# Patient Record
Sex: MALE | Race: BLACK OR AFRICAN AMERICAN | NOT HISPANIC OR LATINO | Employment: OTHER | RURAL
[De-identification: names, ages, dates, MRNs, and addresses within clinical notes are randomized per-mention and may not be internally consistent; named-entity substitution may affect disease eponyms.]

---

## 2020-10-08 ENCOUNTER — HISTORICAL (OUTPATIENT)
Dept: ADMINISTRATIVE | Facility: HOSPITAL | Age: 70
End: 2020-10-08

## 2020-10-08 LAB
ALBUMIN SERPL BCP-MCNC: 3 G/DL (ref 3.5–5)
ALBUMIN/GLOB SERPL: 0.7 {RATIO}
ALP SERPL-CCNC: 58 U/L (ref 45–115)
ALT SERPL W P-5'-P-CCNC: 13 U/L (ref 16–61)
ANION GAP SERPL CALCULATED.3IONS-SCNC: 16 MMOL/L
ANISOCYTOSIS BLD QL SMEAR: ABNORMAL
APTT PPP: 29.4 SECONDS (ref 25.2–37.3)
AST SERPL W P-5'-P-CCNC: 24 U/L (ref 15–37)
BASOPHILS # BLD AUTO: 0.01 X10E3/UL (ref 0–0.2)
BASOPHILS NFR BLD AUTO: 0.1 % (ref 0–1)
BILIRUB SERPL-MCNC: 0.3 MG/DL (ref 0–1.2)
BUN SERPL-MCNC: 107 MG/DL (ref 7–18)
BUN/CREAT SERPL: 15.9
CALCIUM SERPL-MCNC: 9.8 MG/DL (ref 8.5–10.1)
CHLORIDE SERPL-SCNC: 100 MMOL/L (ref 98–107)
CO2 SERPL-SCNC: 21 MMOL/L (ref 21–32)
CREAT SERPL-MCNC: 6.71 MG/DL (ref 0.7–1.3)
CREAT UR-MCNC: 106 MG/DL (ref 39–259)
D DIMER PPP FEU-MCNC: 0.59 MG/ML (ref 0–0.47)
EOSINOPHIL # BLD AUTO: 0.07 X10E3/UL (ref 0–0.5)
EOSINOPHIL NFR BLD AUTO: 0.6 % (ref 1–4)
ERYTHROCYTE [DISTWIDTH] IN BLOOD BY AUTOMATED COUNT: 18.2 % (ref 11.5–14.5)
GLOBULIN SER-MCNC: 4.3 G/DL (ref 2–4)
GLUCOSE SERPL-MCNC: 124 MG/DL (ref 74–106)
HCT VFR BLD AUTO: 25.8 % (ref 40–54)
HCT VFR BLD AUTO: 28.6 % (ref 40–54)
HGB BLD-MCNC: 8.1 G/DL (ref 13.5–18)
HGB BLD-MCNC: 9.3 G/DL (ref 13.5–18)
INR BLD: 1.1 (ref 0–3.4)
LYMPHOCYTES # BLD AUTO: 2.6 X10E3/UL (ref 1–4.8)
LYMPHOCYTES NFR BLD AUTO: 24.1 % (ref 27–41)
MCH RBC QN AUTO: 24.1 PG (ref 27–31)
MCHC RBC AUTO-ENTMCNC: 32.5 G/DL (ref 32–36)
MCV RBC AUTO: 74 FL (ref 80–96)
MICROCYTES BLD QL SMEAR: ABNORMAL
MONOCYTES # BLD AUTO: 1.04 X10E3/UL (ref 0–0.8)
MONOCYTES NFR BLD AUTO: 9.6 % (ref 2–6)
MPC BLD CALC-MCNC: 9.2 FL (ref 9.4–12.4)
NEUTROPHILS # BLD AUTO: 7.08 X10E3/UL (ref 1.8–7.7)
NEUTROPHILS NFR BLD AUTO: 65.6 % (ref 53–65)
OVALOCYTES BLD QL SMEAR: ABNORMAL
PLATELET # BLD AUTO: 283 X10E3/UL (ref 150–400)
PLATELET # BLD AUTO: 352 X10E3/UL (ref 150–400)
PLATELET MORPHOLOGY: NORMAL
POTASSIUM SERPL-SCNC: 4.8 MMOL/L (ref 3.5–5.1)
PROT SERPL-MCNC: 7.3 G/DL (ref 6.4–8.2)
PROT UR-MCNC: 23.7 MG/DL (ref 28–141)
PROT/CREAT 24H UR-RTO: 0.2 MG/G
PROTHROMBIN TIME: 13.2 SECONDS (ref 11.7–14.7)
RBC # BLD AUTO: 3.86 X10E6/UL (ref 4.6–6.2)
SARS-COV+SARS-COV-2 AG RESP QL IA.RAPID: NEGATIVE
SODIUM SERPL-SCNC: 132 MMOL/L (ref 136–145)
TROPONIN I SERPL-MCNC: 0.07 NG/ML (ref 0–0.06)
TROPONIN I SERPL-MCNC: 0.07 NG/ML (ref 0–0.06)
WBC # BLD AUTO: 10.8 X10E3/UL (ref 4.5–11)

## 2020-10-09 ENCOUNTER — HISTORICAL (OUTPATIENT)
Dept: ADMINISTRATIVE | Facility: HOSPITAL | Age: 70
End: 2020-10-09

## 2020-10-09 LAB
CK MB SERPL-MCNC: 2 NG/ML (ref 1–3.6)
CK MB SERPL-MCNC: 2.1 NG/ML (ref 1–3.6)
HCT VFR BLD AUTO: 25.4 % (ref 40–54)
HCT VFR BLD AUTO: 26.4 % (ref 40–54)
HGB BLD-MCNC: 7.8 G/DL (ref 13.5–18)
HGB BLD-MCNC: 8.5 G/DL (ref 13.5–18)
MYOGLOBIN SERPL-MCNC: 239 NG/ML (ref 16–116)
MYOGLOBIN SERPL-MCNC: 264 NG/ML (ref 16–116)
PLATELET # BLD AUTO: 248 X10E3/UL (ref 150–400)
TROPONIN I SERPL-MCNC: 0.06 NG/ML (ref 0–0.06)

## 2020-10-10 ENCOUNTER — HISTORICAL (OUTPATIENT)
Dept: ADMINISTRATIVE | Facility: HOSPITAL | Age: 70
End: 2020-10-10

## 2020-10-10 LAB
ALBUMIN SERPL BCP-MCNC: 2.5 G/DL (ref 3.5–5)
ALBUMIN/GLOB SERPL: 0.7 {RATIO}
ALP SERPL-CCNC: 48 U/L (ref 45–115)
ALT SERPL W P-5'-P-CCNC: 11 U/L (ref 16–61)
ANION GAP SERPL CALCULATED.3IONS-SCNC: 13 MMOL/L
AST SERPL W P-5'-P-CCNC: 22 U/L (ref 15–37)
BASOPHILS # BLD AUTO: 0.02 X10E3/UL (ref 0–0.2)
BASOPHILS NFR BLD AUTO: 0.3 % (ref 0–1)
BILIRUB SERPL-MCNC: 0.3 MG/DL (ref 0–1.2)
BUN SERPL-MCNC: 69 MG/DL (ref 7–18)
BUN/CREAT SERPL: 16.5
CALCIUM SERPL-MCNC: 8.9 MG/DL (ref 8.5–10.1)
CHLORIDE SERPL-SCNC: 106 MMOL/L (ref 98–107)
CO2 SERPL-SCNC: 20 MMOL/L (ref 21–32)
CREAT SERPL-MCNC: 4.18 MG/DL (ref 0.7–1.3)
EOSINOPHIL # BLD AUTO: 0.21 X10E3/UL (ref 0–0.5)
EOSINOPHIL NFR BLD AUTO: 2.9 % (ref 1–4)
ERYTHROCYTE [DISTWIDTH] IN BLOOD BY AUTOMATED COUNT: 18.6 % (ref 11.5–14.5)
GLOBULIN SER-MCNC: 3.5 G/DL (ref 2–4)
GLUCOSE SERPL-MCNC: 86 MG/DL (ref 74–106)
HCT VFR BLD AUTO: 22.8 % (ref 40–54)
HGB BLD-MCNC: 7.2 G/DL (ref 13.5–18)
IMM GRANULOCYTES # BLD AUTO: 0.04 X10E3/UL (ref 0–0.04)
IMM GRANULOCYTES NFR BLD: 0.5 % (ref 0–0.4)
LYMPHOCYTES # BLD AUTO: 2.06 X10E3/UL (ref 1–4.8)
LYMPHOCYTES NFR BLD AUTO: 28.3 % (ref 27–41)
MCH RBC QN AUTO: 24.6 PG (ref 27–31)
MCHC RBC AUTO-ENTMCNC: 31.6 G/DL (ref 32–36)
MCV RBC AUTO: 77.8 FL (ref 80–96)
MONOCYTES # BLD AUTO: 0.63 X10E3/UL (ref 0–0.8)
MONOCYTES NFR BLD AUTO: 8.6 % (ref 2–6)
MPC BLD CALC-MCNC: 9.3 FL (ref 9.4–12.4)
NEUTROPHILS # BLD AUTO: 4.33 X10E3/UL (ref 1.8–7.7)
NEUTROPHILS NFR BLD AUTO: 59.4 % (ref 53–65)
NRBC # BLD AUTO: 0 X10E3/UL (ref 0–0)
NRBC, AUTO (.00): 0 /100 (ref 0–0)
PLATELET # BLD AUTO: 229 X10E3/UL (ref 150–400)
POTASSIUM SERPL-SCNC: 4.5 MMOL/L (ref 3.5–5.1)
PROT SERPL-MCNC: 6 G/DL (ref 6.4–8.2)
RBC # BLD AUTO: 2.93 X10E6/UL (ref 4.6–6.2)
SODIUM SERPL-SCNC: 134 MMOL/L (ref 136–145)
WBC # BLD AUTO: 7.29 X10E3/UL (ref 4.5–11)

## 2020-10-11 ENCOUNTER — HISTORICAL (OUTPATIENT)
Dept: ADMINISTRATIVE | Facility: HOSPITAL | Age: 70
End: 2020-10-11

## 2020-10-11 LAB
ANION GAP SERPL CALCULATED.3IONS-SCNC: 13 MMOL/L
BASOPHILS # BLD AUTO: 0.03 X10E3/UL (ref 0–0.2)
BASOPHILS NFR BLD AUTO: 0.5 % (ref 0–1)
BUN SERPL-MCNC: 51 MG/DL (ref 7–18)
CALCIUM SERPL-MCNC: 9 MG/DL (ref 8.5–10.1)
CHLORIDE SERPL-SCNC: 106 MMOL/L (ref 98–107)
CO2 SERPL-SCNC: 21 MMOL/L (ref 21–32)
CREAT SERPL-MCNC: 3.34 MG/DL (ref 0.7–1.3)
EOSINOPHIL # BLD AUTO: 0.22 X10E3/UL (ref 0–0.5)
EOSINOPHIL NFR BLD AUTO: 3.5 % (ref 1–4)
ERYTHROCYTE [DISTWIDTH] IN BLOOD BY AUTOMATED COUNT: 18.9 % (ref 11.5–14.5)
GLUCOSE SERPL-MCNC: 105 MG/DL (ref 74–106)
HCT VFR BLD AUTO: 23.7 % (ref 40–54)
HGB BLD-MCNC: 7.4 G/DL (ref 13.5–18)
IMM GRANULOCYTES # BLD AUTO: 0.02 X10E3/UL (ref 0–0.04)
IMM GRANULOCYTES NFR BLD: 0.3 % (ref 0–0.4)
LYMPHOCYTES # BLD AUTO: 1.69 X10E3/UL (ref 1–4.8)
LYMPHOCYTES NFR BLD AUTO: 27 % (ref 27–41)
MCH RBC QN AUTO: 23.9 PG (ref 27–31)
MCHC RBC AUTO-ENTMCNC: 31.2 G/DL (ref 32–36)
MCV RBC AUTO: 76.5 FL (ref 80–96)
MONOCYTES # BLD AUTO: 0.4 X10E3/UL (ref 0–0.8)
MONOCYTES NFR BLD AUTO: 6.4 % (ref 2–6)
MPC BLD CALC-MCNC: 9.1 FL (ref 9.4–12.4)
NEUTROPHILS # BLD AUTO: 3.89 X10E3/UL (ref 1.8–7.7)
NEUTROPHILS NFR BLD AUTO: 62.3 % (ref 53–65)
NRBC # BLD AUTO: 0 X10E3/UL (ref 0–0)
NRBC, AUTO (.00): 0 /100 (ref 0–0)
PLATELET # BLD AUTO: 249 X10E3/UL (ref 150–400)
POTASSIUM SERPL-SCNC: 4.2 MMOL/L (ref 3.5–5.1)
RBC # BLD AUTO: 3.1 X10E6/UL (ref 4.6–6.2)
SODIUM SERPL-SCNC: 136 MMOL/L (ref 136–145)
TROPONIN I SERPL-MCNC: 0.04 NG/ML (ref 0–0.06)
WBC # BLD AUTO: 6.25 X10E3/UL (ref 4.5–11)

## 2020-10-21 ENCOUNTER — HISTORICAL (OUTPATIENT)
Dept: ADMINISTRATIVE | Facility: HOSPITAL | Age: 70
End: 2020-10-21

## 2020-10-21 LAB
ALBUMIN SERPL BCP-MCNC: 3.5 G/DL (ref 3.5–5)
ALBUMIN/GLOB SERPL: 0.9 {RATIO}
ALP SERPL-CCNC: 78 U/L (ref 45–115)
ALT SERPL W P-5'-P-CCNC: 15 U/L (ref 16–61)
ANION GAP SERPL CALCULATED.3IONS-SCNC: 14 MMOL/L
AST SERPL W P-5'-P-CCNC: 15 U/L (ref 15–37)
BACTERIA #/AREA URNS HPF: ABNORMAL /HPF
BASOPHILS # BLD AUTO: 0.02 X10E3/UL (ref 0–0.2)
BASOPHILS NFR BLD AUTO: 0.3 % (ref 0–1)
BILIRUB SERPL-MCNC: 0.5 MG/DL (ref 0–1.2)
BILIRUB UR QL STRIP: NEGATIVE MG/DL
BUN SERPL-MCNC: 21 MG/DL (ref 7–18)
BUN/CREAT SERPL: 6
CALCIUM SERPL-MCNC: 9.9 MG/DL (ref 8.5–10.1)
CHLORIDE SERPL-SCNC: 103 MMOL/L (ref 98–107)
CLARITY UR: CLEAR
CLARITY UR: CLEAR
CO2 SERPL-SCNC: 23 MMOL/L (ref 21–32)
COLOR UR: YELLOW
COLOR UR: YELLOW
CREAT SERPL-MCNC: 3.5 MG/DL (ref 0.7–1.3)
EOSINOPHIL # BLD AUTO: 0.06 X10E3/UL (ref 0–0.5)
EOSINOPHIL NFR BLD AUTO: 0.8 % (ref 1–4)
ERYTHROCYTE [DISTWIDTH] IN BLOOD BY AUTOMATED COUNT: 19.1 % (ref 11.5–14.5)
GLOBULIN SER-MCNC: 4 G/DL (ref 2–4)
GLUCOSE SERPL-MCNC: 101 MG/DL (ref 70–105)
GLUCOSE SERPL-MCNC: 119 MG/DL (ref 74–106)
GLUCOSE SERPL-MCNC: 134 MG/DL (ref 70–105)
GLUCOSE UR STRIP-MCNC: NORMAL MG/DL
HCT VFR BLD AUTO: 28.9 % (ref 40–54)
HGB BLD-MCNC: 9.6 G/DL (ref 13.5–18)
KETONES UR STRIP-SCNC: NEGATIVE MG/DL
LEUKOCYTE ESTERASE UR QL STRIP: NEGATIVE LEU/UL
LYMPHOCYTES # BLD AUTO: 1.4 X10E3/UL (ref 1–4.8)
LYMPHOCYTES NFR BLD AUTO: 19.4 % (ref 27–41)
MAGNESIUM SERPL-MCNC: 1.5 MG/DL (ref 1.7–2.3)
MCH RBC QN AUTO: 25.7 PG (ref 27–31)
MCHC RBC AUTO-ENTMCNC: 33.2 G/DL (ref 32–36)
MCV RBC AUTO: 78 FL (ref 80–96)
MONOCYTES # BLD AUTO: 0.56 X10E3/UL (ref 0–0.8)
MONOCYTES NFR BLD AUTO: 7.8 % (ref 2–6)
MPC BLD CALC-MCNC: 9 FL (ref 9.4–12.4)
NEUTROPHILS # BLD AUTO: 5.17 X10E3/UL (ref 1.8–7.7)
NEUTROPHILS NFR BLD AUTO: 71.7 % (ref 53–65)
NITRITE UR QL STRIP: NEGATIVE
PH UR STRIP: 5.5 PH UNITS (ref 5–8)
PLATELET # BLD AUTO: 369 X10E3/UL (ref 150–400)
POTASSIUM SERPL-SCNC: 3.8 MMOL/L (ref 3.5–5.1)
PREALB SERPL NEPH-MCNC: 24 MG/DL (ref 20–40)
PROT SERPL-MCNC: 7.5 G/DL (ref 6.4–8.2)
PROT UR QL STRIP: 30 MG/DL
RBC # BLD AUTO: 3.73 X10E6/UL (ref 4.6–6.2)
RBC # UR STRIP: NEGATIVE ERY/UL
RBC #/AREA URNS HPF: ABNORMAL /HPF (ref 0–3)
SARS-COV+SARS-COV-2 AG RESP QL IA.RAPID: NEGATIVE
SODIUM SERPL-SCNC: 136 MMOL/L (ref 136–145)
SP GR UR STRIP: 1.01 (ref 1–1.03)
SQUAMOUS #/AREA URNS LPF: ABNORMAL /LPF
TROPONIN I SERPL-MCNC: 0.04 NG/ML (ref 0–0.06)
TSH SERPL DL<=0.005 MIU/L-ACNC: 2.49 UIU/ML (ref 0.36–3.74)
UROBILINOGEN UR STRIP-ACNC: 0.2 MG/DL
WBC # BLD AUTO: 7.21 X10E3/UL (ref 4.5–11)
WBC #/AREA URNS HPF: ABNORMAL /HPF (ref 0–5)

## 2020-10-22 ENCOUNTER — HISTORICAL (OUTPATIENT)
Dept: ADMINISTRATIVE | Facility: HOSPITAL | Age: 70
End: 2020-10-22

## 2020-10-22 LAB
GLUCOSE SERPL-MCNC: 89 MG/DL (ref 70–105)
GLUCOSE SERPL-MCNC: 97 MG/DL (ref 70–105)

## 2020-10-23 ENCOUNTER — HISTORICAL (OUTPATIENT)
Dept: ADMINISTRATIVE | Facility: HOSPITAL | Age: 70
End: 2020-10-23

## 2020-10-23 LAB
GLUCOSE SERPL-MCNC: 118 MG/DL (ref 70–105)
GLUCOSE SERPL-MCNC: 135 MG/DL (ref 70–105)

## 2020-10-24 ENCOUNTER — HISTORICAL (OUTPATIENT)
Dept: ADMINISTRATIVE | Facility: HOSPITAL | Age: 70
End: 2020-10-24

## 2020-10-24 LAB
GLUCOSE SERPL-MCNC: 104 MG/DL (ref 70–105)
GLUCOSE SERPL-MCNC: 138 MG/DL (ref 70–105)

## 2020-10-25 ENCOUNTER — HISTORICAL (OUTPATIENT)
Dept: ADMINISTRATIVE | Facility: HOSPITAL | Age: 70
End: 2020-10-25

## 2020-10-25 LAB
GLUCOSE SERPL-MCNC: 115 MG/DL (ref 70–105)
GLUCOSE SERPL-MCNC: 120 MG/DL (ref 70–105)

## 2020-10-26 ENCOUNTER — HISTORICAL (OUTPATIENT)
Dept: ADMINISTRATIVE | Facility: HOSPITAL | Age: 70
End: 2020-10-26

## 2020-10-26 LAB
GLUCOSE SERPL-MCNC: 95 MG/DL (ref 70–105)
GLUCOSE SERPL-MCNC: 96 MG/DL (ref 70–105)

## 2020-10-27 ENCOUNTER — HISTORICAL (OUTPATIENT)
Dept: ADMINISTRATIVE | Facility: HOSPITAL | Age: 70
End: 2020-10-27

## 2020-10-27 LAB
GLUCOSE SERPL-MCNC: 109 MG/DL (ref 70–105)
GLUCOSE SERPL-MCNC: 124 MG/DL (ref 70–105)

## 2020-10-28 ENCOUNTER — HISTORICAL (OUTPATIENT)
Dept: ADMINISTRATIVE | Facility: HOSPITAL | Age: 70
End: 2020-10-28

## 2020-10-28 LAB
ABO: NORMAL
ANION GAP SERPL CALCULATED.3IONS-SCNC: 12 MMOL/L
ANTIBODY SCREEN: NEGATIVE
BASOPHILS # BLD AUTO: 0.01 X10E3/UL (ref 0–0.2)
BASOPHILS NFR BLD AUTO: 0.2 % (ref 0–1)
BUN SERPL-MCNC: 22 MG/DL (ref 7–18)
CALCIUM SERPL-MCNC: 8.9 MG/DL (ref 8.5–10.1)
CHLORIDE SERPL-SCNC: 106 MMOL/L (ref 98–107)
CO2 SERPL-SCNC: 24 MMOL/L (ref 21–32)
CREAT SERPL-MCNC: 2.86 MG/DL (ref 0.7–1.3)
CROSSMATCH INTERPRETATION: 1
CROSSMATCH INTERPRETATION: 1
EOSINOPHIL # BLD AUTO: 0.13 X10E3/UL (ref 0–0.5)
EOSINOPHIL NFR BLD AUTO: 2.1 % (ref 1–4)
ERYTHROCYTE [DISTWIDTH] IN BLOOD BY AUTOMATED COUNT: 18.1 % (ref 11.5–14.5)
GLUCOSE SERPL-MCNC: 118 MG/DL (ref 70–105)
GLUCOSE SERPL-MCNC: 94 MG/DL (ref 74–106)
HCT VFR BLD AUTO: 20.8 % (ref 40–54)
HCT VFR BLD AUTO: 25.4 % (ref 40–54)
HGB BLD-MCNC: 6.4 G/DL (ref 13.5–18)
HGB BLD-MCNC: 8.1 G/DL (ref 13.5–18)
LYMPHOCYTES # BLD AUTO: 1.73 X10E3/UL (ref 1–4.8)
LYMPHOCYTES NFR BLD AUTO: 28.2 % (ref 27–41)
MCH RBC QN AUTO: 23.7 PG (ref 27–31)
MCHC RBC AUTO-ENTMCNC: 30.8 G/DL (ref 32–36)
MCV RBC AUTO: 77 FL (ref 80–96)
MONOCYTES # BLD AUTO: 0.55 X10E3/UL (ref 0–0.8)
MONOCYTES NFR BLD AUTO: 9 % (ref 2–6)
MPC BLD CALC-MCNC: 8.2 FL (ref 9.4–12.4)
NEUTROPHILS # BLD AUTO: 3.71 X10E3/UL (ref 1.8–7.7)
NEUTROPHILS NFR BLD AUTO: 60.5 % (ref 53–65)
PACKED RBC'S: 1
PACKED RBC'S: 1
PLATELET # BLD AUTO: 261 X10E3/UL (ref 150–400)
POTASSIUM SERPL-SCNC: 4.2 MMOL/L (ref 3.5–5.1)
RBC # BLD AUTO: 2.7 X10E6/UL (ref 4.6–6.2)
RH TYPE: POSITIVE
SODIUM SERPL-SCNC: 138 MMOL/L (ref 136–145)
UNIT NUMBER: 1
UNIT NUMBER: 1
UNIT STATUS: 1
UNIT STATUS: 1
WBC # BLD AUTO: 6.13 X10E3/UL (ref 4.5–11)

## 2020-10-29 ENCOUNTER — HISTORICAL (OUTPATIENT)
Dept: ADMINISTRATIVE | Facility: HOSPITAL | Age: 70
End: 2020-10-29

## 2020-10-29 LAB
BASOPHILS # BLD AUTO: 0.02 X10E3/UL (ref 0–0.2)
BASOPHILS NFR BLD AUTO: 0.3 % (ref 0–1)
EOSINOPHIL # BLD AUTO: 0.23 X10E3/UL (ref 0–0.5)
EOSINOPHIL NFR BLD AUTO: 3.2 % (ref 1–4)
ERYTHROCYTE [DISTWIDTH] IN BLOOD BY AUTOMATED COUNT: 18.1 % (ref 11.5–14.5)
GLUCOSE SERPL-MCNC: 87 MG/DL (ref 70–105)
GLUCOSE SERPL-MCNC: 88 MG/DL (ref 70–105)
HCT VFR BLD AUTO: 25.7 % (ref 40–54)
HGB BLD-MCNC: 8.3 G/DL (ref 13.5–18)
LYMPHOCYTES # BLD AUTO: 1.88 X10E3/UL (ref 1–4.8)
LYMPHOCYTES NFR BLD AUTO: 26.4 % (ref 27–41)
MCH RBC QN AUTO: 25.2 PG (ref 27–31)
MCHC RBC AUTO-ENTMCNC: 32.3 G/DL (ref 32–36)
MCV RBC AUTO: 78 FL (ref 80–96)
MONOCYTES # BLD AUTO: 0.61 X10E3/UL (ref 0–0.8)
MONOCYTES NFR BLD AUTO: 8.6 % (ref 2–6)
MPC BLD CALC-MCNC: 9 FL (ref 9.4–12.4)
NEUTROPHILS # BLD AUTO: 4.38 X10E3/UL (ref 1.8–7.7)
NEUTROPHILS NFR BLD AUTO: 61.5 % (ref 53–65)
PLATELET # BLD AUTO: 284 X10E3/UL (ref 150–400)
RBC # BLD AUTO: 3.3 X10E6/UL (ref 4.6–6.2)
WBC # BLD AUTO: 7.12 X10E3/UL (ref 4.5–11)

## 2020-10-30 ENCOUNTER — HISTORICAL (OUTPATIENT)
Dept: ADMINISTRATIVE | Facility: HOSPITAL | Age: 70
End: 2020-10-30

## 2020-10-30 LAB
GLUCOSE SERPL-MCNC: 113 MG/DL (ref 70–105)
GLUCOSE SERPL-MCNC: 137 MG/DL (ref 70–105)
GLUCOSE SERPL-MCNC: 90 MG/DL (ref 70–105)

## 2020-10-31 ENCOUNTER — HISTORICAL (OUTPATIENT)
Dept: ADMINISTRATIVE | Facility: HOSPITAL | Age: 70
End: 2020-10-31

## 2020-10-31 LAB
GLUCOSE SERPL-MCNC: 104 MG/DL (ref 70–105)
GLUCOSE SERPL-MCNC: 106 MG/DL (ref 70–105)

## 2020-11-01 ENCOUNTER — HISTORICAL (OUTPATIENT)
Dept: ADMINISTRATIVE | Facility: HOSPITAL | Age: 70
End: 2020-11-01

## 2020-11-01 LAB
GLUCOSE SERPL-MCNC: 108 MG/DL (ref 70–105)
GLUCOSE SERPL-MCNC: 87 MG/DL (ref 70–105)

## 2020-11-02 ENCOUNTER — HISTORICAL (OUTPATIENT)
Dept: ADMINISTRATIVE | Facility: HOSPITAL | Age: 70
End: 2020-11-02

## 2020-11-02 LAB
GLUCOSE SERPL-MCNC: 91 MG/DL (ref 70–105)
GLUCOSE SERPL-MCNC: 92 MG/DL (ref 70–105)

## 2020-11-03 ENCOUNTER — HISTORICAL (OUTPATIENT)
Dept: ADMINISTRATIVE | Facility: HOSPITAL | Age: 70
End: 2020-11-03

## 2020-11-03 LAB
GLUCOSE SERPL-MCNC: 95 MG/DL (ref 70–105)
GLUCOSE SERPL-MCNC: 95 MG/DL (ref 70–105)

## 2020-11-04 ENCOUNTER — HISTORICAL (OUTPATIENT)
Dept: ADMINISTRATIVE | Facility: HOSPITAL | Age: 70
End: 2020-11-04

## 2020-11-04 LAB
ANION GAP SERPL CALCULATED.3IONS-SCNC: 12 MMOL/L
BASOPHILS # BLD AUTO: 0.02 X10E3/UL (ref 0–0.2)
BASOPHILS NFR BLD AUTO: 0.3 % (ref 0–1)
BUN SERPL-MCNC: 19 MG/DL (ref 7–18)
CALCIUM SERPL-MCNC: 9.1 MG/DL (ref 8.5–10.1)
CHLORIDE SERPL-SCNC: 108 MMOL/L (ref 98–107)
CO2 SERPL-SCNC: 23 MMOL/L (ref 21–32)
CREAT SERPL-MCNC: 2.64 MG/DL (ref 0.7–1.3)
EOSINOPHIL # BLD AUTO: 0.11 X10E3/UL (ref 0–0.5)
EOSINOPHIL NFR BLD AUTO: 1.4 % (ref 1–4)
ERYTHROCYTE [DISTWIDTH] IN BLOOD BY AUTOMATED COUNT: 20.6 % (ref 11.5–14.5)
GLUCOSE SERPL-MCNC: 111 MG/DL (ref 70–105)
GLUCOSE SERPL-MCNC: 90 MG/DL (ref 74–106)
HCT VFR BLD AUTO: 28.2 % (ref 40–54)
HGB BLD-MCNC: 8.8 G/DL (ref 13.5–18)
LYMPHOCYTES # BLD AUTO: 1.75 X10E3/UL (ref 1–4.8)
LYMPHOCYTES NFR BLD AUTO: 22.1 % (ref 27–41)
MCH RBC QN AUTO: 25.4 PG (ref 27–31)
MCHC RBC AUTO-ENTMCNC: 31.2 G/DL (ref 32–36)
MCV RBC AUTO: 82 FL (ref 80–96)
MONOCYTES # BLD AUTO: 0.65 X10E3/UL (ref 0–0.8)
MONOCYTES NFR BLD AUTO: 8.2 % (ref 2–6)
MPC BLD CALC-MCNC: 8.7 FL (ref 9.4–12.4)
NEUTROPHILS # BLD AUTO: 5.4 X10E3/UL (ref 1.8–7.7)
NEUTROPHILS NFR BLD AUTO: 68 % (ref 53–65)
PLATELET # BLD AUTO: 273 X10E3/UL (ref 150–400)
POTASSIUM SERPL-SCNC: 4.4 MMOL/L (ref 3.5–5.1)
RBC # BLD AUTO: 3.46 X10E6/UL (ref 4.6–6.2)
SODIUM SERPL-SCNC: 139 MMOL/L (ref 136–145)
WBC # BLD AUTO: 7.93 X10E3/UL (ref 4.5–11)

## 2020-11-05 ENCOUNTER — HISTORICAL (OUTPATIENT)
Dept: ADMINISTRATIVE | Facility: HOSPITAL | Age: 70
End: 2020-11-05

## 2020-11-05 LAB
GLUCOSE SERPL-MCNC: 102 MG/DL (ref 70–105)
GLUCOSE SERPL-MCNC: 91 MG/DL (ref 70–105)

## 2020-11-06 ENCOUNTER — HISTORICAL (OUTPATIENT)
Dept: ADMINISTRATIVE | Facility: HOSPITAL | Age: 70
End: 2020-11-06

## 2020-11-06 LAB
GLUCOSE SERPL-MCNC: 123 MG/DL (ref 70–105)
GLUCOSE SERPL-MCNC: 91 MG/DL (ref 70–105)

## 2020-11-07 ENCOUNTER — HISTORICAL (OUTPATIENT)
Dept: ADMINISTRATIVE | Facility: HOSPITAL | Age: 70
End: 2020-11-07

## 2020-11-07 LAB
GLUCOSE SERPL-MCNC: 101 MG/DL (ref 70–105)
GLUCOSE SERPL-MCNC: 91 MG/DL (ref 70–105)

## 2020-11-08 ENCOUNTER — HISTORICAL (OUTPATIENT)
Dept: ADMINISTRATIVE | Facility: HOSPITAL | Age: 70
End: 2020-11-08

## 2020-11-08 LAB
GLUCOSE SERPL-MCNC: 114 MG/DL (ref 70–105)
GLUCOSE SERPL-MCNC: 81 MG/DL (ref 70–105)

## 2020-11-09 ENCOUNTER — HISTORICAL (OUTPATIENT)
Dept: ADMINISTRATIVE | Facility: HOSPITAL | Age: 70
End: 2020-11-09

## 2020-11-09 LAB
GLUCOSE SERPL-MCNC: 92 MG/DL (ref 70–105)
GLUCOSE SERPL-MCNC: 97 MG/DL (ref 70–105)

## 2020-11-10 ENCOUNTER — HISTORICAL (OUTPATIENT)
Dept: ADMINISTRATIVE | Facility: HOSPITAL | Age: 70
End: 2020-11-10

## 2020-11-10 LAB — GLUCOSE SERPL-MCNC: 91 MG/DL (ref 70–105)

## 2021-03-10 ENCOUNTER — HISTORICAL (OUTPATIENT)
Dept: ADMINISTRATIVE | Facility: HOSPITAL | Age: 71
End: 2021-03-10

## 2021-03-10 LAB
FLUAV AG UPPER RESP QL IA.RAPID: NEGATIVE
FLUBV AG UPPER RESP QL IA.RAPID: NEGATIVE
SARS-COV+SARS-COV-2 AG RESP QL IA.RAPID: NEGATIVE

## 2021-12-11 ENCOUNTER — HOSPITAL ENCOUNTER (INPATIENT)
Facility: HOSPITAL | Age: 71
LOS: 4 days | Discharge: NURSING, REHAB OR LONG-TERM CARE | DRG: 534 | End: 2021-12-15
Attending: FAMILY MEDICINE | Admitting: FAMILY MEDICINE
Payer: MEDICARE

## 2021-12-11 DIAGNOSIS — S72.109A: ICD-10-CM
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
## 2021-12-11 DIAGNOSIS — R05.9 COUGH: ICD-10-CM

## 2021-12-11 DIAGNOSIS — S72.8X1A OTHER FRACTURE OF RIGHT FEMUR, INITIAL ENCOUNTER FOR CLOSED FRACTURE: Primary | ICD-10-CM

## 2021-12-11 DIAGNOSIS — S72.101A CLOSED FRACTURE OF TROCHANTER OF RIGHT FEMUR, INITIAL ENCOUNTER: ICD-10-CM

## 2021-12-11 LAB
ANION GAP SERPL CALCULATED.3IONS-SCNC: 12 MMOL/L (ref 7–16)
BASOPHILS # BLD AUTO: 0.02 K/UL (ref 0–0.2)
BASOPHILS NFR BLD AUTO: 0.3 % (ref 0–1)
BUN SERPL-MCNC: 26 MG/DL (ref 7–18)
BUN/CREAT SERPL: 9 (ref 6–20)
CALCIUM SERPL-MCNC: 8.6 MG/DL (ref 8.5–10.1)
CHLORIDE SERPL-SCNC: 106 MMOL/L (ref 98–107)
CO2 SERPL-SCNC: 25 MMOL/L (ref 21–32)
CREAT SERPL-MCNC: 2.91 MG/DL (ref 0.7–1.3)
DIFFERENTIAL METHOD BLD: ABNORMAL
EOSINOPHIL # BLD AUTO: 0.14 K/UL (ref 0–0.5)
EOSINOPHIL NFR BLD AUTO: 1.8 % (ref 1–4)
ERYTHROCYTE [DISTWIDTH] IN BLOOD BY AUTOMATED COUNT: 13.2 % (ref 11.5–14.5)
FLUAV AG UPPER RESP QL IA.RAPID: NEGATIVE
FLUBV AG UPPER RESP QL IA.RAPID: NEGATIVE
GLUCOSE SERPL-MCNC: 101 MG/DL (ref 74–106)
HCT VFR BLD AUTO: 33 % (ref 40–54)
HGB BLD-MCNC: 11.3 G/DL (ref 13.5–18)
LYMPHOCYTES # BLD AUTO: 0.84 K/UL (ref 1–4.8)
LYMPHOCYTES NFR BLD AUTO: 11 % (ref 27–41)
MCH RBC QN AUTO: 33.9 PG (ref 27–31)
MCHC RBC AUTO-ENTMCNC: 34.2 G/DL (ref 32–36)
MCV RBC AUTO: 99.1 FL (ref 80–96)
MONOCYTES # BLD AUTO: 0.53 K/UL (ref 0–0.8)
MONOCYTES NFR BLD AUTO: 6.9 % (ref 2–6)
MPC BLD CALC-MCNC: 8.7 FL (ref 9.4–12.4)
NEUTROPHILS # BLD AUTO: 6.13 K/UL (ref 1.8–7.7)
NEUTROPHILS NFR BLD AUTO: 80 % (ref 53–65)
PLATELET # BLD AUTO: 196 K/UL (ref 150–400)
POTASSIUM SERPL-SCNC: 4.5 MMOL/L (ref 3.5–5.1)
RBC # BLD AUTO: 3.33 M/UL (ref 4.6–6.2)
SARS-COV+SARS-COV-2 AG RESP QL IA.RAPID: NEGATIVE
SODIUM SERPL-SCNC: 138 MMOL/L (ref 136–145)
WBC # BLD AUTO: 7.66 K/UL (ref 4.5–11)

## 2021-12-11 PROCEDURE — 85025 COMPLETE CBC W/AUTO DIFF WBC: CPT | Performed by: NURSE PRACTITIONER

## 2021-12-11 PROCEDURE — 94640 AIRWAY INHALATION TREATMENT: CPT

## 2021-12-11 PROCEDURE — 99285 EMERGENCY DEPT VISIT HI MDM: CPT | Mod: 25

## 2021-12-11 PROCEDURE — 96375 TX/PRO/DX INJ NEW DRUG ADDON: CPT

## 2021-12-11 PROCEDURE — 27000941

## 2021-12-11 PROCEDURE — 25000242 PHARM REV CODE 250 ALT 637 W/ HCPCS: Performed by: NURSE PRACTITIONER

## 2021-12-11 PROCEDURE — 25000003 PHARM REV CODE 250: Performed by: PHYSICIAN ASSISTANT

## 2021-12-11 PROCEDURE — 96374 THER/PROPH/DIAG INJ IV PUSH: CPT

## 2021-12-11 PROCEDURE — 63600175 PHARM REV CODE 636 W HCPCS: Performed by: NURSE PRACTITIONER

## 2021-12-11 PROCEDURE — 96376 TX/PRO/DX INJ SAME DRUG ADON: CPT

## 2021-12-11 PROCEDURE — 36415 COLL VENOUS BLD VENIPUNCTURE: CPT | Performed by: NURSE PRACTITIONER

## 2021-12-11 PROCEDURE — 25000003 PHARM REV CODE 250: Performed by: NURSE PRACTITIONER

## 2021-12-11 PROCEDURE — 99223 PR INITIAL HOSPITAL CARE,LEVL III: ICD-10-PCS | Mod: AI,,, | Performed by: FAMILY MEDICINE

## 2021-12-11 PROCEDURE — 87428 SARSCOV & INF VIR A&B AG IA: CPT | Performed by: NURSE PRACTITIONER

## 2021-12-11 PROCEDURE — 99900035 HC TECH TIME PER 15 MIN (STAT)

## 2021-12-11 PROCEDURE — 83036 HEMOGLOBIN GLYCOSYLATED A1C: CPT | Performed by: NURSE PRACTITIONER

## 2021-12-11 PROCEDURE — 99223 1ST HOSP IP/OBS HIGH 75: CPT | Mod: AI,,, | Performed by: FAMILY MEDICINE

## 2021-12-11 PROCEDURE — 99285 EMERGENCY DEPT VISIT HI MDM: CPT | Mod: GF | Performed by: NURSE PRACTITIONER

## 2021-12-11 PROCEDURE — 94761 N-INVAS EAR/PLS OXIMETRY MLT: CPT

## 2021-12-11 PROCEDURE — 11000001 HC ACUTE MED/SURG PRIVATE ROOM

## 2021-12-11 PROCEDURE — 80048 BASIC METABOLIC PNL TOTAL CA: CPT | Performed by: NURSE PRACTITIONER

## 2021-12-11 RX ORDER — CLOPIDOGREL BISULFATE 75 MG/1
75 TABLET ORAL DAILY
Status: DISCONTINUED | OUTPATIENT
Start: 2021-12-12 | End: 2021-12-15 | Stop reason: HOSPADM

## 2021-12-11 RX ORDER — SODIUM CHLORIDE 0.9 % (FLUSH) 0.9 %
10 SYRINGE (ML) INJECTION
Status: DISCONTINUED | OUTPATIENT
Start: 2021-12-11 | End: 2021-12-15 | Stop reason: HOSPADM

## 2021-12-11 RX ORDER — TRAZODONE HYDROCHLORIDE 50 MG/1
300 TABLET ORAL NIGHTLY
Status: DISCONTINUED | OUTPATIENT
Start: 2021-12-11 | End: 2021-12-13

## 2021-12-11 RX ORDER — ATORVASTATIN CALCIUM 80 MG/1
40 TABLET, FILM COATED ORAL NIGHTLY
COMMUNITY
Start: 2021-01-28

## 2021-12-11 RX ORDER — LISINOPRIL 20 MG/1
20 TABLET ORAL DAILY
Status: DISCONTINUED | OUTPATIENT
Start: 2021-12-12 | End: 2021-12-15 | Stop reason: HOSPADM

## 2021-12-11 RX ORDER — AMLODIPINE BESYLATE 10 MG/1
10 TABLET ORAL DAILY
COMMUNITY
Start: 2021-01-28

## 2021-12-11 RX ORDER — ISOSORBIDE MONONITRATE 30 MG/1
60 TABLET, EXTENDED RELEASE ORAL DAILY
Status: DISCONTINUED | OUTPATIENT
Start: 2021-12-12 | End: 2021-12-15 | Stop reason: HOSPADM

## 2021-12-11 RX ORDER — ALLOPURINOL 100 MG/1
TABLET ORAL
Status: ON HOLD | COMMUNITY
Start: 2021-01-28 | End: 2022-07-20 | Stop reason: HOSPADM

## 2021-12-11 RX ORDER — MORPHINE SULFATE 10 MG/ML
4 INJECTION INTRAMUSCULAR; INTRAVENOUS; SUBCUTANEOUS
Status: COMPLETED | OUTPATIENT
Start: 2021-12-11 | End: 2021-12-11

## 2021-12-11 RX ORDER — CETIRIZINE HYDROCHLORIDE 10 MG/1
10 TABLET ORAL DAILY
Status: DISCONTINUED | OUTPATIENT
Start: 2021-12-12 | End: 2021-12-15 | Stop reason: HOSPADM

## 2021-12-11 RX ORDER — ALBUTEROL SULFATE 90 UG/1
2 AEROSOL, METERED RESPIRATORY (INHALATION) EVERY 6 HOURS PRN
Status: ON HOLD | COMMUNITY
End: 2021-12-23 | Stop reason: HOSPADM

## 2021-12-11 RX ORDER — TALC
6 POWDER (GRAM) TOPICAL NIGHTLY PRN
Status: DISCONTINUED | OUTPATIENT
Start: 2021-12-11 | End: 2021-12-15 | Stop reason: HOSPADM

## 2021-12-11 RX ORDER — METOPROLOL TARTRATE 25 MG/1
25 TABLET, FILM COATED ORAL DAILY
Status: DISCONTINUED | OUTPATIENT
Start: 2021-12-12 | End: 2021-12-15 | Stop reason: HOSPADM

## 2021-12-11 RX ORDER — ISOSORBIDE MONONITRATE 60 MG/1
1 TABLET, EXTENDED RELEASE ORAL DAILY
COMMUNITY
Start: 2021-10-08 | End: 2022-07-20

## 2021-12-11 RX ORDER — HYDROCODONE BITARTRATE AND ACETAMINOPHEN 10; 325 MG/1; MG/1
1 TABLET ORAL EVERY 4 HOURS PRN
Status: DISCONTINUED | OUTPATIENT
Start: 2021-12-12 | End: 2021-12-15 | Stop reason: HOSPADM

## 2021-12-11 RX ORDER — LISINOPRIL 20 MG/1
20 TABLET ORAL
COMMUNITY
End: 2022-07-20

## 2021-12-11 RX ORDER — ASPIRIN 325 MG
325 TABLET, DELAYED RELEASE (ENTERIC COATED) ORAL DAILY
COMMUNITY
End: 2022-07-20

## 2021-12-11 RX ORDER — HYDROCODONE BITARTRATE AND ACETAMINOPHEN 10; 325 MG/1; MG/1
1 TABLET ORAL EVERY 6 HOURS PRN
Status: DISCONTINUED | OUTPATIENT
Start: 2021-12-11 | End: 2021-12-11

## 2021-12-11 RX ORDER — OMEPRAZOLE 20 MG/1
20 CAPSULE, DELAYED RELEASE ORAL DAILY
COMMUNITY
End: 2022-07-20

## 2021-12-11 RX ORDER — SODIUM CHLORIDE 9 MG/ML
INJECTION, SOLUTION INTRAVENOUS
Status: COMPLETED | OUTPATIENT
Start: 2021-12-11 | End: 2021-12-11

## 2021-12-11 RX ORDER — TRAZODONE HYDROCHLORIDE 100 MG/1
300 TABLET ORAL NIGHTLY
COMMUNITY

## 2021-12-11 RX ORDER — AMLODIPINE BESYLATE 5 MG/1
5 TABLET ORAL DAILY
Status: DISCONTINUED | OUTPATIENT
Start: 2021-12-12 | End: 2021-12-15 | Stop reason: HOSPADM

## 2021-12-11 RX ORDER — ALLOPURINOL 100 MG/1
100 TABLET ORAL DAILY
Status: DISCONTINUED | OUTPATIENT
Start: 2021-12-12 | End: 2021-12-15 | Stop reason: HOSPADM

## 2021-12-11 RX ORDER — POLYETHYLENE GLYCOL 3350 17 G/17G
17 POWDER, FOR SOLUTION ORAL DAILY
Status: DISCONTINUED | OUTPATIENT
Start: 2021-12-12 | End: 2021-12-15 | Stop reason: HOSPADM

## 2021-12-11 RX ORDER — ALBUTEROL SULFATE 90 UG/1
2 AEROSOL, METERED RESPIRATORY (INHALATION) EVERY 6 HOURS PRN
Status: DISCONTINUED | OUTPATIENT
Start: 2021-12-11 | End: 2021-12-15 | Stop reason: HOSPADM

## 2021-12-11 RX ORDER — ATORVASTATIN CALCIUM 40 MG/1
80 TABLET, FILM COATED ORAL DAILY
Status: DISCONTINUED | OUTPATIENT
Start: 2021-12-12 | End: 2021-12-15 | Stop reason: HOSPADM

## 2021-12-11 RX ORDER — ASPIRIN 325 MG
325 TABLET, DELAYED RELEASE (ENTERIC COATED) ORAL DAILY
Status: DISCONTINUED | OUTPATIENT
Start: 2021-12-12 | End: 2021-12-15 | Stop reason: HOSPADM

## 2021-12-11 RX ORDER — PANTOPRAZOLE SODIUM 40 MG/1
40 TABLET, DELAYED RELEASE ORAL DAILY
Status: DISCONTINUED | OUTPATIENT
Start: 2021-12-12 | End: 2021-12-15 | Stop reason: HOSPADM

## 2021-12-11 RX ORDER — LORATADINE 10 MG/1
TABLET ORAL
COMMUNITY
Start: 2021-10-08

## 2021-12-11 RX ORDER — IPRATROPIUM BROMIDE AND ALBUTEROL SULFATE 2.5; .5 MG/3ML; MG/3ML
3 SOLUTION RESPIRATORY (INHALATION) EVERY 4 HOURS PRN
Status: DISCONTINUED | OUTPATIENT
Start: 2021-12-11 | End: 2021-12-15 | Stop reason: HOSPADM

## 2021-12-11 RX ORDER — ONDANSETRON 2 MG/ML
4 INJECTION INTRAMUSCULAR; INTRAVENOUS
Status: COMPLETED | OUTPATIENT
Start: 2021-12-11 | End: 2021-12-11

## 2021-12-11 RX ORDER — METOPROLOL TARTRATE 25 MG/1
25 TABLET, FILM COATED ORAL DAILY
Status: ON HOLD | COMMUNITY
End: 2022-07-20 | Stop reason: HOSPADM

## 2021-12-11 RX ORDER — IPRATROPIUM BROMIDE AND ALBUTEROL SULFATE 2.5; .5 MG/3ML; MG/3ML
3 SOLUTION RESPIRATORY (INHALATION)
Status: COMPLETED | OUTPATIENT
Start: 2021-12-11 | End: 2021-12-11

## 2021-12-11 RX ORDER — ACETAMINOPHEN 325 MG/1
650 TABLET ORAL EVERY 8 HOURS PRN
Status: DISCONTINUED | OUTPATIENT
Start: 2021-12-11 | End: 2021-12-15 | Stop reason: HOSPADM

## 2021-12-11 RX ORDER — ONDANSETRON 2 MG/ML
4 INJECTION INTRAMUSCULAR; INTRAVENOUS EVERY 8 HOURS PRN
Status: DISCONTINUED | OUTPATIENT
Start: 2021-12-11 | End: 2021-12-15 | Stop reason: HOSPADM

## 2021-12-11 RX ADMIN — TRAZODONE HYDROCHLORIDE 300 MG: 50 TABLET ORAL at 09:12

## 2021-12-11 RX ADMIN — IPRATROPIUM BROMIDE AND ALBUTEROL SULFATE 3 ML: 2.5; .5 SOLUTION RESPIRATORY (INHALATION) at 02:12

## 2021-12-11 RX ADMIN — ONDANSETRON 4 MG: 2 INJECTION INTRAMUSCULAR; INTRAVENOUS at 03:12

## 2021-12-11 RX ADMIN — HYDROCODONE BITARTRATE AND ACETAMINOPHEN 1 TABLET: 10; 325 TABLET ORAL at 08:12

## 2021-12-11 RX ADMIN — MORPHINE SULFATE 4 MG: 10 INJECTION INTRAVENOUS at 03:12

## 2021-12-11 RX ADMIN — SODIUM CHLORIDE: 9 INJECTION, SOLUTION INTRAVENOUS at 03:12

## 2021-12-11 RX ADMIN — MORPHINE SULFATE 4 MG: 10 INJECTION INTRAVENOUS at 04:12

## 2021-12-11 RX ADMIN — IPRATROPIUM BROMIDE AND ALBUTEROL SULFATE 3 ML: 2.5; .5 SOLUTION RESPIRATORY (INHALATION) at 06:12

## 2021-12-12 PROBLEM — N18.9 CHRONIC KIDNEY DISEASE: Status: ACTIVE | Noted: 2021-12-12

## 2021-12-12 PROBLEM — J44.9 CHRONIC OBSTRUCTIVE PULMONARY DISEASE: Status: ACTIVE | Noted: 2021-12-12

## 2021-12-12 PROBLEM — I10 HYPERTENSION: Status: ACTIVE | Noted: 2021-12-12

## 2021-12-12 PROBLEM — K21.9 GASTROESOPHAGEAL REFLUX DISEASE: Status: ACTIVE | Noted: 2021-12-12

## 2021-12-12 PROBLEM — S72.109A: Status: ACTIVE | Noted: 2021-12-12

## 2021-12-12 PROBLEM — E11.9 TYPE 2 DIABETES MELLITUS WITHOUT COMPLICATION: Status: ACTIVE | Noted: 2021-12-12

## 2021-12-12 LAB
EST. AVERAGE GLUCOSE BLD GHB EST-MCNC: 81 MG/DL
GLUCOSE SERPL-MCNC: 128 MG/DL (ref 70–105)
GLUCOSE SERPL-MCNC: 132 MG/DL (ref 70–105)
GLUCOSE SERPL-MCNC: 94 MG/DL (ref 70–105)
HBA1C MFR BLD HPLC: 5 % (ref 4.5–6.6)

## 2021-12-12 PROCEDURE — 25000003 PHARM REV CODE 250: Performed by: NURSE PRACTITIONER

## 2021-12-12 PROCEDURE — 82962 GLUCOSE BLOOD TEST: CPT

## 2021-12-12 PROCEDURE — 11000001 HC ACUTE MED/SURG PRIVATE ROOM

## 2021-12-12 PROCEDURE — 99900035 HC TECH TIME PER 15 MIN (STAT)

## 2021-12-12 PROCEDURE — 99232 SBSQ HOSP IP/OBS MODERATE 35: CPT | Mod: ,,, | Performed by: FAMILY MEDICINE

## 2021-12-12 PROCEDURE — 25000003 PHARM REV CODE 250: Performed by: PHYSICIAN ASSISTANT

## 2021-12-12 PROCEDURE — 94761 N-INVAS EAR/PLS OXIMETRY MLT: CPT

## 2021-12-12 PROCEDURE — 27000941

## 2021-12-12 PROCEDURE — 99232 PR SUBSEQUENT HOSPITAL CARE,LEVL II: ICD-10-PCS | Mod: ,,, | Performed by: FAMILY MEDICINE

## 2021-12-12 PROCEDURE — 25000003 PHARM REV CODE 250: Performed by: FAMILY MEDICINE

## 2021-12-12 RX ORDER — KETOROLAC TROMETHAMINE 30 MG/ML
15 INJECTION, SOLUTION INTRAMUSCULAR; INTRAVENOUS EVERY 6 HOURS PRN
Status: DISCONTINUED | OUTPATIENT
Start: 2021-12-12 | End: 2021-12-15 | Stop reason: HOSPADM

## 2021-12-12 RX ORDER — INSULIN ASPART 100 [IU]/ML
0-5 INJECTION, SOLUTION INTRAVENOUS; SUBCUTANEOUS
Status: DISCONTINUED | OUTPATIENT
Start: 2021-12-12 | End: 2021-12-15 | Stop reason: HOSPADM

## 2021-12-12 RX ORDER — GLUCAGON 1 MG
1 KIT INJECTION
Status: DISCONTINUED | OUTPATIENT
Start: 2021-12-12 | End: 2021-12-15 | Stop reason: HOSPADM

## 2021-12-12 RX ORDER — IBUPROFEN 200 MG
24 TABLET ORAL
Status: DISCONTINUED | OUTPATIENT
Start: 2021-12-12 | End: 2021-12-15 | Stop reason: HOSPADM

## 2021-12-12 RX ORDER — IBUPROFEN 200 MG
16 TABLET ORAL
Status: DISCONTINUED | OUTPATIENT
Start: 2021-12-12 | End: 2021-12-15 | Stop reason: HOSPADM

## 2021-12-12 RX ADMIN — HYDROCODONE BITARTRATE AND ACETAMINOPHEN 1 TABLET: 10; 325 TABLET ORAL at 12:12

## 2021-12-12 RX ADMIN — PANTOPRAZOLE SODIUM 40 MG: 40 TABLET, DELAYED RELEASE ORAL at 09:12

## 2021-12-12 RX ADMIN — CLOPIDOGREL 75 MG: 75 TABLET, FILM COATED ORAL at 09:12

## 2021-12-12 RX ADMIN — HYDROCODONE BITARTRATE AND ACETAMINOPHEN 1 TABLET: 10; 325 TABLET ORAL at 02:12

## 2021-12-12 RX ADMIN — ASPIRIN 325 MG: 325 TABLET ORAL at 09:12

## 2021-12-12 RX ADMIN — TRAZODONE HYDROCHLORIDE 300 MG: 50 TABLET ORAL at 08:12

## 2021-12-12 RX ADMIN — POLYETHYLENE GLYCOL 3350 17 G: 17 POWDER, FOR SOLUTION ORAL at 09:12

## 2021-12-12 RX ADMIN — HYDROCODONE BITARTRATE AND ACETAMINOPHEN 1 TABLET: 10; 325 TABLET ORAL at 07:12

## 2021-12-12 RX ADMIN — LIDOCAINE HYDROCHLORIDE: 20 SOLUTION ORAL; TOPICAL at 08:12

## 2021-12-12 RX ADMIN — ISOSORBIDE MONONITRATE 60 MG: 30 TABLET, EXTENDED RELEASE ORAL at 09:12

## 2021-12-12 RX ADMIN — HYDROCODONE BITARTRATE AND ACETAMINOPHEN 1 TABLET: 10; 325 TABLET ORAL at 09:12

## 2021-12-12 RX ADMIN — ATORVASTATIN CALCIUM 80 MG: 40 TABLET, FILM COATED ORAL at 09:12

## 2021-12-12 RX ADMIN — CETIRIZINE HYDROCHLORIDE 10 MG: 10 TABLET, FILM COATED ORAL at 09:12

## 2021-12-12 RX ADMIN — ALLOPURINOL 100 MG: 100 TABLET ORAL at 09:12

## 2021-12-13 LAB
GLUCOSE SERPL-MCNC: 100 MG/DL (ref 70–105)
GLUCOSE SERPL-MCNC: 102 MG/DL (ref 70–105)
GLUCOSE SERPL-MCNC: 105 MG/DL (ref 70–105)
GLUCOSE SERPL-MCNC: 110 MG/DL (ref 70–105)

## 2021-12-13 PROCEDURE — 82962 GLUCOSE BLOOD TEST: CPT

## 2021-12-13 PROCEDURE — 99232 PR SUBSEQUENT HOSPITAL CARE,LEVL II: ICD-10-PCS | Mod: ,,, | Performed by: FAMILY MEDICINE

## 2021-12-13 PROCEDURE — 25000003 PHARM REV CODE 250: Performed by: PHYSICIAN ASSISTANT

## 2021-12-13 PROCEDURE — 97161 PT EVAL LOW COMPLEX 20 MIN: CPT

## 2021-12-13 PROCEDURE — 97166 OT EVAL MOD COMPLEX 45 MIN: CPT

## 2021-12-13 PROCEDURE — 25000242 PHARM REV CODE 250 ALT 637 W/ HCPCS: Performed by: NURSE PRACTITIONER

## 2021-12-13 PROCEDURE — 25000003 PHARM REV CODE 250: Performed by: FAMILY MEDICINE

## 2021-12-13 PROCEDURE — 63600175 PHARM REV CODE 636 W HCPCS: Performed by: FAMILY MEDICINE

## 2021-12-13 PROCEDURE — 99232 SBSQ HOSP IP/OBS MODERATE 35: CPT | Mod: ,,, | Performed by: FAMILY MEDICINE

## 2021-12-13 PROCEDURE — 25000003 PHARM REV CODE 250: Performed by: NURSE PRACTITIONER

## 2021-12-13 PROCEDURE — 27000941

## 2021-12-13 PROCEDURE — 94761 N-INVAS EAR/PLS OXIMETRY MLT: CPT

## 2021-12-13 PROCEDURE — 94640 AIRWAY INHALATION TREATMENT: CPT

## 2021-12-13 PROCEDURE — 11000001 HC ACUTE MED/SURG PRIVATE ROOM

## 2021-12-13 PROCEDURE — 99900035 HC TECH TIME PER 15 MIN (STAT)

## 2021-12-13 PROCEDURE — A4216 STERILE WATER/SALINE, 10 ML: HCPCS | Performed by: NURSE PRACTITIONER

## 2021-12-13 RX ORDER — TRAZODONE HYDROCHLORIDE 50 MG/1
300 TABLET ORAL NIGHTLY
Status: DISCONTINUED | OUTPATIENT
Start: 2021-12-13 | End: 2021-12-15 | Stop reason: HOSPADM

## 2021-12-13 RX ORDER — TRAZODONE HYDROCHLORIDE 50 MG/1
300 TABLET ORAL NIGHTLY
Status: DISCONTINUED | OUTPATIENT
Start: 2021-12-13 | End: 2021-12-13

## 2021-12-13 RX ORDER — MAG HYDROX/ALUMINUM HYD/SIMETH 200-200-20
30 SUSPENSION, ORAL (FINAL DOSE FORM) ORAL EVERY 6 HOURS PRN
Status: DISCONTINUED | OUTPATIENT
Start: 2021-12-13 | End: 2021-12-15 | Stop reason: HOSPADM

## 2021-12-13 RX ORDER — KETOROLAC TROMETHAMINE 30 MG/ML
60 INJECTION, SOLUTION INTRAMUSCULAR; INTRAVENOUS ONCE
Status: COMPLETED | OUTPATIENT
Start: 2021-12-13 | End: 2021-12-13

## 2021-12-13 RX ADMIN — HYDROCODONE BITARTRATE AND ACETAMINOPHEN 1 TABLET: 10; 325 TABLET ORAL at 12:12

## 2021-12-13 RX ADMIN — ISOSORBIDE MONONITRATE 60 MG: 30 TABLET, EXTENDED RELEASE ORAL at 09:12

## 2021-12-13 RX ADMIN — TRAZODONE HYDROCHLORIDE 300 MG: 50 TABLET ORAL at 09:12

## 2021-12-13 RX ADMIN — ATORVASTATIN CALCIUM 80 MG: 40 TABLET, FILM COATED ORAL at 09:12

## 2021-12-13 RX ADMIN — HYDROCODONE BITARTRATE AND ACETAMINOPHEN 1 TABLET: 10; 325 TABLET ORAL at 01:12

## 2021-12-13 RX ADMIN — AMLODIPINE BESYLATE 5 MG: 5 TABLET ORAL at 09:12

## 2021-12-13 RX ADMIN — LIDOCAINE HYDROCHLORIDE: 20 SOLUTION ORAL; TOPICAL at 08:12

## 2021-12-13 RX ADMIN — ALUMINUM HYDROXIDE, MAGNESIUM HYDROXIDE, AND SIMETHICONE 30 ML: 200; 200; 20 SUSPENSION ORAL at 10:12

## 2021-12-13 RX ADMIN — KETOROLAC TROMETHAMINE 15 MG: 30 INJECTION, SOLUTION INTRAMUSCULAR at 06:12

## 2021-12-13 RX ADMIN — LISINOPRIL 20 MG: 20 TABLET ORAL at 09:12

## 2021-12-13 RX ADMIN — ALLOPURINOL 100 MG: 100 TABLET ORAL at 09:12

## 2021-12-13 RX ADMIN — HYDROCODONE BITARTRATE AND ACETAMINOPHEN 1 TABLET: 10; 325 TABLET ORAL at 05:12

## 2021-12-13 RX ADMIN — IPRATROPIUM BROMIDE AND ALBUTEROL SULFATE 3 ML: 2.5; .5 SOLUTION RESPIRATORY (INHALATION) at 08:12

## 2021-12-13 RX ADMIN — CLOPIDOGREL 75 MG: 75 TABLET, FILM COATED ORAL at 09:12

## 2021-12-13 RX ADMIN — CETIRIZINE HYDROCHLORIDE 10 MG: 10 TABLET, FILM COATED ORAL at 09:12

## 2021-12-13 RX ADMIN — METOPROLOL TARTRATE 25 MG: 25 TABLET, FILM COATED ORAL at 09:12

## 2021-12-13 RX ADMIN — ASPIRIN 325 MG: 325 TABLET ORAL at 09:12

## 2021-12-13 RX ADMIN — PANTOPRAZOLE SODIUM 40 MG: 40 TABLET, DELAYED RELEASE ORAL at 09:12

## 2021-12-13 RX ADMIN — SODIUM CHLORIDE, PRESERVATIVE FREE 10 ML: 5 INJECTION INTRAVENOUS at 09:12

## 2021-12-13 RX ADMIN — KETOROLAC TROMETHAMINE 60 MG: 30 INJECTION, SOLUTION INTRAMUSCULAR at 09:12

## 2021-12-13 RX ADMIN — POLYETHYLENE GLYCOL 3350 17 G: 17 POWDER, FOR SOLUTION ORAL at 09:12

## 2021-12-14 LAB
ALBUMIN SERPL BCP-MCNC: 2 G/DL (ref 3.5–5)
ALBUMIN/GLOB SERPL: 0.6 {RATIO}
ALP SERPL-CCNC: 57 U/L (ref 45–115)
ALT SERPL W P-5'-P-CCNC: 13 U/L (ref 16–61)
ANION GAP SERPL CALCULATED.3IONS-SCNC: 10 MMOL/L (ref 7–16)
AST SERPL W P-5'-P-CCNC: 15 U/L (ref 15–37)
BASOPHILS # BLD AUTO: 0.01 K/UL (ref 0–0.2)
BASOPHILS NFR BLD AUTO: 0.1 % (ref 0–1)
BILIRUB SERPL-MCNC: 0.3 MG/DL (ref 0–1.2)
BUN SERPL-MCNC: 47 MG/DL (ref 7–18)
BUN/CREAT SERPL: 13 (ref 6–20)
CALCIUM SERPL-MCNC: 7.8 MG/DL (ref 8.5–10.1)
CHLORIDE SERPL-SCNC: 102 MMOL/L (ref 98–107)
CO2 SERPL-SCNC: 26 MMOL/L (ref 21–32)
CREAT SERPL-MCNC: 3.58 MG/DL (ref 0.7–1.3)
DIFFERENTIAL METHOD BLD: ABNORMAL
EOSINOPHIL # BLD AUTO: 0.23 K/UL (ref 0–0.5)
EOSINOPHIL NFR BLD AUTO: 3.4 % (ref 1–4)
EOSINOPHIL NFR BLD MANUAL: 2 % (ref 1–4)
ERYTHROCYTE [DISTWIDTH] IN BLOOD BY AUTOMATED COUNT: 12.8 % (ref 11.5–14.5)
GLOBULIN SER-MCNC: 3.6 G/DL (ref 2–4)
GLUCOSE SERPL-MCNC: 109 MG/DL (ref 70–105)
GLUCOSE SERPL-MCNC: 111 MG/DL (ref 70–105)
GLUCOSE SERPL-MCNC: 112 MG/DL (ref 70–105)
GLUCOSE SERPL-MCNC: 114 MG/DL (ref 70–105)
GLUCOSE SERPL-MCNC: 122 MG/DL (ref 74–106)
GROUP & RH: NORMAL
HCT VFR BLD AUTO: 23.8 % (ref 40–54)
HGB BLD-MCNC: 8.1 G/DL (ref 13.5–18)
INDIRECT COOMBS GEL: NEGATIVE
LYMPHOCYTES # BLD AUTO: 0.73 K/UL (ref 1–4.8)
LYMPHOCYTES NFR BLD AUTO: 10.9 % (ref 27–41)
LYMPHOCYTES NFR BLD MANUAL: 16 % (ref 27–41)
MCH RBC QN AUTO: 33.9 PG (ref 27–31)
MCHC RBC AUTO-ENTMCNC: 34 G/DL (ref 32–36)
MCV RBC AUTO: 99.6 FL (ref 80–96)
MONOCYTES # BLD AUTO: 0.41 K/UL (ref 0–0.8)
MONOCYTES NFR BLD AUTO: 6.1 % (ref 2–6)
MONOCYTES NFR BLD MANUAL: 5 % (ref 2–6)
MPC BLD CALC-MCNC: 8.9 FL (ref 9.4–12.4)
NEUTROPHILS # BLD AUTO: 5.34 K/UL (ref 1.8–7.7)
NEUTROPHILS NFR BLD AUTO: 79.5 % (ref 53–65)
NEUTS BAND NFR BLD MANUAL: 1 % (ref 1–5)
NEUTS SEG NFR BLD MANUAL: 76 % (ref 50–62)
NRBC BLD MANUAL-RTO: ABNORMAL %
OCCULT BLOOD: NEGATIVE
OVALOCYTES BLD QL SMEAR: ABNORMAL
PLATELET # BLD AUTO: 191 K/UL (ref 150–400)
PLATELET MORPHOLOGY: NORMAL
POTASSIUM SERPL-SCNC: 5.1 MMOL/L (ref 3.5–5.1)
PROT SERPL-MCNC: 5.6 G/DL (ref 6.4–8.2)
RBC # BLD AUTO: 2.39 M/UL (ref 4.6–6.2)
SODIUM SERPL-SCNC: 133 MMOL/L (ref 136–145)
WBC # BLD AUTO: 6.72 K/UL (ref 4.5–11)

## 2021-12-14 PROCEDURE — 82962 GLUCOSE BLOOD TEST: CPT

## 2021-12-14 PROCEDURE — 36430 TRANSFUSION BLD/BLD COMPNT: CPT

## 2021-12-14 PROCEDURE — 97110 THERAPEUTIC EXERCISES: CPT

## 2021-12-14 PROCEDURE — A4216 STERILE WATER/SALINE, 10 ML: HCPCS | Performed by: NURSE PRACTITIONER

## 2021-12-14 PROCEDURE — 63600175 PHARM REV CODE 636 W HCPCS: Performed by: FAMILY MEDICINE

## 2021-12-14 PROCEDURE — 99900035 HC TECH TIME PER 15 MIN (STAT)

## 2021-12-14 PROCEDURE — 97530 THERAPEUTIC ACTIVITIES: CPT

## 2021-12-14 PROCEDURE — 94761 N-INVAS EAR/PLS OXIMETRY MLT: CPT

## 2021-12-14 PROCEDURE — 27000941

## 2021-12-14 PROCEDURE — 82271 OCCULT BLOOD OTHER SOURCES: CPT | Performed by: FAMILY MEDICINE

## 2021-12-14 PROCEDURE — 25000003 PHARM REV CODE 250: Performed by: NURSE PRACTITIONER

## 2021-12-14 PROCEDURE — 36415 COLL VENOUS BLD VENIPUNCTURE: CPT | Performed by: FAMILY MEDICINE

## 2021-12-14 PROCEDURE — 11000001 HC ACUTE MED/SURG PRIVATE ROOM

## 2021-12-14 PROCEDURE — 25000242 PHARM REV CODE 250 ALT 637 W/ HCPCS: Performed by: NURSE PRACTITIONER

## 2021-12-14 PROCEDURE — 25000003 PHARM REV CODE 250: Performed by: FAMILY MEDICINE

## 2021-12-14 PROCEDURE — 99232 PR SUBSEQUENT HOSPITAL CARE,LEVL II: ICD-10-PCS | Mod: ,,, | Performed by: FAMILY MEDICINE

## 2021-12-14 PROCEDURE — 25000003 PHARM REV CODE 250: Performed by: PHYSICIAN ASSISTANT

## 2021-12-14 PROCEDURE — 97535 SELF CARE MNGMENT TRAINING: CPT

## 2021-12-14 PROCEDURE — 80053 COMPREHEN METABOLIC PANEL: CPT | Performed by: FAMILY MEDICINE

## 2021-12-14 PROCEDURE — 97116 GAIT TRAINING THERAPY: CPT | Mod: CQ

## 2021-12-14 PROCEDURE — 25000003 PHARM REV CODE 250

## 2021-12-14 PROCEDURE — 94640 AIRWAY INHALATION TREATMENT: CPT

## 2021-12-14 PROCEDURE — 85025 COMPLETE CBC W/AUTO DIFF WBC: CPT | Performed by: FAMILY MEDICINE

## 2021-12-14 PROCEDURE — 99232 SBSQ HOSP IP/OBS MODERATE 35: CPT | Mod: ,,, | Performed by: FAMILY MEDICINE

## 2021-12-14 PROCEDURE — 97110 THERAPEUTIC EXERCISES: CPT | Mod: CQ

## 2021-12-14 PROCEDURE — P9016 RBC LEUKOCYTES REDUCED: HCPCS

## 2021-12-14 RX ORDER — SODIUM CHLORIDE 9 MG/ML
INJECTION, SOLUTION INTRAVENOUS
Status: COMPLETED
Start: 2021-12-14 | End: 2021-12-14

## 2021-12-14 RX ORDER — HYDROCODONE BITARTRATE AND ACETAMINOPHEN 500; 5 MG/1; MG/1
TABLET ORAL
Status: DISCONTINUED | OUTPATIENT
Start: 2021-12-14 | End: 2021-12-15

## 2021-12-14 RX ADMIN — IPRATROPIUM BROMIDE AND ALBUTEROL SULFATE 3 ML: 2.5; .5 SOLUTION RESPIRATORY (INHALATION) at 07:12

## 2021-12-14 RX ADMIN — METOPROLOL TARTRATE 25 MG: 25 TABLET, FILM COATED ORAL at 08:12

## 2021-12-14 RX ADMIN — ALLOPURINOL 100 MG: 100 TABLET ORAL at 08:12

## 2021-12-14 RX ADMIN — KETOROLAC TROMETHAMINE 15 MG: 30 INJECTION, SOLUTION INTRAMUSCULAR at 11:12

## 2021-12-14 RX ADMIN — TRAZODONE HYDROCHLORIDE 300 MG: 50 TABLET ORAL at 08:12

## 2021-12-14 RX ADMIN — HYDROCODONE BITARTRATE AND ACETAMINOPHEN 1 TABLET: 10; 325 TABLET ORAL at 06:12

## 2021-12-14 RX ADMIN — ISOSORBIDE MONONITRATE 60 MG: 30 TABLET, EXTENDED RELEASE ORAL at 08:12

## 2021-12-14 RX ADMIN — HYDROCODONE BITARTRATE AND ACETAMINOPHEN 1 TABLET: 10; 325 TABLET ORAL at 08:12

## 2021-12-14 RX ADMIN — SODIUM CHLORIDE, PRESERVATIVE FREE 10 ML: 5 INJECTION INTRAVENOUS at 05:12

## 2021-12-14 RX ADMIN — CETIRIZINE HYDROCHLORIDE 10 MG: 10 TABLET, FILM COATED ORAL at 08:12

## 2021-12-14 RX ADMIN — IPRATROPIUM BROMIDE AND ALBUTEROL SULFATE 3 ML: 2.5; .5 SOLUTION RESPIRATORY (INHALATION) at 06:12

## 2021-12-14 RX ADMIN — CLOPIDOGREL 75 MG: 75 TABLET, FILM COATED ORAL at 08:12

## 2021-12-14 RX ADMIN — POLYETHYLENE GLYCOL 3350 17 G: 17 POWDER, FOR SOLUTION ORAL at 08:12

## 2021-12-14 RX ADMIN — ATORVASTATIN CALCIUM 80 MG: 40 TABLET, FILM COATED ORAL at 08:12

## 2021-12-14 RX ADMIN — ASPIRIN 325 MG: 325 TABLET ORAL at 08:12

## 2021-12-14 RX ADMIN — IPRATROPIUM BROMIDE AND ALBUTEROL SULFATE 3 ML: 2.5; .5 SOLUTION RESPIRATORY (INHALATION) at 01:12

## 2021-12-14 RX ADMIN — SODIUM CHLORIDE 1000 ML: 9 INJECTION, SOLUTION INTRAVENOUS at 11:12

## 2021-12-14 RX ADMIN — PANTOPRAZOLE SODIUM 40 MG: 40 TABLET, DELAYED RELEASE ORAL at 08:12

## 2021-12-14 RX ADMIN — SODIUM CHLORIDE: 9 INJECTION, SOLUTION INTRAVENOUS at 06:12

## 2021-12-14 RX ADMIN — IPRATROPIUM BROMIDE AND ALBUTEROL SULFATE 3 ML: 2.5; .5 SOLUTION RESPIRATORY (INHALATION) at 12:12

## 2021-12-14 RX ADMIN — HYDROCODONE BITARTRATE AND ACETAMINOPHEN 1 TABLET: 10; 325 TABLET ORAL at 11:12

## 2021-12-15 ENCOUNTER — HOSPITAL ENCOUNTER (INPATIENT)
Facility: HOSPITAL | Age: 71
LOS: 8 days | Discharge: NURSING, REHAB OR LONG-TERM CARE | DRG: 561 | End: 2021-12-23
Attending: FAMILY MEDICINE | Admitting: FAMILY MEDICINE
Payer: MEDICARE

## 2021-12-15 VITALS
DIASTOLIC BLOOD PRESSURE: 54 MMHG | RESPIRATION RATE: 20 BRPM | WEIGHT: 167 LBS | BODY MASS INDEX: 22.62 KG/M2 | TEMPERATURE: 98 F | SYSTOLIC BLOOD PRESSURE: 115 MMHG | HEART RATE: 62 BPM | OXYGEN SATURATION: 96 % | HEIGHT: 72 IN

## 2021-12-15 DIAGNOSIS — S72.109A: ICD-10-CM

## 2021-12-15 DIAGNOSIS — M80.051A: ICD-10-CM

## 2021-12-15 LAB
GLUCOSE SERPL-MCNC: 94 MG/DL (ref 70–105)
GLUCOSE SERPL-MCNC: 98 MG/DL (ref 70–105)

## 2021-12-15 PROCEDURE — 27000941

## 2021-12-15 PROCEDURE — 97110 THERAPEUTIC EXERCISES: CPT

## 2021-12-15 PROCEDURE — 94640 AIRWAY INHALATION TREATMENT: CPT

## 2021-12-15 PROCEDURE — 94761 N-INVAS EAR/PLS OXIMETRY MLT: CPT

## 2021-12-15 PROCEDURE — 82962 GLUCOSE BLOOD TEST: CPT

## 2021-12-15 PROCEDURE — 11000004 HC SNF PRIVATE

## 2021-12-15 PROCEDURE — 97535 SELF CARE MNGMENT TRAINING: CPT

## 2021-12-15 PROCEDURE — 25000242 PHARM REV CODE 250 ALT 637 W/ HCPCS: Performed by: PHYSICIAN ASSISTANT

## 2021-12-15 PROCEDURE — 25000003 PHARM REV CODE 250: Performed by: PHYSICIAN ASSISTANT

## 2021-12-15 PROCEDURE — 97530 THERAPEUTIC ACTIVITIES: CPT

## 2021-12-15 PROCEDURE — 99239 PR HOSPITAL DISCHARGE DAY,>30 MIN: ICD-10-PCS | Mod: ,,, | Performed by: FAMILY MEDICINE

## 2021-12-15 PROCEDURE — 99900035 HC TECH TIME PER 15 MIN (STAT)

## 2021-12-15 PROCEDURE — 25000003 PHARM REV CODE 250: Performed by: FAMILY MEDICINE

## 2021-12-15 PROCEDURE — 25000242 PHARM REV CODE 250 ALT 637 W/ HCPCS: Performed by: NURSE PRACTITIONER

## 2021-12-15 PROCEDURE — 99239 HOSP IP/OBS DSCHRG MGMT >30: CPT | Mod: ,,, | Performed by: FAMILY MEDICINE

## 2021-12-15 PROCEDURE — 25000003 PHARM REV CODE 250: Performed by: NURSE PRACTITIONER

## 2021-12-15 RX ORDER — TALC
6 POWDER (GRAM) TOPICAL NIGHTLY PRN
Status: DISCONTINUED | OUTPATIENT
Start: 2021-12-15 | End: 2021-12-23 | Stop reason: HOSPADM

## 2021-12-15 RX ORDER — ACETAMINOPHEN 325 MG/1
650 TABLET ORAL EVERY 6 HOURS PRN
Status: DISCONTINUED | OUTPATIENT
Start: 2021-12-15 | End: 2021-12-23 | Stop reason: HOSPADM

## 2021-12-15 RX ORDER — LISINOPRIL 20 MG/1
20 TABLET ORAL DAILY
Status: DISCONTINUED | OUTPATIENT
Start: 2021-12-16 | End: 2021-12-23 | Stop reason: HOSPADM

## 2021-12-15 RX ORDER — ALLOPURINOL 100 MG/1
100 TABLET ORAL DAILY
Status: DISCONTINUED | OUTPATIENT
Start: 2021-12-16 | End: 2021-12-23 | Stop reason: HOSPADM

## 2021-12-15 RX ORDER — ATORVASTATIN CALCIUM 40 MG/1
80 TABLET, FILM COATED ORAL NIGHTLY
Status: DISCONTINUED | OUTPATIENT
Start: 2021-12-15 | End: 2021-12-23 | Stop reason: HOSPADM

## 2021-12-15 RX ORDER — CALCIUM CARBONATE 200(500)MG
500 TABLET,CHEWABLE ORAL 2 TIMES DAILY PRN
Status: DISCONTINUED | OUTPATIENT
Start: 2021-12-15 | End: 2021-12-23 | Stop reason: HOSPADM

## 2021-12-15 RX ORDER — IPRATROPIUM BROMIDE AND ALBUTEROL SULFATE 2.5; .5 MG/3ML; MG/3ML
3 SOLUTION RESPIRATORY (INHALATION) EVERY 6 HOURS PRN
Status: DISCONTINUED | OUTPATIENT
Start: 2021-12-15 | End: 2021-12-23 | Stop reason: HOSPADM

## 2021-12-15 RX ORDER — ISOSORBIDE MONONITRATE 30 MG/1
60 TABLET, EXTENDED RELEASE ORAL DAILY
Status: DISCONTINUED | OUTPATIENT
Start: 2021-12-16 | End: 2021-12-23 | Stop reason: HOSPADM

## 2021-12-15 RX ORDER — HYDROCODONE BITARTRATE AND ACETAMINOPHEN 10; 325 MG/1; MG/1
1 TABLET ORAL EVERY 6 HOURS PRN
Status: DISCONTINUED | OUTPATIENT
Start: 2021-12-16 | End: 2021-12-23 | Stop reason: HOSPADM

## 2021-12-15 RX ORDER — SODIUM CHLORIDE 9 MG/ML
INJECTION, SOLUTION INTRAVENOUS CONTINUOUS
Status: DISCONTINUED | OUTPATIENT
Start: 2021-12-15 | End: 2021-12-15 | Stop reason: HOSPADM

## 2021-12-15 RX ORDER — ASPIRIN 325 MG
325 TABLET, DELAYED RELEASE (ENTERIC COATED) ORAL DAILY
Status: DISCONTINUED | OUTPATIENT
Start: 2021-12-16 | End: 2021-12-23 | Stop reason: HOSPADM

## 2021-12-15 RX ORDER — AMLODIPINE BESYLATE 5 MG/1
5 TABLET ORAL DAILY
Status: DISCONTINUED | OUTPATIENT
Start: 2021-12-16 | End: 2021-12-23 | Stop reason: HOSPADM

## 2021-12-15 RX ORDER — IPRATROPIUM BROMIDE AND ALBUTEROL SULFATE 2.5; .5 MG/3ML; MG/3ML
3 SOLUTION RESPIRATORY (INHALATION) EVERY 6 HOURS
Status: DISCONTINUED | OUTPATIENT
Start: 2021-12-15 | End: 2021-12-15

## 2021-12-15 RX ORDER — ALBUTEROL SULFATE 90 UG/1
2 AEROSOL, METERED RESPIRATORY (INHALATION) EVERY 6 HOURS PRN
Status: DISCONTINUED | OUTPATIENT
Start: 2021-12-15 | End: 2021-12-15

## 2021-12-15 RX ORDER — PANTOPRAZOLE SODIUM 40 MG/1
40 TABLET, DELAYED RELEASE ORAL DAILY
Status: DISCONTINUED | OUTPATIENT
Start: 2021-12-16 | End: 2021-12-23 | Stop reason: HOSPADM

## 2021-12-15 RX ORDER — METOPROLOL TARTRATE 25 MG/1
25 TABLET, FILM COATED ORAL DAILY
Status: DISCONTINUED | OUTPATIENT
Start: 2021-12-16 | End: 2021-12-23 | Stop reason: HOSPADM

## 2021-12-15 RX ORDER — TRAZODONE HYDROCHLORIDE 150 MG/1
300 TABLET ORAL NIGHTLY
Status: DISCONTINUED | OUTPATIENT
Start: 2021-12-15 | End: 2021-12-23 | Stop reason: HOSPADM

## 2021-12-15 RX ORDER — CLOPIDOGREL BISULFATE 75 MG/1
75 TABLET ORAL DAILY
Status: DISCONTINUED | OUTPATIENT
Start: 2021-12-16 | End: 2021-12-23 | Stop reason: HOSPADM

## 2021-12-15 RX ORDER — AMOXICILLIN 250 MG
1 CAPSULE ORAL 2 TIMES DAILY
Status: DISCONTINUED | OUTPATIENT
Start: 2021-12-15 | End: 2021-12-23 | Stop reason: HOSPADM

## 2021-12-15 RX ORDER — CETIRIZINE HYDROCHLORIDE 10 MG/1
10 TABLET ORAL DAILY
Status: DISCONTINUED | OUTPATIENT
Start: 2021-12-16 | End: 2021-12-23 | Stop reason: HOSPADM

## 2021-12-15 RX ADMIN — LISINOPRIL 20 MG: 20 TABLET ORAL at 09:12

## 2021-12-15 RX ADMIN — ISOSORBIDE MONONITRATE 60 MG: 30 TABLET, EXTENDED RELEASE ORAL at 09:12

## 2021-12-15 RX ADMIN — CETIRIZINE HYDROCHLORIDE 10 MG: 10 TABLET, FILM COATED ORAL at 09:12

## 2021-12-15 RX ADMIN — SENNOSIDES AND DOCUSATE SODIUM 1 TABLET: 50; 8.6 TABLET ORAL at 09:12

## 2021-12-15 RX ADMIN — CLOPIDOGREL 75 MG: 75 TABLET, FILM COATED ORAL at 09:12

## 2021-12-15 RX ADMIN — SODIUM CHLORIDE: 9 INJECTION, SOLUTION INTRAVENOUS at 03:12

## 2021-12-15 RX ADMIN — POLYETHYLENE GLYCOL 3350 17 G: 17 POWDER, FOR SOLUTION ORAL at 09:12

## 2021-12-15 RX ADMIN — IPRATROPIUM BROMIDE AND ALBUTEROL SULFATE 3 ML: 2.5; .5 SOLUTION RESPIRATORY (INHALATION) at 01:12

## 2021-12-15 RX ADMIN — METOPROLOL TARTRATE 25 MG: 25 TABLET, FILM COATED ORAL at 09:12

## 2021-12-15 RX ADMIN — HYDROCODONE BITARTRATE AND ACETAMINOPHEN 1 TABLET: 10; 325 TABLET ORAL at 03:12

## 2021-12-15 RX ADMIN — HYDROCODONE BITARTRATE AND ACETAMINOPHEN 1 TABLET: 10; 325 TABLET ORAL at 10:12

## 2021-12-15 RX ADMIN — PANTOPRAZOLE SODIUM 40 MG: 40 TABLET, DELAYED RELEASE ORAL at 09:12

## 2021-12-15 RX ADMIN — AMLODIPINE BESYLATE 5 MG: 5 TABLET ORAL at 09:12

## 2021-12-15 RX ADMIN — HYDROCODONE BITARTRATE AND ACETAMINOPHEN 1 TABLET: 10; 325 TABLET ORAL at 11:12

## 2021-12-15 RX ADMIN — IPRATROPIUM BROMIDE AND ALBUTEROL SULFATE 3 ML: 2.5; .5 SOLUTION RESPIRATORY (INHALATION) at 09:12

## 2021-12-15 RX ADMIN — IPRATROPIUM BROMIDE AND ALBUTEROL SULFATE 3 ML: 2.5; .5 SOLUTION RESPIRATORY (INHALATION) at 06:12

## 2021-12-15 RX ADMIN — ATORVASTATIN CALCIUM 80 MG: 40 TABLET, FILM COATED ORAL at 09:12

## 2021-12-15 RX ADMIN — ASPIRIN 325 MG: 325 TABLET ORAL at 09:12

## 2021-12-15 RX ADMIN — ALLOPURINOL 100 MG: 100 TABLET ORAL at 09:12

## 2021-12-15 RX ADMIN — TRAZODONE HYDROCHLORIDE 300 MG: 150 TABLET ORAL at 09:12

## 2021-12-15 RX ADMIN — IPRATROPIUM BROMIDE AND ALBUTEROL SULFATE 3 ML: 2.5; .5 SOLUTION RESPIRATORY (INHALATION) at 12:12

## 2021-12-16 LAB
BASOPHILS # BLD AUTO: 0.01 K/UL (ref 0–0.2)
BASOPHILS NFR BLD AUTO: 0.2 % (ref 0–1)
DIFFERENTIAL METHOD BLD: ABNORMAL
EOSINOPHIL # BLD AUTO: 0.33 K/UL (ref 0–0.5)
EOSINOPHIL NFR BLD AUTO: 6.5 % (ref 1–4)
EOSINOPHIL NFR BLD MANUAL: 7 % (ref 1–4)
ERYTHROCYTE [DISTWIDTH] IN BLOOD BY AUTOMATED COUNT: 13.7 % (ref 11.5–14.5)
GLUCOSE SERPL-MCNC: 103 MG/DL (ref 70–105)
GLUCOSE SERPL-MCNC: 106 MG/DL (ref 70–105)
GLUCOSE SERPL-MCNC: 85 MG/DL (ref 70–105)
GLUCOSE SERPL-MCNC: 94 MG/DL (ref 70–105)
HCT VFR BLD AUTO: 29.2 % (ref 40–54)
HGB BLD-MCNC: 9.7 G/DL (ref 13.5–18)
LYMPHOCYTES # BLD AUTO: 0.67 K/UL (ref 1–4.8)
LYMPHOCYTES NFR BLD AUTO: 13.3 % (ref 27–41)
LYMPHOCYTES NFR BLD MANUAL: 14 % (ref 27–41)
MCH RBC QN AUTO: 33.3 PG (ref 27–31)
MCHC RBC AUTO-ENTMCNC: 33.2 G/DL (ref 32–36)
MCV RBC AUTO: 100.3 FL (ref 80–96)
MONOCYTES # BLD AUTO: 0.55 K/UL (ref 0–0.8)
MONOCYTES NFR BLD AUTO: 10.9 % (ref 2–6)
MONOCYTES NFR BLD MANUAL: 12 % (ref 2–6)
MPC BLD CALC-MCNC: 9.3 FL (ref 9.4–12.4)
NEUTROPHILS # BLD AUTO: 3.48 K/UL (ref 1.8–7.7)
NEUTROPHILS NFR BLD AUTO: 69.1 % (ref 53–65)
NEUTS SEG NFR BLD MANUAL: 67 % (ref 50–62)
NRBC BLD MANUAL-RTO: ABNORMAL %
PLATELET # BLD AUTO: 157 K/UL (ref 150–400)
PLATELET MORPHOLOGY: NORMAL
RBC # BLD AUTO: 2.91 M/UL (ref 4.6–6.2)
RBC MORPH BLD: NORMAL
WBC # BLD AUTO: 5.04 K/UL (ref 4.5–11)

## 2021-12-16 PROCEDURE — 25000003 PHARM REV CODE 250: Performed by: PHYSICIAN ASSISTANT

## 2021-12-16 PROCEDURE — 94761 N-INVAS EAR/PLS OXIMETRY MLT: CPT

## 2021-12-16 PROCEDURE — 94640 AIRWAY INHALATION TREATMENT: CPT

## 2021-12-16 PROCEDURE — 27000941

## 2021-12-16 PROCEDURE — 97162 PT EVAL MOD COMPLEX 30 MIN: CPT

## 2021-12-16 PROCEDURE — 85025 COMPLETE CBC W/AUTO DIFF WBC: CPT | Performed by: NURSE PRACTITIONER

## 2021-12-16 PROCEDURE — 82962 GLUCOSE BLOOD TEST: CPT

## 2021-12-16 PROCEDURE — 25000242 PHARM REV CODE 250 ALT 637 W/ HCPCS: Performed by: PHYSICIAN ASSISTANT

## 2021-12-16 PROCEDURE — 97166 OT EVAL MOD COMPLEX 45 MIN: CPT

## 2021-12-16 PROCEDURE — 36415 COLL VENOUS BLD VENIPUNCTURE: CPT | Performed by: NURSE PRACTITIONER

## 2021-12-16 PROCEDURE — 99900035 HC TECH TIME PER 15 MIN (STAT)

## 2021-12-16 PROCEDURE — 97110 THERAPEUTIC EXERCISES: CPT

## 2021-12-16 PROCEDURE — 11000004 HC SNF PRIVATE

## 2021-12-16 RX ORDER — IBUPROFEN 200 MG
16 TABLET ORAL
Status: DISCONTINUED | OUTPATIENT
Start: 2021-12-16 | End: 2021-12-23 | Stop reason: HOSPADM

## 2021-12-16 RX ORDER — IBUPROFEN 200 MG
24 TABLET ORAL
Status: DISCONTINUED | OUTPATIENT
Start: 2021-12-16 | End: 2021-12-23 | Stop reason: HOSPADM

## 2021-12-16 RX ORDER — GLUCAGON 1 MG
1 KIT INJECTION
Status: DISCONTINUED | OUTPATIENT
Start: 2021-12-16 | End: 2021-12-23 | Stop reason: HOSPADM

## 2021-12-16 RX ORDER — INSULIN ASPART 100 [IU]/ML
0-5 INJECTION, SOLUTION INTRAVENOUS; SUBCUTANEOUS
Status: DISCONTINUED | OUTPATIENT
Start: 2021-12-16 | End: 2021-12-23 | Stop reason: HOSPADM

## 2021-12-16 RX ORDER — ONDANSETRON 2 MG/ML
4 INJECTION INTRAMUSCULAR; INTRAVENOUS EVERY 8 HOURS PRN
Status: DISCONTINUED | OUTPATIENT
Start: 2021-12-16 | End: 2021-12-23 | Stop reason: HOSPADM

## 2021-12-16 RX ADMIN — AMLODIPINE BESYLATE 5 MG: 5 TABLET ORAL at 09:12

## 2021-12-16 RX ADMIN — HYDROCODONE BITARTRATE AND ACETAMINOPHEN 1 TABLET: 10; 325 TABLET ORAL at 10:12

## 2021-12-16 RX ADMIN — METOPROLOL TARTRATE 25 MG: 25 TABLET, FILM COATED ORAL at 09:12

## 2021-12-16 RX ADMIN — ATORVASTATIN CALCIUM 80 MG: 40 TABLET, FILM COATED ORAL at 08:12

## 2021-12-16 RX ADMIN — ISOSORBIDE MONONITRATE 60 MG: 30 TABLET, EXTENDED RELEASE ORAL at 09:12

## 2021-12-16 RX ADMIN — CLOPIDOGREL 75 MG: 75 TABLET, FILM COATED ORAL at 09:12

## 2021-12-16 RX ADMIN — CETIRIZINE HYDROCHLORIDE 10 MG: 10 TABLET, FILM COATED ORAL at 09:12

## 2021-12-16 RX ADMIN — TRAZODONE HYDROCHLORIDE 300 MG: 150 TABLET ORAL at 08:12

## 2021-12-16 RX ADMIN — SENNOSIDES AND DOCUSATE SODIUM 1 TABLET: 50; 8.6 TABLET ORAL at 09:12

## 2021-12-16 RX ADMIN — ASPIRIN 325 MG: 325 TABLET ORAL at 09:12

## 2021-12-16 RX ADMIN — IPRATROPIUM BROMIDE AND ALBUTEROL SULFATE 3 ML: 2.5; .5 SOLUTION RESPIRATORY (INHALATION) at 07:12

## 2021-12-16 RX ADMIN — HYDROCODONE BITARTRATE AND ACETAMINOPHEN 1 TABLET: 10; 325 TABLET ORAL at 09:12

## 2021-12-16 RX ADMIN — PANTOPRAZOLE SODIUM 40 MG: 40 TABLET, DELAYED RELEASE ORAL at 09:12

## 2021-12-16 RX ADMIN — LISINOPRIL 20 MG: 20 TABLET ORAL at 09:12

## 2021-12-16 RX ADMIN — SENNOSIDES AND DOCUSATE SODIUM 1 TABLET: 50; 8.6 TABLET ORAL at 08:12

## 2021-12-16 RX ADMIN — ALLOPURINOL 100 MG: 100 TABLET ORAL at 09:12

## 2021-12-17 LAB
GLUCOSE SERPL-MCNC: 103 MG/DL (ref 70–105)
GLUCOSE SERPL-MCNC: 157 MG/DL (ref 70–105)
GLUCOSE SERPL-MCNC: 80 MG/DL (ref 70–105)
GLUCOSE SERPL-MCNC: 94 MG/DL (ref 70–105)

## 2021-12-17 PROCEDURE — 97116 GAIT TRAINING THERAPY: CPT

## 2021-12-17 PROCEDURE — 82962 GLUCOSE BLOOD TEST: CPT

## 2021-12-17 PROCEDURE — 25000242 PHARM REV CODE 250 ALT 637 W/ HCPCS: Performed by: PHYSICIAN ASSISTANT

## 2021-12-17 PROCEDURE — 25000003 PHARM REV CODE 250: Performed by: PHYSICIAN ASSISTANT

## 2021-12-17 PROCEDURE — 99900035 HC TECH TIME PER 15 MIN (STAT)

## 2021-12-17 PROCEDURE — 27000941

## 2021-12-17 PROCEDURE — 94640 AIRWAY INHALATION TREATMENT: CPT

## 2021-12-17 PROCEDURE — 97110 THERAPEUTIC EXERCISES: CPT

## 2021-12-17 PROCEDURE — 11000004 HC SNF PRIVATE

## 2021-12-17 PROCEDURE — 94761 N-INVAS EAR/PLS OXIMETRY MLT: CPT

## 2021-12-17 RX ADMIN — IPRATROPIUM BROMIDE AND ALBUTEROL SULFATE 3 ML: 2.5; .5 SOLUTION RESPIRATORY (INHALATION) at 09:12

## 2021-12-17 RX ADMIN — IPRATROPIUM BROMIDE AND ALBUTEROL SULFATE 3 ML: 2.5; .5 SOLUTION RESPIRATORY (INHALATION) at 07:12

## 2021-12-17 RX ADMIN — HYDROCODONE BITARTRATE AND ACETAMINOPHEN 1 TABLET: 10; 325 TABLET ORAL at 08:12

## 2021-12-17 RX ADMIN — TRAZODONE HYDROCHLORIDE 300 MG: 150 TABLET ORAL at 08:12

## 2021-12-17 RX ADMIN — ATORVASTATIN CALCIUM 80 MG: 40 TABLET, FILM COATED ORAL at 08:12

## 2021-12-17 RX ADMIN — ASPIRIN 325 MG: 325 TABLET ORAL at 08:12

## 2021-12-17 RX ADMIN — SENNOSIDES AND DOCUSATE SODIUM 1 TABLET: 50; 8.6 TABLET ORAL at 08:12

## 2021-12-17 RX ADMIN — ALLOPURINOL 100 MG: 100 TABLET ORAL at 08:12

## 2021-12-17 RX ADMIN — METOPROLOL TARTRATE 25 MG: 25 TABLET, FILM COATED ORAL at 08:12

## 2021-12-17 RX ADMIN — PANTOPRAZOLE SODIUM 40 MG: 40 TABLET, DELAYED RELEASE ORAL at 08:12

## 2021-12-17 RX ADMIN — CLOPIDOGREL 75 MG: 75 TABLET, FILM COATED ORAL at 08:12

## 2021-12-17 RX ADMIN — AMLODIPINE BESYLATE 5 MG: 5 TABLET ORAL at 08:12

## 2021-12-17 RX ADMIN — ISOSORBIDE MONONITRATE 60 MG: 30 TABLET, EXTENDED RELEASE ORAL at 08:12

## 2021-12-17 RX ADMIN — LISINOPRIL 20 MG: 20 TABLET ORAL at 08:12

## 2021-12-17 RX ADMIN — CETIRIZINE HYDROCHLORIDE 10 MG: 10 TABLET, FILM COATED ORAL at 08:12

## 2021-12-18 LAB
GLUCOSE SERPL-MCNC: 100 MG/DL (ref 70–105)
GLUCOSE SERPL-MCNC: 124 MG/DL (ref 70–105)
GLUCOSE SERPL-MCNC: 132 MG/DL (ref 70–105)
GLUCOSE SERPL-MCNC: 93 MG/DL (ref 70–105)

## 2021-12-18 PROCEDURE — 11000004 HC SNF PRIVATE

## 2021-12-18 PROCEDURE — 25000003 PHARM REV CODE 250: Performed by: PHYSICIAN ASSISTANT

## 2021-12-18 PROCEDURE — 82962 GLUCOSE BLOOD TEST: CPT

## 2021-12-18 PROCEDURE — 94640 AIRWAY INHALATION TREATMENT: CPT

## 2021-12-18 PROCEDURE — 94761 N-INVAS EAR/PLS OXIMETRY MLT: CPT

## 2021-12-18 PROCEDURE — 25000242 PHARM REV CODE 250 ALT 637 W/ HCPCS: Performed by: PHYSICIAN ASSISTANT

## 2021-12-18 PROCEDURE — 99900035 HC TECH TIME PER 15 MIN (STAT)

## 2021-12-18 PROCEDURE — 27000941

## 2021-12-18 RX ORDER — IPRATROPIUM BROMIDE AND ALBUTEROL SULFATE 2.5; .5 MG/3ML; MG/3ML
3 SOLUTION RESPIRATORY (INHALATION) ONCE
Status: COMPLETED | OUTPATIENT
Start: 2021-12-18 | End: 2021-12-18

## 2021-12-18 RX ORDER — IPRATROPIUM BROMIDE AND ALBUTEROL SULFATE 2.5; .5 MG/3ML; MG/3ML
3 SOLUTION RESPIRATORY (INHALATION) ONCE
Status: DISCONTINUED | OUTPATIENT
Start: 2021-12-18 | End: 2021-12-23 | Stop reason: HOSPADM

## 2021-12-18 RX ADMIN — HYDROCODONE BITARTRATE AND ACETAMINOPHEN 1 TABLET: 10; 325 TABLET ORAL at 08:12

## 2021-12-18 RX ADMIN — TRAZODONE HYDROCHLORIDE 300 MG: 150 TABLET ORAL at 08:12

## 2021-12-18 RX ADMIN — CLOPIDOGREL 75 MG: 75 TABLET, FILM COATED ORAL at 08:12

## 2021-12-18 RX ADMIN — AMLODIPINE BESYLATE 5 MG: 5 TABLET ORAL at 08:12

## 2021-12-18 RX ADMIN — IPRATROPIUM BROMIDE AND ALBUTEROL SULFATE 3 ML: 2.5; .5 SOLUTION RESPIRATORY (INHALATION) at 07:12

## 2021-12-18 RX ADMIN — PANTOPRAZOLE SODIUM 40 MG: 40 TABLET, DELAYED RELEASE ORAL at 08:12

## 2021-12-18 RX ADMIN — ALLOPURINOL 100 MG: 100 TABLET ORAL at 08:12

## 2021-12-18 RX ADMIN — ATORVASTATIN CALCIUM 80 MG: 40 TABLET, FILM COATED ORAL at 08:12

## 2021-12-18 RX ADMIN — SENNOSIDES AND DOCUSATE SODIUM 1 TABLET: 50; 8.6 TABLET ORAL at 08:12

## 2021-12-18 RX ADMIN — METOPROLOL TARTRATE 25 MG: 25 TABLET, FILM COATED ORAL at 08:12

## 2021-12-18 RX ADMIN — ASPIRIN 325 MG: 325 TABLET ORAL at 08:12

## 2021-12-18 RX ADMIN — HYDROCODONE BITARTRATE AND ACETAMINOPHEN 1 TABLET: 10; 325 TABLET ORAL at 11:12

## 2021-12-18 RX ADMIN — IPRATROPIUM BROMIDE AND ALBUTEROL SULFATE 3 ML: 2.5; .5 SOLUTION RESPIRATORY (INHALATION) at 06:12

## 2021-12-18 RX ADMIN — CETIRIZINE HYDROCHLORIDE 10 MG: 10 TABLET, FILM COATED ORAL at 08:12

## 2021-12-18 RX ADMIN — HYDROCODONE BITARTRATE AND ACETAMINOPHEN 1 TABLET: 10; 325 TABLET ORAL at 05:12

## 2021-12-18 RX ADMIN — HYDROCODONE BITARTRATE AND ACETAMINOPHEN 1 TABLET: 10; 325 TABLET ORAL at 02:12

## 2021-12-18 RX ADMIN — ISOSORBIDE MONONITRATE 60 MG: 30 TABLET, EXTENDED RELEASE ORAL at 08:12

## 2021-12-18 RX ADMIN — LISINOPRIL 20 MG: 20 TABLET ORAL at 08:12

## 2021-12-19 LAB
GLUCOSE SERPL-MCNC: 103 MG/DL (ref 70–105)
GLUCOSE SERPL-MCNC: 103 MG/DL (ref 70–105)
GLUCOSE SERPL-MCNC: 106 MG/DL (ref 70–105)
GLUCOSE SERPL-MCNC: 127 MG/DL (ref 70–105)

## 2021-12-19 PROCEDURE — 94761 N-INVAS EAR/PLS OXIMETRY MLT: CPT

## 2021-12-19 PROCEDURE — 97530 THERAPEUTIC ACTIVITIES: CPT

## 2021-12-19 PROCEDURE — 82962 GLUCOSE BLOOD TEST: CPT

## 2021-12-19 PROCEDURE — 27000941

## 2021-12-19 PROCEDURE — 25000242 PHARM REV CODE 250 ALT 637 W/ HCPCS: Performed by: PHYSICIAN ASSISTANT

## 2021-12-19 PROCEDURE — 94640 AIRWAY INHALATION TREATMENT: CPT

## 2021-12-19 PROCEDURE — 11000004 HC SNF PRIVATE

## 2021-12-19 PROCEDURE — 99900035 HC TECH TIME PER 15 MIN (STAT)

## 2021-12-19 PROCEDURE — 25000003 PHARM REV CODE 250: Performed by: PHYSICIAN ASSISTANT

## 2021-12-19 RX ADMIN — AMLODIPINE BESYLATE 5 MG: 5 TABLET ORAL at 09:12

## 2021-12-19 RX ADMIN — METOPROLOL TARTRATE 25 MG: 25 TABLET, FILM COATED ORAL at 09:12

## 2021-12-19 RX ADMIN — CETIRIZINE HYDROCHLORIDE 10 MG: 10 TABLET, FILM COATED ORAL at 09:12

## 2021-12-19 RX ADMIN — ATORVASTATIN CALCIUM 80 MG: 40 TABLET, FILM COATED ORAL at 08:12

## 2021-12-19 RX ADMIN — IPRATROPIUM BROMIDE AND ALBUTEROL SULFATE 3 ML: 2.5; .5 SOLUTION RESPIRATORY (INHALATION) at 06:12

## 2021-12-19 RX ADMIN — IPRATROPIUM BROMIDE AND ALBUTEROL SULFATE 3 ML: 2.5; .5 SOLUTION RESPIRATORY (INHALATION) at 07:12

## 2021-12-19 RX ADMIN — MELATONIN 6 MG: 3 TAB ORAL at 10:12

## 2021-12-19 RX ADMIN — CLOPIDOGREL 75 MG: 75 TABLET, FILM COATED ORAL at 09:12

## 2021-12-19 RX ADMIN — ALLOPURINOL 100 MG: 100 TABLET ORAL at 09:12

## 2021-12-19 RX ADMIN — ASPIRIN 325 MG: 325 TABLET ORAL at 09:12

## 2021-12-19 RX ADMIN — SENNOSIDES AND DOCUSATE SODIUM 1 TABLET: 50; 8.6 TABLET ORAL at 08:12

## 2021-12-19 RX ADMIN — PANTOPRAZOLE SODIUM 40 MG: 40 TABLET, DELAYED RELEASE ORAL at 09:12

## 2021-12-19 RX ADMIN — HYDROCODONE BITARTRATE AND ACETAMINOPHEN 1 TABLET: 10; 325 TABLET ORAL at 08:12

## 2021-12-19 RX ADMIN — LISINOPRIL 20 MG: 20 TABLET ORAL at 09:12

## 2021-12-19 RX ADMIN — SENNOSIDES AND DOCUSATE SODIUM 1 TABLET: 50; 8.6 TABLET ORAL at 09:12

## 2021-12-19 RX ADMIN — IPRATROPIUM BROMIDE AND ALBUTEROL SULFATE 3 ML: 2.5; .5 SOLUTION RESPIRATORY (INHALATION) at 01:12

## 2021-12-19 RX ADMIN — ISOSORBIDE MONONITRATE 60 MG: 30 TABLET, EXTENDED RELEASE ORAL at 09:12

## 2021-12-19 RX ADMIN — TRAZODONE HYDROCHLORIDE 300 MG: 150 TABLET ORAL at 08:12

## 2021-12-19 RX ADMIN — HYDROCODONE BITARTRATE AND ACETAMINOPHEN 1 TABLET: 10; 325 TABLET ORAL at 09:12

## 2021-12-20 LAB
GLUCOSE SERPL-MCNC: 121 MG/DL (ref 70–105)
GLUCOSE SERPL-MCNC: 126 MG/DL (ref 70–105)
GLUCOSE SERPL-MCNC: 81 MG/DL (ref 70–105)
GLUCOSE SERPL-MCNC: 94 MG/DL (ref 70–105)

## 2021-12-20 PROCEDURE — 97530 THERAPEUTIC ACTIVITIES: CPT

## 2021-12-20 PROCEDURE — 94640 AIRWAY INHALATION TREATMENT: CPT

## 2021-12-20 PROCEDURE — 97110 THERAPEUTIC EXERCISES: CPT

## 2021-12-20 PROCEDURE — 11000004 HC SNF PRIVATE

## 2021-12-20 PROCEDURE — 25000003 PHARM REV CODE 250: Performed by: PHYSICIAN ASSISTANT

## 2021-12-20 PROCEDURE — 97116 GAIT TRAINING THERAPY: CPT

## 2021-12-20 PROCEDURE — 27000941

## 2021-12-20 PROCEDURE — 86580 TB INTRADERMAL TEST: CPT | Performed by: NURSE PRACTITIONER

## 2021-12-20 PROCEDURE — 94761 N-INVAS EAR/PLS OXIMETRY MLT: CPT

## 2021-12-20 PROCEDURE — 99900035 HC TECH TIME PER 15 MIN (STAT)

## 2021-12-20 PROCEDURE — 30200315 PPD INTRADERMAL TEST REV CODE 302: Performed by: NURSE PRACTITIONER

## 2021-12-20 PROCEDURE — 97535 SELF CARE MNGMENT TRAINING: CPT

## 2021-12-20 PROCEDURE — 82962 GLUCOSE BLOOD TEST: CPT

## 2021-12-20 PROCEDURE — 25000242 PHARM REV CODE 250 ALT 637 W/ HCPCS: Performed by: PHYSICIAN ASSISTANT

## 2021-12-20 RX ADMIN — TUBERCULIN PURIFIED PROTEIN DERIVATIVE 5 UNITS: 5 INJECTION, SOLUTION INTRADERMAL at 06:12

## 2021-12-20 RX ADMIN — IPRATROPIUM BROMIDE AND ALBUTEROL SULFATE 3 ML: 2.5; .5 SOLUTION RESPIRATORY (INHALATION) at 07:12

## 2021-12-20 RX ADMIN — PANTOPRAZOLE SODIUM 40 MG: 40 TABLET, DELAYED RELEASE ORAL at 09:12

## 2021-12-20 RX ADMIN — ATORVASTATIN CALCIUM 80 MG: 40 TABLET, FILM COATED ORAL at 09:12

## 2021-12-20 RX ADMIN — IPRATROPIUM BROMIDE AND ALBUTEROL SULFATE 3 ML: 2.5; .5 SOLUTION RESPIRATORY (INHALATION) at 01:12

## 2021-12-20 RX ADMIN — ALLOPURINOL 100 MG: 100 TABLET ORAL at 09:12

## 2021-12-20 RX ADMIN — IPRATROPIUM BROMIDE AND ALBUTEROL SULFATE 3 ML: 2.5; .5 SOLUTION RESPIRATORY (INHALATION) at 08:12

## 2021-12-20 RX ADMIN — CLOPIDOGREL 75 MG: 75 TABLET, FILM COATED ORAL at 09:12

## 2021-12-20 RX ADMIN — TRAZODONE HYDROCHLORIDE 300 MG: 150 TABLET ORAL at 09:12

## 2021-12-20 RX ADMIN — LISINOPRIL 20 MG: 20 TABLET ORAL at 09:12

## 2021-12-20 RX ADMIN — CETIRIZINE HYDROCHLORIDE 10 MG: 10 TABLET, FILM COATED ORAL at 09:12

## 2021-12-20 RX ADMIN — ISOSORBIDE MONONITRATE 60 MG: 30 TABLET, EXTENDED RELEASE ORAL at 09:12

## 2021-12-20 RX ADMIN — SENNOSIDES AND DOCUSATE SODIUM 1 TABLET: 50; 8.6 TABLET ORAL at 09:12

## 2021-12-20 RX ADMIN — AMLODIPINE BESYLATE 5 MG: 5 TABLET ORAL at 09:12

## 2021-12-20 RX ADMIN — METOPROLOL TARTRATE 25 MG: 25 TABLET, FILM COATED ORAL at 09:12

## 2021-12-20 RX ADMIN — HYDROCODONE BITARTRATE AND ACETAMINOPHEN 1 TABLET: 10; 325 TABLET ORAL at 09:12

## 2021-12-20 RX ADMIN — HYDROCODONE BITARTRATE AND ACETAMINOPHEN 1 TABLET: 10; 325 TABLET ORAL at 03:12

## 2021-12-20 RX ADMIN — MELATONIN 6 MG: 3 TAB ORAL at 10:12

## 2021-12-20 RX ADMIN — ASPIRIN 325 MG: 325 TABLET ORAL at 09:12

## 2021-12-21 ENCOUNTER — OFFICE VISIT (OUTPATIENT)
Dept: ORTHOPEDICS | Facility: CLINIC | Age: 71
End: 2021-12-21
Payer: MEDICARE

## 2021-12-21 VITALS — BODY MASS INDEX: 22.66 KG/M2 | HEIGHT: 72 IN

## 2021-12-21 DIAGNOSIS — S72.024A: ICD-10-CM

## 2021-12-21 DIAGNOSIS — S72.111A CLOSED AVULSION FRACTURE OF GREATER TROCHANTER OF FEMUR, RIGHT, INITIAL ENCOUNTER: Primary | ICD-10-CM

## 2021-12-21 LAB
GLUCOSE SERPL-MCNC: 106 MG/DL (ref 70–105)
GLUCOSE SERPL-MCNC: 119 MG/DL (ref 70–105)
GLUCOSE SERPL-MCNC: 131 MG/DL (ref 70–105)

## 2021-12-21 PROCEDURE — 27246 PR CLOSED RX GR TROCHANTERIC FX: ICD-10-PCS | Mod: RT,,, | Performed by: ORTHOPAEDIC SURGERY

## 2021-12-21 PROCEDURE — 25000003 PHARM REV CODE 250: Performed by: PHYSICIAN ASSISTANT

## 2021-12-21 PROCEDURE — 97535 SELF CARE MNGMENT TRAINING: CPT

## 2021-12-21 PROCEDURE — 94640 AIRWAY INHALATION TREATMENT: CPT

## 2021-12-21 PROCEDURE — 99204 PR OFFICE/OUTPT VISIT, NEW, LEVL IV, 45-59 MIN: ICD-10-PCS | Mod: 57,,, | Performed by: ORTHOPAEDIC SURGERY

## 2021-12-21 PROCEDURE — 27246 TREAT THIGH FRACTURE: CPT | Mod: RT,,, | Performed by: ORTHOPAEDIC SURGERY

## 2021-12-21 PROCEDURE — 27000941

## 2021-12-21 PROCEDURE — 97116 GAIT TRAINING THERAPY: CPT | Mod: CQ

## 2021-12-21 PROCEDURE — 99900035 HC TECH TIME PER 15 MIN (STAT)

## 2021-12-21 PROCEDURE — 97530 THERAPEUTIC ACTIVITIES: CPT

## 2021-12-21 PROCEDURE — 82962 GLUCOSE BLOOD TEST: CPT

## 2021-12-21 PROCEDURE — 94761 N-INVAS EAR/PLS OXIMETRY MLT: CPT

## 2021-12-21 PROCEDURE — 99204 OFFICE O/P NEW MOD 45 MIN: CPT | Mod: 57,,, | Performed by: ORTHOPAEDIC SURGERY

## 2021-12-21 PROCEDURE — 11000004 HC SNF PRIVATE

## 2021-12-21 PROCEDURE — 97110 THERAPEUTIC EXERCISES: CPT

## 2021-12-21 PROCEDURE — 25000242 PHARM REV CODE 250 ALT 637 W/ HCPCS: Performed by: PHYSICIAN ASSISTANT

## 2021-12-21 PROCEDURE — 97110 THERAPEUTIC EXERCISES: CPT | Mod: CQ

## 2021-12-21 RX ORDER — PRAZOSIN HYDROCHLORIDE 1 MG/1
1 CAPSULE ORAL
Status: ON HOLD | COMMUNITY
End: 2021-12-23 | Stop reason: HOSPADM

## 2021-12-21 RX ORDER — GUAIFENESIN 600 MG/1
TABLET, EXTENDED RELEASE ORAL
Status: ON HOLD | COMMUNITY
Start: 2021-10-08 | End: 2021-12-23 | Stop reason: HOSPADM

## 2021-12-21 RX ORDER — SILDENAFIL 100 MG/1
100 TABLET, FILM COATED ORAL DAILY PRN
Status: ON HOLD | COMMUNITY
End: 2021-12-23 | Stop reason: HOSPADM

## 2021-12-21 RX ORDER — TRAZODONE HYDROCHLORIDE 100 MG/1
TABLET ORAL
Status: ON HOLD | COMMUNITY
Start: 2021-01-28 | End: 2021-12-23 | Stop reason: HOSPADM

## 2021-12-21 RX ORDER — ALPROSTADIL 500 UG/ML
1000 INJECTION, SOLUTION, CONCENTRATE INTRAVASCULAR
Status: ON HOLD | COMMUNITY
End: 2021-12-23 | Stop reason: HOSPADM

## 2021-12-21 RX ORDER — SUCRALFATE 1 G/1
1 TABLET ORAL DAILY
Status: ON HOLD | COMMUNITY
Start: 2021-01-28 | End: 2021-12-23 | Stop reason: HOSPADM

## 2021-12-21 RX ORDER — CLOPIDOGREL BISULFATE 75 MG/1
1 TABLET ORAL DAILY
Status: ON HOLD | COMMUNITY
Start: 2021-01-28 | End: 2021-12-23 | Stop reason: HOSPADM

## 2021-12-21 RX ORDER — ASPIRIN 325 MG
1 TABLET ORAL DAILY
Status: ON HOLD | COMMUNITY
Start: 2021-01-28 | End: 2021-12-23 | Stop reason: HOSPADM

## 2021-12-21 RX ADMIN — ISOSORBIDE MONONITRATE 60 MG: 30 TABLET, EXTENDED RELEASE ORAL at 09:12

## 2021-12-21 RX ADMIN — LISINOPRIL 20 MG: 20 TABLET ORAL at 09:12

## 2021-12-21 RX ADMIN — TRAZODONE HYDROCHLORIDE 300 MG: 150 TABLET ORAL at 08:12

## 2021-12-21 RX ADMIN — HYDROCODONE BITARTRATE AND ACETAMINOPHEN 1 TABLET: 10; 325 TABLET ORAL at 11:12

## 2021-12-21 RX ADMIN — ATORVASTATIN CALCIUM 80 MG: 40 TABLET, FILM COATED ORAL at 08:12

## 2021-12-21 RX ADMIN — HYDROCODONE BITARTRATE AND ACETAMINOPHEN 1 TABLET: 10; 325 TABLET ORAL at 03:12

## 2021-12-21 RX ADMIN — PANTOPRAZOLE SODIUM 40 MG: 40 TABLET, DELAYED RELEASE ORAL at 09:12

## 2021-12-21 RX ADMIN — IPRATROPIUM BROMIDE AND ALBUTEROL SULFATE 3 ML: 2.5; .5 SOLUTION RESPIRATORY (INHALATION) at 07:12

## 2021-12-21 RX ADMIN — CETIRIZINE HYDROCHLORIDE 10 MG: 10 TABLET, FILM COATED ORAL at 09:12

## 2021-12-21 RX ADMIN — SENNOSIDES AND DOCUSATE SODIUM 1 TABLET: 50; 8.6 TABLET ORAL at 09:12

## 2021-12-21 RX ADMIN — HYDROCODONE BITARTRATE AND ACETAMINOPHEN 1 TABLET: 10; 325 TABLET ORAL at 09:12

## 2021-12-21 RX ADMIN — SENNOSIDES AND DOCUSATE SODIUM 1 TABLET: 50; 8.6 TABLET ORAL at 08:12

## 2021-12-21 RX ADMIN — MELATONIN 6 MG: 3 TAB ORAL at 10:12

## 2021-12-21 RX ADMIN — ASPIRIN 325 MG: 325 TABLET ORAL at 09:12

## 2021-12-21 RX ADMIN — METOPROLOL TARTRATE 25 MG: 25 TABLET, FILM COATED ORAL at 09:12

## 2021-12-21 RX ADMIN — ALLOPURINOL 100 MG: 100 TABLET ORAL at 09:12

## 2021-12-21 RX ADMIN — AMLODIPINE BESYLATE 5 MG: 5 TABLET ORAL at 09:12

## 2021-12-21 RX ADMIN — CLOPIDOGREL 75 MG: 75 TABLET, FILM COATED ORAL at 09:12

## 2021-12-22 LAB
ANION GAP SERPL CALCULATED.3IONS-SCNC: 13 MMOL/L (ref 7–16)
BASOPHILS # BLD AUTO: 0.02 K/UL (ref 0–0.2)
BASOPHILS NFR BLD AUTO: 0.5 % (ref 0–1)
BUN SERPL-MCNC: 35 MG/DL (ref 7–18)
BUN/CREAT SERPL: 11 (ref 6–20)
CALCIUM SERPL-MCNC: 8.8 MG/DL (ref 8.5–10.1)
CHLORIDE SERPL-SCNC: 109 MMOL/L (ref 98–107)
CO2 SERPL-SCNC: 24 MMOL/L (ref 21–32)
CREAT SERPL-MCNC: 3.31 MG/DL (ref 0.7–1.3)
DIFFERENTIAL METHOD BLD: ABNORMAL
EOSINOPHIL # BLD AUTO: 0.26 K/UL (ref 0–0.5)
EOSINOPHIL NFR BLD AUTO: 6.6 % (ref 1–4)
EOSINOPHIL NFR BLD MANUAL: 7 % (ref 1–4)
ERYTHROCYTE [DISTWIDTH] IN BLOOD BY AUTOMATED COUNT: 12.7 % (ref 11.5–14.5)
GLUCOSE SERPL-MCNC: 116 MG/DL (ref 70–105)
GLUCOSE SERPL-MCNC: 84 MG/DL (ref 70–105)
GLUCOSE SERPL-MCNC: 93 MG/DL (ref 70–105)
GLUCOSE SERPL-MCNC: 95 MG/DL (ref 74–106)
GLUCOSE SERPL-MCNC: 99 MG/DL (ref 70–105)
HCT VFR BLD AUTO: 28.5 % (ref 40–54)
HGB BLD-MCNC: 9.6 G/DL (ref 13.5–18)
LYMPHOCYTES # BLD AUTO: 0.79 K/UL (ref 1–4.8)
LYMPHOCYTES NFR BLD AUTO: 20.1 % (ref 27–41)
LYMPHOCYTES NFR BLD MANUAL: 25 % (ref 27–41)
MCH RBC QN AUTO: 33.2 PG (ref 27–31)
MCHC RBC AUTO-ENTMCNC: 33.7 G/DL (ref 32–36)
MCV RBC AUTO: 98.6 FL (ref 80–96)
MONOCYTES # BLD AUTO: 0.46 K/UL (ref 0–0.8)
MONOCYTES NFR BLD AUTO: 11.7 % (ref 2–6)
MONOCYTES NFR BLD MANUAL: 6 % (ref 2–6)
MPC BLD CALC-MCNC: 8.6 FL (ref 9.4–12.4)
NEUTROPHILS # BLD AUTO: 2.4 K/UL (ref 1.8–7.7)
NEUTROPHILS NFR BLD AUTO: 61.1 % (ref 53–65)
NEUTS SEG NFR BLD MANUAL: 62 % (ref 50–62)
NRBC BLD MANUAL-RTO: ABNORMAL %
OVALOCYTES BLD QL SMEAR: ABNORMAL
PLATELET # BLD AUTO: 177 K/UL (ref 150–400)
PLATELET MORPHOLOGY: NORMAL
POTASSIUM SERPL-SCNC: 5.5 MMOL/L (ref 3.5–5.1)
RBC # BLD AUTO: 2.89 M/UL (ref 4.6–6.2)
SCHISTOCYTES BLD QL AUTO: ABNORMAL
SODIUM SERPL-SCNC: 140 MMOL/L (ref 136–145)
WBC # BLD AUTO: 3.93 K/UL (ref 4.5–11)

## 2021-12-22 PROCEDURE — 25000242 PHARM REV CODE 250 ALT 637 W/ HCPCS: Performed by: PHYSICIAN ASSISTANT

## 2021-12-22 PROCEDURE — 85025 COMPLETE CBC W/AUTO DIFF WBC: CPT | Performed by: NURSE PRACTITIONER

## 2021-12-22 PROCEDURE — 97530 THERAPEUTIC ACTIVITIES: CPT

## 2021-12-22 PROCEDURE — 80048 BASIC METABOLIC PNL TOTAL CA: CPT | Performed by: NURSE PRACTITIONER

## 2021-12-22 PROCEDURE — 97535 SELF CARE MNGMENT TRAINING: CPT

## 2021-12-22 PROCEDURE — 99900035 HC TECH TIME PER 15 MIN (STAT)

## 2021-12-22 PROCEDURE — 94761 N-INVAS EAR/PLS OXIMETRY MLT: CPT

## 2021-12-22 PROCEDURE — 97110 THERAPEUTIC EXERCISES: CPT

## 2021-12-22 PROCEDURE — 97110 THERAPEUTIC EXERCISES: CPT | Mod: CQ

## 2021-12-22 PROCEDURE — 36415 COLL VENOUS BLD VENIPUNCTURE: CPT | Performed by: NURSE PRACTITIONER

## 2021-12-22 PROCEDURE — 97116 GAIT TRAINING THERAPY: CPT | Mod: CQ

## 2021-12-22 PROCEDURE — 25000003 PHARM REV CODE 250: Performed by: PHYSICIAN ASSISTANT

## 2021-12-22 PROCEDURE — 94640 AIRWAY INHALATION TREATMENT: CPT

## 2021-12-22 PROCEDURE — 11000004 HC SNF PRIVATE

## 2021-12-22 PROCEDURE — 27000941

## 2021-12-22 PROCEDURE — 82962 GLUCOSE BLOOD TEST: CPT

## 2021-12-22 RX ADMIN — PANTOPRAZOLE SODIUM 40 MG: 40 TABLET, DELAYED RELEASE ORAL at 09:12

## 2021-12-22 RX ADMIN — HYDROCODONE BITARTRATE AND ACETAMINOPHEN 1 TABLET: 10; 325 TABLET ORAL at 09:12

## 2021-12-22 RX ADMIN — ATORVASTATIN CALCIUM 80 MG: 40 TABLET, FILM COATED ORAL at 08:12

## 2021-12-22 RX ADMIN — AMLODIPINE BESYLATE 5 MG: 5 TABLET ORAL at 09:12

## 2021-12-22 RX ADMIN — METOPROLOL TARTRATE 25 MG: 25 TABLET, FILM COATED ORAL at 09:12

## 2021-12-22 RX ADMIN — HYDROCODONE BITARTRATE AND ACETAMINOPHEN 1 TABLET: 10; 325 TABLET ORAL at 03:12

## 2021-12-22 RX ADMIN — IPRATROPIUM BROMIDE AND ALBUTEROL SULFATE 3 ML: 2.5; .5 SOLUTION RESPIRATORY (INHALATION) at 10:12

## 2021-12-22 RX ADMIN — LISINOPRIL 20 MG: 20 TABLET ORAL at 09:12

## 2021-12-22 RX ADMIN — SENNOSIDES AND DOCUSATE SODIUM 1 TABLET: 50; 8.6 TABLET ORAL at 09:12

## 2021-12-22 RX ADMIN — ISOSORBIDE MONONITRATE 60 MG: 30 TABLET, EXTENDED RELEASE ORAL at 09:12

## 2021-12-22 RX ADMIN — ALLOPURINOL 100 MG: 100 TABLET ORAL at 09:12

## 2021-12-22 RX ADMIN — CLOPIDOGREL 75 MG: 75 TABLET, FILM COATED ORAL at 09:12

## 2021-12-22 RX ADMIN — CETIRIZINE HYDROCHLORIDE 10 MG: 10 TABLET, FILM COATED ORAL at 09:12

## 2021-12-22 RX ADMIN — SENNOSIDES AND DOCUSATE SODIUM 1 TABLET: 50; 8.6 TABLET ORAL at 08:12

## 2021-12-22 RX ADMIN — TRAZODONE HYDROCHLORIDE 300 MG: 150 TABLET ORAL at 08:12

## 2021-12-22 RX ADMIN — ASPIRIN 325 MG: 325 TABLET ORAL at 09:12

## 2021-12-23 VITALS
SYSTOLIC BLOOD PRESSURE: 135 MMHG | HEIGHT: 72 IN | BODY MASS INDEX: 22.69 KG/M2 | WEIGHT: 167.56 LBS | RESPIRATION RATE: 17 BRPM | OXYGEN SATURATION: 94 % | HEART RATE: 74 BPM | TEMPERATURE: 98 F | DIASTOLIC BLOOD PRESSURE: 85 MMHG

## 2021-12-23 LAB — GLUCOSE SERPL-MCNC: 87 MG/DL (ref 70–105)

## 2021-12-23 PROCEDURE — 25000003 PHARM REV CODE 250: Performed by: PHYSICIAN ASSISTANT

## 2021-12-23 PROCEDURE — 82962 GLUCOSE BLOOD TEST: CPT

## 2021-12-23 PROCEDURE — 94761 N-INVAS EAR/PLS OXIMETRY MLT: CPT

## 2021-12-23 PROCEDURE — 27000941

## 2021-12-23 PROCEDURE — 94640 AIRWAY INHALATION TREATMENT: CPT

## 2021-12-23 PROCEDURE — 25000242 PHARM REV CODE 250 ALT 637 W/ HCPCS: Performed by: PHYSICIAN ASSISTANT

## 2021-12-23 RX ORDER — HYDROCODONE BITARTRATE AND ACETAMINOPHEN 7.5; 325 MG/1; MG/1
1 TABLET ORAL EVERY 6 HOURS PRN
Qty: 20 TABLET | Refills: 0 | Status: SHIPPED | OUTPATIENT
Start: 2021-12-23

## 2021-12-23 RX ORDER — IPRATROPIUM BROMIDE AND ALBUTEROL SULFATE 2.5; .5 MG/3ML; MG/3ML
3 SOLUTION RESPIRATORY (INHALATION) EVERY 6 HOURS PRN
Qty: 75 ML | Refills: 0 | Status: SHIPPED | OUTPATIENT
Start: 2021-12-23 | End: 2022-12-23

## 2021-12-23 RX ADMIN — ALLOPURINOL 100 MG: 100 TABLET ORAL at 08:12

## 2021-12-23 RX ADMIN — HYDROCODONE BITARTRATE AND ACETAMINOPHEN 1 TABLET: 10; 325 TABLET ORAL at 03:12

## 2021-12-23 RX ADMIN — PANTOPRAZOLE SODIUM 40 MG: 40 TABLET, DELAYED RELEASE ORAL at 08:12

## 2021-12-23 RX ADMIN — SENNOSIDES AND DOCUSATE SODIUM 1 TABLET: 50; 8.6 TABLET ORAL at 08:12

## 2021-12-23 RX ADMIN — METOPROLOL TARTRATE 25 MG: 25 TABLET, FILM COATED ORAL at 08:12

## 2021-12-23 RX ADMIN — AMLODIPINE BESYLATE 5 MG: 5 TABLET ORAL at 08:12

## 2021-12-23 RX ADMIN — CETIRIZINE HYDROCHLORIDE 10 MG: 10 TABLET, FILM COATED ORAL at 08:12

## 2021-12-23 RX ADMIN — CLOPIDOGREL 75 MG: 75 TABLET, FILM COATED ORAL at 08:12

## 2021-12-23 RX ADMIN — ASPIRIN 325 MG: 325 TABLET ORAL at 08:12

## 2021-12-23 RX ADMIN — IPRATROPIUM BROMIDE AND ALBUTEROL SULFATE 3 ML: 2.5; .5 SOLUTION RESPIRATORY (INHALATION) at 06:12

## 2021-12-23 RX ADMIN — ISOSORBIDE MONONITRATE 60 MG: 30 TABLET, EXTENDED RELEASE ORAL at 08:12

## 2021-12-23 RX ADMIN — LISINOPRIL 20 MG: 20 TABLET ORAL at 08:12

## 2022-01-11 ENCOUNTER — HOSPITAL ENCOUNTER (OUTPATIENT)
Dept: RADIOLOGY | Facility: HOSPITAL | Age: 72
Discharge: HOME | End: 2022-01-11
Attending: ORTHOPAEDIC SURGERY
Payer: COMMERCIAL

## 2022-01-11 ENCOUNTER — OFFICE VISIT (OUTPATIENT)
Dept: ORTHOPEDICS | Facility: CLINIC | Age: 72
End: 2022-01-11
Payer: MEDICARE

## 2022-01-11 DIAGNOSIS — S42.254D CLOSED NONDISPLACED FRACTURE OF GREATER TUBEROSITY OF RIGHT HUMERUS WITH ROUTINE HEALING, SUBSEQUENT ENCOUNTER: Primary | ICD-10-CM

## 2022-01-11 DIAGNOSIS — S72.411D CLOSED DISPLACED FRACTURE OF CONDYLE OF RIGHT FEMUR WITH ROUTINE HEALING, SUBSEQUENT ENCOUNTER: ICD-10-CM

## 2022-01-11 PROCEDURE — 99024 PR POST-OP FOLLOW-UP VISIT: ICD-10-PCS | Mod: ,,, | Performed by: ORTHOPAEDIC SURGERY

## 2022-01-11 PROCEDURE — 73552 X-RAY EXAM OF FEMUR 2/>: CPT | Mod: 26,RT,, | Performed by: ORTHOPAEDIC SURGERY

## 2022-01-11 PROCEDURE — 99024 POSTOP FOLLOW-UP VISIT: CPT | Mod: ,,, | Performed by: ORTHOPAEDIC SURGERY

## 2022-01-11 PROCEDURE — 73552 XR FEMUR 2 VIEW RIGHT: ICD-10-PCS | Mod: 26,RT,, | Performed by: ORTHOPAEDIC SURGERY

## 2022-01-11 PROCEDURE — 73552 X-RAY EXAM OF FEMUR 2/>: CPT | Mod: TC,RT

## 2022-01-11 NOTE — PROGRESS NOTES
ASSESSMENT:      ICD-10-CM ICD-9-CM   1. Closed nondisplaced fracture of greater tuberosity of right humerus with routine healing, subsequent encounter  S42.254D V54.11   2. Closed displaced fracture of condyle of right femur with routine healing, subsequent encounter  S72.411D V54.15       PLAN:     -Findings and treatment options were discussed with the patient  -All questions answered  Okay for full weight-bearing at this time see show no signs of read displacement.  Will see him back in about 4-6 weeks with repeat radiographs of the right hip.    There are no Patient Instructions on file for this visit.    IMAGING:  X-Ray Femur 2 View Right    Result Date: 1/11/2022  See Procedure Notes for results. IMPRESSION: Please see Ortho procedure notes for report.  This procedure was auto-finalized by: Virtual Radiologist     Radiographs right femur demonstrate a greater tuberosity fracture which is in satisfactory alignment with no displacement and no significant adverse interval change          CC:  Fracture follow-up.     71 y.o. Male returns to clinic for a follow up visit regarding his right femur fracture. He is using his walker on today, states he is just now bearing weight  on his leg, states that it is painful.    he is now 4 weeks out from the injury.  he has been treated with a walker/ home therapy    The patient's current weight bearing restriction has been: as tolerated           REVIEW OF SYSTEMS:   Constitution: Negative. Negative for chills, fever and night sweats.    Hematologic/Lymphatic: Negative for bleeding problem. Does not bruise/bleed easily.   Skin: Negative for dry skin, itching and rash.   Musculoskeletal: Negative for falls. Positive for hand/wrist pain and muscle weakness.     All other review of symptoms were reviewed and found to be noncontributory.     PAST MEDICAL HISTORY:   Past Medical History:   Diagnosis Date    Cancer     Duodenal adenoma     Hypertension        PAST SURGICAL  HISTORY:   Past Surgical History:   Procedure Laterality Date    ABDOMINAL SURGERY      JOINT REPLACEMENT         FAMILY HISTORY:   No family history on file.    SOCIAL HISTORY:   Social History     Socioeconomic History    Marital status: Single   Tobacco Use    Smoking status: Current Every Day Smoker     Types: Cigarettes    Smokeless tobacco: Never Used   Substance and Sexual Activity    Alcohol use: Yes     Comment: pint a week    Drug use: Never       MEDICATIONS:     Current Outpatient Medications:     albuterol-ipratropium (DUO-NEB) 2.5 mg-0.5 mg/3 mL nebulizer solution, Take 3 mLs by nebulization every 6 (six) hours as needed for Wheezing. Rescue, Disp: 75 mL, Rfl: 0    allopurinoL (ZYLOPRIM) 100 MG tablet, TAKE ONE TABLET BY MOUTH DAILY FOR GOUT, Disp: , Rfl:     amLODIPine (NORVASC) 10 MG tablet, Take 0.5 tablets by mouth once daily., Disp: , Rfl:     aspirin (ECOTRIN) 325 MG EC tablet, Take 325 mg by mouth once daily., Disp: , Rfl:     atorvastatin (LIPITOR) 80 MG tablet, TAKE ONE-HALF TABLET BY MOUTH DAILY FOR CHOLESTEROL. STOP MEDICATION AND CALL PROVIDER FOR ANY UNEXPLAINED MUSCLE ACHES,PAIN OR WEAKNESS, Disp: , Rfl:     clopidogreL (PLAVIX) 75 mg tablet, TAKE 1 TABLET BY MOUTH ONCE DAILY , Disp: 90 tablet, Rfl: 1    HYDROcodone-acetaminophen (NORCO) 7.5-325 mg per tablet, Take 1 tablet by mouth every 6 (six) hours as needed for Pain., Disp: 20 tablet, Rfl: 0    isosorbide mononitrate (IMDUR) 60 MG 24 hr tablet, Take 1 tablet by mouth once daily., Disp: , Rfl:     lisinopriL (PRINIVIL,ZESTRIL) 20 MG tablet, Take 20 mg by mouth., Disp: , Rfl:     loratadine (CLARITIN) 10 mg tablet, TAKE ONE TABLET BY MOUTH DAILY FOR ALLERGIES, Disp: , Rfl:     metoprolol tartrate (LOPRESSOR) 25 MG tablet, Take 25 mg by mouth once daily., Disp: , Rfl:     omeprazole (PRILOSEC) 20 MG capsule, Take 20 mg by mouth., Disp: , Rfl:     traZODone (DESYREL) 100 MG tablet, Take 300 mg by mouth every evening.,  Disp: , Rfl:     ALLERGIES:   Review of patient's allergies indicates:   Allergen Reactions    Gatifloxacin Anaphylaxis        PHYSICAL EXAMINATION:  There were no vitals taken for this visit.  Ortho/SPM Exam  Overall gait appears to be improved.  He is mobilizing quite well.  No significant abductor lurch is noted.  His leg lengths appear to be equal.  Orders Placed This Encounter   Procedures    X-Ray Femur 2 View Right

## 2022-02-08 ENCOUNTER — HOSPITAL ENCOUNTER (EMERGENCY)
Facility: HOSPITAL | Age: 72
Discharge: HOME | End: 2022-02-08
Attending: FAMILY MEDICINE | Admitting: FAMILY MEDICINE
Payer: MEDICARE

## 2022-02-08 VITALS
RESPIRATION RATE: 16 BRPM | SYSTOLIC BLOOD PRESSURE: 138 MMHG | OXYGEN SATURATION: 97 % | WEIGHT: 181 LBS | HEIGHT: 72 IN | TEMPERATURE: 98 F | DIASTOLIC BLOOD PRESSURE: 77 MMHG | BODY MASS INDEX: 24.52 KG/M2 | HEART RATE: 89 BPM

## 2022-02-08 DIAGNOSIS — R06.02 SHORTNESS OF BREATH: ICD-10-CM

## 2022-02-08 DIAGNOSIS — I50.9 ACUTE ON CHRONIC CONGESTIVE HEART FAILURE, UNSPECIFIED HEART FAILURE TYPE: Primary | ICD-10-CM

## 2022-02-08 LAB
ALBUMIN SERPL BCP-MCNC: 3.3 G/DL (ref 3.5–5)
ALBUMIN/GLOB SERPL: 0.8 {RATIO}
ALP SERPL-CCNC: 88 U/L (ref 45–115)
ALT SERPL W P-5'-P-CCNC: 33 U/L (ref 16–61)
ANION GAP SERPL CALCULATED.3IONS-SCNC: 17 MMOL/L (ref 7–16)
AST SERPL W P-5'-P-CCNC: 24 U/L (ref 15–37)
BASOPHILS # BLD AUTO: 0.01 K/UL (ref 0–0.2)
BASOPHILS NFR BLD AUTO: 0.2 % (ref 0–1)
BILIRUB SERPL-MCNC: 0.4 MG/DL (ref 0–1.2)
BUN SERPL-MCNC: 46 MG/DL (ref 7–18)
BUN/CREAT SERPL: 12 (ref 6–20)
CALCIUM SERPL-MCNC: 8.4 MG/DL (ref 8.5–10.1)
CHLORIDE SERPL-SCNC: 107 MMOL/L (ref 98–107)
CO2 SERPL-SCNC: 21 MMOL/L (ref 21–32)
CREAT SERPL-MCNC: 3.95 MG/DL (ref 0.7–1.3)
D DIMER PPP FEU-MCNC: 0.69 ΜG/ML (ref 0–0.47)
DIFFERENTIAL METHOD BLD: ABNORMAL
EOSINOPHIL # BLD AUTO: 0.21 K/UL (ref 0–0.5)
EOSINOPHIL NFR BLD AUTO: 3.8 % (ref 1–4)
ERYTHROCYTE [DISTWIDTH] IN BLOOD BY AUTOMATED COUNT: 14.7 % (ref 11.5–14.5)
GLOBULIN SER-MCNC: 3.9 G/DL (ref 2–4)
GLUCOSE SERPL-MCNC: 101 MG/DL (ref 74–106)
HCT VFR BLD AUTO: 29.2 % (ref 40–54)
HGB BLD-MCNC: 9.5 G/DL (ref 13.5–18)
LYMPHOCYTES # BLD AUTO: 1.01 K/UL (ref 1–4.8)
LYMPHOCYTES NFR BLD AUTO: 18.4 % (ref 27–41)
MCH RBC QN AUTO: 32.2 PG (ref 27–31)
MCHC RBC AUTO-ENTMCNC: 32.5 G/DL (ref 32–36)
MCV RBC AUTO: 99 FL (ref 80–96)
MONOCYTES # BLD AUTO: 0.39 K/UL (ref 0–0.8)
MONOCYTES NFR BLD AUTO: 7.1 % (ref 2–6)
MPC BLD CALC-MCNC: 9.2 FL (ref 9.4–12.4)
NEUTROPHILS # BLD AUTO: 3.87 K/UL (ref 1.8–7.7)
NEUTROPHILS NFR BLD AUTO: 70.5 % (ref 53–65)
NT-PROBNP SERPL-MCNC: 3979 PG/ML (ref 1–125)
PLATELET # BLD AUTO: 154 K/UL (ref 150–400)
POTASSIUM SERPL-SCNC: 5.6 MMOL/L (ref 3.5–5.1)
PROT SERPL-MCNC: 7.2 G/DL (ref 6.4–8.2)
RBC # BLD AUTO: 2.95 M/UL (ref 4.6–6.2)
SODIUM SERPL-SCNC: 139 MMOL/L (ref 136–145)
TROPONIN I SERPL HS-MCNC: 23.5 PG/ML
WBC # BLD AUTO: 5.49 K/UL (ref 4.5–11)

## 2022-02-08 PROCEDURE — 80053 COMPREHEN METABOLIC PANEL: CPT | Performed by: FAMILY MEDICINE

## 2022-02-08 PROCEDURE — 93005 ELECTROCARDIOGRAM TRACING: CPT

## 2022-02-08 PROCEDURE — 99285 EMERGENCY DEPT VISIT HI MDM: CPT | Mod: 25

## 2022-02-08 PROCEDURE — 94761 N-INVAS EAR/PLS OXIMETRY MLT: CPT

## 2022-02-08 PROCEDURE — 96374 THER/PROPH/DIAG INJ IV PUSH: CPT

## 2022-02-08 PROCEDURE — 84484 ASSAY OF TROPONIN QUANT: CPT | Performed by: FAMILY MEDICINE

## 2022-02-08 PROCEDURE — 93010 ELECTROCARDIOGRAM REPORT: CPT | Performed by: FAMILY MEDICINE

## 2022-02-08 PROCEDURE — 63600175 PHARM REV CODE 636 W HCPCS: Performed by: FAMILY MEDICINE

## 2022-02-08 PROCEDURE — 36415 COLL VENOUS BLD VENIPUNCTURE: CPT | Performed by: FAMILY MEDICINE

## 2022-02-08 PROCEDURE — 99284 EMERGENCY DEPT VISIT MOD MDM: CPT | Mod: GF | Performed by: NURSE PRACTITIONER

## 2022-02-08 PROCEDURE — 94640 AIRWAY INHALATION TREATMENT: CPT

## 2022-02-08 PROCEDURE — 85378 FIBRIN DEGRADE SEMIQUANT: CPT | Performed by: FAMILY MEDICINE

## 2022-02-08 PROCEDURE — 85025 COMPLETE CBC W/AUTO DIFF WBC: CPT | Performed by: FAMILY MEDICINE

## 2022-02-08 PROCEDURE — 83880 ASSAY OF NATRIURETIC PEPTIDE: CPT | Performed by: FAMILY MEDICINE

## 2022-02-08 PROCEDURE — 27000221 HC OXYGEN, UP TO 24 HOURS

## 2022-02-08 PROCEDURE — 25000003 PHARM REV CODE 250: Performed by: FAMILY MEDICINE

## 2022-02-08 PROCEDURE — 25000242 PHARM REV CODE 250 ALT 637 W/ HCPCS: Performed by: NURSE PRACTITIONER

## 2022-02-08 RX ORDER — IPRATROPIUM BROMIDE AND ALBUTEROL SULFATE 2.5; .5 MG/3ML; MG/3ML
3 SOLUTION RESPIRATORY (INHALATION)
Status: COMPLETED | OUTPATIENT
Start: 2022-02-08 | End: 2022-02-08

## 2022-02-08 RX ORDER — FUROSEMIDE 10 MG/ML
40 INJECTION INTRAMUSCULAR; INTRAVENOUS
Status: COMPLETED | OUTPATIENT
Start: 2022-02-08 | End: 2022-02-08

## 2022-02-08 RX ORDER — METOLAZONE 5 MG/1
10 TABLET ORAL DAILY
Status: DISCONTINUED | OUTPATIENT
Start: 2022-02-08 | End: 2022-02-08 | Stop reason: HOSPADM

## 2022-02-08 RX ORDER — FUROSEMIDE 20 MG/1
20 TABLET ORAL EVERY OTHER DAY
Qty: 30 TABLET | Refills: 3 | Status: SHIPPED | OUTPATIENT
Start: 2022-02-08 | End: 2022-06-07

## 2022-02-08 RX ORDER — METOLAZONE 5 MG/1
TABLET ORAL
Status: DISCONTINUED
Start: 2022-02-08 | End: 2022-02-08 | Stop reason: HOSPADM

## 2022-02-08 RX ADMIN — IPRATROPIUM BROMIDE AND ALBUTEROL SULFATE 3 ML: 2.5; .5 SOLUTION RESPIRATORY (INHALATION) at 06:02

## 2022-02-08 RX ADMIN — METOLAZONE 10 MG: 5 TABLET ORAL at 04:02

## 2022-02-08 RX ADMIN — FUROSEMIDE 40 MG: 10 INJECTION INTRAMUSCULAR; INTRAVENOUS at 02:02

## 2022-02-08 NOTE — ED PROVIDER NOTES
Encounter Date: 2/8/2022       History     Chief Complaint   Patient presents with    Shortness of Breath     C/o increased SOB and bloated feeling onset last pm     HPI  Review of patient's allergies indicates:   Allergen Reactions    Gatifloxacin Anaphylaxis     Past Medical History:   Diagnosis Date    Cancer     Duodenal adenoma     Hypertension      Past Surgical History:   Procedure Laterality Date    ABDOMINAL SURGERY      JOINT REPLACEMENT       No family history on file.  Social History     Tobacco Use    Smoking status: Former Smoker     Types: Cigarettes    Smokeless tobacco: Never Used   Substance Use Topics    Alcohol use: Yes     Comment: pint a week    Drug use: Never     Review of Systems    Physical Exam     Initial Vitals   BP Pulse Resp Temp SpO2   02/08/22 1248 02/08/22 1248 02/08/22 1246 02/08/22 1248 02/08/22 1246   (!) 158/89 76 (!) 34 98.1 °F (36.7 °C) (!) 83 %      MAP       --                Physical Exam    Medical Screening Exam   See Full Note    ED Course   Procedures  Labs Reviewed - No data to display     ECG Results          EKG 12-lead (In process)  Result time 02/08/22 13:10:29    In process by Interface, Lab In [facility] (02/08/22 13:10:29)                 Narrative:    Test Reason : R06.02,    Vent. Rate : 073 BPM     Atrial Rate : 073 BPM     P-R Int : 186 ms          QRS Dur : 074 ms      QT Int : 396 ms       P-R-T Axes : 040 067 081 degrees     QTc Int : 436 ms    Normal sinus rhythm  Possible Anterior infarct ,age undetermined  Abnormal ECG  No previous ECGs available    Referred By: [email or web address]   SELF           Confirmed By:                             Imaging Results    None          Medications - No data to display                    Clinical Impression:   Final diagnoses:  [R06.02] Shortness of breath

## 2022-02-09 NOTE — ED PROVIDER NOTES
Encounter Date: 2/8/2022       History     Chief Complaint   Patient presents with    Shortness of Breath     C/o increased SOB and bloated feeling onset last pm     70 y/o BM presents to the ED with complaints of shortness of breath. PT was tested for covid at the NH and it was negative. Pt does have a history of COPD. His chest xray revealed pulmonary congestive changes.         Review of patient's allergies indicates:   Allergen Reactions    Gatifloxacin Anaphylaxis     Past Medical History:   Diagnosis Date    Cancer     Duodenal adenoma     Hypertension      Past Surgical History:   Procedure Laterality Date    ABDOMINAL SURGERY      JOINT REPLACEMENT       No family history on file.  Social History     Tobacco Use    Smoking status: Former Smoker     Types: Cigarettes    Smokeless tobacco: Never Used   Substance Use Topics    Alcohol use: Yes     Comment: pint a week    Drug use: Never     Review of Systems   Constitutional: Negative.    HENT: Negative.    Eyes: Negative.    Respiratory: Positive for shortness of breath.    Cardiovascular: Negative.  Negative for chest pain, palpitations and leg swelling.   Gastrointestinal: Negative.  Negative for diarrhea, nausea and vomiting.   Endocrine: Negative.    Genitourinary: Negative.    Musculoskeletal: Negative.  Negative for arthralgias, back pain and neck pain.   Skin: Negative.    Neurological: Negative.    Psychiatric/Behavioral: Negative.    All other systems reviewed and are negative.      Physical Exam     Initial Vitals   BP Pulse Resp Temp SpO2   02/08/22 1248 02/08/22 1248 02/08/22 1246 02/08/22 1248 02/08/22 1246   (!) 158/89 76 (!) 34 98.1 °F (36.7 °C) (!) 83 %      MAP       --                Physical Exam    Nursing note and vitals reviewed.  Constitutional: He appears well-developed and well-nourished.   HENT:   Head: Normocephalic and atraumatic.   Eyes: EOM are normal. Pupils are equal, round, and reactive to light.   Neck: Neck supple.    Normal range of motion.  Cardiovascular: Normal rate, regular rhythm and normal heart sounds.   Pulmonary/Chest: He has rales.   Abdominal: Abdomen is soft. Bowel sounds are normal. There is no abdominal tenderness. There is no rebound and no guarding.   Musculoskeletal:         General: Normal range of motion.      Cervical back: Normal range of motion and neck supple.     Neurological: He is alert and oriented to person, place, and time. He has normal strength.   Skin: Skin is warm and dry.   Psychiatric: He has a normal mood and affect.         Medical Screening Exam   See Full Note    ED Course   Procedures  Labs Reviewed   COMPREHENSIVE METABOLIC PANEL - Abnormal; Notable for the following components:       Result Value    Potassium 5.6 (*)     Anion Gap 17 (*)     BUN 46 (*)     Creatinine 3.95 (*)     Calcium 8.4 (*)     Albumin 3.3 (*)     eGFR 16 (*)     All other components within normal limits   D DIMER, QUANTITATIVE - Abnormal; Notable for the following components:    D-Dimer 0.69 (*)     All other components within normal limits   CBC WITH DIFFERENTIAL - Abnormal; Notable for the following components:    RBC 2.95 (*)     Hemoglobin 9.5 (*)     Hematocrit 29.2 (*)     MCV 99.0 (*)     MCH 32.2 (*)     RDW 14.7 (*)     MPV 9.2 (*)     Neutrophils % 70.5 (*)     Lymphocytes % 18.4 (*)     Monocytes % 7.1 (*)     All other components within normal limits   NT-PRO NATRIURETIC PEPTIDE - Abnormal; Notable for the following components:    ProBNP 3,979 (*)     All other components within normal limits   TROPONIN I - Normal   CBC W/ AUTO DIFFERENTIAL    Narrative:     The following orders were created for panel order CBC auto differential.  Procedure                               Abnormality         Status                     ---------                               -----------         ------                     CBC with Differential[[id number]]        Abnormal            Final result                 Please view  results for these tests on the individual orders.        ECG Results          EKG 12-lead (In process)  Result time 02/08/22 13:10:29    In process by Interface, Lab In [facility] (02/08/22 13:10:29)                 Narrative:    Test Reason : R06.02,    Vent. Rate : 073 BPM     Atrial Rate : 073 BPM     P-R Int : 186 ms          QRS Dur : 074 ms      QT Int : 396 ms       P-R-T Axes : 040 067 081 degrees     QTc Int : 436 ms    Normal sinus rhythm  Possible Anterior infarct ,age undetermined  Abnormal ECG  No previous ECGs available    Referred By: [email or web address]   SELF           Confirmed By:                             Imaging Results          X-Ray Chest 1 View (Final result)  Result time 02/08/22 13:38:43    Final result by Francesco Graham MD (02/08/22 13:38:43)                 Impression:      Development of bilateral hazy opacities which may represent pulmonary congestive changes      Electronically signed by: Francesco Graham  Date:    02/08/2022  Time:    13:38             Narrative:    EXAMINATION:  XR CHEST 1 VIEW    CLINICAL HISTORY:  Shortness of breath    TECHNIQUE:  Single frontal view of the chest was performed.    COMPARISON:  12/11/2021    FINDINGS:  Cardiac silhouette enlarged.  There are hazy bilateral basilar predominant opacities.  No pneumothorax.                                 Medications   metOLazone tablet 10 mg ( Oral Not Given 2/8/22 1630)   furosemide injection 40 mg (40 mg Intravenous Given 2/8/22 1413)   albuterol-ipratropium 2.5 mg-0.5 mg/3 mL nebulizer solution 3 mL (3 mLs Nebulization Given 2/8/22 1816)     Medical Decision Making:   ED Management:  Dr. Castañeda gave pt Lasix 40 mg and pt began to diurese. He had had 1150 mls of urine output. His oxygen saturation has remained stable at 95-97% on oxygen 2 liters per NC that he has had on intermittently today. He does have oxygen at the NH. Pt received one Duoneb in the ED - he does take nebs at the NH and has not had one since this morning. He is  eating supper and is doing better. We will keep him until he has put off around 1500 cc of urine. He will go back to the nursing home with a prescription Lasix 20 mg every other day.                    Clinical Impression:   Final diagnoses:  [R06.02] Shortness of breath  [I50.9] Acute on chronic congestive heart failure, unspecified heart failure type (Primary)                 Justin Holbrook, [facility]  02/08/22 1846       Justin Holbrook [facility]  02/08/22 1848       Justin Holbrook, [facility]  02/08/22 1848       Justin Holbrook, [facility]  02/08/22 1849

## 2022-02-09 NOTE — DISCHARGE INSTRUCTIONS
Wear oxygen at NH as needed for SOB. Start taking the lasix 20 mg in the morning and then take it every other day. Return to the ED for any increase in symptoms or any worrisome of symptoms.

## 2022-02-11 ENCOUNTER — TELEPHONE (OUTPATIENT)
Dept: EMERGENCY MEDICINE | Facility: HOSPITAL | Age: 72
End: 2022-02-11
Payer: MEDICAID

## 2022-02-14 ENCOUNTER — LAB REQUISITION (OUTPATIENT)
Dept: LAB | Facility: HOSPITAL | Age: 72
End: 2022-02-14
Payer: MEDICARE

## 2022-02-14 DIAGNOSIS — I10 ESSENTIAL (PRIMARY) HYPERTENSION: ICD-10-CM

## 2022-02-14 DIAGNOSIS — N18.9 CHRONIC KIDNEY DISEASE, UNSPECIFIED: ICD-10-CM

## 2022-02-14 LAB
ALBUMIN SERPL BCP-MCNC: 2.9 G/DL (ref 3.5–5)
ALBUMIN/GLOB SERPL: 0.9 {RATIO}
ALP SERPL-CCNC: 73 U/L (ref 45–115)
ALT SERPL W P-5'-P-CCNC: 24 U/L (ref 16–61)
ANION GAP SERPL CALCULATED.3IONS-SCNC: 14 MMOL/L (ref 7–16)
AST SERPL W P-5'-P-CCNC: 17 U/L (ref 15–37)
BILIRUB SERPL-MCNC: 0.4 MG/DL (ref 0–1.2)
BUN SERPL-MCNC: 48 MG/DL (ref 7–18)
BUN/CREAT SERPL: 12 (ref 6–20)
CALCIUM SERPL-MCNC: 8.1 MG/DL (ref 8.5–10.1)
CHLORIDE SERPL-SCNC: 107 MMOL/L (ref 98–107)
CO2 SERPL-SCNC: 22 MMOL/L (ref 21–32)
CREAT SERPL-MCNC: 4 MG/DL (ref 0.7–1.3)
GLOBULIN SER-MCNC: 3.4 G/DL (ref 2–4)
GLUCOSE SERPL-MCNC: 84 MG/DL (ref 74–106)
POTASSIUM SERPL-SCNC: 4.7 MMOL/L (ref 3.5–5.1)
PROT SERPL-MCNC: 6.3 G/DL (ref 6.4–8.2)
SODIUM SERPL-SCNC: 138 MMOL/L (ref 136–145)
URATE SERPL-MCNC: 5.8 MG/DL (ref 3.5–7.2)

## 2022-02-14 PROCEDURE — 84075 ASSAY ALKALINE PHOSPHATASE: CPT | Performed by: INTERNAL MEDICINE

## 2022-02-14 PROCEDURE — 80053 COMPREHEN METABOLIC PANEL: CPT | Performed by: INTERNAL MEDICINE

## 2022-02-14 PROCEDURE — 82040 ASSAY OF SERUM ALBUMIN: CPT | Performed by: INTERNAL MEDICINE

## 2022-02-14 PROCEDURE — 84550 ASSAY OF BLOOD/URIC ACID: CPT | Performed by: INTERNAL MEDICINE

## 2022-02-21 ENCOUNTER — LAB REQUISITION (OUTPATIENT)
Dept: LAB | Facility: HOSPITAL | Age: 72
End: 2022-02-21
Payer: MEDICARE

## 2022-02-21 DIAGNOSIS — I10 ESSENTIAL (PRIMARY) HYPERTENSION: ICD-10-CM

## 2022-02-21 DIAGNOSIS — N18.9 CHRONIC KIDNEY DISEASE, UNSPECIFIED: ICD-10-CM

## 2022-02-21 LAB
ALBUMIN SERPL BCP-MCNC: 3.2 G/DL (ref 3.5–5)
ALBUMIN/GLOB SERPL: 0.9 {RATIO}
ALP SERPL-CCNC: 85 U/L (ref 45–115)
ALT SERPL W P-5'-P-CCNC: 23 U/L (ref 16–61)
ANION GAP SERPL CALCULATED.3IONS-SCNC: 13 MMOL/L (ref 7–16)
AST SERPL W P-5'-P-CCNC: 16 U/L (ref 15–37)
BILIRUB SERPL-MCNC: 0.4 MG/DL (ref 0–1.2)
BUN SERPL-MCNC: 50 MG/DL (ref 7–18)
BUN/CREAT SERPL: 12 (ref 6–20)
CALCIUM SERPL-MCNC: 8.5 MG/DL (ref 8.5–10.1)
CHLORIDE SERPL-SCNC: 108 MMOL/L (ref 98–107)
CO2 SERPL-SCNC: 24 MMOL/L (ref 21–32)
CREAT SERPL-MCNC: 4.03 MG/DL (ref 0.7–1.3)
GLOBULIN SER-MCNC: 3.7 G/DL (ref 2–4)
GLUCOSE SERPL-MCNC: 84 MG/DL (ref 74–106)
POTASSIUM SERPL-SCNC: 5.8 MMOL/L (ref 3.5–5.1)
PROT SERPL-MCNC: 6.9 G/DL (ref 6.4–8.2)
SODIUM SERPL-SCNC: 139 MMOL/L (ref 136–145)

## 2022-02-21 PROCEDURE — 80053 COMPREHEN METABOLIC PANEL: CPT

## 2022-02-24 ENCOUNTER — LAB REQUISITION (OUTPATIENT)
Dept: LAB | Facility: HOSPITAL | Age: 72
End: 2022-02-24
Payer: MEDICARE

## 2022-02-24 DIAGNOSIS — N18.9 CHRONIC KIDNEY DISEASE, UNSPECIFIED: ICD-10-CM

## 2022-02-24 DIAGNOSIS — I10 ESSENTIAL (PRIMARY) HYPERTENSION: ICD-10-CM

## 2022-02-24 LAB
ANION GAP SERPL CALCULATED.3IONS-SCNC: 14 MMOL/L (ref 7–16)
BUN SERPL-MCNC: 49 MG/DL (ref 7–18)
BUN/CREAT SERPL: 12 (ref 6–20)
CALCIUM SERPL-MCNC: 8.8 MG/DL (ref 8.5–10.1)
CHLORIDE SERPL-SCNC: 107 MMOL/L (ref 98–107)
CO2 SERPL-SCNC: 23 MMOL/L (ref 21–32)
CREAT SERPL-MCNC: 4.12 MG/DL (ref 0.7–1.3)
GLUCOSE SERPL-MCNC: 131 MG/DL (ref 74–106)
POTASSIUM SERPL-SCNC: 5.3 MMOL/L (ref 3.5–5.1)
SODIUM SERPL-SCNC: 139 MMOL/L (ref 136–145)

## 2022-02-24 PROCEDURE — 80048 BASIC METABOLIC PNL TOTAL CA: CPT | Performed by: INTERNAL MEDICINE

## 2022-03-14 ENCOUNTER — LAB REQUISITION (OUTPATIENT)
Dept: LAB | Facility: HOSPITAL | Age: 72
End: 2022-03-14
Payer: MEDICARE

## 2022-03-14 DIAGNOSIS — I10 ESSENTIAL (PRIMARY) HYPERTENSION: ICD-10-CM

## 2022-03-14 LAB
ALBUMIN SERPL BCP-MCNC: 3.1 G/DL (ref 3.5–5)
ALBUMIN/GLOB SERPL: 0.9 {RATIO}
ALP SERPL-CCNC: 91 U/L (ref 45–115)
ALT SERPL W P-5'-P-CCNC: 45 U/L (ref 16–61)
ANION GAP SERPL CALCULATED.3IONS-SCNC: 17 MMOL/L (ref 7–16)
AST SERPL W P-5'-P-CCNC: 25 U/L (ref 15–37)
BASOPHILS # BLD AUTO: 0.02 K/UL (ref 0–0.2)
BASOPHILS NFR BLD AUTO: 0.4 % (ref 0–1)
BILIRUB SERPL-MCNC: 0.3 MG/DL (ref 0–1.2)
BUN SERPL-MCNC: 42 MG/DL (ref 7–18)
BUN/CREAT SERPL: 10 (ref 6–20)
CALCIUM SERPL-MCNC: 8.5 MG/DL (ref 8.5–10.1)
CHLORIDE SERPL-SCNC: 109 MMOL/L (ref 98–107)
CO2 SERPL-SCNC: 19 MMOL/L (ref 21–32)
CREAT SERPL-MCNC: 4.08 MG/DL (ref 0.7–1.3)
DIFFERENTIAL METHOD BLD: ABNORMAL
EOSINOPHIL # BLD AUTO: 0.3 K/UL (ref 0–0.5)
EOSINOPHIL NFR BLD AUTO: 6 % (ref 1–4)
ERYTHROCYTE [DISTWIDTH] IN BLOOD BY AUTOMATED COUNT: 14.9 % (ref 11.5–14.5)
GLOBULIN SER-MCNC: 3.5 G/DL (ref 2–4)
GLUCOSE SERPL-MCNC: 79 MG/DL (ref 74–106)
HCT VFR BLD AUTO: 28.4 % (ref 40–54)
HGB BLD-MCNC: 9 G/DL (ref 13.5–18)
LYMPHOCYTES # BLD AUTO: 1.08 K/UL (ref 1–4.8)
LYMPHOCYTES NFR BLD AUTO: 21.6 % (ref 27–41)
MCH RBC QN AUTO: 31.1 PG (ref 27–31)
MCHC RBC AUTO-ENTMCNC: 31.7 G/DL (ref 32–36)
MCV RBC AUTO: 98.3 FL (ref 80–96)
MONOCYTES # BLD AUTO: 0.4 K/UL (ref 0–0.8)
MONOCYTES NFR BLD AUTO: 8 % (ref 2–6)
MPC BLD CALC-MCNC: 8.8 FL (ref 9.4–12.4)
NEUTROPHILS # BLD AUTO: 3.21 K/UL (ref 1.8–7.7)
NEUTROPHILS NFR BLD AUTO: 64 % (ref 53–65)
PLATELET # BLD AUTO: 146 K/UL (ref 150–400)
POTASSIUM SERPL-SCNC: 5.7 MMOL/L (ref 3.5–5.1)
PROT SERPL-MCNC: 6.6 G/DL (ref 6.4–8.2)
RBC # BLD AUTO: 2.89 M/UL (ref 4.6–6.2)
SODIUM SERPL-SCNC: 139 MMOL/L (ref 136–145)
WBC # BLD AUTO: 5.01 K/UL (ref 4.5–11)

## 2022-03-14 PROCEDURE — 85025 COMPLETE CBC W/AUTO DIFF WBC: CPT | Performed by: INTERNAL MEDICINE

## 2022-03-14 PROCEDURE — 80053 COMPREHEN METABOLIC PANEL: CPT | Performed by: INTERNAL MEDICINE

## 2022-04-08 ENCOUNTER — LAB REQUISITION (OUTPATIENT)
Dept: LAB | Facility: HOSPITAL | Age: 72
End: 2022-04-08
Payer: MEDICARE

## 2022-04-08 DIAGNOSIS — I10 ESSENTIAL (PRIMARY) HYPERTENSION: ICD-10-CM

## 2022-04-08 LAB
BASOPHILS # BLD AUTO: 0.02 K/UL (ref 0–0.2)
BASOPHILS NFR BLD AUTO: 0.4 % (ref 0–1)
BURR CELLS BLD QL SMEAR: ABNORMAL
DIFFERENTIAL METHOD BLD: ABNORMAL
EOSINOPHIL # BLD AUTO: 0.24 K/UL (ref 0–0.5)
EOSINOPHIL NFR BLD AUTO: 5 % (ref 1–4)
EOSINOPHIL NFR BLD MANUAL: 4 % (ref 1–4)
ERYTHROCYTE [DISTWIDTH] IN BLOOD BY AUTOMATED COUNT: 15.3 % (ref 11.5–14.5)
HCT VFR BLD AUTO: 33.1 % (ref 40–54)
HGB BLD-MCNC: 10.7 G/DL (ref 13.5–18)
LYMPHOCYTES # BLD AUTO: 0.79 K/UL (ref 1–4.8)
LYMPHOCYTES NFR BLD AUTO: 16.4 % (ref 27–41)
LYMPHOCYTES NFR BLD MANUAL: 20 % (ref 27–41)
MCH RBC QN AUTO: 30.4 PG (ref 27–31)
MCHC RBC AUTO-ENTMCNC: 32.3 G/DL (ref 32–36)
MCV RBC AUTO: 94 FL (ref 80–96)
MONOCYTES # BLD AUTO: 0.41 K/UL (ref 0–0.8)
MONOCYTES NFR BLD AUTO: 8.5 % (ref 2–6)
MONOCYTES NFR BLD MANUAL: 7 % (ref 2–6)
MPC BLD CALC-MCNC: 8.7 FL (ref 9.4–12.4)
NEUTROPHILS # BLD AUTO: 3.36 K/UL (ref 1.8–7.7)
NEUTROPHILS NFR BLD AUTO: 69.7 % (ref 53–65)
NEUTS SEG NFR BLD MANUAL: 69 % (ref 50–62)
NRBC BLD MANUAL-RTO: ABNORMAL %
OVALOCYTES BLD QL SMEAR: ABNORMAL
PLATELET # BLD AUTO: 145 K/UL (ref 150–400)
PLATELET MORPHOLOGY: NORMAL
RBC # BLD AUTO: 3.52 M/UL (ref 4.6–6.2)
WBC # BLD AUTO: 4.82 K/UL (ref 4.5–11)

## 2022-04-08 PROCEDURE — 85025 COMPLETE CBC W/AUTO DIFF WBC: CPT | Performed by: INTERNAL MEDICINE

## 2022-04-28 DIAGNOSIS — J44.9 CHRONIC OBSTRUCTIVE PULMONARY DISEASE, UNSPECIFIED COPD TYPE: ICD-10-CM

## 2022-04-28 DIAGNOSIS — R93.89 ABNORMAL CXR (CHEST X-RAY): ICD-10-CM

## 2022-04-28 DIAGNOSIS — R04.2 HEMOPTYSIS: Primary | ICD-10-CM

## 2022-04-29 ENCOUNTER — HOSPITAL ENCOUNTER (OUTPATIENT)
Dept: RADIOLOGY | Facility: HOSPITAL | Age: 72
Discharge: HOME | End: 2022-04-29
Attending: INTERNAL MEDICINE
Payer: MEDICARE

## 2022-04-29 ENCOUNTER — OFFICE VISIT (OUTPATIENT)
Dept: PULMONOLOGY | Facility: CLINIC | Age: 72
End: 2022-04-29
Payer: MEDICARE

## 2022-04-29 VITALS
DIASTOLIC BLOOD PRESSURE: 82 MMHG | OXYGEN SATURATION: 98 % | BODY MASS INDEX: 27.9 KG/M2 | HEIGHT: 72 IN | HEART RATE: 61 BPM | WEIGHT: 206 LBS | SYSTOLIC BLOOD PRESSURE: 140 MMHG

## 2022-04-29 DIAGNOSIS — R04.2 HEMOPTYSIS: ICD-10-CM

## 2022-04-29 DIAGNOSIS — R93.89 ABNORMAL CXR (CHEST X-RAY): ICD-10-CM

## 2022-04-29 DIAGNOSIS — J44.9 CHRONIC OBSTRUCTIVE PULMONARY DISEASE, UNSPECIFIED COPD TYPE: ICD-10-CM

## 2022-04-29 DIAGNOSIS — J90 PLEURAL EFFUSION: ICD-10-CM

## 2022-04-29 PROCEDURE — 99214 OFFICE O/P EST MOD 30 MIN: CPT | Mod: S$PBB,,, | Performed by: INTERNAL MEDICINE

## 2022-04-29 PROCEDURE — 71046 X-RAY EXAM CHEST 2 VIEWS: CPT | Mod: 26,,, | Performed by: RADIOLOGY

## 2022-04-29 PROCEDURE — 99214 PR OFFICE/OUTPT VISIT, EST, LEVL IV, 30-39 MIN: ICD-10-PCS | Mod: S$PBB,,, | Performed by: INTERNAL MEDICINE

## 2022-04-29 PROCEDURE — 71046 XR CHEST PA AND LATERAL: ICD-10-PCS | Mod: 26,,, | Performed by: RADIOLOGY

## 2022-04-29 PROCEDURE — 99215 OFFICE O/P EST HI 40 MIN: CPT | Mod: PBBFAC,25 | Performed by: INTERNAL MEDICINE

## 2022-04-29 PROCEDURE — 71046 X-RAY EXAM CHEST 2 VIEWS: CPT | Mod: TC

## 2022-04-29 NOTE — H&P (VIEW-ONLY)
Subjective:       Patient ID: Neymar Parra is a 71 y.o. male.    Chief Complaint: Establish Care and Hemoptysis    Hemoptysis  This is a chronic problem. The current episode started more than 1 month ago. The problem occurs daily. The problem has been unchanged. Pertinent negatives include no abdominal pain, arthralgias, chest pain, chills, congestion, headaches or rash. Nothing aggravates the symptoms.     Past Medical History:   Diagnosis Date    Cancer     Duodenal adenoma     Hypertension      Past Surgical History:   Procedure Laterality Date    ABDOMINAL SURGERY      JOINT REPLACEMENT       History reviewed. No pertinent family history.  Review of patient's allergies indicates:   Allergen Reactions    Gatifloxacin Anaphylaxis      Social History     Tobacco Use    Smoking status: Former Smoker     Types: Cigarettes    Smokeless tobacco: Never Used   Substance Use Topics    Alcohol use: Yes     Comment: pint a week    Drug use: Never      Review of Systems   Constitutional: Negative for chills, activity change and night sweats.   HENT: Negative for congestion and ear pain.    Eyes: Negative for redness and itching.   Respiratory: Positive for hemoptysis.    Cardiovascular: Negative for chest pain and palpitations.   Musculoskeletal: Negative for arthralgias and back pain.   Skin: Negative for rash.   Gastrointestinal: Negative for abdominal pain and abdominal distention.   Neurological: Negative for dizziness and headaches.   Hematological: Negative for adenopathy. Does not bruise/bleed easily.   Psychiatric/Behavioral: Negative for confusion. The patient is not nervous/anxious.        Objective:      Physical Exam   Constitutional: He is oriented to person, place, and time. He appears well-developed and well-nourished.   HENT:   Head: Normocephalic.   Nose: Nose normal.   Mouth/Throat: Oropharynx is clear and moist.   Neck: No JVD present. No thyromegaly present.   Cardiovascular: Normal rate,  regular rhythm, normal heart sounds and intact distal pulses.   Pulmonary/Chest: Normal expansion, hyperinflation, symmetric chest wall expansion, effort normal and breath sounds normal.   Abdominal: Soft. Bowel sounds are normal.   Musculoskeletal:         General: Normal range of motion.      Cervical back: Normal range of motion and neck supple.   Lymphadenopathy: No supraclavicular adenopathy is present.     He has no cervical adenopathy.   Neurological: He is alert and oriented to person, place, and time. He has normal reflexes.   Skin: Skin is warm and dry.   Psychiatric: He has a normal mood and affect. His behavior is normal.     Personal Diagnostic Review  Chest x-ray has right pleural effusion    No flowsheet data found.      Assessment:       1. Hemoptysis    2. Pleural effusion        Outpatient Encounter Medications as of 4/29/2022   Medication Sig Dispense Refill    albuterol-ipratropium (DUO-NEB) 2.5 mg-0.5 mg/3 mL nebulizer solution Take 3 mLs by nebulization every 6 (six) hours as needed for Wheezing. Rescue 75 mL 0    allopurinoL (ZYLOPRIM) 100 MG tablet TAKE ONE TABLET BY MOUTH DAILY FOR GOUT      amLODIPine (NORVASC) 10 MG tablet Take 0.5 tablets by mouth once daily.      aspirin (ECOTRIN) 325 MG EC tablet Take 325 mg by mouth once daily.      atorvastatin (LIPITOR) 80 MG tablet TAKE ONE-HALF TABLET BY MOUTH DAILY FOR CHOLESTEROL. STOP MEDICATION AND CALL PROVIDER FOR ANY UNEXPLAINED MUSCLE ACHES,PAIN OR WEAKNESS      clopidogreL (PLAVIX) 75 mg tablet TAKE 1 TABLET BY MOUTH ONCE DAILY  90 tablet 1    furosemide (LASIX) 20 MG tablet Take 1 tablet (20 mg total) by mouth every other day. 30 tablet 3    HYDROcodone-acetaminophen (NORCO) 7.5-325 mg per tablet Take 1 tablet by mouth every 6 (six) hours as needed for Pain. 20 tablet 0    isosorbide mononitrate (IMDUR) 60 MG 24 hr tablet Take 1 tablet by mouth once daily.      lisinopriL (PRINIVIL,ZESTRIL) 20 MG tablet Take 20 mg by mouth.       loratadine (CLARITIN) 10 mg tablet TAKE ONE TABLET BY MOUTH DAILY FOR ALLERGIES      metoprolol tartrate (LOPRESSOR) 25 MG tablet Take 25 mg by mouth once daily.      omeprazole (PRILOSEC) 20 MG capsule Take 20 mg by mouth.      traZODone (DESYREL) 100 MG tablet Take 300 mg by mouth every evening.       No facility-administered encounter medications on file as of 4/29/2022.     Orders Placed This Encounter   Procedures    CT Chest Without Contrast     Standing Status:   Future     Standing Expiration Date:   4/29/2023     Order Specific Question:   May the Radiologist modify the order per protocol to meet the clinical needs of the patient?     Answer:   Yes     Order Specific Question:   Access port per protocol?     Answer:   No       Plan:       Problem List Items Addressed This Visit        Pulmonary    Hemoptysis     Patient is on anticoagulation but presents with several months history of hemoptysis chest x-ray shows of pleural effusion on the right versus of subpulmonic effusion.  Will proceed with bronchoscopy           Relevant Orders    BRONCHOSCOPY    CT Chest Without Contrast    Pleural effusion     CT chest review what was going on the pleural space           Relevant Orders    BRONCHOSCOPY    CT Chest Without Contrast

## 2022-04-29 NOTE — ASSESSMENT & PLAN NOTE
Patient is on anticoagulation but presents with several months history of hemoptysis chest x-ray shows of pleural effusion on the right versus of subpulmonic effusion.  Will proceed with bronchoscopy

## 2022-05-03 ENCOUNTER — HOSPITAL ENCOUNTER (OUTPATIENT)
Dept: GASTROENTEROLOGY | Facility: HOSPITAL | Age: 72
Discharge: HOME | End: 2022-05-03
Attending: INTERNAL MEDICINE
Payer: MEDICARE

## 2022-05-03 VITALS
HEART RATE: 77 BPM | OXYGEN SATURATION: 99 % | DIASTOLIC BLOOD PRESSURE: 89 MMHG | RESPIRATION RATE: 16 BRPM | SYSTOLIC BLOOD PRESSURE: 155 MMHG

## 2022-05-03 DIAGNOSIS — J90 PLEURAL EFFUSION: ICD-10-CM

## 2022-05-03 DIAGNOSIS — R04.2 HEMOPTYSIS: ICD-10-CM

## 2022-05-03 LAB — DIRECT PREP: NORMAL

## 2022-05-03 PROCEDURE — 87206 SMEAR FLUORESCENT/ACID STAI: CPT | Performed by: INTERNAL MEDICINE

## 2022-05-03 PROCEDURE — 88108 NON-GYN CYTOLOGY: ICD-10-PCS | Mod: 26,,, | Performed by: PATHOLOGY

## 2022-05-03 PROCEDURE — 31622 PR BRONCHOSCOPY,DIAGNOSTIC: ICD-10-PCS | Mod: ,,, | Performed by: INTERNAL MEDICINE

## 2022-05-03 PROCEDURE — 87116 MYCOBACTERIA CULTURE: CPT | Performed by: INTERNAL MEDICINE

## 2022-05-03 PROCEDURE — 88108 CYTOPATH CONCENTRATE TECH: CPT | Mod: 26,,, | Performed by: PATHOLOGY

## 2022-05-03 PROCEDURE — 31622 DX BRONCHOSCOPE/WASH: CPT | Mod: ,,, | Performed by: INTERNAL MEDICINE

## 2022-05-03 PROCEDURE — 27201423 OPTIME MED/SURG SUP & DEVICES STERILE SUPPLY

## 2022-05-03 PROCEDURE — 88305 TISSUE EXAM BY PATHOLOGIST: CPT | Mod: MCY | Performed by: INTERNAL MEDICINE

## 2022-05-03 PROCEDURE — 88305 NON-GYN CYTOLOGY: ICD-10-PCS | Mod: 26,,, | Performed by: PATHOLOGY

## 2022-05-03 PROCEDURE — 87210 SMEAR WET MOUNT SALINE/INK: CPT | Performed by: INTERNAL MEDICINE

## 2022-05-03 PROCEDURE — 63600175 PHARM REV CODE 636 W HCPCS: Performed by: INTERNAL MEDICINE

## 2022-05-03 PROCEDURE — 88305 TISSUE EXAM BY PATHOLOGIST: CPT | Mod: 26,,, | Performed by: PATHOLOGY

## 2022-05-03 PROCEDURE — 31622 DX BRONCHOSCOPE/WASH: CPT

## 2022-05-03 PROCEDURE — 87070 CULTURE OTHR SPECIMN AEROBIC: CPT | Performed by: INTERNAL MEDICINE

## 2022-05-03 PROCEDURE — 87070 CULTURE OTHR SPECIMN AEROBIC: CPT | Mod: 91 | Performed by: INTERNAL MEDICINE

## 2022-05-03 PROCEDURE — 87102 FUNGUS ISOLATION CULTURE: CPT | Performed by: INTERNAL MEDICINE

## 2022-05-03 RX ORDER — MEPERIDINE HYDROCHLORIDE 100 MG/ML
INJECTION INTRAMUSCULAR; INTRAVENOUS; SUBCUTANEOUS
Status: COMPLETED | OUTPATIENT
Start: 2022-05-03 | End: 2022-05-03

## 2022-05-03 RX ORDER — MIDAZOLAM HYDROCHLORIDE 1 MG/ML
INJECTION INTRAMUSCULAR; INTRAVENOUS
Status: COMPLETED | OUTPATIENT
Start: 2022-05-03 | End: 2022-05-03

## 2022-05-03 RX ADMIN — MIDAZOLAM HYDROCHLORIDE 2 MG: 1 INJECTION, SOLUTION INTRAMUSCULAR; INTRAVENOUS at 08:05

## 2022-05-03 RX ADMIN — Medication 25 MG: at 08:05

## 2022-05-03 NOTE — DISCHARGE INSTRUCTIONS
Procedure Date  5/3/22     Impression  Overall Impression: bronchitis     Recommendation  Follow up bronchoscopy    NO DRIVING, OPERATING EQUIPMENT, OR SIGNING LEGAL DOCUMENTS FOR 24 HOURS.

## 2022-05-04 LAB
AFB SMEAR (FA): NORMAL
INSULIN SERPL-ACNC: NORMAL U[IU]/ML
LAB AP CLINICAL INFORMATION: NORMAL
LAB AP LABORATORY NOTES: NORMAL
LAB AP SPECIMEN A NON-GYN GENERAL CATEGORIZATION: NEGATIVE
LAB AP SPECIMEN A NON-GYN INTERPETATION: NORMAL

## 2022-05-05 LAB
CULTURE, LOWER RESPIRATORY: NORMAL
GRAM STN SPEC: NORMAL

## 2022-05-26 ENCOUNTER — HOSPITAL ENCOUNTER (OUTPATIENT)
Dept: RADIOLOGY | Facility: HOSPITAL | Age: 72
Discharge: HOME | End: 2022-05-26
Attending: INTERNAL MEDICINE
Payer: MEDICARE

## 2022-05-26 ENCOUNTER — OFFICE VISIT (OUTPATIENT)
Dept: PULMONOLOGY | Facility: CLINIC | Age: 72
End: 2022-05-26
Payer: MEDICARE

## 2022-05-26 ENCOUNTER — TELEPHONE (OUTPATIENT)
Dept: PULMONOLOGY | Facility: CLINIC | Age: 72
End: 2022-05-26
Payer: MEDICAID

## 2022-05-26 VITALS
OXYGEN SATURATION: 98 % | HEIGHT: 72 IN | BODY MASS INDEX: 28.58 KG/M2 | RESPIRATION RATE: 16 BRPM | SYSTOLIC BLOOD PRESSURE: 142 MMHG | DIASTOLIC BLOOD PRESSURE: 80 MMHG | HEART RATE: 64 BPM | WEIGHT: 211 LBS

## 2022-05-26 DIAGNOSIS — R04.2 HEMOPTYSIS: ICD-10-CM

## 2022-05-26 DIAGNOSIS — J44.9 CHRONIC OBSTRUCTIVE PULMONARY DISEASE, UNSPECIFIED COPD TYPE: ICD-10-CM

## 2022-05-26 DIAGNOSIS — J90 PLEURAL EFFUSION: Primary | ICD-10-CM

## 2022-05-26 DIAGNOSIS — J90 PLEURAL EFFUSION: ICD-10-CM

## 2022-05-26 PROCEDURE — 71250 CT THORAX DX C-: CPT | Mod: TC

## 2022-05-26 PROCEDURE — 99214 PR OFFICE/OUTPT VISIT, EST, LEVL IV, 30-39 MIN: ICD-10-PCS | Mod: S$PBB,,, | Performed by: INTERNAL MEDICINE

## 2022-05-26 PROCEDURE — 71250 CT THORAX DX C-: CPT | Mod: 26,,, | Performed by: RADIOLOGY

## 2022-05-26 PROCEDURE — 99215 OFFICE O/P EST HI 40 MIN: CPT | Mod: PBBFAC,25 | Performed by: INTERNAL MEDICINE

## 2022-05-26 PROCEDURE — 71250 CT CHEST WITHOUT CONTRAST: ICD-10-PCS | Mod: 26,,, | Performed by: RADIOLOGY

## 2022-05-26 PROCEDURE — 99214 OFFICE O/P EST MOD 30 MIN: CPT | Mod: S$PBB,,, | Performed by: INTERNAL MEDICINE

## 2022-05-26 NOTE — ASSESSMENT & PLAN NOTE
Bronchoscopy was unremarkable except for some bronchitis I worry that this is being driven by Plavix with the same to the cardiologist to review his need for ongoing Plavix he has been on it greater than 4 years

## 2022-05-26 NOTE — ASSESSMENT & PLAN NOTE
This is a small effusion but in the context of hemoptysis were going to try to ultrasound directed thoracentesis

## 2022-05-26 NOTE — PROGRESS NOTES
Subjective:       Patient ID: Neymar Parra is a 72 y.o. male.    Chief Complaint: Hemoptysis (Follow-up appt w/ CT today for Hemoptysis: post-Bronchoscopy (5/3/22))    Hemoptysis  This is a chronic problem. The current episode started more than 1 month ago. The problem occurs daily. Pertinent negatives include no abdominal pain, arthralgias, chest pain, chills, congestion, headaches or rash. Nothing aggravates the symptoms.     Past Medical History:   Diagnosis Date    Cancer     Coronary artery disease     stents ~ 2017    Duodenal adenoma     Hypertension      Past Surgical History:   Procedure Laterality Date    ABDOMINAL SURGERY      CORONARY ANGIOPLASTY WITH STENT PLACEMENT      ~ 2017    JOINT REPLACEMENT       Family History   Problem Relation Age of Onset    Diabetes Mother     Diabetes Father      Review of patient's allergies indicates:   Allergen Reactions    Gatifloxacin Anaphylaxis      Social History     Tobacco Use    Smoking status: Former Smoker     Types: Cigarettes    Smokeless tobacco: Never Used    Tobacco comment: 1/3 PPD ~ 20 years: quit Dec 2021   Substance Use Topics    Alcohol use: Not Currently     Comment: pint a week: Hx    Drug use: Never      Review of Systems   Constitutional: Negative for chills, activity change and night sweats.   HENT: Negative for congestion and ear pain.    Eyes: Negative for redness and itching.   Respiratory: Positive for hemoptysis.    Cardiovascular: Negative for chest pain and palpitations.   Musculoskeletal: Negative for arthralgias and back pain.   Skin: Negative for rash.   Gastrointestinal: Negative for abdominal pain and abdominal distention.   Neurological: Negative for dizziness and headaches.   Hematological: Negative for adenopathy. Does not bruise/bleed easily.   Psychiatric/Behavioral: Negative for confusion. The patient is not nervous/anxious.        Objective:      Physical Exam   Constitutional: He is oriented to person, place,  and time. He appears well-developed and well-nourished.   HENT:   Head: Normocephalic.   Nose: Nose normal.   Mouth/Throat: Oropharynx is clear and moist.   Neck: No JVD present. No thyromegaly present.   Cardiovascular: Normal rate, regular rhythm, normal heart sounds and intact distal pulses.   Pulmonary/Chest: Normal expansion, hyperinflation, symmetric chest wall expansion, effort normal and breath sounds normal.   Abdominal: Soft. Bowel sounds are normal.   Musculoskeletal:         General: Normal range of motion.      Cervical back: Normal range of motion and neck supple.   Lymphadenopathy: No supraclavicular adenopathy is present.     He has no cervical adenopathy.   Neurological: He is alert and oriented to person, place, and time. He has normal reflexes.   Skin: Skin is warm and dry.   Psychiatric: He has a normal mood and affect. His behavior is normal.     Personal Diagnostic Review  Bronch negative for tumors or cancer    No flowsheet data found.      Assessment:       1. Pleural effusion    2. Chronic obstructive pulmonary disease, unspecified COPD type    3. Hemoptysis        Outpatient Encounter Medications as of 5/26/2022   Medication Sig Dispense Refill    albuterol-ipratropium (DUO-NEB) 2.5 mg-0.5 mg/3 mL nebulizer solution Take 3 mLs by nebulization every 6 (six) hours as needed for Wheezing. Rescue 75 mL 0    allopurinoL (ZYLOPRIM) 100 MG tablet TAKE ONE TABLET BY MOUTH DAILY FOR GOUT      amLODIPine (NORVASC) 10 MG tablet Take 0.5 tablets by mouth once daily.      aspirin (ECOTRIN) 325 MG EC tablet Take 325 mg by mouth once daily.      atorvastatin (LIPITOR) 80 MG tablet TAKE ONE-HALF TABLET BY MOUTH DAILY FOR CHOLESTEROL. STOP MEDICATION AND CALL PROVIDER FOR ANY UNEXPLAINED MUSCLE ACHES,PAIN OR WEAKNESS      clopidogreL (PLAVIX) 75 mg tablet TAKE 1 TABLET BY MOUTH ONCE DAILY  90 tablet 1    isosorbide mononitrate (IMDUR) 60 MG 24 hr tablet Take 1 tablet by mouth once daily.       loratadine (CLARITIN) 10 mg tablet TAKE ONE TABLET BY MOUTH DAILY FOR ALLERGIES      metoprolol tartrate (LOPRESSOR) 25 MG tablet Take 25 mg by mouth once daily.      omeprazole (PRILOSEC) 20 MG capsule Take 20 mg by mouth.      traZODone (DESYREL) 100 MG tablet Take 300 mg by mouth every evening.      furosemide (LASIX) 20 MG tablet Take 1 tablet (20 mg total) by mouth every other day. 30 tablet 3    HYDROcodone-acetaminophen (NORCO) 7.5-325 mg per tablet Take 1 tablet by mouth every 6 (six) hours as needed for Pain. 20 tablet 0    lisinopriL (PRINIVIL,ZESTRIL) 20 MG tablet Take 20 mg by mouth.       No facility-administered encounter medications on file as of 5/26/2022.     Orders Placed This Encounter   Procedures    Culture, Body Fluid     Standing Status:   Future     Standing Expiration Date:   7/25/2023    US Thoracentesis with Imaging, Aspiration Only     Standing Status:   Future     Standing Expiration Date:   5/26/2023     Order Specific Question:   May the Radiologist modify the order per protocol to meet the clinical needs of the patient?     Answer:   Yes     Order Specific Question:   Release to patient     Answer:   Immediate    Cholesterol, Body Fluid     Standing Status:   Future     Standing Expiration Date:   7/25/2023    Glucose, Body Fluid     Standing Status:   Future     Standing Expiration Date:   7/25/2023    LDH, Body Fluid     Standing Status:   Future     Standing Expiration Date:   7/25/2023    Protein, Body Fluid     Standing Status:   Future     Standing Expiration Date:   7/25/2023    pH, Body Fluid     Standing Status:   Future     Standing Expiration Date:   7/25/2023    Cell Count w/ Diff, Fluid     Standing Status:   Future     Standing Expiration Date:   7/25/2023     Order Specific Question:   Source:     Answer:   Pleural, Right [[id number]]    Ambulatory referral/consult to Cardiology     Standing Status:   Future     Standing Expiration Date:   6/26/2023     Referral  Priority:   Routine     Referral Type:   Consultation     Referral Reason:   Specialty Services Required     Requested Specialty:   Cardiology     Number of Visits Requested:   1       Plan:       Problem List Items Addressed This Visit        Pulmonary    Chronic obstructive pulmonary disease     This seems to be stable at the current time           Hemoptysis     Bronchoscopy was unremarkable except for some bronchitis I worry that this is being driven by Plavix with the same to the cardiologist to review his need for ongoing Plavix he has been on it greater than 4 years           Pleural effusion - Primary     This is a small effusion but in the context of hemoptysis were going to try to ultrasound directed thoracentesis           Relevant Orders    US Thoracentesis with Imaging, Aspiration Only    Ambulatory referral/consult to Cardiology    Cholesterol, Body Fluid    Culture, Body Fluid    Glucose, Body Fluid    LDH, Body Fluid    Protein, Body Fluid    pH, Body Fluid    Cell Count w/ Diff, Fluid    Non-Gyn Cytology

## 2022-05-26 NOTE — TELEPHONE ENCOUNTER
at [facility]:   fax [phone]  Phone [phone].    Dali accompanied  to today's clinic appt:  Written notes re follow-up for Thoracentesis, Cardio appt with , and follow-up appt with   were given to her today after clinic appt.    Today's clinic note and outline for scheduled follow-up fax'd to  at above fax number.  Message was left on voice mail at above phone number that fax was sent and to phone, prn.//gp

## 2022-06-06 ENCOUNTER — LAB REQUISITION (OUTPATIENT)
Dept: LAB | Facility: HOSPITAL | Age: 72
End: 2022-06-06
Payer: MEDICARE

## 2022-06-06 DIAGNOSIS — I10 ESSENTIAL (PRIMARY) HYPERTENSION: ICD-10-CM

## 2022-06-06 LAB
ALBUMIN SERPL BCP-MCNC: 3 G/DL (ref 3.5–5)
ALBUMIN/GLOB SERPL: 0.8 {RATIO}
ALP SERPL-CCNC: 73 U/L (ref 45–115)
ALT SERPL W P-5'-P-CCNC: 21 U/L (ref 16–61)
ANION GAP SERPL CALCULATED.3IONS-SCNC: 17 MMOL/L (ref 7–16)
AST SERPL W P-5'-P-CCNC: 13 U/L (ref 15–37)
BASOPHILS # BLD AUTO: 0.02 K/UL (ref 0–0.2)
BASOPHILS NFR BLD AUTO: 0.4 % (ref 0–1)
BILIRUB DIRECT SERPL-MCNC: 0.1 MG/DL (ref 0–0.2)
BILIRUB SERPL-MCNC: 0.3 MG/DL (ref 0–1.2)
BUN SERPL-MCNC: 47 MG/DL (ref 7–18)
BUN/CREAT SERPL: 10 (ref 6–20)
CALCIUM SERPL-MCNC: 8.6 MG/DL (ref 8.5–10.1)
CHLORIDE SERPL-SCNC: 109 MMOL/L (ref 98–107)
CHOLEST SERPL-MCNC: 110 MG/DL (ref 0–200)
CHOLEST/HDLC SERPL: 1.5 {RATIO}
CO2 SERPL-SCNC: 19 MMOL/L (ref 21–32)
CREAT SERPL-MCNC: 4.76 MG/DL (ref 0.7–1.3)
DIFFERENTIAL METHOD BLD: ABNORMAL
EOSINOPHIL # BLD AUTO: 0.27 K/UL (ref 0–0.5)
EOSINOPHIL NFR BLD AUTO: 5.1 % (ref 1–4)
ERYTHROCYTE [DISTWIDTH] IN BLOOD BY AUTOMATED COUNT: 16.7 % (ref 11.5–14.5)
EST. AVERAGE GLUCOSE BLD GHB EST-MCNC: 71 MG/DL
GLOBULIN SER-MCNC: 3.7 G/DL (ref 2–4)
GLUCOSE SERPL-MCNC: 71 MG/DL (ref 74–106)
HBA1C MFR BLD HPLC: 4.7 % (ref 4.5–6.6)
HCT VFR BLD AUTO: 27.9 % (ref 40–54)
HDLC SERPL-MCNC: 71 MG/DL (ref 40–60)
HGB BLD-MCNC: 9.1 G/DL (ref 13.5–18)
LDLC SERPL CALC-MCNC: 34 MG/DL
LDLC/HDLC SERPL: 0.5 {RATIO}
LYMPHOCYTES # BLD AUTO: 1.06 K/UL (ref 1–4.8)
LYMPHOCYTES NFR BLD AUTO: 20.1 % (ref 27–41)
MCH RBC QN AUTO: 29.9 PG (ref 27–31)
MCHC RBC AUTO-ENTMCNC: 32.6 G/DL (ref 32–36)
MCV RBC AUTO: 91.8 FL (ref 80–96)
MONOCYTES # BLD AUTO: 0.55 K/UL (ref 0–0.8)
MONOCYTES NFR BLD AUTO: 10.4 % (ref 2–6)
MPC BLD CALC-MCNC: 8.9 FL (ref 9.4–12.4)
NEUTROPHILS # BLD AUTO: 3.38 K/UL (ref 1.8–7.7)
NEUTROPHILS NFR BLD AUTO: 64 % (ref 53–65)
NONHDLC SERPL-MCNC: 39 MG/DL
PLATELET # BLD AUTO: 147 K/UL (ref 150–400)
POTASSIUM SERPL-SCNC: 6.5 MMOL/L (ref 3.5–5.1)
PROT SERPL-MCNC: 6.7 G/DL (ref 6.4–8.2)
RBC # BLD AUTO: 3.04 M/UL (ref 4.6–6.2)
SODIUM SERPL-SCNC: 138 MMOL/L (ref 136–145)
TRIGL SERPL-MCNC: 26 MG/DL (ref 35–150)
VLDLC SERPL-MCNC: 5 MG/DL
WBC # BLD AUTO: 5.28 K/UL (ref 4.5–11)

## 2022-06-06 PROCEDURE — 80061 LIPID PANEL: CPT | Performed by: INTERNAL MEDICINE

## 2022-06-06 PROCEDURE — 80053 COMPREHEN METABOLIC PANEL: CPT | Performed by: INTERNAL MEDICINE

## 2022-06-06 PROCEDURE — 83036 HEMOGLOBIN GLYCOSYLATED A1C: CPT | Performed by: INTERNAL MEDICINE

## 2022-06-06 PROCEDURE — 85025 COMPLETE CBC W/AUTO DIFF WBC: CPT | Performed by: INTERNAL MEDICINE

## 2022-06-06 PROCEDURE — 82248 BILIRUBIN DIRECT: CPT | Performed by: INTERNAL MEDICINE

## 2022-06-07 ENCOUNTER — HOSPITAL ENCOUNTER (OUTPATIENT)
Dept: RADIOLOGY | Facility: HOSPITAL | Age: 72
Discharge: HOME | End: 2022-06-07
Attending: INTERNAL MEDICINE
Payer: MEDICARE

## 2022-06-07 ENCOUNTER — TELEPHONE (OUTPATIENT)
Dept: PULMONOLOGY | Facility: CLINIC | Age: 72
End: 2022-06-07
Payer: MEDICAID

## 2022-06-07 VITALS
RESPIRATION RATE: 22 BRPM | DIASTOLIC BLOOD PRESSURE: 78 MMHG | SYSTOLIC BLOOD PRESSURE: 158 MMHG | HEART RATE: 76 BPM | OXYGEN SATURATION: 100 %

## 2022-06-07 DIAGNOSIS — J90 PLEURAL EFFUSION: ICD-10-CM

## 2022-06-07 LAB
APTT PPP: 37.6 SECONDS (ref 25.2–37.3)
INR BLD: 1.08 (ref 0.9–1.1)
PROTHROMBIN TIME: 13.6 SECONDS (ref 11.7–14.7)

## 2022-06-07 PROCEDURE — 71046 X-RAY EXAM CHEST 2 VIEWS: CPT | Mod: TC,59

## 2022-06-07 PROCEDURE — 71046 XR CHEST 2 VIEW INSPIRATION EXPIRATION: ICD-10-PCS | Mod: 26,59,77, | Performed by: RADIOLOGY

## 2022-06-07 PROCEDURE — 32555 US THORACENTESIS WITH IMAGING, ASPIRATION ONLY: ICD-10-PCS | Mod: RT,,, | Performed by: STUDENT IN AN ORGANIZED HEALTH CARE EDUCATION/TRAINING PROGRAM

## 2022-06-07 PROCEDURE — 71046 X-RAY EXAM CHEST 2 VIEWS: CPT | Mod: 26,59,, | Performed by: STUDENT IN AN ORGANIZED HEALTH CARE EDUCATION/TRAINING PROGRAM

## 2022-06-07 PROCEDURE — 71046 XR CHEST 2 VIEW INSPIRATION EXPIRATION: ICD-10-PCS | Mod: 26,59,, | Performed by: STUDENT IN AN ORGANIZED HEALTH CARE EDUCATION/TRAINING PROGRAM

## 2022-06-07 PROCEDURE — C1729 CATH, DRAINAGE: HCPCS

## 2022-06-07 PROCEDURE — 85730 THROMBOPLASTIN TIME PARTIAL: CPT | Performed by: STUDENT IN AN ORGANIZED HEALTH CARE EDUCATION/TRAINING PROGRAM

## 2022-06-07 PROCEDURE — 71046 X-RAY EXAM CHEST 2 VIEWS: CPT | Mod: 26,59,77, | Performed by: RADIOLOGY

## 2022-06-07 PROCEDURE — 36415 COLL VENOUS BLD VENIPUNCTURE: CPT | Performed by: STUDENT IN AN ORGANIZED HEALTH CARE EDUCATION/TRAINING PROGRAM

## 2022-06-07 PROCEDURE — 32555 ASPIRATE PLEURA W/ IMAGING: CPT | Mod: RT,,, | Performed by: STUDENT IN AN ORGANIZED HEALTH CARE EDUCATION/TRAINING PROGRAM

## 2022-06-07 NOTE — DISCHARGE INSTRUCTIONS
No heavy lifting.  Remove bandage tomorrow. Restart plavix and aspirin in am. No change in other medications.

## 2022-06-07 NOTE — PROGRESS NOTES
I R requested to perform ultrasound-guided right thoracentesis.  H&P had been reviewed.  Informed consent has been obtained.

## 2022-06-07 NOTE — PROCEDURES
Procedure performed by Marcello HERNANDEZ with assistance from Dr. Donaldson. Patient was prepped with ChloraPrep and draped in a sterile manner.  Formal timeout was called with all present in agreement. 6 cc of lidocaine infused.  Six Thai thoracentesis catheter advanced  into the right posterior thorax.  A total of 500 cc of red tinged  fluid obtained and sent to the lab for analysis.  Fluid delivered to the lab by Ibeth Bragg RN. Catheter was withdrawn  with pressure being held or puncture site.  Bandage then placed.   Patient tolerated procedure well with no immediate complication.

## 2022-06-09 LAB
INSULIN SERPL-ACNC: NORMAL U[IU]/ML
LAB AP CLINICAL INFORMATION: NORMAL
LAB AP LABORATORY NOTES: NORMAL
LAB AP SPECIMEN A NON-GYN GENERAL CATEGORIZATION: NEGATIVE
LAB AP SPECIMEN A NON-GYN INTERPETATION: NORMAL

## 2022-06-12 LAB — CULTURE, FUNGUS (OTHER): NORMAL

## 2022-06-15 ENCOUNTER — HOSPITAL ENCOUNTER (EMERGENCY)
Facility: HOSPITAL | Age: 72
Discharge: OTHER INSTITUTION | End: 2022-06-16
Payer: MEDICARE

## 2022-06-15 ENCOUNTER — TELEPHONE (OUTPATIENT)
Dept: PULMONOLOGY | Facility: CLINIC | Age: 72
End: 2022-06-15
Payer: MEDICAID

## 2022-06-15 DIAGNOSIS — E87.5 HYPERKALEMIA: ICD-10-CM

## 2022-06-15 DIAGNOSIS — R09.02 HYPOXIA: ICD-10-CM

## 2022-06-15 DIAGNOSIS — U07.1 COVID: Primary | ICD-10-CM

## 2022-06-15 DIAGNOSIS — N17.9 ACUTE RENAL FAILURE, UNSPECIFIED ACUTE RENAL FAILURE TYPE: ICD-10-CM

## 2022-06-15 DIAGNOSIS — R06.00 DYSPNEA: ICD-10-CM

## 2022-06-15 LAB
ALBUMIN SERPL BCP-MCNC: 2.8 G/DL (ref 3.5–5)
ALBUMIN/GLOB SERPL: 0.7 {RATIO}
ALP SERPL-CCNC: 80 U/L (ref 45–115)
ALT SERPL W P-5'-P-CCNC: 28 U/L (ref 16–61)
ANION GAP SERPL CALCULATED.3IONS-SCNC: 16 MMOL/L (ref 7–16)
AST SERPL W P-5'-P-CCNC: 29 U/L (ref 15–37)
BASOPHILS # BLD AUTO: 0.01 K/UL (ref 0–0.2)
BASOPHILS NFR BLD AUTO: 0.2 % (ref 0–1)
BILIRUB SERPL-MCNC: 0.2 MG/DL (ref 0–1.2)
BUN SERPL-MCNC: 73 MG/DL (ref 7–18)
BUN/CREAT SERPL: 10 (ref 6–20)
CALCIUM SERPL-MCNC: 7.8 MG/DL (ref 8.5–10.1)
CHLORIDE SERPL-SCNC: 101 MMOL/L (ref 98–107)
CO2 SERPL-SCNC: 19 MMOL/L (ref 21–32)
CREAT SERPL-MCNC: 7.16 MG/DL (ref 0.7–1.3)
D DIMER PPP FEU-MCNC: 0.76 ΜG/ML (ref 0–0.47)
DIFFERENTIAL METHOD BLD: ABNORMAL
EOSINOPHIL # BLD AUTO: 0.02 K/UL (ref 0–0.5)
EOSINOPHIL NFR BLD AUTO: 0.3 % (ref 1–4)
EOSINOPHIL NFR BLD MANUAL: 1 % (ref 1–4)
ERYTHROCYTE [DISTWIDTH] IN BLOOD BY AUTOMATED COUNT: 15.4 % (ref 11.5–14.5)
GLOBULIN SER-MCNC: 3.9 G/DL (ref 2–4)
GLUCOSE SERPL-MCNC: 131 MG/DL (ref 74–106)
HCT VFR BLD AUTO: 27.6 % (ref 40–54)
HGB BLD-MCNC: 9.1 G/DL (ref 13.5–18)
LACTATE SERPL-SCNC: 1.4 MMOL/L (ref 0.4–2)
LYMPHOCYTES # BLD AUTO: 0.56 K/UL (ref 1–4.8)
LYMPHOCYTES NFR BLD AUTO: 9.5 % (ref 27–41)
LYMPHOCYTES NFR BLD MANUAL: 12 % (ref 27–41)
MAGNESIUM SERPL-MCNC: 2.1 MG/DL (ref 1.7–2.3)
MCH RBC QN AUTO: 29.5 PG (ref 27–31)
MCHC RBC AUTO-ENTMCNC: 33 G/DL (ref 32–36)
MCV RBC AUTO: 89.6 FL (ref 80–96)
MONOCYTES # BLD AUTO: 0.43 K/UL (ref 0–0.8)
MONOCYTES NFR BLD AUTO: 7.3 % (ref 2–6)
MONOCYTES NFR BLD MANUAL: 7 % (ref 2–6)
MPC BLD CALC-MCNC: 8.3 FL (ref 9.4–12.4)
NEUTROPHILS # BLD AUTO: 4.87 K/UL (ref 1.8–7.7)
NEUTROPHILS NFR BLD AUTO: 82.7 % (ref 53–65)
NEUTS BAND NFR BLD MANUAL: 2 % (ref 1–5)
NEUTS SEG NFR BLD MANUAL: 78 % (ref 50–62)
NRBC BLD MANUAL-RTO: ABNORMAL %
NT-PROBNP SERPL-MCNC: 6284 PG/ML (ref 1–125)
PLATELET # BLD AUTO: 86 K/UL (ref 150–400)
PLATELET MORPHOLOGY: ABNORMAL
POTASSIUM SERPL-SCNC: 7.2 MMOL/L (ref 3.5–5.1)
PROT SERPL-MCNC: 6.7 G/DL (ref 6.4–8.2)
RBC # BLD AUTO: 3.08 M/UL (ref 4.6–6.2)
RBC MORPH BLD: NORMAL
SODIUM SERPL-SCNC: 129 MMOL/L (ref 136–145)
WBC # BLD AUTO: 5.89 K/UL (ref 4.5–11)

## 2022-06-15 PROCEDURE — 83880 ASSAY OF NATRIURETIC PEPTIDE: CPT | Performed by: NURSE PRACTITIONER

## 2022-06-15 PROCEDURE — 96375 TX/PRO/DX INJ NEW DRUG ADDON: CPT

## 2022-06-15 PROCEDURE — 93005 ELECTROCARDIOGRAM TRACING: CPT

## 2022-06-15 PROCEDURE — 94761 N-INVAS EAR/PLS OXIMETRY MLT: CPT

## 2022-06-15 PROCEDURE — 27000221 HC OXYGEN, UP TO 24 HOURS

## 2022-06-15 PROCEDURE — 96365 THER/PROPH/DIAG IV INF INIT: CPT

## 2022-06-15 PROCEDURE — 85025 COMPLETE CBC W/AUTO DIFF WBC: CPT | Performed by: NURSE PRACTITIONER

## 2022-06-15 PROCEDURE — 36415 COLL VENOUS BLD VENIPUNCTURE: CPT | Performed by: NURSE PRACTITIONER

## 2022-06-15 PROCEDURE — 93010 ELECTROCARDIOGRAM REPORT: CPT | Performed by: FAMILY MEDICINE

## 2022-06-15 PROCEDURE — 85378 FIBRIN DEGRADE SEMIQUANT: CPT | Performed by: NURSE PRACTITIONER

## 2022-06-15 PROCEDURE — 99284 EMERGENCY DEPT VISIT MOD MDM: CPT | Mod: GF | Performed by: NURSE PRACTITIONER

## 2022-06-15 PROCEDURE — 80053 COMPREHEN METABOLIC PANEL: CPT | Performed by: NURSE PRACTITIONER

## 2022-06-15 PROCEDURE — 25000003 PHARM REV CODE 250: Performed by: NURSE PRACTITIONER

## 2022-06-15 PROCEDURE — 99285 EMERGENCY DEPT VISIT HI MDM: CPT | Mod: 25

## 2022-06-15 PROCEDURE — 83605 ASSAY OF LACTIC ACID: CPT | Performed by: NURSE PRACTITIONER

## 2022-06-15 PROCEDURE — 83735 ASSAY OF MAGNESIUM: CPT | Performed by: NURSE PRACTITIONER

## 2022-06-15 PROCEDURE — 87040 BLOOD CULTURE FOR BACTERIA: CPT | Mod: 59 | Performed by: NURSE PRACTITIONER

## 2022-06-15 PROCEDURE — 63600175 PHARM REV CODE 636 W HCPCS: Performed by: NURSE PRACTITIONER

## 2022-06-15 RX ORDER — ACETAMINOPHEN 500 MG
1000 TABLET ORAL EVERY 8 HOURS PRN
COMMUNITY

## 2022-06-15 RX ORDER — ACETAMINOPHEN 325 MG/1
650 TABLET ORAL
Status: COMPLETED | OUTPATIENT
Start: 2022-06-15 | End: 2022-06-15

## 2022-06-15 RX ORDER — TRAMADOL HYDROCHLORIDE 50 MG/1
50 TABLET ORAL EVERY 8 HOURS PRN
COMMUNITY

## 2022-06-15 RX ORDER — CALCIUM GLUCONATE 20 MG/ML
1 INJECTION, SOLUTION INTRAVENOUS ONCE
Status: COMPLETED | OUTPATIENT
Start: 2022-06-15 | End: 2022-06-15

## 2022-06-15 RX ORDER — METHYLPREDNISOLONE SOD SUCC 125 MG
125 VIAL (EA) INJECTION
Status: COMPLETED | OUTPATIENT
Start: 2022-06-15 | End: 2022-06-15

## 2022-06-15 RX ADMIN — CALCIUM GLUCONATE 1 G: 20 INJECTION, SOLUTION INTRAVENOUS at 09:06

## 2022-06-15 RX ADMIN — METHYLPREDNISOLONE SODIUM SUCCINATE 125 MG: 125 INJECTION, POWDER, FOR SOLUTION INTRAMUSCULAR; INTRAVENOUS at 07:06

## 2022-06-15 RX ADMIN — SODIUM CHLORIDE 1000 ML: 9 INJECTION, SOLUTION INTRAVENOUS at 09:06

## 2022-06-15 RX ADMIN — HUMAN INSULIN 10 UNITS: 100 INJECTION, SOLUTION SUBCUTANEOUS at 09:06

## 2022-06-15 RX ADMIN — ACETAMINOPHEN 650 MG: 325 TABLET ORAL at 08:06

## 2022-06-15 RX ADMIN — DEXTROSE MONOHYDRATE 25 G: 25 INJECTION, SOLUTION INTRAVENOUS at 09:06

## 2022-06-16 ENCOUNTER — HOSPITAL ENCOUNTER (INPATIENT)
Facility: HOSPITAL | Age: 72
LOS: 34 days | Discharge: NURSING, REHAB OR LONG-TERM CARE | DRG: 981 | End: 2022-07-20
Attending: HOSPITALIST | Admitting: HOSPITALIST
Payer: MEDICARE

## 2022-06-16 VITALS
WEIGHT: 211.5 LBS | TEMPERATURE: 100 F | OXYGEN SATURATION: 93 % | SYSTOLIC BLOOD PRESSURE: 110 MMHG | DIASTOLIC BLOOD PRESSURE: 72 MMHG | HEIGHT: 72 IN | BODY MASS INDEX: 28.65 KG/M2 | RESPIRATION RATE: 24 BRPM | HEART RATE: 64 BPM

## 2022-06-16 DIAGNOSIS — K31.82 DIEULAFOY LESION OF DUODENUM: ICD-10-CM

## 2022-06-16 DIAGNOSIS — K21.9 GASTROESOPHAGEAL REFLUX DISEASE, UNSPECIFIED WHETHER ESOPHAGITIS PRESENT: ICD-10-CM

## 2022-06-16 DIAGNOSIS — N18.9 ACUTE RENAL FAILURE SUPERIMPOSED ON CHRONIC KIDNEY DISEASE, UNSPECIFIED CKD STAGE, UNSPECIFIED ACUTE RENAL FAILURE TYPE: ICD-10-CM

## 2022-06-16 DIAGNOSIS — J90 PLEURAL EFFUSION: ICD-10-CM

## 2022-06-16 DIAGNOSIS — R07.9 CHEST PAIN: ICD-10-CM

## 2022-06-16 DIAGNOSIS — I50.9 CHF (CONGESTIVE HEART FAILURE): ICD-10-CM

## 2022-06-16 DIAGNOSIS — K26.4 GASTROINTESTINAL HEMORRHAGE ASSOCIATED WITH DUODENAL ULCER: ICD-10-CM

## 2022-06-16 DIAGNOSIS — K29.81 GASTROINTESTINAL HEMORRHAGE ASSOCIATED WITH DUODENITIS: Primary | ICD-10-CM

## 2022-06-16 DIAGNOSIS — E87.5 HYPERKALEMIA: ICD-10-CM

## 2022-06-16 DIAGNOSIS — N17.9 AKI (ACUTE KIDNEY INJURY): ICD-10-CM

## 2022-06-16 DIAGNOSIS — K29.80 DUODENITIS: ICD-10-CM

## 2022-06-16 DIAGNOSIS — E87.1 HYPONATREMIA: ICD-10-CM

## 2022-06-16 DIAGNOSIS — U07.1 COVID-19: ICD-10-CM

## 2022-06-16 DIAGNOSIS — K44.9 HH (HIATUS HERNIA): ICD-10-CM

## 2022-06-16 DIAGNOSIS — D64.9 ANEMIA, UNSPECIFIED TYPE: ICD-10-CM

## 2022-06-16 DIAGNOSIS — N17.9 ACUTE RENAL FAILURE SUPERIMPOSED ON CHRONIC KIDNEY DISEASE, UNSPECIFIED CKD STAGE, UNSPECIFIED ACUTE RENAL FAILURE TYPE: ICD-10-CM

## 2022-06-16 DIAGNOSIS — K20.90 ESOPHAGITIS: ICD-10-CM

## 2022-06-16 DIAGNOSIS — E11.9 TYPE 2 DIABETES MELLITUS WITHOUT COMPLICATION, WITHOUT LONG-TERM CURRENT USE OF INSULIN: Chronic | ICD-10-CM

## 2022-06-16 DIAGNOSIS — I49.9 ARRHYTHMIA: ICD-10-CM

## 2022-06-16 DIAGNOSIS — I10 HYPERTENSION, UNSPECIFIED TYPE: ICD-10-CM

## 2022-06-16 PROBLEM — N18.5 ACUTE RENAL FAILURE SUPERIMPOSED ON STAGE 5 CHRONIC KIDNEY DISEASE, NOT ON CHRONIC DIALYSIS: Chronic | Status: ACTIVE | Noted: 2022-06-16

## 2022-06-16 PROBLEM — J44.9 CHRONIC OBSTRUCTIVE PULMONARY DISEASE: Chronic | Status: ACTIVE | Noted: 2021-12-12

## 2022-06-16 PROBLEM — R04.2 HEMOPTYSIS: Status: RESOLVED | Noted: 2022-04-29 | Resolved: 2022-06-16

## 2022-06-16 PROBLEM — S72.109A: Status: RESOLVED | Noted: 2021-12-12 | Resolved: 2022-06-16

## 2022-06-16 LAB
ANION GAP SERPL CALCULATED.3IONS-SCNC: 20 MMOL/L (ref 7–16)
APTT PPP: 45.6 SECONDS (ref 25.2–37.3)
BASOPHILS # BLD AUTO: 0 K/UL (ref 0–0.2)
BASOPHILS NFR BLD AUTO: 0 % (ref 0–1)
BUN SERPL-MCNC: 79 MG/DL (ref 7–18)
BUN/CREAT SERPL: 11 (ref 6–20)
CALCIUM SERPL-MCNC: 7.7 MG/DL (ref 8.5–10.1)
CHLORIDE SERPL-SCNC: 105 MMOL/L (ref 98–107)
CO2 SERPL-SCNC: 16 MMOL/L (ref 21–32)
CREAT SERPL-MCNC: 7.26 MG/DL (ref 0.7–1.3)
DIFFERENTIAL METHOD BLD: ABNORMAL
EOSINOPHIL # BLD AUTO: 0 K/UL (ref 0–0.5)
EOSINOPHIL NFR BLD AUTO: 0 % (ref 1–4)
ERYTHROCYTE [DISTWIDTH] IN BLOOD BY AUTOMATED COUNT: 15.9 % (ref 11.5–14.5)
FERRITIN SERPL-MCNC: 238 NG/ML (ref 26–388)
FOLATE SERPL-MCNC: 11.9 NG/ML (ref 3.1–17.5)
GLUCOSE SERPL-MCNC: 192 MG/DL (ref 74–106)
GLUCOSE SERPL-MCNC: 226 MG/DL (ref 70–105)
HCT VFR BLD AUTO: 27.3 % (ref 40–54)
HGB BLD-MCNC: 8.8 G/DL (ref 13.5–18)
HYPOCHROMIA BLD QL SMEAR: ABNORMAL
IMM GRANULOCYTES # BLD AUTO: 0.02 K/UL (ref 0–0.04)
IMM GRANULOCYTES NFR BLD: 0.4 % (ref 0–0.4)
INR BLD: 1.12 (ref 0.9–1.1)
INTERNAL CONTROL: ABNORMAL
IRON SATN MFR SERPL: 7 % (ref 14–50)
IRON SERPL-MCNC: 12 ΜG/DL (ref 65–175)
LYMPHOCYTES # BLD AUTO: 0.28 K/UL (ref 1–4.8)
LYMPHOCYTES NFR BLD AUTO: 5.1 % (ref 27–41)
LYMPHOCYTES NFR BLD MANUAL: 7 % (ref 27–41)
MCH RBC QN AUTO: 29 PG (ref 27–31)
MCHC RBC AUTO-ENTMCNC: 32.2 G/DL (ref 32–36)
MCV RBC AUTO: 90.1 FL (ref 80–96)
MONOCYTES # BLD AUTO: 0.05 K/UL (ref 0–0.8)
MONOCYTES NFR BLD AUTO: 0.9 % (ref 2–6)
MONOCYTES NFR BLD MANUAL: 1 % (ref 2–6)
MPC BLD CALC-MCNC: 9.4 FL (ref 9.4–12.4)
NEUTROPHILS # BLD AUTO: 5.1 K/UL (ref 1.8–7.7)
NEUTROPHILS NFR BLD AUTO: 93.6 % (ref 53–65)
NEUTS BAND NFR BLD MANUAL: 2 % (ref 1–5)
NEUTS SEG NFR BLD MANUAL: 90 % (ref 50–62)
NRBC # BLD AUTO: 0 X10E3/UL
NRBC, AUTO (.00): 0 %
OVALOCYTES BLD QL SMEAR: ABNORMAL
PLATELET # BLD AUTO: 92 K/UL (ref 150–400)
PLATELET MORPHOLOGY: ABNORMAL
POLYCHROMASIA BLD QL SMEAR: ABNORMAL
POTASSIUM SERPL-SCNC: 6.3 MMOL/L (ref 3.5–5.1)
POTASSIUM SERPL-SCNC: 6.7 MMOL/L (ref 3.5–5.1)
POTASSIUM SERPL-SCNC: 6.8 MMOL/L (ref 3.5–5.1)
POTASSIUM SERPL-SCNC: 7.6 MMOL/L (ref 3.5–5.1)
PROTHROMBIN TIME: 14 SECONDS (ref 11.7–14.7)
RBC # BLD AUTO: 3.03 M/UL (ref 4.6–6.2)
REACTIVE LYMPHOCYTES: ABNORMAL
SARS-COV-2 RNA RESP QL NAA+PROBE: POSITIVE
SODIUM SERPL-SCNC: 133 MMOL/L (ref 136–145)
TIBC SERPL-MCNC: 172 ΜG/DL (ref 250–450)
TSH SERPL DL<=0.005 MIU/L-ACNC: 0.9 UIU/ML (ref 0.36–3.74)
VIT B12 SERPL-MCNC: 912 PG/ML (ref 193–986)
WBC # BLD AUTO: 5.45 K/UL (ref 4.5–11)

## 2022-06-16 PROCEDURE — 25000003 PHARM REV CODE 250

## 2022-06-16 PROCEDURE — 93010 ELECTROCARDIOGRAM REPORT: CPT | Mod: ,,, | Performed by: INTERNAL MEDICINE

## 2022-06-16 PROCEDURE — 25000003 PHARM REV CODE 250: Performed by: HOSPITALIST

## 2022-06-16 PROCEDURE — 82962 GLUCOSE BLOOD TEST: CPT

## 2022-06-16 PROCEDURE — 80048 BASIC METABOLIC PNL TOTAL CA: CPT | Performed by: HOSPITALIST

## 2022-06-16 PROCEDURE — 85610 PROTHROMBIN TIME: CPT | Performed by: FAMILY MEDICINE

## 2022-06-16 PROCEDURE — 27000221 HC OXYGEN, UP TO 24 HOURS

## 2022-06-16 PROCEDURE — 99223 PR INITIAL HOSPITAL CARE,LEVL III: ICD-10-PCS | Mod: AI,CR,, | Performed by: HOSPITALIST

## 2022-06-16 PROCEDURE — 63600175 PHARM REV CODE 636 W HCPCS: Performed by: HOSPITALIST

## 2022-06-16 PROCEDURE — 63600175 PHARM REV CODE 636 W HCPCS

## 2022-06-16 PROCEDURE — 84132 ASSAY OF SERUM POTASSIUM: CPT

## 2022-06-16 PROCEDURE — 36415 COLL VENOUS BLD VENIPUNCTURE: CPT | Performed by: HOSPITALIST

## 2022-06-16 PROCEDURE — 84132 ASSAY OF SERUM POTASSIUM: CPT | Performed by: FAMILY MEDICINE

## 2022-06-16 PROCEDURE — 99900035 HC TECH TIME PER 15 MIN (STAT)

## 2022-06-16 PROCEDURE — 87040 BLOOD CULTURE FOR BACTERIA: CPT | Performed by: FAMILY MEDICINE

## 2022-06-16 PROCEDURE — 82728 ASSAY OF FERRITIN: CPT | Performed by: HOSPITALIST

## 2022-06-16 PROCEDURE — 85025 COMPLETE CBC W/AUTO DIFF WBC: CPT | Performed by: HOSPITALIST

## 2022-06-16 PROCEDURE — 99223 1ST HOSP IP/OBS HIGH 75: CPT | Mod: AI,CR,, | Performed by: HOSPITALIST

## 2022-06-16 PROCEDURE — 63600175 PHARM REV CODE 636 W HCPCS: Performed by: FAMILY MEDICINE

## 2022-06-16 PROCEDURE — 25000003 PHARM REV CODE 250: Performed by: FAMILY MEDICINE

## 2022-06-16 PROCEDURE — 83540 ASSAY OF IRON: CPT | Performed by: HOSPITALIST

## 2022-06-16 PROCEDURE — 25000242 PHARM REV CODE 250 ALT 637 W/ HCPCS: Performed by: HOSPITALIST

## 2022-06-16 PROCEDURE — 85730 THROMBOPLASTIN TIME PARTIAL: CPT | Performed by: FAMILY MEDICINE

## 2022-06-16 PROCEDURE — 93010 EKG 12-LEAD: ICD-10-PCS | Mod: ,,, | Performed by: INTERNAL MEDICINE

## 2022-06-16 PROCEDURE — 94761 N-INVAS EAR/PLS OXIMETRY MLT: CPT

## 2022-06-16 PROCEDURE — 84443 ASSAY THYROID STIM HORMONE: CPT

## 2022-06-16 PROCEDURE — 11000001 HC ACUTE MED/SURG PRIVATE ROOM

## 2022-06-16 PROCEDURE — 82746 ASSAY OF FOLIC ACID SERUM: CPT | Performed by: HOSPITALIST

## 2022-06-16 PROCEDURE — 87635 SARS-COV-2 COVID-19 AMP PRB: CPT

## 2022-06-16 PROCEDURE — 93005 ELECTROCARDIOGRAM TRACING: CPT

## 2022-06-16 RX ORDER — BISACODYL 5 MG
10 TABLET, DELAYED RELEASE (ENTERIC COATED) ORAL DAILY PRN
Status: DISCONTINUED | OUTPATIENT
Start: 2022-06-16 | End: 2022-07-20 | Stop reason: HOSPADM

## 2022-06-16 RX ORDER — ALLOPURINOL 100 MG/1
100 TABLET ORAL DAILY
Status: DISCONTINUED | OUTPATIENT
Start: 2022-06-16 | End: 2022-06-19

## 2022-06-16 RX ORDER — SODIUM BICARBONATE 1 MEQ/ML
50 SYRINGE (ML) INTRAVENOUS ONCE
Status: COMPLETED | OUTPATIENT
Start: 2022-06-16 | End: 2022-06-16

## 2022-06-16 RX ORDER — CLOPIDOGREL BISULFATE 75 MG/1
75 TABLET ORAL DAILY
Status: DISCONTINUED | OUTPATIENT
Start: 2022-06-16 | End: 2022-06-16

## 2022-06-16 RX ORDER — TRAZODONE HYDROCHLORIDE 50 MG/1
50 TABLET ORAL NIGHTLY PRN
Status: DISCONTINUED | OUTPATIENT
Start: 2022-06-16 | End: 2022-07-20 | Stop reason: HOSPADM

## 2022-06-16 RX ORDER — IBUPROFEN 200 MG
16 TABLET ORAL
Status: DISCONTINUED | OUTPATIENT
Start: 2022-06-16 | End: 2022-07-20 | Stop reason: HOSPADM

## 2022-06-16 RX ORDER — LISINOPRIL 20 MG/1
20 TABLET ORAL DAILY
Status: DISCONTINUED | OUTPATIENT
Start: 2022-06-16 | End: 2022-06-16

## 2022-06-16 RX ORDER — ISOSORBIDE MONONITRATE 30 MG/1
60 TABLET, EXTENDED RELEASE ORAL DAILY
Status: DISCONTINUED | OUTPATIENT
Start: 2022-06-16 | End: 2022-07-07

## 2022-06-16 RX ORDER — ATORVASTATIN CALCIUM 40 MG/1
40 TABLET, FILM COATED ORAL NIGHTLY
Status: DISCONTINUED | OUTPATIENT
Start: 2022-06-16 | End: 2022-07-20 | Stop reason: HOSPADM

## 2022-06-16 RX ORDER — ALBUTEROL SULFATE 90 UG/1
2 AEROSOL, METERED RESPIRATORY (INHALATION) EVERY 6 HOURS PRN
Status: DISCONTINUED | OUTPATIENT
Start: 2022-06-16 | End: 2022-07-20 | Stop reason: HOSPADM

## 2022-06-16 RX ORDER — SIMETHICONE 80 MG
1 TABLET,CHEWABLE ORAL 3 TIMES DAILY PRN
Status: DISCONTINUED | OUTPATIENT
Start: 2022-06-16 | End: 2022-07-20 | Stop reason: HOSPADM

## 2022-06-16 RX ORDER — CALCIUM GLUCONATE 20 MG/ML
1 INJECTION, SOLUTION INTRAVENOUS ONCE
Status: COMPLETED | OUTPATIENT
Start: 2022-06-16 | End: 2022-06-16

## 2022-06-16 RX ORDER — METOPROLOL TARTRATE 25 MG/1
25 TABLET, FILM COATED ORAL DAILY
Status: DISCONTINUED | OUTPATIENT
Start: 2022-06-16 | End: 2022-07-01

## 2022-06-16 RX ORDER — TRAMADOL HYDROCHLORIDE 50 MG/1
50 TABLET ORAL EVERY 8 HOURS PRN
Status: DISCONTINUED | OUTPATIENT
Start: 2022-06-16 | End: 2022-07-20 | Stop reason: HOSPADM

## 2022-06-16 RX ORDER — PANTOPRAZOLE SODIUM 40 MG/1
40 TABLET, DELAYED RELEASE ORAL DAILY
Status: DISCONTINUED | OUTPATIENT
Start: 2022-06-16 | End: 2022-06-16

## 2022-06-16 RX ORDER — IBUPROFEN 200 MG
24 TABLET ORAL
Status: DISCONTINUED | OUTPATIENT
Start: 2022-06-16 | End: 2022-07-20 | Stop reason: HOSPADM

## 2022-06-16 RX ORDER — TALC
6 POWDER (GRAM) TOPICAL NIGHTLY PRN
Status: DISCONTINUED | OUTPATIENT
Start: 2022-06-16 | End: 2022-07-20 | Stop reason: HOSPADM

## 2022-06-16 RX ORDER — SODIUM CHLORIDE 0.9 % (FLUSH) 0.9 %
10 SYRINGE (ML) INJECTION EVERY 12 HOURS PRN
Status: DISCONTINUED | OUTPATIENT
Start: 2022-06-16 | End: 2022-07-20 | Stop reason: HOSPADM

## 2022-06-16 RX ORDER — INSULIN ASPART 100 [IU]/ML
0-5 INJECTION, SOLUTION INTRAVENOUS; SUBCUTANEOUS
Status: DISCONTINUED | OUTPATIENT
Start: 2022-06-16 | End: 2022-06-16

## 2022-06-16 RX ORDER — ACETAMINOPHEN 500 MG
1000 TABLET ORAL EVERY 6 HOURS PRN
Status: DISCONTINUED | OUTPATIENT
Start: 2022-06-16 | End: 2022-07-20 | Stop reason: HOSPADM

## 2022-06-16 RX ORDER — GUAIFENESIN/DEXTROMETHORPHAN 100-10MG/5
10 SYRUP ORAL EVERY 6 HOURS PRN
Status: DISCONTINUED | OUTPATIENT
Start: 2022-06-16 | End: 2022-07-20 | Stop reason: HOSPADM

## 2022-06-16 RX ORDER — MUPIROCIN 20 MG/G
OINTMENT TOPICAL 2 TIMES DAILY
Status: DISPENSED | OUTPATIENT
Start: 2022-06-16 | End: 2022-06-21

## 2022-06-16 RX ORDER — ONDANSETRON 2 MG/ML
8 INJECTION INTRAMUSCULAR; INTRAVENOUS EVERY 6 HOURS PRN
Status: DISCONTINUED | OUTPATIENT
Start: 2022-06-16 | End: 2022-07-20 | Stop reason: HOSPADM

## 2022-06-16 RX ORDER — GLUCAGON 1 MG
1 KIT INJECTION
Status: DISCONTINUED | OUTPATIENT
Start: 2022-06-16 | End: 2022-06-18

## 2022-06-16 RX ORDER — TRAZODONE HYDROCHLORIDE 50 MG/1
50 TABLET ORAL NIGHTLY
Status: DISCONTINUED | OUTPATIENT
Start: 2022-06-16 | End: 2022-06-18

## 2022-06-16 RX ORDER — FERROUS GLUCONATE 324(38)MG
324 TABLET ORAL
Status: DISCONTINUED | OUTPATIENT
Start: 2022-06-16 | End: 2022-06-29

## 2022-06-16 RX ORDER — CETIRIZINE HYDROCHLORIDE 10 MG/1
10 TABLET ORAL DAILY PRN
Status: DISCONTINUED | OUTPATIENT
Start: 2022-06-16 | End: 2022-07-20 | Stop reason: HOSPADM

## 2022-06-16 RX ORDER — CALCIUM GLUCONATE 20 MG/ML
1 INJECTION, SOLUTION INTRAVENOUS EVERY 10 MIN PRN
Status: DISCONTINUED | OUTPATIENT
Start: 2022-06-16 | End: 2022-07-20 | Stop reason: HOSPADM

## 2022-06-16 RX ORDER — AMLODIPINE BESYLATE 10 MG/1
10 TABLET ORAL DAILY
Status: DISCONTINUED | OUTPATIENT
Start: 2022-06-16 | End: 2022-07-07

## 2022-06-16 RX ORDER — IPRATROPIUM BROMIDE AND ALBUTEROL SULFATE 2.5; .5 MG/3ML; MG/3ML
3 SOLUTION RESPIRATORY (INHALATION) EVERY 6 HOURS PRN
Status: DISCONTINUED | OUTPATIENT
Start: 2022-06-16 | End: 2022-06-16

## 2022-06-16 RX ORDER — TRAZODONE HYDROCHLORIDE 50 MG/1
150 TABLET ORAL NIGHTLY
Status: DISCONTINUED | OUTPATIENT
Start: 2022-06-16 | End: 2022-06-16

## 2022-06-16 RX ORDER — NALOXONE HCL 0.4 MG/ML
0.02 VIAL (ML) INJECTION
Status: DISCONTINUED | OUTPATIENT
Start: 2022-06-16 | End: 2022-07-20 | Stop reason: HOSPADM

## 2022-06-16 RX ORDER — HYDROCODONE BITARTRATE AND ACETAMINOPHEN 7.5; 325 MG/1; MG/1
1 TABLET ORAL EVERY 6 HOURS PRN
Status: DISCONTINUED | OUTPATIENT
Start: 2022-06-16 | End: 2022-07-20 | Stop reason: HOSPADM

## 2022-06-16 RX ORDER — ASPIRIN 81 MG/1
81 TABLET ORAL DAILY
Status: DISCONTINUED | OUTPATIENT
Start: 2022-06-16 | End: 2022-06-16

## 2022-06-16 RX ADMIN — ALLOPURINOL 100 MG: 100 TABLET ORAL at 08:06

## 2022-06-16 RX ADMIN — VANCOMYCIN HYDROCHLORIDE 2000 MG: 5 INJECTION, POWDER, LYOPHILIZED, FOR SOLUTION INTRAVENOUS at 05:06

## 2022-06-16 RX ADMIN — GUAIFENESIN AND DEXTROMETHORPHAN 10 ML: 100; 10 SYRUP ORAL at 01:06

## 2022-06-16 RX ADMIN — HUMAN INSULIN 10 UNITS: 100 INJECTION, SOLUTION SUBCUTANEOUS at 01:06

## 2022-06-16 RX ADMIN — HYDROCODONE BITARTRATE AND ACETAMINOPHEN 1 TABLET: 7.5; 325 TABLET ORAL at 08:06

## 2022-06-16 RX ADMIN — CALCIUM GLUCONATE 1 G: 20 INJECTION, SOLUTION INTRAVENOUS at 01:06

## 2022-06-16 RX ADMIN — SODIUM ZIRCONIUM CYCLOSILICATE 10 G: 10 POWDER, FOR SUSPENSION ORAL at 03:06

## 2022-06-16 RX ADMIN — SODIUM ZIRCONIUM CYCLOSILICATE 10 G: 10 POWDER, FOR SUSPENSION ORAL at 02:06

## 2022-06-16 RX ADMIN — METOPROLOL TARTRATE 25 MG: 25 TABLET, FILM COATED ORAL at 08:06

## 2022-06-16 RX ADMIN — DEXAMETHASONE 6 MG: 2 TABLET ORAL at 08:06

## 2022-06-16 RX ADMIN — ISOSORBIDE MONONITRATE 60 MG: 60 TABLET, EXTENDED RELEASE ORAL at 08:06

## 2022-06-16 RX ADMIN — THERA TABS 1 TABLET: TAB at 08:06

## 2022-06-16 RX ADMIN — SODIUM BICARBONATE 50 MEQ: 84 INJECTION, SOLUTION INTRAVENOUS at 02:06

## 2022-06-16 RX ADMIN — FERROUS GLUCONATE 324 MG: 324 TABLET ORAL at 08:06

## 2022-06-16 RX ADMIN — SODIUM ZIRCONIUM CYCLOSILICATE 10 G: 10 POWDER, FOR SUSPENSION ORAL at 08:06

## 2022-06-16 RX ADMIN — TRAZODONE HYDROCHLORIDE 150 MG: 50 TABLET ORAL at 02:06

## 2022-06-16 RX ADMIN — TRAZODONE HYDROCHLORIDE 50 MG: 50 TABLET ORAL at 08:06

## 2022-06-16 RX ADMIN — HUMAN INSULIN 10 UNITS: 100 INJECTION, SOLUTION SUBCUTANEOUS at 06:06

## 2022-06-16 RX ADMIN — MUPIROCIN: 20 OINTMENT TOPICAL at 08:06

## 2022-06-16 RX ADMIN — DEXTROSE MONOHYDRATE 25 G: 25 INJECTION, SOLUTION INTRAVENOUS at 01:06

## 2022-06-16 RX ADMIN — DEXTROSE MONOHYDRATE 25 G: 25 INJECTION, SOLUTION INTRAVENOUS at 06:06

## 2022-06-16 RX ADMIN — AMLODIPINE BESYLATE 10 MG: 10 TABLET ORAL at 08:06

## 2022-06-16 RX ADMIN — HYDROCODONE BITARTRATE AND ACETAMINOPHEN 1 TABLET: 7.5; 325 TABLET ORAL at 12:06

## 2022-06-16 RX ADMIN — ATORVASTATIN CALCIUM 40 MG: 40 TABLET, FILM COATED ORAL at 02:06

## 2022-06-16 RX ADMIN — CEFEPIME HYDROCHLORIDE 1 G: 1 INJECTION, POWDER, FOR SOLUTION INTRAMUSCULAR; INTRAVENOUS at 04:06

## 2022-06-16 RX ADMIN — ATORVASTATIN CALCIUM 40 MG: 40 TABLET, FILM COATED ORAL at 08:06

## 2022-06-16 NOTE — ED NOTES
Sachin Shaver RN called [facility] to facilitate transfer of patient. Talked with Ho.

## 2022-06-16 NOTE — PLAN OF CARE
[facility], [place] - [facility] Telemetry  Initial Discharge Assessment       Primary Care Provider: [facility]    Admission Diagnosis: DEO (acute kidney injury) [N17.9]    Admission Date: 6/16/2022  Expected Discharge Date:     Discharge Barriers Identified: None    Payor: MEDICARE / Plan: MEDICARE PART A & B / Product Type: Government /     Extended Emergency Contact Information  Primary Emergency Contact: GarciaCaity gill  Mobile Phone: [phone]  Relation: Relative  Preferred language: English   needed? No    Discharge Plan A: Long-term acute care facility (LTAC) (Choice obtained for Specialty)  Discharge Plan B: Assisted Living      [facility], [place] - [address] Replaced by [facility] [address]  [id number] Replaced by [facility] [address]  [facility] [address]  Phone: [phone] Fax: [phone]      Initial Assessment (most recent)     Adult Discharge Assessment - 06/16/22 1121        Discharge Assessment    Assessment Type Discharge Planning Assessment     Source of Information family   Caity Garcia (Next of kin) [phone]    If unable to respond/provide information was family/caregiver contacted? Yes     Contact Name/Number Caity Garcia (Next of kin) [phone]     Communicated EDWARDO with patient/caregiver Date not available/Unable to determine     Lives With facility resident     Facility Arrived From: [facility]     Do you expect to return to your current living situation? Yes     Walking or Climbing Stairs Difficulty ambulation difficulty, requires equipment     Dressing/Bathing Difficulty none     Home Accessibility wheelchair accessible     Home Layout Able to live on 1st floor     Equipment Currently Used at Home walker, rolling;oxygen;wheelchair     Patient currently being followed by outpatient case management? No     Are you on dialysis? No     Discharge Plan A Long-term acute care facility (LTAC)   Choice obtained for Specialty    Discharge Plan B Assisted Living      DME Needed Upon Discharge  none     Discharge Barriers Identified None                 SS consulted for LTAC placement. SS unable to speak with son. SS contacted patient's next of kin, Caity Garcia, to discuss discharge planning. Caity states patient was a resident at [facility]. Caity is open to LTAC placement at Specialty. Choice obtained. Discussed with Mary in Specialty, she will check his days. Awaiting cultures. Final plan pending. SS to follow.

## 2022-06-16 NOTE — ED NOTES
Sachin Shaver RN started a second IV site to the right forearm with a 20g IV catheter. Normal Saline at 175ml/hr was moved to this site.

## 2022-06-16 NOTE — ED NOTES
Patient presents with coughing, slight fever, inspiratory & expiratory wheezing & shortness or breath. On auscultation patient has rhonci, rales & wheezing through out all lung fields. This reported to ROSAS Sow.

## 2022-06-16 NOTE — ED NOTES
ROSAS Galindo consulted Dr Garvey at [facility]. Dr Garvey concurred that patient should be transferred to another facility.

## 2022-06-16 NOTE — ED PROVIDER NOTES
Encounter Date: 6/15/2022       History     Chief Complaint   Patient presents with    Cough    COVID-19 Concerns    Shortness of Breath     Patient is 6 days positive for covid-19. Has a cough & is short of breath.     Poor historian. Sent from local nursing home with reports of dyspnea and hypoxia.  Room air sats in the 80s.  Dyspnea began 7 days ago, worsening.  Diagnosed with COVID 6 days ago (06/09/2022).  On arrival, O2 sat 88-92% on 2 L via nasal cannula.  Afebrile.  Breath sounds coarse.  Mildly tachypneic.  Respirations even and nonlabored.  Of note, the patient had a thoracentesis of a right pleural effusion recently.  Followed by Dr. Staley.  20+ pack-year history of smoking, quit a few weeks ago.        Review of patient's allergies indicates:   Allergen Reactions    Gatifloxacin Anaphylaxis     Past Medical History:   Diagnosis Date    Cancer     Coronary artery disease     stents ~ 2017    Duodenal adenoma     Hypertension      Past Surgical History:   Procedure Laterality Date    ABDOMINAL SURGERY      CORONARY ANGIOPLASTY WITH STENT PLACEMENT      ~ 2017    JOINT REPLACEMENT       Family History   Problem Relation Age of Onset    Diabetes Mother     Diabetes Father      Social History     Tobacco Use    Smoking status: Former Smoker     Types: Cigarettes    Smokeless tobacco: Never Used    Tobacco comment: 1/3 PPD ~ 20 years: quit Dec 2021   Substance Use Topics    Alcohol use: Not Currently     Comment: pint a week: Hx    Drug use: Never     Review of Systems   Constitutional: Negative for fever.   HENT: Negative for trouble swallowing.    Eyes: Negative for visual disturbance.   Respiratory: Positive for cough and shortness of breath.    Cardiovascular: Negative for chest pain, palpitations and leg swelling.   Gastrointestinal: Negative for abdominal pain, diarrhea, nausea and vomiting.   Genitourinary: Negative for decreased urine volume.   Skin: Negative for color change.    Neurological: Negative for dizziness, numbness and headaches.       Physical Exam     Initial Vitals   BP Pulse Resp Temp SpO2   06/15/22 1924 06/15/22 1924 06/15/22 1924 06/15/22 1924 06/15/22 1910   (!) 102/47 (!) 53 (!) 24 99.8 °F (37.7 °C) (!) 91 %      MAP       --                Physical Exam    Nursing note and vitals reviewed.  Constitutional: No distress.   HENT:   Head: Normocephalic and atraumatic.   Mouth/Throat: Oropharynx is clear and moist.   Eyes: EOM are normal. Pupils are equal, round, and reactive to light.   Neck: Neck supple.   Cardiovascular: Normal rate, regular rhythm and normal heart sounds.   Pulmonary/Chest: No respiratory distress. He has rhonchi (throughout, worse on right).   Abdominal: Abdomen is soft. He exhibits no distension. There is no abdominal tenderness.   Musculoskeletal:         General: No edema.      Cervical back: Neck supple.     Neurological: He is alert. GCS score is 15. GCS eye subscore is 4. GCS verbal subscore is 5. GCS motor subscore is 6.   Skin: Skin is warm and dry. Capillary refill takes less than 2 seconds.         Medical Screening Exam   See Full Note    ED Course   Procedures  Labs Reviewed   COMPREHENSIVE METABOLIC PANEL - Abnormal; Notable for the following components:       Result Value    Sodium 129 (*)     Potassium 7.2 (*)     CO2 19 (*)     Glucose 131 (*)     BUN 73 (*)     Creatinine 7.16 (*)     Calcium 7.8 (*)     Albumin 2.8 (*)     eGFR 8 (*)     All other components within normal limits   D DIMER, QUANTITATIVE - Abnormal; Notable for the following components:    D-Dimer 0.76 (*)     All other components within normal limits   CBC WITH DIFFERENTIAL - Abnormal; Notable for the following components:    RBC 3.08 (*)     Hemoglobin 9.1 (*)     Hematocrit 27.6 (*)     RDW 15.4 (*)     Platelet Count 86 (*)     MPV 8.3 (*)     Neutrophils % 82.7 (*)     Lymphocytes % 9.5 (*)     Lymphocytes, Absolute 0.56 (*)     Monocytes % 7.3 (*)     Eosinophils  % 0.3 (*)     All other components within normal limits   NT-PRO NATRIURETIC PEPTIDE - Abnormal; Notable for the following components:    ProBNP 6,284 (*)     All other components within normal limits   MANUAL DIFFERENTIAL - Abnormal; Notable for the following components:    Segmented Neutrophils, Man % 78 (*)     Lymphocytes, Man % 12 (*)     Monocytes, Man % 7 (*)     Platelet Morphology Decreased (*)     All other components within normal limits   MAGNESIUM - Normal   LACTIC ACID, PLASMA - Normal   CULTURE, BLOOD   CULTURE, BLOOD   CBC W/ AUTO DIFFERENTIAL    Narrative:     The following orders were created for panel order CBC auto differential.  Procedure                               Abnormality         Status                     ---------                               -----------         ------                     CBC with Differential[[id number]]        Abnormal            Final result               Manual Differential[[id number]]          Abnormal            Final result                 Please view results for these tests on the individual orders.   URINALYSIS, REFLEX TO URINE CULTURE   POCT GLUCOSE MONITORING CONTINUOUS          Imaging Results          X-Ray Chest 1 View (Final result)  Result time 06/15/22 19:37:27    Final result by Sergio Marlow II, MD (06/15/22 19:37:27)                 Impression:      Increased right lower lung pulmonary density could indicate pneumonia.      Electronically signed by: Sergio Marlow  Date:    06/15/2022  Time:    19:37             Narrative:    EXAMINATION:  XR CHEST 1 VIEW    CLINICAL HISTORY:  Dyspnea, unspecified    COMPARISON:  7 June 2022 there is increased right lower    FINDINGS:  The heart and mediastinum are normal in size and configuration.  The pulmonary vascularity is normal in caliber.  There is lung pulmonary density.  No other infiltrates, effusions, pneumothorax or other abnormality is demonstrated.                                 Medications    calcium gluconate 1 g in NS IVPB (premixed) (has no administration in time range)   insulin regular injection 10 Units 0.1 mL (has no administration in time range)   dextrose 50% injection 25 g (has no administration in time range)   sodium chloride 0.9% bolus 1,000 mL (1,000 mLs Intravenous New Bag 6/15/22 2101)   methylPREDNISolone sodium succinate injection 125 mg (125 mg Intravenous Given 6/15/22 1916)   acetaminophen tablet 650 mg (650 mg Oral Given 6/15/22 2050)                 ED Course as of 06/15/22 2113   Wed Manuel 15, 2022   2019 VBG:  pH 7.27  pCO2: 39  pO2: 61  HCO3: 17.9 [GM]   2021 NT-proBNP(!): 6,284 [GM]   2021 Sodium(!): 129 [GM]   2021 Potassium(!!): 7.2 [GM]   2021 BUN(!): 73 [GM]   2021 Creatinine(!): 7.16 [GM]   2021 RBC(!): 3.08 [GM]   2021 Hemoglobin(!): 9.1 [GM]   2021 Hematocrit(!): 27.6 [GM]   2021 Platelets(!): 86 [GM]   2021 Creatinine 7.16, up from trends at 4. Not on dialysis. [GM]   2022 [facility] consult initiated [GM]   2047 Video consult with Dr Finn at [facility]. Advised 1L NS, giving in 250mL increments with re-eval. Transfer. No other change in plan [GM]   2110 Accepted by Dr Rivera. Awaiting room assignment. [GM]      ED Course User Index  [GM] SRIKANTH Null          Clinical Impression:   Final diagnoses:  [R06.00] Dyspnea  [R09.02] Hypoxia  [U07.1] COVID (Primary)  [N17.9] Acute renal failure, unspecified acute renal failure type  [E87.5] Hyperkalemia          ED Disposition Condition    Transfer to Another Facility Stable              SRIKANTH Null  06/15/22 2113

## 2022-06-16 NOTE — ASSESSMENT & PLAN NOTE
-albuterol inhaler q4h PRN, will avoid nebulizer until pt's has completed 10 days after testing positive for COVID.

## 2022-06-16 NOTE — ASSESSMENT & PLAN NOTE
-BUN/Cr on admission 73/7.16  -BUN/Cr 1 week ago was 47/4.76.  -will consult nephrology for eGFR 8 and end stage kidney disease.

## 2022-06-16 NOTE — PROGRESS NOTES
Pharmacy consulted to assist with vancomycin dosing for pneumonia in this 71 y/o nursing home resident. Was Covid + on 6/9.    Due to acute renal failure (CrCl 11), will give a one-time dose of 2000mg and check a random with tomorrow morning labs.    Pharmacy will continue to monitor and make adjustments as needed.    Thank you for the consult,  Jessika Munoz, PharmD  [phone]

## 2022-06-16 NOTE — H&P
[address] Medicine  History & Physical    Patient Name: Neymar Parra  MRN: [id number]  Patient Class: IP- Inpatient  Admission Date: 6/16/2022  Attending Physician: Rj Fernandez MD   Primary Care Provider: [facility] megan [place]         Patient information was obtained from patient and ER records.     Subjective:     Principal Problem:Hyperkalemia    Chief Complaint:   Chief Complaint   Patient presents with    Cough    Shortness of Breath        HPI: 72 y.o. male transferred to the [facility] from [facility] for hyperkalemia and hypoxia. Pt reports he went to [facility] because he was having dyspnea and hemoptysis since 1 month which is worse today. Pt reports he was tested positive for COVID at the nursing home on 6/9/22. Per nursing home patient oxygen saturation was in the 80's on RA with tachypnea and labored breathing. Pt reports he normally does not wear oxygen at the nursing home but has been wearing oxygen more often recently. Pt reports he smoked 1-1.5 PPD for 20 years but quit 7 months ago. Per pt, he saw a pulmonologist (Dr. Aldana) for his hemoptysis and had some tests done the results of which he does not know. Per records, pt with a right pleural effusion recently and had a thoracentesis. Pt denies any fever, congestion, dysuria, N/V/D, chest pain, or any other complaints at this time.     In the ED, pt's K 7.2, d-dimer 0.76, BNP 6284, BUN/Cr 73/7.16. EKG showed some peaked t-waves and irregular rhythm. Chest xray showed Increased right lower lung pulmonary density could indicate pneumonia. Pt was treated in the ED with Calcium gluconate 1g, regular insulin 10 units, D50, IV solumedrol 125 mg, tylenol 650 mg and IV fluids NS 1L bolus. Pt will be admitted to the hospital service for management of hypoxia and hyperkalemia.      Past Medical History:   Diagnosis Date    Anticoagulant long-term use     Chronic renal disease, stage 4, severely decreased  glomerular filtration rate (GFR) between 15-29 mL/min/1.73 square meter     Coronary artery disease     stents ~ 2017    Duodenal adenoma     Hypertension        Past Surgical History:   Procedure Laterality Date    ABDOMINAL SURGERY      CORONARY ANGIOPLASTY WITH STENT PLACEMENT      ~ 2017    JOINT REPLACEMENT         Review of patient's allergies indicates:   Allergen Reactions    Gatifloxacin Anaphylaxis       Current Facility-Administered Medications on File Prior to Encounter   Medication    [COMPLETED] acetaminophen tablet 650 mg    [COMPLETED] calcium gluconate 1 g in NS IVPB (premixed)    [COMPLETED] dextrose 50% injection 25 g    [COMPLETED] insulin regular injection 10 Units 0.1 mL    [COMPLETED] methylPREDNISolone sodium succinate injection 125 mg    [COMPLETED] sodium chloride 0.9% bolus 1,000 mL     Current Outpatient Medications on File Prior to Encounter   Medication Sig    acetaminophen (TYLENOL) 500 MG tablet Take 1,000 mg by mouth every 8 (eight) hours as needed for Pain.    albuterol-ipratropium (DUO-NEB) 2.5 mg-0.5 mg/3 mL nebulizer solution Take 3 mLs by nebulization every 6 (six) hours as needed for Wheezing. Rescue    allopurinoL (ZYLOPRIM) 100 MG tablet TAKE ONE TABLET BY MOUTH DAILY FOR GOUT    amLODIPine (NORVASC) 10 MG tablet Take 10 mg by mouth once daily.    aspirin (ECOTRIN) 325 MG EC tablet Take 325 mg by mouth once daily.    atorvastatin (LIPITOR) 80 MG tablet Take 40 mg by mouth every evening.    clopidogreL (PLAVIX) 75 mg tablet TAKE 1 TABLET BY MOUTH ONCE DAILY     HYDROcodone-acetaminophen (NORCO) 7.5-325 mg per tablet Take 1 tablet by mouth every 6 (six) hours as needed for Pain.    isosorbide mononitrate (IMDUR) 60 MG 24 hr tablet Take 1 tablet by mouth once daily.    lisinopriL (PRINIVIL,ZESTRIL) 20 MG tablet Take 20 mg by mouth.    loratadine (CLARITIN) 10 mg tablet TAKE ONE TABLET BY MOUTH DAILY FOR ALLERGIES    metoprolol tartrate (LOPRESSOR) 25 MG  tablet Take 25 mg by mouth once daily.    omeprazole (PRILOSEC) 20 MG capsule Take 20 mg by mouth once daily.    traMADoL (ULTRAM) 50 mg tablet Take 50 mg by mouth every 8 (eight) hours as needed for Pain.    traZODone (DESYREL) 100 MG tablet Take 300 mg by mouth every evening.     Family History       Problem Relation (Age of Onset)    Diabetes Mother, Father          Tobacco Use    Smoking status: Former Smoker     Types: Cigarettes    Smokeless tobacco: Never Used    Tobacco comment: 1/3 PPD ~ 20 years: quit Dec 2021   Substance and Sexual Activity    Alcohol use: Not Currently     Comment: pint a week: Hx    Drug use: Never    Sexual activity: Not Currently     Review of Systems   Constitutional:  Negative for chills, fatigue and fever.   HENT:  Negative for congestion, hearing loss and trouble swallowing.    Eyes:  Negative for visual disturbance.   Respiratory:  Positive for cough (hemoptysis) and shortness of breath.    Cardiovascular:  Negative for chest pain, palpitations and leg swelling.   Gastrointestinal:  Negative for abdominal pain, blood in stool, diarrhea, nausea and vomiting.   Genitourinary:  Negative for difficulty urinating and hematuria.   Musculoskeletal:  Negative for back pain and myalgias.   Skin:  Negative for rash.   Neurological:  Negative for dizziness, light-headedness and headaches.   Psychiatric/Behavioral:  Negative for sleep disturbance. The patient is not nervous/anxious.    Objective:     Vital Signs (Most Recent):  Temp: 97.3 °F (36.3 °C) (06/16/22 0100)  Pulse: 69 (06/16/22 0100)  Resp: (!) 22 (06/16/22 0100)  BP: 135/72 (06/16/22 0100)  SpO2: 95 % (06/16/22 0100)   Vital Signs (24h Range):  Temp:  [97.3 °F (36.3 °C)-99.8 °F (37.7 °C)] 97.3 °F (36.3 °C)  Pulse:  [49-71] 69  Resp:  [22-24] 22  SpO2:  [91 %-98 %] 95 %  BP: (102-135)/(47-72) 135/72     Weight: 100.9 kg (222 lb 8 oz)  Body mass index is 30.18 kg/m².    Physical Exam  Vitals reviewed.   Constitutional:        General: He is not in acute distress.     Appearance: Normal appearance. He is obese. He is not toxic-appearing or diaphoretic.   HENT:      Head: Normocephalic and atraumatic.      Right Ear: External ear normal.      Left Ear: External ear normal.      Nose: Nose normal.      Mouth/Throat:      Mouth: Mucous membranes are moist.      Pharynx: Oropharynx is clear.   Eyes:      General: No scleral icterus.     Extraocular Movements: Extraocular movements intact.      Conjunctiva/sclera: Conjunctivae normal.      Pupils: Pupils are equal, round, and reactive to light.   Cardiovascular:      Rate and Rhythm: Normal rate and regular rhythm.      Pulses: Normal pulses.      Heart sounds: Normal heart sounds. No murmur heard.  Pulmonary:      Effort: Pulmonary effort is normal. No respiratory distress.      Breath sounds: Rhonchi and rales present. No wheezing.   Abdominal:      General: Abdomen is flat. Bowel sounds are normal. There is no distension.      Palpations: Abdomen is soft.      Tenderness: There is no abdominal tenderness. There is no guarding or rebound.   Musculoskeletal:         General: No swelling. Normal range of motion.      Cervical back: Normal range of motion and neck supple. No rigidity.      Right lower leg: No edema.      Left lower leg: No edema.   Skin:     General: Skin is warm and dry.      Capillary Refill: Capillary refill takes less than 2 seconds.      Findings: No rash.   Neurological:      General: No focal deficit present.      Mental Status: He is alert. Mental status is at baseline.   Psychiatric:         Mood and Affect: Mood normal.         Behavior: Behavior normal.         Thought Content: Thought content normal.         Judgment: Judgment normal.         CRANIAL NERVES     CN III, IV, VI   Pupils are equal, round, and reactive to light.     Significant Labs: All pertinent labs within the past 24 hours have been reviewed.  Recent Lab Results         06/15/22  1950    06/15/22  1918        Albumin/Globulin Ratio   0.7       Albumin   2.8       Alkaline Phosphatase   80       ALT   28       Anion Gap   16       AST   29       Bands   2       Baso #   0.01       Basophil %   0.2       BILIRUBIN TOTAL   0.2       BUN   73       BUN/CREAT RATIO   10       Calcium   7.8       Chloride   101       CO2   19       Creatinine   7.16       D-Dimer   0.76       Differential Type   Manual       eGFR if non    8       Eos #   0.02       Eosinophil %   0.3          1       Globulin, Total   3.9       Glucose   131       Hematocrit   27.6       Hemoglobin   9.1       Lactate, Alonzo   1.4       Lymph #   0.56       Lymph %   9.5          12       Magnesium   2.1       MCH   29.5       MCHC   33.0       MCV   89.6       Mono #   0.43       Mono %   7.3          7       MPV   8.3       Neutrophils, Abs   4.87       Neutrophils Relative   82.7       nRBC         NT-proBNP 6,284         PLATELET MORPHOLOGY   Decreased       Platelets   86       Potassium   7.2       PROTEIN TOTAL   6.7       RBC   3.08       RBC Morphology   Normal       RDW   15.4       Segmented Neutrophils, Man %   78       Sodium   129       WBC   5.89               Significant Imaging: I have reviewed all pertinent imaging results/findings within the past 24 hours.    Assessment/Plan:     * Hyperkalemia  -given potassium cocktail at [facility]  -Bicarb 19 so 1 amp of sodium bicarb on arrival and LoKelma 10 mg given.  -repeat BMP pending.      COVID-19  -concern for COVID bacterial pneumonia, will treat as hospital acquired pneumonia since pt resides at a nursing home so will start IV cefepime and IV vanc pharmacy to dose.  -dexamethasone 6 mg qd   -pt with hemoptysis -s/p bronchoscopy by Dr. Aldana who reports it is most likely due to aspirin and Plavix that pt has used for greater than 4 years. Will hold at this time. Pt is scheduled to see cardiology outpt for further evaluation.  -d-dimer elevated - age-adjusted  d-dimer and Well's score shows VTE possible so LE dopplers and V/Q scan pending.     Acute renal failure superimposed on stage 5 chronic kidney disease, not on chronic dialysis  -BUN/Cr on admission 73/7.16  -BUN/Cr 1 week ago was 47/4.76.  -will consult nephrology for eGFR 8 and end stage kidney disease.    Hypertension  -continued home amlodipine 10 mg qd, isosorbide mononitrate 60 mg qd, and lisinopril 20 mg qd.    Anemia  -H/H 9.1/27.6  -serial BMP, Fe, TIBC, ferritin, B12 and folate.  -will hold DAPT and DVT ppx since patient also has hemoptysis.     Type 2 diabetes mellitus without complication  -HgA1c 4.7 10 days ago  -pt is not on any oral hypoglycemic agents so will not add POCT glucose.    Gastroesophageal reflux disease  -pantoprazole 40 mg qd    Chronic obstructive pulmonary disease  -albuterol inhaler q4h PRN, will avoid nebulizer until pt's has completed 10 days after testing positive for COVID.       VTE Risk Mitigation (From admission, onward)         Ordered     Reason for No Pharmacological VTE Prophylaxis  Once        Question:  Reasons:  Answer:  Risk of Bleeding    06/16/22 0301     IP VTE HIGH RISK PATIENT  Once         06/16/22 0301     Place sequential compression device  Until discontinued         06/16/22 0301            -will hold DAPT and DVT ppx since patient also has hemoptysis.        Nuzhat Leiva DO  Department of Hospital Medicine   [address]

## 2022-06-16 NOTE — ASSESSMENT & PLAN NOTE
-concern for COVID bacterial pneumonia, will treat as hospital acquired pneumonia since pt resides at a nursing home so will start IV cefepime and IV vanc pharmacy to dose.  -dexamethasone 6 mg qd   -pt with hemoptysis -s/p bronchoscopy by Dr. Aldana who reports it is most likely due to aspirin and Plavix that pt has used for greater than 4 years. Will hold at this time. Pt is scheduled to see cardiology outpt for further evaluation.

## 2022-06-16 NOTE — SUBJECTIVE & OBJECTIVE
Past Medical History:   Diagnosis Date    Anticoagulant long-term use     Chronic renal disease, stage 4, severely decreased glomerular filtration rate (GFR) between 15-29 mL/min/1.73 square meter     Coronary artery disease     stents ~ 2017    Duodenal adenoma     Hypertension        Past Surgical History:   Procedure Laterality Date    ABDOMINAL SURGERY      CORONARY ANGIOPLASTY WITH STENT PLACEMENT      ~ 2017    JOINT REPLACEMENT         Review of patient's allergies indicates:   Allergen Reactions    Gatifloxacin Anaphylaxis       Current Facility-Administered Medications on File Prior to Encounter   Medication    [COMPLETED] acetaminophen tablet 650 mg    [COMPLETED] calcium gluconate 1 g in NS IVPB (premixed)    [COMPLETED] dextrose 50% injection 25 g    [COMPLETED] insulin regular injection 10 Units 0.1 mL    [COMPLETED] methylPREDNISolone sodium succinate injection 125 mg    [COMPLETED] sodium chloride 0.9% bolus 1,000 mL     Current Outpatient Medications on File Prior to Encounter   Medication Sig    acetaminophen (TYLENOL) 500 MG tablet Take 1,000 mg by mouth every 8 (eight) hours as needed for Pain.    albuterol-ipratropium (DUO-NEB) 2.5 mg-0.5 mg/3 mL nebulizer solution Take 3 mLs by nebulization every 6 (six) hours as needed for Wheezing. Rescue    allopurinoL (ZYLOPRIM) 100 MG tablet TAKE ONE TABLET BY MOUTH DAILY FOR GOUT    amLODIPine (NORVASC) 10 MG tablet Take 10 mg by mouth once daily.    aspirin (ECOTRIN) 325 MG EC tablet Take 325 mg by mouth once daily.    atorvastatin (LIPITOR) 80 MG tablet Take 40 mg by mouth every evening.    clopidogreL (PLAVIX) 75 mg tablet TAKE 1 TABLET BY MOUTH ONCE DAILY     HYDROcodone-acetaminophen (NORCO) 7.5-325 mg per tablet Take 1 tablet by mouth every 6 (six) hours as needed for Pain.    isosorbide mononitrate (IMDUR) 60 MG 24 hr tablet Take 1 tablet by mouth once daily.    lisinopriL (PRINIVIL,ZESTRIL) 20 MG tablet Take 20 mg by mouth.    loratadine (CLARITIN)  10 mg tablet TAKE ONE TABLET BY MOUTH DAILY FOR ALLERGIES    metoprolol tartrate (LOPRESSOR) 25 MG tablet Take 25 mg by mouth once daily.    omeprazole (PRILOSEC) 20 MG capsule Take 20 mg by mouth once daily.    traMADoL (ULTRAM) 50 mg tablet Take 50 mg by mouth every 8 (eight) hours as needed for Pain.    traZODone (DESYREL) 100 MG tablet Take 300 mg by mouth every evening.     Family History       Problem Relation (Age of Onset)    Diabetes Mother, Father          Tobacco Use    Smoking status: Former Smoker     Types: Cigarettes    Smokeless tobacco: Never Used    Tobacco comment: 1/3 PPD ~ 20 years: quit Dec 2021   Substance and Sexual Activity    Alcohol use: Not Currently     Comment: pint a week: Hx    Drug use: Never    Sexual activity: Not Currently     Review of Systems   Constitutional:  Negative for chills, fatigue and fever.   HENT:  Negative for congestion, hearing loss and trouble swallowing.    Eyes:  Negative for visual disturbance.   Respiratory:  Positive for cough (hemoptysis) and shortness of breath.    Cardiovascular:  Negative for chest pain, palpitations and leg swelling.   Gastrointestinal:  Negative for abdominal pain, blood in stool, diarrhea, nausea and vomiting.   Genitourinary:  Negative for difficulty urinating and hematuria.   Musculoskeletal:  Negative for back pain and myalgias.   Skin:  Negative for rash.   Neurological:  Negative for dizziness, light-headedness and headaches.   Psychiatric/Behavioral:  Negative for sleep disturbance. The patient is not nervous/anxious.    Objective:     Vital Signs (Most Recent):  Temp: 97.3 °F (36.3 °C) (06/16/22 0100)  Pulse: 69 (06/16/22 0100)  Resp: (!) 22 (06/16/22 0100)  BP: 135/72 (06/16/22 0100)  SpO2: 95 % (06/16/22 0100)   Vital Signs (24h Range):  Temp:  [97.3 °F (36.3 °C)-99.8 °F (37.7 °C)] 97.3 °F (36.3 °C)  Pulse:  [49-71] 69  Resp:  [22-24] 22  SpO2:  [91 %-98 %] 95 %  BP: (102-135)/(47-72) 135/72     Weight: 100.9 kg (222 lb 8  oz)  Body mass index is 30.18 kg/m².    Physical Exam  Vitals reviewed.   Constitutional:       General: He is not in acute distress.     Appearance: Normal appearance. He is obese. He is not toxic-appearing or diaphoretic.   HENT:      Head: Normocephalic and atraumatic.      Right Ear: External ear normal.      Left Ear: External ear normal.      Nose: Nose normal.      Mouth/Throat:      Mouth: Mucous membranes are moist.      Pharynx: Oropharynx is clear.   Eyes:      General: No scleral icterus.     Extraocular Movements: Extraocular movements intact.      Conjunctiva/sclera: Conjunctivae normal.      Pupils: Pupils are equal, round, and reactive to light.   Cardiovascular:      Rate and Rhythm: Normal rate and regular rhythm.      Pulses: Normal pulses.      Heart sounds: Normal heart sounds. No murmur heard.  Pulmonary:      Effort: Pulmonary effort is normal. No respiratory distress.      Breath sounds: Rhonchi and rales present. No wheezing.   Abdominal:      General: Abdomen is flat. Bowel sounds are normal. There is no distension.      Palpations: Abdomen is soft.      Tenderness: There is no abdominal tenderness. There is no guarding or rebound.   Musculoskeletal:         General: No swelling. Normal range of motion.      Cervical back: Normal range of motion and neck supple. No rigidity.      Right lower leg: No edema.      Left lower leg: No edema.   Skin:     General: Skin is warm and dry.      Capillary Refill: Capillary refill takes less than 2 seconds.      Findings: No rash.   Neurological:      General: No focal deficit present.      Mental Status: He is alert. Mental status is at baseline.   Psychiatric:         Mood and Affect: Mood normal.         Behavior: Behavior normal.         Thought Content: Thought content normal.         Judgment: Judgment normal.         CRANIAL NERVES     CN III, IV, VI   Pupils are equal, round, and reactive to light.     Significant Labs: All pertinent labs within  the past 24 hours have been reviewed.  Recent Lab Results         06/15/22  1950   06/15/22  1918        Albumin/Globulin Ratio   0.7       Albumin   2.8       Alkaline Phosphatase   80       ALT   28       Anion Gap   16       AST   29       Bands   2       Baso #   0.01       Basophil %   0.2       BILIRUBIN TOTAL   0.2       BUN   73       BUN/CREAT RATIO   10       Calcium   7.8       Chloride   101       CO2   19       Creatinine   7.16       D-Dimer   0.76       Differential Type   Manual       eGFR if non    8       Eos #   0.02       Eosinophil %   0.3          1       Globulin, Total   3.9       Glucose   131       Hematocrit   27.6       Hemoglobin   9.1       Lactate, Alonzo   1.4       Lymph #   0.56       Lymph %   9.5          12       Magnesium   2.1       MCH   29.5       MCHC   33.0       MCV   89.6       Mono #   0.43       Mono %   7.3          7       MPV   8.3       Neutrophils, Abs   4.87       Neutrophils Relative   82.7       nRBC         NT-proBNP 6,284         PLATELET MORPHOLOGY   Decreased       Platelets   86       Potassium   7.2       PROTEIN TOTAL   6.7       RBC   3.08       RBC Morphology   Normal       RDW   15.4       Segmented Neutrophils, Man %   78       Sodium   129       WBC   5.89               Significant Imaging: I have reviewed all pertinent imaging results/findings within the past 24 hours.

## 2022-06-16 NOTE — ASSESSMENT & PLAN NOTE
-H/H 9.1/27.6  -serial BMP, Fe, TIBC, ferritin, B12 and folate.  -will hold DAPT and DVT ppx since patient also has hemoptysis.

## 2022-06-16 NOTE — ED NOTES
[facility] departed [facility] with patient on board en route to [facility]. Patient stable at time of discharge with normal saline at [id number] ml/hr infusing to gravity.

## 2022-06-16 NOTE — HPI
72 y.o. male transferred to the [facility] from [facility] for hyperkalemia and hypoxia. Pt reports he went to [facility] because he was having dyspnea and hemoptysis since 1 month which is worse today. Pt reports he was tested positive for COVID at the nursing home on 6/9/22. Per nursing home patient oxygen saturation was in the 80's on RA with tachypnea and labored breathing. Pt reports he normally does not wear oxygen at the nursing home but has been wearing oxygen more often recently. Pt reports he smoked 1-1.5 PPD for 20 years but quit 7 months ago. Per pt, he saw a pulmonologist (Dr. Aldana) for his hemoptysis and had some tests done the results of which he does not know. Per records, pt with a right pleural effusion recently and had a thoracentesis. Pt denies any fever, congestion, dysuria, N/V/D, chest pain, or any other complaints at this time.     In the ED, pt's K 7.2, d-dimer 0.76, BNP 6284, BUN/Cr 73/7.16. EKG showed some peaked t-waves and irregular rhythm. Chest xray showed Increased right lower lung pulmonary density could indicate pneumonia. Pt was treated in the ED with Calcium gluconate 1g, regular insulin 10 units, D50, IV solumedrol 125 mg, tylenol 650 mg and IV fluids NS 1L bolus. Pt will be admitted to the hospital service for management of hypoxia and hyperkalemia.

## 2022-06-16 NOTE — ED NOTES
Called [facility] & talked with Opal to inform them that Mr Parra is being transferred to [facility] in [place] to Room 653.

## 2022-06-16 NOTE — ASSESSMENT & PLAN NOTE
-given potassium cocktail. Bicarb 19 so 1 amp of sodium bicarb on arrival.   -repeat BMP pending.

## 2022-06-17 LAB
ACANTHOCYTES BLD QL SMEAR: ABNORMAL
ANION GAP SERPL CALCULATED.3IONS-SCNC: 18 MMOL/L (ref 7–16)
ANISOCYTOSIS BLD QL SMEAR: ABNORMAL
BASOPHILS # BLD AUTO: 0.01 K/UL (ref 0–0.2)
BASOPHILS NFR BLD AUTO: 0.1 % (ref 0–1)
BUN SERPL-MCNC: 83 MG/DL (ref 7–18)
BUN SERPL-MCNC: 83 MG/DL (ref 7–18)
BUN/CREAT SERPL: 12 (ref 6–20)
BUN/CREAT SERPL: 12 (ref 6–20)
CALCIUM SERPL-MCNC: 8.4 MG/DL (ref 8.5–10.1)
CHLORIDE SERPL-SCNC: 103 MMOL/L (ref 98–107)
CO2 SERPL-SCNC: 20 MMOL/L (ref 21–32)
CREAT SERPL-MCNC: 7.15 MG/DL (ref 0.7–1.3)
CREAT SERPL-MCNC: 7.15 MG/DL (ref 0.7–1.3)
CRENATED CELLS: ABNORMAL
DIFFERENTIAL METHOD BLD: ABNORMAL
EOSINOPHIL # BLD AUTO: 0 K/UL (ref 0–0.5)
EOSINOPHIL NFR BLD AUTO: 0 % (ref 1–4)
ERYTHROCYTE [DISTWIDTH] IN BLOOD BY AUTOMATED COUNT: 15.9 % (ref 11.5–14.5)
GLUCOSE SERPL-MCNC: 197 MG/DL (ref 70–105)
GLUCOSE SERPL-MCNC: 200 MG/DL (ref 70–105)
GLUCOSE SERPL-MCNC: 214 MG/DL (ref 74–106)
GLUCOSE SERPL-MCNC: 228 MG/DL (ref 70–105)
GLUCOSE SERPL-MCNC: 262 MG/DL (ref 70–105)
HAV IGM SER QL: NORMAL
HBV CORE IGM SER QL: NORMAL
HBV SURFACE AG SERPL QL IA: NORMAL
HCO3 UR-SCNC: 17.9 MMOL/L (ref 24–28)
HCO3 UR-SCNC: 24.2 MMOL/L (ref 24–28)
HCT VFR BLD AUTO: 27.5 % (ref 40–54)
HCV AB SER QL: NORMAL
HGB BLD-MCNC: 9.1 G/DL (ref 13.5–18)
IMM GRANULOCYTES # BLD AUTO: 0.09 K/UL (ref 0–0.04)
IMM GRANULOCYTES NFR BLD: 0.7 % (ref 0–0.4)
LYMPHOCYTES # BLD AUTO: 0.44 K/UL (ref 1–4.8)
LYMPHOCYTES NFR BLD AUTO: 3.5 % (ref 27–41)
LYMPHOCYTES NFR BLD MANUAL: 3 % (ref 27–41)
MCH RBC QN AUTO: 28.9 PG (ref 27–31)
MCHC RBC AUTO-ENTMCNC: 33.1 G/DL (ref 32–36)
MCV RBC AUTO: 87.3 FL (ref 80–96)
MONOCYTES # BLD AUTO: 0.35 K/UL (ref 0–0.8)
MONOCYTES NFR BLD AUTO: 2.8 % (ref 2–6)
MONOCYTES NFR BLD MANUAL: 3 % (ref 2–6)
MPC BLD CALC-MCNC: 11.1 FL (ref 9.4–12.4)
NEUTROPHILS # BLD AUTO: 11.67 K/UL (ref 1.8–7.7)
NEUTROPHILS NFR BLD AUTO: 92.9 % (ref 53–65)
NEUTS BAND NFR BLD MANUAL: 8 % (ref 1–5)
NEUTS SEG NFR BLD MANUAL: 86 % (ref 50–62)
NRBC # BLD AUTO: 0.03 X10E3/UL
NRBC, AUTO (.00): 0.2 %
OVALOCYTES BLD QL SMEAR: ABNORMAL
PCO2 BLDA: 39 MMHG (ref 41–51)
PCO2 BLDA: 48 MMHG (ref 41–51)
PH SMN: 7.27 [PH] (ref 7.32–7.42)
PH SMN: 7.31 [PH] (ref 7.32–7.42)
PLATELET # BLD AUTO: 147 K/UL (ref 150–400)
PLATELET MORPHOLOGY: ABNORMAL
PO2 BLDA: 19 MMHG (ref 25–40)
PO2 BLDA: 61 MMHG (ref 25–40)
POC BASE EXCESS: -2.4 MMOL/L (ref -2–3)
POC BASE EXCESS: -8.4 MMOL/L (ref -2–3)
POC CO2: 19.1 MMOL/L
POC CO2: 25.7 MMOL/L
POC SATURATED O2: 24 %
POC SATURATED O2: 87 %
POTASSIUM SERPL-SCNC: 5.4 MMOL/L (ref 3.5–5.1)
POTASSIUM SERPL-SCNC: 5.7 MMOL/L (ref 3.5–5.1)
POTASSIUM SERPL-SCNC: 6.4 MMOL/L (ref 3.5–5.1)
POTASSIUM SERPL-SCNC: 6.4 MMOL/L (ref 3.5–5.1)
POTASSIUM SERPL-SCNC: 6.5 MMOL/L (ref 3.5–5.1)
POTASSIUM SERPL-SCNC: 6.6 MMOL/L (ref 3.5–5.1)
POTASSIUM SERPL-SCNC: 6.8 MMOL/L (ref 3.5–5.1)
POTASSIUM SERPL-SCNC: 7 MMOL/L (ref 3.5–5.1)
PROCALCITONIN SERPL-MCNC: 0.86 NG/ML
RBC # BLD AUTO: 3.15 M/UL (ref 4.6–6.2)
SODIUM SERPL-SCNC: 135 MMOL/L (ref 136–145)
VANCOMYCIN SERPL-MCNC: 15.2 ΜG/ML (ref 0–20)
WBC # BLD AUTO: 12.56 K/UL (ref 4.5–11)

## 2022-06-17 PROCEDURE — 80048 BASIC METABOLIC PNL TOTAL CA: CPT | Performed by: FAMILY MEDICINE

## 2022-06-17 PROCEDURE — 93010 ELECTROCARDIOGRAM REPORT: CPT | Mod: ,,, | Performed by: INTERNAL MEDICINE

## 2022-06-17 PROCEDURE — 99232 PR SUBSEQUENT HOSPITAL CARE,LEVL II: ICD-10-PCS | Mod: GC,,, | Performed by: FAMILY MEDICINE

## 2022-06-17 PROCEDURE — 82962 GLUCOSE BLOOD TEST: CPT

## 2022-06-17 PROCEDURE — 25000003 PHARM REV CODE 250: Performed by: FAMILY MEDICINE

## 2022-06-17 PROCEDURE — 84520 ASSAY OF UREA NITROGEN: CPT

## 2022-06-17 PROCEDURE — 11000001 HC ACUTE MED/SURG PRIVATE ROOM

## 2022-06-17 PROCEDURE — 36415 COLL VENOUS BLD VENIPUNCTURE: CPT

## 2022-06-17 PROCEDURE — 25000003 PHARM REV CODE 250

## 2022-06-17 PROCEDURE — 85025 COMPLETE CBC W/AUTO DIFF WBC: CPT

## 2022-06-17 PROCEDURE — 93005 ELECTROCARDIOGRAM TRACING: CPT

## 2022-06-17 PROCEDURE — 87070 CULTURE OTHR SPECIMN AEROBIC: CPT | Performed by: FAMILY MEDICINE

## 2022-06-17 PROCEDURE — 99232 SBSQ HOSP IP/OBS MODERATE 35: CPT | Mod: GC,,, | Performed by: FAMILY MEDICINE

## 2022-06-17 PROCEDURE — 63600175 PHARM REV CODE 636 W HCPCS: Performed by: FAMILY MEDICINE

## 2022-06-17 PROCEDURE — 25000242 PHARM REV CODE 250 ALT 637 W/ HCPCS: Performed by: FAMILY MEDICINE

## 2022-06-17 PROCEDURE — 80202 ASSAY OF VANCOMYCIN: CPT | Performed by: FAMILY MEDICINE

## 2022-06-17 PROCEDURE — 80074 ACUTE HEPATITIS PANEL: CPT

## 2022-06-17 PROCEDURE — 93010 EKG 12-LEAD: ICD-10-PCS | Mod: ,,, | Performed by: INTERNAL MEDICINE

## 2022-06-17 PROCEDURE — 82565 ASSAY OF CREATININE: CPT

## 2022-06-17 PROCEDURE — 36415 COLL VENOUS BLD VENIPUNCTURE: CPT | Performed by: FAMILY MEDICINE

## 2022-06-17 PROCEDURE — 63600175 PHARM REV CODE 636 W HCPCS

## 2022-06-17 PROCEDURE — 84132 ASSAY OF SERUM POTASSIUM: CPT | Performed by: FAMILY MEDICINE

## 2022-06-17 PROCEDURE — 25000003 PHARM REV CODE 250: Performed by: HOSPITALIST

## 2022-06-17 RX ORDER — SODIUM BICARBONATE 1 MEQ/ML
50 SYRINGE (ML) INTRAVENOUS ONCE
Status: COMPLETED | OUTPATIENT
Start: 2022-06-17 | End: 2022-06-17

## 2022-06-17 RX ORDER — CALCIUM GLUCONATE 20 MG/ML
1 INJECTION, SOLUTION INTRAVENOUS ONCE
Status: COMPLETED | OUTPATIENT
Start: 2022-06-17 | End: 2022-06-17

## 2022-06-17 RX ADMIN — DEXAMETHASONE 6 MG: 2 TABLET ORAL at 08:06

## 2022-06-17 RX ADMIN — SODIUM BICARBONATE: 84 INJECTION, SOLUTION INTRAVENOUS at 09:06

## 2022-06-17 RX ADMIN — ALLOPURINOL 100 MG: 100 TABLET ORAL at 08:06

## 2022-06-17 RX ADMIN — CALCIUM GLUCONATE 1 G: 20 INJECTION, SOLUTION INTRAVENOUS at 01:06

## 2022-06-17 RX ADMIN — CEFEPIME HYDROCHLORIDE 1 G: 1 INJECTION, POWDER, FOR SOLUTION INTRAMUSCULAR; INTRAVENOUS at 05:06

## 2022-06-17 RX ADMIN — GUAIFENESIN AND DEXTROMETHORPHAN 10 ML: 100; 10 SYRUP ORAL at 08:06

## 2022-06-17 RX ADMIN — THERA TABS 1 TABLET: TAB at 08:06

## 2022-06-17 RX ADMIN — MUPIROCIN: 20 OINTMENT TOPICAL at 08:06

## 2022-06-17 RX ADMIN — ISOSORBIDE MONONITRATE 60 MG: 60 TABLET, EXTENDED RELEASE ORAL at 08:06

## 2022-06-17 RX ADMIN — FERROUS GLUCONATE 324 MG: 324 TABLET ORAL at 08:06

## 2022-06-17 RX ADMIN — DEXTROSE MONOHYDRATE 25 G: 25 INJECTION, SOLUTION INTRAVENOUS at 01:06

## 2022-06-17 RX ADMIN — HUMAN INSULIN 10 UNITS: 100 INJECTION, SOLUTION SUBCUTANEOUS at 08:06

## 2022-06-17 RX ADMIN — SODIUM ZIRCONIUM CYCLOSILICATE 10 G: 10 POWDER, FOR SUSPENSION ORAL at 08:06

## 2022-06-17 RX ADMIN — SODIUM BICARBONATE 50 MEQ: 84 INJECTION, SOLUTION INTRAVENOUS at 01:06

## 2022-06-17 RX ADMIN — ATORVASTATIN CALCIUM 40 MG: 40 TABLET, FILM COATED ORAL at 08:06

## 2022-06-17 RX ADMIN — MUPIROCIN: 20 OINTMENT TOPICAL at 09:06

## 2022-06-17 RX ADMIN — SODIUM ZIRCONIUM CYCLOSILICATE 10 G: 10 POWDER, FOR SUSPENSION ORAL at 03:06

## 2022-06-17 RX ADMIN — TRAZODONE HYDROCHLORIDE 50 MG: 50 TABLET ORAL at 08:06

## 2022-06-17 RX ADMIN — AMLODIPINE BESYLATE 10 MG: 10 TABLET ORAL at 08:06

## 2022-06-17 RX ADMIN — SODIUM BICARBONATE: 84 INJECTION, SOLUTION INTRAVENOUS at 08:06

## 2022-06-17 RX ADMIN — DEXTROSE MONOHYDRATE 25 G: 25 INJECTION, SOLUTION INTRAVENOUS at 08:06

## 2022-06-17 RX ADMIN — HUMAN INSULIN 10 UNITS: 100 INJECTION, SOLUTION SUBCUTANEOUS at 01:06

## 2022-06-17 RX ADMIN — HYDROCODONE BITARTRATE AND ACETAMINOPHEN 1 TABLET: 7.5; 325 TABLET ORAL at 05:06

## 2022-06-17 RX ADMIN — HYDROCODONE BITARTRATE AND ACETAMINOPHEN 1 TABLET: 7.5; 325 TABLET ORAL at 08:06

## 2022-06-17 NOTE — CONSULTS
[facility], [place] - [address] [place] [address]  Nephrology  Consult Note    Patient Name: Neymar Parra  MRN: [id number]  Admission Date: 6/16/2022  Hospital Length of Stay: 1 days  Attending Provider: Rj Fernandez MD   Primary Care Physician: [facility] megan Rodríguez  Principal Problem:Hyperkalemia    Consults  Subjective:     HPI: This patient with history of HTN, DM, CKD who has seen Dr. Crowder in the past 2 years ago is currently in a nursing facility.  The patient presented with SOB and diagnosed with COVID.  Serum creatinine has trended up to 7.15.  Serum potassium is 6.4.  Nephrology has been consulted for renal issues.  At the bedside, the patient mentions feeling fine.  He states that his breathing has improved.      Past Medical History:   Diagnosis Date    Anticoagulant long-term use     Chronic renal disease, stage 4, severely decreased glomerular filtration rate (GFR) between 15-29 mL/min/1.73 square meter     Coronary artery disease     stents ~ 2017    Duodenal adenoma     Hypertension        Past Surgical History:   Procedure Laterality Date    ABDOMINAL SURGERY      CORONARY ANGIOPLASTY WITH STENT PLACEMENT      ~ 2017    JOINT REPLACEMENT         Review of patient's allergies indicates:   Allergen Reactions    Gatifloxacin Anaphylaxis     Current Facility-Administered Medications   Medication Frequency    acetaminophen tablet 1,000 mg Q6H PRN    albuterol inhaler 2 puff Q6H PRN    allopurinoL tablet 100 mg Daily    amLODIPine tablet 10 mg Daily    atorvastatin tablet 40 mg QHS    bisacodyL EC tablet 10 mg Daily PRN    calcium gluconate 1 g in NS IVPB (premixed) Q10 Min PRN    ceFEPIme (MAXIPIME) 1 g in dextrose 5 % in water (D5W) 5 % 50 mL IVPB (MB+) Q24H    cetirizine tablet 10 mg Daily PRN    dexAMETHasone tablet 6 mg Daily    dextromethorphan-guaiFENesin  mg/5 ml liquid 10 mL Q6H PRN    dextrose 50% injection 12.5 g PRN    dextrose 50% injection 25 g PRN     dextrose 50% injection 25 g PRN    ferrous gluconate tablet 324 mg Daily with breakfast    glucagon (human recombinant) injection 1 mg PRN    glucose chewable tablet 16 g PRN    glucose chewable tablet 24 g PRN    HYDROcodone-acetaminophen 7.5-325 mg per tablet 1 tablet Q6H PRN    isosorbide mononitrate 24 hr tablet 60 mg Daily    melatonin tablet 6 mg Nightly PRN    metoprolol tartrate (LOPRESSOR) tablet 25 mg Daily    multivitamin tablet Daily    mupirocin 2 % ointment BID    naloxone 0.4 mg/mL injection 0.02 mg PRN    ondansetron injection 8 mg Q6H PRN    simethicone chewable tablet 80 mg TID PRN    sodium bicarbonate 150 mEq in dextrose 5 % 1,000 mL infusion Continuous    sodium chloride 0.9% flush 10 mL Q12H PRN    sodium zirconium cyclosilicate packet 10 g TID    traMADoL tablet 50 mg Q8H PRN    traZODone tablet 50 mg Nightly PRN    traZODone tablet 50 mg QHS    vancomycin (VANCOCIN) 2,000 mg in dextrose 5 % 500 mL IVPB Once    vancomycin - pharmacy to dose pharmacy to manage frequency     Family History       Problem Relation (Age of Onset)    Diabetes Mother, Father          Tobacco Use    Smoking status: Former Smoker     Types: Cigarettes    Smokeless tobacco: Never Used    Tobacco comment: 1/3 PPD ~ 20 years: quit Dec 2021   Substance and Sexual Activity    Alcohol use: Not Currently     Comment: pint a week: Hx    Drug use: Never    Sexual activity: Not Currently     Review of Systems   All other systems reviewed and are negative.  Objective:     Vital Signs (Most Recent):  Temp: 98 °F (36.7 °C) (06/17/22 1020)  Pulse: 76 (06/17/22 1020)  Resp: 18 (06/17/22 1020)  BP: 121/67 (06/17/22 1020)  SpO2: 98 % (06/17/22 1020)  O2 Device (Oxygen Therapy): nasal cannula w/ humidification (06/16/22 2036) Vital Signs (24h Range):  Temp:  [97.7 °F (36.5 °C)-98.2 °F (36.8 °C)] 98 °F (36.7 °C)  Pulse:  [61-76] 76  Resp:  [18-20] 18  SpO2:  [96 %-98 %] 98 %  BP: (114-125)/(61-71) 121/67      Weight: 100.8 kg (222 lb 3.6 oz) (06/16/22 0600)  Body mass index is 30.14 kg/m².  Body surface area is 2.26 meters squared.    I/O last 3 completed shifts:  In: 220 [P.O.:120; IV Piggyback:100]  Out: 1075 [Urine:1075]    Physical Exam  Vitals reviewed.   HENT:      Head: Normocephalic and atraumatic.      Mouth/Throat:      Mouth: Mucous membranes are moist.   Cardiovascular:      Rate and Rhythm: Regular rhythm.   Pulmonary:      Effort: Pulmonary effort is normal.      Comments: Occassional crackles in the bases.  Abdominal:      Palpations: Abdomen is soft.   Neurological:      Mental Status: He is alert.       Significant Labs:  BMP:   Recent Labs   Lab 06/15/22  1918 06/16/22  0809 06/17/22  0954   *   < > 214*   *   < > 135*   K 7.2*   < > 6.4*  6.4*      < > 103   CO2 19*   < > 20*   BUN 73*   < > 83*  83*   CREATININE 7.16*   < > 7.15*  7.15*   CALCIUM 7.8*   < > 8.4*   MG 2.1  --   --     < > = values in this interval not displayed.     CBC:   Recent Labs   Lab 06/17/22  0954   WBC 12.56*   RBC 3.15*   HGB 9.1*   HCT 27.5*   *   MCV 87.3   MCH 28.9   MCHC 33.1       Significant Imaging:  Labs: Reviewed    Assessment/Plan:     * Hyperkalemia  Recommend lokelma 10mg po TID  BMP in Am    DEO (acute kidney injury)  Acute on chronic renal failure.  2. No uremic symptoms  3.  Hepatitis panel  4.  Strict I/os  5.  BMP in AM  6.  Will follow    COVID-19  New diagnosis.    BMP in AM    Thank you for your consult. I will follow-up with patient. Please contact us if you have any additional questions.    Jorge Butts Jr, MD  Nephrology  [facility] - [address] [place] [address]

## 2022-06-17 NOTE — ASSESSMENT & PLAN NOTE
- given potassium cocktail. Bicarb 19 so 1 amp of sodium bicarb on arrival.   - Calcium gluconate x 2  - Insulin 10U with D50   - Lokelma 10mg TID  - Neph consulted, appreciate their expertise -- Dr Butts to see today.

## 2022-06-17 NOTE — PLAN OF CARE
Problem: Adult Inpatient Plan of Care  Goal: Plan of Care Review  Outcome: Ongoing, Progressing  Flowsheets (Taken 6/17/2022 0039)  Plan of Care Reviewed With: patient  Goal: Optimal Comfort and Wellbeing  Outcome: Ongoing, Progressing  Intervention: Monitor Pain and Promote Comfort  Flowsheets (Taken 6/17/2022 0039)  Pain Management Interventions:   pain management plan reviewed with patient/caregiver   relaxation techniques promoted   position adjusted  Intervention: Provide Person-Centered Care  Flowsheets (Taken 6/17/2022 0039)  Trust Relationship/Rapport:   care explained   choices provided   emotional support provided   empathic listening provided   questions answered   questions encouraged   reassurance provided   thoughts/feelings acknowledged     Problem: Diabetes Comorbidity  Goal: Blood Glucose Level Within Targeted Range  Outcome: Ongoing, Progressing  Intervention: Monitor and Manage Glycemia  Flowsheets (Taken 6/17/2022 0039)  Glycemic Management:   blood glucose monitored   supplemental insulin given     Problem: Fluid and Electrolyte Imbalance (Acute Kidney Injury/Impairment)  Goal: Fluid and Electrolyte Balance  Outcome: Ongoing, Progressing  Intervention: Monitor and Manage Fluid and Electrolyte Balance  Flowsheets (Taken 6/17/2022 0039)  Fluid/Electrolyte Management: fluids provided     Problem: Oral Intake Inadequate (Acute Kidney Injury/Impairment)  Goal: Optimal Nutrition Intake  Outcome: Ongoing, Progressing  Intervention: Promote and Optimize Nutrition  Flowsheets (Taken 6/17/2022 0039)  Oral Nutrition Promotion:   physical activity promoted   rest periods promoted   safe use of adaptive equipment encouraged   social interaction promoted     Problem: Renal Function Impairment (Acute Kidney Injury/Impairment)  Goal: Effective Renal Function  Outcome: Ongoing, Progressing  Intervention: Monitor and Support Renal Function  Flowsheets (Taken 6/17/2022 0039)  Medication Review/Management:  medications reviewed

## 2022-06-17 NOTE — PROGRESS NOTES
Giving a dose of Vancomycin 2gm at 1300 today. BUN 83. Scr 7.15. Will get a random tomorrow to determine when a next dose is to be given.

## 2022-06-17 NOTE — PROGRESS NOTES
[facility] - [address] [place] [address] Medicine  Progress Note    Patient Name: Neymar Parra  MRN: [id number]  Patient Class: IP- Inpatient   Admission Date: 6/16/2022  Length of Stay: 1 days  Attending Physician: Rj Fernandez MD  Primary Care Provider: [facility] megan Rodríguez        Subjective:     Principal Problem:Hyperkalemia        HPI:  72 y.o. male transferred to the [facility] from [facility] for hyperkalemia and hypoxia. Pt reports he went to [facility] because he was having dyspnea and hemoptysis since 1 month which is worse today. Pt reports he was tested positive for COVID at the nursing home on 6/9/22. Per nursing home patient oxygen saturation was in the 80's on RA with tachypnea and labored breathing. Pt reports he normally does not wear oxygen at the nursing home but has been wearing oxygen more often recently. Pt reports he smoked 1-1.5 PPD for 20 years but quit 7 months ago. Per pt, he saw a pulmonologist (Dr. Aldana) for his hemoptysis and had some tests done the results of which he does not know. Per records, pt with a right pleural effusion recently and had a thoracentesis. Pt denies any fever, congestion, dysuria, N/V/D, chest pain, or any other complaints at this time.     In the ED, pt's K 7.2, d-dimer 0.76, BNP 6284, BUN/Cr 73/7.16. EKG showed some peaked t-waves and irregular rhythm. Chest xray showed Increased right lower lung pulmonary density could indicate pneumonia. Pt was treated in the ED with Calcium gluconate 1g, regular insulin 10 units, D50, IV solumedrol 125 mg, tylenol 650 mg and IV fluids NS 1L bolus. Pt will be admitted to the hospital service for management of hypoxia and hyperkalemia.      Overview/Hospital Course:  No notes on file    Interval History: Pt is awake, resting in bed. No acute complaints, no overnight events. Pt still hyperkalemic, Nephrology re-consulted, Dr. Butts to see today.     Review of Systems   Constitutional:  Negative for chills,  fatigue and fever.   HENT:  Negative for congestion, hearing loss and trouble swallowing.    Eyes:  Negative for visual disturbance.   Respiratory:  Positive for cough (hemoptysis) and shortness of breath.    Cardiovascular:  Negative for chest pain, palpitations and leg swelling.   Gastrointestinal:  Negative for abdominal pain, blood in stool, diarrhea, nausea and vomiting.   Genitourinary:  Negative for difficulty urinating and hematuria.   Musculoskeletal:  Negative for back pain and myalgias.   Skin:  Negative for rash.   Neurological:  Negative for dizziness, light-headedness and headaches.   Psychiatric/Behavioral:  Negative for sleep disturbance. The patient is not nervous/anxious.    Objective:     Vital Signs (Most Recent):  Temp: 98 °F (36.7 °C) (06/17/22 1020)  Pulse: 76 (06/17/22 1020)  Resp: 18 (06/17/22 1020)  BP: 121/67 (06/17/22 1020)  SpO2: 98 % (06/17/22 1020) Vital Signs (24h Range):  Temp:  [97.7 °F (36.5 °C)-98.2 °F (36.8 °C)] 98 °F (36.7 °C)  Pulse:  [61-76] 76  Resp:  [18-20] 18  SpO2:  [96 %-98 %] 98 %  BP: (114-125)/(61-71) 121/67     Weight: 100.8 kg (222 lb 3.6 oz)  Body mass index is 30.14 kg/m².    Intake/Output Summary (Last 24 hours) at 6/17/2022 1233  Last data filed at 6/17/2022 0611  Gross per 24 hour   Intake 220 ml   Output 675 ml   Net -455 ml      Physical Exam  Vitals reviewed.   Constitutional:       General: He is not in acute distress.     Appearance: Normal appearance. He is obese. He is not toxic-appearing or diaphoretic.   HENT:      Head: Normocephalic and atraumatic.      Right Ear: External ear normal.      Left Ear: External ear normal.      Nose: Nose normal.      Mouth/Throat:      Mouth: Mucous membranes are moist.      Pharynx: Oropharynx is clear.   Eyes:      General: No scleral icterus.     Extraocular Movements: Extraocular movements intact.      Conjunctiva/sclera: Conjunctivae normal.      Pupils: Pupils are equal, round, and reactive to light.    Cardiovascular:      Rate and Rhythm: Normal rate and regular rhythm.      Pulses: Normal pulses.      Heart sounds: Normal heart sounds. No murmur heard.  Pulmonary:      Effort: Pulmonary effort is normal. No respiratory distress.      Breath sounds: Rhonchi and rales present. No wheezing.   Abdominal:      General: Abdomen is flat. Bowel sounds are normal. There is no distension.      Palpations: Abdomen is soft.      Tenderness: There is no abdominal tenderness. There is no guarding or rebound.   Musculoskeletal:         General: No swelling. Normal range of motion.      Cervical back: Normal range of motion and neck supple. No rigidity.      Right lower leg: No edema.      Left lower leg: No edema.   Skin:     General: Skin is warm and dry.      Capillary Refill: Capillary refill takes less than 2 seconds.      Findings: No rash.   Neurological:      General: No focal deficit present.      Mental Status: He is alert. Mental status is at baseline.   Psychiatric:         Mood and Affect: Mood normal.         Behavior: Behavior normal.         Thought Content: Thought content normal.         Judgment: Judgment normal.       Significant Labs: All pertinent labs within the past 24 hours have been reviewed.  Recent Lab Results  (Last 5 results in the past 24 hours)        06/17/22  1059   06/17/22  0954   06/17/22  0430   06/17/22  0312   06/16/22  2359        POC CO2               Acanthocytes   Few             Anion Gap   18             Aniso   1+             Bands   8             Baso #   0.01             Basophil %   0.1             BUN   83                83             BUN/CREAT RATIO   12                12             Calcium   8.4             Chloride   103             CO2   20             Creatinine   7.15                7.15             Crenated RBC's   1+             Differential Type   Manual             eGFR if non    8                8             Eos #   0.00             Eosinophil %    0.0             Glucose   214             Hematocrit   27.5             Hemoglobin   9.1             Immature Grans (Abs)   0.09             Immature Granulocytes   0.7             Lymph #   0.44             Lymph %   3.5                3             MCH   28.9             MCHC   33.1             MCV   87.3             Mono #   0.35             Mono %   2.8                3             MPV   11.1             Neutrophils, Abs   11.67             Neutrophils Relative   92.9             nRBC   0.2             NUCLEATED RBC ABSOLUTE   0.03             Ovalocytes   Few             PLATELET MORPHOLOGY   Decreased             Platelets   147             POC Base Excess               POC Glucose 262     200           POC HCO3               POC PCO2               POC PH               POC PO2               POC SATURATED O2               Potassium   6.4     6.8   7.0          6.4             RBC   3.15             RDW   15.9             Segmented Neutrophils, Man %   86             Sodium   135             Vancomycin, Random       15.2         WBC   12.56                                    Significant Imaging: I have reviewed all pertinent imaging results/findings within the past 24 hours.      Assessment/Plan:      * Hyperkalemia  - given potassium cocktail. Bicarb 19 so 1 amp of sodium bicarb on arrival.   - Calcium gluconate x 2  - Insulin 10U with D50   - Lokelma 10mg TID  - Neph consulted, appreciate their expertise -- Dr Butts to see today.    Anemia  -H/H 9.1/27.6  -serial BMP, Fe, TIBC, ferritin, B12 and folate.  -will hold DAPT and DVT ppx since patient also has hemoptysis.     DEO (acute kidney injury)  -BUN/Cr on admission 73/7.16  -BUN/Cr 1 week ago was 47/4.76.  -will consult nephrology for eGFR 8 and end stage kidney disease.    COVID-19  -concern for COVID bacterial pneumonia, will treat as hospital acquired pneumonia since pt resides at a nursing home so will start IV cefepime and IV vanc pharmacy to  dose.  -dexamethasone 6 mg qd   -pt with hemoptysis -s/p bronchoscopy by Dr. Aldana who reports it is most likely due to aspirin and Plavix that pt has used for greater than 4 years. Will hold at this time. Pt is scheduled to see cardiology outpt for further evaluation.     Type 2 diabetes mellitus without complication  -HgA1c 4.7 10 days ago  -pt is not on any oral hypoglycemic agents so will not add POCT glucose.    Gastroesophageal reflux disease  -pantoprazole 40 mg qd    Chronic obstructive pulmonary disease  -albuterol inhaler q4h PRN, will avoid nebulizer until pt's has completed 10 days after testing positive for COVID.     Hypertension  -continued home amlodipine 10 mg qd, isosorbide mononitrate 60 mg qd, and lisinopril 20 mg qd.      VTE Risk Mitigation (From admission, onward)         Ordered     Reason for No Pharmacological VTE Prophylaxis  Once        Question:  Reasons:  Answer:  Risk of Bleeding    06/16/22 0301     IP VTE HIGH RISK PATIENT  Once         06/16/22 0301     Place sequential compression device  Until discontinued         06/16/22 0301                Discharge Planning   EDWARDO:      Code Status: Full Code   Is the patient medically ready for discharge?:     Reason for patient still in hospital (select all that apply): Treatment  Discharge Plan A: Long-term acute care facility (LTAC) (Choice obtained for Specialty)                  Narciso Salazar MD  Department of Hospital Medicine   [address]

## 2022-06-17 NOTE — ASSESSMENT & PLAN NOTE
Acute on chronic renal failure.  2. No uremic symptoms  3.  Hepatitis panel  4.  Strict I/os  5.  BMP in AM  6.  Will follow

## 2022-06-17 NOTE — PLAN OF CARE
Problem: Adult Inpatient Plan of Care  Goal: Plan of Care Review  Outcome: Ongoing, Progressing  Flowsheets (Taken 6/17/2022 1658)  Plan of Care Reviewed With: patient  Goal: Patient-Specific Goal (Individualized)  Outcome: Ongoing, Progressing  Goal: Absence of Hospital-Acquired Illness or Injury  Outcome: Ongoing, Progressing  Intervention: Identify and Manage Fall Risk  Flowsheets (Taken 6/17/2022 1658)  Safety Promotion/Fall Prevention:   assistive device/personal item within reach   side rails raised x 2  Intervention: Prevent Skin Injury  Flowsheets (Taken 6/17/2022 1658)  Body Position: position changed independently  Skin Protection:   adhesive use limited   incontinence pads utilized   transparent dressing maintained   tubing/devices free from skin contact  Intervention: Prevent and Manage VTE (Venous Thromboembolism) Risk  Flowsheets (Taken 6/17/2022 1658)  VTE Prevention/Management:   bleeding precations maintained   bleeding risk assessed   bleeding risk factor(s) identified, provider notified   fluids promoted   ROM (active) performed  Range of Motion:   active ROM (range of motion) encouraged   ROM (range of motion) performed  Intervention: Prevent Infection  Flowsheets (Taken 6/17/2022 1658)  Infection Prevention:   equipment surfaces disinfected   personal protective equipment utilized  Goal: Optimal Comfort and Wellbeing  Outcome: Ongoing, Progressing  Intervention: Monitor Pain and Promote Comfort  Flowsheets (Taken 6/17/2022 1658)  Pain Management Interventions:   pain management plan reviewed with patient/caregiver   relaxation techniques promoted   position adjusted  Intervention: Provide Person-Centered Care  Flowsheets (Taken 6/17/2022 1658)  Trust Relationship/Rapport:   care explained   choices provided   questions answered   questions encouraged  Goal: Readiness for Transition of Care  Outcome: Ongoing, Progressing     Problem: Diabetes Comorbidity  Goal: Blood Glucose Level Within  Targeted Range  Outcome: Ongoing, Progressing  Intervention: Monitor and Manage Glycemia  Flowsheets (Taken 6/17/2022 1658)  Glycemic Management:   blood glucose monitored   supplemental insulin given     Problem: Fluid and Electrolyte Imbalance (Acute Kidney Injury/Impairment)  Goal: Fluid and Electrolyte Balance  Outcome: Ongoing, Progressing  Intervention: Monitor and Manage Fluid and Electrolyte Balance  Flowsheets (Taken 6/17/2022 1658)  Fluid/Electrolyte Management: fluids provided     Problem: Oral Intake Inadequate (Acute Kidney Injury/Impairment)  Goal: Optimal Nutrition Intake  Outcome: Ongoing, Progressing  Intervention: Promote and Optimize Nutrition  Flowsheets (Taken 6/17/2022 1658)  Oral Nutrition Promotion:   physical activity promoted   rest periods promoted   safe use of adaptive equipment encouraged   social interaction promoted     Problem: Renal Function Impairment (Acute Kidney Injury/Impairment)  Goal: Effective Renal Function  Outcome: Ongoing, Progressing  Intervention: Monitor and Support Renal Function  Flowsheets (Taken 6/17/2022 1658)  Medication Review/Management: medications reviewed     POC reviewed and continues

## 2022-06-17 NOTE — SUBJECTIVE & OBJECTIVE
Interval History: Pt is awake, resting in bed. No acute complaints, no overnight events. Pt still hyperkalemic, Nephrology re-consulted, Dr. Butts to see today.     Review of Systems   Constitutional:  Negative for chills, fatigue and fever.   HENT:  Negative for congestion, hearing loss and trouble swallowing.    Eyes:  Negative for visual disturbance.   Respiratory:  Positive for cough (hemoptysis) and shortness of breath.    Cardiovascular:  Negative for chest pain, palpitations and leg swelling.   Gastrointestinal:  Negative for abdominal pain, blood in stool, diarrhea, nausea and vomiting.   Genitourinary:  Negative for difficulty urinating and hematuria.   Musculoskeletal:  Negative for back pain and myalgias.   Skin:  Negative for rash.   Neurological:  Negative for dizziness, light-headedness and headaches.   Psychiatric/Behavioral:  Negative for sleep disturbance. The patient is not nervous/anxious.    Objective:     Vital Signs (Most Recent):  Temp: 98 °F (36.7 °C) (06/17/22 1020)  Pulse: 76 (06/17/22 1020)  Resp: 18 (06/17/22 1020)  BP: 121/67 (06/17/22 1020)  SpO2: 98 % (06/17/22 1020) Vital Signs (24h Range):  Temp:  [97.7 °F (36.5 °C)-98.2 °F (36.8 °C)] 98 °F (36.7 °C)  Pulse:  [61-76] 76  Resp:  [18-20] 18  SpO2:  [96 %-98 %] 98 %  BP: (114-125)/(61-71) 121/67     Weight: 100.8 kg (222 lb 3.6 oz)  Body mass index is 30.14 kg/m².    Intake/Output Summary (Last 24 hours) at 6/17/2022 1233  Last data filed at 6/17/2022 0611  Gross per 24 hour   Intake 220 ml   Output 675 ml   Net -455 ml      Physical Exam  Vitals reviewed.   Constitutional:       General: He is not in acute distress.     Appearance: Normal appearance. He is obese. He is not toxic-appearing or diaphoretic.   HENT:      Head: Normocephalic and atraumatic.      Right Ear: External ear normal.      Left Ear: External ear normal.      Nose: Nose normal.      Mouth/Throat:      Mouth: Mucous membranes are moist.      Pharynx: Oropharynx is  clear.   Eyes:      General: No scleral icterus.     Extraocular Movements: Extraocular movements intact.      Conjunctiva/sclera: Conjunctivae normal.      Pupils: Pupils are equal, round, and reactive to light.   Cardiovascular:      Rate and Rhythm: Normal rate and regular rhythm.      Pulses: Normal pulses.      Heart sounds: Normal heart sounds. No murmur heard.  Pulmonary:      Effort: Pulmonary effort is normal. No respiratory distress.      Breath sounds: Rhonchi and rales present. No wheezing.   Abdominal:      General: Abdomen is flat. Bowel sounds are normal. There is no distension.      Palpations: Abdomen is soft.      Tenderness: There is no abdominal tenderness. There is no guarding or rebound.   Musculoskeletal:         General: No swelling. Normal range of motion.      Cervical back: Normal range of motion and neck supple. No rigidity.      Right lower leg: No edema.      Left lower leg: No edema.   Skin:     General: Skin is warm and dry.      Capillary Refill: Capillary refill takes less than 2 seconds.      Findings: No rash.   Neurological:      General: No focal deficit present.      Mental Status: He is alert. Mental status is at baseline.   Psychiatric:         Mood and Affect: Mood normal.         Behavior: Behavior normal.         Thought Content: Thought content normal.         Judgment: Judgment normal.       Significant Labs: All pertinent labs within the past 24 hours have been reviewed.  Recent Lab Results  (Last 5 results in the past 24 hours)        06/17/22  1059   06/17/22  0954   06/17/22  0430   06/17/22  0312   06/16/22  2359        POC CO2               Acanthocytes   Few             Anion Gap   18             Aniso   1+             Bands   8             Baso #   0.01             Basophil %   0.1             BUN   83                83             BUN/CREAT RATIO   12                12             Calcium   8.4             Chloride   103             CO2   20              Creatinine   7.15                7.15             Crenated RBC's   1+             Differential Type   Manual             eGFR if non    8                8             Eos #   0.00             Eosinophil %   0.0             Glucose   214             Hematocrit   27.5             Hemoglobin   9.1             Immature Grans (Abs)   0.09             Immature Granulocytes   0.7             Lymph #   0.44             Lymph %   3.5                3             MCH   28.9             MCHC   33.1             MCV   87.3             Mono #   0.35             Mono %   2.8                3             MPV   11.1             Neutrophils, Abs   11.67             Neutrophils Relative   92.9             nRBC   0.2             NUCLEATED RBC ABSOLUTE   0.03             Ovalocytes   Few             PLATELET MORPHOLOGY   Decreased             Platelets   147             POC Base Excess               POC Glucose 262     200           POC HCO3               POC PCO2               POC PH               POC PO2               POC SATURATED O2               Potassium   6.4     6.8   7.0          6.4             RBC   3.15             RDW   15.9             Segmented Neutrophils, Man %   86             Sodium   135             Vancomycin, Random       15.2         WBC   12.56                                    Significant Imaging: I have reviewed all pertinent imaging results/findings within the past 24 hours.

## 2022-06-17 NOTE — SUBJECTIVE & OBJECTIVE
Past Medical History:   Diagnosis Date    Anticoagulant long-term use     Chronic renal disease, stage 4, severely decreased glomerular filtration rate (GFR) between 15-29 mL/min/1.73 square meter     Coronary artery disease     stents ~ 2017    Duodenal adenoma     Hypertension        Past Surgical History:   Procedure Laterality Date    ABDOMINAL SURGERY      CORONARY ANGIOPLASTY WITH STENT PLACEMENT      ~ 2017    JOINT REPLACEMENT         Review of patient's allergies indicates:   Allergen Reactions    Gatifloxacin Anaphylaxis     Current Facility-Administered Medications   Medication Frequency    acetaminophen tablet 1,000 mg Q6H PRN    albuterol inhaler 2 puff Q6H PRN    allopurinoL tablet 100 mg Daily    amLODIPine tablet 10 mg Daily    atorvastatin tablet 40 mg QHS    bisacodyL EC tablet 10 mg Daily PRN    calcium gluconate 1 g in NS IVPB (premixed) Q10 Min PRN    ceFEPIme (MAXIPIME) 1 g in dextrose 5 % in water (D5W) 5 % 50 mL IVPB (MB+) Q24H    cetirizine tablet 10 mg Daily PRN    dexAMETHasone tablet 6 mg Daily    dextromethorphan-guaiFENesin  mg/5 ml liquid 10 mL Q6H PRN    dextrose 50% injection 12.5 g PRN    dextrose 50% injection 25 g PRN    dextrose 50% injection 25 g PRN    ferrous gluconate tablet 324 mg Daily with breakfast    glucagon (human recombinant) injection 1 mg PRN    glucose chewable tablet 16 g PRN    glucose chewable tablet 24 g PRN    HYDROcodone-acetaminophen 7.5-325 mg per tablet 1 tablet Q6H PRN    isosorbide mononitrate 24 hr tablet 60 mg Daily    melatonin tablet 6 mg Nightly PRN    metoprolol tartrate (LOPRESSOR) tablet 25 mg Daily    multivitamin tablet Daily    mupirocin 2 % ointment BID    naloxone 0.4 mg/mL injection 0.02 mg PRN    ondansetron injection 8 mg Q6H PRN    simethicone chewable tablet 80 mg TID PRN    sodium bicarbonate 150 mEq in dextrose 5 % 1,000 mL infusion Continuous    sodium chloride 0.9% flush 10 mL Q12H PRN    sodium zirconium cyclosilicate packet  10 g TID    traMADoL tablet 50 mg Q8H PRN    traZODone tablet 50 mg Nightly PRN    traZODone tablet 50 mg QHS    vancomycin (VANCOCIN) 2,000 mg in dextrose 5 % 500 mL IVPB Once    vancomycin - pharmacy to dose pharmacy to manage frequency     Family History       Problem Relation (Age of Onset)    Diabetes Mother, Father          Tobacco Use    Smoking status: Former Smoker     Types: Cigarettes    Smokeless tobacco: Never Used    Tobacco comment: 1/3 PPD ~ 20 years: quit Dec 2021   Substance and Sexual Activity    Alcohol use: Not Currently     Comment: pint a week: Hx    Drug use: Never    Sexual activity: Not Currently     Review of Systems   All other systems reviewed and are negative.  Objective:     Vital Signs (Most Recent):  Temp: 98 °F (36.7 °C) (06/17/22 1020)  Pulse: 76 (06/17/22 1020)  Resp: 18 (06/17/22 1020)  BP: 121/67 (06/17/22 1020)  SpO2: 98 % (06/17/22 1020)  O2 Device (Oxygen Therapy): nasal cannula w/ humidification (06/16/22 2036) Vital Signs (24h Range):  Temp:  [97.7 °F (36.5 °C)-98.2 °F (36.8 °C)] 98 °F (36.7 °C)  Pulse:  [61-76] 76  Resp:  [18-20] 18  SpO2:  [96 %-98 %] 98 %  BP: (114-125)/(61-71) 121/67     Weight: 100.8 kg (222 lb 3.6 oz) (06/16/22 0600)  Body mass index is 30.14 kg/m².  Body surface area is 2.26 meters squared.    I/O last 3 completed shifts:  In: 220 [P.O.:120; IV Piggyback:100]  Out: 1075 [Urine:1075]    Physical Exam  Vitals reviewed.   HENT:      Head: Normocephalic and atraumatic.      Mouth/Throat:      Mouth: Mucous membranes are moist.   Cardiovascular:      Rate and Rhythm: Regular rhythm.   Pulmonary:      Effort: Pulmonary effort is normal.      Comments: Occassional crackles in the bases.  Abdominal:      Palpations: Abdomen is soft.   Neurological:      Mental Status: He is alert.       Significant Labs:  BMP:   Recent Labs   Lab 06/15/22  1918 06/16/22  0809 06/17/22  0954   *   < > 214*   *   < > 135*   K 7.2*   < > 6.4*  6.4*      < >  103   CO2 19*   < > 20*   BUN 73*   < > 83*  83*   CREATININE 7.16*   < > 7.15*  7.15*   CALCIUM 7.8*   < > 8.4*   MG 2.1  --   --     < > = values in this interval not displayed.     CBC:   Recent Labs   Lab 06/17/22  0954   WBC 12.56*   RBC 3.15*   HGB 9.1*   HCT 27.5*   *   MCV 87.3   MCH 28.9   MCHC 33.1       Significant Imaging:  Labs: Reviewed

## 2022-06-18 LAB
ANION GAP SERPL CALCULATED.3IONS-SCNC: 18 MMOL/L (ref 7–16)
BASOPHILS # BLD AUTO: 0.01 K/UL (ref 0–0.2)
BASOPHILS NFR BLD AUTO: 0.1 % (ref 0–1)
BUN SERPL-MCNC: 85 MG/DL (ref 7–18)
BUN/CREAT SERPL: 13 (ref 6–20)
CALCIUM SERPL-MCNC: 7.2 MG/DL (ref 8.5–10.1)
CHLORIDE SERPL-SCNC: 98 MMOL/L (ref 98–107)
CO2 SERPL-SCNC: 27 MMOL/L (ref 21–32)
CREAT SERPL-MCNC: 6.37 MG/DL (ref 0.7–1.3)
DIFFERENTIAL METHOD BLD: ABNORMAL
EOSINOPHIL # BLD AUTO: 0 K/UL (ref 0–0.5)
EOSINOPHIL NFR BLD AUTO: 0 % (ref 1–4)
ERYTHROCYTE [DISTWIDTH] IN BLOOD BY AUTOMATED COUNT: 15.2 % (ref 11.5–14.5)
GLUCOSE SERPL-MCNC: 211 MG/DL (ref 70–105)
GLUCOSE SERPL-MCNC: 245 MG/DL (ref 70–105)
GLUCOSE SERPL-MCNC: 271 MG/DL (ref 70–105)
GLUCOSE SERPL-MCNC: 295 MG/DL (ref 70–105)
GLUCOSE SERPL-MCNC: 383 MG/DL (ref 74–106)
HCT VFR BLD AUTO: 23.8 % (ref 40–54)
HGB BLD-MCNC: 8.1 G/DL (ref 13.5–18)
IMM GRANULOCYTES # BLD AUTO: 0.11 K/UL (ref 0–0.04)
IMM GRANULOCYTES NFR BLD: 1 % (ref 0–0.4)
LYMPHOCYTES # BLD AUTO: 0.37 K/UL (ref 1–4.8)
LYMPHOCYTES NFR BLD AUTO: 3.4 % (ref 27–41)
LYMPHOCYTES NFR BLD MANUAL: 4 % (ref 27–41)
MCH RBC QN AUTO: 28.8 PG (ref 27–31)
MCHC RBC AUTO-ENTMCNC: 34 G/DL (ref 32–36)
MCV RBC AUTO: 84.7 FL (ref 80–96)
MONOCYTES # BLD AUTO: 0.35 K/UL (ref 0–0.8)
MONOCYTES NFR BLD AUTO: 3.2 % (ref 2–6)
MONOCYTES NFR BLD MANUAL: 2 % (ref 2–6)
MPC BLD CALC-MCNC: 11 FL (ref 9.4–12.4)
NEUTROPHILS # BLD AUTO: 9.98 K/UL (ref 1.8–7.7)
NEUTROPHILS NFR BLD AUTO: 92.3 % (ref 53–65)
NEUTS BAND NFR BLD MANUAL: 9 % (ref 1–5)
NEUTS SEG NFR BLD MANUAL: 85 % (ref 50–62)
NRBC # BLD AUTO: 0.03 X10E3/UL
NRBC, AUTO (.00): 0.3 %
PLATELET # BLD AUTO: 140 K/UL (ref 150–400)
POTASSIUM SERPL-SCNC: 5.4 MMOL/L (ref 3.5–5.1)
POTASSIUM SERPL-SCNC: 5.4 MMOL/L (ref 3.5–5.1)
POTASSIUM SERPL-SCNC: 5.5 MMOL/L (ref 3.5–5.1)
POTASSIUM SERPL-SCNC: 5.6 MMOL/L (ref 3.5–5.1)
RBC # BLD AUTO: 2.81 M/UL (ref 4.6–6.2)
SODIUM SERPL-SCNC: 137 MMOL/L (ref 136–145)
VANCOMYCIN SERPL-MCNC: 10.9 ΜG/ML (ref 0–20)
WBC # BLD AUTO: 10.82 K/UL (ref 4.5–11)

## 2022-06-18 PROCEDURE — 84132 ASSAY OF SERUM POTASSIUM: CPT | Performed by: FAMILY MEDICINE

## 2022-06-18 PROCEDURE — 25000003 PHARM REV CODE 250

## 2022-06-18 PROCEDURE — 80048 BASIC METABOLIC PNL TOTAL CA: CPT | Performed by: INTERNAL MEDICINE

## 2022-06-18 PROCEDURE — 36415 COLL VENOUS BLD VENIPUNCTURE: CPT | Performed by: FAMILY MEDICINE

## 2022-06-18 PROCEDURE — 63600175 PHARM REV CODE 636 W HCPCS: Performed by: FAMILY MEDICINE

## 2022-06-18 PROCEDURE — 11000001 HC ACUTE MED/SURG PRIVATE ROOM

## 2022-06-18 PROCEDURE — 25000003 PHARM REV CODE 250: Performed by: FAMILY MEDICINE

## 2022-06-18 PROCEDURE — 84132 ASSAY OF SERUM POTASSIUM: CPT

## 2022-06-18 PROCEDURE — 25000242 PHARM REV CODE 250 ALT 637 W/ HCPCS: Performed by: FAMILY MEDICINE

## 2022-06-18 PROCEDURE — 85025 COMPLETE CBC W/AUTO DIFF WBC: CPT

## 2022-06-18 PROCEDURE — 82962 GLUCOSE BLOOD TEST: CPT

## 2022-06-18 PROCEDURE — 94761 N-INVAS EAR/PLS OXIMETRY MLT: CPT

## 2022-06-18 PROCEDURE — 99232 PR SUBSEQUENT HOSPITAL CARE,LEVL II: ICD-10-PCS | Mod: GC,,, | Performed by: FAMILY MEDICINE

## 2022-06-18 PROCEDURE — 27000221 HC OXYGEN, UP TO 24 HOURS

## 2022-06-18 PROCEDURE — 36415 COLL VENOUS BLD VENIPUNCTURE: CPT | Performed by: INTERNAL MEDICINE

## 2022-06-18 PROCEDURE — 80202 ASSAY OF VANCOMYCIN: CPT | Performed by: FAMILY MEDICINE

## 2022-06-18 PROCEDURE — 25000003 PHARM REV CODE 250: Performed by: HOSPITALIST

## 2022-06-18 PROCEDURE — 63600175 PHARM REV CODE 636 W HCPCS

## 2022-06-18 PROCEDURE — 99232 SBSQ HOSP IP/OBS MODERATE 35: CPT | Mod: GC,,, | Performed by: FAMILY MEDICINE

## 2022-06-18 RX ORDER — INSULIN ASPART 100 [IU]/ML
1-10 INJECTION, SOLUTION INTRAVENOUS; SUBCUTANEOUS
Status: DISCONTINUED | OUTPATIENT
Start: 2022-06-18 | End: 2022-07-20 | Stop reason: HOSPADM

## 2022-06-18 RX ORDER — GLUCAGON 1 MG
1 KIT INJECTION
Status: DISCONTINUED | OUTPATIENT
Start: 2022-06-18 | End: 2022-07-20 | Stop reason: HOSPADM

## 2022-06-18 RX ADMIN — TRAZODONE HYDROCHLORIDE 50 MG: 50 TABLET ORAL at 08:06

## 2022-06-18 RX ADMIN — SODIUM ZIRCONIUM CYCLOSILICATE 10 G: 10 POWDER, FOR SUSPENSION ORAL at 08:06

## 2022-06-18 RX ADMIN — INSULIN ASPART 3 UNITS: 100 INJECTION, SOLUTION INTRAVENOUS; SUBCUTANEOUS at 08:06

## 2022-06-18 RX ADMIN — ATORVASTATIN CALCIUM 40 MG: 40 TABLET, FILM COATED ORAL at 08:06

## 2022-06-18 RX ADMIN — HYDROCODONE BITARTRATE AND ACETAMINOPHEN 1 TABLET: 7.5; 325 TABLET ORAL at 05:06

## 2022-06-18 RX ADMIN — ISOSORBIDE MONONITRATE 60 MG: 60 TABLET, EXTENDED RELEASE ORAL at 10:06

## 2022-06-18 RX ADMIN — DEXAMETHASONE 6 MG: 2 TABLET ORAL at 10:06

## 2022-06-18 RX ADMIN — METOPROLOL TARTRATE 25 MG: 25 TABLET, FILM COATED ORAL at 10:06

## 2022-06-18 RX ADMIN — AMLODIPINE BESYLATE 10 MG: 10 TABLET ORAL at 10:06

## 2022-06-18 RX ADMIN — SODIUM BICARBONATE: 84 INJECTION, SOLUTION INTRAVENOUS at 01:06

## 2022-06-18 RX ADMIN — FERROUS GLUCONATE 324 MG: 324 TABLET ORAL at 10:06

## 2022-06-18 RX ADMIN — INSULIN ASPART 6 UNITS: 100 INJECTION, SOLUTION INTRAVENOUS; SUBCUTANEOUS at 05:06

## 2022-06-18 RX ADMIN — MUPIROCIN: 20 OINTMENT TOPICAL at 09:06

## 2022-06-18 RX ADMIN — THERA TABS 1 TABLET: TAB at 10:06

## 2022-06-18 RX ADMIN — ALLOPURINOL 100 MG: 100 TABLET ORAL at 10:06

## 2022-06-18 RX ADMIN — TRAMADOL HYDROCHLORIDE 50 MG: 50 TABLET, COATED ORAL at 08:06

## 2022-06-18 RX ADMIN — SODIUM ZIRCONIUM CYCLOSILICATE 10 G: 10 POWDER, FOR SUSPENSION ORAL at 10:06

## 2022-06-18 RX ADMIN — VANCOMYCIN HYDROCHLORIDE 2000 MG: 1 INJECTION, POWDER, LYOPHILIZED, FOR SOLUTION INTRAVENOUS at 10:06

## 2022-06-18 RX ADMIN — MUPIROCIN: 20 OINTMENT TOPICAL at 08:06

## 2022-06-18 RX ADMIN — HYDROCODONE BITARTRATE AND ACETAMINOPHEN 1 TABLET: 7.5; 325 TABLET ORAL at 10:06

## 2022-06-18 RX ADMIN — CEFEPIME HYDROCHLORIDE 1 G: 1 INJECTION, POWDER, FOR SOLUTION INTRAMUSCULAR; INTRAVENOUS at 04:06

## 2022-06-18 NOTE — ASSESSMENT & PLAN NOTE
- given potassium cocktail. Bicarb 19 so 1 amp of sodium bicarb on arrival.   - Calcium gluconate x 2  - Insulin 10U with D50   - Lokelma 10mg TID  - Neph consulted, appreciate their expertise -- Dr Butts recommending continued lokelma  - Improving today

## 2022-06-18 NOTE — ASSESSMENT & PLAN NOTE
-H/H 8.1/23.8  -serial BMP, Fe, TIBC, ferritin, B12 and folate.  -will hold DAPT and DVT ppx since patient also has hemoptysis.

## 2022-06-18 NOTE — ASSESSMENT & PLAN NOTE
-HgA1c 4.7 10 days ago  - BG elevated at 380, likely 2/2 D50 for hyperkalemia  - Added SSI and POCT glucose

## 2022-06-18 NOTE — PROGRESS NOTES
[facility], [place] - [address] [place] [address] Medicine  Progress Note    Patient Name: Neymar Parra  MRN: [id number]  Patient Class: IP- Inpatient   Admission Date: 6/16/2022  Length of Stay: 2 days  Attending Physician: Rj Fernandez MD  Primary Care Provider: [facility] megan Rodríguez        Subjective:     Principal Problem:Hyperkalemia        HPI:  72 y.o. male transferred to the [facility] from [facility] for hyperkalemia and hypoxia. Pt reports he went to [facility] because he was having dyspnea and hemoptysis since 1 month which is worse today. Pt reports he was tested positive for COVID at the nursing home on 6/9/22. Per nursing home patient oxygen saturation was in the 80's on RA with tachypnea and labored breathing. Pt reports he normally does not wear oxygen at the nursing home but has been wearing oxygen more often recently. Pt reports he smoked 1-1.5 PPD for 20 years but quit 7 months ago. Per pt, he saw a pulmonologist (Dr. Aldana) for his hemoptysis and had some tests done the results of which he does not know. Per records, pt with a right pleural effusion recently and had a thoracentesis. Pt denies any fever, congestion, dysuria, N/V/D, chest pain, or any other complaints at this time.     In the ED, pt's K 7.2, d-dimer 0.76, BNP 6284, BUN/Cr 73/7.16. EKG showed some peaked t-waves and irregular rhythm. Chest xray showed Increased right lower lung pulmonary density could indicate pneumonia. Pt was treated in the ED with Calcium gluconate 1g, regular insulin 10 units, D50, IV solumedrol 125 mg, tylenol 650 mg and IV fluids NS 1L bolus. Pt will be admitted to the hospital service for management of hypoxia and hyperkalemia.      Overview/Hospital Course:  No notes on file    Interval History: Pt is awake, resting in bed. No acute complaints, no overnight events. Potassium improving. SSI added for hyperglycemia. Awaiting recommendations from Nephrology     Review of Systems   Constitutional:   Negative for chills, fatigue and fever.   HENT:  Negative for congestion, hearing loss and trouble swallowing.    Eyes:  Negative for visual disturbance.   Respiratory:  Positive for cough (hemoptysis) and shortness of breath.    Cardiovascular:  Negative for chest pain, palpitations and leg swelling.   Gastrointestinal:  Negative for abdominal pain, blood in stool, diarrhea, nausea and vomiting.   Genitourinary:  Negative for difficulty urinating and hematuria.   Musculoskeletal:  Negative for back pain and myalgias.   Skin:  Negative for rash.   Neurological:  Negative for dizziness, light-headedness and headaches.   Psychiatric/Behavioral:  Negative for sleep disturbance. The patient is not nervous/anxious.    Objective:     Vital Signs (Most Recent):  Temp: 97.8 °F (36.6 °C) (06/18/22 0455)  Pulse: 76 (06/18/22 0455)  Resp: 18 (06/18/22 0455)  BP: 123/71 (06/18/22 0455)  SpO2: 98 % (06/18/22 0455) Vital Signs (24h Range):  Temp:  [97.8 °F (36.6 °C)-98.7 °F (37.1 °C)] 97.8 °F (36.6 °C)  Pulse:  [73-86] 76  Resp:  [18] 18  SpO2:  [96 %-98 %] 98 %  BP: (121-151)/(67-84) 123/71     Weight: 107.4 kg (236 lb 11.2 oz)  Body mass index is 32.1 kg/m².    Intake/Output Summary (Last 24 hours) at 6/18/2022 0824  Last data filed at 6/18/2022 0453  Gross per 24 hour   Intake --   Output 1225 ml   Net -1225 ml      Physical Exam  Vitals reviewed.   Constitutional:       General: He is not in acute distress.     Appearance: Normal appearance. He is obese. He is not toxic-appearing or diaphoretic.   HENT:      Head: Normocephalic and atraumatic.      Right Ear: External ear normal.      Left Ear: External ear normal.      Nose: Nose normal.      Mouth/Throat:      Mouth: Mucous membranes are moist.      Pharynx: Oropharynx is clear.   Eyes:      General: No scleral icterus.     Extraocular Movements: Extraocular movements intact.      Conjunctiva/sclera: Conjunctivae normal.      Pupils: Pupils are equal, round, and reactive to  light.   Cardiovascular:      Rate and Rhythm: Normal rate and regular rhythm.      Pulses: Normal pulses.      Heart sounds: Normal heart sounds. No murmur heard.  Pulmonary:      Effort: Pulmonary effort is normal. No respiratory distress.      Breath sounds: No wheezing, rhonchi or rales.   Abdominal:      General: Abdomen is flat. Bowel sounds are normal. There is no distension.      Palpations: Abdomen is soft.      Tenderness: There is no abdominal tenderness. There is no guarding or rebound.   Musculoskeletal:         General: No swelling. Normal range of motion.      Cervical back: Normal range of motion and neck supple. No rigidity.      Right lower leg: No edema.      Left lower leg: No edema.   Skin:     General: Skin is warm and dry.      Capillary Refill: Capillary refill takes less than 2 seconds.      Findings: No rash.   Neurological:      General: No focal deficit present.      Mental Status: He is alert. Mental status is at baseline.   Psychiatric:         Mood and Affect: Mood normal.         Behavior: Behavior normal.         Thought Content: Thought content normal.         Judgment: Judgment normal.       Significant Labs: All pertinent labs within the past 24 hours have been reviewed.  Recent Lab Results  (Last 5 results in the past 24 hours)        06/18/22  0429   06/18/22  0303   06/18/22  0302   06/17/22  2314   06/17/22  2038        Anion Gap     18           Bands   9             Baso #   0.01             Basophil %   0.1             BUN     85           BUN/CREAT RATIO     13           Calcium     7.2           Chloride     98           CO2     27           Creatinine     6.37           Differential Type   Manual             eGFR if non      9           Eos #   0.00             Eosinophil %   0.0             Glucose     383           Gram Stain Result               Hematocrit   23.8             Hemoglobin   8.1             Immature Grans (Abs)   0.11             Immature  Granulocytes   1.0             Lymph #   0.37             Lymph %   3.4                4             MCH   28.8             MCHC   34.0             MCV   84.7             Mono #   0.35             Mono %   3.2                2             MPV   11.0             Neutrophils, Abs   9.98             Neutrophils Relative   92.3             nRBC   0.3             NUCLEATED RBC ABSOLUTE   0.03             Platelets   140             POC Glucose 211         228       Potassium     5.6   5.7         RBC   2.81             RDW   15.2             Segmented Neutrophils, Man %   85             Sodium     137           Vancomycin, Random     10.9           WBC   10.82                                    Significant Imaging: I have reviewed all pertinent imaging results/findings within the past 24 hours.      Assessment/Plan:      * Hyperkalemia  - given potassium cocktail. Bicarb 19 so 1 amp of sodium bicarb on arrival.   - Calcium gluconate x 2  - Insulin 10U with D50   - Lokelma 10mg TID  - Neph consulted, appreciate their expertise -- Dr Butts recommending continued lokelma  - Improving today    Anemia  -H/H 8.1/23.8  -serial BMP, Fe, TIBC, ferritin, B12 and folate.  -will hold DAPT and DVT ppx since patient also has hemoptysis.     DEO (acute kidney injury)  -BUN/Cr on admission 73/7.16  -BUN/Cr 1 week ago was 47/4.76.  -will consult nephrology for eGFR 8 and end stage kidney disease.    COVID-19  -concern for COVID bacterial pneumonia, will treat as hospital acquired pneumonia since pt resides at a nursing home so will start IV cefepime and IV vanc pharmacy to dose.  -dexamethasone 6 mg qd   -pt with hemoptysis -s/p bronchoscopy by Dr. Aldana who reports it is most likely due to aspirin and Plavix that pt has used for greater than 4 years. Will hold at this time. Pt is scheduled to see cardiology outpt for further evaluation.     Type 2 diabetes mellitus without complication  -HgA1c 4.7 10 days ago  - BG elevated at 380,  likely 2/2 D50 for hyperkalemia  - Added SSI and POCT glucose    Gastroesophageal reflux disease  -pantoprazole 40 mg qd    Chronic obstructive pulmonary disease  -albuterol inhaler q4h PRN, will avoid nebulizer until pt's has completed 10 days after testing positive for COVID.     Hypertension  -continued home amlodipine 10 mg qd, isosorbide mononitrate 60 mg qd, and lisinopril 20 mg qd.      VTE Risk Mitigation (From admission, onward)         Ordered     Reason for No Pharmacological VTE Prophylaxis  Once        Question:  Reasons:  Answer:  Risk of Bleeding    06/16/22 0301     IP VTE HIGH RISK PATIENT  Once         06/16/22 0301     Place sequential compression device  Until discontinued         06/16/22 0301                Discharge Planning   EDWARDO:      Code Status: Full Code   Is the patient medically ready for discharge?:     Reason for patient still in hospital (select all that apply): Treatment  Discharge Plan A: Long-term acute care facility (LTAC) (Choice obtained for Specialty)                  Narciso Salazar MD  Department of Hospital Medicine   [address]

## 2022-06-18 NOTE — ASSESSMENT & PLAN NOTE
Acute on chronic renal failure.  2. No uremic symptoms  3.  Hepatitis panel  4.  Strict I/os  5.  BMP in AM  6.  Will follow  6/18/2022  The patient's renal function is improving.

## 2022-06-18 NOTE — SUBJECTIVE & OBJECTIVE
Interval History: Pt is awake, resting in bed. No acute complaints, no overnight events. Potassium improving. SSI added for hyperglycemia. Awaiting recommendations from Nephrology     Review of Systems   Constitutional:  Negative for chills, fatigue and fever.   HENT:  Negative for congestion, hearing loss and trouble swallowing.    Eyes:  Negative for visual disturbance.   Respiratory:  Positive for cough (hemoptysis) and shortness of breath.    Cardiovascular:  Negative for chest pain, palpitations and leg swelling.   Gastrointestinal:  Negative for abdominal pain, blood in stool, diarrhea, nausea and vomiting.   Genitourinary:  Negative for difficulty urinating and hematuria.   Musculoskeletal:  Negative for back pain and myalgias.   Skin:  Negative for rash.   Neurological:  Negative for dizziness, light-headedness and headaches.   Psychiatric/Behavioral:  Negative for sleep disturbance. The patient is not nervous/anxious.    Objective:     Vital Signs (Most Recent):  Temp: 97.8 °F (36.6 °C) (06/18/22 0455)  Pulse: 76 (06/18/22 0455)  Resp: 18 (06/18/22 0455)  BP: 123/71 (06/18/22 0455)  SpO2: 98 % (06/18/22 0455) Vital Signs (24h Range):  Temp:  [97.8 °F (36.6 °C)-98.7 °F (37.1 °C)] 97.8 °F (36.6 °C)  Pulse:  [73-86] 76  Resp:  [18] 18  SpO2:  [96 %-98 %] 98 %  BP: (121-151)/(67-84) 123/71     Weight: 107.4 kg (236 lb 11.2 oz)  Body mass index is 32.1 kg/m².    Intake/Output Summary (Last 24 hours) at 6/18/2022 0824  Last data filed at 6/18/2022 0453  Gross per 24 hour   Intake --   Output 1225 ml   Net -1225 ml      Physical Exam  Vitals reviewed.   Constitutional:       General: He is not in acute distress.     Appearance: Normal appearance. He is obese. He is not toxic-appearing or diaphoretic.   HENT:      Head: Normocephalic and atraumatic.      Right Ear: External ear normal.      Left Ear: External ear normal.      Nose: Nose normal.      Mouth/Throat:      Mouth: Mucous membranes are moist.       Pharynx: Oropharynx is clear.   Eyes:      General: No scleral icterus.     Extraocular Movements: Extraocular movements intact.      Conjunctiva/sclera: Conjunctivae normal.      Pupils: Pupils are equal, round, and reactive to light.   Cardiovascular:      Rate and Rhythm: Normal rate and regular rhythm.      Pulses: Normal pulses.      Heart sounds: Normal heart sounds. No murmur heard.  Pulmonary:      Effort: Pulmonary effort is normal. No respiratory distress.      Breath sounds: No wheezing, rhonchi or rales.   Abdominal:      General: Abdomen is flat. Bowel sounds are normal. There is no distension.      Palpations: Abdomen is soft.      Tenderness: There is no abdominal tenderness. There is no guarding or rebound.   Musculoskeletal:         General: No swelling. Normal range of motion.      Cervical back: Normal range of motion and neck supple. No rigidity.      Right lower leg: No edema.      Left lower leg: No edema.   Skin:     General: Skin is warm and dry.      Capillary Refill: Capillary refill takes less than 2 seconds.      Findings: No rash.   Neurological:      General: No focal deficit present.      Mental Status: He is alert. Mental status is at baseline.   Psychiatric:         Mood and Affect: Mood normal.         Behavior: Behavior normal.         Thought Content: Thought content normal.         Judgment: Judgment normal.       Significant Labs: All pertinent labs within the past 24 hours have been reviewed.  Recent Lab Results  (Last 5 results in the past 24 hours)        06/18/22  0429   06/18/22  0303   06/18/22  0302   06/17/22  2314   06/17/22  2038        Anion Gap     18           Bands   9             Baso #   0.01             Basophil %   0.1             BUN     85           BUN/CREAT RATIO     13           Calcium     7.2           Chloride     98           CO2     27           Creatinine     6.37           Differential Type   Manual             eGFR if non      9            Eos #   0.00             Eosinophil %   0.0             Glucose     383           Gram Stain Result               Hematocrit   23.8             Hemoglobin   8.1             Immature Grans (Abs)   0.11             Immature Granulocytes   1.0             Lymph #   0.37             Lymph %   3.4                4             MCH   28.8             MCHC   34.0             MCV   84.7             Mono #   0.35             Mono %   3.2                2             MPV   11.0             Neutrophils, Abs   9.98             Neutrophils Relative   92.3             nRBC   0.3             NUCLEATED RBC ABSOLUTE   0.03             Platelets   140             POC Glucose 211         228       Potassium     5.6   5.7         RBC   2.81             RDW   15.2             Segmented Neutrophils, Man %   85             Sodium     137           Vancomycin, Random     10.9           WBC   10.82                                    Significant Imaging: I have reviewed all pertinent imaging results/findings within the past 24 hours.

## 2022-06-18 NOTE — PROGRESS NOTES
[facility], [place] - [address] [place] [address]  Nephrology  Progress Note    Patient Name: Neymar Parra  MRN: [id number]  Admission Date: 6/16/2022  Hospital Length of Stay: 2 days  Attending Provider: Rj Fernandez MD   Primary Care Physician: [facility] megan Rodríguez  Principal Problem:Hyperkalemia    Subjective:     HPI: This patient with history of HTN, DM, CKD who has seen Dr. Crowder in the past 2 years ago is currently in a nursing facility.  The patient presented with SOB and diagnosed with COVID.  Serum creatinine has trended up to 7.15.  Serum potassium is 6.4.  Nephrology has been consulted for renal issues.  At the bedside, the patient mentions feeling fine.  He states that his breathing has improved.      Interval History: The patient is sitting up in bed.  He is comfortable.  Serum creatinine is now 6.3  down from 7.15.    Review of patient's allergies indicates:   Allergen Reactions    Gatifloxacin Anaphylaxis     Current Facility-Administered Medications   Medication Frequency    acetaminophen tablet 1,000 mg Q6H PRN    albuterol inhaler 2 puff Q6H PRN    allopurinoL tablet 100 mg Daily    amLODIPine tablet 10 mg Daily    atorvastatin tablet 40 mg QHS    bisacodyL EC tablet 10 mg Daily PRN    calcium gluconate 1 g in NS IVPB (premixed) Q10 Min PRN    ceFEPIme (MAXIPIME) 1 g in dextrose 5 % in water (D5W) 5 % 50 mL IVPB (MB+) Q24H    cetirizine tablet 10 mg Daily PRN    dexAMETHasone tablet 6 mg Daily    dextromethorphan-guaiFENesin  mg/5 ml liquid 10 mL Q6H PRN    dextrose 50% injection 12.5 g PRN    dextrose 50% injection 25 g PRN    ferrous gluconate tablet 324 mg Daily with breakfast    glucagon (human recombinant) injection 1 mg PRN    glucose chewable tablet 16 g PRN    glucose chewable tablet 24 g PRN    HYDROcodone-acetaminophen 7.5-325 mg per tablet 1 tablet Q6H PRN    insulin aspart U-100 injection 1-10 Units QID (AC + HS) PRN    isosorbide mononitrate 24  hr tablet 60 mg Daily    melatonin tablet 6 mg Nightly PRN    metoprolol tartrate (LOPRESSOR) tablet 25 mg Daily    multivitamin tablet Daily    mupirocin 2 % ointment BID    naloxone 0.4 mg/mL injection 0.02 mg PRN    ondansetron injection 8 mg Q6H PRN    simethicone chewable tablet 80 mg TID PRN    sodium bicarbonate 150 mEq in dextrose 5 % 1,000 mL infusion Continuous    sodium chloride 0.9% flush 10 mL Q12H PRN    sodium zirconium cyclosilicate packet 10 g TID    traMADoL tablet 50 mg Q8H PRN    traZODone tablet 50 mg Nightly PRN    vancomycin - pharmacy to dose pharmacy to manage frequency       Objective:     Vital Signs (Most Recent):  Temp: 98 °F (36.7 °C) (06/18/22 0800)  Pulse: 77 (06/18/22 1023)  Resp: 20 (06/18/22 1030)  BP: 138/79 (06/18/22 1023)  SpO2: 98 % (06/18/22 0800)  O2 Device (Oxygen Therapy): nasal cannula w/ humidification (06/16/22 2036) Vital Signs (24h Range):  Temp:  [97.8 °F (36.6 °C)-98.7 °F (37.1 °C)] 98 °F (36.7 °C)  Pulse:  [73-86] 77  Resp:  [18-83] 20  SpO2:  [96 %-98 %] 98 %  BP: (123-151)/(71-84) 138/79     Weight: 107.4 kg (236 lb 11.2 oz) (06/18/22 0533)  Body mass index is 32.1 kg/m².  Body surface area is 2.34 meters squared.    I/O last 3 completed shifts:  In: 220 [P.O.:120; IV Piggyback:100]  Out: 1625 [Urine:1625]    Physical Exam  Vitals reviewed.   HENT:      Head: Normocephalic and atraumatic.   Cardiovascular:      Rate and Rhythm: Regular rhythm.   Pulmonary:      Effort: Pulmonary effort is normal.   Abdominal:      Palpations: Abdomen is soft.   Neurological:      Mental Status: He is alert.   Psychiatric:         Mood and Affect: Mood normal.         Behavior: Behavior normal.       Significant Labs:  BMP:   Recent Labs   Lab 06/15/22  1918 06/16/22  0809 06/18/22  0302 06/18/22  0938 06/18/22  1127   *   < > 383*  --   --    *   < > 137  --   --    K 7.2*   < > 5.6*   < > 5.4*      < > 98  --   --    CO2 19*   < > 27  --   --     BUN 73*   < > 85*  --   --    CREATININE 7.16*   < > 6.37*  --   --    CALCIUM 7.8*   < > 7.2*  --   --    MG 2.1  --   --   --   --     < > = values in this interval not displayed.     CBC:   Recent Labs   Lab 06/18/22  0303   WBC 10.82   RBC 2.81*   HGB 8.1*   HCT 23.8*   *   MCV 84.7   MCH 28.8   MCHC 34.0        Significant Imaging:  Labs: Reviewed    Assessment/Plan:     * Hyperkalemia  Recommend lokelma 10mg po TID  BMP in Am  6/18/2022  Potassium is improving    DEO (acute kidney injury)  Acute on chronic renal failure.  2. No uremic symptoms  3.  Hepatitis panel  4.  Strict I/os  5.  BMP in AM  6.  Will follow  6/18/2022  The patient's renal function is improving.        Thank you for your consult. I will follow-up with patient. Please contact us if you have any additional questions.    Jorge Butts Jr, MD  Nephrology  [facility] - [address] [place] [address]

## 2022-06-18 NOTE — SUBJECTIVE & OBJECTIVE
Interval History: The patient is sitting up in bed.  He is comfortable.  Serum creatinine is now 6.3  down from 7.15.    Review of patient's allergies indicates:   Allergen Reactions    Gatifloxacin Anaphylaxis     Current Facility-Administered Medications   Medication Frequency    acetaminophen tablet 1,000 mg Q6H PRN    albuterol inhaler 2 puff Q6H PRN    allopurinoL tablet 100 mg Daily    amLODIPine tablet 10 mg Daily    atorvastatin tablet 40 mg QHS    bisacodyL EC tablet 10 mg Daily PRN    calcium gluconate 1 g in NS IVPB (premixed) Q10 Min PRN    ceFEPIme (MAXIPIME) 1 g in dextrose 5 % in water (D5W) 5 % 50 mL IVPB (MB+) Q24H    cetirizine tablet 10 mg Daily PRN    dexAMETHasone tablet 6 mg Daily    dextromethorphan-guaiFENesin  mg/5 ml liquid 10 mL Q6H PRN    dextrose 50% injection 12.5 g PRN    dextrose 50% injection 25 g PRN    ferrous gluconate tablet 324 mg Daily with breakfast    glucagon (human recombinant) injection 1 mg PRN    glucose chewable tablet 16 g PRN    glucose chewable tablet 24 g PRN    HYDROcodone-acetaminophen 7.5-325 mg per tablet 1 tablet Q6H PRN    insulin aspart U-100 injection 1-10 Units QID (AC + HS) PRN    isosorbide mononitrate 24 hr tablet 60 mg Daily    melatonin tablet 6 mg Nightly PRN    metoprolol tartrate (LOPRESSOR) tablet 25 mg Daily    multivitamin tablet Daily    mupirocin 2 % ointment BID    naloxone 0.4 mg/mL injection 0.02 mg PRN    ondansetron injection 8 mg Q6H PRN    simethicone chewable tablet 80 mg TID PRN    sodium bicarbonate 150 mEq in dextrose 5 % 1,000 mL infusion Continuous    sodium chloride 0.9% flush 10 mL Q12H PRN    sodium zirconium cyclosilicate packet 10 g TID    traMADoL tablet 50 mg Q8H PRN    traZODone tablet 50 mg Nightly PRN    vancomycin - pharmacy to dose pharmacy to manage frequency       Objective:     Vital Signs (Most Recent):  Temp: 98 °F (36.7 °C) (06/18/22 0800)  Pulse: 77 (06/18/22 1023)  Resp: 20 (06/18/22 1030)  BP: 138/79  (06/18/22 1023)  SpO2: 98 % (06/18/22 0800)  O2 Device (Oxygen Therapy): nasal cannula w/ humidification (06/16/22 2036) Vital Signs (24h Range):  Temp:  [97.8 °F (36.6 °C)-98.7 °F (37.1 °C)] 98 °F (36.7 °C)  Pulse:  [73-86] 77  Resp:  [18-83] 20  SpO2:  [96 %-98 %] 98 %  BP: (123-151)/(71-84) 138/79     Weight: 107.4 kg (236 lb 11.2 oz) (06/18/22 0533)  Body mass index is 32.1 kg/m².  Body surface area is 2.34 meters squared.    I/O last 3 completed shifts:  In: 220 [P.O.:120; IV Piggyback:100]  Out: 1625 [Urine:1625]    Physical Exam  Vitals reviewed.   HENT:      Head: Normocephalic and atraumatic.   Cardiovascular:      Rate and Rhythm: Regular rhythm.   Pulmonary:      Effort: Pulmonary effort is normal.   Abdominal:      Palpations: Abdomen is soft.   Neurological:      Mental Status: He is alert.   Psychiatric:         Mood and Affect: Mood normal.         Behavior: Behavior normal.       Significant Labs:  BMP:   Recent Labs   Lab 06/15/22  1918 06/16/22  0809 06/18/22  0302 06/18/22  0938 06/18/22  1127   *   < > 383*  --   --    *   < > 137  --   --    K 7.2*   < > 5.6*   < > 5.4*      < > 98  --   --    CO2 19*   < > 27  --   --    BUN 73*   < > 85*  --   --    CREATININE 7.16*   < > 6.37*  --   --    CALCIUM 7.8*   < > 7.2*  --   --    MG 2.1  --   --   --   --     < > = values in this interval not displayed.     CBC:   Recent Labs   Lab 06/18/22  0303   WBC 10.82   RBC 2.81*   HGB 8.1*   HCT 23.8*   *   MCV 84.7   MCH 28.8   MCHC 34.0        Significant Imaging:  Labs: Reviewed

## 2022-06-18 NOTE — PROGRESS NOTES
Random vancomycin level resulted 10.9. Will re-dose today and check a random level in the morning with AM blood draw.    Pharmacy will continue to monitor and make adjustments as needed.    Jessika Munoz, PharmD  [phone]

## 2022-06-19 LAB
ANION GAP SERPL CALCULATED.3IONS-SCNC: 16 MMOL/L (ref 7–16)
ANISOCYTOSIS BLD QL SMEAR: ABNORMAL
BASOPHILS # BLD AUTO: 0 K/UL (ref 0–0.2)
BASOPHILS NFR BLD AUTO: 0 % (ref 0–1)
BUN SERPL-MCNC: 89 MG/DL (ref 7–18)
BUN/CREAT SERPL: 14 (ref 6–20)
CALCIUM SERPL-MCNC: 7.4 MG/DL (ref 8.5–10.1)
CHLORIDE SERPL-SCNC: 100 MMOL/L (ref 98–107)
CO2 SERPL-SCNC: 25 MMOL/L (ref 21–32)
CREAT SERPL-MCNC: 6.5 MG/DL (ref 0.7–1.3)
CULTURE, LOWER RESPIRATORY: NORMAL
DIFFERENTIAL METHOD BLD: ABNORMAL
EOSINOPHIL # BLD AUTO: 0 K/UL (ref 0–0.5)
EOSINOPHIL NFR BLD AUTO: 0 % (ref 1–4)
ERYTHROCYTE [DISTWIDTH] IN BLOOD BY AUTOMATED COUNT: 15.1 % (ref 11.5–14.5)
GLUCOSE SERPL-MCNC: 209 MG/DL (ref 74–106)
GLUCOSE SERPL-MCNC: 220 MG/DL (ref 70–105)
GLUCOSE SERPL-MCNC: 228 MG/DL (ref 70–105)
GLUCOSE SERPL-MCNC: 237 MG/DL (ref 70–105)
GLUCOSE SERPL-MCNC: 274 MG/DL (ref 70–105)
GRAM STN SPEC: NORMAL
HCT VFR BLD AUTO: 25.4 % (ref 40–54)
HGB BLD-MCNC: 8.4 G/DL (ref 13.5–18)
IMM GRANULOCYTES # BLD AUTO: 0.15 K/UL (ref 0–0.04)
IMM GRANULOCYTES NFR BLD: 1.5 % (ref 0–0.4)
LYMPHOCYTES # BLD AUTO: 0.42 K/UL (ref 1–4.8)
LYMPHOCYTES NFR BLD AUTO: 4.1 % (ref 27–41)
LYMPHOCYTES NFR BLD MANUAL: 2 % (ref 27–41)
MCH RBC QN AUTO: 28.5 PG (ref 27–31)
MCHC RBC AUTO-ENTMCNC: 33.1 G/DL (ref 32–36)
MCV RBC AUTO: 86.1 FL (ref 80–96)
MONOCYTES # BLD AUTO: 0.46 K/UL (ref 0–0.8)
MONOCYTES NFR BLD AUTO: 4.5 % (ref 2–6)
MONOCYTES NFR BLD MANUAL: 2 % (ref 2–6)
MPC BLD CALC-MCNC: 9.9 FL (ref 9.4–12.4)
NEUTROPHILS # BLD AUTO: 9.19 K/UL (ref 1.8–7.7)
NEUTROPHILS NFR BLD AUTO: 89.9 % (ref 53–65)
NEUTS BAND NFR BLD MANUAL: 1 % (ref 1–5)
NEUTS SEG NFR BLD MANUAL: 95 % (ref 50–62)
NRBC # BLD AUTO: 0.04 X10E3/UL
NRBC, AUTO (.00): 0.4 %
OVALOCYTES BLD QL SMEAR: ABNORMAL
PLATELET # BLD AUTO: 153 K/UL (ref 150–400)
PLATELET MORPHOLOGY: ABNORMAL
POLYCHROMASIA BLD QL SMEAR: ABNORMAL
POTASSIUM SERPL-SCNC: 4.8 MMOL/L (ref 3.5–5.1)
POTASSIUM SERPL-SCNC: 4.9 MMOL/L (ref 3.5–5.1)
POTASSIUM SERPL-SCNC: 5.1 MMOL/L (ref 3.5–5.1)
POTASSIUM SERPL-SCNC: 5.2 MMOL/L (ref 3.5–5.1)
RBC # BLD AUTO: 2.95 M/UL (ref 4.6–6.2)
SODIUM SERPL-SCNC: 136 MMOL/L (ref 136–145)
VANCOMYCIN SERPL-MCNC: 26.1 ΜG/ML (ref 0–20)
WBC # BLD AUTO: 10.22 K/UL (ref 4.5–11)

## 2022-06-19 PROCEDURE — 36415 COLL VENOUS BLD VENIPUNCTURE: CPT

## 2022-06-19 PROCEDURE — 11000001 HC ACUTE MED/SURG PRIVATE ROOM

## 2022-06-19 PROCEDURE — 25000003 PHARM REV CODE 250: Performed by: FAMILY MEDICINE

## 2022-06-19 PROCEDURE — 94761 N-INVAS EAR/PLS OXIMETRY MLT: CPT

## 2022-06-19 PROCEDURE — 80202 ASSAY OF VANCOMYCIN: CPT | Performed by: FAMILY MEDICINE

## 2022-06-19 PROCEDURE — 84132 ASSAY OF SERUM POTASSIUM: CPT

## 2022-06-19 PROCEDURE — 25000003 PHARM REV CODE 250: Performed by: HOSPITALIST

## 2022-06-19 PROCEDURE — 85025 COMPLETE CBC W/AUTO DIFF WBC: CPT

## 2022-06-19 PROCEDURE — 99232 PR SUBSEQUENT HOSPITAL CARE,LEVL II: ICD-10-PCS | Mod: GC,,, | Performed by: FAMILY MEDICINE

## 2022-06-19 PROCEDURE — 63600175 PHARM REV CODE 636 W HCPCS

## 2022-06-19 PROCEDURE — 63600175 PHARM REV CODE 636 W HCPCS: Performed by: FAMILY MEDICINE

## 2022-06-19 PROCEDURE — 82962 GLUCOSE BLOOD TEST: CPT

## 2022-06-19 PROCEDURE — 27000221 HC OXYGEN, UP TO 24 HOURS

## 2022-06-19 PROCEDURE — 25000003 PHARM REV CODE 250

## 2022-06-19 PROCEDURE — 80048 BASIC METABOLIC PNL TOTAL CA: CPT

## 2022-06-19 PROCEDURE — 99232 SBSQ HOSP IP/OBS MODERATE 35: CPT | Mod: GC,,, | Performed by: FAMILY MEDICINE

## 2022-06-19 PROCEDURE — 25000242 PHARM REV CODE 250 ALT 637 W/ HCPCS: Performed by: FAMILY MEDICINE

## 2022-06-19 RX ADMIN — MUPIROCIN: 20 OINTMENT TOPICAL at 11:06

## 2022-06-19 RX ADMIN — ISOSORBIDE MONONITRATE 60 MG: 60 TABLET, EXTENDED RELEASE ORAL at 09:06

## 2022-06-19 RX ADMIN — SODIUM BICARBONATE: 84 INJECTION, SOLUTION INTRAVENOUS at 02:06

## 2022-06-19 RX ADMIN — AMLODIPINE BESYLATE 10 MG: 10 TABLET ORAL at 10:06

## 2022-06-19 RX ADMIN — ONDANSETRON 8 MG: 2 INJECTION INTRAMUSCULAR; INTRAVENOUS at 11:06

## 2022-06-19 RX ADMIN — INSULIN ASPART 3 UNITS: 100 INJECTION, SOLUTION INTRAVENOUS; SUBCUTANEOUS at 10:06

## 2022-06-19 RX ADMIN — SODIUM ZIRCONIUM CYCLOSILICATE 10 G: 10 POWDER, FOR SUSPENSION ORAL at 11:06

## 2022-06-19 RX ADMIN — CEFEPIME HYDROCHLORIDE 1 G: 1 INJECTION, POWDER, FOR SOLUTION INTRAMUSCULAR; INTRAVENOUS at 05:06

## 2022-06-19 RX ADMIN — MUPIROCIN: 20 OINTMENT TOPICAL at 09:06

## 2022-06-19 RX ADMIN — ATORVASTATIN CALCIUM 40 MG: 40 TABLET, FILM COATED ORAL at 10:06

## 2022-06-19 RX ADMIN — INSULIN ASPART 4 UNITS: 100 INJECTION, SOLUTION INTRAVENOUS; SUBCUTANEOUS at 05:06

## 2022-06-19 RX ADMIN — DEXAMETHASONE 6 MG: 2 TABLET ORAL at 09:06

## 2022-06-19 RX ADMIN — SODIUM ZIRCONIUM CYCLOSILICATE 10 G: 10 POWDER, FOR SUSPENSION ORAL at 10:06

## 2022-06-19 RX ADMIN — FERROUS GLUCONATE 324 MG: 324 TABLET ORAL at 09:06

## 2022-06-19 RX ADMIN — ALLOPURINOL 100 MG: 100 TABLET ORAL at 10:06

## 2022-06-19 RX ADMIN — SODIUM ZIRCONIUM CYCLOSILICATE 10 G: 10 POWDER, FOR SUSPENSION ORAL at 02:06

## 2022-06-19 RX ADMIN — HYDROCODONE BITARTRATE AND ACETAMINOPHEN 1 TABLET: 7.5; 325 TABLET ORAL at 11:06

## 2022-06-19 RX ADMIN — METOPROLOL TARTRATE 25 MG: 25 TABLET, FILM COATED ORAL at 10:06

## 2022-06-19 RX ADMIN — THERA TABS 1 TABLET: TAB at 09:06

## 2022-06-19 RX ADMIN — INSULIN ASPART 4 UNITS: 100 INJECTION, SOLUTION INTRAVENOUS; SUBCUTANEOUS at 06:06

## 2022-06-19 NOTE — ASSESSMENT & PLAN NOTE
- acute on chronic renal failure, slowly improving  - nephrology following, recommend no dialysis at this time

## 2022-06-19 NOTE — ASSESSMENT & PLAN NOTE
-dexamethasone 6 mg qd   -pt with hemoptysis -s/p bronchoscopy by Dr. Aldana who reports it is most likely due to aspirin and Plavix that pt has used for greater than 4 years. Will hold at this time. Pt is scheduled to see cardiology outpt for further evaluation.

## 2022-06-19 NOTE — PROGRESS NOTES
[facility] - [address] [place] [address]  Nephrology  Progress Note    Patient Name: Neymar Parra  MRN: [id number]  Admission Date: 6/16/2022  Hospital Length of Stay: 3 days  Attending Provider: Rj Fernandez MD   Primary Care Physician: [facility] megan Rodríguez  Principal Problem:DEO (acute kidney injury)    Subjective:     HPI: This patient with history of HTN, DM, CKD who has seen Dr. Crowder in the past 2 years ago is currently in a nursing facility.  The patient presented with SOB and diagnosed with COVID.  Serum creatinine has trended up to 7.15.  Serum potassium is 6.4.  Nephrology has been consulted for renal issues.  At the bedside, the patient mentions feeling fine.  He states that his breathing has improved.      Interval History: The patient is doing acceptable.  He voices no complaints today.  Serum creatinine is 6.5 which continues to trend down.    Review of patient's allergies indicates:   Allergen Reactions    Gatifloxacin Anaphylaxis     Current Facility-Administered Medications   Medication Frequency    acetaminophen tablet 1,000 mg Q6H PRN    albuterol inhaler 2 puff Q6H PRN    allopurinoL tablet 100 mg Daily    amLODIPine tablet 10 mg Daily    atorvastatin tablet 40 mg QHS    bisacodyL EC tablet 10 mg Daily PRN    calcium gluconate 1 g in NS IVPB (premixed) Q10 Min PRN    cetirizine tablet 10 mg Daily PRN    dexAMETHasone tablet 6 mg Daily    dextromethorphan-guaiFENesin  mg/5 ml liquid 10 mL Q6H PRN    dextrose 50% injection 12.5 g PRN    dextrose 50% injection 25 g PRN    ferrous gluconate tablet 324 mg Daily with breakfast    glucagon (human recombinant) injection 1 mg PRN    glucose chewable tablet 16 g PRN    glucose chewable tablet 24 g PRN    HYDROcodone-acetaminophen 7.5-325 mg per tablet 1 tablet Q6H PRN    insulin aspart U-100 injection 1-10 Units QID (AC + HS) PRN    isosorbide mononitrate 24 hr tablet 60 mg Daily    melatonin tablet 6 mg  Nightly PRN    metoprolol tartrate (LOPRESSOR) tablet 25 mg Daily    multivitamin tablet Daily    mupirocin 2 % ointment BID    naloxone 0.4 mg/mL injection 0.02 mg PRN    ondansetron injection 8 mg Q6H PRN    simethicone chewable tablet 80 mg TID PRN    sodium bicarbonate 150 mEq in dextrose 5 % 1,000 mL infusion Continuous    sodium chloride 0.9% flush 10 mL Q12H PRN    sodium zirconium cyclosilicate packet 10 g TID    traMADoL tablet 50 mg Q8H PRN    traZODone tablet 50 mg Nightly PRN       Objective:     Vital Signs (Most Recent):  Temp: 97.5 °F (36.4 °C) (06/19/22 0836)  Pulse: 78 (06/19/22 0836)  Resp: 18 (06/19/22 0836)  BP: 135/74 (06/19/22 0836)  SpO2: 97 % (06/19/22 0836)  O2 Device (Oxygen Therapy): nasal cannula (06/18/22 1930) Vital Signs (24h Range):  Temp:  [97.5 °F (36.4 °C)-98 °F (36.7 °C)] 97.5 °F (36.4 °C)  Pulse:  [70-78] 78  Resp:  [18-20] 18  SpO2:  [96 %-99 %] 97 %  BP: (114-135)/(62-74) 135/74     Weight: 109.4 kg (241 lb 2.9 oz) (06/19/22 0000)  Body mass index is 32.71 kg/m².  Body surface area is 2.36 meters squared.    I/O last 3 completed shifts:  In: 250 [IV Piggyback:250]  Out: 2075 [Urine:2075]    Physical Exam  Vitals reviewed.   HENT:      Head: Normocephalic and atraumatic.      Mouth/Throat:      Mouth: Mucous membranes are moist.   Eyes:      Pupils: Pupils are equal, round, and reactive to light.   Cardiovascular:      Rate and Rhythm: Regular rhythm.   Pulmonary:      Effort: Pulmonary effort is normal.   Neurological:      Mental Status: He is alert.   Psychiatric:         Mood and Affect: Mood normal.         Behavior: Behavior normal.       Significant Labs:  BMP:   Recent Labs   Lab 06/15/22  1918 06/16/22  0809 06/19/22  0339 06/19/22  0809   *   < > 209*  --    *   < > 136  --    K 7.2*   < > 5.2* 5.1      < > 100  --    CO2 19*   < > 25  --    BUN 73*   < > 89*  --    CREATININE 7.16*   < > 6.50*  --    CALCIUM 7.8*   < > 7.4*  --    MG 2.1   --   --   --     < > = values in this interval not displayed.     CBC:   Recent Labs   Lab 06/19/22  0339   WBC 10.22   RBC 2.95*   HGB 8.4*   HCT 25.4*      MCV 86.1   MCH 28.5   MCHC 33.1        Significant Imaging:  Labs: Reviewed    Assessment/Plan:     * DEO (acute kidney injury)  Acute on chronic renal failure.  2. No uremic symptoms  3.  Hepatitis panel  4.  Strict I/os  5.  BMP in AM  6.  Will follow  6/18/2022  The patient's renal function is improving.  6/19/2022 Continue to monitor.        Thank you for your consult. I will follow-up with patient. Please contact us if you have any additional questions.    Jorge Butts Jr, MD  Nephrology  [address]

## 2022-06-19 NOTE — SUBJECTIVE & OBJECTIVE
Interval History: The patient is doing acceptable.  He voices no complaints today.  Serum creatinine is 6.5 which continues to trend down.    Review of patient's allergies indicates:   Allergen Reactions    Gatifloxacin Anaphylaxis     Current Facility-Administered Medications   Medication Frequency    acetaminophen tablet 1,000 mg Q6H PRN    albuterol inhaler 2 puff Q6H PRN    allopurinoL tablet 100 mg Daily    amLODIPine tablet 10 mg Daily    atorvastatin tablet 40 mg QHS    bisacodyL EC tablet 10 mg Daily PRN    calcium gluconate 1 g in NS IVPB (premixed) Q10 Min PRN    cetirizine tablet 10 mg Daily PRN    dexAMETHasone tablet 6 mg Daily    dextromethorphan-guaiFENesin  mg/5 ml liquid 10 mL Q6H PRN    dextrose 50% injection 12.5 g PRN    dextrose 50% injection 25 g PRN    ferrous gluconate tablet 324 mg Daily with breakfast    glucagon (human recombinant) injection 1 mg PRN    glucose chewable tablet 16 g PRN    glucose chewable tablet 24 g PRN    HYDROcodone-acetaminophen 7.5-325 mg per tablet 1 tablet Q6H PRN    insulin aspart U-100 injection 1-10 Units QID (AC + HS) PRN    isosorbide mononitrate 24 hr tablet 60 mg Daily    melatonin tablet 6 mg Nightly PRN    metoprolol tartrate (LOPRESSOR) tablet 25 mg Daily    multivitamin tablet Daily    mupirocin 2 % ointment BID    naloxone 0.4 mg/mL injection 0.02 mg PRN    ondansetron injection 8 mg Q6H PRN    simethicone chewable tablet 80 mg TID PRN    sodium bicarbonate 150 mEq in dextrose 5 % 1,000 mL infusion Continuous    sodium chloride 0.9% flush 10 mL Q12H PRN    sodium zirconium cyclosilicate packet 10 g TID    traMADoL tablet 50 mg Q8H PRN    traZODone tablet 50 mg Nightly PRN       Objective:     Vital Signs (Most Recent):  Temp: 97.5 °F (36.4 °C) (06/19/22 0836)  Pulse: 78 (06/19/22 0836)  Resp: 18 (06/19/22 0836)  BP: 135/74 (06/19/22 0836)  SpO2: 97 % (06/19/22 0836)  O2 Device (Oxygen Therapy): nasal cannula (06/18/22 1930) Vital Signs (24h  Range):  Temp:  [97.5 °F (36.4 °C)-98 °F (36.7 °C)] 97.5 °F (36.4 °C)  Pulse:  [70-78] 78  Resp:  [18-20] 18  SpO2:  [96 %-99 %] 97 %  BP: (114-135)/(62-74) 135/74     Weight: 109.4 kg (241 lb 2.9 oz) (06/19/22 0000)  Body mass index is 32.71 kg/m².  Body surface area is 2.36 meters squared.    I/O last 3 completed shifts:  In: 250 [IV Piggyback:250]  Out: 2075 [Urine:2075]    Physical Exam  Vitals reviewed.   HENT:      Head: Normocephalic and atraumatic.      Mouth/Throat:      Mouth: Mucous membranes are moist.   Eyes:      Pupils: Pupils are equal, round, and reactive to light.   Cardiovascular:      Rate and Rhythm: Regular rhythm.   Pulmonary:      Effort: Pulmonary effort is normal.   Neurological:      Mental Status: He is alert.   Psychiatric:         Mood and Affect: Mood normal.         Behavior: Behavior normal.       Significant Labs:  BMP:   Recent Labs   Lab 06/15/22  1918 06/16/22  0809 06/19/22  0339 06/19/22  0809   *   < > 209*  --    *   < > 136  --    K 7.2*   < > 5.2* 5.1      < > 100  --    CO2 19*   < > 25  --    BUN 73*   < > 89*  --    CREATININE 7.16*   < > 6.50*  --    CALCIUM 7.8*   < > 7.4*  --    MG 2.1  --   --   --     < > = values in this interval not displayed.     CBC:   Recent Labs   Lab 06/19/22  0339   WBC 10.22   RBC 2.95*   HGB 8.4*   HCT 25.4*      MCV 86.1   MCH 28.5   MCHC 33.1        Significant Imaging:  Labs: Reviewed

## 2022-06-19 NOTE — SUBJECTIVE & OBJECTIVE
Interval History: Patient seen this am with no family at bedside. He has acute on chronic renal failure that is improving. Nephrology is following, recommend no dialysis at this time. His hyperkalemia has resolved with administration of Lokelma. We have been treating pneumonia with vancomycin and cefepime but will stop antibiotics today as there are no current signs of infection.    Review of Systems   Constitutional:  Negative for chills, fatigue and fever.   HENT:  Negative for congestion, hearing loss and trouble swallowing.    Eyes:  Negative for visual disturbance.   Respiratory:  Negative for cough (hemoptysis) and shortness of breath.    Cardiovascular:  Negative for chest pain, palpitations and leg swelling.   Gastrointestinal:  Negative for abdominal pain, blood in stool, diarrhea, nausea and vomiting.   Genitourinary:  Negative for difficulty urinating and hematuria.   Musculoskeletal:  Negative for back pain and myalgias.   Skin:  Negative for rash.   Neurological:  Negative for dizziness, light-headedness and headaches.   Psychiatric/Behavioral:  Negative for sleep disturbance. The patient is not nervous/anxious.    Objective:     Vital Signs (Most Recent):  Temp: 97.5 °F (36.4 °C) (06/19/22 0836)  Pulse: 78 (06/19/22 0836)  Resp: 18 (06/19/22 0836)  BP: 135/74 (06/19/22 0836)  SpO2: 97 % (06/19/22 0836) Vital Signs (24h Range):  Temp:  [97.5 °F (36.4 °C)-98 °F (36.7 °C)] 97.5 °F (36.4 °C)  Pulse:  [70-78] 78  Resp:  [18-20] 18  SpO2:  [96 %-99 %] 97 %  BP: (114-135)/(62-74) 135/74     Weight: 109.4 kg (241 lb 2.9 oz)  Body mass index is 32.71 kg/m².    Intake/Output Summary (Last 24 hours) at 6/19/2022 1046  Last data filed at 6/19/2022 0228  Gross per 24 hour   Intake --   Output 850 ml   Net -850 ml      Physical Exam  Vitals reviewed.   Constitutional:       General: He is not in acute distress.     Appearance: Normal appearance. He is obese. He is not toxic-appearing or diaphoretic.   HENT:       Head: Normocephalic and atraumatic.      Right Ear: External ear normal.      Left Ear: External ear normal.      Nose: Nose normal.      Mouth/Throat:      Mouth: Mucous membranes are moist.      Pharynx: Oropharynx is clear.   Eyes:      General: No scleral icterus.     Extraocular Movements: Extraocular movements intact.      Conjunctiva/sclera: Conjunctivae normal.      Pupils: Pupils are equal, round, and reactive to light.   Cardiovascular:      Rate and Rhythm: Normal rate and regular rhythm.      Pulses: Normal pulses.      Heart sounds: Normal heart sounds. No murmur heard.  Pulmonary:      Effort: Pulmonary effort is normal. No respiratory distress.      Breath sounds: No wheezing, rhonchi or rales.   Abdominal:      General: Abdomen is flat. Bowel sounds are normal. There is no distension.      Palpations: Abdomen is soft.      Tenderness: There is no abdominal tenderness. There is no guarding or rebound.   Musculoskeletal:         General: No swelling. Normal range of motion.      Cervical back: Normal range of motion and neck supple. No rigidity.      Right lower leg: No edema.      Left lower leg: No edema.   Skin:     General: Skin is warm and dry.      Capillary Refill: Capillary refill takes less than 2 seconds.      Findings: No rash.   Neurological:      General: No focal deficit present.      Mental Status: He is alert. Mental status is at baseline.   Psychiatric:         Mood and Affect: Mood normal.         Behavior: Behavior normal.         Thought Content: Thought content normal.         Judgment: Judgment normal.       Significant Labs: All pertinent labs within the past 24 hours have been reviewed.    Significant Imaging: I have reviewed all pertinent imaging results/findings within the past 24 hours.

## 2022-06-19 NOTE — PROGRESS NOTES
[facility] - [address] [place] [id number] Medicine  Progress Note    Patient Name: Neymar Parra  MRN: [id number]  Patient Class: IP- Inpatient   Admission Date: 6/16/2022  Length of Stay: 3 days  Attending Physician: Rj Fernandez MD  Primary Care Provider: [facility] megan Rodríguez        Subjective:     Principal Problem:DEO (acute kidney injury)        HPI:  72 y.o. male transferred to the [facility] from [facility] for hyperkalemia and hypoxia. Pt reports he went to [facility] because he was having dyspnea and hemoptysis since 1 month which is worse today. Pt reports he was tested positive for COVID at the nursing home on 6/9/22. Per nursing home patient oxygen saturation was in the 80's on RA with tachypnea and labored breathing. Pt reports he normally does not wear oxygen at the nursing home but has been wearing oxygen more often recently. Pt reports he smoked 1-1.5 PPD for 20 years but quit 7 months ago. Per pt, he saw a pulmonologist (Dr. Aldana) for his hemoptysis and had some tests done the results of which he does not know. Per records, pt with a right pleural effusion recently and had a thoracentesis. Pt denies any fever, congestion, dysuria, N/V/D, chest pain, or any other complaints at this time.     In the ED, pt's K 7.2, d-dimer 0.76, BNP 6284, BUN/Cr 73/7.16. EKG showed some peaked t-waves and irregular rhythm. Chest xray showed Increased right lower lung pulmonary density could indicate pneumonia. Pt was treated in the ED with Calcium gluconate 1g, regular insulin 10 units, D50, IV solumedrol 125 mg, tylenol 650 mg and IV fluids NS 1L bolus. Pt will be admitted to the hospital service for management of hypoxia and hyperkalemia.      Overview/Hospital Course:  No notes on file    Interval History: Patient seen this am with no family at bedside. He has acute on chronic renal failure that is improving. Nephrology is following, recommend no dialysis at this time. His hyperkalemia has  resolved with administration of Lokelma. We have been treating pneumonia with vancomycin and cefepime but will stop antibiotics today as there are no current signs of infection.    Review of Systems   Constitutional:  Negative for chills, fatigue and fever.   HENT:  Negative for congestion, hearing loss and trouble swallowing.    Eyes:  Negative for visual disturbance.   Respiratory:  Negative for cough (hemoptysis) and shortness of breath.    Cardiovascular:  Negative for chest pain, palpitations and leg swelling.   Gastrointestinal:  Negative for abdominal pain, blood in stool, diarrhea, nausea and vomiting.   Genitourinary:  Negative for difficulty urinating and hematuria.   Musculoskeletal:  Negative for back pain and myalgias.   Skin:  Negative for rash.   Neurological:  Negative for dizziness, light-headedness and headaches.   Psychiatric/Behavioral:  Negative for sleep disturbance. The patient is not nervous/anxious.    Objective:     Vital Signs (Most Recent):  Temp: 97.5 °F (36.4 °C) (06/19/22 0836)  Pulse: 78 (06/19/22 0836)  Resp: 18 (06/19/22 0836)  BP: 135/74 (06/19/22 0836)  SpO2: 97 % (06/19/22 0836) Vital Signs (24h Range):  Temp:  [97.5 °F (36.4 °C)-98 °F (36.7 °C)] 97.5 °F (36.4 °C)  Pulse:  [70-78] 78  Resp:  [18-20] 18  SpO2:  [96 %-99 %] 97 %  BP: (114-135)/(62-74) 135/74     Weight: 109.4 kg (241 lb 2.9 oz)  Body mass index is 32.71 kg/m².    Intake/Output Summary (Last 24 hours) at 6/19/2022 1046  Last data filed at 6/19/2022 0228  Gross per 24 hour   Intake --   Output 850 ml   Net -850 ml      Physical Exam  Vitals reviewed.   Constitutional:       General: He is not in acute distress.     Appearance: Normal appearance. He is obese. He is not toxic-appearing or diaphoretic.   HENT:      Head: Normocephalic and atraumatic.      Right Ear: External ear normal.      Left Ear: External ear normal.      Nose: Nose normal.      Mouth/Throat:      Mouth: Mucous membranes are moist.      Pharynx:  Oropharynx is clear.   Eyes:      General: No scleral icterus.     Extraocular Movements: Extraocular movements intact.      Conjunctiva/sclera: Conjunctivae normal.      Pupils: Pupils are equal, round, and reactive to light.   Cardiovascular:      Rate and Rhythm: Normal rate and regular rhythm.      Pulses: Normal pulses.      Heart sounds: Normal heart sounds. No murmur heard.  Pulmonary:      Effort: Pulmonary effort is normal. No respiratory distress.      Breath sounds: No wheezing, rhonchi or rales.   Abdominal:      General: Abdomen is flat. Bowel sounds are normal. There is no distension.      Palpations: Abdomen is soft.      Tenderness: There is no abdominal tenderness. There is no guarding or rebound.   Musculoskeletal:         General: No swelling. Normal range of motion.      Cervical back: Normal range of motion and neck supple. No rigidity.      Right lower leg: No edema.      Left lower leg: No edema.   Skin:     General: Skin is warm and dry.      Capillary Refill: Capillary refill takes less than 2 seconds.      Findings: No rash.   Neurological:      General: No focal deficit present.      Mental Status: He is alert. Mental status is at baseline.   Psychiatric:         Mood and Affect: Mood normal.         Behavior: Behavior normal.         Thought Content: Thought content normal.         Judgment: Judgment normal.       Significant Labs: All pertinent labs within the past 24 hours have been reviewed.    Significant Imaging: I have reviewed all pertinent imaging results/findings within the past 24 hours.      Assessment/Plan:      * DEO (acute kidney injury)  - acute on chronic renal failure, slowly improving  - nephrology following, recommend no dialysis at this time    Hyperkalemia  - improved with use of Lokelma    COVID-19  -dexamethasone 6 mg qd   -pt with hemoptysis -s/p bronchoscopy by Dr. Aldana who reports it is most likely due to aspirin and Plavix that pt has used for greater than 4  years. Will hold at this time. Pt is scheduled to see cardiology outpt for further evaluation.     Anemia  -H/H 8.1/23.8  -serial BMP, Fe, TIBC, ferritin, B12 and folate.  -will hold DAPT and DVT ppx since patient also has hemoptysis.     Type 2 diabetes mellitus without complication  -HgA1c 4.7 10 days ago  - BG elevated at 380, likely 2/2 D50 for hyperkalemia  - Added SSI and POCT glucose    Gastroesophageal reflux disease  -pantoprazole 40 mg qd    Chronic obstructive pulmonary disease  -albuterol inhaler q4h PRN, will avoid nebulizer until pt's has completed 10 days after testing positive for COVID.     Hypertension  -continued home amlodipine 10 mg qd, isosorbide mononitrate 60 mg qd, and lisinopril 20 mg qd.      VTE Risk Mitigation (From admission, onward)         Ordered     Reason for No Pharmacological VTE Prophylaxis  Once        Question:  Reasons:  Answer:  Risk of Bleeding    06/16/22 0301     IP VTE HIGH RISK PATIENT  Once         06/16/22 0301     Place sequential compression device  Until discontinued         06/16/22 0301                Discharge Planning   EDWARDO:      Code Status: Full Code   Is the patient medically ready for discharge?:     Reason for patient still in hospital (select all that apply): Treatment  Discharge Plan A: Long-term acute care facility (LTAC) (Choice obtained for Specialty)                  Tomi Quezada MD  Department of Hospital Medicine   [facility] - [address] [place] [address]

## 2022-06-19 NOTE — ASSESSMENT & PLAN NOTE
Acute on chronic renal failure.  2. No uremic symptoms  3.  Hepatitis panel  4.  Strict I/os  5.  BMP in AM  6.  Will follow  6/18/2022  The patient's renal function is improving.  6/19/2022 Continue to monitor.

## 2022-06-20 LAB
ANION GAP SERPL CALCULATED.3IONS-SCNC: 16 MMOL/L (ref 7–16)
ANISOCYTOSIS BLD QL SMEAR: ABNORMAL
BASOPHILS # BLD AUTO: 0 K/UL (ref 0–0.2)
BASOPHILS NFR BLD AUTO: 0 % (ref 0–1)
BUN SERPL-MCNC: 89 MG/DL (ref 7–18)
BUN/CREAT SERPL: 15 (ref 6–20)
CALCIUM SERPL-MCNC: 7.5 MG/DL (ref 8.5–10.1)
CHLORIDE SERPL-SCNC: 98 MMOL/L (ref 98–107)
CO2 SERPL-SCNC: 28 MMOL/L (ref 21–32)
CREAT SERPL-MCNC: 5.79 MG/DL (ref 0.7–1.3)
DIFFERENTIAL METHOD BLD: ABNORMAL
EOSINOPHIL # BLD AUTO: 0 K/UL (ref 0–0.5)
EOSINOPHIL NFR BLD AUTO: 0 % (ref 1–4)
ERYTHROCYTE [DISTWIDTH] IN BLOOD BY AUTOMATED COUNT: 15.4 % (ref 11.5–14.5)
GLUCOSE SERPL-MCNC: 213 MG/DL (ref 74–106)
GLUCOSE SERPL-MCNC: 235 MG/DL (ref 70–105)
GLUCOSE SERPL-MCNC: 250 MG/DL (ref 70–105)
GLUCOSE SERPL-MCNC: 258 MG/DL (ref 70–105)
GLUCOSE SERPL-MCNC: 265 MG/DL (ref 70–105)
HCT VFR BLD AUTO: 26.3 % (ref 40–54)
HGB BLD-MCNC: 8.9 G/DL (ref 13.5–18)
IMM GRANULOCYTES # BLD AUTO: 0.19 K/UL (ref 0–0.04)
IMM GRANULOCYTES NFR BLD: 1.9 % (ref 0–0.4)
LYMPHOCYTES # BLD AUTO: 0.49 K/UL (ref 1–4.8)
LYMPHOCYTES NFR BLD AUTO: 5 % (ref 27–41)
LYMPHOCYTES NFR BLD MANUAL: 6 % (ref 27–41)
MCH RBC QN AUTO: 28.5 PG (ref 27–31)
MCHC RBC AUTO-ENTMCNC: 33.8 G/DL (ref 32–36)
MCV RBC AUTO: 84.3 FL (ref 80–96)
MONOCYTES # BLD AUTO: 0.68 K/UL (ref 0–0.8)
MONOCYTES NFR BLD AUTO: 6.9 % (ref 2–6)
MONOCYTES NFR BLD MANUAL: 4 % (ref 2–6)
MPC BLD CALC-MCNC: 11.4 FL (ref 9.4–12.4)
NEUTROPHILS # BLD AUTO: 8.51 K/UL (ref 1.8–7.7)
NEUTROPHILS NFR BLD AUTO: 86.2 % (ref 53–65)
NEUTS BAND NFR BLD MANUAL: 2 % (ref 1–5)
NEUTS SEG NFR BLD MANUAL: 88 % (ref 50–62)
NRBC # BLD AUTO: 0.03 X10E3/UL
NRBC BLD MANUAL-RTO: 1 /100 WBC
NRBC, AUTO (.00): 0.3 %
OVALOCYTES BLD QL SMEAR: ABNORMAL
PLATELET # BLD AUTO: 210 K/UL (ref 150–400)
PLATELET MORPHOLOGY: ABNORMAL
POLYCHROMASIA BLD QL SMEAR: ABNORMAL
POTASSIUM SERPL-SCNC: 4.5 MMOL/L (ref 3.5–5.1)
POTASSIUM SERPL-SCNC: 5.4 MMOL/L (ref 3.5–5.1)
RBC # BLD AUTO: 3.12 M/UL (ref 4.6–6.2)
SODIUM SERPL-SCNC: 137 MMOL/L (ref 136–145)
WBC # BLD AUTO: 9.87 K/UL (ref 4.5–11)

## 2022-06-20 PROCEDURE — 99232 PR SUBSEQUENT HOSPITAL CARE,LEVL II: ICD-10-PCS | Mod: GC,,, | Performed by: FAMILY MEDICINE

## 2022-06-20 PROCEDURE — 25000003 PHARM REV CODE 250: Performed by: FAMILY MEDICINE

## 2022-06-20 PROCEDURE — 63600175 PHARM REV CODE 636 W HCPCS: Performed by: FAMILY MEDICINE

## 2022-06-20 PROCEDURE — 25000003 PHARM REV CODE 250

## 2022-06-20 PROCEDURE — 96372 THER/PROPH/DIAG INJ SC/IM: CPT

## 2022-06-20 PROCEDURE — 63600175 PHARM REV CODE 636 W HCPCS

## 2022-06-20 PROCEDURE — 25000242 PHARM REV CODE 250 ALT 637 W/ HCPCS

## 2022-06-20 PROCEDURE — 85025 COMPLETE CBC W/AUTO DIFF WBC: CPT

## 2022-06-20 PROCEDURE — 94640 AIRWAY INHALATION TREATMENT: CPT

## 2022-06-20 PROCEDURE — 25000242 PHARM REV CODE 250 ALT 637 W/ HCPCS: Performed by: FAMILY MEDICINE

## 2022-06-20 PROCEDURE — 25000003 PHARM REV CODE 250: Performed by: HOSPITALIST

## 2022-06-20 PROCEDURE — 84132 ASSAY OF SERUM POTASSIUM: CPT

## 2022-06-20 PROCEDURE — 36415 COLL VENOUS BLD VENIPUNCTURE: CPT

## 2022-06-20 PROCEDURE — 27000221 HC OXYGEN, UP TO 24 HOURS

## 2022-06-20 PROCEDURE — 94761 N-INVAS EAR/PLS OXIMETRY MLT: CPT

## 2022-06-20 PROCEDURE — 80048 BASIC METABOLIC PNL TOTAL CA: CPT

## 2022-06-20 PROCEDURE — 99232 SBSQ HOSP IP/OBS MODERATE 35: CPT | Mod: GC,,, | Performed by: FAMILY MEDICINE

## 2022-06-20 PROCEDURE — 82962 GLUCOSE BLOOD TEST: CPT

## 2022-06-20 PROCEDURE — 11000001 HC ACUTE MED/SURG PRIVATE ROOM

## 2022-06-20 RX ORDER — SODIUM CHLORIDE, SODIUM LACTATE, POTASSIUM CHLORIDE, CALCIUM CHLORIDE 600; 310; 30; 20 MG/100ML; MG/100ML; MG/100ML; MG/100ML
INJECTION, SOLUTION INTRAVENOUS CONTINUOUS
Status: DISCONTINUED | OUTPATIENT
Start: 2022-06-20 | End: 2022-06-21

## 2022-06-20 RX ORDER — IPRATROPIUM BROMIDE AND ALBUTEROL SULFATE 2.5; .5 MG/3ML; MG/3ML
3 SOLUTION RESPIRATORY (INHALATION) EVERY 4 HOURS
Status: DISCONTINUED | OUTPATIENT
Start: 2022-06-20 | End: 2022-07-04 | Stop reason: SDDI

## 2022-06-20 RX ADMIN — SODIUM ZIRCONIUM CYCLOSILICATE 10 G: 10 POWDER, FOR SUSPENSION ORAL at 09:06

## 2022-06-20 RX ADMIN — INSULIN ASPART 4 UNITS: 100 INJECTION, SOLUTION INTRAVENOUS; SUBCUTANEOUS at 06:06

## 2022-06-20 RX ADMIN — HYDROCODONE BITARTRATE AND ACETAMINOPHEN 1 TABLET: 7.5; 325 TABLET ORAL at 09:06

## 2022-06-20 RX ADMIN — MUPIROCIN: 20 OINTMENT TOPICAL at 09:06

## 2022-06-20 RX ADMIN — IPRATROPIUM BROMIDE AND ALBUTEROL SULFATE 3 ML: 2.5; .5 SOLUTION RESPIRATORY (INHALATION) at 04:06

## 2022-06-20 RX ADMIN — THERA TABS 1 TABLET: TAB at 09:06

## 2022-06-20 RX ADMIN — SODIUM BICARBONATE: 84 INJECTION, SOLUTION INTRAVENOUS at 01:06

## 2022-06-20 RX ADMIN — ALLOPURINOL 50 MG: 300 TABLET ORAL at 12:06

## 2022-06-20 RX ADMIN — INSULIN ASPART 4 UNITS: 100 INJECTION, SOLUTION INTRAVENOUS; SUBCUTANEOUS at 12:06

## 2022-06-20 RX ADMIN — METHYLPREDNISOLONE SODIUM SUCCINATE 60 MG: 40 INJECTION, POWDER, FOR SOLUTION INTRAMUSCULAR; INTRAVENOUS at 12:06

## 2022-06-20 RX ADMIN — ISOSORBIDE MONONITRATE 60 MG: 60 TABLET, EXTENDED RELEASE ORAL at 09:06

## 2022-06-20 RX ADMIN — ALLOPURINOL 50 MG: 300 TABLET ORAL at 09:06

## 2022-06-20 RX ADMIN — SODIUM ZIRCONIUM CYCLOSILICATE 10 G: 10 POWDER, FOR SUSPENSION ORAL at 05:06

## 2022-06-20 RX ADMIN — SODIUM CHLORIDE, POTASSIUM CHLORIDE, SODIUM LACTATE AND CALCIUM CHLORIDE: 600; 310; 30; 20 INJECTION, SOLUTION INTRAVENOUS at 12:06

## 2022-06-20 RX ADMIN — INSULIN ASPART 6 UNITS: 100 INJECTION, SOLUTION INTRAVENOUS; SUBCUTANEOUS at 05:06

## 2022-06-20 RX ADMIN — FERROUS GLUCONATE 324 MG: 324 TABLET ORAL at 09:06

## 2022-06-20 RX ADMIN — IPRATROPIUM BROMIDE AND ALBUTEROL SULFATE 3 ML: 2.5; .5 SOLUTION RESPIRATORY (INHALATION) at 08:06

## 2022-06-20 RX ADMIN — DEXAMETHASONE 6 MG: 2 TABLET ORAL at 09:06

## 2022-06-20 RX ADMIN — SODIUM CHLORIDE, POTASSIUM CHLORIDE, SODIUM LACTATE AND CALCIUM CHLORIDE: 600; 310; 30; 20 INJECTION, SOLUTION INTRAVENOUS at 09:06

## 2022-06-20 RX ADMIN — IPRATROPIUM BROMIDE AND ALBUTEROL SULFATE 3 ML: 2.5; .5 SOLUTION RESPIRATORY (INHALATION) at 11:06

## 2022-06-20 RX ADMIN — ATORVASTATIN CALCIUM 40 MG: 40 TABLET, FILM COATED ORAL at 09:06

## 2022-06-20 RX ADMIN — INSULIN ASPART 3 UNITS: 100 INJECTION, SOLUTION INTRAVENOUS; SUBCUTANEOUS at 09:06

## 2022-06-20 RX ADMIN — ONDANSETRON 8 MG: 2 INJECTION INTRAMUSCULAR; INTRAVENOUS at 01:06

## 2022-06-20 RX ADMIN — IPRATROPIUM BROMIDE AND ALBUTEROL SULFATE 3 ML: 2.5; .5 SOLUTION RESPIRATORY (INHALATION) at 12:06

## 2022-06-20 RX ADMIN — AMLODIPINE BESYLATE 10 MG: 10 TABLET ORAL at 09:06

## 2022-06-20 RX ADMIN — ALBUTEROL SULFATE 2 PUFF: 90 AEROSOL, METERED RESPIRATORY (INHALATION) at 12:06

## 2022-06-20 RX ADMIN — METOPROLOL TARTRATE 25 MG: 25 TABLET, FILM COATED ORAL at 09:06

## 2022-06-20 NOTE — NURSING
"Pt refused a bath/ bath setup assistance with the Nurses aide. I went in after her and attempt to assist  Pt to get washed up and he stated "im too tired today" will continue to monitor and provide safe environment  "

## 2022-06-20 NOTE — PROGRESS NOTES
[facility] - [address] [place] [address] Medicine  Progress Note    Patient Name: Neymar Parra  MRN: [id number]  Patient Class: IP- Inpatient   Admission Date: 6/16/2022  Length of Stay: 4 days  Attending Physician: Earl Lemus Jr., MD  Primary Care Provider: [facility] megan Rodríguez        Subjective:     Principal Problem:DEO (acute kidney injury)        HPI:  72 y.o. male transferred to the [facility] from [facility] for hyperkalemia and hypoxia. Pt reports he went to [facility] because he was having dyspnea and hemoptysis since 1 month which is worse today. Pt reports he was tested positive for COVID at the nursing home on 6/9/22. Per nursing home patient oxygen saturation was in the 80's on RA with tachypnea and labored breathing. Pt reports he normally does not wear oxygen at the nursing home but has been wearing oxygen more often recently. Pt reports he smoked 1-1.5 PPD for 20 years but quit 7 months ago. Per pt, he saw a pulmonologist (Dr. Aldana) for his hemoptysis and had some tests done the results of which he does not know. Per records, pt with a right pleural effusion recently and had a thoracentesis. Pt denies any fever, congestion, dysuria, N/V/D, chest pain, or any other complaints at this time.     In the ED, pt's K 7.2, d-dimer 0.76, BNP 6284, BUN/Cr 73/7.16. EKG showed some peaked t-waves and irregular rhythm. Chest xray showed Increased right lower lung pulmonary density could indicate pneumonia. Pt was treated in the ED with Calcium gluconate 1g, regular insulin 10 units, D50, IV solumedrol 125 mg, tylenol 650 mg and IV fluids NS 1L bolus. Pt will be admitted to the hospital service for management of hypoxia and hyperkalemia.      Overview/Hospital Course:  No notes on file    Interval History: Patient resting comfortably in bed with no acute complaints. No overnight events reported. Potassium is 5.4. Nephrology saw patient and do not plan for dialysis at this time. Patient says he  still feels weak but improved since he first came in. Still getting lokelma for hyperkalemia, potassium is 5.4 today. Switch fluids to lactated ringers as it is not contraindicated for hyperkalemia. Patient is out of COVID window, so will switch from dexamethasone to duonebs and solumedrol as he has an audible wheeze on ausculation.     Review of Systems   Constitutional:  Negative for chills, fatigue and fever.   HENT:  Negative for congestion, hearing loss and trouble swallowing.    Eyes:  Negative for visual disturbance.   Respiratory:  Negative for cough (hemoptysis) and shortness of breath.    Cardiovascular:  Negative for chest pain, palpitations and leg swelling.   Gastrointestinal:  Negative for abdominal pain, blood in stool, diarrhea, nausea and vomiting.   Genitourinary:  Negative for difficulty urinating and hematuria.   Musculoskeletal:  Negative for back pain and myalgias.   Skin:  Negative for rash.   Neurological:  Negative for dizziness, light-headedness and headaches.   Psychiatric/Behavioral:  Negative for sleep disturbance. The patient is not nervous/anxious.    Objective:     Vital Signs (Most Recent):  Temp: 98 °F (36.7 °C) (06/20/22 0455)  Pulse: 84 (06/20/22 0455)  Resp: 20 (06/20/22 0455)  BP: (!) 151/80 (06/20/22 0455)  SpO2: 98 % (06/20/22 0455)   Vital Signs (24h Range):  Temp:  [97.4 °F (36.3 °C)-98.4 °F (36.9 °C)] 98 °F (36.7 °C)  Pulse:  [75-91] 84  Resp:  [18-22] 20  SpO2:  [94 %-99 %] 98 %  BP: (128-151)/(70-80) 151/80     Weight: 111.4 kg (245 lb 8 oz)  Body mass index is 33.3 kg/m².    Intake/Output Summary (Last 24 hours) at 6/20/2022 0803  Last data filed at 6/20/2022 0612  Gross per 24 hour   Intake 540 ml   Output 1950 ml   Net -1410 ml      Physical Exam  Vitals reviewed.   Constitutional:       General: He is not in acute distress.     Appearance: Normal appearance. He is obese. He is not toxic-appearing or diaphoretic.   HENT:      Head: Normocephalic and atraumatic.       Right Ear: External ear normal.      Left Ear: External ear normal.      Nose: Nose normal.      Mouth/Throat:      Mouth: Mucous membranes are moist.      Pharynx: Oropharynx is clear.   Eyes:      General: No scleral icterus.     Extraocular Movements: Extraocular movements intact.      Conjunctiva/sclera: Conjunctivae normal.      Pupils: Pupils are equal, round, and reactive to light.   Cardiovascular:      Rate and Rhythm: Normal rate and regular rhythm.      Pulses: Normal pulses.      Heart sounds: Normal heart sounds. No murmur heard.  Pulmonary:      Effort: Pulmonary effort is normal. No respiratory distress.      Breath sounds: No wheezing, rhonchi or rales.   Abdominal:      General: Abdomen is flat. Bowel sounds are normal. There is no distension.      Palpations: Abdomen is soft.      Tenderness: There is no abdominal tenderness. There is no guarding or rebound.   Musculoskeletal:         General: No swelling. Normal range of motion.      Cervical back: Normal range of motion and neck supple. No rigidity.      Right lower leg: No edema.      Left lower leg: No edema.   Skin:     General: Skin is warm and dry.      Capillary Refill: Capillary refill takes less than 2 seconds.      Findings: No rash.   Neurological:      General: No focal deficit present.      Mental Status: He is alert. Mental status is at baseline.   Psychiatric:         Mood and Affect: Mood normal.         Behavior: Behavior normal.         Thought Content: Thought content normal.         Judgment: Judgment normal.       Significant Labs: All pertinent labs within the past 24 hours have been reviewed.    Significant Imaging: I have reviewed all pertinent imaging results/findings within the past 24 hours.      Assessment/Plan:      * Hyperkalemia  - 5.4 today  - Improving  - Continue lokelma   - Switch from sodium bicarb to lactated ringers for IVF    Anemia  -H/H 8.9/26.3, improving  -serial BMP, Fe, TIBC, ferritin, B12 and  folate.  -will hold DAPT and DVT ppx since patient also has hemoptysis.     DEO (acute kidney injury)  - acute on chronic renal failure, slowly improving  - nephrology following, recommend no dialysis at this time  - start LR @100cc/hour  - BUN/Crt 89/5.79 down from 83/7.15    COVID-19  - Out of acute window  - Stop dexamethasone     Type 2 diabetes mellitus without complication  -HgA1c 4.7 10 days ago  - BG elevated at 380, likely 2/2 D50 for hyperkalemia  - Added SSI and POCT glucose    Gastroesophageal reflux disease  -pantoprazole 40 mg qd    Chronic obstructive pulmonary disease  -Start duonebs and solumedrol q12    Hypertension  -continue home amlodipine 10 mg qd, isosorbide mononitrate 60 mg qd, and lisinopril 20 mg qd.    VTE Risk Mitigation (From admission, onward)         Ordered     Reason for No Pharmacological VTE Prophylaxis  Once        Question:  Reasons:  Answer:  Risk of Bleeding    06/16/22 0301     IP VTE HIGH RISK PATIENT  Once         06/16/22 0301     Place sequential compression device  Until discontinued         06/16/22 0301                Discharge Planning   EDWARDO:      Code Status: Full Code   Is the patient medically ready for discharge?:     Reason for patient still in hospital (select all that apply): Treatment  Discharge Plan A: Long-term acute care facility (LTAC) (Choice obtained for Specialty)                  Brooklynn Kaur MD  Department of Hospital Medicine   [address]

## 2022-06-20 NOTE — ASSESSMENT & PLAN NOTE
Acute on chronic renal failure.  2. No uremic symptoms  3.  Hepatitis panel  4.  Strict I/os  5.  BMP in AM  6.  Will follow  6/18/2022  The patient's renal function is improving.  6/19/2022 Continue to monitor.  6/20/2022 Continued improvement in renal function.

## 2022-06-20 NOTE — SUBJECTIVE & OBJECTIVE
Interval History: Patient resting comfortably in bed with no acute complaints. No overnight events reported. Potassium is 5.4. Nephrology saw patient and do not plan for dialysis at this time. Patient says he still feels weak but improved since he first came in. Still getting lokelma for hyperkalemia, potassium is 5.4 today. Switch fluids to lactated ringers as it is not contraindicated for hyperkalemia. Patient is out of COVID window, so will switch from dexamethasone to duonebs and solumedrol as he has an audible wheeze on ausculation.     Review of Systems   Constitutional:  Negative for chills, fatigue and fever.   HENT:  Negative for congestion, hearing loss and trouble swallowing.    Eyes:  Negative for visual disturbance.   Respiratory:  Negative for cough (hemoptysis) and shortness of breath.    Cardiovascular:  Negative for chest pain, palpitations and leg swelling.   Gastrointestinal:  Negative for abdominal pain, blood in stool, diarrhea, nausea and vomiting.   Genitourinary:  Negative for difficulty urinating and hematuria.   Musculoskeletal:  Negative for back pain and myalgias.   Skin:  Negative for rash.   Neurological:  Negative for dizziness, light-headedness and headaches.   Psychiatric/Behavioral:  Negative for sleep disturbance. The patient is not nervous/anxious.    Objective:     Vital Signs (Most Recent):  Temp: 98 °F (36.7 °C) (06/20/22 0455)  Pulse: 84 (06/20/22 0455)  Resp: 20 (06/20/22 0455)  BP: (!) 151/80 (06/20/22 0455)  SpO2: 98 % (06/20/22 0455)   Vital Signs (24h Range):  Temp:  [97.4 °F (36.3 °C)-98.4 °F (36.9 °C)] 98 °F (36.7 °C)  Pulse:  [75-91] 84  Resp:  [18-22] 20  SpO2:  [94 %-99 %] 98 %  BP: (128-151)/(70-80) 151/80     Weight: 111.4 kg (245 lb 8 oz)  Body mass index is 33.3 kg/m².    Intake/Output Summary (Last 24 hours) at 6/20/2022 0803  Last data filed at 6/20/2022 0612  Gross per 24 hour   Intake 540 ml   Output 1950 ml   Net -1410 ml      Physical Exam  Vitals reviewed.    Constitutional:       General: He is not in acute distress.     Appearance: Normal appearance. He is obese. He is not toxic-appearing or diaphoretic.   HENT:      Head: Normocephalic and atraumatic.      Right Ear: External ear normal.      Left Ear: External ear normal.      Nose: Nose normal.      Mouth/Throat:      Mouth: Mucous membranes are moist.      Pharynx: Oropharynx is clear.   Eyes:      General: No scleral icterus.     Extraocular Movements: Extraocular movements intact.      Conjunctiva/sclera: Conjunctivae normal.      Pupils: Pupils are equal, round, and reactive to light.   Cardiovascular:      Rate and Rhythm: Normal rate and regular rhythm.      Pulses: Normal pulses.      Heart sounds: Normal heart sounds. No murmur heard.  Pulmonary:      Effort: Pulmonary effort is normal. No respiratory distress.      Breath sounds: No wheezing, rhonchi or rales.   Abdominal:      General: Abdomen is flat. Bowel sounds are normal. There is no distension.      Palpations: Abdomen is soft.      Tenderness: There is no abdominal tenderness. There is no guarding or rebound.   Musculoskeletal:         General: No swelling. Normal range of motion.      Cervical back: Normal range of motion and neck supple. No rigidity.      Right lower leg: No edema.      Left lower leg: No edema.   Skin:     General: Skin is warm and dry.      Capillary Refill: Capillary refill takes less than 2 seconds.      Findings: No rash.   Neurological:      General: No focal deficit present.      Mental Status: He is alert. Mental status is at baseline.   Psychiatric:         Mood and Affect: Mood normal.         Behavior: Behavior normal.         Thought Content: Thought content normal.         Judgment: Judgment normal.       Significant Labs: All pertinent labs within the past 24 hours have been reviewed.    Significant Imaging: I have reviewed all pertinent imaging results/findings within the past 24 hours.

## 2022-06-20 NOTE — PROGRESS NOTES
[facility] [place] - [address] [place] [address]  Nephrology  Progress Note    Patient Name: Neymar Parra  MRN: [id number]  Admission Date: 6/16/2022  Hospital Length of Stay: 4 days  Attending Provider: Earl Lemus Jr., MD   Primary Care Physician: [facility] megan [place]  Principal Problem:DEO (acute kidney injury)    Subjective:     HPI: This patient with history of HTN, DM, CKD who has seen Dr. Crowder in the past 2 years ago is currently in a nursing facility.  The patient presented with SOB and diagnosed with COVID.  Serum creatinine has trended up to 7.15.  Serum potassium is 6.4.  Nephrology has been consulted for renal issues.  At the bedside, the patient mentions feeling fine.  He states that his breathing has improved.      Interval History: The patient is sitting up in bed.  He is resting comfortably.  Serum creatinine is 5.7 which continues to improve.  He voices no complaints.    Review of patient's allergies indicates:   Allergen Reactions    Gatifloxacin Anaphylaxis     Current Facility-Administered Medications   Medication Frequency    acetaminophen tablet 1,000 mg Q6H PRN    albuterol inhaler 2 puff Q6H PRN    allopurinol split tablet 50 mg Daily    amLODIPine tablet 10 mg Daily    atorvastatin tablet 40 mg QHS    bisacodyL EC tablet 10 mg Daily PRN    calcium gluconate 1 g in NS IVPB (premixed) Q10 Min PRN    cetirizine tablet 10 mg Daily PRN    dextromethorphan-guaiFENesin  mg/5 ml liquid 10 mL Q6H PRN    dextrose 50% injection 12.5 g PRN    dextrose 50% injection 25 g PRN    ferrous gluconate tablet 324 mg Daily with breakfast    glucagon (human recombinant) injection 1 mg PRN    glucose chewable tablet 16 g PRN    glucose chewable tablet 24 g PRN    HYDROcodone-acetaminophen 7.5-325 mg per tablet 1 tablet Q6H PRN    insulin aspart U-100 injection 1-10 Units QID (AC + HS) PRN    isosorbide mononitrate 24 hr tablet 60 mg Daily    melatonin tablet 6 mg Nightly PRN     metoprolol tartrate (LOPRESSOR) tablet 25 mg Daily    multivitamin tablet Daily    mupirocin 2 % ointment BID    naloxone 0.4 mg/mL injection 0.02 mg PRN    ondansetron injection 8 mg Q6H PRN    simethicone chewable tablet 80 mg TID PRN    sodium bicarbonate 150 mEq in dextrose 5 % 1,000 mL infusion Continuous    sodium chloride 0.9% flush 10 mL Q12H PRN    sodium zirconium cyclosilicate packet 10 g TID    traMADoL tablet 50 mg Q8H PRN    traZODone tablet 50 mg Nightly PRN       Objective:     Vital Signs (Most Recent):  Temp: 98.8 °F (37.1 °C) (06/20/22 0838)  Pulse: 89 (06/20/22 0838)  Resp: 18 (06/20/22 0932)  BP: (!) 145/69 (06/20/22 0838)  SpO2: 98 % (06/20/22 0838)  O2 Device (Oxygen Therapy): nasal cannula (06/20/22 0031)   Vital Signs (24h Range):  Temp:  [97.4 °F (36.3 °C)-98.8 °F (37.1 °C)] 98.8 °F (37.1 °C)  Pulse:  [75-91] 89  Resp:  [18-22] 18  SpO2:  [94 %-99 %] 98 %  BP: (131-151)/(69-80) 145/69     Weight: 111.4 kg (245 lb 8 oz) (06/20/22 0630)  Body mass index is 33.3 kg/m².  Body surface area is 2.38 meters squared.    I/O last 3 completed shifts:  In: 540 [P.O.:540]  Out: 2800 [Urine:2800]    Physical Exam  Vitals reviewed.   HENT:      Head: Normocephalic and atraumatic.   Cardiovascular:      Rate and Rhythm: Regular rhythm.   Pulmonary:      Effort: Pulmonary effort is normal.   Abdominal:      General: Abdomen is flat.   Musculoskeletal:      Cervical back: Normal range of motion.   Skin:     General: Skin is warm.   Neurological:      Mental Status: He is alert.   Psychiatric:         Mood and Affect: Mood normal.       Significant Labs:  BMP:   Recent Labs   Lab 06/15/22  1918 06/16/22  0809 06/20/22  0536 06/20/22  0826   *   < > 213*  --    *   < > 137  --    K 7.2*   < > 5.4* 4.5      < > 98  --    CO2 19*   < > 28  --    BUN 73*   < > 89*  --    CREATININE 7.16*   < > 5.79*  --    CALCIUM 7.8*   < > 7.5*  --    MG 2.1  --   --   --     < > = values in  this interval not displayed.     CBC:   Recent Labs   Lab 06/20/22  0536   WBC 9.87   RBC 3.12*   HGB 8.9*   HCT 26.3*      MCV 84.3   MCH 28.5   MCHC 33.8        Significant Imaging:  Labs: Reviewed    Assessment/Plan:     * DEO (acute kidney injury)  Acute on chronic renal failure.  2. No uremic symptoms  3.  Hepatitis panel  4.  Strict I/os  5.  BMP in AM  6.  Will follow  6/18/2022  The patient's renal function is improving.  6/19/2022 Continue to monitor.  6/20/2022 Continued improvement in renal function.        Thank you for your consult. I will follow-up with patient. Please contact us if you have any additional questions.    Jorge Butts Jr, MD  Nephrology  [address]

## 2022-06-20 NOTE — SUBJECTIVE & OBJECTIVE
Interval History: The patient is sitting up in bed.  He is resting comfortably.  Serum creatinine is 5.7 which continues to improve.  He voices no complaints.    Review of patient's allergies indicates:   Allergen Reactions    Gatifloxacin Anaphylaxis     Current Facility-Administered Medications   Medication Frequency    acetaminophen tablet 1,000 mg Q6H PRN    albuterol inhaler 2 puff Q6H PRN    allopurinol split tablet 50 mg Daily    amLODIPine tablet 10 mg Daily    atorvastatin tablet 40 mg QHS    bisacodyL EC tablet 10 mg Daily PRN    calcium gluconate 1 g in NS IVPB (premixed) Q10 Min PRN    cetirizine tablet 10 mg Daily PRN    dextromethorphan-guaiFENesin  mg/5 ml liquid 10 mL Q6H PRN    dextrose 50% injection 12.5 g PRN    dextrose 50% injection 25 g PRN    ferrous gluconate tablet 324 mg Daily with breakfast    glucagon (human recombinant) injection 1 mg PRN    glucose chewable tablet 16 g PRN    glucose chewable tablet 24 g PRN    HYDROcodone-acetaminophen 7.5-325 mg per tablet 1 tablet Q6H PRN    insulin aspart U-100 injection 1-10 Units QID (AC + HS) PRN    isosorbide mononitrate 24 hr tablet 60 mg Daily    melatonin tablet 6 mg Nightly PRN    metoprolol tartrate (LOPRESSOR) tablet 25 mg Daily    multivitamin tablet Daily    mupirocin 2 % ointment BID    naloxone 0.4 mg/mL injection 0.02 mg PRN    ondansetron injection 8 mg Q6H PRN    simethicone chewable tablet 80 mg TID PRN    sodium bicarbonate 150 mEq in dextrose 5 % 1,000 mL infusion Continuous    sodium chloride 0.9% flush 10 mL Q12H PRN    sodium zirconium cyclosilicate packet 10 g TID    traMADoL tablet 50 mg Q8H PRN    traZODone tablet 50 mg Nightly PRN       Objective:     Vital Signs (Most Recent):  Temp: 98.8 °F (37.1 °C) (06/20/22 0838)  Pulse: 89 (06/20/22 0838)  Resp: 18 (06/20/22 0932)  BP: (!) 145/69 (06/20/22 0838)  SpO2: 98 % (06/20/22 0838)  O2 Device (Oxygen Therapy): nasal cannula (06/20/22 0031)   Vital Signs (24h  Range):  Temp:  [97.4 °F (36.3 °C)-98.8 °F (37.1 °C)] 98.8 °F (37.1 °C)  Pulse:  [75-91] 89  Resp:  [18-22] 18  SpO2:  [94 %-99 %] 98 %  BP: (131-151)/(69-80) 145/69     Weight: 111.4 kg (245 lb 8 oz) (06/20/22 0630)  Body mass index is 33.3 kg/m².  Body surface area is 2.38 meters squared.    I/O last 3 completed shifts:  In: 540 [P.O.:540]  Out: 2800 [Urine:2800]    Physical Exam  Vitals reviewed.   HENT:      Head: Normocephalic and atraumatic.   Cardiovascular:      Rate and Rhythm: Regular rhythm.   Pulmonary:      Effort: Pulmonary effort is normal.   Abdominal:      General: Abdomen is flat.   Musculoskeletal:      Cervical back: Normal range of motion.   Skin:     General: Skin is warm.   Neurological:      Mental Status: He is alert.   Psychiatric:         Mood and Affect: Mood normal.       Significant Labs:  BMP:   Recent Labs   Lab 06/15/22  1918 06/16/22  0809 06/20/22  0536 06/20/22  0826   *   < > 213*  --    *   < > 137  --    K 7.2*   < > 5.4* 4.5      < > 98  --    CO2 19*   < > 28  --    BUN 73*   < > 89*  --    CREATININE 7.16*   < > 5.79*  --    CALCIUM 7.8*   < > 7.5*  --    MG 2.1  --   --   --     < > = values in this interval not displayed.     CBC:   Recent Labs   Lab 06/20/22  0536   WBC 9.87   RBC 3.12*   HGB 8.9*   HCT 26.3*      MCV 84.3   MCH 28.5   MCHC 33.8        Significant Imaging:  Labs: Reviewed

## 2022-06-20 NOTE — ASSESSMENT & PLAN NOTE
-H/H 8.9/26.3, improving  -serial BMP, Fe, TIBC, ferritin, B12 and folate.  -will hold DAPT and DVT ppx since patient also has hemoptysis.

## 2022-06-20 NOTE — PLAN OF CARE
Spoke with august in specialty and unable to take due to covid, informed dr yang, cont treatment and hope to be able to dc back to [facility] once pt stable, following

## 2022-06-20 NOTE — PLAN OF CARE
Problem: Adult Inpatient Plan of Care  Goal: Plan of Care Review  Outcome: Ongoing, Progressing  Flowsheets (Taken 6/20/2022 0255)  Plan of Care Reviewed With: patient  Goal: Patient-Specific Goal (Individualized)  Outcome: Ongoing, Progressing  Flowsheets (Taken 6/20/2022 0255)  Anxieties, Fears or Concerns: Episodes of SOB  Individualized Care Needs:   Improve respiratory status   coughing/breath sounds/SOB  Patient-Specific Goals (Include Timeframe): Pt to improve et dc home  Goal: Absence of Hospital-Acquired Illness or Injury  Outcome: Ongoing, Progressing  Goal: Optimal Comfort and Wellbeing  Outcome: Ongoing, Progressing  Goal: Readiness for Transition of Care  Outcome: Ongoing, Progressing     Problem: Diabetes Comorbidity  Goal: Blood Glucose Level Within Targeted Range  Outcome: Ongoing, Progressing     Problem: Oral Intake Inadequate (Acute Kidney Injury/Impairment)  Goal: Optimal Nutrition Intake  Outcome: Ongoing, Progressing     Problem: Renal Function Impairment (Acute Kidney Injury/Impairment)  Goal: Effective Renal Function  Outcome: Ongoing, Progressing     Problem: Skin Injury Risk Increased  Goal: Skin Health and Integrity  Outcome: Ongoing, Progressing   Pt cooperative with ongoing POC

## 2022-06-20 NOTE — ASSESSMENT & PLAN NOTE
- 5.4 today  - Improving  - Continue lokelma   - Switch from sodium bicarb to lactated ringers for IVF

## 2022-06-20 NOTE — HOSPITAL COURSE
Overview of hospital stay from admission to present:   6/15- [facility] resident presented to [facility] ED with dyspnea and hypoxia; RA sats 80s; K 7.2; Cr 7.16; covid positive 22- Cr 6.13- acute on chronic; K has normalized; some SOB- COPD exacerbation  - hyponatremia 123   - abd pain with slight drop in H/H--9->7  - CXR with worsening consolidation in LLL- completed 5 days of zithromax and steroids-- escalated to zosyn; H/H - 1u PRBCS (pt's bed at [facility] held until )   - hx of duodenal adenoma- H/H continues to drop- FOBT positive; GI consulted   - CXR with worsening LLL consolidation and RLL infiltrate and pleural effusion; pt's bed  at [facility]: EGD with esophageal candidiasis, moderate sized hiatal hernia, large duodenal bulb ulcer with adherent clot  : pulm consulted for recurrent R sided effusion- not enough fluid to drain; H/H - 1u PRBCs   : repeat EGD- Dieulafoy's lesion- duodenal bulb; s/p endoscopic therapy (clipping + epinephrine injection) and Duodenal bulb ulcer with visible vessel,s/p endoscopic therapy with clipping/epinephrine injection; rec'd 1u PRBCs   7/3: continued drop in H/H - 2u PRBCs   : H/H stable : H/H down to - 1u PRBCs- EGD- Mucosa of the esophagus was normal, overlying a small hiatus hernia. Blood was noted around the pyloric channel and no gastric ulcer was seen. Clot was present in the first portion of the duodenum with two clips attached to the mucosa. In that area, there appeared to be duodenitis and a visible vessel was noted with active bleeding. The bleeding was controlled with a clip.   : H/H stable : H/H - bloody BM with clots; total of 5u PRBCs and 1u PLTs; hypotensive 80/40s- transferred to ICU-; surgery consulted; EGD- The distal esophagus had a small non-bleeding mucosal tear. A 3cm hiatus hernia was noted. Gastric mucosa is pale, consistent with severe anemia. Gastritis is seen  without active bleeding or gastric ulcer. A large adherent  clot is present in the 1st portion of the duodenum at the site of previous bleeding and clip placement. 3cc of 1/100 epinephrine was injected submucosally and the clot would not suction off. Three more hemostasis clips were placed at the base of the clot. The more distal duodenum had blood in the lumen, likely from distal migration, but no ulcer.  7/8: OR for exp lap with vagotomy pyloroplasty and oversewing of ulcer- midline incision with ALYSON drain/dsg and jewels JAKE drains; 2u PRBCs   7/9: 1u PRBCs   7/10: H/H 6/19- 2u PRBCs   7/11: NGT removed; remains NPO   7/12: HR 120s and elevated BP; lopressor ordered; clear liquids   7/13: transferred to floor; clear liquid-> full liquid   7/15: H/H 7/20; transfuse 1u PRBCs   7/19: JAKE drains pulled; pending covid pcr for continued placement workup   07/20--Jake pulled on yesterday, Covid PCR positive and [facility], however[facility] and [facility] for [facility] today.  Will need to have staples removed on 07/22/2022.  Follow up with G/S and GI outpatient.  No new issues or concerns and is stable for transfer.        07/04-Patient resting comfortably in bed with no acute complaints. Is not requiring supplemental oxygen today. Denies new rectal bleeding. The patient says he feels better after getting 2 units of pRBC yesterday, but that he still feels weak. His H&H is stable this morning, will continue to monitor, and as long as it stays stable may be able to discharge tomorrow.    7/14- transferred out of ICU overnight; midline incision with ALYSON drain and bilateral JAKE drains; full liquid diet per surgery; SS following for placement-- will need SWB   7/15: decrease in H/H 7/20- transfuse 1u PRBCs- surgery following; SS--- will need covid PCR before any further paperwork regarding placement at [facility]   7/16: NAEO; denies any needs; H/H 8/24 after 1u yesterday; continue  current POC  7/17: NAEO; advanced to reg diet; covid PCR ordered   7/18: NAEO; abd tenderness improving; tolerating reg diet; continues with ALYSON and XIMENA drains; PCR pending   7/19: does have some abd discomfort- relates to gas pains; XIMENA drains to be pulled today; tolerating diet; pending placement

## 2022-06-20 NOTE — ASSESSMENT & PLAN NOTE
- acute on chronic renal failure, slowly improving  - nephrology following, recommend no dialysis at this time  - start LR @100cc/hour  - BUN/Crt 89/5.79 down from 83/7.15

## 2022-06-21 PROBLEM — E87.5 HYPERKALEMIA: Status: RESOLVED | Noted: 2022-06-16 | Resolved: 2022-06-21

## 2022-06-21 LAB
ANION GAP SERPL CALCULATED.3IONS-SCNC: 17 MMOL/L (ref 7–16)
BASOPHILS # BLD AUTO: 0.03 K/UL (ref 0–0.2)
BASOPHILS NFR BLD AUTO: 0.3 % (ref 0–1)
BUN SERPL-MCNC: 83 MG/DL (ref 7–18)
BUN/CREAT SERPL: 14 (ref 6–20)
CALCIUM SERPL-MCNC: 8 MG/DL (ref 8.5–10.1)
CHLORIDE SERPL-SCNC: 92 MMOL/L (ref 98–107)
CO2 SERPL-SCNC: 27 MMOL/L (ref 21–32)
CREAT SERPL-MCNC: 6.13 MG/DL (ref 0.7–1.3)
D DIMER PPP FEU-MCNC: 1.83 ΜG/ML (ref 0–0.47)
DIFFERENTIAL METHOD BLD: ABNORMAL
EOSINOPHIL # BLD AUTO: 0 K/UL (ref 0–0.5)
EOSINOPHIL NFR BLD AUTO: 0 % (ref 1–4)
ERYTHROCYTE [DISTWIDTH] IN BLOOD BY AUTOMATED COUNT: 14.9 % (ref 11.5–14.5)
GLUCOSE SERPL-MCNC: 171 MG/DL (ref 70–105)
GLUCOSE SERPL-MCNC: 216 MG/DL (ref 70–105)
GLUCOSE SERPL-MCNC: 219 MG/DL (ref 74–106)
GLUCOSE SERPL-MCNC: 248 MG/DL (ref 70–105)
GLUCOSE SERPL-MCNC: 256 MG/DL (ref 70–105)
HCO3 UR-SCNC: 28.9 MMOL/L (ref 21–28)
HCT VFR BLD AUTO: 29.5 % (ref 40–54)
HGB BLD-MCNC: 10.4 G/DL (ref 13.5–18)
IMM GRANULOCYTES # BLD AUTO: 0.27 K/UL (ref 0–0.04)
IMM GRANULOCYTES NFR BLD: 2.5 % (ref 0–0.4)
LYMPHOCYTES # BLD AUTO: 0.37 K/UL (ref 1–4.8)
LYMPHOCYTES NFR BLD AUTO: 3.4 % (ref 27–41)
MCH RBC QN AUTO: 29.2 PG (ref 27–31)
MCHC RBC AUTO-ENTMCNC: 35.3 G/DL (ref 32–36)
MCV RBC AUTO: 82.9 FL (ref 80–96)
MONOCYTES # BLD AUTO: 0.35 K/UL (ref 0–0.8)
MONOCYTES NFR BLD AUTO: 3.2 % (ref 2–6)
MPC BLD CALC-MCNC: 9.3 FL (ref 9.4–12.4)
NEUTROPHILS # BLD AUTO: 9.95 K/UL (ref 1.8–7.7)
NEUTROPHILS NFR BLD AUTO: 90.6 % (ref 53–65)
NRBC # BLD AUTO: 0.06 X10E3/UL
NRBC, AUTO (.00): 0.5 %
PCO2 BLDA: 37 MMHG (ref 35–48)
PH SMN: 7.5 [PH] (ref 7.35–7.45)
PLATELET # BLD AUTO: 228 K/UL (ref 150–400)
PO2 BLDA: 48 MMHG (ref 83–108)
POC BASE EXCESS: 5.5 MMOL/L (ref -2–3)
POC SATURATED O2: 87 % (ref 95–98)
POTASSIUM SERPL-SCNC: 4.4 MMOL/L (ref 3.5–5.1)
POTASSIUM SERPL-SCNC: 4.4 MMOL/L (ref 3.5–5.1)
POTASSIUM SERPL-SCNC: 4.6 MMOL/L (ref 3.5–5.1)
POTASSIUM SERPL-SCNC: 4.6 MMOL/L (ref 3.5–5.1)
RBC # BLD AUTO: 3.56 M/UL (ref 4.6–6.2)
SODIUM SERPL-SCNC: 132 MMOL/L (ref 136–145)
WBC # BLD AUTO: 10.97 K/UL (ref 4.5–11)

## 2022-06-21 PROCEDURE — 36600 WITHDRAWAL OF ARTERIAL BLOOD: CPT

## 2022-06-21 PROCEDURE — 85378 FIBRIN DEGRADE SEMIQUANT: CPT

## 2022-06-21 PROCEDURE — 94640 AIRWAY INHALATION TREATMENT: CPT

## 2022-06-21 PROCEDURE — 25000003 PHARM REV CODE 250

## 2022-06-21 PROCEDURE — 25000242 PHARM REV CODE 250 ALT 637 W/ HCPCS

## 2022-06-21 PROCEDURE — 94667 MNPJ CHEST WALL 1ST: CPT

## 2022-06-21 PROCEDURE — 85025 COMPLETE CBC W/AUTO DIFF WBC: CPT

## 2022-06-21 PROCEDURE — 25000003 PHARM REV CODE 250: Performed by: FAMILY MEDICINE

## 2022-06-21 PROCEDURE — 27000221 HC OXYGEN, UP TO 24 HOURS

## 2022-06-21 PROCEDURE — 36415 COLL VENOUS BLD VENIPUNCTURE: CPT

## 2022-06-21 PROCEDURE — 99232 PR SUBSEQUENT HOSPITAL CARE,LEVL II: ICD-10-PCS | Mod: GC,,, | Performed by: FAMILY MEDICINE

## 2022-06-21 PROCEDURE — 63600175 PHARM REV CODE 636 W HCPCS

## 2022-06-21 PROCEDURE — 25000003 PHARM REV CODE 250: Performed by: HOSPITALIST

## 2022-06-21 PROCEDURE — 84132 ASSAY OF SERUM POTASSIUM: CPT

## 2022-06-21 PROCEDURE — 82962 GLUCOSE BLOOD TEST: CPT

## 2022-06-21 PROCEDURE — 11000001 HC ACUTE MED/SURG PRIVATE ROOM

## 2022-06-21 PROCEDURE — 80048 BASIC METABOLIC PNL TOTAL CA: CPT

## 2022-06-21 PROCEDURE — 82803 BLOOD GASES ANY COMBINATION: CPT

## 2022-06-21 PROCEDURE — 99232 SBSQ HOSP IP/OBS MODERATE 35: CPT | Mod: GC,,, | Performed by: FAMILY MEDICINE

## 2022-06-21 PROCEDURE — 99900035 HC TECH TIME PER 15 MIN (STAT)

## 2022-06-21 PROCEDURE — 63700000 PHARM REV CODE 250 ALT 637 W/O HCPCS

## 2022-06-21 PROCEDURE — 96372 THER/PROPH/DIAG INJ SC/IM: CPT

## 2022-06-21 PROCEDURE — 94761 N-INVAS EAR/PLS OXIMETRY MLT: CPT

## 2022-06-21 RX ORDER — BUDESONIDE 0.5 MG/2ML
0.5 INHALANT ORAL EVERY 12 HOURS
Status: DISCONTINUED | OUTPATIENT
Start: 2022-06-21 | End: 2022-06-22

## 2022-06-21 RX ORDER — AZITHROMYCIN 250 MG/1
500 TABLET, FILM COATED ORAL ONCE
Status: COMPLETED | OUTPATIENT
Start: 2022-06-21 | End: 2022-06-21

## 2022-06-21 RX ORDER — AZITHROMYCIN 250 MG/1
250 TABLET, FILM COATED ORAL DAILY
Status: COMPLETED | OUTPATIENT
Start: 2022-06-22 | End: 2022-06-25

## 2022-06-21 RX ADMIN — INSULIN ASPART 4 UNITS: 100 INJECTION, SOLUTION INTRAVENOUS; SUBCUTANEOUS at 06:06

## 2022-06-21 RX ADMIN — METHYLPREDNISOLONE SODIUM SUCCINATE 60 MG: 40 INJECTION, POWDER, FOR SOLUTION INTRAMUSCULAR; INTRAVENOUS at 11:06

## 2022-06-21 RX ADMIN — BUDESONIDE 0.5 MG: 0.5 INHALANT RESPIRATORY (INHALATION) at 08:06

## 2022-06-21 RX ADMIN — ATORVASTATIN CALCIUM 40 MG: 40 TABLET, FILM COATED ORAL at 10:06

## 2022-06-21 RX ADMIN — AMLODIPINE BESYLATE 10 MG: 10 TABLET ORAL at 08:06

## 2022-06-21 RX ADMIN — IPRATROPIUM BROMIDE AND ALBUTEROL SULFATE 3 ML: 2.5; .5 SOLUTION RESPIRATORY (INHALATION) at 11:06

## 2022-06-21 RX ADMIN — METHYLPREDNISOLONE SODIUM SUCCINATE 60 MG: 40 INJECTION, POWDER, FOR SOLUTION INTRAMUSCULAR; INTRAVENOUS at 12:06

## 2022-06-21 RX ADMIN — TRAZODONE HYDROCHLORIDE 50 MG: 50 TABLET ORAL at 10:06

## 2022-06-21 RX ADMIN — IPRATROPIUM BROMIDE AND ALBUTEROL SULFATE 3 ML: 2.5; .5 SOLUTION RESPIRATORY (INHALATION) at 03:06

## 2022-06-21 RX ADMIN — THERA TABS 1 TABLET: TAB at 08:06

## 2022-06-21 RX ADMIN — IPRATROPIUM BROMIDE AND ALBUTEROL SULFATE 3 ML: 2.5; .5 SOLUTION RESPIRATORY (INHALATION) at 07:06

## 2022-06-21 RX ADMIN — INSULIN ASPART 6 UNITS: 100 INJECTION, SOLUTION INTRAVENOUS; SUBCUTANEOUS at 11:06

## 2022-06-21 RX ADMIN — SODIUM ZIRCONIUM CYCLOSILICATE 10 G: 10 POWDER, FOR SUSPENSION ORAL at 08:06

## 2022-06-21 RX ADMIN — METOPROLOL TARTRATE 25 MG: 25 TABLET, FILM COATED ORAL at 08:06

## 2022-06-21 RX ADMIN — HYDROCODONE BITARTRATE AND ACETAMINOPHEN 1 TABLET: 7.5; 325 TABLET ORAL at 10:06

## 2022-06-21 RX ADMIN — SODIUM CHLORIDE, POTASSIUM CHLORIDE, SODIUM LACTATE AND CALCIUM CHLORIDE: 600; 310; 30; 20 INJECTION, SOLUTION INTRAVENOUS at 08:06

## 2022-06-21 RX ADMIN — INSULIN ASPART 2 UNITS: 100 INJECTION, SOLUTION INTRAVENOUS; SUBCUTANEOUS at 04:06

## 2022-06-21 RX ADMIN — INSULIN ASPART 2 UNITS: 100 INJECTION, SOLUTION INTRAVENOUS; SUBCUTANEOUS at 10:06

## 2022-06-21 RX ADMIN — ISOSORBIDE MONONITRATE 60 MG: 60 TABLET, EXTENDED RELEASE ORAL at 08:06

## 2022-06-21 RX ADMIN — AZITHROMYCIN MONOHYDRATE 500 MG: 250 TABLET ORAL at 09:06

## 2022-06-21 RX ADMIN — MUPIROCIN: 20 OINTMENT TOPICAL at 09:06

## 2022-06-21 RX ADMIN — ALLOPURINOL 50 MG: 300 TABLET ORAL at 08:06

## 2022-06-21 RX ADMIN — FERROUS GLUCONATE 324 MG: 324 TABLET ORAL at 08:06

## 2022-06-21 NOTE — PROGRESS NOTES
[facility] - [address] [place] [address] Medicine  Progress Note    Patient Name: Neymar Parra  MRN: [id number]  Patient Class: IP- Inpatient   Admission Date: 6/16/2022  Length of Stay: 5 days  Attending Physician: Earl Lemus Jr., MD  Primary Care Provider: [facility] megan Rodríguez        Subjective:     Principal Problem:Hyperkalemia        HPI:  72 y.o. male transferred to the [facility] from [facility] for hyperkalemia and hypoxia. Pt reports he went to [facility] because he was having dyspnea and hemoptysis since 1 month which is worse today. Pt reports he was tested positive for COVID at the nursing home on 6/9/22. Per nursing home patient oxygen saturation was in the 80's on RA with tachypnea and labored breathing. Pt reports he normally does not wear oxygen at the nursing home but has been wearing oxygen more often recently. Pt reports he smoked 1-1.5 PPD for 20 years but quit 7 months ago. Per pt, he saw a pulmonologist (Dr. Aldana) for his hemoptysis and had some tests done the results of which he does not know. Per records, pt with a right pleural effusion recently and had a thoracentesis. Pt denies any fever, congestion, dysuria, N/V/D, chest pain, or any other complaints at this time.     In the ED, pt's K 7.2, d-dimer 0.76, BNP 6284, BUN/Cr 73/7.16. EKG showed some peaked t-waves and irregular rhythm. Chest xray showed Increased right lower lung pulmonary density could indicate pneumonia. Pt was treated in the ED with Calcium gluconate 1g, regular insulin 10 units, D50, IV solumedrol 125 mg, tylenol 650 mg and IV fluids NS 1L bolus. Pt will be admitted to the hospital service for management of hypoxia and hyperkalemia.      Overview/Hospital Course:  6/20- Patient resting comfortably in bed with no acute complaints. No overnight events reported. Potassium is 5.4. Nephrology saw patient and do not plan for dialysis at this time. Patient says he still feels weak but improved since he first  came in. Still getting lokelma for hyperkalemia, potassium is 5.4 today. Switch fluids to lactated ringers as it is not contraindicated for hyperkalemia. Patient is out of COVID window, so will switch from dexamethasone to duonebs and solumedrol as he has an audible wheeze on ausculation.          Interval History: Patient saying he still feels short of breath this morning. Has been getting duonebs since yesterday but does not feel that they have helped. Chest has similar wheeze to ausculation. Is maintaining O2 sats above 88%. Patient's hyperkalemia has resolved with potassium of 4.4 today.     Review of Systems   Constitutional:  Negative for chills, fatigue and fever.   HENT:  Negative for congestion, hearing loss and trouble swallowing.    Eyes:  Negative for visual disturbance.   Respiratory:  Positive for shortness of breath. Negative for cough (hemoptysis).    Cardiovascular:  Negative for chest pain, palpitations and leg swelling.   Gastrointestinal:  Negative for abdominal pain, blood in stool, diarrhea, nausea and vomiting.   Genitourinary:  Negative for difficulty urinating and hematuria.   Musculoskeletal:  Negative for back pain and myalgias.   Skin:  Negative for rash.   Neurological:  Negative for dizziness, light-headedness and headaches.   Psychiatric/Behavioral:  Negative for sleep disturbance. The patient is not nervous/anxious.    Objective:     Vital Signs (Most Recent):  Temp: 98.1 °F (36.7 °C) (06/21/22 0705)  Pulse: 101 (06/21/22 0713)  Resp: (!) 25 (06/21/22 0713)  BP: (!) 167/81 (06/21/22 0705)  SpO2: (!) 92 % (06/21/22 0713)   Vital Signs (24h Range):  Temp:  [97.9 °F (36.6 °C)-99.9 °F (37.7 °C)] 98.1 °F (36.7 °C)  Pulse:  [] 101  Resp:  [18-25] 25  SpO2:  [88 %-96 %] 92 %  BP: (137-167)/(74-89) 167/81     Weight: 111.3 kg (245 lb 6.4 oz)  Body mass index is 33.28 kg/m².    Intake/Output Summary (Last 24 hours) at 6/21/2022 0840  Last data filed at 6/21/2022 0700  Gross per 24 hour    Intake 900 ml   Output 2105 ml   Net -1205 ml      Physical Exam  Vitals reviewed.   Constitutional:       General: He is not in acute distress.     Appearance: Normal appearance. He is obese. He is not toxic-appearing or diaphoretic.   HENT:      Head: Normocephalic and atraumatic.      Right Ear: External ear normal.      Left Ear: External ear normal.      Nose: Nose normal.      Mouth/Throat:      Mouth: Mucous membranes are moist.      Pharynx: Oropharynx is clear.   Eyes:      General: No scleral icterus.     Extraocular Movements: Extraocular movements intact.      Conjunctiva/sclera: Conjunctivae normal.      Pupils: Pupils are equal, round, and reactive to light.   Cardiovascular:      Rate and Rhythm: Normal rate and regular rhythm.      Pulses: Normal pulses.      Heart sounds: Normal heart sounds. No murmur heard.  Pulmonary:      Effort: Pulmonary effort is normal. No respiratory distress.      Breath sounds: Wheezing present. No rhonchi or rales.   Abdominal:      General: Abdomen is flat. Bowel sounds are normal. There is no distension.      Palpations: Abdomen is soft.      Tenderness: There is no abdominal tenderness. There is no guarding or rebound.   Musculoskeletal:         General: No swelling. Normal range of motion.      Cervical back: Normal range of motion and neck supple. No rigidity.      Right lower leg: No edema.      Left lower leg: No edema.   Skin:     General: Skin is warm and dry.      Capillary Refill: Capillary refill takes less than 2 seconds.      Findings: No rash.   Neurological:      General: No focal deficit present.      Mental Status: He is alert. Mental status is at baseline.   Psychiatric:         Mood and Affect: Mood normal.         Behavior: Behavior normal.         Thought Content: Thought content normal.         Judgment: Judgment normal.       Significant Labs: All pertinent labs within the past 24 hours have been reviewed.    Significant Imaging: I have reviewed  all pertinent imaging results/findings within the past 24 hours.      Assessment/Plan:      Anemia  -H/H 10.4/29.5, improving  -serial BMP, Fe, TIBC, ferritin, B12 and folate.  -will hold DAPT and DVT ppx since patient also has hemoptysis.     DEO (acute kidney injury)  - Improved    COVID-19  - Out of acute window  - Stop dexamethasone     Type 2 diabetes mellitus without complication  -HgA1c 4.7 10 days ago  - SSI  -  today likely due to starting steroids    Gastroesophageal reflux disease  -pantoprazole 40 mg qd    Chronic obstructive pulmonary disease  -Continue duonebs and solumedrol q12  - Start budesonide 0.5mg q12  - chest xray shows no changes    Hypertension  -continue home amlodipine 10 mg qd, isosorbide mononitrate 60 mg qd, and lisinopril 20 mg qd.      VTE Risk Mitigation (From admission, onward)         Ordered     Reason for No Pharmacological VTE Prophylaxis  Once        Question:  Reasons:  Answer:  Risk of Bleeding    06/16/22 0301     IP VTE HIGH RISK PATIENT  Once         06/16/22 0301     Place sequential compression device  Until discontinued         06/16/22 0301                Discharge Planning   EDWARDO:      Code Status: Full Code   Is the patient medically ready for discharge?:     Reason for patient still in hospital (select all that apply): Treatment  Discharge Plan A: Long-term acute care facility (LTAC) (Choice obtained for Specialty)                  Brooklynn Kaur MD  Department of Hospital Medicine   [address]

## 2022-06-21 NOTE — PLAN OF CARE
Problem: Adult Inpatient Plan of Care  Goal: Plan of Care Review  Outcome: Ongoing, Progressing  Flowsheets (Taken 6/21/2022 0123)  Plan of Care Reviewed With: patient  Goal: Patient-Specific Goal (Individualized)  Outcome: Ongoing, Progressing  Goal: Absence of Hospital-Acquired Illness or Injury  Outcome: Ongoing, Progressing  Goal: Optimal Comfort and Wellbeing  Outcome: Ongoing, Progressing  Goal: Readiness for Transition of Care  Outcome: Ongoing, Progressing     Problem: Diabetes Comorbidity  Goal: Blood Glucose Level Within Targeted Range  Outcome: Ongoing, Progressing     Problem: Fluid and Electrolyte Imbalance (Acute Kidney Injury/Impairment)  Goal: Fluid and Electrolyte Balance  Outcome: Ongoing, Progressing     Problem: Renal Function Impairment (Acute Kidney Injury/Impairment)  Goal: Effective Renal Function  Outcome: Ongoing, Progressing     Problem: Skin Injury Risk Increased  Goal: Skin Health and Integrity  Outcome: Ongoing, Progressing     Problem: Gas Exchange Impaired  Goal: Optimal Gas Exchange  Outcome: Ongoing, Progressing   Pt cooperative with ongoing POC

## 2022-06-21 NOTE — PROGRESS NOTES
[facility] - [address] [place] [address]  Nephrology  Progress Note    Patient Name: Neymar Parra  MRN: [id number]  Admission Date: 6/16/2022  Hospital Length of Stay: 5 days  Attending Provider: Earl Lemus Jr., MD   Primary Care Physician: [facility] megan Rodríguez  Principal Problem:Hyperkalemia    Subjective:     HPI: This patient with history of HTN, DM, CKD who has seen Dr. Crowder in the past 2 years ago is currently in a nursing facility.  The patient presented with SOB and diagnosed with COVID.  Serum creatinine has trended up to 7.15.  Serum potassium is 6.4.  Nephrology has been consulted for renal issues.  At the bedside, the patient mentions feeling fine.  He states that his breathing has improved.      Interval History: The patient is sitting up in bed.  He mentions feeling fine.  Today, serum creatinine is 6.1.    Review of patient's allergies indicates:   Allergen Reactions    Gatifloxacin Anaphylaxis     Current Facility-Administered Medications   Medication Frequency    acetaminophen tablet 1,000 mg Q6H PRN    albuterol inhaler 2 puff Q6H PRN    albuterol-ipratropium 2.5 mg-0.5 mg/3 mL nebulizer solution 3 mL Q4H    allopurinol split tablet 50 mg Daily    amLODIPine tablet 10 mg Daily    atorvastatin tablet 40 mg QHS    [START ON 6/22/2022] azithromycin tablet 250 mg Daily    bisacodyL EC tablet 10 mg Daily PRN    budesonide nebulizer solution 0.5 mg Q12H    calcium gluconate 1 g in NS IVPB (premixed) Q10 Min PRN    cetirizine tablet 10 mg Daily PRN    dextromethorphan-guaiFENesin  mg/5 ml liquid 10 mL Q6H PRN    dextrose 50% injection 12.5 g PRN    dextrose 50% injection 25 g PRN    ferrous gluconate tablet 324 mg Daily with breakfast    glucagon (human recombinant) injection 1 mg PRN    glucose chewable tablet 16 g PRN    glucose chewable tablet 24 g PRN    HYDROcodone-acetaminophen 7.5-325 mg per tablet 1 tablet Q6H PRN    insulin aspart U-100 injection  1-10 Units QID (AC + HS) PRN    isosorbide mononitrate 24 hr tablet 60 mg Daily    melatonin tablet 6 mg Nightly PRN    methylPREDNISolone sodium succinate injection 60 mg Q12H    metoprolol tartrate (LOPRESSOR) tablet 25 mg Daily    multivitamin tablet Daily    mupirocin 2 % ointment BID    naloxone 0.4 mg/mL injection 0.02 mg PRN    ondansetron injection 8 mg Q6H PRN    simethicone chewable tablet 80 mg TID PRN    sodium chloride 0.9% flush 10 mL Q12H PRN    traMADoL tablet 50 mg Q8H PRN    traZODone tablet 50 mg Nightly PRN       Objective:     Vital Signs (Most Recent):  Temp: 98.7 °F (37.1 °C) (06/21/22 1023)  Pulse: 98 (06/21/22 1137)  Resp: 20 (06/21/22 1137)  BP: (!) 151/73 (06/21/22 1023)  SpO2: 95 % (06/21/22 1137)  O2 Device (Oxygen Therapy): nasal cannula w/ humidification (06/21/22 1137)   Vital Signs (24h Range):  Temp:  [97.9 °F (36.6 °C)-99.9 °F (37.7 °C)] 98.7 °F (37.1 °C)  Pulse:  [] 98  Resp:  [20-25] 20  SpO2:  [88 %-96 %] 95 %  BP: (137-167)/(73-89) 151/73     Weight: 111.3 kg (245 lb 6.4 oz) (06/21/22 0600)  Body mass index is 33.28 kg/m².  Body surface area is 2.38 meters squared.    I/O last 3 completed shifts:  In: 1440 [P.O.:1440]  Out: 3255 [Urine:3255]    Physical Exam  Vitals reviewed.   Constitutional:       Appearance: He is obese.   HENT:      Head: Normocephalic and atraumatic.      Mouth/Throat:      Mouth: Mucous membranes are moist.   Cardiovascular:      Rate and Rhythm: Regular rhythm.   Pulmonary:      Effort: Pulmonary effort is normal.   Neurological:      Mental Status: He is alert.       Significant Labs:  BMP:   Recent Labs   Lab 06/15/22  1918 06/16/22  0809 06/21/22  0120 06/21/22  0824 06/21/22  1146   *   < > 219*  --   --    *   < > 132*  --   --    K 7.2*   < > 4.4   < > 4.6      < > 92*  --   --    CO2 19*   < > 27  --   --    BUN 73*   < > 83*  --   --    CREATININE 7.16*   < > 6.13*  --   --    CALCIUM 7.8*   < > 8.0*  --   --     MG 2.1  --   --   --   --     < > = values in this interval not displayed.     CBC:   Recent Labs   Lab 06/21/22  0120   WBC 10.97   RBC 3.56*   HGB 10.4*   HCT 29.5*      MCV 82.9   MCH 29.2   MCHC 35.3        Significant Imaging:  Labs: Reviewed    Assessment/Plan:     DEO (acute kidney injury)  Acute on chronic renal failure.  2. No uremic symptoms  3.  Hepatitis panel  4.  Strict I/os  5.  BMP in AM  6.  Will follow  6/18/2022  The patient's renal function is improving.  6/19/2022 Continue to monitor.  6/20/2022 Continued improvement in renal function.  6/21/2022  Daily bmp.    COVID-19  New diagnosis.  The patient is on nasal cannula oxygen    Type 2 diabetes mellitus without complication  Chronic condition        Thank you for your consult. I will follow-up with patient. Please contact us if you have any additional questions.    Jorge Butts Jr, MD  Nephrology  [facility] [place] - [address] [place] [address]

## 2022-06-21 NOTE — SUBJECTIVE & OBJECTIVE
Interval History: Patient saying he still feels short of breath this morning. Has been getting duonebs since yesterday but does not feel that they have helped. Chest has similar wheeze to ausculation. Is maintaining O2 sats above 88%. Patient's hyperkalemia has resolved with potassium of 4.4 today.     Review of Systems   Constitutional:  Negative for chills, fatigue and fever.   HENT:  Negative for congestion, hearing loss and trouble swallowing.    Eyes:  Negative for visual disturbance.   Respiratory:  Positive for shortness of breath. Negative for cough (hemoptysis).    Cardiovascular:  Negative for chest pain, palpitations and leg swelling.   Gastrointestinal:  Negative for abdominal pain, blood in stool, diarrhea, nausea and vomiting.   Genitourinary:  Negative for difficulty urinating and hematuria.   Musculoskeletal:  Negative for back pain and myalgias.   Skin:  Negative for rash.   Neurological:  Negative for dizziness, light-headedness and headaches.   Psychiatric/Behavioral:  Negative for sleep disturbance. The patient is not nervous/anxious.    Objective:     Vital Signs (Most Recent):  Temp: 98.1 °F (36.7 °C) (06/21/22 0705)  Pulse: 101 (06/21/22 0713)  Resp: (!) 25 (06/21/22 0713)  BP: (!) 167/81 (06/21/22 0705)  SpO2: (!) 92 % (06/21/22 0713)   Vital Signs (24h Range):  Temp:  [97.9 °F (36.6 °C)-99.9 °F (37.7 °C)] 98.1 °F (36.7 °C)  Pulse:  [] 101  Resp:  [18-25] 25  SpO2:  [88 %-96 %] 92 %  BP: (137-167)/(74-89) 167/81     Weight: 111.3 kg (245 lb 6.4 oz)  Body mass index is 33.28 kg/m².    Intake/Output Summary (Last 24 hours) at 6/21/2022 0840  Last data filed at 6/21/2022 0700  Gross per 24 hour   Intake 900 ml   Output 2105 ml   Net -1205 ml      Physical Exam  Vitals reviewed.   Constitutional:       General: He is not in acute distress.     Appearance: Normal appearance. He is obese. He is not toxic-appearing or diaphoretic.   HENT:      Head: Normocephalic and atraumatic.      Right  Ear: External ear normal.      Left Ear: External ear normal.      Nose: Nose normal.      Mouth/Throat:      Mouth: Mucous membranes are moist.      Pharynx: Oropharynx is clear.   Eyes:      General: No scleral icterus.     Extraocular Movements: Extraocular movements intact.      Conjunctiva/sclera: Conjunctivae normal.      Pupils: Pupils are equal, round, and reactive to light.   Cardiovascular:      Rate and Rhythm: Normal rate and regular rhythm.      Pulses: Normal pulses.      Heart sounds: Normal heart sounds. No murmur heard.  Pulmonary:      Effort: Pulmonary effort is normal. No respiratory distress.      Breath sounds: Wheezing present. No rhonchi or rales.   Abdominal:      General: Abdomen is flat. Bowel sounds are normal. There is no distension.      Palpations: Abdomen is soft.      Tenderness: There is no abdominal tenderness. There is no guarding or rebound.   Musculoskeletal:         General: No swelling. Normal range of motion.      Cervical back: Normal range of motion and neck supple. No rigidity.      Right lower leg: No edema.      Left lower leg: No edema.   Skin:     General: Skin is warm and dry.      Capillary Refill: Capillary refill takes less than 2 seconds.      Findings: No rash.   Neurological:      General: No focal deficit present.      Mental Status: He is alert. Mental status is at baseline.   Psychiatric:         Mood and Affect: Mood normal.         Behavior: Behavior normal.         Thought Content: Thought content normal.         Judgment: Judgment normal.       Significant Labs: All pertinent labs within the past 24 hours have been reviewed.    Significant Imaging: I have reviewed all pertinent imaging results/findings within the past 24 hours.

## 2022-06-21 NOTE — SUBJECTIVE & OBJECTIVE
Interval History: The patient is sitting up in bed.  He mentions feeling fine.  Today, serum creatinine is 6.1.    Review of patient's allergies indicates:   Allergen Reactions    Gatifloxacin Anaphylaxis     Current Facility-Administered Medications   Medication Frequency    acetaminophen tablet 1,000 mg Q6H PRN    albuterol inhaler 2 puff Q6H PRN    albuterol-ipratropium 2.5 mg-0.5 mg/3 mL nebulizer solution 3 mL Q4H    allopurinol split tablet 50 mg Daily    amLODIPine tablet 10 mg Daily    atorvastatin tablet 40 mg QHS    [START ON 6/22/2022] azithromycin tablet 250 mg Daily    bisacodyL EC tablet 10 mg Daily PRN    budesonide nebulizer solution 0.5 mg Q12H    calcium gluconate 1 g in NS IVPB (premixed) Q10 Min PRN    cetirizine tablet 10 mg Daily PRN    dextromethorphan-guaiFENesin  mg/5 ml liquid 10 mL Q6H PRN    dextrose 50% injection 12.5 g PRN    dextrose 50% injection 25 g PRN    ferrous gluconate tablet 324 mg Daily with breakfast    glucagon (human recombinant) injection 1 mg PRN    glucose chewable tablet 16 g PRN    glucose chewable tablet 24 g PRN    HYDROcodone-acetaminophen 7.5-325 mg per tablet 1 tablet Q6H PRN    insulin aspart U-100 injection 1-10 Units QID (AC + HS) PRN    isosorbide mononitrate 24 hr tablet 60 mg Daily    melatonin tablet 6 mg Nightly PRN    methylPREDNISolone sodium succinate injection 60 mg Q12H    metoprolol tartrate (LOPRESSOR) tablet 25 mg Daily    multivitamin tablet Daily    mupirocin 2 % ointment BID    naloxone 0.4 mg/mL injection 0.02 mg PRN    ondansetron injection 8 mg Q6H PRN    simethicone chewable tablet 80 mg TID PRN    sodium chloride 0.9% flush 10 mL Q12H PRN    traMADoL tablet 50 mg Q8H PRN    traZODone tablet 50 mg Nightly PRN       Objective:     Vital Signs (Most Recent):  Temp: 98.7 °F (37.1 °C) (06/21/22 1023)  Pulse: 98 (06/21/22 1137)  Resp: 20 (06/21/22 1137)  BP: (!) 151/73 (06/21/22 1023)  SpO2: 95 % (06/21/22 1137)  O2 Device (Oxygen  Therapy): nasal cannula w/ humidification (06/21/22 1137)   Vital Signs (24h Range):  Temp:  [97.9 °F (36.6 °C)-99.9 °F (37.7 °C)] 98.7 °F (37.1 °C)  Pulse:  [] 98  Resp:  [20-25] 20  SpO2:  [88 %-96 %] 95 %  BP: (137-167)/(73-89) 151/73     Weight: 111.3 kg (245 lb 6.4 oz) (06/21/22 0600)  Body mass index is 33.28 kg/m².  Body surface area is 2.38 meters squared.    I/O last 3 completed shifts:  In: 1440 [P.O.:1440]  Out: 3255 [Urine:3255]    Physical Exam  Vitals reviewed.   Constitutional:       Appearance: He is obese.   HENT:      Head: Normocephalic and atraumatic.      Mouth/Throat:      Mouth: Mucous membranes are moist.   Cardiovascular:      Rate and Rhythm: Regular rhythm.   Pulmonary:      Effort: Pulmonary effort is normal.   Neurological:      Mental Status: He is alert.       Significant Labs:  BMP:   Recent Labs   Lab 06/15/22  1918 06/16/22  0809 06/21/22  0120 06/21/22  0824 06/21/22  1146   *   < > 219*  --   --    *   < > 132*  --   --    K 7.2*   < > 4.4   < > 4.6      < > 92*  --   --    CO2 19*   < > 27  --   --    BUN 73*   < > 83*  --   --    CREATININE 7.16*   < > 6.13*  --   --    CALCIUM 7.8*   < > 8.0*  --   --    MG 2.1  --   --   --   --     < > = values in this interval not displayed.     CBC:   Recent Labs   Lab 06/21/22  0120   WBC 10.97   RBC 3.56*   HGB 10.4*   HCT 29.5*      MCV 82.9   MCH 29.2   MCHC 35.3        Significant Imaging:  Labs: Reviewed

## 2022-06-21 NOTE — ASSESSMENT & PLAN NOTE
-H/H 10.4/29.5, improving  -serial BMP, Fe, TIBC, ferritin, B12 and folate.  -will hold DAPT and DVT ppx since patient also has hemoptysis.

## 2022-06-21 NOTE — NURSING
Notified Resident (Dr Krishna) of pt c/o of difficulty catching his breath. Pt 02 is currently 92% on 5L. Pt is asking for another breathing treatment as well. Will continue to monitor

## 2022-06-21 NOTE — ASSESSMENT & PLAN NOTE
Acute on chronic renal failure.  2. No uremic symptoms  3.  Hepatitis panel  4.  Strict I/os  5.  BMP in AM  6.  Will follow  6/18/2022  The patient's renal function is improving.  6/19/2022 Continue to monitor.  6/20/2022 Continued improvement in renal function.  6/21/2022  Daily bmp.

## 2022-06-22 PROBLEM — R79.89 ELEVATED D-DIMER: Status: ACTIVE | Noted: 2022-06-22

## 2022-06-22 PROBLEM — J44.1 COPD EXACERBATION: Status: ACTIVE | Noted: 2021-12-12

## 2022-06-22 PROBLEM — U07.1 COVID-19: Status: RESOLVED | Noted: 2022-06-16 | Resolved: 2022-06-22

## 2022-06-22 LAB
ANION GAP SERPL CALCULATED.3IONS-SCNC: 15 MMOL/L (ref 7–16)
ANISOCYTOSIS BLD QL SMEAR: ABNORMAL
BACTERIA BLD CULT: NORMAL
BASOPHILS # BLD AUTO: 0.01 K/UL (ref 0–0.2)
BASOPHILS NFR BLD AUTO: 0.1 % (ref 0–1)
BUN SERPL-MCNC: 101 MG/DL (ref 7–18)
BUN/CREAT SERPL: 16 (ref 6–20)
CALCIUM SERPL-MCNC: 8.2 MG/DL (ref 8.5–10.1)
CHLORIDE SERPL-SCNC: 92 MMOL/L (ref 98–107)
CO2 SERPL-SCNC: 26 MMOL/L (ref 21–32)
CREAT SERPL-MCNC: 6.18 MG/DL (ref 0.7–1.3)
DIFFERENTIAL METHOD BLD: ABNORMAL
EOSINOPHIL # BLD AUTO: 0 K/UL (ref 0–0.5)
EOSINOPHIL NFR BLD AUTO: 0 % (ref 1–4)
ERYTHROCYTE [DISTWIDTH] IN BLOOD BY AUTOMATED COUNT: 15.3 % (ref 11.5–14.5)
GLUCOSE SERPL-MCNC: 223 MG/DL (ref 70–105)
GLUCOSE SERPL-MCNC: 245 MG/DL (ref 70–105)
GLUCOSE SERPL-MCNC: 247 MG/DL (ref 74–106)
GLUCOSE SERPL-MCNC: 254 MG/DL (ref 70–105)
GLUCOSE SERPL-MCNC: 268 MG/DL (ref 70–105)
HCT VFR BLD AUTO: 27.5 % (ref 40–54)
HGB BLD-MCNC: 9.5 G/DL (ref 13.5–18)
IMM GRANULOCYTES # BLD AUTO: 0.22 K/UL (ref 0–0.04)
IMM GRANULOCYTES NFR BLD: 1.9 % (ref 0–0.4)
LYMPHOCYTES # BLD AUTO: 0.34 K/UL (ref 1–4.8)
LYMPHOCYTES NFR BLD AUTO: 2.9 % (ref 27–41)
LYMPHOCYTES NFR BLD MANUAL: 2 % (ref 27–41)
MCH RBC QN AUTO: 28.5 PG (ref 27–31)
MCHC RBC AUTO-ENTMCNC: 34.5 G/DL (ref 32–36)
MCV RBC AUTO: 82.6 FL (ref 80–96)
MONOCYTES # BLD AUTO: 0.37 K/UL (ref 0–0.8)
MONOCYTES NFR BLD AUTO: 3.1 % (ref 2–6)
MONOCYTES NFR BLD MANUAL: 2 % (ref 2–6)
MPC BLD CALC-MCNC: 8.8 FL (ref 9.4–12.4)
NEUTROPHILS # BLD AUTO: 10.83 K/UL (ref 1.8–7.7)
NEUTROPHILS NFR BLD AUTO: 92 % (ref 53–65)
NEUTS BAND NFR BLD MANUAL: 1 % (ref 1–5)
NEUTS SEG NFR BLD MANUAL: 95 % (ref 50–62)
NRBC # BLD AUTO: 0.04 X10E3/UL
NRBC, AUTO (.00): 0.3 %
OVALOCYTES BLD QL SMEAR: ABNORMAL
PLATELET # BLD AUTO: 195 K/UL (ref 150–400)
PLATELET MORPHOLOGY: NORMAL
POLYCHROMASIA BLD QL SMEAR: ABNORMAL
POTASSIUM SERPL-SCNC: 4.3 MMOL/L (ref 3.5–5.1)
POTASSIUM SERPL-SCNC: 4.4 MMOL/L (ref 3.5–5.1)
RBC # BLD AUTO: 3.33 M/UL (ref 4.6–6.2)
SCHISTOCYTES BLD QL AUTO: ABNORMAL
SODIUM SERPL-SCNC: 129 MMOL/L (ref 136–145)
WBC # BLD AUTO: 11.77 K/UL (ref 4.5–11)

## 2022-06-22 PROCEDURE — 25000003 PHARM REV CODE 250: Performed by: FAMILY MEDICINE

## 2022-06-22 PROCEDURE — 25000003 PHARM REV CODE 250: Performed by: HOSPITALIST

## 2022-06-22 PROCEDURE — 11000001 HC ACUTE MED/SURG PRIVATE ROOM

## 2022-06-22 PROCEDURE — 82962 GLUCOSE BLOOD TEST: CPT

## 2022-06-22 PROCEDURE — 99232 SBSQ HOSP IP/OBS MODERATE 35: CPT | Mod: GC,,, | Performed by: FAMILY MEDICINE

## 2022-06-22 PROCEDURE — 63700000 PHARM REV CODE 250 ALT 637 W/O HCPCS

## 2022-06-22 PROCEDURE — 25000242 PHARM REV CODE 250 ALT 637 W/ HCPCS

## 2022-06-22 PROCEDURE — 94761 N-INVAS EAR/PLS OXIMETRY MLT: CPT

## 2022-06-22 PROCEDURE — 85025 COMPLETE CBC W/AUTO DIFF WBC: CPT

## 2022-06-22 PROCEDURE — 63600175 PHARM REV CODE 636 W HCPCS: Performed by: FAMILY MEDICINE

## 2022-06-22 PROCEDURE — 94668 MNPJ CHEST WALL SBSQ: CPT

## 2022-06-22 PROCEDURE — 27000221 HC OXYGEN, UP TO 24 HOURS

## 2022-06-22 PROCEDURE — 99232 PR SUBSEQUENT HOSPITAL CARE,LEVL II: ICD-10-PCS | Mod: GC,,, | Performed by: FAMILY MEDICINE

## 2022-06-22 PROCEDURE — 94640 AIRWAY INHALATION TREATMENT: CPT

## 2022-06-22 PROCEDURE — 25000003 PHARM REV CODE 250

## 2022-06-22 PROCEDURE — 63600175 PHARM REV CODE 636 W HCPCS

## 2022-06-22 PROCEDURE — 80048 BASIC METABOLIC PNL TOTAL CA: CPT

## 2022-06-22 PROCEDURE — 36415 COLL VENOUS BLD VENIPUNCTURE: CPT

## 2022-06-22 RX ORDER — BUDESONIDE 0.5 MG/2ML
0.5 INHALANT ORAL DAILY
Status: DISCONTINUED | OUTPATIENT
Start: 2022-06-23 | End: 2022-07-04 | Stop reason: SDDI

## 2022-06-22 RX ADMIN — BUDESONIDE 0.5 MG: 0.5 INHALANT RESPIRATORY (INHALATION) at 07:06

## 2022-06-22 RX ADMIN — AZITHROMYCIN MONOHYDRATE 250 MG: 250 TABLET ORAL at 08:06

## 2022-06-22 RX ADMIN — IPRATROPIUM BROMIDE AND ALBUTEROL SULFATE 3 ML: 2.5; .5 SOLUTION RESPIRATORY (INHALATION) at 12:06

## 2022-06-22 RX ADMIN — INSULIN ASPART 6 UNITS: 100 INJECTION, SOLUTION INTRAVENOUS; SUBCUTANEOUS at 05:06

## 2022-06-22 RX ADMIN — IPRATROPIUM BROMIDE AND ALBUTEROL SULFATE 3 ML: 2.5; .5 SOLUTION RESPIRATORY (INHALATION) at 03:06

## 2022-06-22 RX ADMIN — INSULIN ASPART 4 UNITS: 100 INJECTION, SOLUTION INTRAVENOUS; SUBCUTANEOUS at 05:06

## 2022-06-22 RX ADMIN — ONDANSETRON 8 MG: 2 INJECTION INTRAMUSCULAR; INTRAVENOUS at 09:06

## 2022-06-22 RX ADMIN — ALLOPURINOL 50 MG: 300 TABLET ORAL at 08:06

## 2022-06-22 RX ADMIN — ATORVASTATIN CALCIUM 40 MG: 40 TABLET, FILM COATED ORAL at 09:06

## 2022-06-22 RX ADMIN — TRAZODONE HYDROCHLORIDE 50 MG: 50 TABLET ORAL at 09:06

## 2022-06-22 RX ADMIN — IPRATROPIUM BROMIDE AND ALBUTEROL SULFATE 3 ML: 2.5; .5 SOLUTION RESPIRATORY (INHALATION) at 07:06

## 2022-06-22 RX ADMIN — FERROUS GLUCONATE 324 MG: 324 TABLET ORAL at 08:06

## 2022-06-22 RX ADMIN — INSULIN ASPART 2 UNITS: 100 INJECTION, SOLUTION INTRAVENOUS; SUBCUTANEOUS at 10:06

## 2022-06-22 RX ADMIN — BISACODYL 10 MG: 5 TABLET, COATED ORAL at 06:06

## 2022-06-22 RX ADMIN — ISOSORBIDE MONONITRATE 60 MG: 60 TABLET, EXTENDED RELEASE ORAL at 08:06

## 2022-06-22 RX ADMIN — MELATONIN 6 MG: at 09:06

## 2022-06-22 RX ADMIN — IPRATROPIUM BROMIDE AND ALBUTEROL SULFATE 3 ML: 2.5; .5 SOLUTION RESPIRATORY (INHALATION) at 08:06

## 2022-06-22 RX ADMIN — INSULIN ASPART 6 UNITS: 100 INJECTION, SOLUTION INTRAVENOUS; SUBCUTANEOUS at 12:06

## 2022-06-22 RX ADMIN — THERA TABS 1 TABLET: TAB at 08:06

## 2022-06-22 RX ADMIN — METHYLPREDNISOLONE SODIUM SUCCINATE 60 MG: 40 INJECTION, POWDER, FOR SOLUTION INTRAMUSCULAR; INTRAVENOUS at 11:06

## 2022-06-22 RX ADMIN — AMLODIPINE BESYLATE 10 MG: 10 TABLET ORAL at 08:06

## 2022-06-22 RX ADMIN — METOPROLOL TARTRATE 25 MG: 25 TABLET, FILM COATED ORAL at 08:06

## 2022-06-22 NOTE — ASSESSMENT & PLAN NOTE
Acute on chronic renal failure.  2. No uremic symptoms  3.  Hepatitis panel  4.  Strict I/os  5.  BMP in AM  6.  Will follow  6/18/2022  The patient's renal function is improving.  6/19/2022 Continue to monitor.  6/20/2022 Continued improvement in renal function.  6/21/2022  Daily bmp.  6/22/2022  Continue to monitor at this time.  Daily BMP.

## 2022-06-22 NOTE — PLAN OF CARE
Problem: Adult Inpatient Plan of Care  Goal: Plan of Care Review  Outcome: Ongoing, Progressing  Goal: Patient-Specific Goal (Individualized)  Outcome: Ongoing, Progressing  Goal: Absence of Hospital-Acquired Illness or Injury  Outcome: Ongoing, Progressing  Goal: Optimal Comfort and Wellbeing  Outcome: Ongoing, Progressing  Goal: Readiness for Transition of Care  Outcome: Ongoing, Progressing     Problem: Diabetes Comorbidity  Goal: Blood Glucose Level Within Targeted Range  Outcome: Ongoing, Progressing     Problem: Fluid and Electrolyte Imbalance (Acute Kidney Injury/Impairment)  Goal: Fluid and Electrolyte Balance  Outcome: Ongoing, Progressing     Problem: Oral Intake Inadequate (Acute Kidney Injury/Impairment)  Goal: Optimal Nutrition Intake  Outcome: Ongoing, Progressing     Problem: Renal Function Impairment (Acute Kidney Injury/Impairment)  Goal: Effective Renal Function  Outcome: Ongoing, Progressing     Problem: Skin Injury Risk Increased  Goal: Skin Health and Integrity  Outcome: Ongoing, Progressing     Problem: Gas Exchange Impaired  Goal: Optimal Gas Exchange  Outcome: Ongoing, Progressing

## 2022-06-22 NOTE — PROGRESS NOTES
[address]  Nephrology  Progress Note    Patient Name: Neymar Parra  MRN: [id number]  Admission Date: 6/16/2022  Hospital Length of Stay: 6 days  Attending Provider: Earl Lemus Jr., MD   Primary Care Physician: [facility] megan [place]  Principal Problem:DEO (acute kidney injury)    Subjective:     HPI: This patient with history of HTN, DM, CKD who has seen Dr. Crowder in the past 2 years ago is currently in a nursing facility.  The patient presented with SOB and diagnosed with COVID.  Serum creatinine has trended up to 7.15.  Serum potassium is 6.4.  Nephrology has been consulted for renal issues.  At the bedside, the patient mentions feeling fine.  He states that his breathing has improved.      Interval History: The patient is sitting up bed resting. Serum creatinine is 6.18 which is the same from yesterday.  No fevers or chills.  Serum potassium is stable at 4.3.    Review of patient's allergies indicates:   Allergen Reactions    Gatifloxacin Anaphylaxis     Current Facility-Administered Medications   Medication Frequency    acetaminophen tablet 1,000 mg Q6H PRN    albuterol inhaler 2 puff Q6H PRN    albuterol-ipratropium 2.5 mg-0.5 mg/3 mL nebulizer solution 3 mL Q4H    allopurinol split tablet 50 mg Daily    amLODIPine tablet 10 mg Daily    atorvastatin tablet 40 mg QHS    azithromycin tablet 250 mg Daily    bisacodyL EC tablet 10 mg Daily PRN    [START ON 6/23/2022] budesonide nebulizer solution 0.5 mg Daily    calcium gluconate 1 g in NS IVPB (premixed) Q10 Min PRN    cetirizine tablet 10 mg Daily PRN    dextromethorphan-guaiFENesin  mg/5 ml liquid 10 mL Q6H PRN    dextrose 50% injection 12.5 g PRN    dextrose 50% injection 25 g PRN    ferrous gluconate tablet 324 mg Daily with breakfast    glucagon (human recombinant) injection 1 mg PRN    glucose chewable tablet 16 g PRN    glucose chewable tablet 24 g PRN    HYDROcodone-acetaminophen  7.5-325 mg per tablet 1 tablet Q6H PRN    insulin aspart U-100 injection 1-10 Units QID (AC + HS) PRN    isosorbide mononitrate 24 hr tablet 60 mg Daily    melatonin tablet 6 mg Nightly PRN    [START ON 6/23/2022] methylPREDNISolone sodium succinate injection 20 mg Q12H    metoprolol tartrate (LOPRESSOR) tablet 25 mg Daily    multivitamin tablet Daily    naloxone 0.4 mg/mL injection 0.02 mg PRN    ondansetron injection 8 mg Q6H PRN    simethicone chewable tablet 80 mg TID PRN    sodium chloride 0.9% flush 10 mL Q12H PRN    traMADoL tablet 50 mg Q8H PRN    traZODone tablet 50 mg Nightly PRN       Objective:     Vital Signs (Most Recent):  Temp: 98.4 °F (36.9 °C) (06/22/22 1600)  Pulse: 73 (06/22/22 1600)  Resp: 20 (06/22/22 1600)  BP: 128/75 (06/22/22 1600)  SpO2: 98 % (06/22/22 1600)  O2 Device (Oxygen Therapy): nasal cannula (06/22/22 0728) Vital Signs (24h Range):  Temp:  [97.6 °F (36.4 °C)-99.2 °F (37.3 °C)] 98.4 °F (36.9 °C)  Pulse:  [] 73  Resp:  [17-22] 20  SpO2:  [90 %-100 %] 98 %  BP: (117-150)/(63-85) 128/75     Weight: 116.8 kg (257 lb 9.6 oz) (06/22/22 0547)  Body mass index is 34.94 kg/m².  Body surface area is 2.44 meters squared.    I/O last 3 completed shifts:  In: 900 [P.O.:900]  Out: 3305 [Urine:3305]    Physical Exam  Vitals reviewed.   HENT:      Head: Normocephalic and atraumatic.   Cardiovascular:      Rate and Rhythm: Regular rhythm.   Abdominal:      General: Abdomen is flat.   Neurological:      Mental Status: He is alert.   Psychiatric:         Mood and Affect: Mood normal.       Significant Labs:  BMP:   Recent Labs   Lab 06/15/22  1918 06/16/22  0809 06/22/22  0843   *   < > 247*   *   < > 129*   K 7.2*   < > 4.3      < > 92*   CO2 19*   < > 26   BUN 73*   < > 101*   CREATININE 7.16*   < > 6.18*   CALCIUM 7.8*   < > 8.2*   MG 2.1  --   --     < > = values in this interval not displayed.     CBC:   Recent Labs   Lab 06/22/22  0843   WBC 11.77*   RBC  3.33*   HGB 9.5*   HCT 27.5*      MCV 82.6   MCH 28.5   MCHC 34.5        Significant Imaging:  Labs: Reviewed    Assessment/Plan:     * DEO (acute kidney injury)  Acute on chronic renal failure.  2. No uremic symptoms  3.  Hepatitis panel  4.  Strict I/os  5.  BMP in AM  6.  Will follow  6/18/2022  The patient's renal function is improving.  6/19/2022 Continue to monitor.  6/20/2022 Continued improvement in renal function.  6/21/2022  Daily bmp.  6/22/2022  Continue to monitor at this time.  Daily BMP.    COPD exacerbation  Chronic condition.        Thank you for your consult. I will follow-up with patient. Please contact us if you have any additional questions.    Jorge Butts Jr, MD  Nephrology  [facility] [place] - [address] [place] [address]

## 2022-06-22 NOTE — ASSESSMENT & PLAN NOTE
- Ddimer was normal on admission  - Repeat was 1.83  - Repeat VQ scan pending, anticoagulation held for now due to recent history of GI bleed   - Patient did recently have COVID, has increased clotting tisk

## 2022-06-22 NOTE — ASSESSMENT & PLAN NOTE
-H/H 9.5/27.5, staying around same level last couple of days  -serial BMP, Fe, TIBC, ferritin, B12 and folate.  -will hold DAPT and DVT ppx since patient also has hemoptysis.

## 2022-06-22 NOTE — SUBJECTIVE & OBJECTIVE
Interval History: Patient appears to be less SOB than yesterday. Physical exam did not show any other signs of respiratory distress. Oxygen sats are staying in the acceptable range for a COPD patient. The patient has no acute complaints this morning. His d-dimer was elevated at 1.83 yesterday, so a repeat VQ scan was ordered.     Review of Systems   Constitutional:  Negative for chills, fatigue and fever.   HENT:  Negative for congestion, hearing loss and trouble swallowing.    Eyes:  Negative for visual disturbance.   Respiratory:  Positive for shortness of breath. Negative for cough (hemoptysis).    Cardiovascular:  Negative for chest pain, palpitations and leg swelling.   Gastrointestinal:  Negative for abdominal pain, blood in stool, diarrhea, nausea and vomiting.   Genitourinary:  Negative for difficulty urinating and hematuria.   Musculoskeletal:  Negative for back pain and myalgias.   Skin:  Negative for rash.   Neurological:  Negative for dizziness, light-headedness and headaches.   Psychiatric/Behavioral:  Negative for sleep disturbance. The patient is not nervous/anxious.    Objective:     Vital Signs (Most Recent):  Temp: 98.6 °F (37 °C) (06/22/22 1050)  Pulse: 90 (06/22/22 1213)  Resp: 20 (06/22/22 1213)  BP: 117/63 (06/22/22 1050)  SpO2: 97 % (06/22/22 1050) Vital Signs (24h Range):  Temp:  [97.6 °F (36.4 °C)-99.4 °F (37.4 °C)] 98.6 °F (37 °C)  Pulse:  [] 90  Resp:  [17-22] 20  SpO2:  [90 %-100 %] 97 %  BP: (117-159)/(63-85) 117/63     Weight: 116.8 kg (257 lb 9.6 oz)  Body mass index is 34.94 kg/m².    Intake/Output Summary (Last 24 hours) at 6/22/2022 1215  Last data filed at 6/22/2022 0547  Gross per 24 hour   Intake --   Output 1300 ml   Net -1300 ml      Physical Exam  Vitals reviewed.   Constitutional:       General: He is not in acute distress.     Appearance: Normal appearance. He is obese. He is not toxic-appearing or diaphoretic.   HENT:      Head: Normocephalic and atraumatic.       Right Ear: External ear normal.      Left Ear: External ear normal.      Nose: Nose normal.      Mouth/Throat:      Mouth: Mucous membranes are moist.      Pharynx: Oropharynx is clear.   Eyes:      General: No scleral icterus.     Extraocular Movements: Extraocular movements intact.      Conjunctiva/sclera: Conjunctivae normal.      Pupils: Pupils are equal, round, and reactive to light.   Cardiovascular:      Rate and Rhythm: Normal rate and regular rhythm.      Pulses: Normal pulses.      Heart sounds: Normal heart sounds. No murmur heard.  Pulmonary:      Effort: Pulmonary effort is normal. No respiratory distress.      Breath sounds: Wheezing present. No rhonchi or rales.   Abdominal:      General: Abdomen is flat. Bowel sounds are normal. There is no distension.      Palpations: Abdomen is soft.      Tenderness: There is no abdominal tenderness. There is no guarding or rebound.   Musculoskeletal:         General: No swelling. Normal range of motion.      Cervical back: Normal range of motion and neck supple. No rigidity.      Right lower leg: No edema.      Left lower leg: No edema.   Skin:     General: Skin is warm and dry.      Capillary Refill: Capillary refill takes less than 2 seconds.      Findings: No rash.   Neurological:      General: No focal deficit present.      Mental Status: He is alert. Mental status is at baseline.   Psychiatric:         Mood and Affect: Mood normal.         Behavior: Behavior normal.         Thought Content: Thought content normal.         Judgment: Judgment normal.       Significant Labs: All pertinent labs within the past 24 hours have been reviewed.    Significant Imaging: I have reviewed all pertinent imaging results/findings within the past 24 hours.

## 2022-06-22 NOTE — PROGRESS NOTES
[address] Medicine  Progress Note    Patient Name: Neymar Parra  MRN: [id number]  Patient Class: IP- Inpatient   Admission Date: 6/16/2022  Length of Stay: 6 days  Attending Physician: Earl Lemus Jr., MD  Primary Care Provider: [facility] megan Rodríguez        Subjective:     Principal Problem:DEO (acute kidney injury)        HPI:  72 y.o. male transferred to the [facility] from [facility] for hyperkalemia and hypoxia. Pt reports he went to [facility] because he was having dyspnea and hemoptysis since 1 month which is worse today. Pt reports he was tested positive for COVID at the nursing home on 6/9/22. Per nursing home patient oxygen saturation was in the 80's on RA with tachypnea and labored breathing. Pt reports he normally does not wear oxygen at the nursing home but has been wearing oxygen more often recently. Pt reports he smoked 1-1.5 PPD for 20 years but quit 7 months ago. Per pt, he saw a pulmonologist (Dr. Aldana) for his hemoptysis and had some tests done the results of which he does not know. Per records, pt with a right pleural effusion recently and had a thoracentesis. Pt denies any fever, congestion, dysuria, N/V/D, chest pain, or any other complaints at this time.     In the ED, pt's K 7.2, d-dimer 0.76, BNP 6284, BUN/Cr 73/7.16. EKG showed some peaked t-waves and irregular rhythm. Chest xray showed Increased right lower lung pulmonary density could indicate pneumonia. Pt was treated in the ED with Calcium gluconate 1g, regular insulin 10 units, D50, IV solumedrol 125 mg, tylenol 650 mg and IV fluids NS 1L bolus. Pt will be admitted to the hospital service for management of hypoxia and hyperkalemia.      Overview/Hospital Course:  6/20- Patient resting comfortably in bed with no acute complaints. No overnight events reported. Potassium is 5.4. Nephrology saw patient and do not plan for dialysis at this time. Patient says he still feels weak but improved  since he first came in. Still getting lokelma for hyperkalemia, potassium is 5.4 today. Switch fluids to lactated ringers as it is not contraindicated for hyperkalemia. Patient is out of COVID window, so will switch from dexamethasone to duonebs and solumedrol as he has an audible wheeze on ausculation.          Interval History: Patient appears to be less SOB than yesterday. Physical exam did not show any other signs of respiratory distress. Oxygen sats are staying in the acceptable range for a COPD patient. The patient has no acute complaints this morning. His d-dimer was elevated at 1.83 yesterday, so a repeat VQ scan was ordered.     Review of Systems   Constitutional:  Negative for chills, fatigue and fever.   HENT:  Negative for congestion, hearing loss and trouble swallowing.    Eyes:  Negative for visual disturbance.   Respiratory:  Positive for shortness of breath. Negative for cough (hemoptysis).    Cardiovascular:  Negative for chest pain, palpitations and leg swelling.   Gastrointestinal:  Negative for abdominal pain, blood in stool, diarrhea, nausea and vomiting.   Genitourinary:  Negative for difficulty urinating and hematuria.   Musculoskeletal:  Negative for back pain and myalgias.   Skin:  Negative for rash.   Neurological:  Negative for dizziness, light-headedness and headaches.   Psychiatric/Behavioral:  Negative for sleep disturbance. The patient is not nervous/anxious.    Objective:     Vital Signs (Most Recent):  Temp: 98.6 °F (37 °C) (06/22/22 1050)  Pulse: 90 (06/22/22 1213)  Resp: 20 (06/22/22 1213)  BP: 117/63 (06/22/22 1050)  SpO2: 97 % (06/22/22 1050) Vital Signs (24h Range):  Temp:  [97.6 °F (36.4 °C)-99.4 °F (37.4 °C)] 98.6 °F (37 °C)  Pulse:  [] 90  Resp:  [17-22] 20  SpO2:  [90 %-100 %] 97 %  BP: (117-159)/(63-85) 117/63     Weight: 116.8 kg (257 lb 9.6 oz)  Body mass index is 34.94 kg/m².    Intake/Output Summary (Last 24 hours) at 6/22/2022 1215  Last data filed at 6/22/2022  0547  Gross per 24 hour   Intake --   Output 1300 ml   Net -1300 ml      Physical Exam  Vitals reviewed.   Constitutional:       General: He is not in acute distress.     Appearance: Normal appearance. He is obese. He is not toxic-appearing or diaphoretic.   HENT:      Head: Normocephalic and atraumatic.      Right Ear: External ear normal.      Left Ear: External ear normal.      Nose: Nose normal.      Mouth/Throat:      Mouth: Mucous membranes are moist.      Pharynx: Oropharynx is clear.   Eyes:      General: No scleral icterus.     Extraocular Movements: Extraocular movements intact.      Conjunctiva/sclera: Conjunctivae normal.      Pupils: Pupils are equal, round, and reactive to light.   Cardiovascular:      Rate and Rhythm: Normal rate and regular rhythm.      Pulses: Normal pulses.      Heart sounds: Normal heart sounds. No murmur heard.  Pulmonary:      Effort: Pulmonary effort is normal. No respiratory distress.      Breath sounds: Wheezing present. No rhonchi or rales.   Abdominal:      General: Abdomen is flat. Bowel sounds are normal. There is no distension.      Palpations: Abdomen is soft.      Tenderness: There is no abdominal tenderness. There is no guarding or rebound.   Musculoskeletal:         General: No swelling. Normal range of motion.      Cervical back: Normal range of motion and neck supple. No rigidity.      Right lower leg: No edema.      Left lower leg: No edema.   Skin:     General: Skin is warm and dry.      Capillary Refill: Capillary refill takes less than 2 seconds.      Findings: No rash.   Neurological:      General: No focal deficit present.      Mental Status: He is alert. Mental status is at baseline.   Psychiatric:         Mood and Affect: Mood normal.         Behavior: Behavior normal.         Thought Content: Thought content normal.         Judgment: Judgment normal.       Significant Labs: All pertinent labs within the past 24 hours have been reviewed.    Significant  Imaging: I have reviewed all pertinent imaging results/findings within the past 24 hours.      Assessment/Plan:      * DEO (acute kidney injury)  - BUN/Crt staying around the same  - Nephrology following  - 101/6.18 today, continue to monitor    Elevated d-dimer  - Ddimer was normal on admission  - Repeat was 1.83  - Repeat VQ scan pending, anticoagulation held for now due to recent history of GI bleed   - Patient did recently have COVID, has increased clotting tisk       Anemia  -H/H 9.5/27.5, staying around same level last couple of days  -serial BMP, Fe, TIBC, ferritin, B12 and folate.  -will hold DAPT and DVT ppx since patient also has hemoptysis.     Type 2 diabetes mellitus without complication  -HgA1c 4.7 10 days ago  - SSI  -  today likely due to starting steroids    Gastroesophageal reflux disease  -pantoprazole 40 mg qd    COPD exacerbation  - Improving  - Reduce dose of solumedrol and inhaled budesonide  - Continue azithromycin another 4 days    Hypertension  -continue home amlodipine 10 mg qd, isosorbide mononitrate 60 mg qd, and lisinopril 20 mg qd.      VTE Risk Mitigation (From admission, onward)         Ordered     Reason for No Pharmacological VTE Prophylaxis  Once        Question:  Reasons:  Answer:  Risk of Bleeding    06/16/22 0301     IP VTE HIGH RISK PATIENT  Once         06/16/22 0301     Place sequential compression device  Until discontinued         06/16/22 0301                Discharge Planning   EDWARDO:      Code Status: Full Code   Is the patient medically ready for discharge?:     Reason for patient still in hospital (select all that apply): Treatment  Discharge Plan A: Long-term acute care facility (LTAC) (Choice obtained for Specialty)                  Brooklynn Kaur MD  Department of Hospital Medicine   [address]

## 2022-06-22 NOTE — SUBJECTIVE & OBJECTIVE
Interval History: The patient is sitting up bed resting. Serum creatinine is 6.18 which is the same from yesterday.  No fevers or chills.  Serum potassium is stable at 4.3.    Review of patient's allergies indicates:   Allergen Reactions    Gatifloxacin Anaphylaxis     Current Facility-Administered Medications   Medication Frequency    acetaminophen tablet 1,000 mg Q6H PRN    albuterol inhaler 2 puff Q6H PRN    albuterol-ipratropium 2.5 mg-0.5 mg/3 mL nebulizer solution 3 mL Q4H    allopurinol split tablet 50 mg Daily    amLODIPine tablet 10 mg Daily    atorvastatin tablet 40 mg QHS    azithromycin tablet 250 mg Daily    bisacodyL EC tablet 10 mg Daily PRN    [START ON 6/23/2022] budesonide nebulizer solution 0.5 mg Daily    calcium gluconate 1 g in NS IVPB (premixed) Q10 Min PRN    cetirizine tablet 10 mg Daily PRN    dextromethorphan-guaiFENesin  mg/5 ml liquid 10 mL Q6H PRN    dextrose 50% injection 12.5 g PRN    dextrose 50% injection 25 g PRN    ferrous gluconate tablet 324 mg Daily with breakfast    glucagon (human recombinant) injection 1 mg PRN    glucose chewable tablet 16 g PRN    glucose chewable tablet 24 g PRN    HYDROcodone-acetaminophen 7.5-325 mg per tablet 1 tablet Q6H PRN    insulin aspart U-100 injection 1-10 Units QID (AC + HS) PRN    isosorbide mononitrate 24 hr tablet 60 mg Daily    melatonin tablet 6 mg Nightly PRN    [START ON 6/23/2022] methylPREDNISolone sodium succinate injection 20 mg Q12H    metoprolol tartrate (LOPRESSOR) tablet 25 mg Daily    multivitamin tablet Daily    naloxone 0.4 mg/mL injection 0.02 mg PRN    ondansetron injection 8 mg Q6H PRN    simethicone chewable tablet 80 mg TID PRN    sodium chloride 0.9% flush 10 mL Q12H PRN    traMADoL tablet 50 mg Q8H PRN    traZODone tablet 50 mg Nightly PRN       Objective:     Vital Signs (Most Recent):  Temp: 98.4 °F (36.9 °C) (06/22/22 1600)  Pulse: 73 (06/22/22 1600)  Resp: 20 (06/22/22 1600)  BP: 128/75 (06/22/22 1600)  SpO2:  98 % (06/22/22 1600)  O2 Device (Oxygen Therapy): nasal cannula (06/22/22 0728) Vital Signs (24h Range):  Temp:  [97.6 °F (36.4 °C)-99.2 °F (37.3 °C)] 98.4 °F (36.9 °C)  Pulse:  [] 73  Resp:  [17-22] 20  SpO2:  [90 %-100 %] 98 %  BP: (117-150)/(63-85) 128/75     Weight: 116.8 kg (257 lb 9.6 oz) (06/22/22 0547)  Body mass index is 34.94 kg/m².  Body surface area is 2.44 meters squared.    I/O last 3 completed shifts:  In: 900 [P.O.:900]  Out: 3305 [Urine:3305]    Physical Exam  Vitals reviewed.   HENT:      Head: Normocephalic and atraumatic.   Cardiovascular:      Rate and Rhythm: Regular rhythm.   Abdominal:      General: Abdomen is flat.   Neurological:      Mental Status: He is alert.   Psychiatric:         Mood and Affect: Mood normal.       Significant Labs:  BMP:   Recent Labs   Lab 06/15/22  1918 06/16/22  0809 06/22/22  0843   *   < > 247*   *   < > 129*   K 7.2*   < > 4.3      < > 92*   CO2 19*   < > 26   BUN 73*   < > 101*   CREATININE 7.16*   < > 6.18*   CALCIUM 7.8*   < > 8.2*   MG 2.1  --   --     < > = values in this interval not displayed.     CBC:   Recent Labs   Lab 06/22/22  0843   WBC 11.77*   RBC 3.33*   HGB 9.5*   HCT 27.5*      MCV 82.6   MCH 28.5   MCHC 34.5        Significant Imaging:  Labs: Reviewed

## 2022-06-22 NOTE — ASSESSMENT & PLAN NOTE
- Improving  - Reduce dose of solumedrol and inhaled budesonide  - Continue azithromycin another 4 days

## 2022-06-23 LAB
ANION GAP SERPL CALCULATED.3IONS-SCNC: 12 MMOL/L (ref 7–16)
BASOPHILS # BLD AUTO: 0.01 K/UL (ref 0–0.2)
BASOPHILS NFR BLD AUTO: 0.1 % (ref 0–1)
BUN SERPL-MCNC: 112 MG/DL (ref 7–18)
BUN/CREAT SERPL: 19 (ref 6–20)
CALCIUM SERPL-MCNC: 8 MG/DL (ref 8.5–10.1)
CHLORIDE SERPL-SCNC: 90 MMOL/L (ref 98–107)
CO2 SERPL-SCNC: 31 MMOL/L (ref 21–32)
CREAT SERPL-MCNC: 6.02 MG/DL (ref 0.7–1.3)
DIFFERENTIAL METHOD BLD: ABNORMAL
EOSINOPHIL # BLD AUTO: 0 K/UL (ref 0–0.5)
EOSINOPHIL NFR BLD AUTO: 0 % (ref 1–4)
ERYTHROCYTE [DISTWIDTH] IN BLOOD BY AUTOMATED COUNT: 14.9 % (ref 11.5–14.5)
GLUCOSE SERPL-MCNC: 207 MG/DL (ref 74–106)
GLUCOSE SERPL-MCNC: 229 MG/DL (ref 70–105)
GLUCOSE SERPL-MCNC: 236 MG/DL (ref 70–105)
GLUCOSE SERPL-MCNC: 243 MG/DL (ref 70–105)
GLUCOSE SERPL-MCNC: 271 MG/DL (ref 70–105)
HCT VFR BLD AUTO: 24.9 % (ref 40–54)
HGB BLD-MCNC: 8.7 G/DL (ref 13.5–18)
IMM GRANULOCYTES # BLD AUTO: 0.33 K/UL (ref 0–0.04)
IMM GRANULOCYTES NFR BLD: 2.3 % (ref 0–0.4)
LYMPHOCYTES # BLD AUTO: 0.27 K/UL (ref 1–4.8)
LYMPHOCYTES NFR BLD AUTO: 1.9 % (ref 27–41)
MCH RBC QN AUTO: 29.2 PG (ref 27–31)
MCHC RBC AUTO-ENTMCNC: 34.9 G/DL (ref 32–36)
MCV RBC AUTO: 83.6 FL (ref 80–96)
MONOCYTES # BLD AUTO: 0.72 K/UL (ref 0–0.8)
MONOCYTES NFR BLD AUTO: 5.1 % (ref 2–6)
MPC BLD CALC-MCNC: 10.2 FL (ref 9.4–12.4)
NEUTROPHILS # BLD AUTO: 12.77 K/UL (ref 1.8–7.7)
NEUTROPHILS NFR BLD AUTO: 90.6 % (ref 53–65)
NRBC # BLD AUTO: 0.04 X10E3/UL
NRBC, AUTO (.00): 0.3 %
PLATELET # BLD AUTO: 211 K/UL (ref 150–400)
POTASSIUM SERPL-SCNC: 4.5 MMOL/L (ref 3.5–5.1)
RBC # BLD AUTO: 2.98 M/UL (ref 4.6–6.2)
SODIUM SERPL-SCNC: 128 MMOL/L (ref 136–145)
WBC # BLD AUTO: 14.1 K/UL (ref 4.5–11)

## 2022-06-23 PROCEDURE — 63600175 PHARM REV CODE 636 W HCPCS

## 2022-06-23 PROCEDURE — 36415 COLL VENOUS BLD VENIPUNCTURE: CPT

## 2022-06-23 PROCEDURE — 63600175 PHARM REV CODE 636 W HCPCS: Performed by: FAMILY MEDICINE

## 2022-06-23 PROCEDURE — 27000221 HC OXYGEN, UP TO 24 HOURS

## 2022-06-23 PROCEDURE — 94761 N-INVAS EAR/PLS OXIMETRY MLT: CPT

## 2022-06-23 PROCEDURE — 82962 GLUCOSE BLOOD TEST: CPT

## 2022-06-23 PROCEDURE — 25000003 PHARM REV CODE 250: Performed by: HOSPITALIST

## 2022-06-23 PROCEDURE — C9399 UNCLASSIFIED DRUGS OR BIOLOG: HCPCS | Performed by: FAMILY MEDICINE

## 2022-06-23 PROCEDURE — 25000003 PHARM REV CODE 250: Performed by: FAMILY MEDICINE

## 2022-06-23 PROCEDURE — 11000001 HC ACUTE MED/SURG PRIVATE ROOM

## 2022-06-23 PROCEDURE — 85025 COMPLETE CBC W/AUTO DIFF WBC: CPT

## 2022-06-23 PROCEDURE — 99232 SBSQ HOSP IP/OBS MODERATE 35: CPT | Mod: GC,,, | Performed by: FAMILY MEDICINE

## 2022-06-23 PROCEDURE — 99232 PR SUBSEQUENT HOSPITAL CARE,LEVL II: ICD-10-PCS | Mod: GC,,, | Performed by: FAMILY MEDICINE

## 2022-06-23 PROCEDURE — 94668 MNPJ CHEST WALL SBSQ: CPT

## 2022-06-23 PROCEDURE — 25000003 PHARM REV CODE 250

## 2022-06-23 PROCEDURE — 80048 BASIC METABOLIC PNL TOTAL CA: CPT

## 2022-06-23 PROCEDURE — 99900035 HC TECH TIME PER 15 MIN (STAT)

## 2022-06-23 PROCEDURE — 94640 AIRWAY INHALATION TREATMENT: CPT

## 2022-06-23 PROCEDURE — 25000242 PHARM REV CODE 250 ALT 637 W/ HCPCS

## 2022-06-23 PROCEDURE — 63700000 PHARM REV CODE 250 ALT 637 W/O HCPCS

## 2022-06-23 RX ORDER — PANTOPRAZOLE SODIUM 40 MG/1
40 TABLET, DELAYED RELEASE ORAL DAILY
Status: DISCONTINUED | OUTPATIENT
Start: 2022-06-23 | End: 2022-06-30

## 2022-06-23 RX ORDER — DEXTROMETHORPHAN POLISTIREX 30 MG/5 ML
1 SUSPENSION, EXTENDED RELEASE 12 HR ORAL DAILY PRN
Status: DISCONTINUED | OUTPATIENT
Start: 2022-06-23 | End: 2022-07-20 | Stop reason: HOSPADM

## 2022-06-23 RX ORDER — HEPARIN SODIUM 5000 [USP'U]/ML
5000 INJECTION, SOLUTION INTRAVENOUS; SUBCUTANEOUS EVERY 8 HOURS
Status: DISCONTINUED | OUTPATIENT
Start: 2022-06-23 | End: 2022-06-24

## 2022-06-23 RX ADMIN — IPRATROPIUM BROMIDE AND ALBUTEROL SULFATE 3 ML: 2.5; .5 SOLUTION RESPIRATORY (INHALATION) at 02:06

## 2022-06-23 RX ADMIN — INSULIN DETEMIR 5 UNITS: 100 INJECTION, SOLUTION SUBCUTANEOUS at 09:06

## 2022-06-23 RX ADMIN — THERA TABS 1 TABLET: TAB at 08:06

## 2022-06-23 RX ADMIN — HEPARIN SODIUM 5000 UNITS: 5000 INJECTION INTRAVENOUS; SUBCUTANEOUS at 02:06

## 2022-06-23 RX ADMIN — AZITHROMYCIN MONOHYDRATE 250 MG: 250 TABLET ORAL at 08:06

## 2022-06-23 RX ADMIN — PANTOPRAZOLE SODIUM 40 MG: 40 TABLET, DELAYED RELEASE ORAL at 10:06

## 2022-06-23 RX ADMIN — PREDNISONE 60 MG: 10 TABLET ORAL at 08:06

## 2022-06-23 RX ADMIN — HEPARIN SODIUM 5000 UNITS: 5000 INJECTION INTRAVENOUS; SUBCUTANEOUS at 09:06

## 2022-06-23 RX ADMIN — IPRATROPIUM BROMIDE AND ALBUTEROL SULFATE 3 ML: 2.5; .5 SOLUTION RESPIRATORY (INHALATION) at 08:06

## 2022-06-23 RX ADMIN — METHYLPREDNISOLONE SODIUM SUCCINATE 20 MG: 40 INJECTION, POWDER, FOR SOLUTION INTRAMUSCULAR; INTRAVENOUS at 12:06

## 2022-06-23 RX ADMIN — TRAZODONE HYDROCHLORIDE 50 MG: 50 TABLET ORAL at 09:06

## 2022-06-23 RX ADMIN — ALLOPURINOL 50 MG: 300 TABLET ORAL at 08:06

## 2022-06-23 RX ADMIN — AMLODIPINE BESYLATE 10 MG: 10 TABLET ORAL at 08:06

## 2022-06-23 RX ADMIN — BISACODYL 10 MG: 5 TABLET, COATED ORAL at 12:06

## 2022-06-23 RX ADMIN — FERROUS GLUCONATE 324 MG: 324 TABLET ORAL at 08:06

## 2022-06-23 RX ADMIN — METOPROLOL TARTRATE 25 MG: 25 TABLET, FILM COATED ORAL at 08:06

## 2022-06-23 RX ADMIN — INSULIN ASPART 6 UNITS: 100 INJECTION, SOLUTION INTRAVENOUS; SUBCUTANEOUS at 12:06

## 2022-06-23 RX ADMIN — INSULIN ASPART 4 UNITS: 100 INJECTION, SOLUTION INTRAVENOUS; SUBCUTANEOUS at 05:06

## 2022-06-23 RX ADMIN — ISOSORBIDE MONONITRATE 60 MG: 60 TABLET, EXTENDED RELEASE ORAL at 08:06

## 2022-06-23 RX ADMIN — IPRATROPIUM BROMIDE AND ALBUTEROL SULFATE 3 ML: 2.5; .5 SOLUTION RESPIRATORY (INHALATION) at 12:06

## 2022-06-23 RX ADMIN — ATORVASTATIN CALCIUM 40 MG: 40 TABLET, FILM COATED ORAL at 09:06

## 2022-06-23 NOTE — SUBJECTIVE & OBJECTIVE
Interval History: The patient is sitting up in bed. Serum creatinine is 6 today.  At this time, no uremic symptoms.  Continuing to monitor.  Review of patient's allergies indicates:   Allergen Reactions    Gatifloxacin Anaphylaxis     Current Facility-Administered Medications   Medication Frequency    acetaminophen tablet 1,000 mg Q6H PRN    albuterol inhaler 2 puff Q6H PRN    albuterol-ipratropium 2.5 mg-0.5 mg/3 mL nebulizer solution 3 mL Q4H    allopurinol split tablet 50 mg Daily    amLODIPine tablet 10 mg Daily    atorvastatin tablet 40 mg QHS    azithromycin tablet 250 mg Daily    bisacodyL EC tablet 10 mg Daily PRN    budesonide nebulizer solution 0.5 mg Daily    calcium gluconate 1 g in NS IVPB (premixed) Q10 Min PRN    cetirizine tablet 10 mg Daily PRN    dextromethorphan-guaiFENesin  mg/5 ml liquid 10 mL Q6H PRN    dextrose 50% injection 12.5 g PRN    dextrose 50% injection 25 g PRN    ferrous gluconate tablet 324 mg Daily with breakfast    glucagon (human recombinant) injection 1 mg PRN    glucose chewable tablet 16 g PRN    glucose chewable tablet 24 g PRN    heparin (porcine) injection 5,000 Units Q8H    HYDROcodone-acetaminophen 7.5-325 mg per tablet 1 tablet Q6H PRN    insulin aspart U-100 injection 1-10 Units QID (AC + HS) PRN    insulin detemir U-100 injection 5 Units QHS    isosorbide mononitrate 24 hr tablet 60 mg Daily    melatonin tablet 6 mg Nightly PRN    metoprolol tartrate (LOPRESSOR) tablet 25 mg Daily    mineral oil enema 1 enema Daily PRN    multivitamin tablet Daily    naloxone 0.4 mg/mL injection 0.02 mg PRN    ondansetron injection 8 mg Q6H PRN    predniSONE tablet 60 mg Daily    simethicone chewable tablet 80 mg TID PRN    sodium chloride 0.9% flush 10 mL Q12H PRN    traMADoL tablet 50 mg Q8H PRN    traZODone tablet 50 mg Nightly PRN       Objective:     Vital Signs (Most Recent):  Temp: 98.1 °F (36.7 °C) (06/23/22 1615)  Pulse: 93 (06/23/22 1615)  Resp: 18 (06/23/22 1615)  BP:  136/84 (06/23/22 1615)  SpO2: 95 % (06/23/22 1615)  O2 Device (Oxygen Therapy): nasal cannula (06/23/22 0700) Vital Signs (24h Range):  Temp:  [97.5 °F (36.4 °C)-98.1 °F (36.7 °C)] 98.1 °F (36.7 °C)  Pulse:  [72-95] 93  Resp:  [18-20] 18  SpO2:  [92 %-96 %] 95 %  BP: (125-136)/(73-87) 136/84     Weight: 119.2 kg (262 lb 12.6 oz) (06/23/22 1300)  Body mass index is 35.64 kg/m².  Body surface area is 2.46 meters squared.    I/O last 3 completed shifts:  In: -   Out: 1000 [Urine:1000]    Physical Exam  Vitals reviewed.   Constitutional:       Appearance: Normal appearance.   HENT:      Head: Normocephalic and atraumatic.   Cardiovascular:      Rate and Rhythm: Normal rate.      Pulses: Normal pulses.   Pulmonary:      Effort: Pulmonary effort is normal.   Abdominal:      Palpations: Abdomen is soft.   Neurological:      Mental Status: He is alert.   Psychiatric:         Mood and Affect: Mood normal.       Significant Labs:  BMP:   Recent Labs   Lab 06/23/22  0250   *   *   K 4.5   CL 90*   CO2 31   *   CREATININE 6.02*   CALCIUM 8.0*     CBC:   Recent Labs   Lab 06/23/22  0250   WBC 14.10*   RBC 2.98*   HGB 8.7*   HCT 24.9*      MCV 83.6   MCH 29.2   MCHC 34.9        Significant Imaging:  Labs: Reviewed

## 2022-06-23 NOTE — ASSESSMENT & PLAN NOTE
Acute on chronic renal failure.  2. No uremic symptoms  3.  Hepatitis panel  4.  Strict I/os  5.  BMP in AM  6.  Will follow  6/18/2022  The patient's renal function is improving.  6/19/2022 Continue to monitor.  6/20/2022 Continued improvement in renal function.  6/21/2022  Daily bmp.  6/22/2022  Continue to monitor at this time.  Daily BMP.  6/23/2022  No uremic symptoms.  Continue to monitor.

## 2022-06-23 NOTE — PLAN OF CARE
Dr. Guerrero requested SS look into LTAC for patient. I spoke with Mary in Specialty and she reviewed chart. She does not think he will qualify for LTAC because he is no longer on IV antibiotics and he has no other qualifying factors. Dr. Guerrero notified.

## 2022-06-23 NOTE — PROGRESS NOTES
[facility] - [address] [place] [address]  Nephrology  Progress Note    Patient Name: Neymar Parra  MRN: [id number]  Admission Date: 6/16/2022  Hospital Length of Stay: 7 days  Attending Provider: Earl Lemus Jr., MD   Primary Care Physician: [facility] megan Sun  Principal Problem:DEO (acute kidney injury)    Subjective:     HPI: This patient with history of HTN, DM, CKD who has seen Dr. Crowder in the past 2 years ago is currently in a nursing facility.  The patient presented with SOB and diagnosed with COVID.  Serum creatinine has trended up to 7.15.  Serum potassium is 6.4.  Nephrology has been consulted for renal issues.  At the bedside, the patient mentions feeling fine.  He states that his breathing has improved.      Interval History: The patient is sitting up in bed. Serum creatinine is 6 today.  At this time, no uremic symptoms.  Continuing to monitor.  Review of patient's allergies indicates:   Allergen Reactions    Gatifloxacin Anaphylaxis     Current Facility-Administered Medications   Medication Frequency    acetaminophen tablet 1,000 mg Q6H PRN    albuterol inhaler 2 puff Q6H PRN    albuterol-ipratropium 2.5 mg-0.5 mg/3 mL nebulizer solution 3 mL Q4H    allopurinol split tablet 50 mg Daily    amLODIPine tablet 10 mg Daily    atorvastatin tablet 40 mg QHS    azithromycin tablet 250 mg Daily    bisacodyL EC tablet 10 mg Daily PRN    budesonide nebulizer solution 0.5 mg Daily    calcium gluconate 1 g in NS IVPB (premixed) Q10 Min PRN    cetirizine tablet 10 mg Daily PRN    dextromethorphan-guaiFENesin  mg/5 ml liquid 10 mL Q6H PRN    dextrose 50% injection 12.5 g PRN    dextrose 50% injection 25 g PRN    ferrous gluconate tablet 324 mg Daily with breakfast    glucagon (human recombinant) injection 1 mg PRN    glucose chewable tablet 16 g PRN    glucose chewable tablet 24 g PRN    heparin (porcine) injection 5,000 Units Q8H    HYDROcodone-acetaminophen 7.5-325 mg  per tablet 1 tablet Q6H PRN    insulin aspart U-100 injection 1-10 Units QID (AC + HS) PRN    insulin detemir U-100 injection 5 Units QHS    isosorbide mononitrate 24 hr tablet 60 mg Daily    melatonin tablet 6 mg Nightly PRN    metoprolol tartrate (LOPRESSOR) tablet 25 mg Daily    mineral oil enema 1 enema Daily PRN    multivitamin tablet Daily    naloxone 0.4 mg/mL injection 0.02 mg PRN    ondansetron injection 8 mg Q6H PRN    predniSONE tablet 60 mg Daily    simethicone chewable tablet 80 mg TID PRN    sodium chloride 0.9% flush 10 mL Q12H PRN    traMADoL tablet 50 mg Q8H PRN    traZODone tablet 50 mg Nightly PRN       Objective:     Vital Signs (Most Recent):  Temp: 98.1 °F (36.7 °C) (06/23/22 1615)  Pulse: 93 (06/23/22 1615)  Resp: 18 (06/23/22 1615)  BP: 136/84 (06/23/22 1615)  SpO2: 95 % (06/23/22 1615)  O2 Device (Oxygen Therapy): nasal cannula (06/23/22 0700) Vital Signs (24h Range):  Temp:  [97.5 °F (36.4 °C)-98.1 °F (36.7 °C)] 98.1 °F (36.7 °C)  Pulse:  [72-95] 93  Resp:  [18-20] 18  SpO2:  [92 %-96 %] 95 %  BP: (125-136)/(73-87) 136/84     Weight: 119.2 kg (262 lb 12.6 oz) (06/23/22 1300)  Body mass index is 35.64 kg/m².  Body surface area is 2.46 meters squared.    I/O last 3 completed shifts:  In: -   Out: 1000 [Urine:1000]    Physical Exam  Vitals reviewed.   Constitutional:       Appearance: Normal appearance.   HENT:      Head: Normocephalic and atraumatic.   Cardiovascular:      Rate and Rhythm: Normal rate.      Pulses: Normal pulses.   Pulmonary:      Effort: Pulmonary effort is normal.   Abdominal:      Palpations: Abdomen is soft.   Neurological:      Mental Status: He is alert.   Psychiatric:         Mood and Affect: Mood normal.       Significant Labs:  BMP:   Recent Labs   Lab 06/23/22  0250   *   *   K 4.5   CL 90*   CO2 31   *   CREATININE 6.02*   CALCIUM 8.0*     CBC:   Recent Labs   Lab 06/23/22  0250   WBC 14.10*   RBC 2.98*   HGB 8.7*   HCT 24.9*   PLT  211   MCV 83.6   MCH 29.2   MCHC 34.9        Significant Imaging:  Labs: Reviewed    Assessment/Plan:     * DEO (acute kidney injury)  Acute on chronic renal failure.  2. No uremic symptoms  3.  Hepatitis panel  4.  Strict I/os  5.  BMP in AM  6.  Will follow  6/18/2022  The patient's renal function is improving.  6/19/2022 Continue to monitor.  6/20/2022 Continued improvement in renal function.  6/21/2022  Daily bmp.  6/22/2022  Continue to monitor at this time.  Daily BMP.  6/23/2022  No uremic symptoms.  Continue to monitor.    Type 2 diabetes mellitus without complication  Chronic condition    COPD    Thank you for your consult. I will follow-up with patient. Please contact us if you have any additional questions.    Jorge Butts Jr, MD  Nephrology  [facility] - [address] [place] [address]

## 2022-06-23 NOTE — SUBJECTIVE & OBJECTIVE
Interval History: Mr Parra is doing well today. He describes having the urge to defecate. Will add an enema PRN. His breath sounds are improving. Will transition to PO steroids.     Review of Systems   Constitutional:  Negative for chills, diaphoresis, fatigue and fever.   HENT:  Negative for congestion, rhinorrhea and sinus pressure.    Eyes:  Negative for visual disturbance.   Respiratory:  Negative for cough, shortness of breath and wheezing.    Cardiovascular:  Negative for chest pain, palpitations and leg swelling.   Gastrointestinal:  Positive for constipation. Negative for abdominal pain, diarrhea, nausea and vomiting.   Genitourinary:  Negative for dysuria.   Musculoskeletal:  Negative for arthralgias.   Skin:  Negative for rash and wound.   Neurological:  Negative for dizziness, weakness and headaches.   Hematological:  Negative for adenopathy.   Psychiatric/Behavioral:  Negative for agitation.    Objective:     Vital Signs (Most Recent):  Temp: 97.5 °F (36.4 °C) (06/23/22 1030)  Pulse: 90 (06/23/22 1030)  Resp: 18 (06/23/22 1030)  BP: 127/79 (06/23/22 1030)  SpO2: 96 % (06/23/22 1030) Vital Signs (24h Range):  Temp:  [97.5 °F (36.4 °C)-98.4 °F (36.9 °C)] 97.5 °F (36.4 °C)  Pulse:  [72-95] 90  Resp:  [18-20] 18  SpO2:  [92 %-98 %] 96 %  BP: (125-132)/(73-87) 127/79     Weight: 119.2 kg (262 lb 11.2 oz)  Body mass index is 35.63 kg/m².    Intake/Output Summary (Last 24 hours) at 6/23/2022 1222  Last data filed at 6/23/2022 0900  Gross per 24 hour   Intake --   Output 1000 ml   Net -1000 ml      Physical Exam  Vitals reviewed.   Constitutional:       General: He is not in acute distress.     Appearance: He is normal weight.   HENT:      Head: Normocephalic and atraumatic.      Right Ear: External ear normal.      Left Ear: External ear normal.      Nose: Nose normal.      Mouth/Throat:      Mouth: Mucous membranes are moist.      Pharynx: Oropharynx is clear.   Eyes:      Extraocular Movements: Extraocular  movements intact.      Pupils: Pupils are equal, round, and reactive to light.   Cardiovascular:      Rate and Rhythm: Normal rate and regular rhythm.      Pulses: Normal pulses.      Heart sounds: Normal heart sounds.   Pulmonary:      Effort: Pulmonary effort is normal. No respiratory distress.      Breath sounds: Normal breath sounds.   Abdominal:      General: Abdomen is flat. There is no distension.      Palpations: Abdomen is soft.      Tenderness: There is no abdominal tenderness.      Comments: hypoactive   Musculoskeletal:         General: Normal range of motion.      Cervical back: Normal range of motion.   Skin:     General: Skin is warm.      Capillary Refill: Capillary refill takes less than 2 seconds.   Neurological:      General: No focal deficit present.      Mental Status: He is alert and oriented to person, place, and time.   Psychiatric:         Mood and Affect: Mood normal.       Significant Labs: All pertinent labs within the past 24 hours have been reviewed.  BMP:   Recent Labs   Lab 06/23/22  0250   *   *   K 4.5   CL 90*   CO2 31   *   CREATININE 6.02*   CALCIUM 8.0*     CBC:   Recent Labs   Lab 06/22/22  0843 06/23/22  0250   WBC 11.77* 14.10*   HGB 9.5* 8.7*   HCT 27.5* 24.9*    211       Significant Imaging: I have reviewed all pertinent imaging results/findings within the past 24 hours.

## 2022-06-23 NOTE — NURSING
Patient is alert and oriented to person and place. respirations even and unlabored on 3 liters of oxygen. Patient complains of constipation. Patient has been given bisacodyl and apple juice to stimulate a  Bowel movement. Patient has has not had a bowel movement yet. No further complaints noted at this time. Bed low, cb in reach, sr upx2

## 2022-06-23 NOTE — PROGRESS NOTES
[facility]  Adult Nutrition  Progress Note    SUMMARY       Recommendations    Recommendation/Intervention: Continue current POC  Goals: tolerate diet, consume % of meals  Nutrition Goal Status: new    Assessment and Plan  Pt seen for LOS. Pt currently receiving a cardiac diet at this time, appropriate. No intakes documented per flowsheets. Note, pt with CKD and DM PMH. Most recent A1C 4.9 per MD note, elevated glucose levels most likely due to medications. Continue current diet order. Pt current weight is 262 lbs, indicating a 5 lb weight gain since 6/22. RD suspect some weight gain is likely fluid related. Will monitor. Last BM: 6/20 per flowsheets. Labs/meds reviewed. RD following.   No new Assessment & Plan notes have been filed under this hospital service since the last note was generated.  Service: Nutrition     Reason for Assessment    Reason For Assessment: length of stay  Diagnosis: other (see comments) (DEO)  Relevant Medical History: HTN, CAD, CKD4, anticoagulant long term use    Nutrition Risk Screen    Nutrition Risk Screen: no indicators present    Anthropometrics    Temp: 97.5 °F (36.4 °C)  Height Method: Stated  Height: 6' (182.9 cm)  Height (inches): 72 in  Weight Method: Bed Scale  Weight: 119.2 kg (262 lb 12.6 oz)  Weight (lb): 262.79 lb  Ideal Body Weight (IBW), Male: 178 lb  % Ideal Body Weight, Male (lb): 147.63 %  BMI (Calculated): 35.6       Lab/Procedures/Meds   Latest Reference Range & Units 06/23/22 02:50   Sodium 136 - 145 mmol/L 128 (L)   Potassium 3.5 - 5.1 mmol/L 4.5   Chloride 98 - 107 mmol/L 90 (L)   CO2 21 - 32 mmol/L 31   Anion Gap 7 - 16 mmol/L 12   BUN 7 - 18 mg/dL 112 (H)   Creatinine 0.70 - 1.30 mg/dL 6.02 (H)   BUN/CREAT RATIO 6 - 20  19   eGFR if non African American >=60 mL/min/1.73m² 10 (L)   Glucose 74 - 106 mg/dL 207 (H)   Calcium 8.5 - 10.1 mg/dL 8.0 (L)   (L): Data is abnormally low  (H): Data is abnormally high  Pertinent Labs  Reviewed: reviewed  Pertinent Medications Reviewed: reviewed  Pertinent Medications Comments: allopurinol, amlodipine, atorvastatin, azithromycin, ferrous sulfate, heparin, isoorbide mononitrate, lopressor, mvi, prednisone    Estimated/Assessed Needs    Weight Used For Calorie Calculations: 90 kg (198 lb 6.6 oz)  Energy Calorie Requirements (kcal): 4022-5675 kcals/day (25-30 kcals/kg Adj BW)     Protein Requirements: 72 g/day (0.8 g/kg adj BW)  Weight Used For Protein Calculations: 90 kg (198 lb 6.6 oz)  Fluid Requirements (mL): 4464-4970 mL/day     RDA Method (mL): 2250         Nutrition Prescription Ordered    Current Diet Order: cardiac    Evaluation of Received Nutrient/Fluid Intake     % Intake of Estimated Energy Needs: Other: Unable to assess  % Meal Intake: Other: No intake documented at this time, unable to determine intake    Nutrition Risk    Level of Risk/Frequency of Follow-up: low     Monitor and Evaluation     1. Monitor PO intake/tolerance   2. Monitor weight changes  3. Monitor labs/meds  4. Monitor POC     Nutrition Follow-Up    RD Follow-up?: Yes

## 2022-06-23 NOTE — NURSING
Patient had a large bowel movement , stool was brown in color and formed. Fidelina care has been completed with soap and water and linen pad has been changed. Patient is currently sitting in bed with head elevated 30 degrees. Patient has been assisted to turn to his left side as requested. Bed low, cb in reach, sr upx2

## 2022-06-24 LAB
ANION GAP SERPL CALCULATED.3IONS-SCNC: 17 MMOL/L (ref 7–16)
ANISOCYTOSIS BLD QL SMEAR: ABNORMAL
BASOPHILS # BLD AUTO: 0.02 K/UL (ref 0–0.2)
BASOPHILS NFR BLD AUTO: 0.1 % (ref 0–1)
BUN SERPL-MCNC: 122 MG/DL (ref 7–18)
BUN/CREAT SERPL: 20 (ref 6–20)
CALCIUM SERPL-MCNC: 8.1 MG/DL (ref 8.5–10.1)
CHLORIDE SERPL-SCNC: 88 MMOL/L (ref 98–107)
CO2 SERPL-SCNC: 24 MMOL/L (ref 21–32)
CREAT SERPL-MCNC: 5.99 MG/DL (ref 0.7–1.3)
DACRYOCYTES BLD QL SMEAR: ABNORMAL
DIFFERENTIAL METHOD BLD: ABNORMAL
EOSINOPHIL # BLD AUTO: 0 K/UL (ref 0–0.5)
EOSINOPHIL NFR BLD AUTO: 0 % (ref 1–4)
ERYTHROCYTE [DISTWIDTH] IN BLOOD BY AUTOMATED COUNT: 15.2 % (ref 11.5–14.5)
GLUCOSE SERPL-MCNC: 152 MG/DL (ref 74–106)
GLUCOSE SERPL-MCNC: 175 MG/DL (ref 70–105)
GLUCOSE SERPL-MCNC: 199 MG/DL (ref 70–105)
GLUCOSE SERPL-MCNC: 220 MG/DL (ref 70–105)
GLUCOSE SERPL-MCNC: 234 MG/DL (ref 70–105)
HCT VFR BLD AUTO: 26.5 % (ref 40–54)
HGB BLD-MCNC: 9 G/DL (ref 13.5–18)
IMM GRANULOCYTES # BLD AUTO: 0.25 K/UL (ref 0–0.04)
IMM GRANULOCYTES NFR BLD: 1.5 % (ref 0–0.4)
LYMPHOCYTES # BLD AUTO: 0.54 K/UL (ref 1–4.8)
LYMPHOCYTES NFR BLD AUTO: 3.3 % (ref 27–41)
LYMPHOCYTES NFR BLD MANUAL: 6 % (ref 27–41)
MCH RBC QN AUTO: 28.6 PG (ref 27–31)
MCHC RBC AUTO-ENTMCNC: 34 G/DL (ref 32–36)
MCV RBC AUTO: 84.1 FL (ref 80–96)
MONOCYTES # BLD AUTO: 1.22 K/UL (ref 0–0.8)
MONOCYTES NFR BLD AUTO: 7.6 % (ref 2–6)
MONOCYTES NFR BLD MANUAL: 8 % (ref 2–6)
MPC BLD CALC-MCNC: 9.2 FL (ref 9.4–12.4)
NEUTROPHILS # BLD AUTO: 14.11 K/UL (ref 1.8–7.7)
NEUTROPHILS NFR BLD AUTO: 87.5 % (ref 53–65)
NEUTS SEG NFR BLD MANUAL: 86 % (ref 50–62)
NRBC # BLD AUTO: 0.04 X10E3/UL
NRBC, AUTO (.00): 0.2 %
OVALOCYTES BLD QL SMEAR: ABNORMAL
PLATELET # BLD AUTO: 210 K/UL (ref 150–400)
PLATELET MORPHOLOGY: NORMAL
POLYCHROMASIA BLD QL SMEAR: ABNORMAL
POTASSIUM SERPL-SCNC: 4.7 MMOL/L (ref 3.5–5.1)
RBC # BLD AUTO: 3.15 M/UL (ref 4.6–6.2)
SCHISTOCYTES BLD QL AUTO: ABNORMAL
SODIUM SERPL-SCNC: 124 MMOL/L (ref 136–145)
WBC # BLD AUTO: 16.14 K/UL (ref 4.5–11)

## 2022-06-24 PROCEDURE — 99232 PR SUBSEQUENT HOSPITAL CARE,LEVL II: ICD-10-PCS | Mod: GC,,, | Performed by: FAMILY MEDICINE

## 2022-06-24 PROCEDURE — 99232 SBSQ HOSP IP/OBS MODERATE 35: CPT | Mod: GC,,, | Performed by: FAMILY MEDICINE

## 2022-06-24 PROCEDURE — 25000003 PHARM REV CODE 250: Performed by: FAMILY MEDICINE

## 2022-06-24 PROCEDURE — 99900035 HC TECH TIME PER 15 MIN (STAT)

## 2022-06-24 PROCEDURE — 82962 GLUCOSE BLOOD TEST: CPT

## 2022-06-24 PROCEDURE — 63600175 PHARM REV CODE 636 W HCPCS

## 2022-06-24 PROCEDURE — 27000221 HC OXYGEN, UP TO 24 HOURS

## 2022-06-24 PROCEDURE — 25000003 PHARM REV CODE 250

## 2022-06-24 PROCEDURE — 85025 COMPLETE CBC W/AUTO DIFF WBC: CPT | Performed by: FAMILY MEDICINE

## 2022-06-24 PROCEDURE — C9399 UNCLASSIFIED DRUGS OR BIOLOG: HCPCS | Performed by: FAMILY MEDICINE

## 2022-06-24 PROCEDURE — 94668 MNPJ CHEST WALL SBSQ: CPT

## 2022-06-24 PROCEDURE — 11000001 HC ACUTE MED/SURG PRIVATE ROOM

## 2022-06-24 PROCEDURE — 25000242 PHARM REV CODE 250 ALT 637 W/ HCPCS

## 2022-06-24 PROCEDURE — 25000003 PHARM REV CODE 250: Performed by: HOSPITALIST

## 2022-06-24 PROCEDURE — 94640 AIRWAY INHALATION TREATMENT: CPT

## 2022-06-24 PROCEDURE — 80048 BASIC METABOLIC PNL TOTAL CA: CPT | Performed by: FAMILY MEDICINE

## 2022-06-24 PROCEDURE — 94761 N-INVAS EAR/PLS OXIMETRY MLT: CPT

## 2022-06-24 PROCEDURE — 63600175 PHARM REV CODE 636 W HCPCS: Performed by: FAMILY MEDICINE

## 2022-06-24 PROCEDURE — 36415 COLL VENOUS BLD VENIPUNCTURE: CPT | Performed by: FAMILY MEDICINE

## 2022-06-24 PROCEDURE — 63700000 PHARM REV CODE 250 ALT 637 W/O HCPCS

## 2022-06-24 RX ORDER — FUROSEMIDE 10 MG/ML
40 INJECTION INTRAMUSCULAR; INTRAVENOUS ONCE
Status: COMPLETED | OUTPATIENT
Start: 2022-06-24 | End: 2022-06-24

## 2022-06-24 RX ADMIN — AMLODIPINE BESYLATE 10 MG: 10 TABLET ORAL at 09:06

## 2022-06-24 RX ADMIN — INSULIN ASPART 4 UNITS: 100 INJECTION, SOLUTION INTRAVENOUS; SUBCUTANEOUS at 04:06

## 2022-06-24 RX ADMIN — ISOSORBIDE MONONITRATE 60 MG: 60 TABLET, EXTENDED RELEASE ORAL at 09:06

## 2022-06-24 RX ADMIN — AZITHROMYCIN MONOHYDRATE 250 MG: 250 TABLET ORAL at 09:06

## 2022-06-24 RX ADMIN — BUDESONIDE INHALATION 0.5 MG: 0.5 SUSPENSION RESPIRATORY (INHALATION) at 07:06

## 2022-06-24 RX ADMIN — ATORVASTATIN CALCIUM 40 MG: 40 TABLET, FILM COATED ORAL at 08:06

## 2022-06-24 RX ADMIN — INSULIN DETEMIR 5 UNITS: 100 INJECTION, SOLUTION SUBCUTANEOUS at 08:06

## 2022-06-24 RX ADMIN — IPRATROPIUM BROMIDE AND ALBUTEROL SULFATE 3 ML: 2.5; .5 SOLUTION RESPIRATORY (INHALATION) at 07:06

## 2022-06-24 RX ADMIN — THERA TABS 1 TABLET: TAB at 09:06

## 2022-06-24 RX ADMIN — INSULIN ASPART 2 UNITS: 100 INJECTION, SOLUTION INTRAVENOUS; SUBCUTANEOUS at 11:06

## 2022-06-24 RX ADMIN — IPRATROPIUM BROMIDE AND ALBUTEROL SULFATE 3 ML: 2.5; .5 SOLUTION RESPIRATORY (INHALATION) at 12:06

## 2022-06-24 RX ADMIN — APIXABAN 2.5 MG: 2.5 TABLET, FILM COATED ORAL at 04:06

## 2022-06-24 RX ADMIN — FERROUS GLUCONATE 324 MG: 324 TABLET ORAL at 09:06

## 2022-06-24 RX ADMIN — PANTOPRAZOLE SODIUM 40 MG: 40 TABLET, DELAYED RELEASE ORAL at 09:06

## 2022-06-24 RX ADMIN — IPRATROPIUM BROMIDE AND ALBUTEROL SULFATE 3 ML: 2.5; .5 SOLUTION RESPIRATORY (INHALATION) at 04:06

## 2022-06-24 RX ADMIN — HYDROCODONE BITARTRATE AND ACETAMINOPHEN 1 TABLET: 7.5; 325 TABLET ORAL at 08:06

## 2022-06-24 RX ADMIN — INSULIN ASPART 2 UNITS: 100 INJECTION, SOLUTION INTRAVENOUS; SUBCUTANEOUS at 08:06

## 2022-06-24 RX ADMIN — HYDROCODONE BITARTRATE AND ACETAMINOPHEN 1 TABLET: 7.5; 325 TABLET ORAL at 05:06

## 2022-06-24 RX ADMIN — ALLOPURINOL 50 MG: 300 TABLET ORAL at 09:06

## 2022-06-24 RX ADMIN — METOPROLOL TARTRATE 25 MG: 25 TABLET, FILM COATED ORAL at 09:06

## 2022-06-24 RX ADMIN — HEPARIN SODIUM 5000 UNITS: 5000 INJECTION INTRAVENOUS; SUBCUTANEOUS at 05:06

## 2022-06-24 RX ADMIN — FUROSEMIDE 40 MG: 10 INJECTION INTRAMUSCULAR; INTRAVENOUS at 02:06

## 2022-06-24 RX ADMIN — PREDNISONE 60 MG: 10 TABLET ORAL at 09:06

## 2022-06-24 RX ADMIN — IPRATROPIUM BROMIDE AND ALBUTEROL SULFATE 3 ML: 2.5; .5 SOLUTION RESPIRATORY (INHALATION) at 11:06

## 2022-06-24 NOTE — ASSESSMENT & PLAN NOTE
- Ddimer was normal on admission  - Repeat was 1.83  - Resume eliquis   - Patient did recently have COVID, has increased clotting tisk

## 2022-06-24 NOTE — ASSESSMENT & PLAN NOTE
6/18/2022  The patient's renal function is improving.  6/19/2022 Continue to monitor.  6/20/2022 Continued improvement in renal function.  6/21/2022  Daily bmp.  6/22/2022  Continue to monitor at this time.  Daily BMP.  6/23/2022  No uremic symptoms.  Continue to monitor.  6/24/2022  At this time, continue to monitor.  Creatinine is 5.99 which is slightly better.

## 2022-06-24 NOTE — ASSESSMENT & PLAN NOTE
- Improving  - Continue oral prednisone  - Continue azithromycin another 4 days  - Walk test for home oxygen

## 2022-06-24 NOTE — PROGRESS NOTES
[facility] - [address] [place] [address] Medicine  Progress Note    Patient Name: Neymar Parra  MRN: [id number]  Patient Class: IP- Inpatient   Admission Date: 6/16/2022  Length of Stay: 8 days  Attending Physician: Earl Lemus Jr., MD  Primary Care Provider: [facility] megan Rodríguez        Subjective:     Principal Problem:COPD exacerbation        HPI:  72 y.o. male transferred to the [facility] from [facility] for hyperkalemia and hypoxia. Pt reports he went to [facility] because he was having dyspnea and hemoptysis since 1 month which is worse today. Pt reports he was tested positive for COVID at the nursing home on 6/9/22. Per nursing home patient oxygen saturation was in the 80's on RA with tachypnea and labored breathing. Pt reports he normally does not wear oxygen at the nursing home but has been wearing oxygen more often recently. Pt reports he smoked 1-1.5 PPD for 20 years but quit 7 months ago. Per pt, he saw a pulmonologist (Dr. Aldana) for his hemoptysis and had some tests done the results of which he does not know. Per records, pt with a right pleural effusion recently and had a thoracentesis. Pt denies any fever, congestion, dysuria, N/V/D, chest pain, or any other complaints at this time.     In the ED, pt's K 7.2, d-dimer 0.76, BNP 6284, BUN/Cr 73/7.16. EKG showed some peaked t-waves and irregular rhythm. Chest xray showed Increased right lower lung pulmonary density could indicate pneumonia. Pt was treated in the ED with Calcium gluconate 1g, regular insulin 10 units, D50, IV solumedrol 125 mg, tylenol 650 mg and IV fluids NS 1L bolus. Pt will be admitted to the hospital service for management of hypoxia and hyperkalemia.      Overview/Hospital Course:        Interval History: Patient is resting comfortably in bed with no acute complaints. No overnight events reported. Is tolerating oral steroids well. Sodium is 124 today, will give patient IV lasix and recheck BMP tomorrow morning.  Resume low dose eliquis for DVT prophylaxis. Patient to get walk test to see if he needs home O2, due to his chronic SOB due to his COPD.     Review of Systems   Constitutional:  Negative for chills, fatigue and fever.   HENT:  Negative for congestion, hearing loss and trouble swallowing.    Eyes:  Negative for visual disturbance.   Respiratory:  Positive for shortness of breath. Negative for cough (hemoptysis).    Cardiovascular:  Negative for chest pain, palpitations and leg swelling.   Gastrointestinal:  Negative for abdominal pain, blood in stool, diarrhea, nausea and vomiting.   Genitourinary:  Negative for difficulty urinating and hematuria.   Musculoskeletal:  Negative for back pain and myalgias.   Skin:  Negative for rash.   Neurological:  Negative for dizziness, light-headedness and headaches.   Psychiatric/Behavioral:  Negative for sleep disturbance. The patient is not nervous/anxious.    Objective:     Vital Signs (Most Recent):  Temp: 98.5 °F (36.9 °C) (06/24/22 1005)  Pulse: 86 (06/24/22 1214)  Resp: (!) 21 (06/24/22 1214)  BP: 139/67 (06/24/22 1005)  SpO2: 96 % (06/24/22 1214)   Vital Signs (24h Range):  Temp:  [97.7 °F (36.5 °C)-98.5 °F (36.9 °C)] 98.5 °F (36.9 °C)  Pulse:  [66-95] 86  Resp:  [16-24] 21  SpO2:  [90 %-98 %] 96 %  BP: (125-144)/(61-84) 139/67     Weight: 121 kg (266 lb 12.1 oz)  Body mass index is 36.18 kg/m².    Intake/Output Summary (Last 24 hours) at 6/24/2022 1357  Last data filed at 6/24/2022 1005  Gross per 24 hour   Intake --   Output 1725 ml   Net -1725 ml      Physical Exam  Vitals reviewed.   Constitutional:       General: He is not in acute distress.     Appearance: He is normal weight.   HENT:      Head: Normocephalic and atraumatic.      Right Ear: External ear normal.      Left Ear: External ear normal.      Nose: Nose normal.      Mouth/Throat:      Mouth: Mucous membranes are moist.      Pharynx: Oropharynx is clear.   Eyes:      Extraocular Movements: Extraocular  movements intact.      Pupils: Pupils are equal, round, and reactive to light.   Cardiovascular:      Rate and Rhythm: Normal rate and regular rhythm.      Pulses: Normal pulses.      Heart sounds: Normal heart sounds.   Pulmonary:      Effort: Pulmonary effort is normal. No respiratory distress.      Breath sounds: Normal breath sounds.   Abdominal:      General: Abdomen is flat. There is no distension.      Palpations: Abdomen is soft.      Tenderness: There is no abdominal tenderness.   Musculoskeletal:         General: Normal range of motion.      Cervical back: Normal range of motion.   Skin:     General: Skin is warm.      Capillary Refill: Capillary refill takes less than 2 seconds.   Neurological:      General: No focal deficit present.      Mental Status: He is alert and oriented to person, place, and time.   Psychiatric:         Mood and Affect: Mood normal.       Significant Labs: All pertinent labs within the past 24 hours have been reviewed.    Significant Imaging: I have reviewed all pertinent imaging results/findings within the past 24 hours.      Assessment/Plan:      * COPD exacerbation  - Improving  - Continue oral prednisone  - Continue azithromycin another 4 days  - Walk test for home oxygen    Elevated d-dimer  - Ddimer was normal on admission  - Repeat was 1.83  - Resume eliquis   - Patient did recently have COVID, has increased clotting tisk       Anemia  -H/H remaining stable  -serial BMP, Fe, TIBC, ferritin, B12 and folate.      DEO (acute kidney injury)  - Mildly improving, BUN/Crt 122/5.99 today  - Nephrology following  - 101/6.18 today, continue to monitor    Type 2 diabetes mellitus without complication  -HgA1c 4.7 10 days ago  - SSI  -  today    Gastroesophageal reflux disease  -pantoprazole 40 mg qd    Hypertension  -continue home amlodipine 10 mg qd, isosorbide mononitrate 60 mg qd, and lisinopril 20 mg qd.      VTE Risk Mitigation (From admission, onward)         Ordered      apixaban tablet 2.5 mg  2 times daily         06/24/22 1201     Reason for No Pharmacological VTE Prophylaxis  Once        Question:  Reasons:  Answer:  Risk of Bleeding    06/16/22 0301     IP VTE HIGH RISK PATIENT  Once         06/16/22 0301     Place sequential compression device  Until discontinued         06/16/22 0301                Discharge Planning   EDWARDO:      Code Status: Full Code   Is the patient medically ready for discharge?:     Reason for patient still in hospital (select all that apply): Treatment  Discharge Plan A: Long-term acute care facility (LTAC) (Choice obtained for Specialty)                  Brooklynn Kaur MD  Department of Hospital Medicine   [address]

## 2022-06-24 NOTE — RESPIRATORY THERAPY
Found patient lying in bed, wearing 02 NC 3LPM.  02 sat 90%. Removed NC, 02 sat dropped to 84% in < a minute. Placed pt back on NC 3LPM.

## 2022-06-24 NOTE — ASSESSMENT & PLAN NOTE
- Mildly improving, BUN/Crt 122/5.99 today  - Nephrology following  - 101/6.18 today, continue to monitor

## 2022-06-24 NOTE — SUBJECTIVE & OBJECTIVE
Interval History: The patient is sitting up in bed.  He states he is feeling a little better today.  Serum creatinine is 5.99 which is slightly better as well.    Review of patient's allergies indicates:   Allergen Reactions    Gatifloxacin Anaphylaxis     Current Facility-Administered Medications   Medication Frequency    acetaminophen tablet 1,000 mg Q6H PRN    albuterol inhaler 2 puff Q6H PRN    albuterol-ipratropium 2.5 mg-0.5 mg/3 mL nebulizer solution 3 mL Q4H    allopurinol split tablet 50 mg Daily    amLODIPine tablet 10 mg Daily    apixaban tablet 2.5 mg BID    atorvastatin tablet 40 mg QHS    azithromycin tablet 250 mg Daily    bisacodyL EC tablet 10 mg Daily PRN    budesonide nebulizer solution 0.5 mg Daily    calcium gluconate 1 g in NS IVPB (premixed) Q10 Min PRN    cetirizine tablet 10 mg Daily PRN    dextromethorphan-guaiFENesin  mg/5 ml liquid 10 mL Q6H PRN    dextrose 50% injection 12.5 g PRN    dextrose 50% injection 25 g PRN    ferrous gluconate tablet 324 mg Daily with breakfast    glucagon (human recombinant) injection 1 mg PRN    glucose chewable tablet 16 g PRN    glucose chewable tablet 24 g PRN    HYDROcodone-acetaminophen 7.5-325 mg per tablet 1 tablet Q6H PRN    insulin aspart U-100 injection 1-10 Units QID (AC + HS) PRN    insulin detemir U-100 injection 5 Units QHS    isosorbide mononitrate 24 hr tablet 60 mg Daily    melatonin tablet 6 mg Nightly PRN    metoprolol tartrate (LOPRESSOR) tablet 25 mg Daily    mineral oil enema 1 enema Daily PRN    multivitamin tablet Daily    naloxone 0.4 mg/mL injection 0.02 mg PRN    ondansetron injection 8 mg Q6H PRN    pantoprazole EC tablet 40 mg Daily    predniSONE tablet 60 mg Daily    simethicone chewable tablet 80 mg TID PRN    sodium chloride 0.9% flush 10 mL Q12H PRN    traMADoL tablet 50 mg Q8H PRN    traZODone tablet 50 mg Nightly PRN       Objective:     Vital Signs (Most Recent):  Temp: 98.5 °F (36.9 °C) (06/24/22 1005)  Pulse: 86  (06/24/22 1214)  Resp: (!) 21 (06/24/22 1214)  BP: 139/67 (06/24/22 1005)  SpO2: 96 % (06/24/22 1214)  O2 Device (Oxygen Therapy): nasal cannula w/ humidification (06/24/22 1214)   Vital Signs (24h Range):  Temp:  [97.7 °F (36.5 °C)-98.5 °F (36.9 °C)] 98.5 °F (36.9 °C)  Pulse:  [66-95] 86  Resp:  [16-24] 21  SpO2:  [90 %-98 %] 96 %  BP: (125-144)/(61-84) 139/67     Weight: 121 kg (266 lb 12.1 oz) (06/24/22 0455)  Body mass index is 36.18 kg/m².  Body surface area is 2.48 meters squared.    I/O last 3 completed shifts:  In: -   Out: 2025 [Urine:2025]    Physical Exam  Vitals reviewed.   HENT:      Head: Normocephalic and atraumatic.      Mouth/Throat:      Mouth: Mucous membranes are moist.   Cardiovascular:      Rate and Rhythm: Regular rhythm.      Pulses: Normal pulses.   Pulmonary:      Effort: Pulmonary effort is normal.   Abdominal:      Palpations: Abdomen is soft.   Neurological:      Mental Status: He is alert.       Significant Labs:  BMP:   Recent Labs   Lab 06/24/22 0445   *   *   K 4.7   CL 88*   CO2 24   *   CREATININE 5.99*   CALCIUM 8.1*     CBC:   Recent Labs   Lab 06/24/22 0445   WBC 16.14*   RBC 3.15*   HGB 9.0*   HCT 26.5*      MCV 84.1   MCH 28.6   MCHC 34.0        Significant Imaging:  Labs: Reviewed

## 2022-06-24 NOTE — PROGRESS NOTES
[facility] - [address] [place] [address]  Nephrology  Progress Note    Patient Name: Neymar Parra  MRN: [id number]  Admission Date: 6/16/2022  Hospital Length of Stay: 8 days  Attending Provider: Earl Lemus Jr., MD   Primary Care Physician: [facility] megan Repton  Principal Problem:COPD exacerbation    Subjective:     HPI: This patient with history of HTN, DM, CKD who has seen Dr. Crowder in the past 2 years ago is currently in a nursing facility.  The patient presented with SOB and diagnosed with COVID.  Serum creatinine has trended up to 7.15.  Serum potassium is 6.4.  Nephrology has been consulted for renal issues.  At the bedside, the patient mentions feeling fine.  He states that his breathing has improved.      Interval History: The patient is sitting up in bed.  He states he is feeling a little better today.  Serum creatinine is 5.99 which is slightly better as well.    Review of patient's allergies indicates:   Allergen Reactions    Gatifloxacin Anaphylaxis     Current Facility-Administered Medications   Medication Frequency    acetaminophen tablet 1,000 mg Q6H PRN    albuterol inhaler 2 puff Q6H PRN    albuterol-ipratropium 2.5 mg-0.5 mg/3 mL nebulizer solution 3 mL Q4H    allopurinol split tablet 50 mg Daily    amLODIPine tablet 10 mg Daily    apixaban tablet 2.5 mg BID    atorvastatin tablet 40 mg QHS    azithromycin tablet 250 mg Daily    bisacodyL EC tablet 10 mg Daily PRN    budesonide nebulizer solution 0.5 mg Daily    calcium gluconate 1 g in NS IVPB (premixed) Q10 Min PRN    cetirizine tablet 10 mg Daily PRN    dextromethorphan-guaiFENesin  mg/5 ml liquid 10 mL Q6H PRN    dextrose 50% injection 12.5 g PRN    dextrose 50% injection 25 g PRN    ferrous gluconate tablet 324 mg Daily with breakfast    glucagon (human recombinant) injection 1 mg PRN    glucose chewable tablet 16 g PRN    glucose chewable tablet 24 g PRN    HYDROcodone-acetaminophen 7.5-325 mg  per tablet 1 tablet Q6H PRN    insulin aspart U-100 injection 1-10 Units QID (AC + HS) PRN    insulin detemir U-100 injection 5 Units QHS    isosorbide mononitrate 24 hr tablet 60 mg Daily    melatonin tablet 6 mg Nightly PRN    metoprolol tartrate (LOPRESSOR) tablet 25 mg Daily    mineral oil enema 1 enema Daily PRN    multivitamin tablet Daily    naloxone 0.4 mg/mL injection 0.02 mg PRN    ondansetron injection 8 mg Q6H PRN    pantoprazole EC tablet 40 mg Daily    predniSONE tablet 60 mg Daily    simethicone chewable tablet 80 mg TID PRN    sodium chloride 0.9% flush 10 mL Q12H PRN    traMADoL tablet 50 mg Q8H PRN    traZODone tablet 50 mg Nightly PRN       Objective:     Vital Signs (Most Recent):  Temp: 98.5 °F (36.9 °C) (06/24/22 1005)  Pulse: 86 (06/24/22 1214)  Resp: (!) 21 (06/24/22 1214)  BP: 139/67 (06/24/22 1005)  SpO2: 96 % (06/24/22 1214)  O2 Device (Oxygen Therapy): nasal cannula w/ humidification (06/24/22 1214)   Vital Signs (24h Range):  Temp:  [97.7 °F (36.5 °C)-98.5 °F (36.9 °C)] 98.5 °F (36.9 °C)  Pulse:  [66-95] 86  Resp:  [16-24] 21  SpO2:  [90 %-98 %] 96 %  BP: (125-144)/(61-84) 139/67     Weight: 121 kg (266 lb 12.1 oz) (06/24/22 0455)  Body mass index is 36.18 kg/m².  Body surface area is 2.48 meters squared.    I/O last 3 completed shifts:  In: -   Out: 2025 [Urine:2025]    Physical Exam  Vitals reviewed.   HENT:      Head: Normocephalic and atraumatic.      Mouth/Throat:      Mouth: Mucous membranes are moist.   Cardiovascular:      Rate and Rhythm: Regular rhythm.      Pulses: Normal pulses.   Pulmonary:      Effort: Pulmonary effort is normal.   Abdominal:      Palpations: Abdomen is soft.   Neurological:      Mental Status: He is alert.       Significant Labs:  BMP:   Recent Labs   Lab 06/24/22  0445   *   *   K 4.7   CL 88*   CO2 24   *   CREATININE 5.99*   CALCIUM 8.1*     CBC:   Recent Labs   Lab 06/24/22  0445   WBC 16.14*   RBC 3.15*   HGB 9.0*    HCT 26.5*      MCV 84.1   MCH 28.6   MCHC 34.0        Significant Imaging:  Labs: Reviewed    Assessment/Plan:     DEO (acute kidney injury)  6/18/2022  The patient's renal function is improving.  6/19/2022 Continue to monitor.  6/20/2022 Continued improvement in renal function.  6/21/2022  Daily bmp.  6/22/2022  Continue to monitor at this time.  Daily BMP.  6/23/2022  No uremic symptoms.  Continue to monitor.  6/24/2022  At this time, continue to monitor.  Creatinine is 5.99 which is slightly better.    Type 2 diabetes mellitus without complication  Chronic condition        Thank you for your consult. I will follow-up with patient. Please contact us if you have any additional questions.    Jorge Butts Jr, MD  Nephrology  [facility] [place] - [address] [place] [address]

## 2022-06-24 NOTE — SUBJECTIVE & OBJECTIVE
Interval History: Patient is resting comfortably in bed with no acute complaints. No overnight events reported. Is tolerating oral steroids well. Sodium is 124 today, will give patient IV lasix and recheck BMP tomorrow morning. Resume low dose eliquis for DVT prophylaxis. Patient to get walk test to see if he needs home O2, due to his chronic SOB due to his COPD.     Review of Systems   Constitutional:  Negative for chills, fatigue and fever.   HENT:  Negative for congestion, hearing loss and trouble swallowing.    Eyes:  Negative for visual disturbance.   Respiratory:  Positive for shortness of breath. Negative for cough (hemoptysis).    Cardiovascular:  Negative for chest pain, palpitations and leg swelling.   Gastrointestinal:  Negative for abdominal pain, blood in stool, diarrhea, nausea and vomiting.   Genitourinary:  Negative for difficulty urinating and hematuria.   Musculoskeletal:  Negative for back pain and myalgias.   Skin:  Negative for rash.   Neurological:  Negative for dizziness, light-headedness and headaches.   Psychiatric/Behavioral:  Negative for sleep disturbance. The patient is not nervous/anxious.    Objective:     Vital Signs (Most Recent):  Temp: 98.5 °F (36.9 °C) (06/24/22 1005)  Pulse: 86 (06/24/22 1214)  Resp: (!) 21 (06/24/22 1214)  BP: 139/67 (06/24/22 1005)  SpO2: 96 % (06/24/22 1214)   Vital Signs (24h Range):  Temp:  [97.7 °F (36.5 °C)-98.5 °F (36.9 °C)] 98.5 °F (36.9 °C)  Pulse:  [66-95] 86  Resp:  [16-24] 21  SpO2:  [90 %-98 %] 96 %  BP: (125-144)/(61-84) 139/67     Weight: 121 kg (266 lb 12.1 oz)  Body mass index is 36.18 kg/m².    Intake/Output Summary (Last 24 hours) at 6/24/2022 1357  Last data filed at 6/24/2022 1005  Gross per 24 hour   Intake --   Output 1725 ml   Net -1725 ml      Physical Exam  Vitals reviewed.   Constitutional:       General: He is not in acute distress.     Appearance: He is normal weight.   HENT:      Head: Normocephalic and atraumatic.      Right Ear:  External ear normal.      Left Ear: External ear normal.      Nose: Nose normal.      Mouth/Throat:      Mouth: Mucous membranes are moist.      Pharynx: Oropharynx is clear.   Eyes:      Extraocular Movements: Extraocular movements intact.      Pupils: Pupils are equal, round, and reactive to light.   Cardiovascular:      Rate and Rhythm: Normal rate and regular rhythm.      Pulses: Normal pulses.      Heart sounds: Normal heart sounds.   Pulmonary:      Effort: Pulmonary effort is normal. No respiratory distress.      Breath sounds: Normal breath sounds.   Abdominal:      General: Abdomen is flat. There is no distension.      Palpations: Abdomen is soft.      Tenderness: There is no abdominal tenderness.   Musculoskeletal:         General: Normal range of motion.      Cervical back: Normal range of motion.   Skin:     General: Skin is warm.      Capillary Refill: Capillary refill takes less than 2 seconds.   Neurological:      General: No focal deficit present.      Mental Status: He is alert and oriented to person, place, and time.   Psychiatric:         Mood and Affect: Mood normal.       Significant Labs: All pertinent labs within the past 24 hours have been reviewed.    Significant Imaging: I have reviewed all pertinent imaging results/findings within the past 24 hours.

## 2022-06-25 PROBLEM — E87.1 HYPONATREMIA: Status: ACTIVE | Noted: 2022-06-25

## 2022-06-25 LAB
ANION GAP SERPL CALCULATED.3IONS-SCNC: 15 MMOL/L (ref 7–16)
BASOPHILS # BLD AUTO: 0.01 K/UL (ref 0–0.2)
BASOPHILS NFR BLD AUTO: 0.1 % (ref 0–1)
BUN SERPL-MCNC: 127 MG/DL (ref 7–18)
BUN/CREAT SERPL: 21 (ref 6–20)
CALCIUM SERPL-MCNC: 8.1 MG/DL (ref 8.5–10.1)
CHLORIDE SERPL-SCNC: 87 MMOL/L (ref 98–107)
CO2 SERPL-SCNC: 26 MMOL/L (ref 21–32)
CREAT SERPL-MCNC: 5.92 MG/DL (ref 0.7–1.3)
DIFFERENTIAL METHOD BLD: ABNORMAL
EOSINOPHIL # BLD AUTO: 0 K/UL (ref 0–0.5)
EOSINOPHIL NFR BLD AUTO: 0 % (ref 1–4)
ERYTHROCYTE [DISTWIDTH] IN BLOOD BY AUTOMATED COUNT: 15.1 % (ref 11.5–14.5)
GLUCOSE SERPL-MCNC: 167 MG/DL (ref 74–106)
GLUCOSE SERPL-MCNC: 178 MG/DL (ref 70–105)
GLUCOSE SERPL-MCNC: 184 MG/DL (ref 70–105)
GLUCOSE SERPL-MCNC: 215 MG/DL (ref 70–105)
GLUCOSE SERPL-MCNC: 220 MG/DL (ref 70–105)
HCT VFR BLD AUTO: 24.7 % (ref 40–54)
HGB BLD-MCNC: 9 G/DL (ref 13.5–18)
IMM GRANULOCYTES # BLD AUTO: 0.25 K/UL (ref 0–0.04)
IMM GRANULOCYTES NFR BLD: 1.4 % (ref 0–0.4)
LYMPHOCYTES # BLD AUTO: 0.36 K/UL (ref 1–4.8)
LYMPHOCYTES NFR BLD AUTO: 2 % (ref 27–41)
MCH RBC QN AUTO: 29.2 PG (ref 27–31)
MCHC RBC AUTO-ENTMCNC: 36.4 G/DL (ref 32–36)
MCV RBC AUTO: 80.2 FL (ref 80–96)
MONOCYTES # BLD AUTO: 0.63 K/UL (ref 0–0.8)
MONOCYTES NFR BLD AUTO: 3.4 % (ref 2–6)
MPC BLD CALC-MCNC: 10.6 FL (ref 9.4–12.4)
NEUTROPHILS # BLD AUTO: 17.15 K/UL (ref 1.8–7.7)
NEUTROPHILS NFR BLD AUTO: 93.1 % (ref 53–65)
NRBC # BLD AUTO: 0 X10E3/UL
NRBC, AUTO (.00): 0 %
OSMOLALITY SERPL: 306 MOSM/KG (ref 280–301)
OSMOLALITY UR: 321 MOSM/KG (ref 50–1400)
PLATELET # BLD AUTO: 236 K/UL (ref 150–400)
POTASSIUM SERPL-SCNC: 5.1 MMOL/L (ref 3.5–5.1)
RBC # BLD AUTO: 3.08 M/UL (ref 4.6–6.2)
SODIUM SERPL-SCNC: 123 MMOL/L (ref 136–145)
SODIUM UR-SCNC: 20 MMOL/L (ref 40–220)
WBC # BLD AUTO: 18.4 K/UL (ref 4.5–11)

## 2022-06-25 PROCEDURE — 11000001 HC ACUTE MED/SURG PRIVATE ROOM

## 2022-06-25 PROCEDURE — 83930 ASSAY OF BLOOD OSMOLALITY: CPT

## 2022-06-25 PROCEDURE — 25000003 PHARM REV CODE 250: Performed by: FAMILY MEDICINE

## 2022-06-25 PROCEDURE — 36415 COLL VENOUS BLD VENIPUNCTURE: CPT | Performed by: FAMILY MEDICINE

## 2022-06-25 PROCEDURE — 27000221 HC OXYGEN, UP TO 24 HOURS

## 2022-06-25 PROCEDURE — 94668 MNPJ CHEST WALL SBSQ: CPT

## 2022-06-25 PROCEDURE — 25000242 PHARM REV CODE 250 ALT 637 W/ HCPCS

## 2022-06-25 PROCEDURE — 94640 AIRWAY INHALATION TREATMENT: CPT

## 2022-06-25 PROCEDURE — 85025 COMPLETE CBC W/AUTO DIFF WBC: CPT | Performed by: FAMILY MEDICINE

## 2022-06-25 PROCEDURE — 63600175 PHARM REV CODE 636 W HCPCS

## 2022-06-25 PROCEDURE — 99232 PR SUBSEQUENT HOSPITAL CARE,LEVL II: ICD-10-PCS | Mod: GC,,, | Performed by: FAMILY MEDICINE

## 2022-06-25 PROCEDURE — 36415 COLL VENOUS BLD VENIPUNCTURE: CPT

## 2022-06-25 PROCEDURE — 80048 BASIC METABOLIC PNL TOTAL CA: CPT | Performed by: FAMILY MEDICINE

## 2022-06-25 PROCEDURE — 25000003 PHARM REV CODE 250: Performed by: HOSPITALIST

## 2022-06-25 PROCEDURE — 99232 SBSQ HOSP IP/OBS MODERATE 35: CPT | Mod: GC,,, | Performed by: FAMILY MEDICINE

## 2022-06-25 PROCEDURE — 99900035 HC TECH TIME PER 15 MIN (STAT)

## 2022-06-25 PROCEDURE — 63600175 PHARM REV CODE 636 W HCPCS: Performed by: FAMILY MEDICINE

## 2022-06-25 PROCEDURE — 84300 ASSAY OF URINE SODIUM: CPT

## 2022-06-25 PROCEDURE — 63700000 PHARM REV CODE 250 ALT 637 W/O HCPCS

## 2022-06-25 PROCEDURE — 83935 ASSAY OF URINE OSMOLALITY: CPT

## 2022-06-25 PROCEDURE — 25000003 PHARM REV CODE 250

## 2022-06-25 PROCEDURE — 94761 N-INVAS EAR/PLS OXIMETRY MLT: CPT

## 2022-06-25 PROCEDURE — 82962 GLUCOSE BLOOD TEST: CPT

## 2022-06-25 PROCEDURE — C9399 UNCLASSIFIED DRUGS OR BIOLOG: HCPCS | Performed by: FAMILY MEDICINE

## 2022-06-25 RX ORDER — FUROSEMIDE 10 MG/ML
40 INJECTION INTRAMUSCULAR; INTRAVENOUS ONCE
Status: COMPLETED | OUTPATIENT
Start: 2022-06-25 | End: 2022-06-25

## 2022-06-25 RX ADMIN — IPRATROPIUM BROMIDE AND ALBUTEROL SULFATE 3 ML: 2.5; .5 SOLUTION RESPIRATORY (INHALATION) at 07:06

## 2022-06-25 RX ADMIN — HYDROCODONE BITARTRATE AND ACETAMINOPHEN 1 TABLET: 7.5; 325 TABLET ORAL at 05:06

## 2022-06-25 RX ADMIN — PANTOPRAZOLE SODIUM 40 MG: 40 TABLET, DELAYED RELEASE ORAL at 09:06

## 2022-06-25 RX ADMIN — BUDESONIDE INHALATION 0.5 MG: 0.5 SUSPENSION RESPIRATORY (INHALATION) at 07:06

## 2022-06-25 RX ADMIN — FUROSEMIDE 40 MG: 10 INJECTION INTRAMUSCULAR; INTRAVENOUS at 04:06

## 2022-06-25 RX ADMIN — INSULIN ASPART 2 UNITS: 100 INJECTION, SOLUTION INTRAVENOUS; SUBCUTANEOUS at 05:06

## 2022-06-25 RX ADMIN — ISOSORBIDE MONONITRATE 60 MG: 60 TABLET, EXTENDED RELEASE ORAL at 09:06

## 2022-06-25 RX ADMIN — METOPROLOL TARTRATE 25 MG: 25 TABLET, FILM COATED ORAL at 09:06

## 2022-06-25 RX ADMIN — THERA TABS 1 TABLET: TAB at 09:06

## 2022-06-25 RX ADMIN — ALLOPURINOL 50 MG: 300 TABLET ORAL at 09:06

## 2022-06-25 RX ADMIN — INSULIN ASPART 4 UNITS: 100 INJECTION, SOLUTION INTRAVENOUS; SUBCUTANEOUS at 04:06

## 2022-06-25 RX ADMIN — APIXABAN 2.5 MG: 2.5 TABLET, FILM COATED ORAL at 08:06

## 2022-06-25 RX ADMIN — IPRATROPIUM BROMIDE AND ALBUTEROL SULFATE 3 ML: 2.5; .5 SOLUTION RESPIRATORY (INHALATION) at 11:06

## 2022-06-25 RX ADMIN — IPRATROPIUM BROMIDE AND ALBUTEROL SULFATE 3 ML: 2.5; .5 SOLUTION RESPIRATORY (INHALATION) at 08:06

## 2022-06-25 RX ADMIN — FERROUS GLUCONATE 324 MG: 324 TABLET ORAL at 09:06

## 2022-06-25 RX ADMIN — INSULIN DETEMIR 5 UNITS: 100 INJECTION, SOLUTION SUBCUTANEOUS at 08:06

## 2022-06-25 RX ADMIN — IPRATROPIUM BROMIDE AND ALBUTEROL SULFATE 3 ML: 2.5; .5 SOLUTION RESPIRATORY (INHALATION) at 03:06

## 2022-06-25 RX ADMIN — AZITHROMYCIN MONOHYDRATE 250 MG: 250 TABLET ORAL at 09:06

## 2022-06-25 RX ADMIN — IPRATROPIUM BROMIDE AND ALBUTEROL SULFATE 3 ML: 2.5; .5 SOLUTION RESPIRATORY (INHALATION) at 04:06

## 2022-06-25 RX ADMIN — ATORVASTATIN CALCIUM 40 MG: 40 TABLET, FILM COATED ORAL at 08:06

## 2022-06-25 RX ADMIN — APIXABAN 2.5 MG: 2.5 TABLET, FILM COATED ORAL at 09:06

## 2022-06-25 RX ADMIN — AMLODIPINE BESYLATE 10 MG: 10 TABLET ORAL at 09:06

## 2022-06-25 RX ADMIN — PREDNISONE 60 MG: 10 TABLET ORAL at 09:06

## 2022-06-25 RX ADMIN — INSULIN ASPART 4 UNITS: 100 INJECTION, SOLUTION INTRAVENOUS; SUBCUTANEOUS at 12:06

## 2022-06-25 NOTE — PROGRESS NOTES
[facility] - [address] [place] [address] Medicine  Progress Note    Patient Name: Neymar Parra  MRN: [id number]  Patient Class: IP- Inpatient   Admission Date: 6/16/2022  Length of Stay: 9 days  Attending Physician: Earl Lemus Jr., MD  Primary Care Provider: [facility] megan Rodríguez        Subjective:     Principal Problem:Hyponatremia        HPI:  72 y.o. male transferred to the [facility] from [facility] for hyperkalemia and hypoxia. Pt reports he went to [facility] because he was having dyspnea and hemoptysis since 1 month which is worse today. Pt reports he was tested positive for COVID at the nursing home on 6/9/22. Per nursing home patient oxygen saturation was in the 80's on RA with tachypnea and labored breathing. Pt reports he normally does not wear oxygen at the nursing home but has been wearing oxygen more often recently. Pt reports he smoked 1-1.5 PPD for 20 years but quit 7 months ago. Per pt, he saw a pulmonologist (Dr. Aldana) for his hemoptysis and had some tests done the results of which he does not know. Per records, pt with a right pleural effusion recently and had a thoracentesis. Pt denies any fever, congestion, dysuria, N/V/D, chest pain, or any other complaints at this time.     In the ED, pt's K 7.2, d-dimer 0.76, BNP 6284, BUN/Cr 73/7.16. EKG showed some peaked t-waves and irregular rhythm. Chest xray showed Increased right lower lung pulmonary density could indicate pneumonia. Pt was treated in the ED with Calcium gluconate 1g, regular insulin 10 units, D50, IV solumedrol 125 mg, tylenol 650 mg and IV fluids NS 1L bolus. Pt will be admitted to the hospital service for management of hypoxia and hyperkalemia.      Overview/Hospital Course:        Interval History: Patient seen resting comfortably in bed with no acute complaints. No overnight events reported. Sodium is still 123 today. The patient will get a workup for his hyponatremia today as it has still not corrected. There  were no signs of respiratory distress on examination, and he will be set up to receive oxygen once he returns to the nursing home.     Review of Systems   Constitutional:  Negative for chills, fatigue and fever.   HENT:  Negative for congestion, hearing loss and trouble swallowing.    Eyes:  Negative for visual disturbance.   Respiratory:  Positive for shortness of breath. Negative for cough (hemoptysis).    Cardiovascular:  Negative for chest pain, palpitations and leg swelling.   Gastrointestinal:  Negative for abdominal pain, blood in stool, diarrhea, nausea and vomiting.   Genitourinary:  Negative for difficulty urinating and hematuria.   Musculoskeletal:  Negative for back pain and myalgias.   Skin:  Negative for rash.   Neurological:  Negative for dizziness, light-headedness and headaches.   Psychiatric/Behavioral:  Negative for sleep disturbance. The patient is not nervous/anxious.    Objective:     Vital Signs (Most Recent):  Temp: 98 °F (36.7 °C) (06/25/22 1000)  Pulse: 61 (06/25/22 1121)  Resp: (!) 23 (06/25/22 1121)  BP: (!) 120/58 (06/25/22 1000)  SpO2: 100 % (06/25/22 1121)   Vital Signs (24h Range):  Temp:  [97.5 °F (36.4 °C)-98.2 °F (36.8 °C)] 98 °F (36.7 °C)  Pulse:  [] 61  Resp:  [16-23] 23  SpO2:  [84 %-100 %] 100 %  BP: (120-146)/(58-76) 120/58     Weight: 121.1 kg (266 lb 15.6 oz)  Body mass index is 36.21 kg/m².    Intake/Output Summary (Last 24 hours) at 6/25/2022 1153  Last data filed at 6/25/2022 1052  Gross per 24 hour   Intake --   Output 2100 ml   Net -2100 ml      Physical Exam  Vitals reviewed.   Constitutional:       General: He is not in acute distress.     Appearance: He is normal weight.   HENT:      Head: Normocephalic and atraumatic.      Right Ear: External ear normal.      Left Ear: External ear normal.      Nose: Nose normal.      Mouth/Throat:      Mouth: Mucous membranes are moist.      Pharynx: Oropharynx is clear.   Eyes:      Extraocular Movements: Extraocular  movements intact.      Pupils: Pupils are equal, round, and reactive to light.   Cardiovascular:      Rate and Rhythm: Normal rate and regular rhythm.      Pulses: Normal pulses.      Heart sounds: Normal heart sounds.   Pulmonary:      Effort: Pulmonary effort is normal. No respiratory distress.      Breath sounds: Normal breath sounds.   Abdominal:      General: Abdomen is flat. There is no distension.      Palpations: Abdomen is soft.      Tenderness: There is no abdominal tenderness.   Musculoskeletal:         General: Normal range of motion.      Cervical back: Normal range of motion.   Skin:     General: Skin is warm.      Capillary Refill: Capillary refill takes less than 2 seconds.   Neurological:      General: No focal deficit present.      Mental Status: He is alert and oriented to person, place, and time.   Psychiatric:         Mood and Affect: Mood normal.       Significant Labs: All pertinent labs within the past 24 hours have been reviewed.    Significant Imaging: I have reviewed all pertinent imaging results/findings within the past 24 hours.      Assessment/Plan:      * Hyponatremia  - Patient got x1 IV lasix, give another 40mg as patient still appears clinically fluid overloaded  - Sodium 123 today  - Urine osmolality, serum osmolality and urine sodium content pending       Elevated d-dimer  - Ddimer was normal on admission  - Repeat was 1.83  - Continue low dose eliquis 2.5mg bid       Anemia  -H/H remaining stable  -serial BMP, Fe, TIBC, ferritin, B12 and folate.      DEO (acute kidney injury)  - Mildly improving, BUN/Crt 127/5.92 today  - Nephrology following      Type 2 diabetes mellitus without complication  -HgA1c 4.7 10 days ago  - SSI  -  today    Gastroesophageal reflux disease  -pantoprazole 40 mg qd    COPD exacerbation  - Much improved  - Oral prednisone for 2 more days    Hypertension  -continue home amlodipine 10 mg qd, isosorbide mononitrate 60 mg qd, and lisinopril 20 mg  qd.      VTE Risk Mitigation (From admission, onward)         Ordered     apixaban tablet 2.5 mg  2 times daily         06/24/22 1201     Reason for No Pharmacological VTE Prophylaxis  Once        Question:  Reasons:  Answer:  Risk of Bleeding    06/16/22 0301     IP VTE HIGH RISK PATIENT  Once         06/16/22 0301     Place sequential compression device  Until discontinued         06/16/22 0301                Discharge Planning   EDWARDO:      Code Status: Full Code   Is the patient medically ready for discharge?:     Reason for patient still in hospital (select all that apply): Treatment  Discharge Plan A: Long-term acute care facility (LTAC) (Choice obtained for Specialty)                  Brooklynn Kaur MD  Department of Hospital Medicine   [address]

## 2022-06-25 NOTE — SUBJECTIVE & OBJECTIVE
Interval History: Patient seen resting comfortably in bed with no acute complaints. No overnight events reported. Sodium is still 123 today. The patient will get a workup for his hyponatremia today as it has still not corrected. There were no signs of respiratory distress on examination, and he will be set up to receive oxygen once he returns to the nursing home.     Review of Systems   Constitutional:  Negative for chills, fatigue and fever.   HENT:  Negative for congestion, hearing loss and trouble swallowing.    Eyes:  Negative for visual disturbance.   Respiratory:  Positive for shortness of breath. Negative for cough (hemoptysis).    Cardiovascular:  Negative for chest pain, palpitations and leg swelling.   Gastrointestinal:  Negative for abdominal pain, blood in stool, diarrhea, nausea and vomiting.   Genitourinary:  Negative for difficulty urinating and hematuria.   Musculoskeletal:  Negative for back pain and myalgias.   Skin:  Negative for rash.   Neurological:  Negative for dizziness, light-headedness and headaches.   Psychiatric/Behavioral:  Negative for sleep disturbance. The patient is not nervous/anxious.    Objective:     Vital Signs (Most Recent):  Temp: 98 °F (36.7 °C) (06/25/22 1000)  Pulse: 61 (06/25/22 1121)  Resp: (!) 23 (06/25/22 1121)  BP: (!) 120/58 (06/25/22 1000)  SpO2: 100 % (06/25/22 1121)   Vital Signs (24h Range):  Temp:  [97.5 °F (36.4 °C)-98.2 °F (36.8 °C)] 98 °F (36.7 °C)  Pulse:  [] 61  Resp:  [16-23] 23  SpO2:  [84 %-100 %] 100 %  BP: (120-146)/(58-76) 120/58     Weight: 121.1 kg (266 lb 15.6 oz)  Body mass index is 36.21 kg/m².    Intake/Output Summary (Last 24 hours) at 6/25/2022 1153  Last data filed at 6/25/2022 1052  Gross per 24 hour   Intake --   Output 2100 ml   Net -2100 ml      Physical Exam  Vitals reviewed.   Constitutional:       General: He is not in acute distress.     Appearance: He is normal weight.   HENT:      Head: Normocephalic and atraumatic.      Right  Ear: External ear normal.      Left Ear: External ear normal.      Nose: Nose normal.      Mouth/Throat:      Mouth: Mucous membranes are moist.      Pharynx: Oropharynx is clear.   Eyes:      Extraocular Movements: Extraocular movements intact.      Pupils: Pupils are equal, round, and reactive to light.   Cardiovascular:      Rate and Rhythm: Normal rate and regular rhythm.      Pulses: Normal pulses.      Heart sounds: Normal heart sounds.   Pulmonary:      Effort: Pulmonary effort is normal. No respiratory distress.      Breath sounds: Normal breath sounds.   Abdominal:      General: Abdomen is flat. There is no distension.      Palpations: Abdomen is soft.      Tenderness: There is no abdominal tenderness.   Musculoskeletal:         General: Normal range of motion.      Cervical back: Normal range of motion.   Skin:     General: Skin is warm.      Capillary Refill: Capillary refill takes less than 2 seconds.   Neurological:      General: No focal deficit present.      Mental Status: He is alert and oriented to person, place, and time.   Psychiatric:         Mood and Affect: Mood normal.       Significant Labs: All pertinent labs within the past 24 hours have been reviewed.    Significant Imaging: I have reviewed all pertinent imaging results/findings within the past 24 hours.

## 2022-06-25 NOTE — PLAN OF CARE
Problem: Adult Inpatient Plan of Care  Goal: Plan of Care Review  Outcome: Ongoing, Progressing  Goal: Patient-Specific Goal (Individualized)  Outcome: Ongoing, Progressing  Goal: Absence of Hospital-Acquired Illness or Injury  Outcome: Ongoing, Progressing  Goal: Optimal Comfort and Wellbeing  Outcome: Ongoing, Progressing     Problem: Skin Injury Risk Increased  Goal: Skin Health and Integrity  Outcome: Ongoing, Progressing     Problem: Gas Exchange Impaired  Goal: Optimal Gas Exchange  Outcome: Ongoing, Progressing

## 2022-06-25 NOTE — ASSESSMENT & PLAN NOTE
- Patient got x1 IV lasix, give another 40mg as patient still appears clinically fluid overloaded  - Sodium 123 today  - Urine osmolality, serum osmolality and urine sodium content pending

## 2022-06-25 NOTE — PROGRESS NOTES
[facility] - [address] [place] [address]  Nephrology  Progress Note    Patient Name: Neymar Parra  MRN: [id number]  Admission Date: 6/16/2022  Hospital Length of Stay: 9 days  Attending Provider: Earl Lemus Jr., MD   Primary Care Physician: [facility] megan [place]  Principal Problem:COPD exacerbation    Consults  Subjective:     Interval History: Mr. Parra is seen in f/u of his CKD. He is up in his chair and in no distress. Using nasal O2    Review of patient's allergies indicates:   Allergen Reactions    Gatifloxacin Anaphylaxis     Current Facility-Administered Medications   Medication Frequency    acetaminophen tablet 1,000 mg Q6H PRN    albuterol inhaler 2 puff Q6H PRN    albuterol-ipratropium 2.5 mg-0.5 mg/3 mL nebulizer solution 3 mL Q4H    allopurinol split tablet 50 mg Daily    amLODIPine tablet 10 mg Daily    apixaban tablet 2.5 mg BID    atorvastatin tablet 40 mg QHS    bisacodyL EC tablet 10 mg Daily PRN    budesonide nebulizer solution 0.5 mg Daily    calcium gluconate 1 g in NS IVPB (premixed) Q10 Min PRN    cetirizine tablet 10 mg Daily PRN    dextromethorphan-guaiFENesin  mg/5 ml liquid 10 mL Q6H PRN    dextrose 50% injection 12.5 g PRN    dextrose 50% injection 25 g PRN    ferrous gluconate tablet 324 mg Daily with breakfast    glucagon (human recombinant) injection 1 mg PRN    glucose chewable tablet 16 g PRN    glucose chewable tablet 24 g PRN    HYDROcodone-acetaminophen 7.5-325 mg per tablet 1 tablet Q6H PRN    insulin aspart U-100 injection 1-10 Units QID (AC + HS) PRN    insulin detemir U-100 injection 5 Units QHS    isosorbide mononitrate 24 hr tablet 60 mg Daily    melatonin tablet 6 mg Nightly PRN    metoprolol tartrate (LOPRESSOR) tablet 25 mg Daily    mineral oil enema 1 enema Daily PRN    multivitamin tablet Daily    naloxone 0.4 mg/mL injection 0.02 mg PRN    ondansetron injection 8 mg Q6H PRN    pantoprazole EC tablet 40 mg Daily     predniSONE tablet 60 mg Daily    simethicone chewable tablet 80 mg TID PRN    sodium chloride 0.9% flush 10 mL Q12H PRN    traMADoL tablet 50 mg Q8H PRN    traZODone tablet 50 mg Nightly PRN       Objective:     Vital Signs (Most Recent):  Temp: 98 °F (36.7 °C) (06/25/22 1000)  Pulse: 61 (06/25/22 1121)  Resp: (!) 23 (06/25/22 1121)  BP: (!) 120/58 (06/25/22 1000)  SpO2: 100 % (06/25/22 1121)  O2 Device (Oxygen Therapy): nasal cannula (06/25/22 1121) Vital Signs (24h Range):  Temp:  [97.5 °F (36.4 °C)-98.2 °F (36.8 °C)] 98 °F (36.7 °C)  Pulse:  [] 61  Resp:  [16-23] 23  SpO2:  [88 %-100 %] 100 %  BP: (120-146)/(58-76) 120/58     Weight: 121.1 kg (266 lb 15.6 oz) (06/25/22 0400)  Body mass index is 36.21 kg/m².  Body surface area is 2.48 meters squared.    I/O last 3 completed shifts:  In: -   Out: 3275 [Urine:3275]    Physical Exam No distress.     Significant Labs:sure  BMP:   Recent Labs   Lab 06/25/22  0332   *   *   K 5.1   CL 87*   CO2 26   *   CREATININE 5.92*   CALCIUM 8.1*     All labs within the past 24 hours have been reviewed.    Significant Imaging:  Labs: Reviewed    Assessment/Plan:     Active Diagnoses:    Diagnosis Date Noted POA    PRINCIPAL PROBLEM:  COPD exacerbation [J44.1] 12/12/2021 Yes    Elevated d-dimer [R79.89] 06/22/2022 Yes    DEO (acute kidney injury) [N17.9] 06/16/2022 Yes    Anemia [D64.9] 06/16/2022 Yes    Hypertension [I10] 12/12/2021 Yes     Chronic    Gastroesophageal reflux disease [K21.9] 12/12/2021 Yes     Chronic    Type 2 diabetes mellitus without complication [E11.9] 12/12/2021 Yes     Chronic      Problems Resolved During this Admission:    Diagnosis Date Noted Date Resolved POA    Hyperkalemia [E87.5] 06/16/2022 06/21/2022 Yes    COVID-19 [U07.1] 06/16/2022 06/22/2022 Yes       Continues slow improvement. No changes for nowl.    Thank you for your consult. I will follow-up with patient. Please contact us if you have any additional  questions.    Jas Crowder MD  Nephrology  [facility] - [facility]

## 2022-06-26 LAB
ANION GAP SERPL CALCULATED.3IONS-SCNC: 17 MMOL/L (ref 7–16)
ANISOCYTOSIS BLD QL SMEAR: ABNORMAL
BASOPHILS # BLD AUTO: 0.02 K/UL (ref 0–0.2)
BASOPHILS NFR BLD AUTO: 0.1 % (ref 0–1)
BUN SERPL-MCNC: 148 MG/DL (ref 7–18)
BUN/CREAT SERPL: 25 (ref 6–20)
CALCIUM SERPL-MCNC: 8.2 MG/DL (ref 8.5–10.1)
CHLORIDE SERPL-SCNC: 89 MMOL/L (ref 98–107)
CO2 SERPL-SCNC: 24 MMOL/L (ref 21–32)
CREAT SERPL-MCNC: 6.02 MG/DL (ref 0.7–1.3)
CRENATED CELLS: ABNORMAL
DIFFERENTIAL METHOD BLD: ABNORMAL
EOSINOPHIL # BLD AUTO: 0 K/UL (ref 0–0.5)
EOSINOPHIL NFR BLD AUTO: 0 % (ref 1–4)
ERYTHROCYTE [DISTWIDTH] IN BLOOD BY AUTOMATED COUNT: 15.7 % (ref 11.5–14.5)
GLUCOSE SERPL-MCNC: 148 MG/DL (ref 74–106)
GLUCOSE SERPL-MCNC: 182 MG/DL (ref 70–105)
GLUCOSE SERPL-MCNC: 194 MG/DL (ref 70–105)
GLUCOSE SERPL-MCNC: 207 MG/DL (ref 70–105)
GLUCOSE SERPL-MCNC: 237 MG/DL (ref 70–105)
HCT VFR BLD AUTO: 22 % (ref 40–54)
HELMET CELLS BLD QL SMEAR: ABNORMAL
HGB BLD-MCNC: 7.6 G/DL (ref 13.5–18)
IMM GRANULOCYTES # BLD AUTO: 0.21 K/UL (ref 0–0.04)
IMM GRANULOCYTES NFR BLD: 1 % (ref 0–0.4)
LYMPHOCYTES # BLD AUTO: 0.35 K/UL (ref 1–4.8)
LYMPHOCYTES NFR BLD AUTO: 1.7 % (ref 27–41)
LYMPHOCYTES NFR BLD MANUAL: 1 % (ref 27–41)
MCH RBC QN AUTO: 28.4 PG (ref 27–31)
MCHC RBC AUTO-ENTMCNC: 34.5 G/DL (ref 32–36)
MCV RBC AUTO: 82.1 FL (ref 80–96)
MONOCYTES # BLD AUTO: 0.74 K/UL (ref 0–0.8)
MONOCYTES NFR BLD AUTO: 3.6 % (ref 2–6)
MONOCYTES NFR BLD MANUAL: 2 % (ref 2–6)
MPC BLD CALC-MCNC: 9.5 FL (ref 9.4–12.4)
NEUTROPHILS # BLD AUTO: 19.35 K/UL (ref 1.8–7.7)
NEUTROPHILS NFR BLD AUTO: 93.6 % (ref 53–65)
NEUTS BAND NFR BLD MANUAL: 2 % (ref 1–5)
NEUTS SEG NFR BLD MANUAL: 95 % (ref 50–62)
NRBC # BLD AUTO: 0 X10E3/UL
NRBC, AUTO (.00): 0 %
OVALOCYTES BLD QL SMEAR: ABNORMAL
PLATELET # BLD AUTO: 201 K/UL (ref 150–400)
PLATELET MORPHOLOGY: NORMAL
POLYCHROMASIA BLD QL SMEAR: ABNORMAL
POTASSIUM SERPL-SCNC: 5.1 MMOL/L (ref 3.5–5.1)
RBC # BLD AUTO: 2.68 M/UL (ref 4.6–6.2)
SCHISTOCYTES BLD QL AUTO: ABNORMAL
SODIUM SERPL-SCNC: 125 MMOL/L (ref 136–145)
WBC # BLD AUTO: 20.67 K/UL (ref 4.5–11)

## 2022-06-26 PROCEDURE — C9399 UNCLASSIFIED DRUGS OR BIOLOG: HCPCS

## 2022-06-26 PROCEDURE — 85025 COMPLETE CBC W/AUTO DIFF WBC: CPT | Performed by: FAMILY MEDICINE

## 2022-06-26 PROCEDURE — 36415 COLL VENOUS BLD VENIPUNCTURE: CPT | Performed by: FAMILY MEDICINE

## 2022-06-26 PROCEDURE — 27000221 HC OXYGEN, UP TO 24 HOURS

## 2022-06-26 PROCEDURE — 94668 MNPJ CHEST WALL SBSQ: CPT

## 2022-06-26 PROCEDURE — 25000003 PHARM REV CODE 250: Performed by: FAMILY MEDICINE

## 2022-06-26 PROCEDURE — 94761 N-INVAS EAR/PLS OXIMETRY MLT: CPT

## 2022-06-26 PROCEDURE — 94640 AIRWAY INHALATION TREATMENT: CPT

## 2022-06-26 PROCEDURE — 63600175 PHARM REV CODE 636 W HCPCS

## 2022-06-26 PROCEDURE — 99232 PR SUBSEQUENT HOSPITAL CARE,LEVL II: ICD-10-PCS | Mod: GC,,, | Performed by: FAMILY MEDICINE

## 2022-06-26 PROCEDURE — 25000003 PHARM REV CODE 250: Performed by: HOSPITALIST

## 2022-06-26 PROCEDURE — 11000001 HC ACUTE MED/SURG PRIVATE ROOM

## 2022-06-26 PROCEDURE — 25000242 PHARM REV CODE 250 ALT 637 W/ HCPCS

## 2022-06-26 PROCEDURE — 63600175 PHARM REV CODE 636 W HCPCS: Performed by: FAMILY MEDICINE

## 2022-06-26 PROCEDURE — 99232 SBSQ HOSP IP/OBS MODERATE 35: CPT | Mod: GC,,, | Performed by: FAMILY MEDICINE

## 2022-06-26 PROCEDURE — 82962 GLUCOSE BLOOD TEST: CPT

## 2022-06-26 PROCEDURE — 80048 BASIC METABOLIC PNL TOTAL CA: CPT | Performed by: FAMILY MEDICINE

## 2022-06-26 PROCEDURE — 25000003 PHARM REV CODE 250

## 2022-06-26 RX ADMIN — FERROUS GLUCONATE 324 MG: 324 TABLET ORAL at 09:06

## 2022-06-26 RX ADMIN — AMLODIPINE BESYLATE 10 MG: 10 TABLET ORAL at 09:06

## 2022-06-26 RX ADMIN — ISOSORBIDE MONONITRATE 60 MG: 60 TABLET, EXTENDED RELEASE ORAL at 09:06

## 2022-06-26 RX ADMIN — IPRATROPIUM BROMIDE AND ALBUTEROL SULFATE 3 ML: 2.5; .5 SOLUTION RESPIRATORY (INHALATION) at 08:06

## 2022-06-26 RX ADMIN — ALLOPURINOL 50 MG: 300 TABLET ORAL at 09:06

## 2022-06-26 RX ADMIN — BUDESONIDE INHALATION 0.5 MG: 0.5 SUSPENSION RESPIRATORY (INHALATION) at 08:06

## 2022-06-26 RX ADMIN — PANTOPRAZOLE SODIUM 40 MG: 40 TABLET, DELAYED RELEASE ORAL at 09:06

## 2022-06-26 RX ADMIN — PREDNISONE 60 MG: 10 TABLET ORAL at 09:06

## 2022-06-26 RX ADMIN — INSULIN ASPART 4 UNITS: 100 INJECTION, SOLUTION INTRAVENOUS; SUBCUTANEOUS at 04:06

## 2022-06-26 RX ADMIN — ATORVASTATIN CALCIUM 40 MG: 40 TABLET, FILM COATED ORAL at 08:06

## 2022-06-26 RX ADMIN — METOPROLOL TARTRATE 25 MG: 25 TABLET, FILM COATED ORAL at 09:06

## 2022-06-26 RX ADMIN — APIXABAN 2.5 MG: 2.5 TABLET, FILM COATED ORAL at 08:06

## 2022-06-26 RX ADMIN — APIXABAN 2.5 MG: 2.5 TABLET, FILM COATED ORAL at 09:06

## 2022-06-26 RX ADMIN — IPRATROPIUM BROMIDE AND ALBUTEROL SULFATE 3 ML: 2.5; .5 SOLUTION RESPIRATORY (INHALATION) at 12:06

## 2022-06-26 RX ADMIN — INSULIN ASPART 2 UNITS: 100 INJECTION, SOLUTION INTRAVENOUS; SUBCUTANEOUS at 05:06

## 2022-06-26 RX ADMIN — IPRATROPIUM BROMIDE AND ALBUTEROL SULFATE 3 ML: 2.5; .5 SOLUTION RESPIRATORY (INHALATION) at 02:06

## 2022-06-26 RX ADMIN — INSULIN DETEMIR 8 UNITS: 100 INJECTION, SOLUTION SUBCUTANEOUS at 08:06

## 2022-06-26 RX ADMIN — IPRATROPIUM BROMIDE AND ALBUTEROL SULFATE 3 ML: 2.5; .5 SOLUTION RESPIRATORY (INHALATION) at 04:06

## 2022-06-26 RX ADMIN — THERA TABS 1 TABLET: TAB at 09:06

## 2022-06-26 RX ADMIN — IPRATROPIUM BROMIDE AND ALBUTEROL SULFATE 3 ML: 2.5; .5 SOLUTION RESPIRATORY (INHALATION) at 11:06

## 2022-06-26 RX ADMIN — INSULIN ASPART 2 UNITS: 100 INJECTION, SOLUTION INTRAVENOUS; SUBCUTANEOUS at 08:06

## 2022-06-26 NOTE — PLAN OF CARE
SW consulted for Home Oxygen.  Per a review of  notes, pt is a resident of [facility]. SW spoke with pt's next of kin, Ioana Garcia who reports pt has O2 at nursing facility. Nurse informed.  [facility] obtained for [facility] for DME if any needed at discharge.  SW following.

## 2022-06-26 NOTE — ASSESSMENT & PLAN NOTE
- continue home amlodipine 10 mg qd, isosorbide mononitrate 60 mg qd, motoprolol 25 mg QD, and lisinopril 20 mg qd.

## 2022-06-26 NOTE — PROGRESS NOTES
[facility] - [address] [place] [address]  Nephrology  Progress Note    Patient Name: Neymar Parra  MRN: [id number]  Admission Date: 6/16/2022  Hospital Length of Stay: 10 days  Attending Provider: Earl Lemus Jr., MD   Primary Care Physician: [facility] megan [place]  Principal Problem:Hyponatremia    Consults  Subjective:     Interval History: Mr. Parra is seen in f/u of his CKD    Review of patient's allergies indicates:   Allergen Reactions    Gatifloxacin Anaphylaxis     Current Facility-Administered Medications   Medication Frequency    acetaminophen tablet 1,000 mg Q6H PRN    albuterol inhaler 2 puff Q6H PRN    albuterol-ipratropium 2.5 mg-0.5 mg/3 mL nebulizer solution 3 mL Q4H    allopurinol split tablet 50 mg Daily    amLODIPine tablet 10 mg Daily    apixaban tablet 2.5 mg BID    atorvastatin tablet 40 mg QHS    bisacodyL EC tablet 10 mg Daily PRN    budesonide nebulizer solution 0.5 mg Daily    calcium gluconate 1 g in NS IVPB (premixed) Q10 Min PRN    cetirizine tablet 10 mg Daily PRN    dextromethorphan-guaiFENesin  mg/5 ml liquid 10 mL Q6H PRN    dextrose 50% injection 12.5 g PRN    dextrose 50% injection 25 g PRN    ferrous gluconate tablet 324 mg Daily with breakfast    glucagon (human recombinant) injection 1 mg PRN    glucose chewable tablet 16 g PRN    glucose chewable tablet 24 g PRN    HYDROcodone-acetaminophen 7.5-325 mg per tablet 1 tablet Q6H PRN    insulin aspart U-100 injection 1-10 Units QID (AC + HS) PRN    insulin detemir U-100 injection 6 Units QHS    isosorbide mononitrate 24 hr tablet 60 mg Daily    melatonin tablet 6 mg Nightly PRN    metoprolol tartrate (LOPRESSOR) tablet 25 mg Daily    mineral oil enema 1 enema Daily PRN    multivitamin tablet Daily    naloxone 0.4 mg/mL injection 0.02 mg PRN    ondansetron injection 8 mg Q6H PRN    pantoprazole EC tablet 40 mg Daily    predniSONE tablet 60 mg Daily    simethicone chewable tablet  80 mg TID PRN    sodium chloride 0.9% flush 10 mL Q12H PRN    traMADoL tablet 50 mg Q8H PRN    traZODone tablet 50 mg Nightly PRN       Objective:     Vital Signs (Most Recent):  Temp: 97.9 °F (36.6 °C) (06/26/22 1000)  Pulse: 66 (06/26/22 1152)  Resp: 20 (06/26/22 1152)  BP: (!) 150/81 (06/26/22 1000)  SpO2: (!) 84 % (checked again and sat. came up to 91%) (06/26/22 1152)  O2 Device (Oxygen Therapy): nasal cannula w/ humidification (06/26/22 1152) Vital Signs (24h Range):  Temp:  [97.2 °F (36.2 °C)-98.5 °F (36.9 °C)] 97.9 °F (36.6 °C)  Pulse:  [] 66  Resp:  [20-25] 20  SpO2:  [84 %-99 %] 84 %  BP: (106-150)/(58-81) 150/81     Weight: 122.2 kg (269 lb 6.4 oz) (06/26/22 0400)  Body mass index is 36.54 kg/m².  Body surface area is 2.49 meters squared.    I/O last 3 completed shifts:  In: -   Out: 2900 [Urine:2900]    Physical Exam Up in chair and chest clear. Eating well.    Significant Labs:sure  BMP:   Recent Labs   Lab 06/26/22  0325   *   *   K 5.1   CL 89*   CO2 24   *   CREATININE 6.02*   CALCIUM 8.2*     All labs within the past 24 hours have been reviewed.    Significant Imaging:  Labs: Reviewed    Assessment/Plan:     Active Diagnoses:    Diagnosis Date Noted POA    PRINCIPAL PROBLEM:  Hyponatremia [E87.1] 06/25/2022 No    Elevated d-dimer [R79.89] 06/22/2022 Yes    DEO (acute kidney injury) [N17.9] 06/16/2022 Yes    Anemia [D64.9] 06/16/2022 Yes    Hypertension [I10] 12/12/2021 Yes     Chronic    COPD exacerbation [J44.1] 12/12/2021 Yes    Gastroesophageal reflux disease [K21.9] 12/12/2021 Yes     Chronic    Type 2 diabetes mellitus without complication [E11.9] 12/12/2021 Yes     Chronic      Problems Resolved During this Admission:    Diagnosis Date Noted Date Resolved POA    Hyperkalemia [E87.5] 06/16/2022 06/21/2022 Yes    COVID-19 [U07.1] 06/16/2022 06/22/2022 Yes       Na 125 better and creat stable at 6. Drinking a lot of water. Cautioned him to drink to thirst  and not force fluid    Thank you for your consult. I will follow-up with patient. Please contact us if you have any additional questions.    Jas Crowder MD  Nephrology  [facility] - [facility]

## 2022-06-26 NOTE — PROGRESS NOTES
[facility] - [address] [place] [address] Medicine  Progress Note    Patient Name: Neymar Parra  MRN: [id number]  Patient Class: IP- Inpatient   Admission Date: 6/16/2022  Length of Stay: 10 days  Attending Physician: Earl Lemus Jr., MD  Primary Care Provider: [facility] megan Rodríguez        Subjective:     Principal Problem:Hyponatremia        HPI:  72 y.o. male transferred to the [facility] from [facility] for hyperkalemia and hypoxia. Pt reports he went to [facility] because he was having dyspnea and hemoptysis since 1 month which is worse today. Pt reports he was tested positive for COVID at the nursing home on 6/9/22. Per nursing home patient oxygen saturation was in the 80's on RA with tachypnea and labored breathing. Pt reports he normally does not wear oxygen at the nursing home but has been wearing oxygen more often recently. Pt reports he smoked 1-1.5 PPD for 20 years but quit 7 months ago. Per pt, he saw a pulmonologist (Dr. Aldana) for his hemoptysis and had some tests done the results of which he does not know. Per records, pt with a right pleural effusion recently and had a thoracentesis. Pt denies any fever, congestion, dysuria, N/V/D, chest pain, or any other complaints at this time.     In the ED, pt's K 7.2, d-dimer 0.76, BNP 6284, BUN/Cr 73/7.16. EKG showed some peaked t-waves and irregular rhythm. Chest xray showed Increased right lower lung pulmonary density could indicate pneumonia. Pt was treated in the ED with Calcium gluconate 1g, regular insulin 10 units, D50, IV solumedrol 125 mg, tylenol 650 mg and IV fluids NS 1L bolus. Pt will be admitted to the hospital service for management of hypoxia and hyperkalemia.      Overview/Hospital Course:        Interval History: Patient seen this morning in bed and reports he had some slight abd pain after eating breakfast. Patient was concerned about having COVID but I explained to him that as of now he is improving. Patient in on O2 at  nursing home. Possible d/c back to NH tomorrow. Hyponatremia workup appears to be due to renal failure. Patient may benefit from home PO bicarb at d/c.     Review of Systems   Constitutional:  Negative for activity change, appetite change, chills, diaphoresis and fever.   HENT:  Negative for congestion, hearing loss, nosebleeds, postnasal drip, rhinorrhea, sore throat and trouble swallowing.    Eyes:  Negative for visual disturbance.   Respiratory:  Negative for cough, chest tightness and shortness of breath.    Cardiovascular:  Negative for chest pain and leg swelling.   Gastrointestinal:  Positive for abdominal pain. Negative for blood in stool, constipation, diarrhea, nausea and vomiting.   Genitourinary:  Negative for dysuria and hematuria.   Musculoskeletal:  Negative for joint swelling.   Skin:  Negative for rash.   Neurological:  Negative for dizziness, weakness, light-headedness and headaches.   All other systems reviewed and are negative.  Objective:     Vital Signs (Most Recent):  Temp: 97.8 °F (36.6 °C) (06/26/22 1600)  Pulse: 73 (06/26/22 1600)  Resp: 18 (06/26/22 1600)  BP: (!) 113/52 (06/26/22 1600)  SpO2: (!) 94 % (06/26/22 1600)   Vital Signs (24h Range):  Temp:  [97.2 °F (36.2 °C)-98.5 °F (36.9 °C)] 97.8 °F (36.6 °C)  Pulse:  [] 73  Resp:  [18-22] 18  SpO2:  [84 %-99 %] 94 %  BP: (106-150)/(52-81) 113/52     Weight: 122.2 kg (269 lb 6.4 oz)  Body mass index is 36.54 kg/m².    Intake/Output Summary (Last 24 hours) at 6/26/2022 1639  Last data filed at 6/26/2022 1412  Gross per 24 hour   Intake --   Output 2350 ml   Net -2350 ml      Physical Exam  Vitals and nursing note reviewed.   Constitutional:       General: He is not in acute distress.     Appearance: Normal appearance. He is not ill-appearing, toxic-appearing or diaphoretic.   HENT:      Head: Normocephalic and atraumatic.      Nose: Nose normal. No congestion or rhinorrhea.      Mouth/Throat:      Mouth: Mucous membranes are moist.       Pharynx: Oropharynx is clear. No oropharyngeal exudate or posterior oropharyngeal erythema.   Eyes:      Conjunctiva/sclera: Conjunctivae normal.      Pupils: Pupils are equal, round, and reactive to light.   Cardiovascular:      Rate and Rhythm: Normal rate and regular rhythm.      Pulses: Normal pulses.      Heart sounds: Normal heart sounds. No murmur heard.    No friction rub.   Pulmonary:      Effort: No respiratory distress.      Breath sounds: Rhonchi present. No wheezing or rales.   Abdominal:      Tenderness: There is no abdominal tenderness. There is no guarding.   Musculoskeletal:      Right lower leg: No edema.      Left lower leg: No edema.   Skin:     General: Skin is warm and dry.      Capillary Refill: Capillary refill takes less than 2 seconds.   Neurological:      General: No focal deficit present.      Mental Status: He is alert and oriented to person, place, and time.   Psychiatric:         Mood and Affect: Mood normal.         Behavior: Behavior normal.         Thought Content: Thought content normal.         Judgment: Judgment normal.       Significant Labs:   Recent Lab Results  (Last 5 results in the past 24 hours)        06/26/22  1510   06/26/22  1027   06/26/22  0435   06/26/22  0325   06/25/22  2037        Anion Gap       17         Aniso       1+         Bands       2         Baso #       0.02         Basophil %       0.1         BUN       148         BUN/CREAT RATIO       25         Calcium       8.2         Chloride       89         CO2       24         Creatinine       6.02         Crenated RBC's       Few         Differential Type       Manual         eGFR if non        10         Eos #       0.00         Eosinophil %       0.0         Glucose       148         Helmet Cells       Few         Hematocrit       22.0         Hemoglobin       7.6         Immature Grans (Abs)       0.21         Immature Granulocytes       1.0         Lymph #       0.35         Lymph %        1.7                1         MCH       28.4         MCHC       34.5         MCV       82.1         Mono #       0.74         Mono %       3.6                2         MPV       9.5         Neutrophils, Abs       19.35         Neutrophils Relative       93.6         nRBC       0.0         NUCLEATED RBC ABSOLUTE       0.00         Ovalocytes       Few         PLATELET MORPHOLOGY       Normal         Platelets       201         POC Glucose 207   194   182     178       Poly       Few         Potassium       5.1         RBC       2.68         RDW       15.7         Schistocytes       Few         Segmented Neutrophils, Man %       95         Sodium       125         WBC       20.67                                Significant Imaging: I have reviewed all pertinent imaging results/findings within the past 24 hours.      Assessment/Plan:      * Hyponatremia  - Patient got x1 IV lasix, give another 40mg as patient still appears clinically fluid overloaded  - Sodium 123 today  - Urine osmolality, serum osmolality and urine sodium content indicate hyponatremia is due to renal failure  - nephrology following       Elevated d-dimer  - D-dimer was normal on admission  - Repeat was 1.83  - Continue low dose eliquis 2.5mg bid       Anemia  -H/H dropped from 9 to 7.6 over night. Will assess in the am with CBC        DEO (acute kidney injury)  - Mildly improving  - Nephrology following      Type 2 diabetes mellitus without complication  -HgA1c 4.7 10 days ago  - detemir 8U QD  - SSI    Gastroesophageal reflux disease  - pantoprazole 40 mg qd    COPD exacerbation  - Much improved  - Oral prednisone for day 4/5  - duo nebs Q4H  - budesonide neb Q12H    Hypertension  - continue home amlodipine 10 mg qd, isosorbide mononitrate 60 mg qd, motoprolol 25 mg QD, and lisinopril 20 mg qd.      VTE Risk Mitigation (From admission, onward)         Ordered     apixaban tablet 2.5 mg  2 times daily         06/24/22 1201     Reason for No  Pharmacological VTE Prophylaxis  Once        Question:  Reasons:  Answer:  Risk of Bleeding    06/16/22 0301     IP VTE HIGH RISK PATIENT  Once         06/16/22 0301     Place sequential compression device  Until discontinued         06/16/22 0301                Discharge Planning   EDWARDO:      Code Status: Full Code   Is the patient medically ready for discharge?:     Reason for patient still in hospital (select all that apply): Treatment  Discharge Plan A: Long-term acute care facility (LTAC) (Choice obtained for Specialty)                  Jose L Hdz DO  Department of Hospital Medicine   [address]

## 2022-06-26 NOTE — ASSESSMENT & PLAN NOTE
- Patient got x1 IV lasix, give another 40mg as patient still appears clinically fluid overloaded  - Sodium 123 today  - Urine osmolality, serum osmolality and urine sodium content indicate hyponatremia is due to renal failure  - nephrology following

## 2022-06-27 PROBLEM — J18.9 PNA (PNEUMONIA): Status: ACTIVE | Noted: 2022-06-27

## 2022-06-27 LAB
ABO + RH BLD: NORMAL
ANION GAP SERPL CALCULATED.3IONS-SCNC: 18 MMOL/L (ref 7–16)
BASOPHILS # BLD AUTO: 0.01 K/UL (ref 0–0.2)
BASOPHILS NFR BLD AUTO: 0.1 % (ref 0–1)
BLD PROD TYP BPU: NORMAL
BLOOD UNIT EXPIRATION DATE: NORMAL
BLOOD UNIT TYPE CODE: 5100
BUN SERPL-MCNC: 145 MG/DL (ref 7–18)
BUN/CREAT SERPL: 24 (ref 6–20)
CALCIUM SERPL-MCNC: 8.3 MG/DL (ref 8.5–10.1)
CHLORIDE SERPL-SCNC: 88 MMOL/L (ref 98–107)
CHOLEST SERPL-MCNC: 118 MG/DL (ref 0–200)
CHOLEST/HDLC SERPL: 1.9 {RATIO}
CO2 SERPL-SCNC: 23 MMOL/L (ref 21–32)
CREAT SERPL-MCNC: 6.16 MG/DL (ref 0.7–1.3)
CROSSMATCH INTERPRETATION: NORMAL
DIFFERENTIAL METHOD BLD: ABNORMAL
DISPENSE STATUS: NORMAL
EOSINOPHIL # BLD AUTO: 0 K/UL (ref 0–0.5)
EOSINOPHIL NFR BLD AUTO: 0 % (ref 1–4)
ERYTHROCYTE [DISTWIDTH] IN BLOOD BY AUTOMATED COUNT: 15.5 % (ref 11.5–14.5)
GLUCOSE SERPL-MCNC: 128 MG/DL (ref 70–105)
GLUCOSE SERPL-MCNC: 146 MG/DL (ref 74–106)
GLUCOSE SERPL-MCNC: 156 MG/DL (ref 70–105)
GLUCOSE SERPL-MCNC: 217 MG/DL (ref 70–105)
GLUCOSE SERPL-MCNC: 252 MG/DL (ref 70–105)
HCT VFR BLD AUTO: 20.3 % (ref 40–54)
HCT VFR BLD AUTO: 22.6 % (ref 40–54)
HDLC SERPL-MCNC: 63 MG/DL (ref 40–60)
HGB BLD-MCNC: 7.4 G/DL (ref 13.5–18)
HGB BLD-MCNC: 8.2 G/DL (ref 13.5–18)
IMM GRANULOCYTES # BLD AUTO: 0.18 K/UL (ref 0–0.04)
IMM GRANULOCYTES NFR BLD: 0.9 % (ref 0–0.4)
INDIRECT COOMBS: NORMAL
LDLC SERPL CALC-MCNC: 38 MG/DL
LDLC/HDLC SERPL: 0.6 {RATIO}
LYMPHOCYTES # BLD AUTO: 0.25 K/UL (ref 1–4.8)
LYMPHOCYTES NFR BLD AUTO: 1.3 % (ref 27–41)
MCH RBC QN AUTO: 29.4 PG (ref 27–31)
MCHC RBC AUTO-ENTMCNC: 36.5 G/DL (ref 32–36)
MCV RBC AUTO: 80.6 FL (ref 80–96)
MONOCYTES # BLD AUTO: 0.78 K/UL (ref 0–0.8)
MONOCYTES NFR BLD AUTO: 4 % (ref 2–6)
MPC BLD CALC-MCNC: 9.8 FL (ref 9.4–12.4)
NEUTROPHILS # BLD AUTO: 18.14 K/UL (ref 1.8–7.7)
NEUTROPHILS NFR BLD AUTO: 93.7 % (ref 53–65)
NONHDLC SERPL-MCNC: 55 MG/DL
NRBC # BLD AUTO: 0 X10E3/UL
NRBC, AUTO (.00): 0 %
PLATELET # BLD AUTO: 196 K/UL (ref 150–400)
POTASSIUM SERPL-SCNC: 5 MMOL/L (ref 3.5–5.1)
RBC # BLD AUTO: 2.52 M/UL (ref 4.6–6.2)
RH BLD: NORMAL
SODIUM SERPL-SCNC: 124 MMOL/L (ref 136–145)
TRIGL SERPL-MCNC: 84 MG/DL (ref 35–150)
UNIT NUMBER: NORMAL
VLDLC SERPL-MCNC: 17 MG/DL
WBC # BLD AUTO: 19.36 K/UL (ref 4.5–11)

## 2022-06-27 PROCEDURE — 99232 SBSQ HOSP IP/OBS MODERATE 35: CPT | Mod: GC,,, | Performed by: FAMILY MEDICINE

## 2022-06-27 PROCEDURE — P9016 RBC LEUKOCYTES REDUCED: HCPCS | Performed by: FAMILY MEDICINE

## 2022-06-27 PROCEDURE — 85025 COMPLETE CBC W/AUTO DIFF WBC: CPT

## 2022-06-27 PROCEDURE — 25000003 PHARM REV CODE 250: Performed by: FAMILY MEDICINE

## 2022-06-27 PROCEDURE — 86923 COMPATIBILITY TEST ELECTRIC: CPT | Performed by: FAMILY MEDICINE

## 2022-06-27 PROCEDURE — 94668 MNPJ CHEST WALL SBSQ: CPT

## 2022-06-27 PROCEDURE — 94761 N-INVAS EAR/PLS OXIMETRY MLT: CPT

## 2022-06-27 PROCEDURE — 63600175 PHARM REV CODE 636 W HCPCS: Performed by: FAMILY MEDICINE

## 2022-06-27 PROCEDURE — 63600175 PHARM REV CODE 636 W HCPCS

## 2022-06-27 PROCEDURE — 80048 BASIC METABOLIC PNL TOTAL CA: CPT

## 2022-06-27 PROCEDURE — 80061 LIPID PANEL: CPT | Performed by: FAMILY MEDICINE

## 2022-06-27 PROCEDURE — 99900035 HC TECH TIME PER 15 MIN (STAT)

## 2022-06-27 PROCEDURE — 86901 BLOOD TYPING SEROLOGIC RH(D): CPT | Performed by: FAMILY MEDICINE

## 2022-06-27 PROCEDURE — C9399 UNCLASSIFIED DRUGS OR BIOLOG: HCPCS

## 2022-06-27 PROCEDURE — 82962 GLUCOSE BLOOD TEST: CPT

## 2022-06-27 PROCEDURE — 25000003 PHARM REV CODE 250: Performed by: HOSPITALIST

## 2022-06-27 PROCEDURE — 25000242 PHARM REV CODE 250 ALT 637 W/ HCPCS

## 2022-06-27 PROCEDURE — 36415 COLL VENOUS BLD VENIPUNCTURE: CPT

## 2022-06-27 PROCEDURE — 27000221 HC OXYGEN, UP TO 24 HOURS

## 2022-06-27 PROCEDURE — 36415 COLL VENOUS BLD VENIPUNCTURE: CPT | Performed by: FAMILY MEDICINE

## 2022-06-27 PROCEDURE — 94640 AIRWAY INHALATION TREATMENT: CPT

## 2022-06-27 PROCEDURE — 36430 TRANSFUSION BLD/BLD COMPNT: CPT

## 2022-06-27 PROCEDURE — 85014 HEMATOCRIT: CPT | Performed by: FAMILY MEDICINE

## 2022-06-27 PROCEDURE — 99232 PR SUBSEQUENT HOSPITAL CARE,LEVL II: ICD-10-PCS | Mod: GC,,, | Performed by: FAMILY MEDICINE

## 2022-06-27 PROCEDURE — 25000003 PHARM REV CODE 250

## 2022-06-27 PROCEDURE — 11000001 HC ACUTE MED/SURG PRIVATE ROOM

## 2022-06-27 RX ORDER — FUROSEMIDE 10 MG/ML
40 INJECTION INTRAMUSCULAR; INTRAVENOUS ONCE
Status: COMPLETED | OUTPATIENT
Start: 2022-06-27 | End: 2022-06-27

## 2022-06-27 RX ORDER — HYDROCODONE BITARTRATE AND ACETAMINOPHEN 500; 5 MG/1; MG/1
TABLET ORAL
Status: DISCONTINUED | OUTPATIENT
Start: 2022-06-27 | End: 2022-07-02

## 2022-06-27 RX ADMIN — PANTOPRAZOLE SODIUM 40 MG: 40 TABLET, DELAYED RELEASE ORAL at 09:06

## 2022-06-27 RX ADMIN — INSULIN DETEMIR 8 UNITS: 100 INJECTION, SOLUTION SUBCUTANEOUS at 09:06

## 2022-06-27 RX ADMIN — APIXABAN 2.5 MG: 2.5 TABLET, FILM COATED ORAL at 09:06

## 2022-06-27 RX ADMIN — ATORVASTATIN CALCIUM 40 MG: 40 TABLET, FILM COATED ORAL at 09:06

## 2022-06-27 RX ADMIN — INSULIN ASPART 4 UNITS: 100 INJECTION, SOLUTION INTRAVENOUS; SUBCUTANEOUS at 05:06

## 2022-06-27 RX ADMIN — PIPERACILLIN AND TAZOBACTAM 4.5 G: 4; .5 INJECTION, POWDER, LYOPHILIZED, FOR SOLUTION INTRAVENOUS; PARENTERAL at 11:06

## 2022-06-27 RX ADMIN — PREDNISONE 60 MG: 10 TABLET ORAL at 09:06

## 2022-06-27 RX ADMIN — METOPROLOL TARTRATE 25 MG: 25 TABLET, FILM COATED ORAL at 09:06

## 2022-06-27 RX ADMIN — INSULIN ASPART 2 UNITS: 100 INJECTION, SOLUTION INTRAVENOUS; SUBCUTANEOUS at 05:06

## 2022-06-27 RX ADMIN — ALLOPURINOL 50 MG: 300 TABLET ORAL at 09:06

## 2022-06-27 RX ADMIN — BUDESONIDE INHALATION 0.5 MG: 0.5 SUSPENSION RESPIRATORY (INHALATION) at 08:06

## 2022-06-27 RX ADMIN — ISOSORBIDE MONONITRATE 60 MG: 60 TABLET, EXTENDED RELEASE ORAL at 09:06

## 2022-06-27 RX ADMIN — FUROSEMIDE 40 MG: 10 INJECTION INTRAMUSCULAR; INTRAVENOUS at 03:06

## 2022-06-27 RX ADMIN — IPRATROPIUM BROMIDE AND ALBUTEROL SULFATE 3 ML: 2.5; .5 SOLUTION RESPIRATORY (INHALATION) at 04:06

## 2022-06-27 RX ADMIN — IPRATROPIUM BROMIDE AND ALBUTEROL SULFATE 3 ML: 2.5; .5 SOLUTION RESPIRATORY (INHALATION) at 12:06

## 2022-06-27 RX ADMIN — IPRATROPIUM BROMIDE AND ALBUTEROL SULFATE 3 ML: 2.5; .5 SOLUTION RESPIRATORY (INHALATION) at 11:06

## 2022-06-27 RX ADMIN — FERROUS GLUCONATE 324 MG: 324 TABLET ORAL at 09:06

## 2022-06-27 RX ADMIN — INSULIN ASPART 3 UNITS: 100 INJECTION, SOLUTION INTRAVENOUS; SUBCUTANEOUS at 10:06

## 2022-06-27 RX ADMIN — AMLODIPINE BESYLATE 10 MG: 10 TABLET ORAL at 09:06

## 2022-06-27 RX ADMIN — IPRATROPIUM BROMIDE AND ALBUTEROL SULFATE 3 ML: 2.5; .5 SOLUTION RESPIRATORY (INHALATION) at 07:06

## 2022-06-27 RX ADMIN — PIPERACILLIN AND TAZOBACTAM 4.5 G: 4; .5 INJECTION, POWDER, FOR SOLUTION INTRAVENOUS at 08:06

## 2022-06-27 RX ADMIN — THERA TABS 1 TABLET: TAB at 09:06

## 2022-06-27 NOTE — ASSESSMENT & PLAN NOTE
6/18/2022  The patient's renal function is improving.  6/19/2022 Continue to monitor.  6/20/2022 Continued improvement in renal function.  6/21/2022  Daily bmp.  6/22/2022  Continue to monitor at this time.  Daily BMP.  6/23/2022  No uremic symptoms.  Continue to monitor.  6/24/2022  At this time, continue to monitor.  Creatinine is 5.99 which is slightly better.  6/27/2022  Continue to monitor.  The patient does have CKD V.  Watching to see if there is any further improvement in renal function.

## 2022-06-27 NOTE — SUBJECTIVE & OBJECTIVE
Interval History: Pt is AAOx2. He is in no acute distress but does seem confused. Repeat CXR reveals mild worsening of left lower lobe consolidated. He already completed 5 days of azithromycin and prednisone. Today we started zosyn for PNA. Also started fluid restriction to 1200cc qd. Serum Na 124 today.     Review of Systems   Constitutional:  Negative for chills, diaphoresis, fatigue and fever.   HENT:  Negative for congestion, rhinorrhea, sinus pressure and sore throat.    Eyes:  Negative for visual disturbance.   Respiratory:  Negative for cough, shortness of breath and wheezing.    Cardiovascular:  Negative for chest pain, palpitations and leg swelling.   Gastrointestinal:  Positive for constipation. Negative for abdominal pain, diarrhea, nausea and vomiting.   Genitourinary:  Negative for dysuria.   Musculoskeletal:  Negative for arthralgias.   Neurological:  Negative for dizziness.   Hematological:  Negative for adenopathy.   Psychiatric/Behavioral:  Negative for agitation.    Objective:     Vital Signs (Most Recent):  Temp: 98 °F (36.7 °C) (06/27/22 1131)  Pulse: 75 (06/27/22 1144)  Resp: 20 (06/27/22 1144)  BP: (!) 146/83 (06/27/22 1131)  SpO2: 97 % (06/27/22 1131)   Vital Signs (24h Range):  Temp:  [97.5 °F (36.4 °C)-98.5 °F (36.9 °C)] 98 °F (36.7 °C)  Pulse:  [60-97] 75  Resp:  [16-23] 20  SpO2:  [84 %-99 %] 97 %  BP: (108-151)/(52-83) 146/83     Weight: 122.2 kg (269 lb 6.4 oz)  Body mass index is 36.54 kg/m².    Intake/Output Summary (Last 24 hours) at 6/27/2022 1149  Last data filed at 6/27/2022 0600  Gross per 24 hour   Intake --   Output 1400 ml   Net -1400 ml      Physical Exam  Vitals reviewed.   Constitutional:       Appearance: Normal appearance.   HENT:      Head: Normocephalic and atraumatic.      Right Ear: External ear normal.      Left Ear: External ear normal.      Nose: Nose normal.      Mouth/Throat:      Mouth: Mucous membranes are moist.      Pharynx: Oropharynx is clear.   Eyes:       Extraocular Movements: Extraocular movements intact.      Pupils: Pupils are equal, round, and reactive to light.   Cardiovascular:      Rate and Rhythm: Normal rate and regular rhythm.      Pulses: Normal pulses.      Heart sounds: Normal heart sounds.   Pulmonary:      Effort: Pulmonary effort is normal. No respiratory distress.      Breath sounds: Wheezing, rhonchi and rales (bilaterally) present.   Abdominal:      General: Abdomen is flat. There is no distension.      Palpations: Abdomen is soft.      Tenderness: There is no abdominal tenderness.   Musculoskeletal:         General: Normal range of motion.      Cervical back: Normal range of motion.   Skin:     General: Skin is warm.      Capillary Refill: Capillary refill takes less than 2 seconds.   Neurological:      General: No focal deficit present.      Mental Status: He is alert and oriented to person, place, and time.      Comments: Pt seems confused, at baseline   Psychiatric:         Mood and Affect: Mood normal.       Significant Labs: All pertinent labs within the past 24 hours have been reviewed.  BMP:   Recent Labs   Lab 06/27/22  0242   *   *   K 5.0   CL 88*   CO2 23   *   CREATININE 6.16*   CALCIUM 8.3*     CBC:   Recent Labs   Lab 06/26/22  0325 06/27/22  0242   WBC 20.67* 19.36*   HGB 7.6* 7.4*   HCT 22.0* 20.3*    196       Significant Imaging: I have reviewed all pertinent imaging results/findings within the past 24 hours.

## 2022-06-27 NOTE — PROGRESS NOTES
[facility] - [address] [place] [address] Medicine  Progress Note    Patient Name: Neymar Parra  MRN: [id number]  Patient Class: IP- Inpatient   Admission Date: 6/16/2022  Length of Stay: 11 days  Attending Physician: Rj Fernandez MD  Primary Care Provider: [facility] megan Rodríguez    Subjective:     Principal Problem:Hyponatremia    HPI:  72 y.o. male transferred to the [facility] from [facility] for hyperkalemia and hypoxia. Pt reports he went to [facility] because he was having dyspnea and hemoptysis since 1 month which is worse today. Pt reports he was tested positive for COVID at the nursing home on 6/9/22. Per nursing home patient oxygen saturation was in the 80's on RA with tachypnea and labored breathing. Pt reports he normally does not wear oxygen at the nursing home but has been wearing oxygen more often recently. Pt reports he smoked 1-1.5 PPD for 20 years but quit 7 months ago. Per pt, he saw a pulmonologist (Dr. Aldana) for his hemoptysis and had some tests done the results of which he does not know. Per records, pt with a right pleural effusion recently and had a thoracentesis. Pt denies any fever, congestion, dysuria, N/V/D, chest pain, or any other complaints at this time.     In the ED, pt's K 7.2, d-dimer 0.76, BNP 6284, BUN/Cr 73/7.16. EKG showed some peaked t-waves and irregular rhythm. Chest xray showed Increased right lower lung pulmonary density could indicate pneumonia. Pt was treated in the ED with Calcium gluconate 1g, regular insulin 10 units, D50, IV solumedrol 125 mg, tylenol 650 mg and IV fluids NS 1L bolus. Pt will be admitted to the hospital service for management of hypoxia and hyperkalemia.    Interval History: Pt is AAOx2. He is in no acute distress but does seem confused. Repeat CXR reveals mild worsening of left lower lobe consolidated. He already completed 5 days of azithromycin and prednisone. Today we started zosyn for PNA. Also started fluid restriction to 1200cc  qd. Serum Na 124 today.     Review of Systems   Constitutional:  Negative for chills, diaphoresis, fatigue and fever.   HENT:  Negative for congestion, rhinorrhea, sinus pressure and sore throat.    Eyes:  Negative for visual disturbance.   Respiratory:  Negative for cough, shortness of breath and wheezing.    Cardiovascular:  Negative for chest pain, palpitations and leg swelling.   Gastrointestinal:  Positive for constipation. Negative for abdominal pain, diarrhea, nausea and vomiting.   Genitourinary:  Negative for dysuria.   Musculoskeletal:  Negative for arthralgias.   Neurological:  Negative for dizziness.   Hematological:  Negative for adenopathy.   Psychiatric/Behavioral:  Negative for agitation.    Objective:     Vital Signs (Most Recent):  Temp: 98 °F (36.7 °C) (06/27/22 1131)  Pulse: 75 (06/27/22 1144)  Resp: 20 (06/27/22 1144)  BP: (!) 146/83 (06/27/22 1131)  SpO2: 97 % (06/27/22 1131)   Vital Signs (24h Range):  Temp:  [97.5 °F (36.4 °C)-98.5 °F (36.9 °C)] 98 °F (36.7 °C)  Pulse:  [60-97] 75  Resp:  [16-23] 20  SpO2:  [84 %-99 %] 97 %  BP: (108-151)/(52-83) 146/83     Weight: 122.2 kg (269 lb 6.4 oz)  Body mass index is 36.54 kg/m².    Intake/Output Summary (Last 24 hours) at 6/27/2022 1149  Last data filed at 6/27/2022 0600  Gross per 24 hour   Intake --   Output 1400 ml   Net -1400 ml      Physical Exam  Vitals reviewed.   Constitutional:       Appearance: Normal appearance.   HENT:      Head: Normocephalic and atraumatic.      Right Ear: External ear normal.      Left Ear: External ear normal.      Nose: Nose normal.      Mouth/Throat:      Mouth: Mucous membranes are moist.      Pharynx: Oropharynx is clear.   Eyes:      Extraocular Movements: Extraocular movements intact.      Pupils: Pupils are equal, round, and reactive to light.   Cardiovascular:      Rate and Rhythm: Normal rate and regular rhythm.      Pulses: Normal pulses.      Heart sounds: Normal heart sounds.   Pulmonary:      Effort:  Pulmonary effort is normal. No respiratory distress.      Breath sounds: Wheezing, rhonchi and rales (bilaterally) present.   Abdominal:      General: Abdomen is flat. There is no distension.      Palpations: Abdomen is soft.      Tenderness: There is no abdominal tenderness.   Musculoskeletal:         General: Normal range of motion.      Cervical back: Normal range of motion.   Skin:     General: Skin is warm.      Capillary Refill: Capillary refill takes less than 2 seconds.   Neurological:      General: No focal deficit present.      Mental Status: He is alert and oriented to person, place, and time.      Comments: Pt seems confused, at baseline   Psychiatric:         Mood and Affect: Mood normal.       Significant Labs: All pertinent labs within the past 24 hours have been reviewed.  BMP:   Recent Labs   Lab 06/27/22  0242   *   *   K 5.0   CL 88*   CO2 23   *   CREATININE 6.16*   CALCIUM 8.3*     CBC:   Recent Labs   Lab 06/26/22  0325 06/27/22  0242   WBC 20.67* 19.36*   HGB 7.6* 7.4*   HCT 22.0* 20.3*    196       Significant Imaging: I have reviewed all pertinent imaging results/findings within the past 24 hours.      Assessment/Plan:      * Hyponatremia  - Na 124, serum osmolality WNL at 306, urine osmolality WNL  - IV lasix 40mg qd, fluid restrict 1200cc qd   - Lipid panel pending, CMP in the AM    Aspiration PNA       - Completed 5 days of azithromycin       - CXR this AM revealed worsening left lower lobe consolidation       - WBC at 19.36 (also receiving prednisone 60mg qd)        - Will add zosyn today    Elevated d-dimer  - D-dimer was normal on admission  - Repeat was 1.83, V/Q scan was low probability for PE  - Continue low dose eliquis 2.5mg bid     Anemia  - H/H 7.4/20.3 (8.7/24.9)  - Transfuse 1UPRBC today     DEO (acute kidney injury)  - BUN/Cr 145/6.16 (112/6.02 yesterday)   - Consider bicarb  - Nephrology following     Type 2 diabetes mellitus without  complication  -HgA1c 4.7 10 days ago  - detemir 8U qhs  - SSI    Gastroesophageal reflux disease  - pantoprazole 40 mg qd    COPD exacerbation  - Much improved  - Oral prednisone for day 5/5  - duo nebs Q4H  - budesonide neb Q12H    Hypertension  - continue home amlodipine 10 mg qd, isosorbide mononitrate 60 mg qd, motoprolol 25 mg QD, and lisinopril 20 mg qd.      VTE Risk Mitigation (From admission, onward)         Ordered     apixaban tablet 2.5 mg  2 times daily         06/24/22 1201     Reason for No Pharmacological VTE Prophylaxis  Once        Question:  Reasons:  Answer:  Risk of Bleeding    06/16/22 0301     IP VTE HIGH RISK PATIENT  Once         06/16/22 0301     Place sequential compression device  Until discontinued         06/16/22 0301              Discharge Planning   EDWARDO:      Code Status: Full Code   Is the patient medically ready for discharge?:     Reason for patient still in hospital (select all that apply): Treatment  Discharge Plan A: Long-term acute care facility (LTAC) (Choice obtained for Specialty)        Barbie Guerrero DO  Department of Hospital Medicine   [facility] - [address] [place] [address]

## 2022-06-27 NOTE — SUBJECTIVE & OBJECTIVE
Interval History: The patient is resting.  Currently he is receiving PRBCs.  Today. Serum creatinine is slightly higher.    Review of patient's allergies indicates:   Allergen Reactions    Gatifloxacin Anaphylaxis     Current Facility-Administered Medications   Medication Frequency    0.9%  NaCl infusion (for blood administration) Q24H PRN    acetaminophen tablet 1,000 mg Q6H PRN    albuterol inhaler 2 puff Q6H PRN    albuterol-ipratropium 2.5 mg-0.5 mg/3 mL nebulizer solution 3 mL Q4H    allopurinol split tablet 50 mg Daily    amLODIPine tablet 10 mg Daily    apixaban tablet 2.5 mg BID    atorvastatin tablet 40 mg QHS    bisacodyL EC tablet 10 mg Daily PRN    budesonide nebulizer solution 0.5 mg Daily    calcium gluconate 1 g in NS IVPB (premixed) Q10 Min PRN    cetirizine tablet 10 mg Daily PRN    dextromethorphan-guaiFENesin  mg/5 ml liquid 10 mL Q6H PRN    dextrose 50% injection 12.5 g PRN    dextrose 50% injection 25 g PRN    ferrous gluconate tablet 324 mg Daily with breakfast    glucagon (human recombinant) injection 1 mg PRN    glucose chewable tablet 16 g PRN    glucose chewable tablet 24 g PRN    HYDROcodone-acetaminophen 7.5-325 mg per tablet 1 tablet Q6H PRN    insulin aspart U-100 injection 1-10 Units QID (AC + HS) PRN    insulin detemir U-100 injection 8 Units QHS    isosorbide mononitrate 24 hr tablet 60 mg Daily    melatonin tablet 6 mg Nightly PRN    metoprolol tartrate (LOPRESSOR) tablet 25 mg Daily    mineral oil enema 1 enema Daily PRN    multivitamin tablet Daily    naloxone 0.4 mg/mL injection 0.02 mg PRN    ondansetron injection 8 mg Q6H PRN    pantoprazole EC tablet 40 mg Daily    piperacillin-tazobactam (ZOSYN) 4.5 g in dextrose 5 % in water (D5W) 5 % 100 mL IVPB (MB+) Q12H    simethicone chewable tablet 80 mg TID PRN    sodium chloride 0.9% flush 10 mL Q12H PRN    traMADoL tablet 50 mg Q8H PRN    traZODone tablet 50 mg Nightly PRN       Objective:     Vital Signs (Most Recent):  Temp:  97 °F (36.1 °C) (06/27/22 1443)  Pulse: 69 (06/27/22 1443)  Resp: 18 (06/27/22 1443)  BP: 126/79 (06/27/22 1443)  SpO2: 98 % (06/27/22 1443)  O2 Device (Oxygen Therapy): nasal cannula (06/27/22 1330) Vital Signs (24h Range):  Temp:  [77 °F (25 °C)-98.5 °F (36.9 °C)] 97 °F (36.1 °C)  Pulse:  [60-97] 69  Resp:  [16-23] 18  SpO2:  [95 %-99 %] 98 %  BP: (108-151)/(64-83) 126/79     Weight: 122.2 kg (269 lb 6.4 oz) (06/26/22 0400)  Body mass index is 36.54 kg/m².  Body surface area is 2.49 meters squared.    I/O last 3 completed shifts:  In: -   Out: 3300 [Urine:3300]    Physical Exam  Vitals reviewed.   HENT:      Head: Normocephalic and atraumatic.   Cardiovascular:      Rate and Rhythm: Regular rhythm.   Pulmonary:      Breath sounds: Rales present.   Abdominal:      Palpations: Abdomen is soft.   Neurological:      Mental Status: He is alert.   Psychiatric:         Mood and Affect: Mood normal.       Significant Labs:  BMP:   Recent Labs   Lab 06/27/22  0242   *   *   K 5.0   CL 88*   CO2 23   *   CREATININE 6.16*   CALCIUM 8.3*     CBC:   Recent Labs   Lab 06/27/22  0242   WBC 19.36*   RBC 2.52*   HGB 7.4*   HCT 20.3*      MCV 80.6   MCH 29.4   MCHC 36.5*        Significant Imaging:  Labs: Reviewed

## 2022-06-27 NOTE — PROGRESS NOTES
[facility] - [address] [place] [address]  Nephrology  Progress Note    Patient Name: Neymar Parra  MRN: [id number]  Admission Date: 6/16/2022  Hospital Length of Stay: 11 days  Attending Provider: Rj Fernandez MD   Primary Care Physician: [facility] megan Rodríguez  Principal Problem:Hyponatremia    Subjective:     HPI: This patient with history of HTN, DM, CKD who has seen Dr. Crowder in the past 2 years ago is currently in a nursing facility.  The patient presented with SOB and diagnosed with COVID.  Serum creatinine has trended up to 7.15.  Serum potassium is 6.4.  Nephrology has been consulted for renal issues.  At the bedside, the patient mentions feeling fine.  He states that his breathing has improved.      Interval History: The patient is resting.  Currently he is receiving PRBCs.  Today. Serum creatinine is slightly higher.    Review of patient's allergies indicates:   Allergen Reactions    Gatifloxacin Anaphylaxis     Current Facility-Administered Medications   Medication Frequency    0.9%  NaCl infusion (for blood administration) Q24H PRN    acetaminophen tablet 1,000 mg Q6H PRN    albuterol inhaler 2 puff Q6H PRN    albuterol-ipratropium 2.5 mg-0.5 mg/3 mL nebulizer solution 3 mL Q4H    allopurinol split tablet 50 mg Daily    amLODIPine tablet 10 mg Daily    apixaban tablet 2.5 mg BID    atorvastatin tablet 40 mg QHS    bisacodyL EC tablet 10 mg Daily PRN    budesonide nebulizer solution 0.5 mg Daily    calcium gluconate 1 g in NS IVPB (premixed) Q10 Min PRN    cetirizine tablet 10 mg Daily PRN    dextromethorphan-guaiFENesin  mg/5 ml liquid 10 mL Q6H PRN    dextrose 50% injection 12.5 g PRN    dextrose 50% injection 25 g PRN    ferrous gluconate tablet 324 mg Daily with breakfast    glucagon (human recombinant) injection 1 mg PRN    glucose chewable tablet 16 g PRN    glucose chewable tablet 24 g PRN    HYDROcodone-acetaminophen 7.5-325 mg per tablet 1 tablet Q6H  PRN    insulin aspart U-100 injection 1-10 Units QID (AC + HS) PRN    insulin detemir U-100 injection 8 Units QHS    isosorbide mononitrate 24 hr tablet 60 mg Daily    melatonin tablet 6 mg Nightly PRN    metoprolol tartrate (LOPRESSOR) tablet 25 mg Daily    mineral oil enema 1 enema Daily PRN    multivitamin tablet Daily    naloxone 0.4 mg/mL injection 0.02 mg PRN    ondansetron injection 8 mg Q6H PRN    pantoprazole EC tablet 40 mg Daily    piperacillin-tazobactam (ZOSYN) 4.5 g in dextrose 5 % in water (D5W) 5 % 100 mL IVPB (MB+) Q12H    simethicone chewable tablet 80 mg TID PRN    sodium chloride 0.9% flush 10 mL Q12H PRN    traMADoL tablet 50 mg Q8H PRN    traZODone tablet 50 mg Nightly PRN       Objective:     Vital Signs (Most Recent):  Temp: 97 °F (36.1 °C) (06/27/22 1443)  Pulse: 69 (06/27/22 1443)  Resp: 18 (06/27/22 1443)  BP: 126/79 (06/27/22 1443)  SpO2: 98 % (06/27/22 1443)  O2 Device (Oxygen Therapy): nasal cannula (06/27/22 1330) Vital Signs (24h Range):  Temp:  [77 °F (25 °C)-98.5 °F (36.9 °C)] 97 °F (36.1 °C)  Pulse:  [60-97] 69  Resp:  [16-23] 18  SpO2:  [95 %-99 %] 98 %  BP: (108-151)/(64-83) 126/79     Weight: 122.2 kg (269 lb 6.4 oz) (06/26/22 0400)  Body mass index is 36.54 kg/m².  Body surface area is 2.49 meters squared.    I/O last 3 completed shifts:  In: -   Out: 3300 [Urine:3300]    Physical Exam  Vitals reviewed.   HENT:      Head: Normocephalic and atraumatic.   Cardiovascular:      Rate and Rhythm: Regular rhythm.   Pulmonary:      Breath sounds: Rales present.   Abdominal:      Palpations: Abdomen is soft.   Neurological:      Mental Status: He is alert.   Psychiatric:         Mood and Affect: Mood normal.       Significant Labs:  BMP:   Recent Labs   Lab 06/27/22 0242   *   *   K 5.0   CL 88*   CO2 23   *   CREATININE 6.16*   CALCIUM 8.3*     CBC:   Recent Labs   Lab 06/27/22 0242   WBC 19.36*   RBC 2.52*   HGB 7.4*   HCT 20.3*      MCV  80.6   MCH 29.4   MCHC 36.5*        Significant Imaging:  Labs: Reviewed    Assessment/Plan:     Anemia  He is getting PRBCs    DEO (acute kidney injury)  6/18/2022  The patient's renal function is improving.  6/19/2022 Continue to monitor.  6/20/2022 Continued improvement in renal function.  6/21/2022  Daily bmp.  6/22/2022  Continue to monitor at this time.  Daily BMP.  6/23/2022  No uremic symptoms.  Continue to monitor.  6/24/2022  At this time, continue to monitor.  Creatinine is 5.99 which is slightly better.  6/27/2022  Continue to monitor.  The patient does have CKD V.  Watching to see if there is any further improvement in renal function.    COPD exacerbation  Chronic condition.        Thank you for your consult. I will follow-up with patient. Please contact us if you have any additional questions.    Jorge Butts Jr, MD  Nephrology  [facility] - [address] [place] [address]

## 2022-06-27 NOTE — PLAN OF CARE
Per Remedios in administration at [facility], patient's bed there will be held until July 7th. MD notified. SS to follow.

## 2022-06-28 LAB
ALBUMIN SERPL BCP-MCNC: 2.5 G/DL (ref 3.5–5)
ALBUMIN/GLOB SERPL: 0.9 {RATIO}
ALP SERPL-CCNC: 53 U/L (ref 45–115)
ALT SERPL W P-5'-P-CCNC: 65 U/L (ref 16–61)
ANION GAP SERPL CALCULATED.3IONS-SCNC: 16 MMOL/L (ref 7–16)
ANISOCYTOSIS BLD QL SMEAR: ABNORMAL
AST SERPL W P-5'-P-CCNC: 38 U/L (ref 15–37)
BASOPHILS # BLD AUTO: 0.02 K/UL (ref 0–0.2)
BASOPHILS NFR BLD AUTO: 0.1 % (ref 0–1)
BILIRUB SERPL-MCNC: 0.5 MG/DL (ref 0–1.2)
BUN SERPL-MCNC: 164 MG/DL (ref 7–18)
BUN/CREAT SERPL: 25 (ref 6–20)
CALCIUM SERPL-MCNC: 8.2 MG/DL (ref 8.5–10.1)
CHLORIDE SERPL-SCNC: 92 MMOL/L (ref 98–107)
CO2 SERPL-SCNC: 23 MMOL/L (ref 21–32)
CREAT SERPL-MCNC: 6.44 MG/DL (ref 0.7–1.3)
DACRYOCYTES BLD QL SMEAR: ABNORMAL
DIFFERENTIAL METHOD BLD: ABNORMAL
EOSINOPHIL # BLD AUTO: 0 K/UL (ref 0–0.5)
EOSINOPHIL NFR BLD AUTO: 0 % (ref 1–4)
ERYTHROCYTE [DISTWIDTH] IN BLOOD BY AUTOMATED COUNT: 16.2 % (ref 11.5–14.5)
GLOBULIN SER-MCNC: 2.8 G/DL (ref 2–4)
GLUCOSE SERPL-MCNC: 102 MG/DL (ref 74–106)
GLUCOSE SERPL-MCNC: 116 MG/DL (ref 70–105)
GLUCOSE SERPL-MCNC: 129 MG/DL (ref 70–105)
GLUCOSE SERPL-MCNC: 130 MG/DL (ref 70–105)
GLUCOSE SERPL-MCNC: 177 MG/DL (ref 70–105)
HCT VFR BLD AUTO: 21.9 % (ref 40–54)
HGB BLD-MCNC: 7.8 G/DL (ref 13.5–18)
IMM GRANULOCYTES # BLD AUTO: 0.17 K/UL (ref 0–0.04)
IMM GRANULOCYTES NFR BLD: 1 % (ref 0–0.4)
LYMPHOCYTES # BLD AUTO: 0.32 K/UL (ref 1–4.8)
LYMPHOCYTES NFR BLD AUTO: 1.8 % (ref 27–41)
LYMPHOCYTES NFR BLD MANUAL: 5 % (ref 27–41)
MCH RBC QN AUTO: 28.7 PG (ref 27–31)
MCHC RBC AUTO-ENTMCNC: 35.6 G/DL (ref 32–36)
MCV RBC AUTO: 80.5 FL (ref 80–96)
MONOCYTES # BLD AUTO: 0.68 K/UL (ref 0–0.8)
MONOCYTES NFR BLD AUTO: 3.8 % (ref 2–6)
MONOCYTES NFR BLD MANUAL: 4 % (ref 2–6)
MPC BLD CALC-MCNC: 10.7 FL (ref 9.4–12.4)
NEUTROPHILS # BLD AUTO: 16.49 K/UL (ref 1.8–7.7)
NEUTROPHILS NFR BLD AUTO: 93.3 % (ref 53–65)
NEUTS SEG NFR BLD MANUAL: 91 % (ref 50–62)
NRBC # BLD AUTO: 0 X10E3/UL
NRBC, AUTO (.00): 0 %
OCCULT BLOOD: POSITIVE
OVALOCYTES BLD QL SMEAR: ABNORMAL
PLATELET # BLD AUTO: 193 K/UL (ref 150–400)
PLATELET MORPHOLOGY: NORMAL
POLYCHROMASIA BLD QL SMEAR: ABNORMAL
POTASSIUM SERPL-SCNC: 5.3 MMOL/L (ref 3.5–5.1)
PROT SERPL-MCNC: 5.3 G/DL (ref 6.4–8.2)
RBC # BLD AUTO: 2.72 M/UL (ref 4.6–6.2)
SODIUM SERPL-SCNC: 126 MMOL/L (ref 136–145)
WBC # BLD AUTO: 17.68 K/UL (ref 4.5–11)

## 2022-06-28 PROCEDURE — 80053 COMPREHEN METABOLIC PANEL: CPT | Performed by: FAMILY MEDICINE

## 2022-06-28 PROCEDURE — 99232 PR SUBSEQUENT HOSPITAL CARE,LEVL II: ICD-10-PCS | Mod: GC,,, | Performed by: FAMILY MEDICINE

## 2022-06-28 PROCEDURE — 36415 COLL VENOUS BLD VENIPUNCTURE: CPT | Performed by: FAMILY MEDICINE

## 2022-06-28 PROCEDURE — 99232 SBSQ HOSP IP/OBS MODERATE 35: CPT | Mod: GC,,, | Performed by: FAMILY MEDICINE

## 2022-06-28 PROCEDURE — 25000003 PHARM REV CODE 250: Performed by: FAMILY MEDICINE

## 2022-06-28 PROCEDURE — 63600175 PHARM REV CODE 636 W HCPCS

## 2022-06-28 PROCEDURE — 94668 MNPJ CHEST WALL SBSQ: CPT

## 2022-06-28 PROCEDURE — 25000003 PHARM REV CODE 250: Performed by: HOSPITALIST

## 2022-06-28 PROCEDURE — 25000242 PHARM REV CODE 250 ALT 637 W/ HCPCS

## 2022-06-28 PROCEDURE — 11000001 HC ACUTE MED/SURG PRIVATE ROOM

## 2022-06-28 PROCEDURE — 94640 AIRWAY INHALATION TREATMENT: CPT

## 2022-06-28 PROCEDURE — 25000003 PHARM REV CODE 250

## 2022-06-28 PROCEDURE — 82962 GLUCOSE BLOOD TEST: CPT

## 2022-06-28 PROCEDURE — C9399 UNCLASSIFIED DRUGS OR BIOLOG: HCPCS

## 2022-06-28 PROCEDURE — 94761 N-INVAS EAR/PLS OXIMETRY MLT: CPT

## 2022-06-28 PROCEDURE — 99900035 HC TECH TIME PER 15 MIN (STAT)

## 2022-06-28 PROCEDURE — 27000221 HC OXYGEN, UP TO 24 HOURS

## 2022-06-28 PROCEDURE — 82271 OCCULT BLOOD OTHER SOURCES: CPT | Performed by: FAMILY MEDICINE

## 2022-06-28 PROCEDURE — 85025 COMPLETE CBC W/AUTO DIFF WBC: CPT | Performed by: FAMILY MEDICINE

## 2022-06-28 PROCEDURE — 63600175 PHARM REV CODE 636 W HCPCS: Performed by: FAMILY MEDICINE

## 2022-06-28 RX ADMIN — PANTOPRAZOLE SODIUM 40 MG: 40 TABLET, DELAYED RELEASE ORAL at 10:06

## 2022-06-28 RX ADMIN — INSULIN DETEMIR 8 UNITS: 100 INJECTION, SOLUTION SUBCUTANEOUS at 09:06

## 2022-06-28 RX ADMIN — AMLODIPINE BESYLATE 10 MG: 10 TABLET ORAL at 10:06

## 2022-06-28 RX ADMIN — METOPROLOL TARTRATE 25 MG: 25 TABLET, FILM COATED ORAL at 10:06

## 2022-06-28 RX ADMIN — ISOSORBIDE MONONITRATE 60 MG: 60 TABLET, EXTENDED RELEASE ORAL at 10:06

## 2022-06-28 RX ADMIN — THERA TABS 1 TABLET: TAB at 10:06

## 2022-06-28 RX ADMIN — IPRATROPIUM BROMIDE AND ALBUTEROL SULFATE 3 ML: 2.5; .5 SOLUTION RESPIRATORY (INHALATION) at 03:06

## 2022-06-28 RX ADMIN — IPRATROPIUM BROMIDE AND ALBUTEROL SULFATE 3 ML: 2.5; .5 SOLUTION RESPIRATORY (INHALATION) at 11:06

## 2022-06-28 RX ADMIN — PIPERACILLIN AND TAZOBACTAM 4.5 G: 4; .5 INJECTION, POWDER, FOR SOLUTION INTRAVENOUS at 10:06

## 2022-06-28 RX ADMIN — ATORVASTATIN CALCIUM 40 MG: 40 TABLET, FILM COATED ORAL at 09:06

## 2022-06-28 RX ADMIN — IPRATROPIUM BROMIDE AND ALBUTEROL SULFATE 3 ML: 2.5; .5 SOLUTION RESPIRATORY (INHALATION) at 07:06

## 2022-06-28 RX ADMIN — APIXABAN 2.5 MG: 2.5 TABLET, FILM COATED ORAL at 10:06

## 2022-06-28 RX ADMIN — TRAMADOL HYDROCHLORIDE 50 MG: 50 TABLET, COATED ORAL at 09:06

## 2022-06-28 RX ADMIN — APIXABAN 2.5 MG: 2.5 TABLET, FILM COATED ORAL at 09:06

## 2022-06-28 RX ADMIN — TRAZODONE HYDROCHLORIDE 50 MG: 50 TABLET ORAL at 09:06

## 2022-06-28 RX ADMIN — IPRATROPIUM BROMIDE AND ALBUTEROL SULFATE 3 ML: 2.5; .5 SOLUTION RESPIRATORY (INHALATION) at 09:06

## 2022-06-28 RX ADMIN — BUDESONIDE INHALATION 0.5 MG: 0.5 SUSPENSION RESPIRATORY (INHALATION) at 07:06

## 2022-06-28 RX ADMIN — ALLOPURINOL 50 MG: 300 TABLET ORAL at 10:06

## 2022-06-28 RX ADMIN — PIPERACILLIN AND TAZOBACTAM 4.5 G: 4; .5 INJECTION, POWDER, FOR SOLUTION INTRAVENOUS at 06:06

## 2022-06-28 RX ADMIN — FERROUS GLUCONATE 324 MG: 324 TABLET ORAL at 10:06

## 2022-06-28 NOTE — ASSESSMENT & PLAN NOTE
6/24/2022  At this time, continue to monitor.  Creatinine is 5.99 which is slightly better.  6/27/2022  Continue to monitor.  The patient does have CKD V.  Watching to see if there is any further improvement in renal function.  6/28/2022  The patient does not have uremic symptoms.  Serum creatinine is 6.44 today.  Volume status is acceptable.

## 2022-06-28 NOTE — PROGRESS NOTES
[facility] - [address] [place] [address]  Nephrology  Progress Note    Patient Name: Neymar Parra  MRN: [id number]  Admission Date: 6/16/2022  Hospital Length of Stay: 12 days  Attending Provider: Rj Fernandez MD   Primary Care Physician: [facility] megan Rodríguez  Principal Problem:Hyponatremia    Subjective:     HPI: This patient with history of HTN, DM, CKD who has seen Dr. Crowder in the past 2 years ago is currently in a nursing facility.  The patient presented with SOB and diagnosed with COVID.  Serum creatinine has trended up to 7.15.  Serum potassium is 6.4.  Nephrology has been consulted for renal issues.  At the bedside, the patient mentions feeling fine.  He states that his breathing has improved.      Interval History: The patient is sitting up in bed.  He seems a little more comfortable today.  Serum creatinine is 6.44.  Serum potassium is 5.3.    Review of patient's allergies indicates:   Allergen Reactions    Gatifloxacin Anaphylaxis     Current Facility-Administered Medications   Medication Frequency    0.9%  NaCl infusion (for blood administration) Q24H PRN    acetaminophen tablet 1,000 mg Q6H PRN    albuterol inhaler 2 puff Q6H PRN    albuterol-ipratropium 2.5 mg-0.5 mg/3 mL nebulizer solution 3 mL Q4H    allopurinol split tablet 50 mg Daily    amLODIPine tablet 10 mg Daily    apixaban tablet 2.5 mg BID    atorvastatin tablet 40 mg QHS    bisacodyL EC tablet 10 mg Daily PRN    budesonide nebulizer solution 0.5 mg Daily    calcium gluconate 1 g in NS IVPB (premixed) Q10 Min PRN    cetirizine tablet 10 mg Daily PRN    dextromethorphan-guaiFENesin  mg/5 ml liquid 10 mL Q6H PRN    dextrose 50% injection 12.5 g PRN    dextrose 50% injection 25 g PRN    ferrous gluconate tablet 324 mg Daily with breakfast    glucagon (human recombinant) injection 1 mg PRN    glucose chewable tablet 16 g PRN    glucose chewable tablet 24 g PRN    HYDROcodone-acetaminophen 7.5-325  mg per tablet 1 tablet Q6H PRN    insulin aspart U-100 injection 1-10 Units QID (AC + HS) PRN    insulin detemir U-100 injection 8 Units QHS    isosorbide mononitrate 24 hr tablet 60 mg Daily    melatonin tablet 6 mg Nightly PRN    metoprolol tartrate (LOPRESSOR) tablet 25 mg Daily    mineral oil enema 1 enema Daily PRN    multivitamin tablet Daily    naloxone 0.4 mg/mL injection 0.02 mg PRN    ondansetron injection 8 mg Q6H PRN    pantoprazole EC tablet 40 mg Daily    piperacillin-tazobactam (ZOSYN) 4.5 g in dextrose 5 % in water (D5W) 5 % 100 mL IVPB (MB+) Q12H    simethicone chewable tablet 80 mg TID PRN    sodium chloride 0.9% flush 10 mL Q12H PRN    traMADoL tablet 50 mg Q8H PRN    traZODone tablet 50 mg Nightly PRN       Objective:     Vital Signs (Most Recent):  Temp: 98 °F (36.7 °C) (06/28/22 1600)  Pulse: 99 (06/28/22 1600)  Resp: 18 (06/28/22 1600)  BP: 122/81 (06/28/22 1600)  SpO2: 99 % (06/28/22 1600)  O2 Device (Oxygen Therapy): nasal cannula (06/28/22 1528) Vital Signs (24h Range):  Temp:  [97.2 °F (36.2 °C)-98 °F (36.7 °C)] 98 °F (36.7 °C)  Pulse:  [54-99] 99  Resp:  [18-20] 18  SpO2:  [94 %-99 %] 99 %  BP: (113-147)/(51-81) 122/81     Weight: 122.5 kg (270 lb 1 oz) (06/28/22 0600)  Body mass index is 36.63 kg/m².  Body surface area is 2.49 meters squared.    I/O last 3 completed shifts:  In: 361.7 [Blood:361.7]  Out: 1775 [Urine:1775]    Physical Exam  Vitals reviewed.   HENT:      Head: Normocephalic and atraumatic.   Cardiovascular:      Rate and Rhythm: Regular rhythm.      Pulses: Normal pulses.   Pulmonary:      Effort: Pulmonary effort is normal.      Comments: Occasional crackles  Abdominal:      General: Abdomen is flat.      Palpations: Abdomen is soft.   Neurological:      Mental Status: He is alert.   Psychiatric:         Mood and Affect: Mood normal.       Significant Labs:  BMP:   Recent Labs   Lab 06/28/22  0541      *   K 5.3*   CL 92*   CO2 23   *    CREATININE 6.44*   CALCIUM 8.2*     CBC:   Recent Labs   Lab 06/28/22  0541   WBC 17.68*   RBC 2.72*   HGB 7.8*   HCT 21.9*      MCV 80.5   MCH 28.7   MCHC 35.6        Significant Imaging:  Labs: Reviewed    Assessment/Plan:     DEO (acute kidney injury)    6/24/2022  At this time, continue to monitor.  Creatinine is 5.99 which is slightly better.  6/27/2022  Continue to monitor.  The patient does have CKD V.  Watching to see if there is any further improvement in renal function.  6/28/2022  The patient does not have uremic symptoms.  Serum creatinine is 6.44 today.  Volume status is acceptable.    Type 2 diabetes mellitus without complication  Chronic condition    COPD exacerbation  Chronic condition.        Thank you for your consult. I will follow-up with patient. Please contact us if you have any additional questions.    Jorge Butts Jr, MD  Nephrology  [facility] - [address] [place] [address]

## 2022-06-28 NOTE — PROGRESS NOTES
[facility] - [address] [place] [address] Medicine  Progress Note    Patient Name: Neymar Parra  MRN: [id number]  Patient Class: IP- Inpatient   Admission Date: 6/16/2022  Length of Stay: 12 days  Attending Physician: Rj Fernandez MD  Primary Care Provider: [facility] megan Rodríguez    Subjective:     Principal Problem:Hyponatremia    HPI:  72 y.o. male transferred to the [facility] from [facility] for hyperkalemia and hypoxia. Pt reports he went to [facility] because he was having dyspnea and hemoptysis since 1 month which is worse today. Pt reports he was tested positive for COVID at the nursing home on 6/9/22. Per nursing home patient oxygen saturation was in the 80's on RA with tachypnea and labored breathing. Pt reports he normally does not wear oxygen at the nursing home but has been wearing oxygen more often recently. Pt reports he smoked 1-1.5 PPD for 20 years but quit 7 months ago. Per pt, he saw a pulmonologist (Dr. Aldana) for his hemoptysis and had some tests done the results of which he does not know. Per records, pt with a right pleural effusion recently and had a thoracentesis. Pt denies any fever, congestion, dysuria, N/V/D, chest pain, or any other complaints at this time.     In the ED, pt's K 7.2, d-dimer 0.76, BNP 6284, BUN/Cr 73/7.16. EKG showed some peaked t-waves and irregular rhythm. Chest xray showed Increased right lower lung pulmonary density could indicate pneumonia. Pt was treated in the ED with Calcium gluconate 1g, regular insulin 10 units, D50, IV solumedrol 125 mg, tylenol 650 mg and IV fluids NS 1L bolus. Pt will be admitted to the hospital service for management of hypoxia and hyperkalemia.    Interval History: Pt is in no acute distress. Continuing antibiotics. Pending FOBT. Pt is stable.    Review of Systems   Constitutional:  Negative for chills, fatigue and fever.   HENT:  Negative for congestion, rhinorrhea and sinus pressure.    Eyes:  Negative for visual  disturbance.   Respiratory:  Negative for cough, shortness of breath and wheezing.    Cardiovascular:  Negative for chest pain, palpitations and leg swelling.   Gastrointestinal:  Negative for abdominal pain, constipation, diarrhea, nausea and vomiting.   Genitourinary:  Negative for dysuria.   Musculoskeletal:  Positive for arthralgias.   Skin:  Negative for rash and wound.   Neurological:  Negative for dizziness, weakness and headaches.   Hematological:  Negative for adenopathy.   Psychiatric/Behavioral:  Negative for agitation.    Objective:     Vital Signs (Most Recent):  Temp: 97.8 °F (36.6 °C) (06/28/22 1018)  Pulse: 93 (06/28/22 1018)  Resp: 18 (06/28/22 1018)  BP: (!) 122/51 (06/28/22 1018)  SpO2: 95 % (06/28/22 1018)   Vital Signs (24h Range):  Temp:  [77 °F (25 °C)-98.3 °F (36.8 °C)] 97.8 °F (36.6 °C)  Pulse:  [67-93] 93  Resp:  [18-20] 18  SpO2:  [94 %-98 %] 95 %  BP: (113-147)/(51-83) 122/51     Weight: 122.5 kg (270 lb 1 oz)  Body mass index is 36.63 kg/m².    Intake/Output Summary (Last 24 hours) at 6/28/2022 1121  Last data filed at 6/28/2022 0800  Gross per 24 hour   Intake 361.67 ml   Output 1575 ml   Net -1213.33 ml      Physical Exam  Vitals reviewed.   Constitutional:       Appearance: He is normal weight.   HENT:      Head: Normocephalic and atraumatic.      Right Ear: External ear normal.      Left Ear: External ear normal.      Nose: Nose normal.      Mouth/Throat:      Mouth: Mucous membranes are moist.      Pharynx: Oropharynx is clear.   Eyes:      Extraocular Movements: Extraocular movements intact.      Pupils: Pupils are equal, round, and reactive to light.   Cardiovascular:      Rate and Rhythm: Normal rate and regular rhythm.      Pulses: Normal pulses.      Heart sounds: Normal heart sounds.   Pulmonary:      Effort: Pulmonary effort is normal. No respiratory distress.      Breath sounds: No wheezing.   Abdominal:      General: Abdomen is flat.      Palpations: Abdomen is soft.    Musculoskeletal:         General: Normal range of motion.      Cervical back: Normal range of motion.   Skin:     General: Skin is warm.      Capillary Refill: Capillary refill takes less than 2 seconds.   Neurological:      General: No focal deficit present.      Mental Status: He is alert and oriented to person, place, and time.   Psychiatric:         Mood and Affect: Mood normal.       Significant Labs: All pertinent labs within the past 24 hours have been reviewed.  BMP:   Recent Labs   Lab 06/28/22  0541      *   K 5.3*   CL 92*   CO2 23   *   CREATININE 6.44*   CALCIUM 8.2*     CBC:   Recent Labs   Lab 06/27/22  0242 06/27/22  1748 06/28/22  0541   WBC 19.36*  --  17.68*   HGB 7.4* 8.2* 7.8*   HCT 20.3* 22.6* 21.9*     --  193       Significant Imaging: I have reviewed all pertinent imaging results/findings within the past 24 hours.      Assessment/Plan:      * Hyponatremia  - Na 126, serum osmolality WNL at 306, urine osmolality WNL  - Fluid restrict 1200cc qd      Aspiration PNA       - Completed 5 days of azithromycin       - CXR this AM revealed worsening left lower lobe consolidation yesterday       - WBC at 17.68, (19.36 yesterday) completed prednisone       - Completed prednisone     Elevated d-dimer  - D-dimer was normal on admission  - Repeat was 1.83, V/Q scan was low probability for PE  - Continue low dose eliquis 2.5mg bid      Anemia  - H/H 7.8/21.9 (7.4/20.3 yesterday)   - Transfused 1UPRBC yesterday  - FOBT pending     DEO (acute kidney injury)  - BUN/Cr 164/6.44 (145/6.16 yesterday) - worsening  - Consider bicarb  - Nephrology following      Type 2 diabetes mellitus without complication  -HgA1c 4.7 10 days ago  - detemir 8U qhs  - SSI     Gastroesophageal reflux disease  - pantoprazole 40 mg qd     COPD exacerbation  - Much improved  - Oral prednisone for day 5/5  - duo nebs Q4H  - budesonide neb Q12H     Hypertension  - continue home amlodipine 10 mg qd, isosorbide  mononitrate 60 mg qd, motoprolol 25 mg QD, and lisinopril 20 mg qd.    VTE Risk Mitigation (From admission, onward)         Ordered     apixaban tablet 2.5 mg  2 times daily         06/24/22 1201     Reason for No Pharmacological VTE Prophylaxis  Once        Question:  Reasons:  Answer:  Risk of Bleeding    06/16/22 0301     IP VTE HIGH RISK PATIENT  Once         06/16/22 0301     Place sequential compression device  Until discontinued         06/16/22 0301                Discharge Planning   EDWARDO:      Code Status: Full Code   Is the patient medically ready for discharge?:     Reason for patient still in hospital (select all that apply): Treatment  Discharge Plan A: Long-term acute care facility (LTAC) (Choice obtained for Specialty)      Barbie Guerrero DO  Department of Hospital Medicine   [address]

## 2022-06-28 NOTE — SUBJECTIVE & OBJECTIVE
Interval History: The patient is sitting up in bed.  He seems a little more comfortable today.  Serum creatinine is 6.44.  Serum potassium is 5.3.    Review of patient's allergies indicates:   Allergen Reactions    Gatifloxacin Anaphylaxis     Current Facility-Administered Medications   Medication Frequency    0.9%  NaCl infusion (for blood administration) Q24H PRN    acetaminophen tablet 1,000 mg Q6H PRN    albuterol inhaler 2 puff Q6H PRN    albuterol-ipratropium 2.5 mg-0.5 mg/3 mL nebulizer solution 3 mL Q4H    allopurinol split tablet 50 mg Daily    amLODIPine tablet 10 mg Daily    apixaban tablet 2.5 mg BID    atorvastatin tablet 40 mg QHS    bisacodyL EC tablet 10 mg Daily PRN    budesonide nebulizer solution 0.5 mg Daily    calcium gluconate 1 g in NS IVPB (premixed) Q10 Min PRN    cetirizine tablet 10 mg Daily PRN    dextromethorphan-guaiFENesin  mg/5 ml liquid 10 mL Q6H PRN    dextrose 50% injection 12.5 g PRN    dextrose 50% injection 25 g PRN    ferrous gluconate tablet 324 mg Daily with breakfast    glucagon (human recombinant) injection 1 mg PRN    glucose chewable tablet 16 g PRN    glucose chewable tablet 24 g PRN    HYDROcodone-acetaminophen 7.5-325 mg per tablet 1 tablet Q6H PRN    insulin aspart U-100 injection 1-10 Units QID (AC + HS) PRN    insulin detemir U-100 injection 8 Units QHS    isosorbide mononitrate 24 hr tablet 60 mg Daily    melatonin tablet 6 mg Nightly PRN    metoprolol tartrate (LOPRESSOR) tablet 25 mg Daily    mineral oil enema 1 enema Daily PRN    multivitamin tablet Daily    naloxone 0.4 mg/mL injection 0.02 mg PRN    ondansetron injection 8 mg Q6H PRN    pantoprazole EC tablet 40 mg Daily    piperacillin-tazobactam (ZOSYN) 4.5 g in dextrose 5 % in water (D5W) 5 % 100 mL IVPB (MB+) Q12H    simethicone chewable tablet 80 mg TID PRN    sodium chloride 0.9% flush 10 mL Q12H PRN    traMADoL tablet 50 mg Q8H PRN    traZODone tablet 50 mg Nightly PRN       Objective:     Vital  Signs (Most Recent):  Temp: 98 °F (36.7 °C) (06/28/22 1600)  Pulse: 99 (06/28/22 1600)  Resp: 18 (06/28/22 1600)  BP: 122/81 (06/28/22 1600)  SpO2: 99 % (06/28/22 1600)  O2 Device (Oxygen Therapy): nasal cannula (06/28/22 1528) Vital Signs (24h Range):  Temp:  [97.2 °F (36.2 °C)-98 °F (36.7 °C)] 98 °F (36.7 °C)  Pulse:  [54-99] 99  Resp:  [18-20] 18  SpO2:  [94 %-99 %] 99 %  BP: (113-147)/(51-81) 122/81     Weight: 122.5 kg (270 lb 1 oz) (06/28/22 0600)  Body mass index is 36.63 kg/m².  Body surface area is 2.49 meters squared.    I/O last 3 completed shifts:  In: 361.7 [Blood:361.7]  Out: 1775 [Urine:1775]    Physical Exam  Vitals reviewed.   HENT:      Head: Normocephalic and atraumatic.   Cardiovascular:      Rate and Rhythm: Regular rhythm.      Pulses: Normal pulses.   Pulmonary:      Effort: Pulmonary effort is normal.      Comments: Occasional crackles  Abdominal:      General: Abdomen is flat.      Palpations: Abdomen is soft.   Neurological:      Mental Status: He is alert.   Psychiatric:         Mood and Affect: Mood normal.       Significant Labs:  BMP:   Recent Labs   Lab 06/28/22  0541      *   K 5.3*   CL 92*   CO2 23   *   CREATININE 6.44*   CALCIUM 8.2*     CBC:   Recent Labs   Lab 06/28/22  0541   WBC 17.68*   RBC 2.72*   HGB 7.8*   HCT 21.9*      MCV 80.5   MCH 28.7   MCHC 35.6        Significant Imaging:  Labs: Reviewed

## 2022-06-28 NOTE — PLAN OF CARE
Problem: Adult Inpatient Plan of Care  Goal: Plan of Care Review  Outcome: Ongoing, Progressing  Goal: Patient-Specific Goal (Individualized)  Outcome: Ongoing, Progressing  Goal: Absence of Hospital-Acquired Illness or Injury  Outcome: Ongoing, Progressing  Goal: Optimal Comfort and Wellbeing  Outcome: Ongoing, Progressing  Goal: Readiness for Transition of Care  Outcome: Ongoing, Progressing     Problem: Diabetes Comorbidity  Goal: Blood Glucose Level Within Targeted Range  Outcome: Ongoing, Progressing     Problem: Fluid and Electrolyte Imbalance (Acute Kidney Injury/Impairment)  Goal: Fluid and Electrolyte Balance  Outcome: Ongoing, Progressing     Problem: Oral Intake Inadequate (Acute Kidney Injury/Impairment)  Goal: Optimal Nutrition Intake  Outcome: Ongoing, Progressing     Problem: Renal Function Impairment (Acute Kidney Injury/Impairment)  Goal: Effective Renal Function  Outcome: Ongoing, Progressing     Problem: Skin Injury Risk Increased  Goal: Skin Health and Integrity  Outcome: Ongoing, Progressing     Problem: Gas Exchange Impaired  Goal: Optimal Gas Exchange  Outcome: Ongoing, Progressing     Problem: Fall Injury Risk  Goal: Absence of Fall and Fall-Related Injury  Outcome: Ongoing, Progressing     Problem: Fluid Imbalance (Pneumonia)  Goal: Fluid Balance  Outcome: Ongoing, Progressing     Problem: Infection (Pneumonia)  Goal: Resolution of Infection Signs and Symptoms  Outcome: Ongoing, Progressing     Problem: Respiratory Compromise (Pneumonia)  Goal: Effective Oxygenation and Ventilation  Outcome: Ongoing, Progressing

## 2022-06-28 NOTE — SUBJECTIVE & OBJECTIVE
Interval History: Pt is in no acute distress. Continuing antibiotics. Pending FOBT. Pt is stable.    Review of Systems   Constitutional:  Negative for chills, fatigue and fever.   HENT:  Negative for congestion, rhinorrhea and sinus pressure.    Eyes:  Negative for visual disturbance.   Respiratory:  Negative for cough, shortness of breath and wheezing.    Cardiovascular:  Negative for chest pain, palpitations and leg swelling.   Gastrointestinal:  Negative for abdominal pain, constipation, diarrhea, nausea and vomiting.   Genitourinary:  Negative for dysuria.   Musculoskeletal:  Positive for arthralgias.   Skin:  Negative for rash and wound.   Neurological:  Negative for dizziness, weakness and headaches.   Hematological:  Negative for adenopathy.   Psychiatric/Behavioral:  Negative for agitation.    Objective:     Vital Signs (Most Recent):  Temp: 97.8 °F (36.6 °C) (06/28/22 1018)  Pulse: 93 (06/28/22 1018)  Resp: 18 (06/28/22 1018)  BP: (!) 122/51 (06/28/22 1018)  SpO2: 95 % (06/28/22 1018)   Vital Signs (24h Range):  Temp:  [77 °F (25 °C)-98.3 °F (36.8 °C)] 97.8 °F (36.6 °C)  Pulse:  [67-93] 93  Resp:  [18-20] 18  SpO2:  [94 %-98 %] 95 %  BP: (113-147)/(51-83) 122/51     Weight: 122.5 kg (270 lb 1 oz)  Body mass index is 36.63 kg/m².    Intake/Output Summary (Last 24 hours) at 6/28/2022 1121  Last data filed at 6/28/2022 0800  Gross per 24 hour   Intake 361.67 ml   Output 1575 ml   Net -1213.33 ml      Physical Exam  Vitals reviewed.   Constitutional:       Appearance: He is normal weight.   HENT:      Head: Normocephalic and atraumatic.      Right Ear: External ear normal.      Left Ear: External ear normal.      Nose: Nose normal.      Mouth/Throat:      Mouth: Mucous membranes are moist.      Pharynx: Oropharynx is clear.   Eyes:      Extraocular Movements: Extraocular movements intact.      Pupils: Pupils are equal, round, and reactive to light.   Cardiovascular:      Rate and Rhythm: Normal rate and regular  rhythm.      Pulses: Normal pulses.      Heart sounds: Normal heart sounds.   Pulmonary:      Effort: Pulmonary effort is normal. No respiratory distress.      Breath sounds: No wheezing.   Abdominal:      General: Abdomen is flat.      Palpations: Abdomen is soft.   Musculoskeletal:         General: Normal range of motion.      Cervical back: Normal range of motion.   Skin:     General: Skin is warm.      Capillary Refill: Capillary refill takes less than 2 seconds.   Neurological:      General: No focal deficit present.      Mental Status: He is alert and oriented to person, place, and time.   Psychiatric:         Mood and Affect: Mood normal.       Significant Labs: All pertinent labs within the past 24 hours have been reviewed.  BMP:   Recent Labs   Lab 06/28/22  0541      *   K 5.3*   CL 92*   CO2 23   *   CREATININE 6.44*   CALCIUM 8.2*     CBC:   Recent Labs   Lab 06/27/22  0242 06/27/22  1748 06/28/22  0541   WBC 19.36*  --  17.68*   HGB 7.4* 8.2* 7.8*   HCT 20.3* 22.6* 21.9*     --  193       Significant Imaging: I have reviewed all pertinent imaging results/findings within the past 24 hours.

## 2022-06-29 LAB
ANION GAP SERPL CALCULATED.3IONS-SCNC: 17 MMOL/L (ref 7–16)
BASOPHILS # BLD AUTO: 0.01 K/UL (ref 0–0.2)
BASOPHILS NFR BLD AUTO: 0.1 % (ref 0–1)
BUN SERPL-MCNC: 149 MG/DL (ref 7–18)
BUN/CREAT SERPL: 23 (ref 6–20)
CALCIUM SERPL-MCNC: 8.1 MG/DL (ref 8.5–10.1)
CHLORIDE SERPL-SCNC: 94 MMOL/L (ref 98–107)
CO2 SERPL-SCNC: 23 MMOL/L (ref 21–32)
CREAT SERPL-MCNC: 6.48 MG/DL (ref 0.7–1.3)
DIFFERENTIAL METHOD BLD: ABNORMAL
EOSINOPHIL # BLD AUTO: 0.03 K/UL (ref 0–0.5)
EOSINOPHIL NFR BLD AUTO: 0.2 % (ref 1–4)
ERYTHROCYTE [DISTWIDTH] IN BLOOD BY AUTOMATED COUNT: 16.3 % (ref 11.5–14.5)
GLUCOSE SERPL-MCNC: 110 MG/DL (ref 70–105)
GLUCOSE SERPL-MCNC: 123 MG/DL (ref 70–105)
GLUCOSE SERPL-MCNC: 142 MG/DL (ref 74–106)
GLUCOSE SERPL-MCNC: 150 MG/DL (ref 70–105)
GLUCOSE SERPL-MCNC: 177 MG/DL (ref 70–105)
HCT VFR BLD AUTO: 21.1 % (ref 40–54)
HGB BLD-MCNC: 7.5 G/DL (ref 13.5–18)
IMM GRANULOCYTES # BLD AUTO: 0.18 K/UL (ref 0–0.04)
IMM GRANULOCYTES NFR BLD: 1.4 % (ref 0–0.4)
LYMPHOCYTES # BLD AUTO: 0.72 K/UL (ref 1–4.8)
LYMPHOCYTES NFR BLD AUTO: 5.5 % (ref 27–41)
MCH RBC QN AUTO: 28.4 PG (ref 27–31)
MCHC RBC AUTO-ENTMCNC: 35.5 G/DL (ref 32–36)
MCV RBC AUTO: 79.9 FL (ref 80–96)
MONOCYTES # BLD AUTO: 1.43 K/UL (ref 0–0.8)
MONOCYTES NFR BLD AUTO: 10.9 % (ref 2–6)
MPC BLD CALC-MCNC: 9.1 FL (ref 9.4–12.4)
MYCOBACTERIUM SPEC CULT: NORMAL
NEUTROPHILS # BLD AUTO: 10.78 K/UL (ref 1.8–7.7)
NEUTROPHILS NFR BLD AUTO: 81.9 % (ref 53–65)
NRBC # BLD AUTO: 0 X10E3/UL
NRBC, AUTO (.00): 0 %
PLATELET # BLD AUTO: 178 K/UL (ref 150–400)
POTASSIUM SERPL-SCNC: 4.8 MMOL/L (ref 3.5–5.1)
RBC # BLD AUTO: 2.64 M/UL (ref 4.6–6.2)
SODIUM SERPL-SCNC: 129 MMOL/L (ref 136–145)
WBC # BLD AUTO: 13.15 K/UL (ref 4.5–11)

## 2022-06-29 PROCEDURE — C9399 UNCLASSIFIED DRUGS OR BIOLOG: HCPCS

## 2022-06-29 PROCEDURE — 94640 AIRWAY INHALATION TREATMENT: CPT

## 2022-06-29 PROCEDURE — 99232 SBSQ HOSP IP/OBS MODERATE 35: CPT | Mod: GC,,, | Performed by: FAMILY MEDICINE

## 2022-06-29 PROCEDURE — 25000003 PHARM REV CODE 250

## 2022-06-29 PROCEDURE — 25000242 PHARM REV CODE 250 ALT 637 W/ HCPCS

## 2022-06-29 PROCEDURE — 11000001 HC ACUTE MED/SURG PRIVATE ROOM

## 2022-06-29 PROCEDURE — 25000003 PHARM REV CODE 250: Performed by: FAMILY MEDICINE

## 2022-06-29 PROCEDURE — 80048 BASIC METABOLIC PNL TOTAL CA: CPT | Performed by: FAMILY MEDICINE

## 2022-06-29 PROCEDURE — 85025 COMPLETE CBC W/AUTO DIFF WBC: CPT | Performed by: FAMILY MEDICINE

## 2022-06-29 PROCEDURE — 63600175 PHARM REV CODE 636 W HCPCS

## 2022-06-29 PROCEDURE — 27000221 HC OXYGEN, UP TO 24 HOURS

## 2022-06-29 PROCEDURE — 94668 MNPJ CHEST WALL SBSQ: CPT

## 2022-06-29 PROCEDURE — 99232 PR SUBSEQUENT HOSPITAL CARE,LEVL II: ICD-10-PCS | Mod: GC,,, | Performed by: FAMILY MEDICINE

## 2022-06-29 PROCEDURE — 82962 GLUCOSE BLOOD TEST: CPT

## 2022-06-29 PROCEDURE — 36415 COLL VENOUS BLD VENIPUNCTURE: CPT | Performed by: FAMILY MEDICINE

## 2022-06-29 PROCEDURE — 25000003 PHARM REV CODE 250: Performed by: HOSPITALIST

## 2022-06-29 PROCEDURE — 63600175 PHARM REV CODE 636 W HCPCS: Performed by: FAMILY MEDICINE

## 2022-06-29 PROCEDURE — 94761 N-INVAS EAR/PLS OXIMETRY MLT: CPT

## 2022-06-29 RX ORDER — ASCORBIC ACID 500 MG
500 TABLET ORAL DAILY
Status: DISCONTINUED | OUTPATIENT
Start: 2022-06-29 | End: 2022-07-20 | Stop reason: HOSPADM

## 2022-06-29 RX ORDER — LANOLIN ALCOHOL/MO/W.PET/CERES
1 CREAM (GRAM) TOPICAL DAILY
Status: DISCONTINUED | OUTPATIENT
Start: 2022-06-29 | End: 2022-07-20 | Stop reason: HOSPADM

## 2022-06-29 RX ADMIN — AMLODIPINE BESYLATE 10 MG: 10 TABLET ORAL at 09:06

## 2022-06-29 RX ADMIN — MELATONIN 6 MG: at 09:06

## 2022-06-29 RX ADMIN — OXYCODONE HYDROCHLORIDE AND ACETAMINOPHEN 500 MG: 500 TABLET ORAL at 09:06

## 2022-06-29 RX ADMIN — IPRATROPIUM BROMIDE AND ALBUTEROL SULFATE 3 ML: 2.5; .5 SOLUTION RESPIRATORY (INHALATION) at 01:06

## 2022-06-29 RX ADMIN — IPRATROPIUM BROMIDE AND ALBUTEROL SULFATE 3 ML: 2.5; .5 SOLUTION RESPIRATORY (INHALATION) at 11:06

## 2022-06-29 RX ADMIN — APIXABAN 2.5 MG: 2.5 TABLET, FILM COATED ORAL at 09:06

## 2022-06-29 RX ADMIN — IPRATROPIUM BROMIDE AND ALBUTEROL SULFATE 3 ML: 2.5; .5 SOLUTION RESPIRATORY (INHALATION) at 04:06

## 2022-06-29 RX ADMIN — IPRATROPIUM BROMIDE AND ALBUTEROL SULFATE 3 ML: 2.5; .5 SOLUTION RESPIRATORY (INHALATION) at 07:06

## 2022-06-29 RX ADMIN — FERROUS SULFATE TAB 325 MG (65 MG ELEMENTAL FE) 1 EACH: 325 (65 FE) TAB at 12:06

## 2022-06-29 RX ADMIN — ISOSORBIDE MONONITRATE 60 MG: 60 TABLET, EXTENDED RELEASE ORAL at 09:06

## 2022-06-29 RX ADMIN — PIPERACILLIN AND TAZOBACTAM 4.5 G: 4; .5 INJECTION, POWDER, FOR SOLUTION INTRAVENOUS at 07:06

## 2022-06-29 RX ADMIN — INSULIN DETEMIR 8 UNITS: 100 INJECTION, SOLUTION SUBCUTANEOUS at 09:06

## 2022-06-29 RX ADMIN — FERROUS GLUCONATE 324 MG: 324 TABLET ORAL at 09:06

## 2022-06-29 RX ADMIN — BUDESONIDE INHALATION 0.5 MG: 0.5 SUSPENSION RESPIRATORY (INHALATION) at 07:06

## 2022-06-29 RX ADMIN — THERA TABS 1 TABLET: TAB at 09:06

## 2022-06-29 RX ADMIN — PANTOPRAZOLE SODIUM 40 MG: 40 TABLET, DELAYED RELEASE ORAL at 09:06

## 2022-06-29 RX ADMIN — ATORVASTATIN CALCIUM 40 MG: 40 TABLET, FILM COATED ORAL at 09:06

## 2022-06-29 RX ADMIN — ALLOPURINOL 50 MG: 300 TABLET ORAL at 09:06

## 2022-06-29 RX ADMIN — PIPERACILLIN AND TAZOBACTAM 4.5 G: 4; .5 INJECTION, POWDER, FOR SOLUTION INTRAVENOUS at 09:06

## 2022-06-29 RX ADMIN — IPRATROPIUM BROMIDE AND ALBUTEROL SULFATE 3 ML: 2.5; .5 SOLUTION RESPIRATORY (INHALATION) at 02:06

## 2022-06-29 RX ADMIN — METOPROLOL TARTRATE 25 MG: 25 TABLET, FILM COATED ORAL at 09:06

## 2022-06-29 NOTE — PLAN OF CARE
Per MD, patient may be a possible discharge today. SS contacted Delfina at [facility]. She states patient can come back today if he is discharged. Packet made.

## 2022-06-29 NOTE — PROGRESS NOTES
[facility] - [address] [place] [address]  Nephrology  Progress Note    Patient Name: Neymar Parra  MRN: [id number]  Admission Date: 6/16/2022  Hospital Length of Stay: 13 days  Attending Provider: Rj Fernandez MD   Primary Care Physician: [facility] megan Rodríguez  Principal Problem:Hyponatremia    Subjective:     HPI: This patient with history of HTN, DM, CKD who has seen Dr. Crowder in the past 2 years ago is currently in a nursing facility.  The patient presented with SOB and diagnosed with COVID.  Serum creatinine has trended up to 7.15.  Serum potassium is 6.4.  Nephrology has been consulted for renal issues.  At the bedside, the patient mentions feeling fine.  He states that his breathing has improved.      Interval History: The patient is sitting up in bed.  He voices no complaints today.    Review of patient's allergies indicates:   Allergen Reactions    Gatifloxacin Anaphylaxis     Current Facility-Administered Medications   Medication Frequency    0.9%  NaCl infusion (for blood administration) Q24H PRN    acetaminophen tablet 1,000 mg Q6H PRN    albuterol inhaler 2 puff Q6H PRN    albuterol-ipratropium 2.5 mg-0.5 mg/3 mL nebulizer solution 3 mL Q4H    allopurinol split tablet 50 mg Daily    amLODIPine tablet 10 mg Daily    apixaban tablet 2.5 mg BID    ascorbic acid (vitamin C) tablet 500 mg Daily    atorvastatin tablet 40 mg QHS    bisacodyL EC tablet 10 mg Daily PRN    budesonide nebulizer solution 0.5 mg Daily    calcium gluconate 1 g in NS IVPB (premixed) Q10 Min PRN    cetirizine tablet 10 mg Daily PRN    dextromethorphan-guaiFENesin  mg/5 ml liquid 10 mL Q6H PRN    dextrose 50% injection 12.5 g PRN    dextrose 50% injection 25 g PRN    ferrous sulfate tablet 1 each Daily    glucagon (human recombinant) injection 1 mg PRN    glucose chewable tablet 16 g PRN    glucose chewable tablet 24 g PRN    HYDROcodone-acetaminophen 7.5-325 mg per tablet 1 tablet Q6H PRN     insulin aspart U-100 injection 1-10 Units QID (AC + HS) PRN    insulin detemir U-100 injection 8 Units QHS    isosorbide mononitrate 24 hr tablet 60 mg Daily    melatonin tablet 6 mg Nightly PRN    metoprolol tartrate (LOPRESSOR) tablet 25 mg Daily    mineral oil enema 1 enema Daily PRN    multivitamin tablet Daily    naloxone 0.4 mg/mL injection 0.02 mg PRN    ondansetron injection 8 mg Q6H PRN    pantoprazole EC tablet 40 mg Daily    piperacillin-tazobactam (ZOSYN) 4.5 g in dextrose 5 % in water (D5W) 5 % 100 mL IVPB (MB+) Q12H    simethicone chewable tablet 80 mg TID PRN    sodium chloride 0.9% flush 10 mL Q12H PRN    traMADoL tablet 50 mg Q8H PRN    traZODone tablet 50 mg Nightly PRN       Objective:     Vital Signs (Most Recent):  Temp: 98 °F (36.7 °C) (06/29/22 1049)  Pulse: 72 (06/29/22 1439)  Resp: 18 (06/29/22 1439)  BP: (!) 145/80 (06/29/22 1049)  SpO2: 96 % (06/29/22 1439)  O2 Device (Oxygen Therapy): nasal cannula (06/29/22 1439)   Vital Signs (24h Range):  Temp:  [96.9 °F (36.1 °C)-98.4 °F (36.9 °C)] 98 °F (36.7 °C)  Pulse:  [54-99] 72  Resp:  [14-20] 18  SpO2:  [94 %-100 %] 96 %  BP: (121-145)/(59-94) 145/80     Weight: 122.5 kg (270 lb 1 oz) (06/29/22 0455)  Body mass index is 36.63 kg/m².  Body surface area is 2.49 meters squared.    I/O last 3 completed shifts:  In: -   Out: 3225 [Urine:3225]    Physical Exam  Vitals reviewed.   Constitutional:       Appearance: He is obese.   HENT:      Head: Normocephalic and atraumatic.   Cardiovascular:      Rate and Rhythm: Regular rhythm.   Pulmonary:      Effort: Pulmonary effort is normal.   Abdominal:      Palpations: Abdomen is soft.   Neurological:      Mental Status: He is alert.       Significant Labs:  BMP:   Recent Labs   Lab 06/29/22  0400   *   *   K 4.8   CL 94*   CO2 23   *   CREATININE 6.48*   CALCIUM 8.1*     CBC:   Recent Labs   Lab 06/29/22  0400   WBC 13.15*   RBC 2.64*   HGB 7.5*   HCT 21.1*       MCV 79.9*   MCH 28.4   MCHC 35.5        Significant Imaging:  Labs: Reviewed    Assessment/Plan:     DEO (acute kidney injury)    6/24/2022  At this time, continue to monitor.  Creatinine is 5.99 which is slightly better.  6/27/2022  Continue to monitor.  The patient does have CKD V.  Watching to see if there is any further improvement in renal function.  6/28/2022  The patient does not have uremic symptoms.  Serum creatinine is 6.44 today.  Volume status is acceptable.  6/29/2022  CKDV    COPD exacerbation  Chronic condition.  Appears to be stable.        Thank you for your consult. I will follow-up with patient. Please contact us if you have any additional questions.    Jorge Butts Jr, MD  Nephrology  [facility] - [address] [place] [address]

## 2022-06-29 NOTE — PROGRESS NOTES
[facility] - [address] [place] [address] Medicine  Progress Note    Patient Name: Neymar Parra  MRN: [id number]  Patient Class: IP- Inpatient   Admission Date: 6/16/2022  Length of Stay: 13 days  Attending Physician: Rj Fernandez MD  Primary Care Provider: [facility] megan Rodríguez    Subjective:     Principal Problem:Hyponatremia    HPI:  72 y.o. male transferred to the [facility] from [facility] for hyperkalemia and hypoxia. Pt reports he went to [facility] because he was having dyspnea and hemoptysis since 1 month which is worse today. Pt reports he was tested positive for COVID at the nursing home on 6/9/22. Per nursing home patient oxygen saturation was in the 80's on RA with tachypnea and labored breathing. Pt reports he normally does not wear oxygen at the nursing home but has been wearing oxygen more often recently. Pt reports he smoked 1-1.5 PPD for 20 years but quit 7 months ago. Per pt, he saw a pulmonologist (Dr. Aldana) for his hemoptysis and had some tests done the results of which he does not know. Per records, pt with a right pleural effusion recently and had a thoracentesis. Pt denies any fever, congestion, dysuria, N/V/D, chest pain, or any other complaints at this time.     In the ED, pt's K 7.2, d-dimer 0.76, BNP 6284, BUN/Cr 73/7.16. EKG showed some peaked t-waves and irregular rhythm. Chest xray showed Increased right lower lung pulmonary density could indicate pneumonia. Pt was treated in the ED with Calcium gluconate 1g, regular insulin 10 units, D50, IV solumedrol 125 mg, tylenol 650 mg and IV fluids NS 1L bolus. Pt will be admitted to the hospital service for management of hypoxia and hyperkalemia.    Interval History: Pt is in no acute distress. Concern about declining H/H. Currently at 7.5/21.1. History of a duodenal adenoma. FOBT positive on this admission. Consulted GI specialty this AM.     Review of Systems   Constitutional:  Negative for chills, diaphoresis, fatigue and  fever.   HENT:  Negative for congestion, postnasal drip, rhinorrhea and sinus pressure.    Eyes:  Negative for visual disturbance.   Respiratory:  Negative for cough, shortness of breath and wheezing.    Cardiovascular:  Negative for chest pain, palpitations and leg swelling.   Gastrointestinal:  Negative for abdominal distention, abdominal pain, diarrhea, nausea and vomiting.   Genitourinary:  Negative for dysuria.   Skin:  Negative for rash.   Neurological:  Negative for dizziness and weakness.   Hematological:  Negative for adenopathy.   Psychiatric/Behavioral:  Negative for agitation.    Objective:     Vital Signs (Most Recent):  Temp: 97.5 °F (36.4 °C) (06/29/22 0716)  Pulse: 72 (06/29/22 0732)  Resp: 14 (06/29/22 0732)  BP: 137/66 (06/29/22 0716)  SpO2: 96 % (06/29/22 0732) Vital Signs (24h Range):  Temp:  [96.9 °F (36.1 °C)-98.4 °F (36.9 °C)] 97.5 °F (36.4 °C)  Pulse:  [54-99] 72  Resp:  [14-20] 14  SpO2:  [94 %-100 %] 96 %  BP: (121-137)/(59-94) 137/66     Weight: 122.5 kg (270 lb 1 oz)  Body mass index is 36.63 kg/m².    Intake/Output Summary (Last 24 hours) at 6/29/2022 1036  Last data filed at 6/29/2022 0455  Gross per 24 hour   Intake --   Output 1650 ml   Net -1650 ml      Physical Exam  Vitals reviewed.   HENT:      Head: Normocephalic and atraumatic.      Right Ear: External ear normal.      Left Ear: External ear normal.      Nose: Nose normal.      Mouth/Throat:      Mouth: Mucous membranes are moist.      Pharynx: Oropharynx is clear.   Eyes:      Extraocular Movements: Extraocular movements intact.      Pupils: Pupils are equal, round, and reactive to light.   Cardiovascular:      Rate and Rhythm: Normal rate and regular rhythm.      Pulses: Normal pulses.      Heart sounds: Normal heart sounds.   Pulmonary:      Effort: Pulmonary effort is normal. No respiratory distress.      Breath sounds: Normal breath sounds. No wheezing.   Abdominal:      General: Abdomen is flat. There is no distension.       Palpations: Abdomen is soft.      Tenderness: There is no abdominal tenderness.   Musculoskeletal:         General: Normal range of motion.      Cervical back: Normal range of motion.   Skin:     General: Skin is warm.      Capillary Refill: Capillary refill takes less than 2 seconds.   Neurological:      General: No focal deficit present.      Mental Status: He is alert and oriented to person, place, and time.   Psychiatric:         Mood and Affect: Mood normal.       Significant Labs: All pertinent labs within the past 24 hours have been reviewed.  BMP:   Recent Labs   Lab 06/29/22  0400   *   *   K 4.8   CL 94*   CO2 23   *   CREATININE 6.48*   CALCIUM 8.1*     CBC:   Recent Labs   Lab 06/27/22  1748 06/28/22  0541 06/29/22  0400   WBC  --  17.68* 13.15*   HGB 8.2* 7.8* 7.5*   HCT 22.6* 21.9* 21.1*   PLT  --  193 178       Significant Imaging: I have reviewed all pertinent imaging results/findings within the past 24 hours.      Assessment/Plan:     * Hyponatremia  - Na 129, serum osmolality WNL at 306, urine osmolality WNL  - Fluid restrict 1200cc qd      Aspiration PNA       - Completed 5 days of azithromycin, currently on zosyn       - Leukocytosis trending down at 13.15     Elevated d-dimer  - D-dimer was normal on admission  - Repeat was 1.83, V/Q scan was low probability for PE  - Continue low dose eliquis 2.5mg bid      Anemia  - H/H 7.5/21.1 (7.8/21.9 yesterday) MCV 79.9  - Iron sat 7% currently on ferrous sulfate  - Transfused 1UPRBC during this admission  - FOBT positive  - History of duodenal adenoma per prior records  - Gastroenterology consulted- much appreciated     DEO (acute kidney injury)  - BUN/Cr 149/6.48 (164/6.44 yesterday)  - Consider bicarb  - Nephrology following      Type 2 diabetes mellitus without complication  -HgA1c 4.7 10 days ago  - detemir 8U qhs  - SSI     Gastroesophageal reflux disease  - pantoprazole 40 mg qd     COPD exacerbation  - Much improved  - Oral  prednisone for day 5/5  - duo nebs Q4H  - budesonide neb Q12H     Hypertension  - continue home amlodipine 10 mg qd, isosorbide mononitrate 60 mg qd, and metoprolol 25mg qd. Holding lisinopril.    VTE Risk Mitigation (From admission, onward)         Ordered     apixaban tablet 2.5 mg  2 times daily         06/24/22 1201     Reason for No Pharmacological VTE Prophylaxis  Once        Question:  Reasons:  Answer:  Risk of Bleeding    06/16/22 0301     IP VTE HIGH RISK PATIENT  Once         06/16/22 0301     Place sequential compression device  Until discontinued         06/16/22 0301                Discharge Planning   EDWARDO:      Code Status: Full Code   Is the patient medically ready for discharge?:     Reason for patient still in hospital (select all that apply): Treatment  Discharge Plan A: Long-term acute care facility (LTAC) (Choice obtained for Specialty)      Barbie Guerrero DO  Department of Hospital Medicine   [facility] - [address] [place] [address]

## 2022-06-29 NOTE — SUBJECTIVE & OBJECTIVE
Interval History: The patient is sitting up in bed.  He voices no complaints today.    Review of patient's allergies indicates:   Allergen Reactions    Gatifloxacin Anaphylaxis     Current Facility-Administered Medications   Medication Frequency    0.9%  NaCl infusion (for blood administration) Q24H PRN    acetaminophen tablet 1,000 mg Q6H PRN    albuterol inhaler 2 puff Q6H PRN    albuterol-ipratropium 2.5 mg-0.5 mg/3 mL nebulizer solution 3 mL Q4H    allopurinol split tablet 50 mg Daily    amLODIPine tablet 10 mg Daily    apixaban tablet 2.5 mg BID    ascorbic acid (vitamin C) tablet 500 mg Daily    atorvastatin tablet 40 mg QHS    bisacodyL EC tablet 10 mg Daily PRN    budesonide nebulizer solution 0.5 mg Daily    calcium gluconate 1 g in NS IVPB (premixed) Q10 Min PRN    cetirizine tablet 10 mg Daily PRN    dextromethorphan-guaiFENesin  mg/5 ml liquid 10 mL Q6H PRN    dextrose 50% injection 12.5 g PRN    dextrose 50% injection 25 g PRN    ferrous sulfate tablet 1 each Daily    glucagon (human recombinant) injection 1 mg PRN    glucose chewable tablet 16 g PRN    glucose chewable tablet 24 g PRN    HYDROcodone-acetaminophen 7.5-325 mg per tablet 1 tablet Q6H PRN    insulin aspart U-100 injection 1-10 Units QID (AC + HS) PRN    insulin detemir U-100 injection 8 Units QHS    isosorbide mononitrate 24 hr tablet 60 mg Daily    melatonin tablet 6 mg Nightly PRN    metoprolol tartrate (LOPRESSOR) tablet 25 mg Daily    mineral oil enema 1 enema Daily PRN    multivitamin tablet Daily    naloxone 0.4 mg/mL injection 0.02 mg PRN    ondansetron injection 8 mg Q6H PRN    pantoprazole EC tablet 40 mg Daily    piperacillin-tazobactam (ZOSYN) 4.5 g in dextrose 5 % in water (D5W) 5 % 100 mL IVPB (MB+) Q12H    simethicone chewable tablet 80 mg TID PRN    sodium chloride 0.9% flush 10 mL Q12H PRN    traMADoL tablet 50 mg Q8H PRN    traZODone tablet 50 mg Nightly PRN       Objective:     Vital Signs (Most Recent):  Temp: 98  °F (36.7 °C) (06/29/22 1049)  Pulse: 72 (06/29/22 1439)  Resp: 18 (06/29/22 1439)  BP: (!) 145/80 (06/29/22 1049)  SpO2: 96 % (06/29/22 1439)  O2 Device (Oxygen Therapy): nasal cannula (06/29/22 1439)   Vital Signs (24h Range):  Temp:  [96.9 °F (36.1 °C)-98.4 °F (36.9 °C)] 98 °F (36.7 °C)  Pulse:  [54-99] 72  Resp:  [14-20] 18  SpO2:  [94 %-100 %] 96 %  BP: (121-145)/(59-94) 145/80     Weight: 122.5 kg (270 lb 1 oz) (06/29/22 0455)  Body mass index is 36.63 kg/m².  Body surface area is 2.49 meters squared.    I/O last 3 completed shifts:  In: -   Out: 3225 [Urine:3225]    Physical Exam  Vitals reviewed.   Constitutional:       Appearance: He is obese.   HENT:      Head: Normocephalic and atraumatic.   Cardiovascular:      Rate and Rhythm: Regular rhythm.   Pulmonary:      Effort: Pulmonary effort is normal.   Abdominal:      Palpations: Abdomen is soft.   Neurological:      Mental Status: He is alert.       Significant Labs:  BMP:   Recent Labs   Lab 06/29/22  0400   *   *   K 4.8   CL 94*   CO2 23   *   CREATININE 6.48*   CALCIUM 8.1*     CBC:   Recent Labs   Lab 06/29/22  0400   WBC 13.15*   RBC 2.64*   HGB 7.5*   HCT 21.1*      MCV 79.9*   MCH 28.4   MCHC 35.5        Significant Imaging:  Labs: Reviewed

## 2022-06-29 NOTE — HPI
"Mr. Parra is a 72-year-old male who was transferred to our facility from [facility] due to elevated potassium and hypoxia.  Patient is a fair historian therefore information obtained from patient review as well as chart review.  Patient is a prior history of CAD, duodenal adenoma, hypertension.  Patient reportedly was diagnosed with COVID earlier this month after developing onset of some shortness of breath.  He resides in a nursing home facility where he reportedly had a drop in his oxygen saturation as well therefore he was transferred to the hospital for further evaluation.  Patient reportedly also had a fairly recent thoracentesis prior to that.  After admission to [facility], patient developed some worsening hyperkalemia and hypoxia at that point was transferred to our facility on 6/16.  He was initially treated in ICU however since this time he has been transferred to the floor.  Patient was found to have a drop in his H&H during this admission and his stools were checked and reportedly were heme positive.  There are no reports at this time known of overt bleeding and the patient denies any known melena or hematochezia.  Note that his H&H was around 8/27 and is dropped to 7/21. He did receive 1 unit of blood on 6/27.  He cannot recall a prior history before this hospitalization of blood transfusions at this time.  He does state that he had a "stomach cancer" and surgery for this but does not recall having adjuvant therapy.  He does not recall having any further endoscopy at this time as well.    7/1/2022-patient is seen, resting in bed, asleep.  He does arouse to verbal stimuli however remains fairly somnolent.  He had an EGD done on yesterday with findings of Candida, hiatal hernia, and a large duodenal ulcer with adherent clot.  He was changed to IV Protonix as well on yesterday.  His stool for H. pylori is pending.  There has been no further reports of overt bleeding at this time however his H&H is noted " to be down at 6/18.  He is denying abdominal pain, nausea, or vomiting.

## 2022-06-29 NOTE — SUBJECTIVE & OBJECTIVE
Interval History: Pt is in no acute distress. Concern about declining H/H. Currently at 7.5/21.1. History of a duodenal adenoma. FOBT positive on this admission. Consulted GI specialty this AM.     Review of Systems   Constitutional:  Negative for chills, diaphoresis, fatigue and fever.   HENT:  Negative for congestion, postnasal drip, rhinorrhea and sinus pressure.    Eyes:  Negative for visual disturbance.   Respiratory:  Negative for cough, shortness of breath and wheezing.    Cardiovascular:  Negative for chest pain, palpitations and leg swelling.   Gastrointestinal:  Negative for abdominal distention, abdominal pain, diarrhea, nausea and vomiting.   Genitourinary:  Negative for dysuria.   Skin:  Negative for rash.   Neurological:  Negative for dizziness and weakness.   Hematological:  Negative for adenopathy.   Psychiatric/Behavioral:  Negative for agitation.    Objective:     Vital Signs (Most Recent):  Temp: 97.5 °F (36.4 °C) (06/29/22 0716)  Pulse: 72 (06/29/22 0732)  Resp: 14 (06/29/22 0732)  BP: 137/66 (06/29/22 0716)  SpO2: 96 % (06/29/22 0732) Vital Signs (24h Range):  Temp:  [96.9 °F (36.1 °C)-98.4 °F (36.9 °C)] 97.5 °F (36.4 °C)  Pulse:  [54-99] 72  Resp:  [14-20] 14  SpO2:  [94 %-100 %] 96 %  BP: (121-137)/(59-94) 137/66     Weight: 122.5 kg (270 lb 1 oz)  Body mass index is 36.63 kg/m².    Intake/Output Summary (Last 24 hours) at 6/29/2022 1036  Last data filed at 6/29/2022 0455  Gross per 24 hour   Intake --   Output 1650 ml   Net -1650 ml      Physical Exam  Vitals reviewed.   HENT:      Head: Normocephalic and atraumatic.      Right Ear: External ear normal.      Left Ear: External ear normal.      Nose: Nose normal.      Mouth/Throat:      Mouth: Mucous membranes are moist.      Pharynx: Oropharynx is clear.   Eyes:      Extraocular Movements: Extraocular movements intact.      Pupils: Pupils are equal, round, and reactive to light.   Cardiovascular:      Rate and Rhythm: Normal rate and regular  rhythm.      Pulses: Normal pulses.      Heart sounds: Normal heart sounds.   Pulmonary:      Effort: Pulmonary effort is normal. No respiratory distress.      Breath sounds: Normal breath sounds. No wheezing.   Abdominal:      General: Abdomen is flat. There is no distension.      Palpations: Abdomen is soft.      Tenderness: There is no abdominal tenderness.   Musculoskeletal:         General: Normal range of motion.      Cervical back: Normal range of motion.   Skin:     General: Skin is warm.      Capillary Refill: Capillary refill takes less than 2 seconds.   Neurological:      General: No focal deficit present.      Mental Status: He is alert and oriented to person, place, and time.   Psychiatric:         Mood and Affect: Mood normal.       Significant Labs: All pertinent labs within the past 24 hours have been reviewed.  BMP:   Recent Labs   Lab 06/29/22  0400   *   *   K 4.8   CL 94*   CO2 23   *   CREATININE 6.48*   CALCIUM 8.1*     CBC:   Recent Labs   Lab 06/27/22  1748 06/28/22  0541 06/29/22  0400   WBC  --  17.68* 13.15*   HGB 8.2* 7.8* 7.5*   HCT 22.6* 21.9* 21.1*   PLT  --  193 178       Significant Imaging: I have reviewed all pertinent imaging results/findings within the past 24 hours.

## 2022-06-29 NOTE — ASSESSMENT & PLAN NOTE
6/24/2022  At this time, continue to monitor.  Creatinine is 5.99 which is slightly better.  6/27/2022  Continue to monitor.  The patient does have CKD V.  Watching to see if there is any further improvement in renal function.  6/28/2022  The patient does not have uremic symptoms.  Serum creatinine is 6.44 today.  Volume status is acceptable.  6/29/2022  CKDV

## 2022-06-29 NOTE — SUBJECTIVE & OBJECTIVE
Past Medical History:   Diagnosis Date    Anticoagulant long-term use     Chronic renal disease, stage 4, severely decreased glomerular filtration rate (GFR) between 15-29 mL/min/1.73 square meter     Coronary artery disease     stents ~ 2017    COVID-19 6/16/2022    Duodenal adenoma     Hypertension        Past Surgical History:   Procedure Laterality Date    ABDOMINAL SURGERY      CORONARY ANGIOPLASTY WITH STENT PLACEMENT      ~ 2017    JOINT REPLACEMENT         Review of patient's allergies indicates:   Allergen Reactions    Gatifloxacin Anaphylaxis     Family History       Problem Relation (Age of Onset)    Diabetes Mother, Father          Tobacco Use    Smoking status: Former Smoker     Types: Cigarettes    Smokeless tobacco: Never Used    Tobacco comment: 1/3 PPD ~ 20 years: quit Dec 2021   Substance and Sexual Activity    Alcohol use: Not Currently     Comment: pint a week: Hx    Drug use: Never    Sexual activity: Not Currently     Review of Systems   Constitutional:  Negative for activity change, appetite change, fatigue and unexpected weight change.   HENT:  Negative for sore throat and trouble swallowing.    Eyes:  Negative for visual disturbance.   Respiratory:  Negative for cough and shortness of breath.    Cardiovascular:  Negative for chest pain.   Gastrointestinal:  Negative for abdominal distention, abdominal pain, anal bleeding, blood in stool, constipation, diarrhea, nausea, rectal pain and vomiting.   Endocrine: Negative for cold intolerance and heat intolerance.   Genitourinary:  Negative for difficulty urinating, frequency and urgency.   Musculoskeletal:  Negative for arthralgias and myalgias.   Skin:  Negative for color change.   Neurological:  Negative for speech difficulty, weakness and light-headedness.   Psychiatric/Behavioral:  Negative for agitation, behavioral problems and confusion.    Objective:     Vital Signs (Most Recent):  Temp: 98 °F (36.7 °C) (06/29/22 1049)  Pulse: 65 (06/29/22  1108)  Resp: 16 (06/29/22 1108)  BP: (!) 145/80 (06/29/22 1049)  SpO2: 100 % (06/29/22 1108)   Vital Signs (24h Range):  Temp:  [96.9 °F (36.1 °C)-98.4 °F (36.9 °C)] 98 °F (36.7 °C)  Pulse:  [54-99] 65  Resp:  [14-20] 16  SpO2:  [94 %-100 %] 100 %  BP: (121-145)/(59-94) 145/80     Weight: 122.5 kg (270 lb 1 oz) (06/29/22 0455)  Body mass index is 36.63 kg/m².      Intake/Output Summary (Last 24 hours) at 6/29/2022 1419  Last data filed at 6/29/2022 1200  Gross per 24 hour   Intake 360 ml   Output 2300 ml   Net -1940 ml       Lines/Drains/Airways       Peripheral Intravenous Line  Duration                  Peripheral IV - Single Lumen 06/22/22 1400 20 G Posterior;Right Forearm 7 days                    Physical Exam  Vitals and nursing note reviewed.   Constitutional:       General: He is not in acute distress.     Appearance: Normal appearance. He is normal weight. He is not ill-appearing.   HENT:      Head: Normocephalic.      Nose: Nose normal. No congestion or rhinorrhea.      Mouth/Throat:      Mouth: Mucous membranes are moist.      Pharynx: Oropharynx is clear. No oropharyngeal exudate.   Eyes:      General: No scleral icterus.     Conjunctiva/sclera: Conjunctivae normal.      Pupils: Pupils are equal, round, and reactive to light.   Cardiovascular:      Rate and Rhythm: Normal rate and regular rhythm.      Pulses: Normal pulses.      Heart sounds: Normal heart sounds. No murmur heard.  Pulmonary:      Effort: Pulmonary effort is normal.      Breath sounds: Normal breath sounds. No wheezing, rhonchi or rales.   Abdominal:      General: Abdomen is flat. Bowel sounds are normal. There is no distension.      Palpations: Abdomen is soft.      Tenderness: There is no abdominal tenderness.   Musculoskeletal:         General: No swelling.      Cervical back: Normal range of motion. No tenderness.   Skin:     General: Skin is warm and dry.      Coloration: Skin is not jaundiced.   Neurological:      General: No focal  deficit present.      Mental Status: He is alert and oriented to person, place, and time.      Motor: No weakness.   Psychiatric:         Mood and Affect: Mood normal.         Behavior: Behavior normal.         Thought Content: Thought content normal.         Judgment: Judgment normal.       Significant Labs:  All pertinent lab results from the last 24 hours have been reviewed.    Significant Imaging:  Imaging results within the past 24 hours have been reviewed.

## 2022-06-29 NOTE — ASSESSMENT & PLAN NOTE
6/29/2022-patient admitted with shortness of breath with recent findings of drop in H&H and heme positive stool.  No reports of overt bleeding.  No prior reported history of gastric adenoma with reported resection.  Unknown prior endoscopy.  Tolerating diet.  Continue to monitor H&H.  Continue Protonix.  At this time, will review case further with Dr. Arredondo with plan an addendum to follow

## 2022-06-29 NOTE — CONSULTS
[address]  Gastroenterology  Consult Note    Patient Name: Neymar Parra  MRN: [id number]  Admission Date: 6/16/2022  Hospital Length of Stay: 13 days  Code Status: Full Code   Attending Provider: Rj Fernandez MD   Consulting Provider:Jean-Pierre Arredondo MD  Primary Care Physician: [facility]  Principal Problem:Hyponatremia    Inpatient consult to Gastroenterology  Consult performed by: SRIKANTH Forman  Consult ordered by: Barbie Guerrero DO  Reason for consult: anemia, heme positive stool      Patient seen and examined.  Agree with findings of note as detailed below.  72-year-old male is admitted after recent illness with COVID infection/pneumonia.  He is seen in consultation for recent fall in hemoglobin and occult blood noted in stool.  No gross GI bleeding has been noted.  He is a poor historian but does denies any GI complaints including abdominal pain, nausea, abnormal bowel movements or difficulty swallowing.  He reportedly had previous duodenal adenoma but is unable to give any details regarding this and no records are available to clarify.  He appears to be breathing comfortably today.  Physical exam notable for well-nourished appearing male alert in no respiratory distress.  Abdomen is soft and benign without organomegaly.  Patient with occult blood in stool and recent fall in hemoglobin.  He appears stable from lung standpoint after recent COVID infection.  Discussed with him and recommend EGD tomorrow if remains stable.  He agrees to proceed.    Subjective:     HPI:  Mr. Parra is a 72-year-old male who was transferred to our facility from [facility] due to elevated potassium and hypoxia.  Patient is a fair historian therefore information obtained from patient review as well as chart review.  Patient is a prior history of CAD, duodenal adenoma, hypertension.  Patient reportedly was diagnosed with COVID earlier this month after developing onset  "of some shortness of breath.  He resides in a nursing home facility where he reportedly had a drop in his oxygen saturation as well therefore he was transferred to the hospital for further evaluation.  Patient reportedly also had a fairly recent thoracentesis prior to that.  After admission to [facility], patient developed some worsening hyperkalemia and hypoxia at that point was transferred to our facility on 6/16.  He was initially treated in ICU however since this time he has been transferred to the floor.  Patient was found to have a drop in his H&H during this admission and his stools were checked and reportedly were heme positive.  There are no reports at this time known of overt bleeding and the patient denies any known melena or hematochezia.  Note that his H&H was around 8/27 and is dropped to 7/21. He did receive 1 unit of blood on 6/27.  He cannot recall a prior history before this hospitalization of blood transfusions at this time.  He does state that he had a "stomach cancer" and surgery for this but does not recall having adjuvant therapy.  He does not recall having any further endoscopy at this time as well.      Past Medical History:   Diagnosis Date    Anticoagulant long-term use     Chronic renal disease, stage 4, severely decreased glomerular filtration rate (GFR) between 15-29 mL/min/1.73 square meter     Coronary artery disease     stents ~ 2017    COVID-19 6/16/2022    Duodenal adenoma     Hypertension        Past Surgical History:   Procedure Laterality Date    ABDOMINAL SURGERY      CORONARY ANGIOPLASTY WITH STENT PLACEMENT      ~ 2017    JOINT REPLACEMENT         Review of patient's allergies indicates:   Allergen Reactions    Gatifloxacin Anaphylaxis     Family History       Problem Relation (Age of Onset)    Diabetes Mother, Father          Tobacco Use    Smoking status: Former Smoker     Types: Cigarettes    Smokeless tobacco: Never Used    Tobacco comment: 1/3 PPD ~ 20 years: quit Dec 2021 "   Substance and Sexual Activity    Alcohol use: Not Currently     Comment: pint a week: Hx    Drug use: Never    Sexual activity: Not Currently     Review of Systems   Constitutional:  Negative for activity change, appetite change, fatigue and unexpected weight change.   HENT:  Negative for sore throat and trouble swallowing.    Eyes:  Negative for visual disturbance.   Respiratory:  Negative for cough and shortness of breath.    Cardiovascular:  Negative for chest pain.   Gastrointestinal:  Negative for abdominal distention, abdominal pain, anal bleeding, blood in stool, constipation, diarrhea, nausea, rectal pain and vomiting.   Endocrine: Negative for cold intolerance and heat intolerance.   Genitourinary:  Negative for difficulty urinating, frequency and urgency.   Musculoskeletal:  Negative for arthralgias and myalgias.   Skin:  Negative for color change.   Neurological:  Negative for speech difficulty, weakness and light-headedness.   Psychiatric/Behavioral:  Negative for agitation, behavioral problems and confusion.    Objective:     Vital Signs (Most Recent):  Temp: 98 °F (36.7 °C) (06/29/22 1049)  Pulse: 65 (06/29/22 1108)  Resp: 16 (06/29/22 1108)  BP: (!) 145/80 (06/29/22 1049)  SpO2: 100 % (06/29/22 1108)   Vital Signs (24h Range):  Temp:  [96.9 °F (36.1 °C)-98.4 °F (36.9 °C)] 98 °F (36.7 °C)  Pulse:  [54-99] 65  Resp:  [14-20] 16  SpO2:  [94 %-100 %] 100 %  BP: (121-145)/(59-94) 145/80     Weight: 122.5 kg (270 lb 1 oz) (06/29/22 0455)  Body mass index is 36.63 kg/m².      Intake/Output Summary (Last 24 hours) at 6/29/2022 1419  Last data filed at 6/29/2022 1200  Gross per 24 hour   Intake 360 ml   Output 2300 ml   Net -1940 ml       Lines/Drains/Airways       Peripheral Intravenous Line  Duration                  Peripheral IV - Single Lumen 06/22/22 1400 20 G Posterior;Right Forearm 7 days                    Physical Exam  Vitals and nursing note reviewed.   Constitutional:       General: He is not in  acute distress.     Appearance: Normal appearance. He is normal weight. He is not ill-appearing.   HENT:      Head: Normocephalic.      Nose: Nose normal. No congestion or rhinorrhea.      Mouth/Throat:      Mouth: Mucous membranes are moist.      Pharynx: Oropharynx is clear. No oropharyngeal exudate.   Eyes:      General: No scleral icterus.     Conjunctiva/sclera: Conjunctivae normal.      Pupils: Pupils are equal, round, and reactive to light.   Cardiovascular:      Rate and Rhythm: Normal rate and regular rhythm.      Pulses: Normal pulses.      Heart sounds: Normal heart sounds. No murmur heard.  Pulmonary:      Effort: Pulmonary effort is normal.      Breath sounds: Normal breath sounds. No wheezing, rhonchi or rales.   Abdominal:      General: Abdomen is flat. Bowel sounds are normal. There is no distension.      Palpations: Abdomen is soft.      Tenderness: There is no abdominal tenderness.   Musculoskeletal:         General: No swelling.      Cervical back: Normal range of motion. No tenderness.   Skin:     General: Skin is warm and dry.      Coloration: Skin is not jaundiced.   Neurological:      General: No focal deficit present.      Mental Status: He is alert and oriented to person, place, and time.      Motor: No weakness.   Psychiatric:         Mood and Affect: Mood normal.         Behavior: Behavior normal.         Thought Content: Thought content normal.         Judgment: Judgment normal.       Significant Labs:  All pertinent lab results from the last 24 hours have been reviewed.    Significant Imaging:  Imaging results within the past 24 hours have been reviewed.    Assessment/Plan:     Anemia  6/29/2022-patient admitted with shortness of breath with recent findings of drop in H&H and heme positive stool.  No reports of overt bleeding.  No prior reported history of gastric adenoma with reported resection.  Unknown prior endoscopy.  Tolerating diet.  Continue to monitor H&H.  Continue Protonix.   At this time, will review case further with Dr. Arredondo with plan an addendum to follow        Thank you for your consult. I will follow-up with patient. Please contact us if you have any additional questions.    SRIKANTH Forman  Gastroenterology  [facility] - [facility]

## 2022-06-29 NOTE — PLAN OF CARE
Problem: Gas Exchange Impaired  Goal: Optimal Gas Exchange  Outcome: Ongoing, Progressing     Problem: Fluid Imbalance (Pneumonia)  Goal: Fluid Balance  Outcome: Ongoing, Progressing     Problem: Infection (Pneumonia)  Goal: Resolution of Infection Signs and Symptoms  Outcome: Ongoing, Progressing     Problem: Respiratory Compromise (Pneumonia)  Goal: Effective Oxygenation and Ventilation  Outcome: Ongoing, Progressing

## 2022-06-30 ENCOUNTER — ANESTHESIA EVENT (OUTPATIENT)
Dept: GASTROENTEROLOGY | Facility: HOSPITAL | Age: 72
DRG: 981 | End: 2022-06-30
Payer: MEDICARE

## 2022-06-30 ENCOUNTER — ANESTHESIA (OUTPATIENT)
Dept: GASTROENTEROLOGY | Facility: HOSPITAL | Age: 72
DRG: 981 | End: 2022-06-30
Payer: MEDICARE

## 2022-06-30 LAB
ACANTHOCYTES BLD QL SMEAR: ABNORMAL
ANION GAP SERPL CALCULATED.3IONS-SCNC: 21 MMOL/L (ref 7–16)
ANISOCYTOSIS BLD QL SMEAR: ABNORMAL
BASOPHILS # BLD AUTO: 0 K/UL (ref 0–0.2)
BASOPHILS NFR BLD AUTO: 0 % (ref 0–1)
BUN SERPL-MCNC: 156 MG/DL (ref 7–18)
BUN/CREAT SERPL: 25 (ref 6–20)
CALCIUM SERPL-MCNC: 8.7 MG/DL (ref 8.5–10.1)
CHLORIDE SERPL-SCNC: 98 MMOL/L (ref 98–107)
CO2 SERPL-SCNC: 21 MMOL/L (ref 21–32)
CREAT SERPL-MCNC: 6.34 MG/DL (ref 0.7–1.3)
DACRYOCYTES BLD QL SMEAR: ABNORMAL
DIFFERENTIAL METHOD BLD: ABNORMAL
EOSINOPHIL # BLD AUTO: 0.15 K/UL (ref 0–0.5)
EOSINOPHIL NFR BLD AUTO: 1.1 % (ref 1–4)
EOSINOPHIL NFR BLD MANUAL: 1 % (ref 1–4)
ERYTHROCYTE [DISTWIDTH] IN BLOOD BY AUTOMATED COUNT: 17.1 % (ref 11.5–14.5)
ERYTHROCYTE [SEDIMENTATION RATE] IN BLOOD BY WESTERGREN METHOD: 45 MM/HR (ref 0–20)
GLUCOSE SERPL-MCNC: 105 MG/DL (ref 70–105)
GLUCOSE SERPL-MCNC: 110 MG/DL (ref 70–105)
GLUCOSE SERPL-MCNC: 68 MG/DL (ref 70–105)
GLUCOSE SERPL-MCNC: 72 MG/DL (ref 74–106)
GLUCOSE SERPL-MCNC: 78 MG/DL (ref 70–105)
HCT VFR BLD AUTO: 24.9 % (ref 40–54)
HGB BLD-MCNC: 8.7 G/DL (ref 13.5–18)
IMM GRANULOCYTES # BLD AUTO: 0.16 K/UL (ref 0–0.04)
IMM GRANULOCYTES NFR BLD: 1.2 % (ref 0–0.4)
LYMPHOCYTES # BLD AUTO: 0.51 K/UL (ref 1–4.8)
LYMPHOCYTES NFR BLD AUTO: 3.9 % (ref 27–41)
LYMPHOCYTES NFR BLD MANUAL: 3 % (ref 27–41)
MCH RBC QN AUTO: 29.1 PG (ref 27–31)
MCHC RBC AUTO-ENTMCNC: 34.9 G/DL (ref 32–36)
MCV RBC AUTO: 83.3 FL (ref 80–96)
MONOCYTES # BLD AUTO: 0.96 K/UL (ref 0–0.8)
MONOCYTES NFR BLD AUTO: 7.4 % (ref 2–6)
MONOCYTES NFR BLD MANUAL: 5 % (ref 2–6)
MPC BLD CALC-MCNC: 10.1 FL (ref 9.4–12.4)
NEUTROPHILS # BLD AUTO: 11.27 K/UL (ref 1.8–7.7)
NEUTROPHILS NFR BLD AUTO: 86.4 % (ref 53–65)
NEUTS SEG NFR BLD MANUAL: 91 % (ref 50–62)
NRBC # BLD AUTO: 0 X10E3/UL
NRBC, AUTO (.00): 0 %
OVALOCYTES BLD QL SMEAR: ABNORMAL
PLATELET # BLD AUTO: 194 K/UL (ref 150–400)
PLATELET MORPHOLOGY: NORMAL
POLYCHROMASIA BLD QL SMEAR: ABNORMAL
POTASSIUM SERPL-SCNC: 5.6 MMOL/L (ref 3.5–5.1)
RBC # BLD AUTO: 2.99 M/UL (ref 4.6–6.2)
SCHISTOCYTES BLD QL AUTO: ABNORMAL
SODIUM SERPL-SCNC: 134 MMOL/L (ref 136–145)
WBC # BLD AUTO: 13.05 K/UL (ref 4.5–11)

## 2022-06-30 PROCEDURE — 25000003 PHARM REV CODE 250

## 2022-06-30 PROCEDURE — 11000001 HC ACUTE MED/SURG PRIVATE ROOM

## 2022-06-30 PROCEDURE — 94668 MNPJ CHEST WALL SBSQ: CPT

## 2022-06-30 PROCEDURE — C9399 UNCLASSIFIED DRUGS OR BIOLOG: HCPCS

## 2022-06-30 PROCEDURE — 99232 PR SUBSEQUENT HOSPITAL CARE,LEVL II: ICD-10-PCS | Mod: GC,,, | Performed by: FAMILY MEDICINE

## 2022-06-30 PROCEDURE — 36415 COLL VENOUS BLD VENIPUNCTURE: CPT | Performed by: FAMILY MEDICINE

## 2022-06-30 PROCEDURE — 82310 ASSAY OF CALCIUM: CPT | Performed by: FAMILY MEDICINE

## 2022-06-30 PROCEDURE — 25000003 PHARM REV CODE 250: Performed by: HOSPITALIST

## 2022-06-30 PROCEDURE — 27000221 HC OXYGEN, UP TO 24 HOURS

## 2022-06-30 PROCEDURE — 82962 GLUCOSE BLOOD TEST: CPT

## 2022-06-30 PROCEDURE — 43235 EGD DIAGNOSTIC BRUSH WASH: CPT

## 2022-06-30 PROCEDURE — 94761 N-INVAS EAR/PLS OXIMETRY MLT: CPT

## 2022-06-30 PROCEDURE — 85651 RBC SED RATE NONAUTOMATED: CPT | Performed by: FAMILY MEDICINE

## 2022-06-30 PROCEDURE — 99232 SBSQ HOSP IP/OBS MODERATE 35: CPT | Mod: GC,,, | Performed by: FAMILY MEDICINE

## 2022-06-30 PROCEDURE — 63600175 PHARM REV CODE 636 W HCPCS: Performed by: NURSE ANESTHETIST, CERTIFIED REGISTERED

## 2022-06-30 PROCEDURE — 85025 COMPLETE CBC W/AUTO DIFF WBC: CPT | Performed by: FAMILY MEDICINE

## 2022-06-30 PROCEDURE — 80048 BASIC METABOLIC PNL TOTAL CA: CPT | Performed by: FAMILY MEDICINE

## 2022-06-30 PROCEDURE — D9220A PRA ANESTHESIA: ICD-10-PCS | Mod: ,,, | Performed by: NURSE ANESTHETIST, CERTIFIED REGISTERED

## 2022-06-30 PROCEDURE — 25000003 PHARM REV CODE 250: Performed by: FAMILY MEDICINE

## 2022-06-30 PROCEDURE — 94640 AIRWAY INHALATION TREATMENT: CPT

## 2022-06-30 PROCEDURE — 63600175 PHARM REV CODE 636 W HCPCS: Performed by: FAMILY MEDICINE

## 2022-06-30 PROCEDURE — 63600175 PHARM REV CODE 636 W HCPCS: Performed by: INTERNAL MEDICINE

## 2022-06-30 PROCEDURE — 25000003 PHARM REV CODE 250: Performed by: NURSE ANESTHETIST, CERTIFIED REGISTERED

## 2022-06-30 PROCEDURE — C9113 INJ PANTOPRAZOLE SODIUM, VIA: HCPCS | Performed by: INTERNAL MEDICINE

## 2022-06-30 PROCEDURE — 25000242 PHARM REV CODE 250 ALT 637 W/ HCPCS

## 2022-06-30 PROCEDURE — 63600175 PHARM REV CODE 636 W HCPCS

## 2022-06-30 PROCEDURE — D9220A PRA ANESTHESIA: Mod: ,,, | Performed by: NURSE ANESTHETIST, CERTIFIED REGISTERED

## 2022-06-30 RX ORDER — PROPOFOL 10 MG/ML
VIAL (ML) INTRAVENOUS
Status: DISCONTINUED | OUTPATIENT
Start: 2022-06-30 | End: 2022-06-30

## 2022-06-30 RX ORDER — LIDOCAINE HYDROCHLORIDE 20 MG/ML
INJECTION, SOLUTION EPIDURAL; INFILTRATION; INTRACAUDAL; PERINEURAL
Status: DISCONTINUED | OUTPATIENT
Start: 2022-06-30 | End: 2022-06-30

## 2022-06-30 RX ORDER — PANTOPRAZOLE SODIUM 40 MG/10ML
40 INJECTION, POWDER, LYOPHILIZED, FOR SOLUTION INTRAVENOUS 2 TIMES DAILY
Status: DISCONTINUED | OUTPATIENT
Start: 2022-06-30 | End: 2022-07-01

## 2022-06-30 RX ORDER — FUROSEMIDE 10 MG/ML
40 INJECTION INTRAMUSCULAR; INTRAVENOUS ONCE
Status: COMPLETED | OUTPATIENT
Start: 2022-06-30 | End: 2022-06-30

## 2022-06-30 RX ADMIN — IPRATROPIUM BROMIDE AND ALBUTEROL SULFATE 3 ML: 2.5; .5 SOLUTION RESPIRATORY (INHALATION) at 03:06

## 2022-06-30 RX ADMIN — SODIUM CHLORIDE: 9 INJECTION, SOLUTION INTRAVENOUS at 02:06

## 2022-06-30 RX ADMIN — IPRATROPIUM BROMIDE AND ALBUTEROL SULFATE 3 ML: 2.5; .5 SOLUTION RESPIRATORY (INHALATION) at 11:06

## 2022-06-30 RX ADMIN — PIPERACILLIN AND TAZOBACTAM 4.5 G: 4; .5 INJECTION, POWDER, FOR SOLUTION INTRAVENOUS at 08:06

## 2022-06-30 RX ADMIN — PROPOFOL 75 MG: 10 INJECTION, EMULSION INTRAVENOUS at 02:06

## 2022-06-30 RX ADMIN — IPRATROPIUM BROMIDE AND ALBUTEROL SULFATE 3 ML: 2.5; .5 SOLUTION RESPIRATORY (INHALATION) at 07:06

## 2022-06-30 RX ADMIN — FUROSEMIDE 40 MG: 10 INJECTION INTRAMUSCULAR; INTRAVENOUS at 10:06

## 2022-06-30 RX ADMIN — BUDESONIDE INHALATION 0.5 MG: 0.5 SUSPENSION RESPIRATORY (INHALATION) at 08:06

## 2022-06-30 RX ADMIN — APIXABAN 2.5 MG: 2.5 TABLET, FILM COATED ORAL at 08:06

## 2022-06-30 RX ADMIN — INSULIN DETEMIR 8 UNITS: 100 INJECTION, SOLUTION SUBCUTANEOUS at 08:06

## 2022-06-30 RX ADMIN — TRAZODONE HYDROCHLORIDE 50 MG: 50 TABLET ORAL at 08:06

## 2022-06-30 RX ADMIN — IPRATROPIUM BROMIDE AND ALBUTEROL SULFATE 3 ML: 2.5; .5 SOLUTION RESPIRATORY (INHALATION) at 08:06

## 2022-06-30 RX ADMIN — ATORVASTATIN CALCIUM 40 MG: 40 TABLET, FILM COATED ORAL at 08:06

## 2022-06-30 RX ADMIN — LIDOCAINE HYDROCHLORIDE 75 MG: 20 INJECTION, SOLUTION INTRAVENOUS at 02:06

## 2022-06-30 RX ADMIN — IPRATROPIUM BROMIDE AND ALBUTEROL SULFATE 3 ML: 2.5; .5 SOLUTION RESPIRATORY (INHALATION) at 12:06

## 2022-06-30 RX ADMIN — PANTOPRAZOLE SODIUM 40 MG: 40 INJECTION, POWDER, FOR SOLUTION INTRAVENOUS at 08:06

## 2022-06-30 RX ADMIN — DEXTROSE MONOHYDRATE 12.5 G: 25 INJECTION, SOLUTION INTRAVENOUS at 10:06

## 2022-06-30 NOTE — SUBJECTIVE & OBJECTIVE
Interval History: Pt is in no acute distress. Plan is for EGD today. Repeat CXR shows worsening left lower lobe consolidation and right sided infiltrates. Currently is on zosyn. Will also give one dose of IV lasix 40mg qd.     Review of Systems   Constitutional:  Negative for chills, diaphoresis, fatigue and fever.   HENT:  Negative for congestion, rhinorrhea and sore throat.    Eyes:  Negative for visual disturbance.   Respiratory:  Negative for cough, shortness of breath and wheezing.    Cardiovascular:  Negative for chest pain, palpitations and leg swelling.   Gastrointestinal:  Negative for abdominal pain.   Genitourinary:  Negative for dysuria.   Musculoskeletal:  Negative for back pain.   Skin:  Negative for rash and wound.   Neurological:  Negative for dizziness and weakness.   Hematological:  Negative for adenopathy.   Psychiatric/Behavioral:  Negative for agitation.    Objective:     Vital Signs (Most Recent):  Temp: 97.8 °F (36.6 °C) (06/30/22 1011)  Pulse: 81 (06/30/22 1113)  Resp: 20 (06/30/22 1113)  BP: (!) 151/76 (06/30/22 1011)  SpO2: 96 % (06/30/22 1113)   Vital Signs (24h Range):  Temp:  [97.3 °F (36.3 °C)-97.9 °F (36.6 °C)] 97.8 °F (36.6 °C)  Pulse:  [] 81  Resp:  [15-20] 20  SpO2:  [94 %-100 %] 96 %  BP: (121-154)/(57-77) 151/76     Weight: 122.5 kg (270 lb 1 oz)  Body mass index is 36.63 kg/m².    Intake/Output Summary (Last 24 hours) at 6/30/2022 1140  Last data filed at 6/30/2022 1008  Gross per 24 hour   Intake 250 ml   Output 3050 ml   Net -2800 ml      Physical Exam  Vitals reviewed.   Constitutional:       General: He is not in acute distress.     Appearance: He is normal weight.   HENT:      Head: Normocephalic and atraumatic.      Right Ear: External ear normal.      Left Ear: External ear normal.      Nose: Nose normal.      Mouth/Throat:      Mouth: Mucous membranes are moist.      Pharynx: Oropharynx is clear.   Eyes:      Extraocular Movements: Extraocular movements intact.       Pupils: Pupils are equal, round, and reactive to light.   Cardiovascular:      Rate and Rhythm: Normal rate and regular rhythm.      Pulses: Normal pulses.      Heart sounds: Normal heart sounds.   Pulmonary:      Effort: Pulmonary effort is normal.      Breath sounds: Normal breath sounds.   Abdominal:      General: Abdomen is flat.      Palpations: Abdomen is soft.   Musculoskeletal:         General: Normal range of motion.      Cervical back: Normal range of motion.   Skin:     General: Skin is warm.      Capillary Refill: Capillary refill takes less than 2 seconds.   Neurological:      General: No focal deficit present.      Mental Status: He is alert and oriented to person, place, and time.   Psychiatric:         Mood and Affect: Mood normal.       Significant Labs: All pertinent labs within the past 24 hours have been reviewed.  BMP:   Recent Labs   Lab 06/30/22  0902   GLU 72*   *   K 5.6*   CL 98   CO2 21   *   CREATININE 6.34*   CALCIUM 8.7     CBC:   Recent Labs   Lab 06/29/22  0400 06/30/22  0902   WBC 13.15* 13.05*   HGB 7.5* 8.7*   HCT 21.1* 24.9*    194       Significant Imaging: I have reviewed all pertinent imaging results/findings within the past 24 hours.

## 2022-06-30 NOTE — SUBJECTIVE & OBJECTIVE
Interval History: The patient is resting.  No acute changes.  Serum creatinine is 6.3 which is better.    Review of patient's allergies indicates:   Allergen Reactions    Gatifloxacin Anaphylaxis     Current Facility-Administered Medications   Medication Frequency    0.9%  NaCl infusion (for blood administration) Q24H PRN    acetaminophen tablet 1,000 mg Q6H PRN    albuterol inhaler 2 puff Q6H PRN    albuterol-ipratropium 2.5 mg-0.5 mg/3 mL nebulizer solution 3 mL Q4H    allopurinol split tablet 50 mg Daily    amLODIPine tablet 10 mg Daily    apixaban tablet 2.5 mg BID    ascorbic acid (vitamin C) tablet 500 mg Daily    atorvastatin tablet 40 mg QHS    bisacodyL EC tablet 10 mg Daily PRN    budesonide nebulizer solution 0.5 mg Daily    calcium gluconate 1 g in NS IVPB (premixed) Q10 Min PRN    cetirizine tablet 10 mg Daily PRN    dextromethorphan-guaiFENesin  mg/5 ml liquid 10 mL Q6H PRN    dextrose 50% injection 12.5 g PRN    dextrose 50% injection 25 g PRN    ferrous sulfate tablet 1 each Daily    glucagon (human recombinant) injection 1 mg PRN    glucose chewable tablet 16 g PRN    glucose chewable tablet 24 g PRN    HYDROcodone-acetaminophen 7.5-325 mg per tablet 1 tablet Q6H PRN    insulin aspart U-100 injection 1-10 Units QID (AC + HS) PRN    insulin detemir U-100 injection 8 Units QHS    isosorbide mononitrate 24 hr tablet 60 mg Daily    melatonin tablet 6 mg Nightly PRN    metoprolol tartrate (LOPRESSOR) tablet 25 mg Daily    mineral oil enema 1 enema Daily PRN    multivitamin tablet Daily    naloxone 0.4 mg/mL injection 0.02 mg PRN    ondansetron injection 8 mg Q6H PRN    pantoprazole EC tablet 40 mg Daily    piperacillin-tazobactam (ZOSYN) 4.5 g in dextrose 5 % in water (D5W) 5 % 100 mL IVPB (MB+) Q12H    simethicone chewable tablet 80 mg TID PRN    sodium chloride 0.9% flush 10 mL Q12H PRN    traMADoL tablet 50 mg Q8H PRN    traZODone tablet 50 mg Nightly PRN       Objective:     Vital Signs (Most  Recent):  Temp: 97.8 °F (36.6 °C) (06/30/22 1011)  Pulse: 81 (06/30/22 1113)  Resp: 20 (06/30/22 1113)  BP: (!) 151/76 (06/30/22 1011)  SpO2: 96 % (06/30/22 1113)  O2 Device (Oxygen Therapy): nasal cannula (06/30/22 1113)   Vital Signs (24h Range):  Temp:  [97.3 °F (36.3 °C)-97.9 °F (36.6 °C)] 97.8 °F (36.6 °C)  Pulse:  [] 81  Resp:  [15-20] 20  SpO2:  [94 %-100 %] 96 %  BP: (121-154)/(57-77) 151/76     Weight: 122.5 kg (270 lb 1 oz) (06/29/22 0455)  Body mass index is 36.63 kg/m².  Body surface area is 2.49 meters squared.    I/O last 3 completed shifts:  In: 610 [P.O.:610]  Out: 3700 [Urine:3700]    Physical Exam  Vitals reviewed.   HENT:      Head: Normocephalic and atraumatic.   Cardiovascular:      Rate and Rhythm: Regular rhythm.   Pulmonary:      Effort: Pulmonary effort is normal.   Abdominal:      Palpations: Abdomen is soft.   Neurological:      Mental Status: He is alert.   Psychiatric:         Mood and Affect: Mood normal.       Significant Labs:  BMP:   Recent Labs   Lab 06/30/22  0902   GLU 72*   *   K 5.6*   CL 98   CO2 21   *   CREATININE 6.34*   CALCIUM 8.7     CBC:   Recent Labs   Lab 06/30/22  0902   WBC 13.05*   RBC 2.99*   HGB 8.7*   HCT 24.9*      MCV 83.3   MCH 29.1   MCHC 34.9        Significant Imaging:  Labs: Reviewed

## 2022-06-30 NOTE — ASSESSMENT & PLAN NOTE
6/24/2022  At this time, continue to monitor.  Creatinine is 5.99 which is slightly better.  6/27/2022  Continue to monitor.  The patient does have CKD V.  Watching to see if there is any further improvement in renal function.  6/28/2022  The patient does not have uremic symptoms.  Serum creatinine is 6.44 today.  Volume status is acceptable.  6/29/2022  CKDV  6/30/2022  CKD.  No uremic symptoms.  Continue current management.

## 2022-06-30 NOTE — TRANSFER OF CARE
Anesthesia Transfer of Care Note    Patient: Neymar Parra    Procedure(s) Performed: * egd  Patient location: PACU    Anesthesia Type: MAC    Transport from OR: Transported from OR on 6-10 L/min O2 by face mask with adequate spontaneous ventilation    Post pain: adequate analgesia    Post assessment: no apparent anesthetic complications    Post vital signs: stable    Level of consciousness: sedated and awake    Nausea/Vomiting: no nausea/vomiting    Complications: none    Transfer of care protocol was followed      Last vitals:   Visit Vitals  BP (!) 149/69 (BP Location: Left arm, Patient Position: Lying)   Pulse 99   Temp 36.7 °C (98.1 °F) (Tympanic)   Resp 17   Ht 6' (1.829 m)   Wt 122.5 kg (270 lb 1 oz)   SpO2 (!) 94%   BMI 36.63 kg/m²

## 2022-06-30 NOTE — PROGRESS NOTES
[facility] - [address] [place] [address]  Adult Nutrition  Progress Note    SUMMARY       Recommendations     Patient currently NPO due to EGD today. Recommend Diabetic Consistent Carbohydrate, Cardiac diet.      Assessment and Plan      No new Assessment & Plan notes have been filed under this hospital service since the last note was generated.  Service: Nutrition      Latest Reference Range & Units 06/30/22 09:02   Sodium 136 - 145 mmol/L 134 (L)   Potassium 3.5 - 5.1 mmol/L 5.6 (H)   Chloride 98 - 107 mmol/L 98   CO2 21 - 32 mmol/L 21   Anion Gap 7 - 16 mmol/L 21 (H)   BUN 7 - 18 mg/dL 156 (H)   Creatinine 0.70 - 1.30 mg/dL 6.34 (H)   BUN/CREAT RATIO 6 - 20  25 (H)   eGFR if non African American >=60 mL/min/1.73m² 9 (L)   Glucose 74 - 106 mg/dL 72 (L)   (L): Data is abnormally low  (H): Data is abnormally high      Patient seen today for follow up review. Meds/labs reviewed. He is currently NPO due to EGD today. Nephrology following due to elevated BUN and crea. His last A1c was 4.7 x 10 days ago. Blood glucose levels have been running elevated. Diabetic Cardiac diet recommended. His meal intake is adequate at %. CBW is 270# with a weight gain noted of ~8# over the past week. MD initiated a fluid restriction of 1200ml per day. He was ordered  1 dose of IV lasix today. Weight fluctuations expected due to renal function. Nephrology following. Albumin  Low possibly due to increased edema and decreased renal function.    Latest Reference Range & Units 06/28/22 05:41   PROTEIN TOTAL 6.4 - 8.2 g/dL 5.3 (L)   Albumin 3.5 - 5.0 g/dL 2.5 (L)   (L): Data is abnormally lows                              Reason for Assessment    Reason For Assessment: length of stay  Diagnosis: other (see comments) (DEO)  Relevant Medical History: HTN, CAD, CKD4, anticoagulant long term use    Nutrition Risk Screen    Nutrition Risk Screen: no indicators present    Nutrition/Diet History         Anthropometrics    Temp: 97.8 °F (36.6  °C)  Height Method: Stated  Height: 6' (182.9 cm)  Height (inches): 72 in  Weight Method: Bed Scale  Weight: 122.5 kg (270 lb 1 oz)  Weight (lb): 270.07 lb  Ideal Body Weight (IBW), Male: 178 lb  % Ideal Body Weight, Male (lb): 147.63 %  BMI (Calculated): 36.6       Lab/Procedures/Meds    Pertinent Labs Reviewed: reviewed  Pertinent Medications Reviewed: reviewed  Pertinent Medications Comments: allopurinol, amlodipine, atorvastatin, azithromycin, ferrous sulfate, heparin, isoorbide mononitrate, lopressor, mvi, prednisone         Estimated/Assessed Needs    Weight Used For Calorie Calculations: 90 kg (198 lb 6.6 oz)  Energy Calorie Requirements (kcal): 2567-6611 kcals/day (25-30 kcals/kg Adj BW)     Protein Requirements: 72 g/day (0.8 g/kg adj BW)  Weight Used For Protein Calculations: 90 kg (198 lb 6.6 oz)  Fluid Requirements (mL): 4656-5089 mL/day     RDA Method (mL): 2250         Nutrition Prescription Ordered    Current Diet Order: cardiac    Evaluation of Received Nutrient/Fluid Intake       % Intake of Estimated Energy Needs: 75 - 100 %  % Meal Intake: 75 - 100 %    Nutrition Risk    Level of Risk/Frequency of Follow-up: low     Monitor and Evaluation     Will monitor intake and labs     Nutrition Follow-Up    RD Follow-up?: Yes

## 2022-06-30 NOTE — PROGRESS NOTES
[facility] [place] - [address] [place] [address]  Nephrology  Progress Note    Patient Name: Neymar Parra  MRN: [id number]  Admission Date: 6/16/2022  Hospital Length of Stay: 14 days  Attending Provider: Rj Fernandez MD   Primary Care Physician: [facility] megan Rodríguez  Principal Problem:Hyponatremia    Subjective:     HPI: This patient with history of HTN, DM, CKD who has seen Dr. Crowder in the past 2 years ago is currently in a nursing facility.  The patient presented with SOB and diagnosed with COVID.  Serum creatinine has trended up to 7.15.  Serum potassium is 6.4.  Nephrology has been consulted for renal issues.  At the bedside, the patient mentions feeling fine.  He states that his breathing has improved.      Interval History: The patient is resting.  No acute changes.  Serum creatinine is 6.3 which is better.    Review of patient's allergies indicates:   Allergen Reactions    Gatifloxacin Anaphylaxis     Current Facility-Administered Medications   Medication Frequency    0.9%  NaCl infusion (for blood administration) Q24H PRN    acetaminophen tablet 1,000 mg Q6H PRN    albuterol inhaler 2 puff Q6H PRN    albuterol-ipratropium 2.5 mg-0.5 mg/3 mL nebulizer solution 3 mL Q4H    allopurinol split tablet 50 mg Daily    amLODIPine tablet 10 mg Daily    apixaban tablet 2.5 mg BID    ascorbic acid (vitamin C) tablet 500 mg Daily    atorvastatin tablet 40 mg QHS    bisacodyL EC tablet 10 mg Daily PRN    budesonide nebulizer solution 0.5 mg Daily    calcium gluconate 1 g in NS IVPB (premixed) Q10 Min PRN    cetirizine tablet 10 mg Daily PRN    dextromethorphan-guaiFENesin  mg/5 ml liquid 10 mL Q6H PRN    dextrose 50% injection 12.5 g PRN    dextrose 50% injection 25 g PRN    ferrous sulfate tablet 1 each Daily    glucagon (human recombinant) injection 1 mg PRN    glucose chewable tablet 16 g PRN    glucose chewable tablet 24 g PRN    HYDROcodone-acetaminophen 7.5-325 mg per  tablet 1 tablet Q6H PRN    insulin aspart U-100 injection 1-10 Units QID (AC + HS) PRN    insulin detemir U-100 injection 8 Units QHS    isosorbide mononitrate 24 hr tablet 60 mg Daily    melatonin tablet 6 mg Nightly PRN    metoprolol tartrate (LOPRESSOR) tablet 25 mg Daily    mineral oil enema 1 enema Daily PRN    multivitamin tablet Daily    naloxone 0.4 mg/mL injection 0.02 mg PRN    ondansetron injection 8 mg Q6H PRN    pantoprazole EC tablet 40 mg Daily    piperacillin-tazobactam (ZOSYN) 4.5 g in dextrose 5 % in water (D5W) 5 % 100 mL IVPB (MB+) Q12H    simethicone chewable tablet 80 mg TID PRN    sodium chloride 0.9% flush 10 mL Q12H PRN    traMADoL tablet 50 mg Q8H PRN    traZODone tablet 50 mg Nightly PRN       Objective:     Vital Signs (Most Recent):  Temp: 97.8 °F (36.6 °C) (06/30/22 1011)  Pulse: 81 (06/30/22 1113)  Resp: 20 (06/30/22 1113)  BP: (!) 151/76 (06/30/22 1011)  SpO2: 96 % (06/30/22 1113)  O2 Device (Oxygen Therapy): nasal cannula (06/30/22 1113)   Vital Signs (24h Range):  Temp:  [97.3 °F (36.3 °C)-97.9 °F (36.6 °C)] 97.8 °F (36.6 °C)  Pulse:  [] 81  Resp:  [15-20] 20  SpO2:  [94 %-100 %] 96 %  BP: (121-154)/(57-77) 151/76     Weight: 122.5 kg (270 lb 1 oz) (06/29/22 0455)  Body mass index is 36.63 kg/m².  Body surface area is 2.49 meters squared.    I/O last 3 completed shifts:  In: 610 [P.O.:610]  Out: 3700 [Urine:3700]    Physical Exam  Vitals reviewed.   HENT:      Head: Normocephalic and atraumatic.   Cardiovascular:      Rate and Rhythm: Regular rhythm.   Pulmonary:      Effort: Pulmonary effort is normal.   Abdominal:      Palpations: Abdomen is soft.   Neurological:      Mental Status: He is alert.   Psychiatric:         Mood and Affect: Mood normal.       Significant Labs:  BMP:   Recent Labs   Lab 06/30/22  0902   GLU 72*   *   K 5.6*   CL 98   CO2 21   *   CREATININE 6.34*   CALCIUM 8.7     CBC:   Recent Labs   Lab 06/30/22  0902   WBC 13.05*    RBC 2.99*   HGB 8.7*   HCT 24.9*      MCV 83.3   MCH 29.1   MCHC 34.9        Significant Imaging:  Labs: Reviewed    Assessment/Plan:     DEO (acute kidney injury)    6/24/2022  At this time, continue to monitor.  Creatinine is 5.99 which is slightly better.  6/27/2022  Continue to monitor.  The patient does have CKD V.  Watching to see if there is any further improvement in renal function.  6/28/2022  The patient does not have uremic symptoms.  Serum creatinine is 6.44 today.  Volume status is acceptable.  6/29/2022  CKDV  6/30/2022  CKD.  No uremic symptoms.  Continue current management.        Thank you for your consult. I will follow-up with patient. Please contact us if you have any additional questions.    Jorge Butts Jr, MD  Nephrology  [facility] - [address] [place] [address]

## 2022-06-30 NOTE — ANESTHESIA POSTPROCEDURE EVALUATION
Anesthesia Post Evaluation    Patient: Neymar Parra    Procedure(s) Performed: * egd  Final Anesthesia Type: MAC      Patient location during evaluation: GI PACU  Patient participation: Yes- Able to Participate  Level of consciousness: awake and alert  Post-procedure vital signs: reviewed and stable  Pain management: adequate  Airway patency: patent    PONV status at discharge: No PONV  Anesthetic complications: no      Cardiovascular status: blood pressure returned to baseline and hemodynamically stable  Respiratory status: spontaneous ventilation  Hydration status: euvolemic  Follow-up not needed.  Comments: Pt voices appreciation for care          Vitals Value Taken Time   /72 06/30/22 1523   Temp 97f 06/30/22 1525   Pulse 93 06/30/22 1525   Resp 25 06/30/22 1525   SpO2 97 % 06/30/22 1525   Vitals shown include unvalidated device data.      No case tracking events are documented in the log.      Pain/Esther Score: Esther Score: 9 (6/30/2022  3:24 PM)

## 2022-06-30 NOTE — ANESTHESIA PREPROCEDURE EVALUATION
06/30/2022  Neymar Parra is a 72 y.o., male.  Active Ambulatory Problems     Diagnosis Date Noted    Hypertension 12/12/2021    COPD exacerbation 12/12/2021    Gastroesophageal reflux disease 12/12/2021    Type 2 diabetes mellitus without complication 12/12/2021    Pleural effusion 04/29/2022     Resolved Ambulatory Problems     Diagnosis Date Noted    Fracture of trochanter of femur 12/12/2021    Hemoptysis 04/29/2022     Past Medical History:   Diagnosis Date    Anticoagulant long-term use     Chronic renal disease, stage 4, severely decreased glomerular filtration rate (GFR) between 15-29 mL/min/1.73 square meter     Coronary artery disease     COVID-19 6/16/2022    Duodenal adenoma      Lab Results   Component Value Date    WBC 13.05 (H) 06/30/2022    HGB 8.7 (L) 06/30/2022    HCT 24.9 (L) 06/30/2022    MCV 83.3 06/30/2022     06/30/2022       Current Discharge Medication List      CONTINUE these medications which have NOT CHANGED    Details   acetaminophen (TYLENOL) 500 MG tablet Take 1,000 mg by mouth every 8 (eight) hours as needed for Pain.      albuterol-ipratropium (DUO-NEB) 2.5 mg-0.5 mg/3 mL nebulizer solution Take 3 mLs by nebulization every 6 (six) hours as needed for Wheezing. Rescue  Qty: 75 mL, Refills: 0      allopurinoL (ZYLOPRIM) 100 MG tablet TAKE ONE TABLET BY MOUTH DAILY FOR GOUT      amLODIPine (NORVASC) 10 MG tablet Take 10 mg by mouth once daily.      aspirin (ECOTRIN) 325 MG EC tablet Take 325 mg by mouth once daily.      atorvastatin (LIPITOR) 80 MG tablet Take 40 mg by mouth every evening.      clopidogreL (PLAVIX) 75 mg tablet TAKE 1 TABLET BY MOUTH ONCE DAILY   Qty: 90 tablet, Refills: 1      HYDROcodone-acetaminophen (NORCO) 7.5-325 mg per tablet Take 1 tablet by mouth every 6 (six) hours as needed for Pain.  Qty: 20 tablet, Refills: 0    Comments: n/a        isosorbide mononitrate (IMDUR) 60 MG 24 hr tablet Take 1 tablet by mouth once daily.      lisinopriL (PRINIVIL,ZESTRIL) 20 MG tablet Take 20 mg by mouth.      loratadine (CLARITIN) 10 mg tablet TAKE ONE TABLET BY MOUTH DAILY FOR ALLERGIES      metoprolol tartrate (LOPRESSOR) 25 MG tablet Take 25 mg by mouth once daily.      omeprazole (PRILOSEC) 20 MG capsule Take 20 mg by mouth once daily.      traMADoL (ULTRAM) 50 mg tablet Take 50 mg by mouth every 8 (eight) hours as needed for Pain.      traZODone (DESYREL) 100 MG tablet Take 300 mg by mouth every evening.                 Pre-op Assessment          Review of Systems  Cardiovascular:   Hypertension CAD      Pulmonary:   Pneumonia COPD, moderate    Renal/:   Chronic Renal Disease, CRI, ESRD    Hepatic/GI:   GERD    Endocrine:   Diabetes        Physical Exam  General: Well nourished and Cooperative        Anesthesia Plan  Type of Anesthesia, risks & benefits discussed:    Anesthesia Type: Gen Natural Airway, MAC  Intra-op Monitoring Plan: Standard ASA Monitors  Post Op Pain Control Plan: multimodal analgesia  Induction:  IV  Informed Consent: Informed consent signed with the Patient and all parties understand the risks and agree with anesthesia plan.  All questions answered.   ASA Score: 4    Ready For Surgery From Anesthesia Perspective.     .

## 2022-06-30 NOTE — PROGRESS NOTES
[facility] - [address] [place] [address] Medicine  Progress Note    Patient Name: Neymar Parra  MRN: [id number]  Patient Class: IP- Inpatient   Admission Date: 6/16/2022  Length of Stay: 14 days  Attending Physician: Rj Fernandez MD  Primary Care Provider: [facility] megan Rodríguez    Subjective:     Principal Problem:Hyponatremia    HPI:  72 y.o. male transferred to the [facility] from [facility] for hyperkalemia and hypoxia. Pt reports he went to [facility] because he was having dyspnea and hemoptysis since 1 month which is worse today. Pt reports he was tested positive for COVID at the nursing home on 6/9/22. Per nursing home patient oxygen saturation was in the 80's on RA with tachypnea and labored breathing. Pt reports he normally does not wear oxygen at the nursing home but has been wearing oxygen more often recently. Pt reports he smoked 1-1.5 PPD for 20 years but quit 7 months ago. Per pt, he saw a pulmonologist (Dr. Aldana) for his hemoptysis and had some tests done the results of which he does not know. Per records, pt with a right pleural effusion recently and had a thoracentesis. Pt denies any fever, congestion, dysuria, N/V/D, chest pain, or any other complaints at this time.     In the ED, pt's K 7.2, d-dimer 0.76, BNP 6284, BUN/Cr 73/7.16. EKG showed some peaked t-waves and irregular rhythm. Chest xray showed Increased right lower lung pulmonary density could indicate pneumonia. Pt was treated in the ED with Calcium gluconate 1g, regular insulin 10 units, D50, IV solumedrol 125 mg, tylenol 650 mg and IV fluids NS 1L bolus. Pt will be admitted to the hospital service for management of hypoxia and hyperkalemia.    Interval History: Pt is in no acute distress. Plan is for EGD today. Repeat CXR shows worsening left lower lobe consolidation and right sided infiltrates. Currently is on zosyn. Will also give one dose of IV lasix 40mg qd.     Review of Systems   Constitutional:  Negative for  chills, diaphoresis, fatigue and fever.   HENT:  Negative for congestion, rhinorrhea and sore throat.    Eyes:  Negative for visual disturbance.   Respiratory:  Negative for cough, shortness of breath and wheezing.    Cardiovascular:  Negative for chest pain, palpitations and leg swelling.   Gastrointestinal:  Negative for abdominal pain.   Genitourinary:  Negative for dysuria.   Musculoskeletal:  Negative for back pain.   Skin:  Negative for rash and wound.   Neurological:  Negative for dizziness and weakness.   Hematological:  Negative for adenopathy.   Psychiatric/Behavioral:  Negative for agitation.    Objective:     Vital Signs (Most Recent):  Temp: 97.8 °F (36.6 °C) (06/30/22 1011)  Pulse: 81 (06/30/22 1113)  Resp: 20 (06/30/22 1113)  BP: (!) 151/76 (06/30/22 1011)  SpO2: 96 % (06/30/22 1113)   Vital Signs (24h Range):  Temp:  [97.3 °F (36.3 °C)-97.9 °F (36.6 °C)] 97.8 °F (36.6 °C)  Pulse:  [] 81  Resp:  [15-20] 20  SpO2:  [94 %-100 %] 96 %  BP: (121-154)/(57-77) 151/76     Weight: 122.5 kg (270 lb 1 oz)  Body mass index is 36.63 kg/m².    Intake/Output Summary (Last 24 hours) at 6/30/2022 1140  Last data filed at 6/30/2022 1008  Gross per 24 hour   Intake 250 ml   Output 3050 ml   Net -2800 ml      Physical Exam  Vitals reviewed.   Constitutional:       General: He is not in acute distress.     Appearance: He is normal weight.   HENT:      Head: Normocephalic and atraumatic.      Right Ear: External ear normal.      Left Ear: External ear normal.      Nose: Nose normal.      Mouth/Throat:      Mouth: Mucous membranes are moist.      Pharynx: Oropharynx is clear.   Eyes:      Extraocular Movements: Extraocular movements intact.      Pupils: Pupils are equal, round, and reactive to light.   Cardiovascular:      Rate and Rhythm: Normal rate and regular rhythm.      Pulses: Normal pulses.      Heart sounds: Normal heart sounds.   Pulmonary:      Effort: Pulmonary effort is normal.      Breath sounds: Normal  breath sounds.   Abdominal:      General: Abdomen is flat.      Palpations: Abdomen is soft.   Musculoskeletal:         General: Normal range of motion.      Cervical back: Normal range of motion.   Skin:     General: Skin is warm.      Capillary Refill: Capillary refill takes less than 2 seconds.   Neurological:      General: No focal deficit present.      Mental Status: He is alert and oriented to person, place, and time.   Psychiatric:         Mood and Affect: Mood normal.       Significant Labs: All pertinent labs within the past 24 hours have been reviewed.  BMP:   Recent Labs   Lab 06/30/22  0902   GLU 72*   *   K 5.6*   CL 98   CO2 21   *   CREATININE 6.34*   CALCIUM 8.7     CBC:   Recent Labs   Lab 06/29/22  0400 06/30/22  0902   WBC 13.15* 13.05*   HGB 7.5* 8.7*   HCT 21.1* 24.9*    194       Significant Imaging: I have reviewed all pertinent imaging results/findings within the past 24 hours.      Assessment/Plan:      * Hyponatremia  - Na 134, serum osmolality WNL at 306, urine osmolality WNL  - Fluid restrict 1200cc qd      Aspiration PNA       - Completed 5 days of azithromycin, currently on zosyn       - Leukocytosis trending down to 13.05         Elevated d-dimer  - D-dimer was normal on admission  - Repeat was 1.83, V/Q scan was low probability for PE  - Continue low dose eliquis 2.5mg bid      Anemia  - H/H 8.7/24.9 (7.5/21.1 yesterday)   - Iron sat 7% currently on ferrous sulfate  - Transfused 1UPRBC during this admission  - FOBT positive  - History of duodenal adenoma per prior records  - Gastroenterology consulted- much appreciated, EGD today     DEO (acute kidney injury)  - BUN/Cr 156/6.34 (149/6.48 yesterday)  - Consider bicarb  - Nephrology following      Type 2 diabetes mellitus without complication  -HgA1c 4.7 10 days ago  - detemir 8U qhs  - SSI     Gastroesophageal reflux disease  - pantoprazole 40 mg qd     COPD exacerbation  - Much improved  - Oral prednisone  for day 5/5  - duo nebs Q4H  - budesonide neb Q12H     Hypertension  - continue home amlodipine 10 mg qd, isosorbide mononitrate 60 mg qd, and metoprolol 25mg qd. Holding lisinopril.    VTE Risk Mitigation (From admission, onward)         Ordered     apixaban tablet 2.5 mg  2 times daily         06/24/22 1201     Reason for No Pharmacological VTE Prophylaxis  Once        Question:  Reasons:  Answer:  Risk of Bleeding    06/16/22 0301     IP VTE HIGH RISK PATIENT  Once         06/16/22 0301     Place sequential compression device  Until discontinued         06/16/22 0301              Discharge Planning   EDWARDO:      Code Status: Full Code   Is the patient medically ready for discharge?:     Reason for patient still in hospital (select all that apply): Treatment  Discharge Plan A: Long-term acute care facility (LTAC) (Choice obtained for Specialty)      Barbie Guerrero DO  Department of Hospital Medicine   [facility] - [address] [place] [address]

## 2022-06-30 NOTE — PLAN OF CARE
SS received a call from Delfina at [facility]. She states patient's bed has . I notified her that Remedios from [facility] told me earlier this week that his bed would be held until . She states that was incorrect information. However, she informed me that they had been working on getting him to the [facility] for a while and she has informed them that he will need a place to go when he discharges from here. Patient's next of kin, Caity Garcia, is aware of the above information. She states she is unsure of when his bed will be available at [place]. She is open to LTAC placement until patient can be transferred to the [facility]. Choice obtained for [facility]. I discussed the above information with Dr. Guerrero.

## 2022-07-01 LAB
ABO + RH BLD: NORMAL
ACANTHOCYTES BLD QL SMEAR: ABNORMAL
ANION GAP SERPL CALCULATED.3IONS-SCNC: 22 MMOL/L (ref 7–16)
ANISOCYTOSIS BLD QL SMEAR: ABNORMAL
BASOPHILS # BLD AUTO: 0 K/UL (ref 0–0.2)
BASOPHILS NFR BLD AUTO: 0 % (ref 0–1)
BLD PROD TYP BPU: NORMAL
BLOOD UNIT EXPIRATION DATE: NORMAL
BLOOD UNIT TYPE CODE: 5100
BUN SERPL-MCNC: 166 MG/DL (ref 7–18)
BUN/CREAT SERPL: 26 (ref 6–20)
CALCIUM SERPL-MCNC: 8.4 MG/DL (ref 8.5–10.1)
CHLORIDE SERPL-SCNC: 102 MMOL/L (ref 98–107)
CO2 SERPL-SCNC: 19 MMOL/L (ref 21–32)
CREAT SERPL-MCNC: 6.31 MG/DL (ref 0.7–1.3)
CROSSMATCH INTERPRETATION: NORMAL
DACRYOCYTES BLD QL SMEAR: ABNORMAL
DIFFERENTIAL METHOD BLD: ABNORMAL
DISPENSE STATUS: NORMAL
EOSINOPHIL # BLD AUTO: 0.24 K/UL (ref 0–0.5)
EOSINOPHIL NFR BLD AUTO: 1.8 % (ref 1–4)
EOSINOPHIL NFR BLD MANUAL: 6 % (ref 1–4)
ERYTHROCYTE [DISTWIDTH] IN BLOOD BY AUTOMATED COUNT: 18 % (ref 11.5–14.5)
GLUCOSE SERPL-MCNC: 100 MG/DL (ref 74–106)
GLUCOSE SERPL-MCNC: 125 MG/DL (ref 70–105)
GLUCOSE SERPL-MCNC: 130 MG/DL (ref 70–105)
GLUCOSE SERPL-MCNC: 131 MG/DL (ref 70–105)
GLUCOSE SERPL-MCNC: 155 MG/DL (ref 70–105)
GLUCOSE SERPL-MCNC: 88 MG/DL (ref 70–105)
HCT VFR BLD AUTO: 18.7 % (ref 40–54)
HCT VFR BLD AUTO: 20.6 % (ref 40–54)
HGB BLD-MCNC: 6.4 G/DL (ref 13.5–18)
HGB BLD-MCNC: 7 G/DL (ref 13.5–18)
IMM GRANULOCYTES # BLD AUTO: 0.13 K/UL (ref 0–0.04)
IMM GRANULOCYTES NFR BLD: 1 % (ref 0–0.4)
INDIRECT COOMBS: NORMAL
LYMPHOCYTES # BLD AUTO: 0.47 K/UL (ref 1–4.8)
LYMPHOCYTES NFR BLD AUTO: 3.5 % (ref 27–41)
LYMPHOCYTES NFR BLD MANUAL: 2 % (ref 27–41)
MCH RBC QN AUTO: 29 PG (ref 27–31)
MCHC RBC AUTO-ENTMCNC: 34.2 G/DL (ref 32–36)
MCV RBC AUTO: 84.6 FL (ref 80–96)
MONOCYTES # BLD AUTO: 0.83 K/UL (ref 0–0.8)
MONOCYTES NFR BLD AUTO: 6.2 % (ref 2–6)
MONOCYTES NFR BLD MANUAL: 6 % (ref 2–6)
MPC BLD CALC-MCNC: 9.9 FL (ref 9.4–12.4)
NEUTROPHILS # BLD AUTO: 11.64 K/UL (ref 1.8–7.7)
NEUTROPHILS NFR BLD AUTO: 87.5 % (ref 53–65)
NEUTS SEG NFR BLD MANUAL: 86 % (ref 50–62)
NRBC # BLD AUTO: 0 X10E3/UL
NRBC, AUTO (.00): 0 %
OVALOCYTES BLD QL SMEAR: ABNORMAL
PLATELET # BLD AUTO: 155 K/UL (ref 150–400)
PLATELET MORPHOLOGY: NORMAL
POLYCHROMASIA BLD QL SMEAR: ABNORMAL
POTASSIUM SERPL-SCNC: 5.5 MMOL/L (ref 3.5–5.1)
POTASSIUM SERPL-SCNC: 6 MMOL/L (ref 3.5–5.1)
RBC # BLD AUTO: 2.21 M/UL (ref 4.6–6.2)
RH BLD: NORMAL
SODIUM SERPL-SCNC: 137 MMOL/L (ref 136–145)
UNIT NUMBER: NORMAL
WBC # BLD AUTO: 13.31 K/UL (ref 4.5–11)

## 2022-07-01 PROCEDURE — 97165 OT EVAL LOW COMPLEX 30 MIN: CPT

## 2022-07-01 PROCEDURE — 99232 SBSQ HOSP IP/OBS MODERATE 35: CPT | Mod: GC,,, | Performed by: FAMILY MEDICINE

## 2022-07-01 PROCEDURE — 84132 ASSAY OF SERUM POTASSIUM: CPT

## 2022-07-01 PROCEDURE — 82962 GLUCOSE BLOOD TEST: CPT

## 2022-07-01 PROCEDURE — 86923 COMPATIBILITY TEST ELECTRIC: CPT | Mod: 91

## 2022-07-01 PROCEDURE — 25000003 PHARM REV CODE 250

## 2022-07-01 PROCEDURE — C9113 INJ PANTOPRAZOLE SODIUM, VIA: HCPCS | Performed by: INTERNAL MEDICINE

## 2022-07-01 PROCEDURE — 25000003 PHARM REV CODE 250: Performed by: FAMILY MEDICINE

## 2022-07-01 PROCEDURE — 85014 HEMATOCRIT: CPT

## 2022-07-01 PROCEDURE — 25000003 PHARM REV CODE 250: Performed by: INTERNAL MEDICINE

## 2022-07-01 PROCEDURE — 99232 PR SUBSEQUENT HOSPITAL CARE,LEVL II: ICD-10-PCS | Mod: GC,,, | Performed by: FAMILY MEDICINE

## 2022-07-01 PROCEDURE — 63600175 PHARM REV CODE 636 W HCPCS: Performed by: INTERNAL MEDICINE

## 2022-07-01 PROCEDURE — 80048 BASIC METABOLIC PNL TOTAL CA: CPT | Performed by: FAMILY MEDICINE

## 2022-07-01 PROCEDURE — 94640 AIRWAY INHALATION TREATMENT: CPT

## 2022-07-01 PROCEDURE — 27000221 HC OXYGEN, UP TO 24 HOURS

## 2022-07-01 PROCEDURE — 25000003 PHARM REV CODE 250: Performed by: HOSPITALIST

## 2022-07-01 PROCEDURE — 94761 N-INVAS EAR/PLS OXIMETRY MLT: CPT

## 2022-07-01 PROCEDURE — 63600175 PHARM REV CODE 636 W HCPCS

## 2022-07-01 PROCEDURE — 63600175 PHARM REV CODE 636 W HCPCS: Performed by: FAMILY MEDICINE

## 2022-07-01 PROCEDURE — 99222 1ST HOSP IP/OBS MODERATE 55: CPT | Mod: ,,, | Performed by: INTERNAL MEDICINE

## 2022-07-01 PROCEDURE — 97161 PT EVAL LOW COMPLEX 20 MIN: CPT

## 2022-07-01 PROCEDURE — 86900 BLOOD TYPING SEROLOGIC ABO: CPT

## 2022-07-01 PROCEDURE — 85025 COMPLETE CBC W/AUTO DIFF WBC: CPT | Performed by: FAMILY MEDICINE

## 2022-07-01 PROCEDURE — 11000001 HC ACUTE MED/SURG PRIVATE ROOM

## 2022-07-01 PROCEDURE — C9399 UNCLASSIFIED DRUGS OR BIOLOG: HCPCS

## 2022-07-01 PROCEDURE — 36415 COLL VENOUS BLD VENIPUNCTURE: CPT | Performed by: FAMILY MEDICINE

## 2022-07-01 PROCEDURE — P9016 RBC LEUKOCYTES REDUCED: HCPCS

## 2022-07-01 PROCEDURE — 25000242 PHARM REV CODE 250 ALT 637 W/ HCPCS

## 2022-07-01 PROCEDURE — 86923 COMPATIBILITY TEST ELECTRIC: CPT | Mod: 91 | Performed by: FAMILY MEDICINE

## 2022-07-01 PROCEDURE — 86850 RBC ANTIBODY SCREEN: CPT

## 2022-07-01 PROCEDURE — 99222 PR INITIAL HOSPITAL CARE,LEVL II: ICD-10-PCS | Mod: ,,, | Performed by: INTERNAL MEDICINE

## 2022-07-01 RX ORDER — METOPROLOL TARTRATE 50 MG/1
50 TABLET ORAL DAILY
Status: DISCONTINUED | OUTPATIENT
Start: 2022-07-02 | End: 2022-07-07

## 2022-07-01 RX ORDER — HYDROCODONE BITARTRATE AND ACETAMINOPHEN 500; 5 MG/1; MG/1
TABLET ORAL
Status: DISCONTINUED | OUTPATIENT
Start: 2022-07-01 | End: 2022-07-02

## 2022-07-01 RX ORDER — HYDRALAZINE HYDROCHLORIDE 20 MG/ML
10 INJECTION INTRAMUSCULAR; INTRAVENOUS EVERY 6 HOURS PRN
Status: DISCONTINUED | OUTPATIENT
Start: 2022-07-01 | End: 2022-07-20 | Stop reason: HOSPADM

## 2022-07-01 RX ADMIN — IPRATROPIUM BROMIDE AND ALBUTEROL SULFATE 3 ML: 2.5; .5 SOLUTION RESPIRATORY (INHALATION) at 08:07

## 2022-07-01 RX ADMIN — SODIUM ZIRCONIUM CYCLOSILICATE 10 G: 10 POWDER, FOR SUSPENSION ORAL at 08:07

## 2022-07-01 RX ADMIN — IPRATROPIUM BROMIDE AND ALBUTEROL SULFATE 3 ML: 2.5; .5 SOLUTION RESPIRATORY (INHALATION) at 10:07

## 2022-07-01 RX ADMIN — ALLOPURINOL 50 MG: 300 TABLET ORAL at 08:07

## 2022-07-01 RX ADMIN — PANTOPRAZOLE SODIUM 40 MG: 40 INJECTION, POWDER, FOR SOLUTION INTRAVENOUS at 08:07

## 2022-07-01 RX ADMIN — OXYCODONE HYDROCHLORIDE AND ACETAMINOPHEN 500 MG: 500 TABLET ORAL at 08:07

## 2022-07-01 RX ADMIN — PIPERACILLIN AND TAZOBACTAM 4.5 G: 4; .5 INJECTION, POWDER, FOR SOLUTION INTRAVENOUS at 08:07

## 2022-07-01 RX ADMIN — IPRATROPIUM BROMIDE AND ALBUTEROL SULFATE 3 ML: 2.5; .5 SOLUTION RESPIRATORY (INHALATION) at 12:07

## 2022-07-01 RX ADMIN — APIXABAN 2.5 MG: 2.5 TABLET, FILM COATED ORAL at 08:07

## 2022-07-01 RX ADMIN — THERA TABS 1 TABLET: TAB at 08:07

## 2022-07-01 RX ADMIN — FERROUS SULFATE TAB 325 MG (65 MG ELEMENTAL FE) 1 EACH: 325 (65 FE) TAB at 08:07

## 2022-07-01 RX ADMIN — ATORVASTATIN CALCIUM 40 MG: 40 TABLET, FILM COATED ORAL at 08:07

## 2022-07-01 RX ADMIN — METOPROLOL TARTRATE 25 MG: 25 TABLET, FILM COATED ORAL at 08:07

## 2022-07-01 RX ADMIN — INSULIN DETEMIR 8 UNITS: 100 INJECTION, SOLUTION SUBCUTANEOUS at 08:07

## 2022-07-01 RX ADMIN — IPRATROPIUM BROMIDE AND ALBUTEROL SULFATE 3 ML: 2.5; .5 SOLUTION RESPIRATORY (INHALATION) at 03:07

## 2022-07-01 RX ADMIN — PANTOPRAZOLE SODIUM 8 MG/HR: 40 INJECTION, POWDER, FOR SOLUTION INTRAVENOUS at 05:07

## 2022-07-01 RX ADMIN — ISOSORBIDE MONONITRATE 60 MG: 60 TABLET, EXTENDED RELEASE ORAL at 08:07

## 2022-07-01 RX ADMIN — IPRATROPIUM BROMIDE AND ALBUTEROL SULFATE 3 ML: 2.5; .5 SOLUTION RESPIRATORY (INHALATION) at 07:07

## 2022-07-01 RX ADMIN — SODIUM CHLORIDE: 9 INJECTION, SOLUTION INTRAVENOUS at 01:07

## 2022-07-01 RX ADMIN — AMLODIPINE BESYLATE 10 MG: 10 TABLET ORAL at 08:07

## 2022-07-01 NOTE — HPI
Patient was seen in May for hemoptysis with a a bronchoscopy that showed bronchitis no positive cultures or abnormal cells of thoracentesis with 500 cc was obtained and only sent for cytology it had no malignancy patient was admitted on June 16 for hyperkalemia and hypoxemia.  Patient was also having some hemoptysis.  I thought his chronic hemoptysis was related to anticoagulation.  Patient had Dopplers and V/Q lung scan which made the possibility of emboli very low.  Patient has been treated for pneumonia while he is here and also being watch with the chronic renal failure now there is a worsening effusion.  Patient is currently comfortable without complaints

## 2022-07-01 NOTE — ASSESSMENT & PLAN NOTE
- recent chest xray showed possible worsening r sided pleural effusion  - hold eliquis in case of repeat thoracentesis

## 2022-07-01 NOTE — ASSESSMENT & PLAN NOTE
- chest xray shows worsening lower left lube consolidation and and worsening right sided pleural effusion  - continue IV zosyn  - possible thoracentesis

## 2022-07-01 NOTE — ASSESSMENT & PLAN NOTE
7/1/2022-labs reviewed as noted above.  Light drop in H&H.  No overt bleeding.  Stool for H. pylori is pending.  We will continue with IV Protonix with recommendations of monitoring over the weekend for any further bleeding.  Further plan an addendum to follow Dr. Arredondo.    6/29/2022-patient admitted with shortness of breath with recent findings of drop in H&H and heme positive stool.  No reports of overt bleeding.  No prior reported history of gastric adenoma with reported resection.  Unknown prior endoscopy.  Tolerating diet.  Continue to monitor H&H.  Continue Protonix.  At this time, will review case further with Dr. Arredondo with plan an addendum to follow

## 2022-07-01 NOTE — ASSESSMENT & PLAN NOTE
- D-dimer was normal on admission  - Repeat was 1.83  - hold eliquis due to drop in h&h today, and possible thoracentesis

## 2022-07-01 NOTE — PLAN OF CARE
Problem: Occupational Therapy  Goal: Occupational Therapy Goal  Description: STG:  Pt will perform grooming with setup  Pt will bathe with Alnana with setup at EOB   Pt will perform UE dressing with Danita  Pt will perform LE dressing with Alanna  Pt will sit EOB x 10 min with SBA   Pt will transfer bed/chair/bsc with CGA with RW  Pt will perform standing task x 3 min with CGA with RW  Pt will tolerate 15 minutes of tx without fatigue      LT.Restore to max I with self care and mobility.      Outcome: Ongoing, Progressing

## 2022-07-01 NOTE — PLAN OF CARE
Problem: Physical Therapy  Goal: Physical Therapy Goal  Description: Short Term Goals to be met by: 2022    Patient will increase functional independence with mobility by performin. Supine to sit with independently  2. Sit to stand transfer with independently Rolling walker  3. Bed to chair transfer with independently using Rolling walker  4. Gait  x 100 feet with independently using Rolling walker  5. Lower extremity exercise program x30 reps per handout, with assistance as needed    Long Term Goals to be met by: 2022    Pt will regain full independent functional mobility with Rolling walker to return to home situation and prior activities of daily living.   Outcome: Ongoing, Progressing       Patient participated well with PT evaluation but demonstrated SOB/BRUNO; no syncope or c/o lightheadedness. Patient with low H+H and to receive a blood transfusion later today. PT to follow to increase activity. Swing bed or NH placement at d/c.

## 2022-07-01 NOTE — ASSESSMENT & PLAN NOTE
Patient has a new small effusion on the right I think will trial ultrasound to try to decide whether is an exudate or transudate.  Also patient has been treated for aspiration pneumonia with improvement of the white count nothing needs to be done different there and COPD seems to be under good control continue current regimen wean steroids as tolerated

## 2022-07-01 NOTE — SUBJECTIVE & OBJECTIVE
Interval History: Patient seen resting in bed this morning with no new complaints. His chest sounds more clear on physical exam, but a chest xray had shown a worsening left lower lung infiltrate, and a worsening right lung effusion. Dr. Aldana was consulted and may do a repeat thoracentesis. The patient had an h&h of 6.4/18.7 today, so he will get another unit of pRBC. His potassium increased to 6 today, so he was given a alvarado of Lokelma and his potassium will be rechecked later today.     Review of Systems   Constitutional:  Negative for activity change and appetite change.   HENT:  Negative for congestion and dental problem.    Eyes:  Negative for discharge and itching.   Respiratory:  Negative for apnea and chest tightness.    Cardiovascular:  Negative for chest pain and leg swelling.   Gastrointestinal:  Negative for abdominal distention.   Endocrine: Negative for cold intolerance and heat intolerance.   Genitourinary:  Negative for difficulty urinating and dysuria.   Musculoskeletal:  Negative for arthralgias and back pain.   Skin:  Negative for color change.   Allergic/Immunologic: Negative for environmental allergies.   Neurological:  Negative for dizziness and facial asymmetry.   Hematological:  Negative for adenopathy. Does not bruise/bleed easily.   Psychiatric/Behavioral:  Negative for agitation and behavioral problems.    Objective:     Vital Signs (Most Recent):  Temp: 98 °F (36.7 °C) (07/01/22 0703)  Pulse: 100 (07/01/22 1006)  Resp: 19 (07/01/22 1006)  BP: (!) 182/77 (07/01/22 0703)  SpO2: 96 % (07/01/22 1006)   Vital Signs (24h Range):  Temp:  [97.1 °F (36.2 °C)-98.8 °F (37.1 °C)] 98 °F (36.7 °C)  Pulse:  [] 100  Resp:  [15-25] 19  SpO2:  [92 %-100 %] 96 %  BP: (127-182)/(52-78) 182/77     Weight: 122.5 kg (270 lb 1 oz)  Body mass index is 36.63 kg/m².    Intake/Output Summary (Last 24 hours) at 7/1/2022 1052  Last data filed at 7/1/2022 0939  Gross per 24 hour   Intake 200 ml   Output 900 ml    Net -700 ml      Physical Exam  Vitals reviewed.   Constitutional:       Appearance: Normal appearance.      Interventions: He is not intubated.  HENT:      Head: Normocephalic and atraumatic.      Nose: Nose normal.      Mouth/Throat:      Mouth: Mucous membranes are dry.      Pharynx: Oropharynx is clear.   Eyes:      Extraocular Movements: Extraocular movements intact.      Conjunctiva/sclera: Conjunctivae normal.      Pupils: Pupils are equal, round, and reactive to light.   Cardiovascular:      Rate and Rhythm: Normal rate.      Heart sounds: Normal heart sounds. No murmur heard.  Pulmonary:      Effort: Pulmonary effort is normal. He is not intubated.      Breath sounds: Normal breath sounds.   Abdominal:      General: Abdomen is flat. Bowel sounds are normal.      Palpations: Abdomen is soft.   Musculoskeletal:         General: Normal range of motion.      Cervical back: Normal range of motion and neck supple.      Right lower leg: No edema.      Left lower leg: No edema.   Skin:     General: Skin is warm and dry.      Capillary Refill: Capillary refill takes less than 2 seconds.   Neurological:      General: No focal deficit present.      Mental Status: He is alert and oriented to person, place, and time.   Psychiatric:         Mood and Affect: Mood normal.         Behavior: Behavior normal.       Significant Labs: All pertinent labs within the past 24 hours have been reviewed.    Significant Imaging: I have reviewed all pertinent imaging results/findings within the past 24 hours.

## 2022-07-01 NOTE — ASSESSMENT & PLAN NOTE
- H&H 6.4/18.7 today  - pending transfusion of 1 unit pRBC, has already had one unit during this admission

## 2022-07-01 NOTE — PT/OT/SLP EVAL
Occupational Therapy   Evaluation    Name: Neymar Parra  MRN: [id number]  Admitting Diagnosis:  PNA (pneumonia)  Recent Surgery: * No surgery found *      Recommendations:     Discharge Recommendations: intermediate care facility/nursing home  Discharge Equipment Recommendations:  none  Barriers to discharge:  Other (Comment) (ongoing medical treament)    Assessment:     Neymar Parra is a 72 y.o. male with a medical diagnosis of PNA (pneumonia).  He presents with weakness, decreased functional mobility, and decline in ADLs. Performance deficits affecting function: weakness, impaired endurance, impaired self care skills, impaired functional mobilty, gait instability, impaired balance. Pt with SOB with exertion/bed mobility. OT to follow in order to maximize independence with ADL tasks and ADL t/f.     Rehab Prognosis: Good; patient would benefit from acute skilled OT services to address these deficits and reach maximum level of function.       Plan:     Patient to be seen 5 x/week to address the above listed problems via self-care/home management, therapeutic activities, therapeutic exercises  · Plan of Care Expires: 07/29/22  · Plan of Care Reviewed with: patient    Subjective     Chief Complaint: pneumonia  Patient/Family Comments/goals: pt agreeable to participate in OT eval    Occupational Profile:  Living Environment: pt is resident of [facility]  Previous level of function: independent with ADL tasks with setup; utilized RW for functional mobility   Roles and Routines: perform self care  Equipment Used at Home:  walker, rolling, wheelchair  Assistance upon Discharge: nursing home    Pain/Comfort:  · Pain Rating 1: 0/10    Patients cultural, spiritual, Denominational conflicts given the current situation: no    Objective:     Communicated with: NIKKI Flores prior to session.  Patient found supine with oxygen, peripheral IV, telemetry upon OT entry to room.    General Precautions: Standard, fall    Orthopedic Precautions:N/A   Braces: N/A  Respiratory Status: Nasal cannula, flow 2 L/min    Occupational Performance:    Bed Mobility:    · Patient completed Rolling/Turning to Left with  stand by assistance  · Patient completed Rolling/Turning to Right with stand by assistance  · Patient completed Supine to Sit with stand by assistance  · Patient completed Sit to Supine with stand by assistance    Functional Mobility/Transfers:  · Patient completed Sit <> Stand Transfer with contact guard assistance  with  rolling walker   · Functional Mobility: pt performed side step to HOB with RW with CGA    Activities of Daily Living:  · Toileting: minimum assistance to utilize urinal; MaxA for toilet hygiene from bed    Cognitive/Visual Perceptual:  Cognitive/Psychosocial Skills:     -       Oriented to: Person, Place and Situation   -       Follows Commands/attention:Follows multistep  commands  -       Mood/Affect/Coping skills/emotional control: Cooperative    Physical Exam:  Balance:    -       WFL with RW  Upper Extremity Range of Motion:     -       Right Upper Extremity: WFL  -       Left Upper Extremity: WFL  Upper Extremity Strength:    -       Right Upper Extremity: 4/5  -       Left Upper Extremity: 4/5  Gross motor coordination:   WFL    AMPAC 6 Click ADL:  AMPAC Total Score:      Treatment & Education:  · Pt educated on OT role/POC.   · Importance of OOB activity with staff assistance.  · Importance of sitting up in the chair throughout the day as tolerated, especially for meals   · Safety during functional t/f and mobility with use of RW  · Importance of assisting with self-care activities   · All questions/concerns answered within OT scope of practice    Education:    Patient left HOB elevated with all lines intact, call button in reach and Sandra, RN notified    GOALS:   Multidisciplinary Problems     Occupational Therapy Goals        Problem: Occupational Therapy    Goal Priority Disciplines Outcome  Interventions   Occupational Therapy Goal     OT, PT/OT Ongoing, Progressing    Description: STG:  Pt will perform grooming with setup  Pt will bathe with Alanna with setup at EOB   Pt will perform UE dressing with Danita  Pt will perform LE dressing with Alanna  Pt will sit EOB x 10 min with SBA   Pt will transfer bed/chair/bsc with CGA with RW  Pt will perform standing task x 3 min with CGA with RW  Pt will tolerate 15 minutes of tx without fatigue      LT.Restore to max I with self care and mobility.                       History:     Past Medical History:   Diagnosis Date    Anticoagulant long-term use     Chronic renal disease, stage 4, severely decreased glomerular filtration rate (GFR) between 15-29 mL/min/1.73 square meter     Coronary artery disease     stents ~     COVID-19 2022    Duodenal adenoma     Hypertension        Past Surgical History:   Procedure Laterality Date    ABDOMINAL SURGERY      CORONARY ANGIOPLASTY WITH STENT PLACEMENT      ~ 2017    JOINT REPLACEMENT         Time Tracking:     OT Date of Treatment: 22  OT Start Time: 1130  OT Stop Time: 1150  OT Total Time (min): 20 min    Billable Minutes:Evaluation OT min complexity eval    2022

## 2022-07-01 NOTE — ASSESSMENT & PLAN NOTE
6/24/2022  At this time, continue to monitor.  Creatinine is 5.99 which is slightly better.  6/27/2022  Continue to monitor.  The patient does have CKD V.  Watching to see if there is any further improvement in renal function.  6/28/2022  The patient does not have uremic symptoms.  Serum creatinine is 6.44 today.  Volume status is acceptable.  6/29/2022  CKDV  6/30/2022  CKD.  No uremic symptoms.  Continue current management.  7/1/2022  CKD V

## 2022-07-01 NOTE — PROGRESS NOTES
[facility] - [facility]  Gastroenterology  Progress Note    Patient Name: Neymar Parra  MRN: [id number]  Admission Date: 6/16/2022  Hospital Length of Stay: 15 days  Code Status: Full Code   Attending Provider: Rj Fernandez MD  Consulting Provider: Graciela Arredondo MD  Primary Care Physician: [facility]  Principal Problem: PNA (pneumonia)     Pt seen and examined. Agree with findings of note as detailed below. He has had a few black stools over past 12 hours per nursing staff. Hgb fell 8.7 --> 6.4. Hemodynamics stable. He has been on regular diet today. Appears to have some continued bleeding from ulcer. Plan change to Protonix infusion, keep NPO after midnight with EGD in AM if signs of futher bleeding.      7/1/2022-patient is seen, resting in bed, asleep.  He does arouse to verbal stimuli however remains fairly somnolent.  He had an EGD done on yesterday with findings of Candida, hiatal hernia, and a large duodenal ulcer with adherent clot.  He was changed to IV Protonix as well on yesterday.  His stool for H. pylori is pending.  There has been no further reports of overt bleeding at this time however his H&H is noted to be down at 6/18.  He is denying abdominal pain, nausea, or vomiting.    Past Medical History:   Diagnosis Date    Anticoagulant long-term use     Chronic renal disease, stage 4, severely decreased glomerular filtration rate (GFR) between 15-29 mL/min/1.73 square meter     Coronary artery disease     stents ~ 2017    COVID-19 6/16/2022    Duodenal adenoma     Hypertension        Past Surgical History:   Procedure Laterality Date    ABDOMINAL SURGERY      CORONARY ANGIOPLASTY WITH STENT PLACEMENT      ~ 2017    JOINT REPLACEMENT         Review of patient's allergies indicates:   Allergen Reactions    Gatifloxacin Anaphylaxis     Family History       Problem Relation (Age of Onset)    Diabetes Mother, Father          Tobacco Use    Smoking  status: Former Smoker     Types: Cigarettes    Smokeless tobacco: Never Used    Tobacco comment: 1/3 PPD ~ 20 years: quit Dec 2021   Substance and Sexual Activity    Alcohol use: Not Currently     Comment: pint a week: Hx    Drug use: Never    Sexual activity: Not Currently     Review of Systems   Constitutional:  Negative for activity change, appetite change, fatigue and unexpected weight change.   HENT:  Negative for sore throat and trouble swallowing.    Eyes:  Negative for visual disturbance.   Respiratory:  Negative for cough and shortness of breath.    Cardiovascular:  Negative for chest pain.   Gastrointestinal:  Negative for abdominal distention, abdominal pain, anal bleeding, blood in stool, constipation, diarrhea, nausea, rectal pain and vomiting.   Endocrine: Negative for cold intolerance and heat intolerance.   Genitourinary:  Negative for difficulty urinating, frequency and urgency.   Musculoskeletal:  Negative for arthralgias and myalgias.   Skin:  Negative for color change.   Neurological:  Negative for speech difficulty, weakness and light-headedness.   Psychiatric/Behavioral:  Negative for agitation, behavioral problems and confusion.    Objective:     Vital Signs (Most Recent):  Temp: 98 °F (36.7 °C) (07/01/22 0703)  Pulse: 100 (07/01/22 1006)  Resp: 19 (07/01/22 1006)  BP: (!) 182/77 (07/01/22 0703)  SpO2: 96 % (07/01/22 1006)   Vital Signs (24h Range):  Temp:  [97.1 °F (36.2 °C)-98.8 °F (37.1 °C)] 98 °F (36.7 °C)  Pulse:  [] 100  Resp:  [15-25] 19  SpO2:  [92 %-100 %] 96 %  BP: (127-182)/(52-78) 182/77     Weight: 122.5 kg (270 lb 1 oz) (06/29/22 0455)  Body mass index is 36.63 kg/m².      Intake/Output Summary (Last 24 hours) at 7/1/2022 1119  Last data filed at 7/1/2022 0939  Gross per 24 hour   Intake 200 ml   Output 900 ml   Net -700 ml         Lines/Drains/Airways       Peripheral Intravenous Line  Duration                  Peripheral IV - Single Lumen 06/22/22 1400 20 G  Posterior;Right Forearm 8 days                    Physical Exam  Vitals and nursing note reviewed.   Constitutional:       General: He is not in acute distress.     Appearance: Normal appearance. He is normal weight. He is not ill-appearing.   HENT:      Head: Normocephalic.      Nose: Nose normal. No congestion or rhinorrhea.      Mouth/Throat:      Mouth: Mucous membranes are moist.      Pharynx: Oropharynx is clear. No oropharyngeal exudate.   Eyes:      General: No scleral icterus.     Conjunctiva/sclera: Conjunctivae normal.      Pupils: Pupils are equal, round, and reactive to light.   Cardiovascular:      Rate and Rhythm: Normal rate and regular rhythm.      Pulses: Normal pulses.      Heart sounds: Normal heart sounds. No murmur heard.  Pulmonary:      Effort: Pulmonary effort is normal.      Breath sounds: Normal breath sounds. No wheezing, rhonchi or rales.   Abdominal:      General: Abdomen is flat. Bowel sounds are normal. There is no distension.      Palpations: Abdomen is soft.      Tenderness: There is no abdominal tenderness.   Musculoskeletal:         General: No swelling.      Cervical back: Normal range of motion. No tenderness.   Skin:     General: Skin is warm and dry.      Coloration: Skin is not jaundiced.   Neurological:      General: No focal deficit present.      Mental Status: He is alert and oriented to person, place, and time.      Motor: No weakness.   Psychiatric:         Mood and Affect: Mood normal.         Behavior: Behavior normal.         Thought Content: Thought content normal.         Judgment: Judgment normal.       Significant Labs:  All pertinent lab results from the last 24 hours have been reviewed.    Significant Imaging:  Imaging results within the past 24 hours have been reviewed.    Assessment/Plan:     Anemia  7/1/2022-labs reviewed as noted above.  Light drop in H&H.  No overt bleeding.  Stool for H. pylori is pending.  We will continue with IV Protonix with  recommendations of monitoring over the weekend for any further bleeding.  Further plan an addendum to follow Dr. Arredondo.    6/29/2022-patient admitted with shortness of breath with recent findings of drop in H&H and heme positive stool.  No reports of overt bleeding.  No prior reported history of gastric adenoma with reported resection.  Unknown prior endoscopy.  Tolerating diet.  Continue to monitor H&H.  Continue Protonix.  At this time, will review case further with Dr. Arredondo with plan an addendum to follow        Thank you for your consult. Will continue to follow and monitor.     SRIKANTH Forman  Gastroenterology  [facility] - [address] [place] [address]

## 2022-07-01 NOTE — ASSESSMENT & PLAN NOTE
- BUN/Crt ratio 166/6.31 today, around the same as in has been the last couple of days  - Nephrology following

## 2022-07-01 NOTE — PROGRESS NOTES
[facility] - [address] [place] [address]  Nephrology  Progress Note    Patient Name: Neymar Parra  MRN: [id number]  Admission Date: 6/16/2022  Hospital Length of Stay: 15 days  Attending Provider: Rj Fernandez MD   Primary Care Physician: [facility] megan Rordíguez  Principal Problem:PNA (pneumonia)    Subjective:     HPI: This patient with history of HTN, DM, CKD who has seen Dr. Crowder in the past 2 years ago is currently in a nursing facility.  The patient presented with SOB and diagnosed with COVID.  Serum creatinine has trended up to 7.15.  Serum potassium is 6.4.  Nephrology has been consulted for renal issues.  At the bedside, the patient mentions feeling fine.  He states that his breathing has improved.      Interval History: The patient is sitting up in bed resting.  No other changes.  Serum creatinine is 6.3.    Review of patient's allergies indicates:   Allergen Reactions    Gatifloxacin Anaphylaxis     Current Facility-Administered Medications   Medication Frequency    0.9%  NaCl infusion (for blood administration) Q24H PRN    0.9%  NaCl infusion (for blood administration) Q24H PRN    acetaminophen tablet 1,000 mg Q6H PRN    albuterol inhaler 2 puff Q6H PRN    albuterol-ipratropium 2.5 mg-0.5 mg/3 mL nebulizer solution 3 mL Q4H    allopurinol split tablet 50 mg Daily    amLODIPine tablet 10 mg Daily    ascorbic acid (vitamin C) tablet 500 mg Daily    atorvastatin tablet 40 mg QHS    bisacodyL EC tablet 10 mg Daily PRN    budesonide nebulizer solution 0.5 mg Daily    calcium gluconate 1 g in NS IVPB (premixed) Q10 Min PRN    cetirizine tablet 10 mg Daily PRN    dextromethorphan-guaiFENesin  mg/5 ml liquid 10 mL Q6H PRN    dextrose 50% injection 12.5 g PRN    dextrose 50% injection 25 g PRN    ferrous sulfate tablet 1 each Daily    glucagon (human recombinant) injection 1 mg PRN    glucose chewable tablet 16 g PRN    glucose chewable tablet 24 g PRN    hydrALAZINE  injection 10 mg Q6H PRN    HYDROcodone-acetaminophen 7.5-325 mg per tablet 1 tablet Q6H PRN    insulin aspart U-100 injection 1-10 Units QID (AC + HS) PRN    insulin detemir U-100 injection 8 Units QHS    isosorbide mononitrate 24 hr tablet 60 mg Daily    melatonin tablet 6 mg Nightly PRN    [START ON 7/2/2022] metoprolol tartrate (LOPRESSOR) tablet 50 mg Daily    mineral oil enema 1 enema Daily PRN    multivitamin tablet Daily    naloxone 0.4 mg/mL injection 0.02 mg PRN    ondansetron injection 8 mg Q6H PRN    pantoprazole injection 40 mg BID    piperacillin-tazobactam (ZOSYN) 4.5 g in dextrose 5 % in water (D5W) 5 % 100 mL IVPB (MB+) Q12H    simethicone chewable tablet 80 mg TID PRN    sodium chloride 0.9% flush 10 mL Q12H PRN    traMADoL tablet 50 mg Q8H PRN    traZODone tablet 50 mg Nightly PRN       Objective:     Vital Signs (Most Recent):  Temp: 98 °F (36.7 °C) (07/01/22 0703)  Pulse: 100 (07/01/22 1006)  Resp: 19 (07/01/22 1006)  BP: (!) 182/77 (07/01/22 0703)  SpO2: 96 % (07/01/22 1006)  O2 Device (Oxygen Therapy): nasal cannula (07/01/22 1006)   Vital Signs (24h Range):  Temp:  [97.1 °F (36.2 °C)-98.8 °F (37.1 °C)] 98 °F (36.7 °C)  Pulse:  [] 100  Resp:  [15-25] 19  SpO2:  [92 %-100 %] 96 %  BP: (127-182)/(52-78) 182/77     Weight: 122.5 kg (270 lb 1 oz) (06/29/22 0455)  Body mass index is 36.63 kg/m².  Body surface area is 2.49 meters squared.    I/O last 3 completed shifts:  In: 200 [I.V.:200]  Out: 3000 [Urine:3000]    Physical Exam  Vitals reviewed.   HENT:      Head: Normocephalic and atraumatic.   Eyes:      Pupils: Pupils are equal, round, and reactive to light.   Cardiovascular:      Rate and Rhythm: Regular rhythm.      Pulses: Normal pulses.   Pulmonary:      Effort: Pulmonary effort is normal.   Abdominal:      Palpations: Abdomen is soft.   Neurological:      Mental Status: He is alert.   Psychiatric:         Mood and Affect: Mood normal.       Significant Labs:  BMP:    Recent Labs   Lab 07/01/22  0522         K 6.0*      CO2 19*   *   CREATININE 6.31*   CALCIUM 8.4*     CBC:   Recent Labs   Lab 07/01/22  0717   WBC 13.31*   RBC 2.21*   HGB 6.4*   HCT 18.7*      MCV 84.6   MCH 29.0   MCHC 34.2        Significant Imaging:  Labs: Reviewed    Assessment/Plan:     Anemia  He is getting PRBCs  7/1/2022  Chronic condition    DEO (acute kidney injury)    6/24/2022  At this time, continue to monitor.  Creatinine is 5.99 which is slightly better.  6/27/2022  Continue to monitor.  The patient does have CKD V.  Watching to see if there is any further improvement in renal function.  6/28/2022  The patient does not have uremic symptoms.  Serum creatinine is 6.44 today.  Volume status is acceptable.  6/29/2022  CKDV  6/30/2022  CKD.  No uremic symptoms.  Continue current management.  7/1/2022  CKD V        Thank you for your consult. I will follow-up with patient. Please contact us if you have any additional questions.    Jorge Butts Jr, MD  Nephrology  [facility] [place] - [address] [place] [address]

## 2022-07-01 NOTE — PLAN OF CARE
SS spoke with Kiara Smith at the [facility] this morning ([phone]). She sent over new patient application for the doctors to fill out in order to get his admission process started. I discussed with Dr. Fernandez/Dr. Kaur and gave Dr. Kaur the application to fill out. Patient will need TB skin test, chest xray, and rapid covid test prior to discharge. MD notified. SS to follow.

## 2022-07-01 NOTE — PROGRESS NOTES
[facility] [place] - [address] [place] [address] Medicine  Progress Note    Patient Name: Neymar Parra  MRN: [id number]  Patient Class: IP- Inpatient   Admission Date: 6/16/2022  Length of Stay: 15 days  Attending Physician: Rj Fernandez MD  Primary Care Provider: [facility] megan Rodríguez        Subjective:     Principal Problem:PNA (pneumonia)        HPI:  72 y.o. male transferred to the [facility] from [facility] for hyperkalemia and hypoxia. Pt reports he went to [facility] because he was having dyspnea and hemoptysis since 1 month which is worse today. Pt reports he was tested positive for COVID at the nursing home on 6/9/22. Per nursing home patient oxygen saturation was in the 80's on RA with tachypnea and labored breathing. Pt reports he normally does not wear oxygen at the nursing home but has been wearing oxygen more often recently. Pt reports he smoked 1-1.5 PPD for 20 years but quit 7 months ago. Per pt, he saw a pulmonologist (Dr. Aldana) for his hemoptysis and had some tests done the results of which he does not know. Per records, pt with a right pleural effusion recently and had a thoracentesis. Pt denies any fever, congestion, dysuria, N/V/D, chest pain, or any other complaints at this time.     In the ED, pt's K 7.2, d-dimer 0.76, BNP 6284, BUN/Cr 73/7.16. EKG showed some peaked t-waves and irregular rhythm. Chest xray showed Increased right lower lung pulmonary density could indicate pneumonia. Pt was treated in the ED with Calcium gluconate 1g, regular insulin 10 units, D50, IV solumedrol 125 mg, tylenol 650 mg and IV fluids NS 1L bolus. Pt will be admitted to the hospital service for management of hypoxia and hyperkalemia.      Overview/Hospital Course:        Interval History: Patient seen resting in bed this morning with no new complaints. His chest sounds more clear on physical exam, but a chest xray had shown a worsening left lower lung infiltrate, and a worsening right lung effusion.  Dr. Aldana was consulted and may do a repeat thoracentesis. The patient had an h&h of 6.4/18.7 today, so he will get another unit of pRBC. His potassium increased to 6 today, so he was given a alvarado of Lokelma and his potassium will be rechecked later today.     Review of Systems   Constitutional:  Negative for activity change and appetite change.   HENT:  Negative for congestion and dental problem.    Eyes:  Negative for discharge and itching.   Respiratory:  Negative for apnea and chest tightness.    Cardiovascular:  Negative for chest pain and leg swelling.   Gastrointestinal:  Negative for abdominal distention.   Endocrine: Negative for cold intolerance and heat intolerance.   Genitourinary:  Negative for difficulty urinating and dysuria.   Musculoskeletal:  Negative for arthralgias and back pain.   Skin:  Negative for color change.   Allergic/Immunologic: Negative for environmental allergies.   Neurological:  Negative for dizziness and facial asymmetry.   Hematological:  Negative for adenopathy. Does not bruise/bleed easily.   Psychiatric/Behavioral:  Negative for agitation and behavioral problems.    Objective:     Vital Signs (Most Recent):  Temp: 98 °F (36.7 °C) (07/01/22 0703)  Pulse: 100 (07/01/22 1006)  Resp: 19 (07/01/22 1006)  BP: (!) 182/77 (07/01/22 0703)  SpO2: 96 % (07/01/22 1006)   Vital Signs (24h Range):  Temp:  [97.1 °F (36.2 °C)-98.8 °F (37.1 °C)] 98 °F (36.7 °C)  Pulse:  [] 100  Resp:  [15-25] 19  SpO2:  [92 %-100 %] 96 %  BP: (127-182)/(52-78) 182/77     Weight: 122.5 kg (270 lb 1 oz)  Body mass index is 36.63 kg/m².    Intake/Output Summary (Last 24 hours) at 7/1/2022 1058  Last data filed at 7/1/2022 0939  Gross per 24 hour   Intake 200 ml   Output 900 ml   Net -700 ml      Physical Exam  Vitals reviewed.   Constitutional:       Appearance: Normal appearance.      Interventions: He is not intubated.  HENT:      Head: Normocephalic and atraumatic.      Nose: Nose normal.       Mouth/Throat:      Mouth: Mucous membranes are dry.      Pharynx: Oropharynx is clear.   Eyes:      Extraocular Movements: Extraocular movements intact.      Conjunctiva/sclera: Conjunctivae normal.      Pupils: Pupils are equal, round, and reactive to light.   Cardiovascular:      Rate and Rhythm: Normal rate.      Heart sounds: Normal heart sounds. No murmur heard.  Pulmonary:      Effort: Pulmonary effort is normal. He is not intubated.      Breath sounds: Normal breath sounds.   Abdominal:      General: Abdomen is flat. Bowel sounds are normal.      Palpations: Abdomen is soft.   Musculoskeletal:         General: Normal range of motion.      Cervical back: Normal range of motion and neck supple.      Right lower leg: No edema.      Left lower leg: No edema.   Skin:     General: Skin is warm and dry.      Capillary Refill: Capillary refill takes less than 2 seconds.   Neurological:      General: No focal deficit present.      Mental Status: He is alert and oriented to person, place, and time.   Psychiatric:         Mood and Affect: Mood normal.         Behavior: Behavior normal.       Significant Labs: All pertinent labs within the past 24 hours have been reviewed.    Significant Imaging: I have reviewed all pertinent imaging results/findings within the past 24 hours.      Assessment/Plan:      * PNA (pneumonia)  - chest xray shows worsening lower left lube consolidation and and worsening right sided pleural effusion  - continue IV zosyn  - possible thoracentesis       Hyponatremia  Resolved      Elevated d-dimer  - D-dimer was normal on admission  - Repeat was 1.83  - hold eliquis due to drop in h&h today, and possible thoracentesis       Anemia  - H&H 6.4/18.7 today  - pending transfusion of 1 unit pRBC, has already had one unit during this admission      DEO (acute kidney injury)  - BUN/Crt ratio 166/6.31 today, around the same as in has been the last couple of days  - Nephrology following       Pleural  effusion  - recent chest xray showed possible worsening r sided pleural effusion  - hold eliquis in case of repeat thoracentesis       Type 2 diabetes mellitus without complication  -HgA1c 4.7 10 days ago  - detemir 8U QD  - SSI    Gastroesophageal reflux disease  - pantoprazole 40 mg qd    COPD exacerbation  - Much improved  - finished course of oral prednisone  - duo nebs Q4H  - budesonide neb Q12H    Hypertension  - increase metoprolol 25mg from qd to bid today due to high BP this morning      VTE Risk Mitigation (From admission, onward)         Ordered     Reason for No Pharmacological VTE Prophylaxis  Once        Question:  Reasons:  Answer:  Risk of Bleeding    06/16/22 0301     IP VTE HIGH RISK PATIENT  Once         06/16/22 0301     Place sequential compression device  Until discontinued         06/16/22 0301                Discharge Planning   EDWARDO:      Code Status: Full Code   Is the patient medically ready for discharge?:     Reason for patient still in hospital (select all that apply): Treatment  Discharge Plan A: Long-term acute care facility (LTAC) (Choice obtained for Specialty)                  Brooklynn Kaur MD  Department of Hospital Medicine   [address]

## 2022-07-01 NOTE — PROGRESS NOTES
Patient evaluated for a thoracentesis. Chest xray reviewed - concern for right pleural effusion.  Point of care US done to evaluate effusion. Images obtained. He does have a minimal to small amount of pleural fluid; however, it is not enough to drain at this time.

## 2022-07-01 NOTE — SUBJECTIVE & OBJECTIVE
Interval History: The patient is sitting up in bed resting.  No other changes.  Serum creatinine is 6.3.    Review of patient's allergies indicates:   Allergen Reactions    Gatifloxacin Anaphylaxis     Current Facility-Administered Medications   Medication Frequency    0.9%  NaCl infusion (for blood administration) Q24H PRN    0.9%  NaCl infusion (for blood administration) Q24H PRN    acetaminophen tablet 1,000 mg Q6H PRN    albuterol inhaler 2 puff Q6H PRN    albuterol-ipratropium 2.5 mg-0.5 mg/3 mL nebulizer solution 3 mL Q4H    allopurinol split tablet 50 mg Daily    amLODIPine tablet 10 mg Daily    ascorbic acid (vitamin C) tablet 500 mg Daily    atorvastatin tablet 40 mg QHS    bisacodyL EC tablet 10 mg Daily PRN    budesonide nebulizer solution 0.5 mg Daily    calcium gluconate 1 g in NS IVPB (premixed) Q10 Min PRN    cetirizine tablet 10 mg Daily PRN    dextromethorphan-guaiFENesin  mg/5 ml liquid 10 mL Q6H PRN    dextrose 50% injection 12.5 g PRN    dextrose 50% injection 25 g PRN    ferrous sulfate tablet 1 each Daily    glucagon (human recombinant) injection 1 mg PRN    glucose chewable tablet 16 g PRN    glucose chewable tablet 24 g PRN    hydrALAZINE injection 10 mg Q6H PRN    HYDROcodone-acetaminophen 7.5-325 mg per tablet 1 tablet Q6H PRN    insulin aspart U-100 injection 1-10 Units QID (AC + HS) PRN    insulin detemir U-100 injection 8 Units QHS    isosorbide mononitrate 24 hr tablet 60 mg Daily    melatonin tablet 6 mg Nightly PRN    [START ON 7/2/2022] metoprolol tartrate (LOPRESSOR) tablet 50 mg Daily    mineral oil enema 1 enema Daily PRN    multivitamin tablet Daily    naloxone 0.4 mg/mL injection 0.02 mg PRN    ondansetron injection 8 mg Q6H PRN    pantoprazole injection 40 mg BID    piperacillin-tazobactam (ZOSYN) 4.5 g in dextrose 5 % in water (D5W) 5 % 100 mL IVPB (MB+) Q12H    simethicone chewable tablet 80 mg TID PRN    sodium chloride 0.9% flush 10 mL Q12H PRN    traMADoL tablet 50  mg Q8H PRN    traZODone tablet 50 mg Nightly PRN       Objective:     Vital Signs (Most Recent):  Temp: 98 °F (36.7 °C) (07/01/22 0703)  Pulse: 100 (07/01/22 1006)  Resp: 19 (07/01/22 1006)  BP: (!) 182/77 (07/01/22 0703)  SpO2: 96 % (07/01/22 1006)  O2 Device (Oxygen Therapy): nasal cannula (07/01/22 1006)   Vital Signs (24h Range):  Temp:  [97.1 °F (36.2 °C)-98.8 °F (37.1 °C)] 98 °F (36.7 °C)  Pulse:  [] 100  Resp:  [15-25] 19  SpO2:  [92 %-100 %] 96 %  BP: (127-182)/(52-78) 182/77     Weight: 122.5 kg (270 lb 1 oz) (06/29/22 0455)  Body mass index is 36.63 kg/m².  Body surface area is 2.49 meters squared.    I/O last 3 completed shifts:  In: 200 [I.V.:200]  Out: 3000 [Urine:3000]    Physical Exam  Vitals reviewed.   HENT:      Head: Normocephalic and atraumatic.   Eyes:      Pupils: Pupils are equal, round, and reactive to light.   Cardiovascular:      Rate and Rhythm: Regular rhythm.      Pulses: Normal pulses.   Pulmonary:      Effort: Pulmonary effort is normal.   Abdominal:      Palpations: Abdomen is soft.   Neurological:      Mental Status: He is alert.   Psychiatric:         Mood and Affect: Mood normal.       Significant Labs:  BMP:   Recent Labs   Lab 07/01/22 0522         K 6.0*      CO2 19*   *   CREATININE 6.31*   CALCIUM 8.4*     CBC:   Recent Labs   Lab 07/01/22 0717   WBC 13.31*   RBC 2.21*   HGB 6.4*   HCT 18.7*      MCV 84.6   MCH 29.0   MCHC 34.2        Significant Imaging:  Labs: Reviewed

## 2022-07-01 NOTE — SUBJECTIVE & OBJECTIVE
Past Medical History:   Diagnosis Date    Anticoagulant long-term use     Chronic renal disease, stage 4, severely decreased glomerular filtration rate (GFR) between 15-29 mL/min/1.73 square meter     Coronary artery disease     stents ~ 2017    COVID-19 6/16/2022    Duodenal adenoma     Hypertension        Past Surgical History:   Procedure Laterality Date    ABDOMINAL SURGERY      CORONARY ANGIOPLASTY WITH STENT PLACEMENT      ~ 2017    JOINT REPLACEMENT         Review of patient's allergies indicates:   Allergen Reactions    Gatifloxacin Anaphylaxis     Family History       Problem Relation (Age of Onset)    Diabetes Mother, Father          Tobacco Use    Smoking status: Former Smoker     Types: Cigarettes    Smokeless tobacco: Never Used    Tobacco comment: 1/3 PPD ~ 20 years: quit Dec 2021   Substance and Sexual Activity    Alcohol use: Not Currently     Comment: pint a week: Hx    Drug use: Never    Sexual activity: Not Currently     Review of Systems   Constitutional:  Negative for activity change, appetite change, fatigue and unexpected weight change.   HENT:  Negative for sore throat and trouble swallowing.    Eyes:  Negative for visual disturbance.   Respiratory:  Negative for cough and shortness of breath.    Cardiovascular:  Negative for chest pain.   Gastrointestinal:  Negative for abdominal distention, abdominal pain, anal bleeding, blood in stool, constipation, diarrhea, nausea, rectal pain and vomiting.   Endocrine: Negative for cold intolerance and heat intolerance.   Genitourinary:  Negative for difficulty urinating, frequency and urgency.   Musculoskeletal:  Negative for arthralgias and myalgias.   Skin:  Negative for color change.   Neurological:  Negative for speech difficulty, weakness and light-headedness.   Psychiatric/Behavioral:  Negative for agitation, behavioral problems and confusion.    Objective:     Vital Signs (Most Recent):  Temp: 98 °F (36.7 °C) (07/01/22 0703)  Pulse: 100  (07/01/22 1006)  Resp: 19 (07/01/22 1006)  BP: (!) 182/77 (07/01/22 0703)  SpO2: 96 % (07/01/22 1006)   Vital Signs (24h Range):  Temp:  [97.1 °F (36.2 °C)-98.8 °F (37.1 °C)] 98 °F (36.7 °C)  Pulse:  [] 100  Resp:  [15-25] 19  SpO2:  [92 %-100 %] 96 %  BP: (127-182)/(52-78) 182/77     Weight: 122.5 kg (270 lb 1 oz) (06/29/22 0455)  Body mass index is 36.63 kg/m².      Intake/Output Summary (Last 24 hours) at 7/1/2022 1119  Last data filed at 7/1/2022 0939  Gross per 24 hour   Intake 200 ml   Output 900 ml   Net -700 ml         Lines/Drains/Airways       Peripheral Intravenous Line  Duration                  Peripheral IV - Single Lumen 06/22/22 1400 20 G Posterior;Right Forearm 8 days                    Physical Exam  Vitals and nursing note reviewed.   Constitutional:       General: He is not in acute distress.     Appearance: Normal appearance. He is normal weight. He is not ill-appearing.   HENT:      Head: Normocephalic.      Nose: Nose normal. No congestion or rhinorrhea.      Mouth/Throat:      Mouth: Mucous membranes are moist.      Pharynx: Oropharynx is clear. No oropharyngeal exudate.   Eyes:      General: No scleral icterus.     Conjunctiva/sclera: Conjunctivae normal.      Pupils: Pupils are equal, round, and reactive to light.   Cardiovascular:      Rate and Rhythm: Normal rate and regular rhythm.      Pulses: Normal pulses.      Heart sounds: Normal heart sounds. No murmur heard.  Pulmonary:      Effort: Pulmonary effort is normal.      Breath sounds: Normal breath sounds. No wheezing, rhonchi or rales.   Abdominal:      General: Abdomen is flat. Bowel sounds are normal. There is no distension.      Palpations: Abdomen is soft.      Tenderness: There is no abdominal tenderness.   Musculoskeletal:         General: No swelling.      Cervical back: Normal range of motion. No tenderness.   Skin:     General: Skin is warm and dry.      Coloration: Skin is not jaundiced.   Neurological:      General:  No focal deficit present.      Mental Status: He is alert and oriented to person, place, and time.      Motor: No weakness.   Psychiatric:         Mood and Affect: Mood normal.         Behavior: Behavior normal.         Thought Content: Thought content normal.         Judgment: Judgment normal.       Significant Labs:  All pertinent lab results from the last 24 hours have been reviewed.    Significant Imaging:  Imaging results within the past 24 hours have been reviewed.

## 2022-07-01 NOTE — PT/OT/SLP EVAL
"Physical Therapy Evaluation    Patient Name:  Neymar Parra   MRN:  [id number]    Recommendations:     Discharge Recommendations:  nursing facility, skilled, intermediate care facility/nursing home   Discharge Equipment Recommendations: none   Barriers to discharge: awaiting placement; ongoing medical treatment    Assessment:     Neymar Parra is a 72 y.o. male admitted with a medical diagnosis of PNA (pneumonia).  He presents with the following impairments/functional limitations:  weakness, impaired endurance, impaired sensation, impaired self care skills, impaired functional mobilty, gait instability, impaired balance, impaired cardiopulmonary response to activity .    Patient participated well with PT evaluation but demonstrated SOB/BRUNO; no syncope or c/o lightheadedness. Patient with low H+H and to receive a blood transfusion later today. PT to follow to increase activity. Swing bed or NH placement at d/c.    Rehab Prognosis: Good; patient would benefit from acute skilled PT services to address these deficits and reach maximum level of function.    Recent Surgery: * No surgery found *      Plan:     During this hospitalization, patient to be seen 5 x/week to address the identified rehab impairments via gait training, therapeutic activities, therapeutic exercises and progress toward the following goals:    · Plan of Care Expires:  08/01/22    Subjective     Chief Complaint: pneumonia; anemia  Patient/Family Comments/goals: "I walk all around the nursing home with my walker"  Pain/Comfort:  · Pain Rating 1: 0/10  · Pain Rating Post-Intervention 1: 0/10    Patients cultural, spiritual, [employer]ian conflicts given the current situation: no    Living Environment:  Pt was at [facility] but has lost his bed and wants to go to the [facility] in [facility].  Prior to admission, patients level of function was distant supervision mobility with .  Equipment used at home: walker, rolling, wheelchair.  DME owned (not currently " used): none.  Upon discharge, patient will have assistance from swing bed/NH staff..    Objective:     Communicated with RN Effie Warren (as pt RN off the floor) prior to session.  Patient found supine with oxygen, peripheral IV, telemetry  upon PT entry to room.    General Precautions: Standard, fall   Orthopedic Precautions:N/A   Braces: N/A  Respiratory Status: Nasal cannula, flow 2 L/min    Exams:  · pt incontinent of loose stool- assisted with hygiene  · Cognitive Exam:  Patient is oriented to Person, Place, Time and Situation  · Skin Integrity/Edema:      · -       Skin integrity: intact  · RLE ROM: WFL  · RLE Strength: WFL  · LLE ROM: WFL  · LLE Strength: WFL    Functional Mobility:  · Bed Mobility:     · Supine to Sit: stand by assistance and increased effort; SOB/BRUNO  · Transfers:     · Sit to Stand:  contact guard assistance with rolling walker  · Gait: sidestepping to HOB only due to SOB/BRUNO and incontinence; CGA, short steps    Therapeutic Activities and Exercises:  ·  Pt educated on PT role/POC.   · Importance of OOB activity with staff assistance.  · Importance of sitting up in the chair throughout the day as tolerated, especially for meals   · Safety during functional t/f and mobility with use of RW/O2  · White board updated   · Multiple self-care tasks/functional mobility completed- assistance level noted above   · All questions/concerns answered within PT scope of practice      AM-PAC 6 CLICK MOBILITY  Total Score:16     Patient left HOB elevated with all lines intact, call button in reach and Sandra Estrada RN notified.    GOALS:   Multidisciplinary Problems     Physical Therapy Goals        Problem: Physical Therapy    Goal Priority Disciplines Outcome Goal Variances Interventions   Physical Therapy Goal     PT, PT/OT Ongoing, Progressing     Description: Short Term Goals to be met by: 2022    Patient will increase functional independence with mobility by performin. Supine to sit  with independently  2. Sit to stand transfer with independently Rolling walker  3. Bed to chair transfer with independently using Rolling walker  4. Gait  x 100 feet with independently using Rolling walker  5. Lower extremity exercise program x30 reps per handout, with assistance as needed    Long Term Goals to be met by: 8/1/2022    Pt will regain full independent functional mobility with Rolling walker to return to home situation and prior activities of daily living.                    History:     Past Medical History:   Diagnosis Date    Anticoagulant long-term use     Chronic renal disease, stage 4, severely decreased glomerular filtration rate (GFR) between 15-29 mL/min/1.73 square meter     Coronary artery disease     stents ~ 2017    COVID-19 6/16/2022    Duodenal adenoma     Hypertension        Past Surgical History:   Procedure Laterality Date    ABDOMINAL SURGERY      CORONARY ANGIOPLASTY WITH STENT PLACEMENT      ~ 2017    JOINT REPLACEMENT         Time Tracking:     PT Received On: 07/01/22  PT Start Time: 1129     PT Stop Time: 1152  PT Total Time (min): 23 min     Billable Minutes: Evaluation Low complexity      07/01/2022

## 2022-07-01 NOTE — SUBJECTIVE & OBJECTIVE
Past Medical History:   Diagnosis Date    Anticoagulant long-term use     Chronic renal disease, stage 4, severely decreased glomerular filtration rate (GFR) between 15-29 mL/min/1.73 square meter     Coronary artery disease     stents ~ 2017    COVID-19 6/16/2022    Duodenal adenoma     Hypertension        Past Surgical History:   Procedure Laterality Date    ABDOMINAL SURGERY      CORONARY ANGIOPLASTY WITH STENT PLACEMENT      ~ 2017    JOINT REPLACEMENT         Review of patient's allergies indicates:   Allergen Reactions    Gatifloxacin Anaphylaxis       Family History       Problem Relation (Age of Onset)    Diabetes Mother, Father          Tobacco Use    Smoking status: Former Smoker     Types: Cigarettes    Smokeless tobacco: Never Used    Tobacco comment: 1/3 PPD ~ 20 years: quit Dec 2021   Substance and Sexual Activity    Alcohol use: Not Currently     Comment: pint a week: Hx    Drug use: Never    Sexual activity: Not Currently         Review of Systems   Constitutional:  Negative for activity change and appetite change.   HENT:  Negative for congestion and dental problem.    Eyes:  Negative for discharge and itching.   Respiratory:  Negative for apnea and chest tightness.    Cardiovascular:  Negative for chest pain and leg swelling.   Gastrointestinal:  Negative for abdominal distention.   Endocrine: Negative for cold intolerance and heat intolerance.   Genitourinary:  Negative for difficulty urinating and dysuria.   Musculoskeletal:  Negative for arthralgias and back pain.   Skin:  Negative for color change.   Allergic/Immunologic: Negative for environmental allergies.   Neurological:  Negative for dizziness and facial asymmetry.   Hematological:  Negative for adenopathy. Does not bruise/bleed easily.   Psychiatric/Behavioral:  Negative for agitation and behavioral problems.    Objective:     Vital Signs (Most Recent):  Temp: 98 °F (36.7 °C) (07/01/22 0703)  Pulse: 95 (07/01/22 0703)  Resp: 15 (07/01/22  0703)  BP: (!) 182/77 (07/01/22 0703)  SpO2: 95 % (07/01/22 0703)   Vital Signs (24h Range):  Temp:  [97.1 °F (36.2 °C)-98.8 °F (37.1 °C)] 98 °F (36.7 °C)  Pulse:  [] 95  Resp:  [15-25] 15  SpO2:  [92 %-100 %] 95 %  BP: (127-182)/(52-78) 182/77     Weight: 122.5 kg (270 lb 1 oz)  Body mass index is 36.63 kg/m².      Intake/Output Summary (Last 24 hours) at 7/1/2022 0847  Last data filed at 6/30/2022 1529  Gross per 24 hour   Intake 200 ml   Output 850 ml   Net -650 ml       Physical Exam  Vitals reviewed.   Constitutional:       Appearance: Normal appearance.      Interventions: He is not intubated.  HENT:      Head: Normocephalic and atraumatic.      Nose: Nose normal.      Mouth/Throat:      Mouth: Mucous membranes are dry.      Pharynx: Oropharynx is clear.   Eyes:      Extraocular Movements: Extraocular movements intact.      Conjunctiva/sclera: Conjunctivae normal.      Pupils: Pupils are equal, round, and reactive to light.   Cardiovascular:      Rate and Rhythm: Normal rate.      Heart sounds: Normal heart sounds. No murmur heard.  Pulmonary:      Effort: Pulmonary effort is normal. He is not intubated.      Breath sounds: Normal breath sounds.   Abdominal:      General: Abdomen is flat. Bowel sounds are normal.      Palpations: Abdomen is soft.   Musculoskeletal:         General: Normal range of motion.      Cervical back: Normal range of motion and neck supple.      Right lower leg: No edema.      Left lower leg: No edema.   Skin:     General: Skin is warm and dry.      Capillary Refill: Capillary refill takes less than 2 seconds.   Neurological:      General: No focal deficit present.      Mental Status: He is alert and oriented to person, place, and time.   Psychiatric:         Mood and Affect: Mood normal.         Behavior: Behavior normal.       Vents:  Oxygen Concentration (%): 28 (07/01/22 0703)    Lines/Drains/Airways       Peripheral Intravenous Line  Duration                  Peripheral IV -  Single Lumen 06/22/22 1400 20 G Posterior;Right Forearm 8 days                    Significant Labs:    CBC/Anemia Profile:  Recent Labs   Lab 06/30/22  0902 07/01/22  0717   WBC 13.05* 13.31*   HGB 8.7* 6.4*   HCT 24.9* 18.7*    155   MCV 83.3 84.6   RDW 17.1* 18.0*        Chemistries:  Recent Labs   Lab 06/30/22  0902 07/01/22  0522   * 137   K 5.6* 6.0*   CL 98 102   CO2 21 19*   * 166*   CREATININE 6.34* 6.31*   CALCIUM 8.7 8.4*       Recent Lab Results  (Last 5 results in the past 24 hours)        07/01/22  0717   07/01/22  0522   06/30/22  1552   06/30/22  1239   06/30/22  1016        SED RATE (WESTERGREN)               Acanthocytes Few               Anion Gap   22             Aniso 1+               Baso # 0.00               Basophil % 0.0               BUN   166             BUN/CREAT RATIO   26             Calcium   8.4             Chloride   102             CO2   19             Creatinine   6.31             Differential Type Manual               eGFR if non    9             Eos # 0.24               Eosinophil % 1.8                6               Glucose   100             Hematocrit 18.7               Hemoglobin 6.4               Immature Grans (Abs) 0.13               Immature Granulocytes 1.0               Lymph # 0.47               Lymph % 3.5                2               MCH 29.0               MCHC 34.2               MCV 84.6               Mono # 0.83               Mono % 6.2                6               MPV 9.9               Neutrophils, Abs 11.64               Neutrophils Relative 87.5               nRBC 0.0               NUCLEATED RBC ABSOLUTE 0.00               Ovalocytes Few               PLATELET MORPHOLOGY Normal               Platelets 155               POC Glucose     78   110   68       Poly Few               Potassium   6.0             RBC 2.21               RDW 18.0               Schistocytes               Segmented Neutrophils, Man % 86                Sodium   137             Teardrop Cells Few               WBC 13.31                                      Significant Imaging:   I have reviewed all pertinent imaging results/findings within the past 24 hours.

## 2022-07-01 NOTE — CONSULTS
[facility] - [address] [place] [address]  Pulmonology  Consult Note    Patient Name: Neymar Parra  MRN: [id number]  Admission Date: 6/16/2022  Hospital Length of Stay: 15 days  Code Status: Full Code  Attending Physician: Rj Fernandez MD  Primary Care Provider: [facility]   Principal Problem: Hyponatremia    Consults  Subjective:     HPI:  Patient was seen in May for hemoptysis with a a bronchoscopy that showed bronchitis no positive cultures or abnormal cells of thoracentesis with 500 cc was obtained and only sent for cytology it had no malignancy patient was admitted on June 16 for hyperkalemia and hypoxemia.  Patient was also having some hemoptysis.  I thought his chronic hemoptysis was related to anticoagulation.  Patient had Dopplers and V/Q lung scan which made the possibility of emboli very low.  Patient has been treated for pneumonia while he is here and also being watch with the chronic renal failure now there is a worsening effusion.  Patient is currently comfortable without complaints      Past Medical History:   Diagnosis Date    Anticoagulant long-term use     Chronic renal disease, stage 4, severely decreased glomerular filtration rate (GFR) between 15-29 mL/min/1.73 square meter     Coronary artery disease     stents ~ 2017    COVID-19 6/16/2022    Duodenal adenoma     Hypertension        Past Surgical History:   Procedure Laterality Date    ABDOMINAL SURGERY      CORONARY ANGIOPLASTY WITH STENT PLACEMENT      ~ 2017    JOINT REPLACEMENT         Review of patient's allergies indicates:   Allergen Reactions    Gatifloxacin Anaphylaxis       Family History       Problem Relation (Age of Onset)    Diabetes Mother, Father          Tobacco Use    Smoking status: Former Smoker     Types: Cigarettes    Smokeless tobacco: Never Used    Tobacco comment: 1/3 PPD ~ 20 years: quit Dec 2021   Substance and Sexual Activity    Alcohol use: Not Currently     Comment: rivera  a week: Hx    Drug use: Never    Sexual activity: Not Currently         Review of Systems   Constitutional:  Negative for activity change and appetite change.   HENT:  Negative for congestion and dental problem.    Eyes:  Negative for discharge and itching.   Respiratory:  Negative for apnea and chest tightness.    Cardiovascular:  Negative for chest pain and leg swelling.   Gastrointestinal:  Negative for abdominal distention.   Endocrine: Negative for cold intolerance and heat intolerance.   Genitourinary:  Negative for difficulty urinating and dysuria.   Musculoskeletal:  Negative for arthralgias and back pain.   Skin:  Negative for color change.   Allergic/Immunologic: Negative for environmental allergies.   Neurological:  Negative for dizziness and facial asymmetry.   Hematological:  Negative for adenopathy. Does not bruise/bleed easily.   Psychiatric/Behavioral:  Negative for agitation and behavioral problems.    Objective:     Vital Signs (Most Recent):  Temp: 98 °F (36.7 °C) (07/01/22 0703)  Pulse: 95 (07/01/22 0703)  Resp: 15 (07/01/22 0703)  BP: (!) 182/77 (07/01/22 0703)  SpO2: 95 % (07/01/22 0703)   Vital Signs (24h Range):  Temp:  [97.1 °F (36.2 °C)-98.8 °F (37.1 °C)] 98 °F (36.7 °C)  Pulse:  [] 95  Resp:  [15-25] 15  SpO2:  [92 %-100 %] 95 %  BP: (127-182)/(52-78) 182/77     Weight: 122.5 kg (270 lb 1 oz)  Body mass index is 36.63 kg/m².      Intake/Output Summary (Last 24 hours) at 7/1/2022 0847  Last data filed at 6/30/2022 1529  Gross per 24 hour   Intake 200 ml   Output 850 ml   Net -650 ml       Physical Exam  Vitals reviewed.   Constitutional:       Appearance: Normal appearance.      Interventions: He is not intubated.  HENT:      Head: Normocephalic and atraumatic.      Nose: Nose normal.      Mouth/Throat:      Mouth: Mucous membranes are dry.      Pharynx: Oropharynx is clear.   Eyes:      Extraocular Movements: Extraocular movements intact.      Conjunctiva/sclera: Conjunctivae  normal.      Pupils: Pupils are equal, round, and reactive to light.   Cardiovascular:      Rate and Rhythm: Normal rate.      Heart sounds: Normal heart sounds. No murmur heard.  Pulmonary:      Effort: Pulmonary effort is normal. He is not intubated.      Breath sounds: Normal breath sounds.   Abdominal:      General: Abdomen is flat. Bowel sounds are normal.      Palpations: Abdomen is soft.   Musculoskeletal:         General: Normal range of motion.      Cervical back: Normal range of motion and neck supple.      Right lower leg: No edema.      Left lower leg: No edema.   Skin:     General: Skin is warm and dry.      Capillary Refill: Capillary refill takes less than 2 seconds.   Neurological:      General: No focal deficit present.      Mental Status: He is alert and oriented to person, place, and time.   Psychiatric:         Mood and Affect: Mood normal.         Behavior: Behavior normal.       Vents:  Oxygen Concentration (%): 28 (07/01/22 0703)    Lines/Drains/Airways       Peripheral Intravenous Line  Duration                  Peripheral IV - Single Lumen 06/22/22 1400 20 G Posterior;Right Forearm 8 days                    Significant Labs:    CBC/Anemia Profile:  Recent Labs   Lab 06/30/22  0902 07/01/22  0717   WBC 13.05* 13.31*   HGB 8.7* 6.4*   HCT 24.9* 18.7*    155   MCV 83.3 84.6   RDW 17.1* 18.0*        Chemistries:  Recent Labs   Lab 06/30/22  0902 07/01/22  0522   * 137   K 5.6* 6.0*   CL 98 102   CO2 21 19*   * 166*   CREATININE 6.34* 6.31*   CALCIUM 8.7 8.4*       Recent Lab Results  (Last 5 results in the past 24 hours)        07/01/22  0717   07/01/22  0522   06/30/22  1552   06/30/22  1239   06/30/22  1016        SED RATE (WESTERGREN)               Acanthocytes Few               Anion Gap   22             Aniso 1+               Baso # 0.00               Basophil % 0.0               BUN   166             BUN/CREAT RATIO   26             Calcium   8.4             Chloride    102             CO2   19             Creatinine   6.31             Differential Type Manual               eGFR if non    9             Eos # 0.24               Eosinophil % 1.8                6               Glucose   100             Hematocrit 18.7               Hemoglobin 6.4               Immature Grans (Abs) 0.13               Immature Granulocytes 1.0               Lymph # 0.47               Lymph % 3.5                2               MCH 29.0               MCHC 34.2               MCV 84.6               Mono # 0.83               Mono % 6.2                6               MPV 9.9               Neutrophils, Abs 11.64               Neutrophils Relative 87.5               nRBC 0.0               NUCLEATED RBC ABSOLUTE 0.00               Ovalocytes Few               PLATELET MORPHOLOGY Normal               Platelets 155               POC Glucose     78   110   68       Poly Few               Potassium   6.0             RBC 2.21               RDW 18.0               Schistocytes               Segmented Neutrophils, Man % 86               Sodium   137             Teardrop Cells Few               WBC 13.31                                      Significant Imaging:   I have reviewed all pertinent imaging results/findings within the past 24 hours.    Assessment/Plan:     Pleural effusion  Patient has a new small effusion on the right I think will trial ultrasound to try to decide whether is an exudate or transudate.  Also patient has been treated for aspiration pneumonia with improvement of the white count nothing needs to be done different there and COPD seems to be under good control continue current regimen wean steroids as tolerated          Thank you for your consult. I will follow-up with patient. Please contact us if you have any additional questions.     Caleb Aldana MD  Pulmonology  [facility] [place] - [address] [place] [address]

## 2022-07-02 ENCOUNTER — ANESTHESIA EVENT (OUTPATIENT)
Dept: GASTROENTEROLOGY | Facility: HOSPITAL | Age: 72
DRG: 981 | End: 2022-07-02
Payer: MEDICARE

## 2022-07-02 ENCOUNTER — ANESTHESIA (OUTPATIENT)
Dept: GASTROENTEROLOGY | Facility: HOSPITAL | Age: 72
DRG: 981 | End: 2022-07-02
Payer: MEDICARE

## 2022-07-02 PROBLEM — E87.1 HYPONATREMIA: Status: RESOLVED | Noted: 2022-06-25 | Resolved: 2022-07-02

## 2022-07-02 PROBLEM — N18.9 ACUTE ON CHRONIC RENAL FAILURE: Status: ACTIVE | Noted: 2022-06-16

## 2022-07-02 LAB
ABO + RH BLD: NORMAL
ANION GAP SERPL CALCULATED.3IONS-SCNC: 15 MMOL/L (ref 7–16)
BASOPHILS # BLD AUTO: 0.01 K/UL (ref 0–0.2)
BASOPHILS NFR BLD AUTO: 0.1 % (ref 0–1)
BLD PROD TYP BPU: NORMAL
BLOOD UNIT EXPIRATION DATE: NORMAL
BLOOD UNIT TYPE CODE: 5100
BUN SERPL-MCNC: 142 MG/DL (ref 7–18)
BUN/CREAT SERPL: 22 (ref 6–20)
CALCIUM SERPL-MCNC: 8.3 MG/DL (ref 8.5–10.1)
CHLORIDE SERPL-SCNC: 108 MMOL/L (ref 98–107)
CO2 SERPL-SCNC: 21 MMOL/L (ref 21–32)
CREAT SERPL-MCNC: 6.36 MG/DL (ref 0.7–1.3)
CROSSMATCH INTERPRETATION: NORMAL
DIFFERENTIAL METHOD BLD: ABNORMAL
DISPENSE STATUS: NORMAL
EOSINOPHIL # BLD AUTO: 0.23 K/UL (ref 0–0.5)
EOSINOPHIL NFR BLD AUTO: 2.1 % (ref 1–4)
ERYTHROCYTE [DISTWIDTH] IN BLOOD BY AUTOMATED COUNT: 17.1 % (ref 11.5–14.5)
GLUCOSE SERPL-MCNC: 119 MG/DL (ref 74–106)
GLUCOSE SERPL-MCNC: 130 MG/DL (ref 70–105)
GLUCOSE SERPL-MCNC: 140 MG/DL (ref 70–105)
GLUCOSE SERPL-MCNC: 175 MG/DL (ref 70–105)
GLUCOSE SERPL-MCNC: 197 MG/DL (ref 70–105)
HCT VFR BLD AUTO: 17.6 % (ref 40–54)
HCT VFR BLD AUTO: 21 % (ref 40–54)
HGB BLD-MCNC: 6.2 G/DL (ref 13.5–18)
HGB BLD-MCNC: 7.2 G/DL (ref 13.5–18)
IMM GRANULOCYTES # BLD AUTO: 0.06 K/UL (ref 0–0.04)
IMM GRANULOCYTES NFR BLD: 0.6 % (ref 0–0.4)
LYMPHOCYTES # BLD AUTO: 0.57 K/UL (ref 1–4.8)
LYMPHOCYTES NFR BLD AUTO: 5.2 % (ref 27–41)
MCH RBC QN AUTO: 29.4 PG (ref 27–31)
MCHC RBC AUTO-ENTMCNC: 35.2 G/DL (ref 32–36)
MCV RBC AUTO: 83.4 FL (ref 80–96)
MONOCYTES # BLD AUTO: 0.69 K/UL (ref 0–0.8)
MONOCYTES NFR BLD AUTO: 6.3 % (ref 2–6)
MPC BLD CALC-MCNC: 9.5 FL (ref 9.4–12.4)
NEUTROPHILS # BLD AUTO: 9.32 K/UL (ref 1.8–7.7)
NEUTROPHILS NFR BLD AUTO: 85.7 % (ref 53–65)
NRBC # BLD AUTO: 0 X10E3/UL
NRBC, AUTO (.00): 0 %
PLATELET # BLD AUTO: 119 K/UL (ref 150–400)
POTASSIUM SERPL-SCNC: 4.8 MMOL/L (ref 3.5–5.1)
RBC # BLD AUTO: 2.11 M/UL (ref 4.6–6.2)
SODIUM SERPL-SCNC: 139 MMOL/L (ref 136–145)
UNIT NUMBER: NORMAL
WBC # BLD AUTO: 10.88 K/UL (ref 4.5–11)

## 2022-07-02 PROCEDURE — 80048 BASIC METABOLIC PNL TOTAL CA: CPT

## 2022-07-02 PROCEDURE — 25000003 PHARM REV CODE 250: Performed by: FAMILY MEDICINE

## 2022-07-02 PROCEDURE — 63600175 PHARM REV CODE 636 W HCPCS: Performed by: FAMILY MEDICINE

## 2022-07-02 PROCEDURE — 25000242 PHARM REV CODE 250 ALT 637 W/ HCPCS

## 2022-07-02 PROCEDURE — 63600175 PHARM REV CODE 636 W HCPCS

## 2022-07-02 PROCEDURE — D9220A PRA ANESTHESIA: Mod: CRNA,,, | Performed by: NURSE ANESTHETIST, CERTIFIED REGISTERED

## 2022-07-02 PROCEDURE — 94640 AIRWAY INHALATION TREATMENT: CPT

## 2022-07-02 PROCEDURE — D9220A PRA ANESTHESIA: Mod: ANES,,, | Performed by: ANESTHESIOLOGY

## 2022-07-02 PROCEDURE — 82962 GLUCOSE BLOOD TEST: CPT

## 2022-07-02 PROCEDURE — D9220A PRA ANESTHESIA: ICD-10-PCS | Mod: ANES,,, | Performed by: ANESTHESIOLOGY

## 2022-07-02 PROCEDURE — 25000003 PHARM REV CODE 250: Performed by: HOSPITALIST

## 2022-07-02 PROCEDURE — 85025 COMPLETE CBC W/AUTO DIFF WBC: CPT

## 2022-07-02 PROCEDURE — 63700000 PHARM REV CODE 250 ALT 637 W/O HCPCS

## 2022-07-02 PROCEDURE — 94761 N-INVAS EAR/PLS OXIMETRY MLT: CPT

## 2022-07-02 PROCEDURE — 27000221 HC OXYGEN, UP TO 24 HOURS

## 2022-07-02 PROCEDURE — 99231 PR SUBSEQUENT HOSPITAL CARE,LEVL I: ICD-10-PCS | Mod: ,,, | Performed by: INTERNAL MEDICINE

## 2022-07-02 PROCEDURE — 99232 PR SUBSEQUENT HOSPITAL CARE,LEVL II: ICD-10-PCS | Mod: GC,,, | Performed by: FAMILY MEDICINE

## 2022-07-02 PROCEDURE — D9220A PRA ANESTHESIA: ICD-10-PCS | Mod: CRNA,,, | Performed by: NURSE ANESTHETIST, CERTIFIED REGISTERED

## 2022-07-02 PROCEDURE — 43255 EGD CONTROL BLEEDING ANY: CPT

## 2022-07-02 PROCEDURE — 99232 SBSQ HOSP IP/OBS MODERATE 35: CPT | Mod: GC,,, | Performed by: FAMILY MEDICINE

## 2022-07-02 PROCEDURE — 63600175 PHARM REV CODE 636 W HCPCS: Performed by: NURSE ANESTHETIST, CERTIFIED REGISTERED

## 2022-07-02 PROCEDURE — 11000001 HC ACUTE MED/SURG PRIVATE ROOM

## 2022-07-02 PROCEDURE — 25000003 PHARM REV CODE 250

## 2022-07-02 PROCEDURE — 85014 HEMATOCRIT: CPT

## 2022-07-02 PROCEDURE — 25000003 PHARM REV CODE 250: Performed by: NURSE ANESTHETIST, CERTIFIED REGISTERED

## 2022-07-02 PROCEDURE — C9399 UNCLASSIFIED DRUGS OR BIOLOG: HCPCS

## 2022-07-02 PROCEDURE — 36415 COLL VENOUS BLD VENIPUNCTURE: CPT

## 2022-07-02 PROCEDURE — P9016 RBC LEUKOCYTES REDUCED: HCPCS

## 2022-07-02 PROCEDURE — 99231 SBSQ HOSP IP/OBS SF/LOW 25: CPT | Mod: ,,, | Performed by: INTERNAL MEDICINE

## 2022-07-02 RX ORDER — HYDROCODONE BITARTRATE AND ACETAMINOPHEN 500; 5 MG/1; MG/1
TABLET ORAL
Status: CANCELLED | OUTPATIENT
Start: 2022-07-02

## 2022-07-02 RX ORDER — MIDAZOLAM HYDROCHLORIDE 1 MG/ML
INJECTION INTRAMUSCULAR; INTRAVENOUS
Status: DISCONTINUED | OUTPATIENT
Start: 2022-07-02 | End: 2022-07-02

## 2022-07-02 RX ORDER — PROPOFOL 10 MG/ML
VIAL (ML) INTRAVENOUS
Status: DISCONTINUED | OUTPATIENT
Start: 2022-07-02 | End: 2022-07-02

## 2022-07-02 RX ORDER — HYDROCODONE BITARTRATE AND ACETAMINOPHEN 500; 5 MG/1; MG/1
TABLET ORAL
Status: DISCONTINUED | OUTPATIENT
Start: 2022-07-02 | End: 2022-07-03

## 2022-07-02 RX ORDER — LIDOCAINE HYDROCHLORIDE 20 MG/ML
INJECTION, SOLUTION EPIDURAL; INFILTRATION; INTRACAUDAL; PERINEURAL
Status: DISCONTINUED | OUTPATIENT
Start: 2022-07-02 | End: 2022-07-02

## 2022-07-02 RX ADMIN — OXYCODONE HYDROCHLORIDE AND ACETAMINOPHEN 500 MG: 500 TABLET ORAL at 12:07

## 2022-07-02 RX ADMIN — ISOSORBIDE MONONITRATE 60 MG: 60 TABLET, EXTENDED RELEASE ORAL at 12:07

## 2022-07-02 RX ADMIN — IPRATROPIUM BROMIDE AND ALBUTEROL SULFATE 3 ML: 2.5; .5 SOLUTION RESPIRATORY (INHALATION) at 08:07

## 2022-07-02 RX ADMIN — IPRATROPIUM BROMIDE AND ALBUTEROL SULFATE 3 ML: 2.5; .5 SOLUTION RESPIRATORY (INHALATION) at 04:07

## 2022-07-02 RX ADMIN — INSULIN ASPART 1 UNITS: 100 INJECTION, SOLUTION INTRAVENOUS; SUBCUTANEOUS at 08:07

## 2022-07-02 RX ADMIN — PROPOFOL 50 MG: 10 INJECTION, EMULSION INTRAVENOUS at 11:07

## 2022-07-02 RX ADMIN — IPRATROPIUM BROMIDE AND ALBUTEROL SULFATE 3 ML: 2.5; .5 SOLUTION RESPIRATORY (INHALATION) at 07:07

## 2022-07-02 RX ADMIN — METOPROLOL TARTRATE 50 MG: 50 TABLET, FILM COATED ORAL at 12:07

## 2022-07-02 RX ADMIN — FLUCONAZOLE 150 MG: 50 TABLET ORAL at 04:07

## 2022-07-02 RX ADMIN — FERROUS SULFATE TAB 325 MG (65 MG ELEMENTAL FE) 1 EACH: 325 (65 FE) TAB at 12:07

## 2022-07-02 RX ADMIN — AMLODIPINE BESYLATE 10 MG: 10 TABLET ORAL at 12:07

## 2022-07-02 RX ADMIN — IPRATROPIUM BROMIDE AND ALBUTEROL SULFATE 3 ML: 2.5; .5 SOLUTION RESPIRATORY (INHALATION) at 11:07

## 2022-07-02 RX ADMIN — SODIUM CHLORIDE: 9 INJECTION, SOLUTION INTRAVENOUS at 07:07

## 2022-07-02 RX ADMIN — SODIUM CHLORIDE: 9 INJECTION, SOLUTION INTRAVENOUS at 11:07

## 2022-07-02 RX ADMIN — PIPERACILLIN AND TAZOBACTAM 4.5 G: 4; .5 INJECTION, POWDER, FOR SOLUTION INTRAVENOUS at 11:07

## 2022-07-02 RX ADMIN — THERA TABS 1 TABLET: TAB at 12:07

## 2022-07-02 RX ADMIN — IPRATROPIUM BROMIDE AND ALBUTEROL SULFATE 3 ML: 2.5; .5 SOLUTION RESPIRATORY (INHALATION) at 12:07

## 2022-07-02 RX ADMIN — MIDAZOLAM HYDROCHLORIDE 2 MG: 1 INJECTION, SOLUTION INTRAMUSCULAR; INTRAVENOUS at 11:07

## 2022-07-02 RX ADMIN — PIPERACILLIN AND TAZOBACTAM 4.5 G: 4; .5 INJECTION, POWDER, FOR SOLUTION INTRAVENOUS at 07:07

## 2022-07-02 RX ADMIN — SIMETHICONE 80 MG: 80 TABLET, CHEWABLE ORAL at 01:07

## 2022-07-02 RX ADMIN — ALLOPURINOL 50 MG: 300 TABLET ORAL at 12:07

## 2022-07-02 RX ADMIN — ATORVASTATIN CALCIUM 40 MG: 40 TABLET, FILM COATED ORAL at 08:07

## 2022-07-02 RX ADMIN — LIDOCAINE HYDROCHLORIDE 50 MG: 20 INJECTION, SOLUTION INTRAVENOUS at 11:07

## 2022-07-02 RX ADMIN — BUDESONIDE INHALATION 0.5 MG: 0.5 SUSPENSION RESPIRATORY (INHALATION) at 07:07

## 2022-07-02 RX ADMIN — INSULIN DETEMIR 8 UNITS: 100 INJECTION, SOLUTION SUBCUTANEOUS at 08:07

## 2022-07-02 NOTE — ASSESSMENT & PLAN NOTE
- H&H 6.2/17.6 today  - Getting transfusion of 1 unit pRBC, has already had one unit during this admission  - Getting second EGD today to look at duodenal ulcer

## 2022-07-02 NOTE — PROGRESS NOTES
[facility] - [address] [place] [address] Medicine  Progress Note    Patient Name: Neymar Parra  MRN: [id number]  Patient Class: IP- Inpatient   Admission Date: 6/16/2022  Length of Stay: 16 days  Attending Physician: Rj Fernandez MD  Primary Care Provider: [facility] megan Rodríguez        Subjective:     Principal Problem:Anemia        HPI:  72 y.o. male transferred to the [facility] from [facility] for hyperkalemia and hypoxia. Pt reports he went to [facility] because he was having dyspnea and hemoptysis since 1 month which is worse today. Pt reports he was tested positive for COVID at the nursing home on 6/9/22. Per nursing home patient oxygen saturation was in the 80's on RA with tachypnea and labored breathing. Pt reports he normally does not wear oxygen at the nursing home but has been wearing oxygen more often recently. Pt reports he smoked 1-1.5 PPD for 20 years but quit 7 months ago. Per pt, he saw a pulmonologist (Dr. Aldana) for his hemoptysis and had some tests done the results of which he does not know. Per records, pt with a right pleural effusion recently and had a thoracentesis. Pt denies any fever, congestion, dysuria, N/V/D, chest pain, or any other complaints at this time.     In the ED, pt's K 7.2, d-dimer 0.76, BNP 6284, BUN/Cr 73/7.16. EKG showed some peaked t-waves and irregular rhythm. Chest xray showed Increased right lower lung pulmonary density could indicate pneumonia. Pt was treated in the ED with Calcium gluconate 1g, regular insulin 10 units, D50, IV solumedrol 125 mg, tylenol 650 mg and IV fluids NS 1L bolus. Pt will be admitted to the hospital service for management of hypoxia and hyperkalemia.      Overview/Hospital Course:        Interval History: The patient's H&H was 6.2/17.6 this morning, so another unit of blood was ordered to be given to him. The patient says he is overall feeling the same, but still says he feels weak. He was taken to get another EGD this morning  by Dr. Arredondo. His kidney function has remained about the same in the last couple of days. Nephrology is following the patient.     Review of Systems   Constitutional:  Negative for activity change and appetite change.   HENT:  Negative for congestion and dental problem.    Eyes:  Negative for discharge and itching.   Respiratory:  Negative for apnea and chest tightness.    Cardiovascular:  Negative for chest pain and leg swelling.   Gastrointestinal:  Negative for abdominal distention.   Endocrine: Negative for cold intolerance and heat intolerance.   Genitourinary:  Negative for difficulty urinating and dysuria.   Musculoskeletal:  Negative for arthralgias and back pain.   Skin:  Negative for color change.   Allergic/Immunologic: Negative for environmental allergies.   Neurological:  Positive for weakness. Negative for dizziness and facial asymmetry.   Hematological:  Negative for adenopathy. Does not bruise/bleed easily.   Psychiatric/Behavioral:  Negative for agitation and behavioral problems.    Objective:     Vital Signs (Most Recent):  Temp: 97.9 °F (36.6 °C) (07/02/22 0809)  Pulse: 98 (07/02/22 0809)  Resp: 19 (07/02/22 0809)  BP: (!) 146/77 (07/02/22 0809)  SpO2: 96 % (07/02/22 0809)   Vital Signs (24h Range):  Temp:  [97.4 °F (36.3 °C)-99.1 °F (37.3 °C)] 97.9 °F (36.6 °C)  Pulse:  [72-99] 98  Resp:  [18-24] 19  SpO2:  [90 %-100 %] 96 %  BP: (123-155)/(46-77) 146/77     Weight: 122.5 kg (270 lb 1 oz)  Body mass index is 36.63 kg/m².    Intake/Output Summary (Last 24 hours) at 7/2/2022 1053  Last data filed at 7/2/2022 0754  Gross per 24 hour   Intake 1040.5 ml   Output 200 ml   Net 840.5 ml      Physical Exam  Vitals reviewed.   Constitutional:       Appearance: Normal appearance.      Interventions: He is not intubated.  HENT:      Head: Normocephalic and atraumatic.      Nose: Nose normal.      Mouth/Throat:      Mouth: Mucous membranes are dry.      Pharynx: Oropharynx is clear.   Eyes:       Extraocular Movements: Extraocular movements intact.      Conjunctiva/sclera: Conjunctivae normal.      Pupils: Pupils are equal, round, and reactive to light.   Cardiovascular:      Rate and Rhythm: Normal rate.      Heart sounds: Normal heart sounds. No murmur heard.  Pulmonary:      Effort: Pulmonary effort is normal. He is not intubated.      Breath sounds: Normal breath sounds.   Abdominal:      General: Abdomen is flat. Bowel sounds are normal.      Palpations: Abdomen is soft.   Musculoskeletal:         General: Normal range of motion.      Cervical back: Normal range of motion and neck supple.      Right lower leg: No edema.      Left lower leg: No edema.   Skin:     General: Skin is warm and dry.      Capillary Refill: Capillary refill takes less than 2 seconds.   Neurological:      General: No focal deficit present.      Mental Status: He is alert and oriented to person, place, and time.   Psychiatric:         Mood and Affect: Mood normal.         Behavior: Behavior normal.       Significant Labs: All pertinent labs within the past 24 hours have been reviewed.    Significant Imaging: I have reviewed all pertinent imaging results/findings within the past 24 hours.      Assessment/Plan:      * Anemia  - H&H 6.2/17.6 today  - Getting transfusion of 1 unit pRBC, has already had one unit during this admission  - Getting second EGD today to look at duodenal ulcer      Gastrointestinal hemorrhage associated with duodenal ulcer  Patient getting second EGD today  Continue IV pantoprazole       PNA (pneumonia)  - chest xray shows worsening lower left lube consolidation and and worsening right sided pleural effusion  - continue IV zosyn      Elevated d-dimer  - D-dimer was normal on admission  - Repeat was 1.83        Acute on chronic renal failure  - BUN/Crt ratio 142/6.36 today, around the same as in has been the last couple of days  - Nephrology following       Pleural effusion  Stable  Continue to  monitor      Type 2 diabetes mellitus without complication  -HgA1c 4.7 10 days ago  - detemir 8U QD  - SSI    Gastroesophageal reflux disease  - pantoprazole 40 mg qd    COPD exacerbation  Resolved    Hypertension  Continue metoprolol 50mg qd    VTE Risk Mitigation (From admission, onward)         Ordered     Reason for No Pharmacological VTE Prophylaxis  Once        Question:  Reasons:  Answer:  Risk of Bleeding    06/16/22 0301     IP VTE HIGH RISK PATIENT  Once         06/16/22 0301     Place sequential compression device  Until discontinued         06/16/22 0301                Discharge Planning   EDWARDO:      Code Status: Full Code   Is the patient medically ready for discharge?:     Reason for patient still in hospital (select all that apply): Treatment  Discharge Plan A: Long-term acute care facility (LTAC) (Choice obtained for Specialty)                  Brooklynn Kaur MD  Department of Hospital Medicine   [address]

## 2022-07-02 NOTE — ASSESSMENT & PLAN NOTE
- chest xray shows worsening lower left lube consolidation and and worsening right sided pleural effusion  - continue IV zosyn

## 2022-07-02 NOTE — ASSESSMENT & PLAN NOTE
- BUN/Crt ratio 142/6.36 today, around the same as in has been the last couple of days  - Nephrology following

## 2022-07-02 NOTE — PLAN OF CARE
Problem: Gas Exchange Impaired  Goal: Optimal Gas Exchange  Outcome: Ongoing, Progressing     Problem: Respiratory Compromise (Pneumonia)  Goal: Effective Oxygenation and Ventilation  Outcome: Ongoing, Progressing

## 2022-07-02 NOTE — ASSESSMENT & PLAN NOTE
Patient seems to be improving there was not enough fluid to tap keep an eye on the x-ray call us if more fluid develops.  No further recs agree with plan

## 2022-07-02 NOTE — TRANSFER OF CARE
Anesthesia Transfer of Care Note    Patient: Neymar Parra    Procedure(s) Performed: * No procedures listed *    Patient location: GI    Anesthesia Type: general    Transport from OR: Transported from OR on room air with adequate spontaneous ventilation    Post pain: adequate analgesia    Post assessment: no apparent anesthetic complications    Post vital signs: stable    Level of consciousness: responds to stimulation    Nausea/Vomiting: no nausea/vomiting    Complications: none    Transfer of care protocol was followed      Last vitals:   Visit Vitals  BP (!) 150/72   Pulse 99   Temp 36.8 °C (98.2 °F)   Resp 20   Ht 6' (1.829 m)   Wt 122.5 kg (270 lb 1 oz)   SpO2 99%   BMI 36.63 kg/m²

## 2022-07-02 NOTE — SUBJECTIVE & OBJECTIVE
Interval History: The patient's H&H was 6.2/17.6 this morning, so another unit of blood was ordered to be given to him. The patient says he is overall feeling the same, but still says he feels weak. He was taken to get another EGD this morning by Dr. Arredondo. His kidney function has remained about the same in the last couple of days. Nephrology is following the patient.     Review of Systems   Constitutional:  Negative for activity change and appetite change.   HENT:  Negative for congestion and dental problem.    Eyes:  Negative for discharge and itching.   Respiratory:  Negative for apnea and chest tightness.    Cardiovascular:  Negative for chest pain and leg swelling.   Gastrointestinal:  Negative for abdominal distention.   Endocrine: Negative for cold intolerance and heat intolerance.   Genitourinary:  Negative for difficulty urinating and dysuria.   Musculoskeletal:  Negative for arthralgias and back pain.   Skin:  Negative for color change.   Allergic/Immunologic: Negative for environmental allergies.   Neurological:  Positive for weakness. Negative for dizziness and facial asymmetry.   Hematological:  Negative for adenopathy. Does not bruise/bleed easily.   Psychiatric/Behavioral:  Negative for agitation and behavioral problems.    Objective:     Vital Signs (Most Recent):  Temp: 97.9 °F (36.6 °C) (07/02/22 0809)  Pulse: 98 (07/02/22 0809)  Resp: 19 (07/02/22 0809)  BP: (!) 146/77 (07/02/22 0809)  SpO2: 96 % (07/02/22 0809)   Vital Signs (24h Range):  Temp:  [97.4 °F (36.3 °C)-99.1 °F (37.3 °C)] 97.9 °F (36.6 °C)  Pulse:  [72-99] 98  Resp:  [18-24] 19  SpO2:  [90 %-100 %] 96 %  BP: (123-155)/(46-77) 146/77     Weight: 122.5 kg (270 lb 1 oz)  Body mass index is 36.63 kg/m².    Intake/Output Summary (Last 24 hours) at 7/2/2022 1053  Last data filed at 7/2/2022 0754  Gross per 24 hour   Intake 1040.5 ml   Output 200 ml   Net 840.5 ml      Physical Exam  Vitals reviewed.   Constitutional:       Appearance:  Normal appearance.      Interventions: He is not intubated.  HENT:      Head: Normocephalic and atraumatic.      Nose: Nose normal.      Mouth/Throat:      Mouth: Mucous membranes are dry.      Pharynx: Oropharynx is clear.   Eyes:      Extraocular Movements: Extraocular movements intact.      Conjunctiva/sclera: Conjunctivae normal.      Pupils: Pupils are equal, round, and reactive to light.   Cardiovascular:      Rate and Rhythm: Normal rate.      Heart sounds: Normal heart sounds. No murmur heard.  Pulmonary:      Effort: Pulmonary effort is normal. He is not intubated.      Breath sounds: Normal breath sounds.   Abdominal:      General: Abdomen is flat. Bowel sounds are normal.      Palpations: Abdomen is soft.   Musculoskeletal:         General: Normal range of motion.      Cervical back: Normal range of motion and neck supple.      Right lower leg: No edema.      Left lower leg: No edema.   Skin:     General: Skin is warm and dry.      Capillary Refill: Capillary refill takes less than 2 seconds.   Neurological:      General: No focal deficit present.      Mental Status: He is alert and oriented to person, place, and time.   Psychiatric:         Mood and Affect: Mood normal.         Behavior: Behavior normal.       Significant Labs: All pertinent labs within the past 24 hours have been reviewed.    Significant Imaging: I have reviewed all pertinent imaging results/findings within the past 24 hours.

## 2022-07-02 NOTE — PROGRESS NOTES
[facility], [place] - [address] [place] [address]  Pulmonology  Progress Note    Patient Name: Neymar Parra  MRN: [id number]  Admission Date: 6/16/2022  Hospital Length of Stay: 16 days  Code Status: Full Code  Attending Provider: Rj Fernandez MD  Primary Care Provider: [facility]   Principal Problem: PNA (pneumonia)    Subjective:     Interval History:  Patient without complaints    Objective:     Vital Signs (Most Recent):  Temp: 98.3 °F (36.8 °C) (07/02/22 0455)  Pulse: 93 (07/02/22 0455)  Resp: 18 (07/02/22 0455)  BP: 139/69 (07/02/22 0455)  SpO2: 99 % (07/02/22 0455) Vital Signs (24h Range):  Temp:  [97.4 °F (36.3 °C)-99.1 °F (37.3 °C)] 98.3 °F (36.8 °C)  Pulse:  [] 93  Resp:  [15-24] 18  SpO2:  [90 %-99 %] 99 %  BP: (123-182)/(46-77) 139/69     Weight: 122.5 kg (270 lb 1 oz)  Body mass index is 36.63 kg/m².      Intake/Output Summary (Last 24 hours) at 7/2/2022 0543  Last data filed at 7/1/2022 1630  Gross per 24 hour   Intake 675.5 ml   Output 500 ml   Net 175.5 ml       Physical Exam  Vitals reviewed.   Constitutional:       Appearance: Normal appearance.      Interventions: He is not intubated.  HENT:      Head: Normocephalic and atraumatic.      Nose: Nose normal.      Mouth/Throat:      Mouth: Mucous membranes are dry.      Pharynx: Oropharynx is clear.   Eyes:      Extraocular Movements: Extraocular movements intact.      Conjunctiva/sclera: Conjunctivae normal.      Pupils: Pupils are equal, round, and reactive to light.   Cardiovascular:      Rate and Rhythm: Normal rate.      Heart sounds: Normal heart sounds. No murmur heard.  Pulmonary:      Effort: Pulmonary effort is normal. He is not intubated.   Abdominal:      General: Abdomen is flat. Bowel sounds are normal.      Palpations: Abdomen is soft.   Musculoskeletal:         General: Normal range of motion.      Cervical back: Normal range of motion and neck supple.      Right lower leg: No edema.      Left lower leg: No  edema.   Skin:     General: Skin is warm and dry.      Capillary Refill: Capillary refill takes less than 2 seconds.   Neurological:      General: No focal deficit present.      Mental Status: He is alert and oriented to person, place, and time.   Psychiatric:         Mood and Affect: Mood normal.         Behavior: Behavior normal.       Vents:  Oxygen Concentration (%): 28 (07/02/22 0450)    Lines/Drains/Airways       Peripheral Intravenous Line  Duration                  Peripheral IV - Single Lumen 06/22/22 1400 20 G Posterior;Right Forearm 9 days                    Significant Labs:    CBC/Anemia Profile:  Recent Labs   Lab 06/30/22  0902 07/01/22  0717 07/01/22  1831 07/02/22  0248   WBC 13.05* 13.31*  --  10.88   HGB 8.7* 6.4* 7.0* 6.2*   HCT 24.9* 18.7* 20.6* 17.6*    155  --  119*   MCV 83.3 84.6  --  83.4   RDW 17.1* 18.0*  --  17.1*        Chemistries:  Recent Labs   Lab 06/30/22  0902 07/01/22  0522 07/01/22  1646 07/02/22  0248   * 137  --  139   K 5.6* 6.0* 5.5* 4.8   CL 98 102  --  108*   CO2 21 19*  --  21   * 166*  --  142*   CREATININE 6.34* 6.31*  --  6.36*   CALCIUM 8.7 8.4*  --  8.3*       Recent Lab Results  (Last 5 results in the past 24 hours)        07/02/22  0419   07/02/22  0248   07/01/22  2032   07/01/22  1831   07/01/22  1646        Unit Blood Type Code               Unit Expiration               Anion Gap   15             Baso #   0.01             Basophil %   0.1             BUN   142             BUN/CREAT RATIO   22             Calcium   8.3             Chloride   108             CO2   21             Creatinine   6.36             CROSSMATCH INTERPRETATION               Differential Type   Auto             DISPENSE STATUS               eGFR if non    9             Eos #   0.23             Eosinophil %   2.1             Glucose   119             Group & Rh               Hematocrit   17.6     20.6         Hemoglobin   6.2     7.0         Immature Grans  (Abs)   0.06             Immature Granulocytes   0.6             INDIRECT RAJWINDER               Lymph #   0.57             Lymph %   5.2             MCH   29.4             MCHC   35.2             MCV   83.4             Mono #   0.69             Mono %   6.3             MPV   9.5             Neutrophils, Abs   9.32             Neutrophils Relative   85.7             nRBC   0.0             NUCLEATED RBC ABSOLUTE   0.00             Platelets   119             POC Glucose 130     131           Potassium   4.8       5.5       Product Code               RBC   2.11             RDW   17.1             Sodium   139             Unit ABO/Rh               UNIT NUMBER               WBC   10.88                                    Significant Imaging:  I have reviewed all pertinent imaging results/findings within the past 24 hours.    Assessment/Plan:     Pleural effusion  Patient seems to be improving there was not enough fluid to tap keep an eye on the x-ray call us if more fluid develops.  No further recs agree with plan                 Caleb Aldana MD  Pulmonology  [facility], [place] - [address] [place] [address]

## 2022-07-02 NOTE — SUBJECTIVE & OBJECTIVE
Interval History:  Patient without complaints    Objective:     Vital Signs (Most Recent):  Temp: 98.3 °F (36.8 °C) (07/02/22 0455)  Pulse: 93 (07/02/22 0455)  Resp: 18 (07/02/22 0455)  BP: 139/69 (07/02/22 0455)  SpO2: 99 % (07/02/22 0455) Vital Signs (24h Range):  Temp:  [97.4 °F (36.3 °C)-99.1 °F (37.3 °C)] 98.3 °F (36.8 °C)  Pulse:  [] 93  Resp:  [15-24] 18  SpO2:  [90 %-99 %] 99 %  BP: (123-182)/(46-77) 139/69     Weight: 122.5 kg (270 lb 1 oz)  Body mass index is 36.63 kg/m².      Intake/Output Summary (Last 24 hours) at 7/2/2022 0543  Last data filed at 7/1/2022 1630  Gross per 24 hour   Intake 675.5 ml   Output 500 ml   Net 175.5 ml       Physical Exam  Vitals reviewed.   Constitutional:       Appearance: Normal appearance.      Interventions: He is not intubated.  HENT:      Head: Normocephalic and atraumatic.      Nose: Nose normal.      Mouth/Throat:      Mouth: Mucous membranes are dry.      Pharynx: Oropharynx is clear.   Eyes:      Extraocular Movements: Extraocular movements intact.      Conjunctiva/sclera: Conjunctivae normal.      Pupils: Pupils are equal, round, and reactive to light.   Cardiovascular:      Rate and Rhythm: Normal rate.      Heart sounds: Normal heart sounds. No murmur heard.  Pulmonary:      Effort: Pulmonary effort is normal. He is not intubated.   Abdominal:      General: Abdomen is flat. Bowel sounds are normal.      Palpations: Abdomen is soft.   Musculoskeletal:         General: Normal range of motion.      Cervical back: Normal range of motion and neck supple.      Right lower leg: No edema.      Left lower leg: No edema.   Skin:     General: Skin is warm and dry.      Capillary Refill: Capillary refill takes less than 2 seconds.   Neurological:      General: No focal deficit present.      Mental Status: He is alert and oriented to person, place, and time.   Psychiatric:         Mood and Affect: Mood normal.         Behavior: Behavior normal.       Vents:  Oxygen  Concentration (%): 28 (07/02/22 0450)    Lines/Drains/Airways       Peripheral Intravenous Line  Duration                  Peripheral IV - Single Lumen 06/22/22 1400 20 G Posterior;Right Forearm 9 days                    Significant Labs:    CBC/Anemia Profile:  Recent Labs   Lab 06/30/22  0902 07/01/22  0717 07/01/22  1831 07/02/22  0248   WBC 13.05* 13.31*  --  10.88   HGB 8.7* 6.4* 7.0* 6.2*   HCT 24.9* 18.7* 20.6* 17.6*    155  --  119*   MCV 83.3 84.6  --  83.4   RDW 17.1* 18.0*  --  17.1*        Chemistries:  Recent Labs   Lab 06/30/22  0902 07/01/22  0522 07/01/22  1646 07/02/22  0248   * 137  --  139   K 5.6* 6.0* 5.5* 4.8   CL 98 102  --  108*   CO2 21 19*  --  21   * 166*  --  142*   CREATININE 6.34* 6.31*  --  6.36*   CALCIUM 8.7 8.4*  --  8.3*       Recent Lab Results  (Last 5 results in the past 24 hours)        07/02/22  0419   07/02/22  0248   07/01/22  2032 07/01/22  1831   07/01/22  1646        Unit Blood Type Code               Unit Expiration               Anion Gap   15             Baso #   0.01             Basophil %   0.1             BUN   142             BUN/CREAT RATIO   22             Calcium   8.3             Chloride   108             CO2   21             Creatinine   6.36             CROSSMATCH INTERPRETATION               Differential Type   Auto             DISPENSE STATUS               eGFR if non    9             Eos #   0.23             Eosinophil %   2.1             Glucose   119             Group & Rh               Hematocrit   17.6     20.6         Hemoglobin   6.2     7.0         Immature Grans (Abs)   0.06             Immature Granulocytes   0.6             INDIRECT RAJWINDER               Lymph #   0.57             Lymph %   5.2             MCH   29.4             MCHC   35.2             MCV   83.4             Mono #   0.69             Mono %   6.3             MPV   9.5             Neutrophils, Abs   9.32             Neutrophils Relative    85.7             nRBC   0.0             NUCLEATED RBC ABSOLUTE   0.00             Platelets   119             POC Glucose 130     131           Potassium   4.8       5.5       Product Code               RBC   2.11             RDW   17.1             Sodium   139             Unit ABO/Rh               UNIT NUMBER               WBC   10.88                                    Significant Imaging:  I have reviewed all pertinent imaging results/findings within the past 24 hours.

## 2022-07-02 NOTE — ANESTHESIA PREPROCEDURE EVALUATION
07/02/2022  Neymar Parra is a 72 y.o., male.  Active Ambulatory Problems     Diagnosis Date Noted    Hypertension 12/12/2021    COPD exacerbation 12/12/2021    Gastroesophageal reflux disease 12/12/2021    Type 2 diabetes mellitus without complication 12/12/2021    Pleural effusion 04/29/2022    Acute on chronic renal failure 06/16/2022    Anemia 06/16/2022    Elevated d-dimer 06/22/2022    PNA (pneumonia) 06/27/2022    Gastrointestinal hemorrhage associated with duodenal ulcer      Resolved Ambulatory Problems     Diagnosis Date Noted    Fracture of trochanter of femur 12/12/2021    Hemoptysis 04/29/2022    Hyperkalemia 06/16/2022    COVID-19 06/16/2022    Hyponatremia 06/25/2022     Past Medical History:   Diagnosis Date    Anticoagulant long-term use     Chronic renal disease, stage 4, severely decreased glomerular filtration rate (GFR) between 15-29 mL/min/1.73 square meter     Coronary artery disease     Duodenal adenoma      Lab Results   Component Value Date    WBC 10.88 07/02/2022    HGB 6.2 (L) 07/02/2022    HCT 17.6 (LL) 07/02/2022    MCV 83.4 07/02/2022     (L) 07/02/2022       Medication List with Changes/Refills   Current Medications    ACETAMINOPHEN (TYLENOL) 500 MG TABLET    Take 1,000 mg by mouth every 8 (eight) hours as needed for Pain.    ALBUTEROL-IPRATROPIUM (DUO-NEB) 2.5 MG-0.5 MG/3 ML NEBULIZER SOLUTION    Take 3 mLs by nebulization every 6 (six) hours as needed for Wheezing. Rescue    ALLOPURINOL (ZYLOPRIM) 100 MG TABLET    TAKE ONE TABLET BY MOUTH DAILY FOR GOUT    AMLODIPINE (NORVASC) 10 MG TABLET    Take 10 mg by mouth once daily.    ASPIRIN (ECOTRIN) 325 MG EC TABLET    Take 325 mg by mouth once daily.    ATORVASTATIN (LIPITOR) 80 MG TABLET    Take 40 mg by mouth every evening.    CLOPIDOGREL (PLAVIX) 75 MG TABLET    TAKE 1 TABLET BY MOUTH ONCE DAILY      HYDROCODONE-ACETAMINOPHEN (NORCO) 7.5-325 MG PER TABLET    Take 1 tablet by mouth every 6 (six) hours as needed for Pain.    ISOSORBIDE MONONITRATE (IMDUR) 60 MG 24 HR TABLET    Take 1 tablet by mouth once daily.    LISINOPRIL (PRINIVIL,ZESTRIL) 20 MG TABLET    Take 20 mg by mouth.    LORATADINE (CLARITIN) 10 MG TABLET    TAKE ONE TABLET BY MOUTH DAILY FOR ALLERGIES    METOPROLOL TARTRATE (LOPRESSOR) 25 MG TABLET    Take 25 mg by mouth once daily.    OMEPRAZOLE (PRILOSEC) 20 MG CAPSULE    Take 20 mg by mouth once daily.    TRAMADOL (ULTRAM) 50 MG TABLET    Take 50 mg by mouth every 8 (eight) hours as needed for Pain.    TRAZODONE (DESYREL) 100 MG TABLET    Take 300 mg by mouth every evening.           Pre-op Assessment          Review of Systems  Cardiovascular:   Hypertension CAD      Pulmonary:   Pneumonia COPD, moderate    Renal/:   Chronic Renal Disease, CRI, ESRD    Hepatic/GI:   GERD    Endocrine:   Diabetes        Physical Exam  General: Well nourished and Cooperative        Anesthesia Plan  Type of Anesthesia, risks & benefits discussed:    Anesthesia Type: Gen Natural Airway, MAC  Intra-op Monitoring Plan: Standard ASA Monitors  Post Op Pain Control Plan: multimodal analgesia  Induction:  IV  Informed Consent: Informed consent signed with the Patient and all parties understand the risks and agree with anesthesia plan.  All questions answered.   ASA Score: 4    Ready For Surgery From Anesthesia Perspective.     .

## 2022-07-03 PROBLEM — J44.1 COPD EXACERBATION: Status: RESOLVED | Noted: 2021-12-12 | Resolved: 2022-07-03

## 2022-07-03 LAB
ABO + RH BLD: NORMAL
ABO + RH BLD: NORMAL
ANION GAP SERPL CALCULATED.3IONS-SCNC: 19 MMOL/L (ref 7–16)
APTT PPP: 34.2 SECONDS (ref 25.2–37.3)
BASOPHILS # BLD AUTO: 0.01 K/UL (ref 0–0.2)
BASOPHILS NFR BLD AUTO: 0.1 % (ref 0–1)
BLD PROD TYP BPU: NORMAL
BLD PROD TYP BPU: NORMAL
BLOOD UNIT EXPIRATION DATE: NORMAL
BLOOD UNIT EXPIRATION DATE: NORMAL
BLOOD UNIT TYPE CODE: 5100
BLOOD UNIT TYPE CODE: 5100
BUN SERPL-MCNC: 113 MG/DL (ref 7–18)
BUN/CREAT SERPL: 19 (ref 6–20)
CALCIUM SERPL-MCNC: 7.1 MG/DL (ref 8.5–10.1)
CHLORIDE SERPL-SCNC: 111 MMOL/L (ref 98–107)
CO2 SERPL-SCNC: 15 MMOL/L (ref 21–32)
CREAT SERPL-MCNC: 6.03 MG/DL (ref 0.7–1.3)
CROSSMATCH INTERPRETATION: NORMAL
CROSSMATCH INTERPRETATION: NORMAL
DIFFERENTIAL METHOD BLD: ABNORMAL
DISPENSE STATUS: NORMAL
DISPENSE STATUS: NORMAL
EOSINOPHIL # BLD AUTO: 0.26 K/UL (ref 0–0.5)
EOSINOPHIL NFR BLD AUTO: 2.3 % (ref 1–4)
ERYTHROCYTE [DISTWIDTH] IN BLOOD BY AUTOMATED COUNT: 17.1 % (ref 11.5–14.5)
GLUCOSE SERPL-MCNC: 109 MG/DL (ref 70–105)
GLUCOSE SERPL-MCNC: 141 MG/DL (ref 70–105)
GLUCOSE SERPL-MCNC: 52 MG/DL (ref 74–106)
GLUCOSE SERPL-MCNC: 86 MG/DL (ref 70–105)
GLUCOSE SERPL-MCNC: 96 MG/DL (ref 70–105)
HCT VFR BLD AUTO: 19.1 % (ref 40–54)
HCT VFR BLD AUTO: 19.4 % (ref 40–54)
HGB BLD-MCNC: 6.2 G/DL (ref 13.5–18)
HGB BLD-MCNC: 6.6 G/DL (ref 13.5–18)
IMM GRANULOCYTES # BLD AUTO: 0.05 K/UL (ref 0–0.04)
IMM GRANULOCYTES NFR BLD: 0.4 % (ref 0–0.4)
INR BLD: 1.23 (ref 0.9–1.1)
LYMPHOCYTES # BLD AUTO: 0.54 K/UL (ref 1–4.8)
LYMPHOCYTES NFR BLD AUTO: 4.8 % (ref 27–41)
MCH RBC QN AUTO: 30 PG (ref 27–31)
MCHC RBC AUTO-ENTMCNC: 32.5 G/DL (ref 32–36)
MCV RBC AUTO: 92.3 FL (ref 80–96)
MONOCYTES # BLD AUTO: 0.53 K/UL (ref 0–0.8)
MONOCYTES NFR BLD AUTO: 4.7 % (ref 2–6)
MPC BLD CALC-MCNC: 11 FL (ref 9.4–12.4)
NEUTROPHILS # BLD AUTO: 9.78 K/UL (ref 1.8–7.7)
NEUTROPHILS NFR BLD AUTO: 87.7 % (ref 53–65)
NRBC # BLD AUTO: 0 X10E3/UL
NRBC, AUTO (.00): 0 %
PLATELET # BLD AUTO: 112 K/UL (ref 150–400)
POTASSIUM SERPL-SCNC: 5 MMOL/L (ref 3.5–5.1)
PROTHROMBIN TIME: 15 SECONDS (ref 11.7–14.7)
RBC # BLD AUTO: 2.07 M/UL (ref 4.6–6.2)
SODIUM SERPL-SCNC: 140 MMOL/L (ref 136–145)
UNIT NUMBER: NORMAL
UNIT NUMBER: NORMAL
WBC # BLD AUTO: 11.17 K/UL (ref 4.5–11)

## 2022-07-03 PROCEDURE — 85025 COMPLETE CBC W/AUTO DIFF WBC: CPT

## 2022-07-03 PROCEDURE — C9113 INJ PANTOPRAZOLE SODIUM, VIA: HCPCS | Performed by: INTERNAL MEDICINE

## 2022-07-03 PROCEDURE — 36415 COLL VENOUS BLD VENIPUNCTURE: CPT | Performed by: FAMILY MEDICINE

## 2022-07-03 PROCEDURE — P9016 RBC LEUKOCYTES REDUCED: HCPCS

## 2022-07-03 PROCEDURE — 82962 GLUCOSE BLOOD TEST: CPT

## 2022-07-03 PROCEDURE — 99232 PR SUBSEQUENT HOSPITAL CARE,LEVL II: ICD-10-PCS | Mod: GC,,, | Performed by: FAMILY MEDICINE

## 2022-07-03 PROCEDURE — 63600175 PHARM REV CODE 636 W HCPCS

## 2022-07-03 PROCEDURE — 25000003 PHARM REV CODE 250: Performed by: FAMILY MEDICINE

## 2022-07-03 PROCEDURE — 25000003 PHARM REV CODE 250

## 2022-07-03 PROCEDURE — 85730 THROMBOPLASTIN TIME PARTIAL: CPT | Performed by: FAMILY MEDICINE

## 2022-07-03 PROCEDURE — 36415 COLL VENOUS BLD VENIPUNCTURE: CPT

## 2022-07-03 PROCEDURE — 99900035 HC TECH TIME PER 15 MIN (STAT)

## 2022-07-03 PROCEDURE — 80048 BASIC METABOLIC PNL TOTAL CA: CPT

## 2022-07-03 PROCEDURE — C9399 UNCLASSIFIED DRUGS OR BIOLOG: HCPCS

## 2022-07-03 PROCEDURE — P9016 RBC LEUKOCYTES REDUCED: HCPCS | Performed by: FAMILY MEDICINE

## 2022-07-03 PROCEDURE — 63600175 PHARM REV CODE 636 W HCPCS: Performed by: INTERNAL MEDICINE

## 2022-07-03 PROCEDURE — 25000003 PHARM REV CODE 250: Performed by: INTERNAL MEDICINE

## 2022-07-03 PROCEDURE — 11000001 HC ACUTE MED/SURG PRIVATE ROOM

## 2022-07-03 PROCEDURE — 25000003 PHARM REV CODE 250: Performed by: HOSPITALIST

## 2022-07-03 PROCEDURE — 85014 HEMATOCRIT: CPT | Performed by: FAMILY MEDICINE

## 2022-07-03 PROCEDURE — 99232 SBSQ HOSP IP/OBS MODERATE 35: CPT | Mod: GC,,, | Performed by: FAMILY MEDICINE

## 2022-07-03 PROCEDURE — 63600175 PHARM REV CODE 636 W HCPCS: Performed by: FAMILY MEDICINE

## 2022-07-03 RX ORDER — HYDROCODONE BITARTRATE AND ACETAMINOPHEN 500; 5 MG/1; MG/1
TABLET ORAL
Status: DISCONTINUED | OUTPATIENT
Start: 2022-07-03 | End: 2022-07-07

## 2022-07-03 RX ORDER — HYDROCODONE BITARTRATE AND ACETAMINOPHEN 500; 5 MG/1; MG/1
TABLET ORAL
Status: DISCONTINUED | OUTPATIENT
Start: 2022-07-03 | End: 2022-07-03

## 2022-07-03 RX ADMIN — PIPERACILLIN AND TAZOBACTAM 4.5 G: 4; .5 INJECTION, POWDER, FOR SOLUTION INTRAVENOUS at 07:07

## 2022-07-03 RX ADMIN — OXYCODONE HYDROCHLORIDE AND ACETAMINOPHEN 500 MG: 500 TABLET ORAL at 09:07

## 2022-07-03 RX ADMIN — PANTOPRAZOLE SODIUM 8 MG/HR: 40 INJECTION, POWDER, FOR SOLUTION INTRAVENOUS at 12:07

## 2022-07-03 RX ADMIN — METOPROLOL TARTRATE 50 MG: 50 TABLET, FILM COATED ORAL at 09:07

## 2022-07-03 RX ADMIN — INSULIN DETEMIR 8 UNITS: 100 INJECTION, SOLUTION SUBCUTANEOUS at 09:07

## 2022-07-03 RX ADMIN — PANTOPRAZOLE SODIUM 8 MG/HR: 40 INJECTION, POWDER, FOR SOLUTION INTRAVENOUS at 04:07

## 2022-07-03 RX ADMIN — TRAZODONE HYDROCHLORIDE 50 MG: 50 TABLET ORAL at 09:07

## 2022-07-03 RX ADMIN — ALLOPURINOL 50 MG: 300 TABLET ORAL at 09:07

## 2022-07-03 RX ADMIN — ATORVASTATIN CALCIUM 40 MG: 40 TABLET, FILM COATED ORAL at 09:07

## 2022-07-03 RX ADMIN — ISOSORBIDE MONONITRATE 60 MG: 60 TABLET, EXTENDED RELEASE ORAL at 09:07

## 2022-07-03 RX ADMIN — MELATONIN 6 MG: at 09:07

## 2022-07-03 RX ADMIN — PIPERACILLIN AND TAZOBACTAM 4.5 G: 4; .5 INJECTION, POWDER, FOR SOLUTION INTRAVENOUS at 09:07

## 2022-07-03 RX ADMIN — MELATONIN 6 MG: at 03:07

## 2022-07-03 RX ADMIN — TRAZODONE HYDROCHLORIDE 50 MG: 50 TABLET ORAL at 03:07

## 2022-07-03 RX ADMIN — AMLODIPINE BESYLATE 10 MG: 10 TABLET ORAL at 09:07

## 2022-07-03 RX ADMIN — THERA TABS 1 TABLET: TAB at 09:07

## 2022-07-03 RX ADMIN — SODIUM CHLORIDE: 9 INJECTION, SOLUTION INTRAVENOUS at 09:07

## 2022-07-03 RX ADMIN — FERROUS SULFATE TAB 325 MG (65 MG ELEMENTAL FE) 1 EACH: 325 (65 FE) TAB at 09:07

## 2022-07-03 NOTE — SUBJECTIVE & OBJECTIVE
Interval History:  No abdominal pain or hematemesis.  One dark stool since yesterday.    Review of patient's allergies indicates:   Allergen Reactions    Gatifloxacin Anaphylaxis     Current Facility-Administered Medications   Medication Frequency    0.9%  NaCl infusion (for blood administration) Q24H PRN    acetaminophen tablet 1,000 mg Q6H PRN    albuterol inhaler 2 puff Q6H PRN    albuterol-ipratropium 2.5 mg-0.5 mg/3 mL nebulizer solution 3 mL Q4H    allopurinol split tablet 50 mg Daily    amLODIPine tablet 10 mg Daily    ascorbic acid (vitamin C) tablet 500 mg Daily    atorvastatin tablet 40 mg QHS    bisacodyL EC tablet 10 mg Daily PRN    budesonide nebulizer solution 0.5 mg Daily    calcium gluconate 1 g in NS IVPB (premixed) Q10 Min PRN    cetirizine tablet 10 mg Daily PRN    dextromethorphan-guaiFENesin  mg/5 ml liquid 10 mL Q6H PRN    dextrose 50% injection 12.5 g PRN    dextrose 50% injection 25 g PRN    ferrous sulfate tablet 1 each Daily    glucagon (human recombinant) injection 1 mg PRN    glucose chewable tablet 16 g PRN    glucose chewable tablet 24 g PRN    hydrALAZINE injection 10 mg Q6H PRN    HYDROcodone-acetaminophen 7.5-325 mg per tablet 1 tablet Q6H PRN    insulin aspart U-100 injection 1-10 Units QID (AC + HS) PRN    insulin detemir U-100 injection 8 Units QHS    isosorbide mononitrate 24 hr tablet 60 mg Daily    melatonin tablet 6 mg Nightly PRN    metoprolol tartrate (LOPRESSOR) tablet 50 mg Daily    mineral oil enema 1 enema Daily PRN    multivitamin tablet Daily    naloxone 0.4 mg/mL injection 0.02 mg PRN    ondansetron injection 8 mg Q6H PRN    pantoprazole (PROTONIX) 40 mg in sodium chloride 0.9 % 100 mL IVPB (MB+) Continuous    piperacillin-tazobactam (ZOSYN) 4.5 g in dextrose 5 % in water (D5W) 5 % 100 mL IVPB (MB+) Q12H    simethicone chewable tablet 80 mg TID PRN    sodium chloride 0.9% flush 10 mL Q12H PRN    traMADoL tablet 50 mg Q8H PRN    traZODone tablet 50 mg Nightly PRN        Objective:     Vital Signs (Most Recent):  Temp: 98.4 °F (36.9 °C) (07/03/22 1500)  Pulse: 88 (07/03/22 1500)  Resp: 18 (07/03/22 1500)  BP: (!) 149/75 (07/03/22 1500)  SpO2: 100 % (07/03/22 1500)  O2 Device (Oxygen Therapy): nasal cannula (07/03/22 1020)   Vital Signs (24h Range):  Temp:  [96.8 °F (36 °C)-98.6 °F (37 °C)] 98.4 °F (36.9 °C)  Pulse:  [76-97] 88  Resp:  [18-20] 18  SpO2:  [98 %-100 %] 100 %  BP: (122-149)/(60-75) 149/75     Weight: 122.5 kg (270 lb 1 oz) (07/03/22 0455)  Body mass index is 36.63 kg/m².  Body surface area is 2.49 meters squared.    I/O last 3 completed shifts:  In: 677.5 [Blood:677.5]  Out: -     Physical Exam  Eyes:      Pupils: Pupils are equal, round, and reactive to light.   Cardiovascular:      Rate and Rhythm: Normal rate and regular rhythm.   Pulmonary:      Effort: No respiratory distress.   Abdominal:      Tenderness: There is no abdominal tenderness.   Musculoskeletal:      Cervical back: Neck supple.      Right lower leg: No edema.      Left lower leg: No edema.   Neurological:      Mental Status: He is alert.       Significant Labs:  BMP:   Recent Labs   Lab 07/03/22 0213   GLU 52*      K 5.0   *   CO2 15*   *   CREATININE 6.03*   CALCIUM 7.1*     CBC:   Recent Labs   Lab 07/03/22 0213   WBC 11.17*   RBC 2.07*   HGB 6.2*   HCT 19.1*   *   MCV 92.3   MCH 30.0   MCHC 32.5        Significant Imaging:

## 2022-07-03 NOTE — ASSESSMENT & PLAN NOTE
- Patient with bleeding duodenal ulcer on EGD 7/2, ulcer clipped  - Repeat H/H this AM with drop to 6.2/19  - 1u PRBC ordered, will continue to monitor  - GI following, assistance appreciated

## 2022-07-03 NOTE — PROGRESS NOTES
[facility] - [address] [place] [address] Medicine  Progress Note    Patient Name: Neymar Parra  MRN: [id number]  Patient Class: IP- Inpatient   Admission Date: 6/16/2022  Length of Stay: 17 days  Attending Physician: Rj Fernandez MD  Primary Care Provider: [facility] megan Rodríguez        Subjective:     Principal Problem:Anemia        HPI:  72 y.o. male transferred to the [facility] from [facility] for hyperkalemia and hypoxia. Pt reports he went to [facility] because he was having dyspnea and hemoptysis since 1 month which is worse today. Pt reports he was tested positive for COVID at the nursing home on 6/9/22. Per nursing home patient oxygen saturation was in the 80's on RA with tachypnea and labored breathing. Pt reports he normally does not wear oxygen at the nursing home but has been wearing oxygen more often recently. Pt reports he smoked 1-1.5 PPD for 20 years but quit 7 months ago. Per pt, he saw a pulmonologist (Dr. Aldana) for his hemoptysis and had some tests done the results of which he does not know. Per records, pt with a right pleural effusion recently and had a thoracentesis. Pt denies any fever, congestion, dysuria, N/V/D, chest pain, or any other complaints at this time.     In the ED, pt's K 7.2, d-dimer 0.76, BNP 6284, BUN/Cr 73/7.16. EKG showed some peaked t-waves and irregular rhythm. Chest xray showed Increased right lower lung pulmonary density could indicate pneumonia. Pt was treated in the ED with Calcium gluconate 1g, regular insulin 10 units, D50, IV solumedrol 125 mg, tylenol 650 mg and IV fluids NS 1L bolus. Pt will be admitted to the hospital service for management of hypoxia and hyperkalemia.      Overview/Hospital Course:        Interval History: Repeat EGD performed yesterday with bleeding duodenal ulcer. Lesion clipped but patient had drop in H/H today requiring transfusion. No other complaints today. Breathing relatively comfortably with supplemental O2.     Review  of Systems   Constitutional:  Negative for activity change and appetite change.   HENT:  Negative for congestion and dental problem.    Eyes:  Negative for discharge and itching.   Respiratory:  Negative for apnea and chest tightness.    Cardiovascular:  Negative for chest pain and leg swelling.   Gastrointestinal:  Negative for abdominal distention.   Endocrine: Negative for cold intolerance and heat intolerance.   Genitourinary:  Negative for difficulty urinating and dysuria.   Musculoskeletal:  Negative for arthralgias and back pain.   Skin:  Negative for color change.   Allergic/Immunologic: Negative for environmental allergies.   Neurological:  Positive for weakness. Negative for dizziness and facial asymmetry.   Hematological:  Negative for adenopathy. Does not bruise/bleed easily.   Psychiatric/Behavioral:  Negative for agitation and behavioral problems.    Objective:     Vital Signs (Most Recent):  Temp: 97.8 °F (36.6 °C) (07/03/22 1035)  Pulse: 76 (07/03/22 1035)  Resp: 18 (07/03/22 1035)  BP: 130/64 (07/03/22 1035)  SpO2: 100 % (07/03/22 1015) Vital Signs (24h Range):  Temp:  [96.8 °F (36 °C)-98.6 °F (37 °C)] 97.8 °F (36.6 °C)  Pulse:  [] 76  Resp:  [18-22] 18  SpO2:  [95 %-100 %] 100 %  BP: (122-150)/(60-72) 130/64     Weight: 122.5 kg (270 lb 1 oz)  Body mass index is 36.63 kg/m².  No intake or output data in the 24 hours ending 07/03/22 1117   Physical Exam  Vitals reviewed.   Constitutional:       General: He is not in acute distress.     Appearance: Normal appearance.      Interventions: He is not intubated.  HENT:      Head: Normocephalic and atraumatic.      Mouth/Throat:      Mouth: Mucous membranes are dry.      Pharynx: Oropharynx is clear.   Eyes:      Extraocular Movements: Extraocular movements intact.      Conjunctiva/sclera: Conjunctivae normal.      Pupils: Pupils are equal, round, and reactive to light.   Cardiovascular:      Rate and Rhythm: Normal rate.      Heart sounds: Normal  heart sounds. No murmur heard.  Pulmonary:      Effort: Pulmonary effort is normal. No respiratory distress. He is not intubated.      Breath sounds: Normal breath sounds.   Abdominal:      General: Abdomen is flat. Bowel sounds are normal.      Palpations: Abdomen is soft.      Tenderness: There is abdominal tenderness (epigastric). There is no guarding or rebound.   Musculoskeletal:         General: Normal range of motion.      Cervical back: Normal range of motion and neck supple.      Right lower leg: No edema.      Left lower leg: No edema.   Skin:     General: Skin is warm and dry.      Capillary Refill: Capillary refill takes less than 2 seconds.   Neurological:      General: No focal deficit present.      Mental Status: He is alert and oriented to person, place, and time.   Psychiatric:         Mood and Affect: Mood normal.         Behavior: Behavior normal.       Significant Labs: All pertinent labs within the past 24 hours have been reviewed.  BMP:   Recent Labs   Lab 07/03/22  0213   GLU 52*      K 5.0   *   CO2 15*   *   CREATININE 6.03*   CALCIUM 7.1*     CBC:   Recent Labs   Lab 07/02/22  0248 07/02/22  1704 07/03/22  0213   WBC 10.88  --  11.17*   HGB 6.2* 7.2* 6.2*   HCT 17.6* 21.0* 19.1*   *  --  112*       Significant Imaging: I have reviewed all pertinent imaging results/findings within the past 24 hours.      Assessment/Plan:      * Anemia  - Patient with bleeding duodenal ulcer on EGD 7/2, ulcer clipped  - Repeat H/H this AM with drop to 6.2/19  - 1u PRBC ordered, will continue to monitor  - GI following, assistance appreciated     Gastrointestinal hemorrhage associated with duodenal ulcer  - EGD with bleeding duodenal ulcer as above  - H. Pylori pending   - Protonix infusion  - GI following, appreciate assistance       PNA (pneumonia)  - chest xray with worsening lower left lube consolidation and R sided pleural effusion  - continue IV zosyn    Acute on chronic renal  failure  - Some improvement in BUN/Cr at 113/6  - Nephrology following    Pleural effusion  - Continue to monitor  - Seen by pulmonology, appreciate assistance   - No thoracentesis at this time    Elevated d-dimer  - D-dimer 1.83  - V/Q scan with low probability for pulmonary embolism     Type 2 diabetes mellitus without complication  - HgA1c 4.7 6/22  - detemir 8U qhs with SSI     Gastroesophageal reflux disease  - Protonix infusion    Hypertension  Controlled, continue current meds      VTE Risk Mitigation (From admission, onward)         Ordered     Reason for No Pharmacological VTE Prophylaxis  Once        Question:  Reasons:  Answer:  Risk of Bleeding    06/16/22 0301     IP VTE HIGH RISK PATIENT  Once         06/16/22 0301     Place sequential compression device  Until discontinued         06/16/22 0301                Discharge Planning   EDWARDO:      Code Status: Full Code   Is the patient medically ready for discharge?:     Reason for patient still in hospital (select all that apply): Treatment  Discharge Plan A: Long-term acute care facility (LTAC) (Choice obtained for Specialty)          Tayler Jacob MD  Department of Hospital Medicine   [facility] - [address] [place] [address]

## 2022-07-03 NOTE — ANESTHESIA POSTPROCEDURE EVALUATION
Anesthesia Post Evaluation    Patient: Neymar Parra    Procedure(s) Performed: * No procedures listed *    Final Anesthesia Type: general      Patient location during evaluation: PACU  Patient participation: Yes- Able to Participate  Level of consciousness: awake and alert  Post-procedure vital signs: reviewed and stable  Pain management: adequate  Airway patency: patent  LACEY mitigation strategies: Multimodal analgesia  PONV status at discharge: No PONV  Anesthetic complications: no      Cardiovascular status: blood pressure returned to baseline  Respiratory status: unassisted  Hydration status: euvolemic  Follow-up not needed.          Vitals Value Taken Time   /69 07/03/22 0055   Temp 36.8 °C (98.2 °F) 07/03/22 0055   Pulse 92 07/03/22 0055   Resp 18 07/03/22 0055   SpO2 98 % 07/03/22 0055         Event Time   Out of Recovery 07/02/2022 12:09:46         Pain/Esther Score: Esther Score: 8 (7/2/2022 11:48 AM)

## 2022-07-03 NOTE — PROGRESS NOTES
[facility] - [address] [place] [address]  Nephrology  Progress Note    Patient Name: Neymar Parra  MRN: [id number]  Admission Date: 6/16/2022  Hospital Length of Stay: 17 days  Attending Provider: Rj Fernandez MD   Primary Care Physician: [facility] megan Anguianob  Principal Problem:Anemia    Subjective:     HPI: This patient with history of HTN, DM, CKD who has seen Dr. Crowder in the past 2 years ago is currently in a nursing facility.  The patient presented with SOB and diagnosed with COVID.  Serum creatinine has trended up to 7.15.  Serum potassium is 6.4.  Nephrology has been consulted for renal issues.  At the bedside, the patient mentions feeling fine.  He states that his breathing has improved.      Interval History:  No abdominal pain or hematemesis.  One dark stool since yesterday.    Review of patient's allergies indicates:   Allergen Reactions    Gatifloxacin Anaphylaxis     Current Facility-Administered Medications   Medication Frequency    0.9%  NaCl infusion (for blood administration) Q24H PRN    acetaminophen tablet 1,000 mg Q6H PRN    albuterol inhaler 2 puff Q6H PRN    albuterol-ipratropium 2.5 mg-0.5 mg/3 mL nebulizer solution 3 mL Q4H    allopurinol split tablet 50 mg Daily    amLODIPine tablet 10 mg Daily    ascorbic acid (vitamin C) tablet 500 mg Daily    atorvastatin tablet 40 mg QHS    bisacodyL EC tablet 10 mg Daily PRN    budesonide nebulizer solution 0.5 mg Daily    calcium gluconate 1 g in NS IVPB (premixed) Q10 Min PRN    cetirizine tablet 10 mg Daily PRN    dextromethorphan-guaiFENesin  mg/5 ml liquid 10 mL Q6H PRN    dextrose 50% injection 12.5 g PRN    dextrose 50% injection 25 g PRN    ferrous sulfate tablet 1 each Daily    glucagon (human recombinant) injection 1 mg PRN    glucose chewable tablet 16 g PRN    glucose chewable tablet 24 g PRN    hydrALAZINE injection 10 mg Q6H PRN    HYDROcodone-acetaminophen 7.5-325 mg per tablet 1 tablet Q6H  PRN    insulin aspart U-100 injection 1-10 Units QID (AC + HS) PRN    insulin detemir U-100 injection 8 Units QHS    isosorbide mononitrate 24 hr tablet 60 mg Daily    melatonin tablet 6 mg Nightly PRN    metoprolol tartrate (LOPRESSOR) tablet 50 mg Daily    mineral oil enema 1 enema Daily PRN    multivitamin tablet Daily    naloxone 0.4 mg/mL injection 0.02 mg PRN    ondansetron injection 8 mg Q6H PRN    pantoprazole (PROTONIX) 40 mg in sodium chloride 0.9 % 100 mL IVPB (MB+) Continuous    piperacillin-tazobactam (ZOSYN) 4.5 g in dextrose 5 % in water (D5W) 5 % 100 mL IVPB (MB+) Q12H    simethicone chewable tablet 80 mg TID PRN    sodium chloride 0.9% flush 10 mL Q12H PRN    traMADoL tablet 50 mg Q8H PRN    traZODone tablet 50 mg Nightly PRN       Objective:     Vital Signs (Most Recent):  Temp: 98.4 °F (36.9 °C) (07/03/22 1500)  Pulse: 88 (07/03/22 1500)  Resp: 18 (07/03/22 1500)  BP: (!) 149/75 (07/03/22 1500)  SpO2: 100 % (07/03/22 1500)  O2 Device (Oxygen Therapy): nasal cannula (07/03/22 1020)   Vital Signs (24h Range):  Temp:  [96.8 °F (36 °C)-98.6 °F (37 °C)] 98.4 °F (36.9 °C)  Pulse:  [76-97] 88  Resp:  [18-20] 18  SpO2:  [98 %-100 %] 100 %  BP: (122-149)/(60-75) 149/75     Weight: 122.5 kg (270 lb 1 oz) (07/03/22 0455)  Body mass index is 36.63 kg/m².  Body surface area is 2.49 meters squared.    I/O last 3 completed shifts:  In: 677.5 [Blood:677.5]  Out: -     Physical Exam  Eyes:      Pupils: Pupils are equal, round, and reactive to light.   Cardiovascular:      Rate and Rhythm: Normal rate and regular rhythm.   Pulmonary:      Effort: No respiratory distress.   Abdominal:      Tenderness: There is no abdominal tenderness.   Musculoskeletal:      Cervical back: Neck supple.      Right lower leg: No edema.      Left lower leg: No edema.   Neurological:      Mental Status: He is alert.       Significant Labs:  BMP:   Recent Labs   Lab 07/03/22  0213   GLU 52*      K 5.0   *   CO2  15*   *   CREATININE 6.03*   CALCIUM 7.1*     CBC:   Recent Labs   Lab 07/03/22  0213   WBC 11.17*   RBC 2.07*   HGB 6.2*   HCT 19.1*   *   MCV 92.3   MCH 30.0   MCHC 32.5        Significant Imaging:      Assessment/Plan:     * Anemia  He is being transfused 1 unit today.    Gastrointestinal hemorrhage associated with duodenal ulcer  Repeat endoscopy 07/02/2022    Acute on chronic renal failure  Creatinine slightly better at 6.0    Type 2 diabetes mellitus without complication  Chronic condition    Hypertension  Controlled        Thank you for your consult.     Blayne Salgado MD  Nephrology  [address]

## 2022-07-03 NOTE — SUBJECTIVE & OBJECTIVE
Subjective:     Interval History:  Patient with 1 dark stool noted over past 24 hours.  Hemodynamics stable.  Hemoglobin has trended down to 6.2.    Review of Systems   Constitutional:  Negative for chills and fever.   HENT:  Negative for trouble swallowing.    Respiratory:  Negative for cough and shortness of breath.    Cardiovascular:  Negative for chest pain.   Gastrointestinal:  Negative for abdominal pain, nausea and vomiting.   Genitourinary:  Negative for dysuria and flank pain.   Neurological:  Negative for syncope and weakness.   Psychiatric/Behavioral:  Negative for behavioral problems and confusion.    Objective:     Vital Signs (Most Recent):  Temp: 97.8 °F (36.6 °C) (07/03/22 1035)  Pulse: 76 (07/03/22 1035)  Resp: 18 (07/03/22 1035)  BP: 130/64 (07/03/22 1035)  SpO2: 100 % (07/03/22 1015) Vital Signs (24h Range):  Temp:  [96.8 °F (36 °C)-98.6 °F (37 °C)] 97.8 °F (36.6 °C)  Pulse:  [76-97] 76  Resp:  [18-21] 18  SpO2:  [95 %-100 %] 100 %  BP: (122-148)/(60-71) 130/64     Weight: 122.5 kg (270 lb 1 oz) (07/03/22 0455)  Body mass index is 36.63 kg/m².    No intake or output data in the 24 hours ending 07/03/22 1249    Lines/Drains/Airways       Peripheral Intravenous Line  Duration                  Peripheral IV - Single Lumen 06/22/22 1400 20 G Posterior;Right Forearm 10 days                    Physical Exam  Constitutional:       General: He is not in acute distress.     Appearance: He is not ill-appearing.   HENT:      Head: Normocephalic and atraumatic.   Eyes:      General: No scleral icterus.  Cardiovascular:      Rate and Rhythm: Normal rate and regular rhythm.      Pulses: Normal pulses.      Heart sounds: Normal heart sounds.   Pulmonary:      Effort: Pulmonary effort is normal. No respiratory distress.      Breath sounds: Normal breath sounds.   Abdominal:      General: Bowel sounds are normal. There is no distension.      Palpations: Abdomen is soft. There is no mass.      Tenderness: There  is no abdominal tenderness.   Skin:     General: Skin is warm and dry.   Neurological:      General: No focal deficit present.      Mental Status: He is alert and oriented to person, place, and time. Mental status is at baseline.      Sensory: No sensory deficit.   Psychiatric:         Mood and Affect: Mood normal.         Behavior: Behavior normal.       Significant Labs:  CBC:   Recent Labs   Lab 07/02/22  0248 07/02/22  1704 07/03/22  0213   WBC 10.88  --  11.17*   HGB 6.2* 7.2* 6.2*   HCT 17.6* 21.0* 19.1*   *  --  112*     BMP:   Recent Labs   Lab 07/03/22  0213   GLU 52*      K 5.0   *   CO2 15*   *   CREATININE 6.03*   CALCIUM 7.1*     CMP:   Recent Labs   Lab 07/03/22  0213   GLU 52*   CALCIUM 7.1*      K 5.0   CO2 15*   *   *   CREATININE 6.03*     Coagulation:   Recent Labs   Lab 07/03/22  1001   INR 1.23*   APTT 34.2         Significant Imaging:  Imaging results within the past 24 hours have been reviewed.

## 2022-07-03 NOTE — ASSESSMENT & PLAN NOTE
- chest xray with worsening lower left lube consolidation and R sided pleural effusion  - continue IV zosyn

## 2022-07-03 NOTE — PLAN OF CARE
Problem: Adult Inpatient Plan of Care  Goal: Plan of Care Review  Outcome: Ongoing, Progressing  Goal: Patient-Specific Goal (Individualized)  Outcome: Ongoing, Progressing  Goal: Absence of Hospital-Acquired Illness or Injury  Outcome: Ongoing, Progressing  Goal: Optimal Comfort and Wellbeing  Outcome: Ongoing, Progressing  Goal: Readiness for Transition of Care  Outcome: Ongoing, Progressing     Problem: Diabetes Comorbidity  Goal: Blood Glucose Level Within Targeted Range  Outcome: Ongoing, Progressing     Problem: Fluid and Electrolyte Imbalance (Acute Kidney Injury/Impairment)  Goal: Fluid and Electrolyte Balance  Outcome: Ongoing, Progressing     Problem: Oral Intake Inadequate (Acute Kidney Injury/Impairment)  Goal: Optimal Nutrition Intake  Outcome: Ongoing, Progressing     Problem: Renal Function Impairment (Acute Kidney Injury/Impairment)  Goal: Effective Renal Function  Outcome: Ongoing, Progressing     Problem: Skin Injury Risk Increased  Goal: Skin Health and Integrity  Outcome: Ongoing, Progressing     Problem: Fall Injury Risk  Goal: Absence of Fall and Fall-Related Injury  Outcome: Ongoing, Progressing     Problem: Fluid Imbalance (Pneumonia)  Goal: Fluid Balance  Outcome: Ongoing, Progressing     Problem: Respiratory Compromise (Pneumonia)  Goal: Effective Oxygenation and Ventilation  Outcome: Ongoing, Progressing

## 2022-07-03 NOTE — PROGRESS NOTES
[address]  Gastroenterology  Progress Note    Patient Name: Neymar Parra  MRN: [id number]  Admission Date: 6/16/2022  Hospital Length of Stay: 17 days  Code Status: Full Code   Attending Provider: Rj Fernandez MD  Consulting Provider: ROGELIO Arredondo MD  Primary Care Physician: [facility] megan Elmore  Principal Problem: Anemia      Subjective:     Interval History:  Patient with 1 dark stool noted over past 24 hours.  Hemodynamics stable.  Hemoglobin has trended down to 6.2.    Review of Systems   Constitutional:  Negative for chills and fever.   HENT:  Negative for trouble swallowing.    Respiratory:  Negative for cough and shortness of breath.    Cardiovascular:  Negative for chest pain.   Gastrointestinal:  Negative for abdominal pain, nausea and vomiting.   Genitourinary:  Negative for dysuria and flank pain.   Neurological:  Negative for syncope and weakness.   Psychiatric/Behavioral:  Negative for behavioral problems and confusion.    Objective:     Vital Signs (Most Recent):  Temp: 97.8 °F (36.6 °C) (07/03/22 1035)  Pulse: 76 (07/03/22 1035)  Resp: 18 (07/03/22 1035)  BP: 130/64 (07/03/22 1035)  SpO2: 100 % (07/03/22 1015) Vital Signs (24h Range):  Temp:  [96.8 °F (36 °C)-98.6 °F (37 °C)] 97.8 °F (36.6 °C)  Pulse:  [76-97] 76  Resp:  [18-21] 18  SpO2:  [95 %-100 %] 100 %  BP: (122-148)/(60-71) 130/64     Weight: 122.5 kg (270 lb 1 oz) (07/03/22 0455)  Body mass index is 36.63 kg/m².    No intake or output data in the 24 hours ending 07/03/22 1249    Lines/Drains/Airways       Peripheral Intravenous Line  Duration                  Peripheral IV - Single Lumen 06/22/22 1400 20 G Posterior;Right Forearm 10 days                    Physical Exam  Constitutional:       General: He is not in acute distress.     Appearance: He is not ill-appearing.   HENT:      Head: Normocephalic and atraumatic.   Eyes:      General: No scleral icterus.  Cardiovascular:      Rate  and Rhythm: Normal rate and regular rhythm.      Pulses: Normal pulses.      Heart sounds: Normal heart sounds.   Pulmonary:      Effort: Pulmonary effort is normal. No respiratory distress.      Breath sounds: Normal breath sounds.   Abdominal:      General: Bowel sounds are normal. There is no distension.      Palpations: Abdomen is soft. There is no mass.      Tenderness: There is no abdominal tenderness.   Skin:     General: Skin is warm and dry.   Neurological:      General: No focal deficit present.      Mental Status: He is alert and oriented to person, place, and time. Mental status is at baseline.      Sensory: No sensory deficit.   Psychiatric:         Mood and Affect: Mood normal.         Behavior: Behavior normal.       Significant Labs:  CBC:   Recent Labs   Lab 07/02/22  0248 07/02/22  1704 07/03/22  0213   WBC 10.88  --  11.17*   HGB 6.2* 7.2* 6.2*   HCT 17.6* 21.0* 19.1*   *  --  112*     BMP:   Recent Labs   Lab 07/03/22  0213   GLU 52*      K 5.0   *   CO2 15*   *   CREATININE 6.03*   CALCIUM 7.1*     CMP:   Recent Labs   Lab 07/03/22  0213   GLU 52*   CALCIUM 7.1*      K 5.0   CO2 15*   *   *   CREATININE 6.03*     Coagulation:   Recent Labs   Lab 07/03/22  1001   INR 1.23*   APTT 34.2         Significant Imaging:  Imaging results within the past 24 hours have been reviewed.    Assessment/Plan:     Gastrointestinal hemorrhage associated with duodenal ulcer  7/3-hemoglobin has trended down to 6.2 but no active GI bleeding noted further since endoscopic therapy yesterday.  This may be equalibration reflecting bleeding prior to procedure yesterday.  Continue serial hemoglobins, close clinical observation, and IV Protonix infusion.        Thank you for your consult. I will follow-up with patient. Please contact us if you have any additional questions.    ROGELIO Arredondo MD  Gastroenterology  [address]

## 2022-07-03 NOTE — SUBJECTIVE & OBJECTIVE
Interval History:  He denies SOB.  No abdominal pain.    Review of patient's allergies indicates:   Allergen Reactions    Gatifloxacin Anaphylaxis     Current Facility-Administered Medications   Medication Frequency    0.9%  NaCl infusion (for blood administration) Q24H PRN    acetaminophen tablet 1,000 mg Q6H PRN    albuterol inhaler 2 puff Q6H PRN    albuterol-ipratropium 2.5 mg-0.5 mg/3 mL nebulizer solution 3 mL Q4H    allopurinol split tablet 50 mg Daily    amLODIPine tablet 10 mg Daily    ascorbic acid (vitamin C) tablet 500 mg Daily    atorvastatin tablet 40 mg QHS    bisacodyL EC tablet 10 mg Daily PRN    budesonide nebulizer solution 0.5 mg Daily    calcium gluconate 1 g in NS IVPB (premixed) Q10 Min PRN    cetirizine tablet 10 mg Daily PRN    dextromethorphan-guaiFENesin  mg/5 ml liquid 10 mL Q6H PRN    dextrose 50% injection 12.5 g PRN    dextrose 50% injection 25 g PRN    ferrous sulfate tablet 1 each Daily    glucagon (human recombinant) injection 1 mg PRN    glucose chewable tablet 16 g PRN    glucose chewable tablet 24 g PRN    hydrALAZINE injection 10 mg Q6H PRN    HYDROcodone-acetaminophen 7.5-325 mg per tablet 1 tablet Q6H PRN    insulin aspart U-100 injection 1-10 Units QID (AC + HS) PRN    insulin detemir U-100 injection 8 Units QHS    isosorbide mononitrate 24 hr tablet 60 mg Daily    melatonin tablet 6 mg Nightly PRN    metoprolol tartrate (LOPRESSOR) tablet 50 mg Daily    mineral oil enema 1 enema Daily PRN    multivitamin tablet Daily    naloxone 0.4 mg/mL injection 0.02 mg PRN    ondansetron injection 8 mg Q6H PRN    pantoprazole (PROTONIX) 40 mg in sodium chloride 0.9 % 100 mL IVPB (MB+) Continuous    piperacillin-tazobactam (ZOSYN) 4.5 g in dextrose 5 % in water (D5W) 5 % 100 mL IVPB (MB+) Q12H    simethicone chewable tablet 80 mg TID PRN    sodium chloride 0.9% flush 10 mL Q12H PRN    traMADoL tablet 50 mg Q8H PRN    traZODone tablet 50 mg Nightly PRN       Objective:     Vital  Signs (Most Recent):  Temp: 98 °F (36.7 °C) (07/02/22 1943)  Pulse: 94 (07/02/22 2002)  Resp: 20 (07/02/22 2002)  BP: 139/62 (07/02/22 1943)  SpO2: 99 % (07/02/22 2002)  O2 Device (Oxygen Therapy): nasal cannula (07/02/22 1943) Vital Signs (24h Range):  Temp:  [97.8 °F (36.6 °C)-98.3 °F (36.8 °C)] 98 °F (36.7 °C)  Pulse:  [] 94  Resp:  [18-24] 20  SpO2:  [90 %-100 %] 99 %  BP: (129-169)/(61-85) 139/62     Weight: 122.5 kg (270 lb 1 oz) (07/02/22 0455)  Body mass index is 36.63 kg/m².  Body surface area is 2.49 meters squared.    I/O last 3 completed shifts:  In: 1353 [Blood:1353]  Out: 500 [Urine:500]    Physical Exam  Eyes:      Pupils: Pupils are equal, round, and reactive to light.   Cardiovascular:      Rate and Rhythm: Normal rate and regular rhythm.   Pulmonary:      Effort: No respiratory distress.   Abdominal:      Tenderness: There is no abdominal tenderness.   Musculoskeletal:      Cervical back: Neck supple.      Right lower leg: No edema.      Left lower leg: No edema.   Neurological:      Mental Status: He is alert.       Significant Labs:  BMP:   Recent Labs   Lab 07/02/22  0248   *      K 4.8   *   CO2 21   *   CREATININE 6.36*   CALCIUM 8.3*     CBC:   Recent Labs   Lab 07/02/22  0248 07/02/22  1704   WBC 10.88  --    RBC 2.11*  --    HGB 6.2* 7.2*   HCT 17.6* 21.0*   *  --    MCV 83.4  --    MCH 29.4  --    MCHC 35.2  --         Significant Imaging:

## 2022-07-03 NOTE — PROGRESS NOTES
[facility], [place] - [address] [place] [address]  Nephrology  Progress Note    Patient Name: Neymar Parra  MRN: [id number]  Admission Date: 6/16/2022  Hospital Length of Stay: 16 days  Attending Provider: Rj Fernandez MD   Primary Care Physician: [facility] megan Robertlb  Principal Problem:Anemia    Subjective:     HPI: This patient with history of HTN, DM, CKD who has seen Dr. Crowder in the past 2 years ago is currently in a nursing facility.  The patient presented with SOB and diagnosed with COVID.  Serum creatinine has trended up to 7.15.  Serum potassium is 6.4.  Nephrology has been consulted for renal issues.  At the bedside, the patient mentions feeling fine.  He states that his breathing has improved.      Interval History:  He denies SOB.  No abdominal pain.    Review of patient's allergies indicates:   Allergen Reactions    Gatifloxacin Anaphylaxis     Current Facility-Administered Medications   Medication Frequency    0.9%  NaCl infusion (for blood administration) Q24H PRN    acetaminophen tablet 1,000 mg Q6H PRN    albuterol inhaler 2 puff Q6H PRN    albuterol-ipratropium 2.5 mg-0.5 mg/3 mL nebulizer solution 3 mL Q4H    allopurinol split tablet 50 mg Daily    amLODIPine tablet 10 mg Daily    ascorbic acid (vitamin C) tablet 500 mg Daily    atorvastatin tablet 40 mg QHS    bisacodyL EC tablet 10 mg Daily PRN    budesonide nebulizer solution 0.5 mg Daily    calcium gluconate 1 g in NS IVPB (premixed) Q10 Min PRN    cetirizine tablet 10 mg Daily PRN    dextromethorphan-guaiFENesin  mg/5 ml liquid 10 mL Q6H PRN    dextrose 50% injection 12.5 g PRN    dextrose 50% injection 25 g PRN    ferrous sulfate tablet 1 each Daily    glucagon (human recombinant) injection 1 mg PRN    glucose chewable tablet 16 g PRN    glucose chewable tablet 24 g PRN    hydrALAZINE injection 10 mg Q6H PRN    HYDROcodone-acetaminophen 7.5-325 mg per tablet 1 tablet Q6H PRN    insulin aspart U-100  injection 1-10 Units QID (AC + HS) PRN    insulin detemir U-100 injection 8 Units QHS    isosorbide mononitrate 24 hr tablet 60 mg Daily    melatonin tablet 6 mg Nightly PRN    metoprolol tartrate (LOPRESSOR) tablet 50 mg Daily    mineral oil enema 1 enema Daily PRN    multivitamin tablet Daily    naloxone 0.4 mg/mL injection 0.02 mg PRN    ondansetron injection 8 mg Q6H PRN    pantoprazole (PROTONIX) 40 mg in sodium chloride 0.9 % 100 mL IVPB (MB+) Continuous    piperacillin-tazobactam (ZOSYN) 4.5 g in dextrose 5 % in water (D5W) 5 % 100 mL IVPB (MB+) Q12H    simethicone chewable tablet 80 mg TID PRN    sodium chloride 0.9% flush 10 mL Q12H PRN    traMADoL tablet 50 mg Q8H PRN    traZODone tablet 50 mg Nightly PRN       Objective:     Vital Signs (Most Recent):  Temp: 98 °F (36.7 °C) (07/02/22 1943)  Pulse: 94 (07/02/22 2002)  Resp: 20 (07/02/22 2002)  BP: 139/62 (07/02/22 1943)  SpO2: 99 % (07/02/22 2002)  O2 Device (Oxygen Therapy): nasal cannula (07/02/22 1943) Vital Signs (24h Range):  Temp:  [97.8 °F (36.6 °C)-98.3 °F (36.8 °C)] 98 °F (36.7 °C)  Pulse:  [] 94  Resp:  [18-24] 20  SpO2:  [90 %-100 %] 99 %  BP: (129-169)/(61-85) 139/62     Weight: 122.5 kg (270 lb 1 oz) (07/02/22 0525)  Body mass index is 36.63 kg/m².  Body surface area is 2.49 meters squared.    I/O last 3 completed shifts:  In: 1353 [Blood:1353]  Out: 500 [Urine:500]    Physical Exam  Eyes:      Pupils: Pupils are equal, round, and reactive to light.   Cardiovascular:      Rate and Rhythm: Normal rate and regular rhythm.   Pulmonary:      Effort: No respiratory distress.   Abdominal:      Tenderness: There is no abdominal tenderness.   Musculoskeletal:      Cervical back: Neck supple.      Right lower leg: No edema.      Left lower leg: No edema.   Neurological:      Mental Status: He is alert.       Significant Labs:  BMP:   Recent Labs   Lab 07/02/22  0248   *      K 4.8   *   CO2 21   *    CREATININE 6.36*   CALCIUM 8.3*     CBC:   Recent Labs   Lab 07/02/22  0248 07/02/22  1704   WBC 10.88  --    RBC 2.11*  --    HGB 6.2* 7.2*   HCT 17.6* 21.0*   *  --    MCV 83.4  --    MCH 29.4  --    MCHC 35.2  --         Significant Imaging:      Assessment/Plan:     * Anemia  7-22:  Hemoglobin down to 6.2.  He was transfused today.  Repeat EGD was done.    Acute on chronic renal failure  Creatinine stable at 6.3    Type 2 diabetes mellitus without complication  Chronic condition    COPD exacerbation  Asymptomatic today    Hypertension  Controlled        Thank you for your consult.     Blayne Salgado MD  Nephrology  [facility] - [address] [place] [address]

## 2022-07-03 NOTE — ASSESSMENT & PLAN NOTE
7/3-hemoglobin has trended down to 6.2 but no active GI bleeding noted further since endoscopic therapy yesterday.  This may be equalibration reflecting bleeding prior to procedure yesterday.  Continue serial hemoglobins, close clinical observation, and IV Protonix infusion.

## 2022-07-03 NOTE — ASSESSMENT & PLAN NOTE
- EGD with bleeding duodenal ulcer as above  - H. Pylori pending   - Protonix infusion  - GI following, appreciate assistance

## 2022-07-03 NOTE — SUBJECTIVE & OBJECTIVE
Interval History: Repeat EGD performed yesterday with bleeding duodenal ulcer. Lesion clipped but patient had drop in H/H today requiring transfusion. No other complaints today. Breathing relatively comfortably with supplemental O2.     Review of Systems   Constitutional:  Negative for activity change and appetite change.   HENT:  Negative for congestion and dental problem.    Eyes:  Negative for discharge and itching.   Respiratory:  Negative for apnea and chest tightness.    Cardiovascular:  Negative for chest pain and leg swelling.   Gastrointestinal:  Negative for abdominal distention.   Endocrine: Negative for cold intolerance and heat intolerance.   Genitourinary:  Negative for difficulty urinating and dysuria.   Musculoskeletal:  Negative for arthralgias and back pain.   Skin:  Negative for color change.   Allergic/Immunologic: Negative for environmental allergies.   Neurological:  Positive for weakness. Negative for dizziness and facial asymmetry.   Hematological:  Negative for adenopathy. Does not bruise/bleed easily.   Psychiatric/Behavioral:  Negative for agitation and behavioral problems.    Objective:     Vital Signs (Most Recent):  Temp: 97.8 °F (36.6 °C) (07/03/22 1035)  Pulse: 76 (07/03/22 1035)  Resp: 18 (07/03/22 1035)  BP: 130/64 (07/03/22 1035)  SpO2: 100 % (07/03/22 1015) Vital Signs (24h Range):  Temp:  [96.8 °F (36 °C)-98.6 °F (37 °C)] 97.8 °F (36.6 °C)  Pulse:  [] 76  Resp:  [18-22] 18  SpO2:  [95 %-100 %] 100 %  BP: (122-150)/(60-72) 130/64     Weight: 122.5 kg (270 lb 1 oz)  Body mass index is 36.63 kg/m².  No intake or output data in the 24 hours ending 07/03/22 1117   Physical Exam  Vitals reviewed.   Constitutional:       General: He is not in acute distress.     Appearance: Normal appearance.      Interventions: He is not intubated.  HENT:      Head: Normocephalic and atraumatic.      Mouth/Throat:      Mouth: Mucous membranes are dry.      Pharynx: Oropharynx is clear.   Eyes:       Extraocular Movements: Extraocular movements intact.      Conjunctiva/sclera: Conjunctivae normal.      Pupils: Pupils are equal, round, and reactive to light.   Cardiovascular:      Rate and Rhythm: Normal rate.      Heart sounds: Normal heart sounds. No murmur heard.  Pulmonary:      Effort: Pulmonary effort is normal. No respiratory distress. He is not intubated.      Breath sounds: Normal breath sounds.   Abdominal:      General: Abdomen is flat. Bowel sounds are normal.      Palpations: Abdomen is soft.      Tenderness: There is abdominal tenderness (epigastric). There is no guarding or rebound.   Musculoskeletal:         General: Normal range of motion.      Cervical back: Normal range of motion and neck supple.      Right lower leg: No edema.      Left lower leg: No edema.   Skin:     General: Skin is warm and dry.      Capillary Refill: Capillary refill takes less than 2 seconds.   Neurological:      General: No focal deficit present.      Mental Status: He is alert and oriented to person, place, and time.   Psychiatric:         Mood and Affect: Mood normal.         Behavior: Behavior normal.       Significant Labs: All pertinent labs within the past 24 hours have been reviewed.  BMP:   Recent Labs   Lab 07/03/22  0213   GLU 52*      K 5.0   *   CO2 15*   *   CREATININE 6.03*   CALCIUM 7.1*     CBC:   Recent Labs   Lab 07/02/22  0248 07/02/22  1704 07/03/22  0213   WBC 10.88  --  11.17*   HGB 6.2* 7.2* 6.2*   HCT 17.6* 21.0* 19.1*   *  --  112*       Significant Imaging: I have reviewed all pertinent imaging results/findings within the past 24 hours.

## 2022-07-03 NOTE — PLAN OF CARE
Problem: Adult Inpatient Plan of Care  Goal: Absence of Hospital-Acquired Illness or Injury  Outcome: Ongoing, Progressing     Problem: Gas Exchange Impaired  Goal: Optimal Gas Exchange  Outcome: Ongoing, Progressing     Problem: Respiratory Compromise (Pneumonia)  Goal: Effective Oxygenation and Ventilation  Outcome: Ongoing, Progressing

## 2022-07-03 NOTE — ASSESSMENT & PLAN NOTE
- Continue to monitor  - Seen by pulmonology, appreciate assistance   - No thoracentesis at this time

## 2022-07-04 LAB
ANION GAP SERPL CALCULATED.3IONS-SCNC: 15 MMOL/L (ref 7–16)
BASOPHILS # BLD AUTO: 0 K/UL (ref 0–0.2)
BASOPHILS NFR BLD AUTO: 0 % (ref 0–1)
BUN SERPL-MCNC: 115 MG/DL (ref 7–18)
BUN/CREAT SERPL: 20 (ref 6–20)
CALCIUM SERPL-MCNC: 8.2 MG/DL (ref 8.5–10.1)
CHLORIDE SERPL-SCNC: 114 MMOL/L (ref 98–107)
CO2 SERPL-SCNC: 16 MMOL/L (ref 21–32)
CREAT SERPL-MCNC: 5.8 MG/DL (ref 0.7–1.3)
DIFFERENTIAL METHOD BLD: ABNORMAL
EOSINOPHIL # BLD AUTO: 0.3 K/UL (ref 0–0.5)
EOSINOPHIL NFR BLD AUTO: 3.2 % (ref 1–4)
ERYTHROCYTE [DISTWIDTH] IN BLOOD BY AUTOMATED COUNT: 16.3 % (ref 11.5–14.5)
GLUCOSE SERPL-MCNC: 100 MG/DL (ref 70–105)
GLUCOSE SERPL-MCNC: 107 MG/DL (ref 70–105)
GLUCOSE SERPL-MCNC: 128 MG/DL (ref 70–105)
GLUCOSE SERPL-MCNC: 71 MG/DL (ref 74–106)
GLUCOSE SERPL-MCNC: 80 MG/DL (ref 70–105)
H. PYLORI STOOL: NEGATIVE
HCT VFR BLD AUTO: 21.8 % (ref 40–54)
HGB BLD-MCNC: 7.4 G/DL (ref 13.5–18)
IMM GRANULOCYTES # BLD AUTO: 0.04 K/UL (ref 0–0.04)
IMM GRANULOCYTES NFR BLD: 0.4 % (ref 0–0.4)
LYMPHOCYTES # BLD AUTO: 0.52 K/UL (ref 1–4.8)
LYMPHOCYTES NFR BLD AUTO: 5.5 % (ref 27–41)
MCH RBC QN AUTO: 30.5 PG (ref 27–31)
MCHC RBC AUTO-ENTMCNC: 33.9 G/DL (ref 32–36)
MCV RBC AUTO: 89.7 FL (ref 80–96)
MONOCYTES # BLD AUTO: 0.5 K/UL (ref 0–0.8)
MONOCYTES NFR BLD AUTO: 5.3 % (ref 2–6)
MPC BLD CALC-MCNC: 9.7 FL (ref 9.4–12.4)
NEUTROPHILS # BLD AUTO: 8.14 K/UL (ref 1.8–7.7)
NEUTROPHILS NFR BLD AUTO: 85.6 % (ref 53–65)
NRBC # BLD AUTO: 0 X10E3/UL
NRBC, AUTO (.00): 0 %
PLATELET # BLD AUTO: 83 K/UL (ref 150–400)
POTASSIUM SERPL-SCNC: 5.1 MMOL/L (ref 3.5–5.1)
RBC # BLD AUTO: 2.43 M/UL (ref 4.6–6.2)
SODIUM SERPL-SCNC: 140 MMOL/L (ref 136–145)
WBC # BLD AUTO: 9.5 K/UL (ref 4.5–11)

## 2022-07-04 PROCEDURE — 11000001 HC ACUTE MED/SURG PRIVATE ROOM

## 2022-07-04 PROCEDURE — 99900035 HC TECH TIME PER 15 MIN (STAT)

## 2022-07-04 PROCEDURE — 63600175 PHARM REV CODE 636 W HCPCS: Performed by: INTERNAL MEDICINE

## 2022-07-04 PROCEDURE — 87338 HPYLORI STOOL AG IA: CPT

## 2022-07-04 PROCEDURE — 99232 PR SUBSEQUENT HOSPITAL CARE,LEVL II: ICD-10-PCS | Mod: GC,,, | Performed by: FAMILY MEDICINE

## 2022-07-04 PROCEDURE — 85025 COMPLETE CBC W/AUTO DIFF WBC: CPT | Performed by: FAMILY MEDICINE

## 2022-07-04 PROCEDURE — 80048 BASIC METABOLIC PNL TOTAL CA: CPT | Performed by: FAMILY MEDICINE

## 2022-07-04 PROCEDURE — 25000003 PHARM REV CODE 250

## 2022-07-04 PROCEDURE — 25000003 PHARM REV CODE 250: Performed by: FAMILY MEDICINE

## 2022-07-04 PROCEDURE — 63600175 PHARM REV CODE 636 W HCPCS: Performed by: FAMILY MEDICINE

## 2022-07-04 PROCEDURE — 25000003 PHARM REV CODE 250: Performed by: INTERNAL MEDICINE

## 2022-07-04 PROCEDURE — 36415 COLL VENOUS BLD VENIPUNCTURE: CPT | Performed by: FAMILY MEDICINE

## 2022-07-04 PROCEDURE — 99232 SBSQ HOSP IP/OBS MODERATE 35: CPT | Mod: GC,,, | Performed by: FAMILY MEDICINE

## 2022-07-04 PROCEDURE — 82962 GLUCOSE BLOOD TEST: CPT

## 2022-07-04 PROCEDURE — 25000003 PHARM REV CODE 250: Performed by: HOSPITALIST

## 2022-07-04 PROCEDURE — C9113 INJ PANTOPRAZOLE SODIUM, VIA: HCPCS | Performed by: INTERNAL MEDICINE

## 2022-07-04 PROCEDURE — 94761 N-INVAS EAR/PLS OXIMETRY MLT: CPT

## 2022-07-04 RX ADMIN — ISOSORBIDE MONONITRATE 60 MG: 60 TABLET, EXTENDED RELEASE ORAL at 08:07

## 2022-07-04 RX ADMIN — OXYCODONE HYDROCHLORIDE AND ACETAMINOPHEN 500 MG: 500 TABLET ORAL at 08:07

## 2022-07-04 RX ADMIN — AMLODIPINE BESYLATE 10 MG: 10 TABLET ORAL at 08:07

## 2022-07-04 RX ADMIN — PIPERACILLIN AND TAZOBACTAM 4.5 G: 4; .5 INJECTION, POWDER, FOR SOLUTION INTRAVENOUS at 08:07

## 2022-07-04 RX ADMIN — THERA TABS 1 TABLET: TAB at 08:07

## 2022-07-04 RX ADMIN — ALLOPURINOL 50 MG: 300 TABLET ORAL at 08:07

## 2022-07-04 RX ADMIN — SIMETHICONE 80 MG: 80 TABLET, CHEWABLE ORAL at 12:07

## 2022-07-04 RX ADMIN — FERROUS SULFATE TAB 325 MG (65 MG ELEMENTAL FE) 1 EACH: 325 (65 FE) TAB at 08:07

## 2022-07-04 RX ADMIN — PANTOPRAZOLE SODIUM 8 MG/HR: 40 INJECTION, POWDER, FOR SOLUTION INTRAVENOUS at 02:07

## 2022-07-04 RX ADMIN — METOPROLOL TARTRATE 50 MG: 50 TABLET, FILM COATED ORAL at 08:07

## 2022-07-04 RX ADMIN — TRAZODONE HYDROCHLORIDE 50 MG: 50 TABLET ORAL at 08:07

## 2022-07-04 RX ADMIN — ATORVASTATIN CALCIUM 40 MG: 40 TABLET, FILM COATED ORAL at 08:07

## 2022-07-04 NOTE — PROGRESS NOTES
[address]  Nephrology  Progress Note    Patient Name: Neymar Parra  MRN: [id number]  Admission Date: 6/16/2022  Hospital Length of Stay: 18 days  Attending Provider: Earl Lemus Jr., MD   Primary Care Physician: [facility] megan [place]bourn  Principal Problem:Gastrointestinal hemorrhage associated with duodenal ulcer    Subjective:     HPI: This patient with history of HTN, DM, CKD who has seen Dr. Crowder in the past 2 years ago is currently in a nursing facility.  The patient presented with SOB and diagnosed with COVID.  Serum creatinine has trended up to 7.15.  Serum potassium is 6.4.  Nephrology has been consulted for renal issues.  At the bedside, the patient mentions feeling fine.  He states that his breathing has improved.      Interval History:  He denies abdominal pain.  No SOB.    Review of patient's allergies indicates:   Allergen Reactions    Gatifloxacin Anaphylaxis     Current Facility-Administered Medications   Medication Frequency    0.9%  NaCl infusion (for blood administration) Q24H PRN    acetaminophen tablet 1,000 mg Q6H PRN    albuterol inhaler 2 puff Q6H PRN    allopurinol split tablet 50 mg Daily    amLODIPine tablet 10 mg Daily    ascorbic acid (vitamin C) tablet 500 mg Daily    atorvastatin tablet 40 mg QHS    bisacodyL EC tablet 10 mg Daily PRN    calcium gluconate 1 g in NS IVPB (premixed) Q10 Min PRN    cetirizine tablet 10 mg Daily PRN    dextromethorphan-guaiFENesin  mg/5 ml liquid 10 mL Q6H PRN    dextrose 50% injection 12.5 g PRN    dextrose 50% injection 25 g PRN    ferrous sulfate tablet 1 each Daily    glucagon (human recombinant) injection 1 mg PRN    glucose chewable tablet 16 g PRN    glucose chewable tablet 24 g PRN    hydrALAZINE injection 10 mg Q6H PRN    HYDROcodone-acetaminophen 7.5-325 mg per tablet 1 tablet Q6H PRN    insulin aspart U-100 injection 1-10 Units QID (AC + HS) PRN    insulin detemir U-100  injection 8 Units QHS    isosorbide mononitrate 24 hr tablet 60 mg Daily    melatonin tablet 6 mg Nightly PRN    metoprolol tartrate (LOPRESSOR) tablet 50 mg Daily    mineral oil enema 1 enema Daily PRN    multivitamin tablet Daily    naloxone 0.4 mg/mL injection 0.02 mg PRN    ondansetron injection 8 mg Q6H PRN    pantoprazole (PROTONIX) 40 mg in sodium chloride 0.9 % 100 mL IVPB (MB+) Continuous    piperacillin-tazobactam (ZOSYN) 4.5 g in dextrose 5 % in water (D5W) 5 % 100 mL IVPB (MB+) Q12H    simethicone chewable tablet 80 mg TID PRN    sodium chloride 0.9% flush 10 mL Q12H PRN    traMADoL tablet 50 mg Q8H PRN    traZODone tablet 50 mg Nightly PRN       Objective:     Vital Signs (Most Recent):  Temp: 97.4 °F (36.3 °C) (07/04/22 1500)  Pulse: 76 (07/04/22 1500)  Resp: 18 (07/04/22 1500)  BP: (!) 140/76 (07/04/22 1500)  SpO2: 95 % (07/04/22 1500)  O2 Device (Oxygen Therapy): room air (07/04/22 0800)   Vital Signs (24h Range):  Temp:  [96.4 °F (35.8 °C)-98.6 °F (37 °C)] 97.4 °F (36.3 °C)  Pulse:  [76-91] 76  Resp:  [18-19] 18  SpO2:  [95 %-99 %] 95 %  BP: (120-172)/(59-92) 140/76     Weight: 122.5 kg (270 lb 1 oz) (07/04/22 0455)  Body mass index is 36.63 kg/m².  Body surface area is 2.49 meters squared.    I/O last 3 completed shifts:  In: 691 [Blood:691]  Out: 375 [Urine:375]    Physical Exam  Eyes:      Pupils: Pupils are equal, round, and reactive to light.   Cardiovascular:      Rate and Rhythm: Normal rate and regular rhythm.   Pulmonary:      Effort: No respiratory distress.   Abdominal:      Tenderness: There is no abdominal tenderness.   Musculoskeletal:      Cervical back: Neck supple.      Right lower leg: No edema.      Left lower leg: No edema.   Neurological:      Mental Status: He is alert.       Significant Labs:  BMP:   Recent Labs   Lab 07/04/22  0339   GLU 71*      K 5.1   *   CO2 16*   *   CREATININE 5.80*   CALCIUM 8.2*        Significant  Imaging:      Assessment/Plan:     Anemia  Hemoglobin 7.4    Acute on chronic renal failure  Creatinine improved to 5.8    Type 2 diabetes mellitus without complication  Chronic condition    Hypertension  Controlled        Thank you for your consult.     Blayne Salgado MD  Nephrology  [facility] - [address] [place] [address]

## 2022-07-04 NOTE — ASSESSMENT & PLAN NOTE
- Patient with bleeding duodenal ulcer on EGD 7/2, ulcer clipped  - Got 2 units blood for low H&H yesterday, patient has had a total of 5 this admission  - H&H 7.4/21.8 today, stable, continue to monitor  - GI following, assistance appreciated

## 2022-07-04 NOTE — PLAN OF CARE
Problem: Adult Inpatient Plan of Care  Goal: Plan of Care Review  Outcome: Ongoing, Progressing  Goal: Patient-Specific Goal (Individualized)  Outcome: Ongoing, Progressing  Goal: Absence of Hospital-Acquired Illness or Injury  Outcome: Ongoing, Progressing  Goal: Optimal Comfort and Wellbeing  Outcome: Ongoing, Progressing  Goal: Readiness for Transition of Care  Outcome: Ongoing, Progressing     Problem: Diabetes Comorbidity  Goal: Blood Glucose Level Within Targeted Range  Outcome: Ongoing, Progressing     Problem: Fluid and Electrolyte Imbalance (Acute Kidney Injury/Impairment)  Goal: Fluid and Electrolyte Balance  Outcome: Ongoing, Progressing     Problem: Oral Intake Inadequate (Acute Kidney Injury/Impairment)  Goal: Optimal Nutrition Intake  Outcome: Ongoing, Progressing     Problem: Renal Function Impairment (Acute Kidney Injury/Impairment)  Goal: Effective Renal Function  Outcome: Ongoing, Progressing     Problem: Skin Injury Risk Increased  Goal: Skin Health and Integrity  Outcome: Ongoing, Progressing     Problem: Fall Injury Risk  Goal: Absence of Fall and Fall-Related Injury  Outcome: Ongoing, Progressing     Problem: Infection (Pneumonia)  Goal: Resolution of Infection Signs and Symptoms  Outcome: Ongoing, Progressing     Problem: Fluid Imbalance (Pneumonia)  Goal: Fluid Balance  Outcome: Ongoing, Progressing

## 2022-07-04 NOTE — ASSESSMENT & PLAN NOTE
- chest xray with worsening lower left lube consolidation and R sided pleural effusion  - continue IV zosyn, today is day 8

## 2022-07-04 NOTE — SUBJECTIVE & OBJECTIVE
Subjective:     Interval History:  Patient with couple of episodes of maroon hematochezia overnight and hemoglobin fell again.  He has been transfused with 3 additional units of packed red cells over past 24 hours .  Hemodynamics are normal.  He denies complaint other than some upper abdominal discomfort.      Review of Systems   Constitutional:  Negative for chills and fever.   HENT:  Negative for sore throat.    Respiratory:  Negative for cough, choking, chest tightness and shortness of breath.    Cardiovascular:  Negative for chest pain.   Gastrointestinal:  Positive for abdominal pain and blood in stool. Negative for nausea and vomiting.   Genitourinary:  Negative for flank pain and frequency.   Neurological:  Negative for syncope and speech difficulty.   Psychiatric/Behavioral:  Negative for agitation and confusion.    Objective:     Vital Signs (Most Recent):  Temp: 96.4 °F (35.8 °C) (07/04/22 0715)  Pulse: 91 (07/04/22 0715)  Resp: 18 (07/04/22 0715)  BP: 122/66 (07/04/22 0715)  SpO2: 99 % (07/04/22 0715) Vital Signs (24h Range):  Temp:  [96.4 °F (35.8 °C)-98.6 °F (37 °C)] 96.4 °F (35.8 °C)  Pulse:  [76-91] 91  Resp:  [18-19] 18  SpO2:  [95 %-100 %] 99 %  BP: (122-172)/(59-92) 122/66     Weight: 122.5 kg (270 lb 1 oz) (07/04/22 0455)  Body mass index is 36.63 kg/m².      Intake/Output Summary (Last 24 hours) at 7/4/2022 1022  Last data filed at 7/4/2022 0455  Gross per 24 hour   Intake 691 ml   Output 375 ml   Net 316 ml       Lines/Drains/Airways       Peripheral Intravenous Line  Duration                  Peripheral IV - Single Lumen 06/22/22 1400 20 G Posterior;Right Forearm 11 days                    Physical Exam  Vitals and nursing note reviewed.   Constitutional:       General: He is not in acute distress.     Appearance: He is not ill-appearing.   HENT:      Head: Normocephalic and atraumatic.   Eyes:      General: No scleral icterus.  Cardiovascular:      Rate and Rhythm: Normal rate and regular  rhythm.      Pulses: Normal pulses.      Heart sounds: Normal heart sounds.     No gallop.   Pulmonary:      Effort: Pulmonary effort is normal. No respiratory distress.      Breath sounds: Normal breath sounds.   Abdominal:      General: Bowel sounds are normal. There is no distension.      Palpations: Abdomen is soft. There is no mass.      Tenderness: There is no abdominal tenderness.   Skin:     General: Skin is warm and dry.   Neurological:      General: No focal deficit present.      Mental Status: He is alert and oriented to person, place, and time.      Cranial Nerves: No cranial nerve deficit.      Sensory: No sensory deficit.       Significant Labs:  Recent Lab Results  (Last 5 results in the past 24 hours)        07/04/22  0437   07/04/22  0339   07/03/22  2035   07/03/22  1745   07/03/22  1538        Anion Gap   15             Baso #   0.00             Basophil %   0.0             BUN   115             BUN/CREAT RATIO   20             Calcium   8.2             Chloride   114             CO2   16             Creatinine   5.80             Differential Type   Auto             eGFR if non    10             Eos #   0.30             Eosinophil %   3.2             Glucose   71             Hematocrit   21.8     19.4         Hemoglobin   7.4     6.6         Immature Grans (Abs)   0.04             Immature Granulocytes   0.4             Lymph #   0.52             Lymph %   5.5             MCH   30.5             MCHC   33.9             MCV   89.7             Mono #   0.50             Mono %   5.3             MPV   9.7             Neutrophils, Abs   8.14             Neutrophils Relative   85.6             nRBC   0.0             NUCLEATED RBC ABSOLUTE   0.00             Platelets   83             POC Glucose 80     141     86       Potassium   5.1             RBC   2.43             RDW   16.3             Sodium   140             WBC   9.50                                      Significant  Imaging:  Imaging results within the past 24 hours have been reviewed.

## 2022-07-04 NOTE — ASSESSMENT & PLAN NOTE
7/3-hemoglobin has trended down to 6.2 but no active GI bleeding noted further since endoscopic therapy yesterday.  This may be equalibration reflecting bleeding prior to procedure yesterday.  Continue serial hemoglobins, close clinical observation, and IV Protonix infusion.    7/4-patient with sign of further bleeding overnight.  Hemodynamics are stable.  Unfortunately he had full breakfast this morning with roque and grits.  I will change to clear liquid diet.  Continue IV Protonix infusion and serial hemoglobins.  Keep him NPO after midnight for possible repeat EGD in the morning with Dr. Sanon.

## 2022-07-04 NOTE — SUBJECTIVE & OBJECTIVE
Interval History:  He denies abdominal pain.  No SOB.    Review of patient's allergies indicates:   Allergen Reactions    Gatifloxacin Anaphylaxis     Current Facility-Administered Medications   Medication Frequency    0.9%  NaCl infusion (for blood administration) Q24H PRN    acetaminophen tablet 1,000 mg Q6H PRN    albuterol inhaler 2 puff Q6H PRN    allopurinol split tablet 50 mg Daily    amLODIPine tablet 10 mg Daily    ascorbic acid (vitamin C) tablet 500 mg Daily    atorvastatin tablet 40 mg QHS    bisacodyL EC tablet 10 mg Daily PRN    calcium gluconate 1 g in NS IVPB (premixed) Q10 Min PRN    cetirizine tablet 10 mg Daily PRN    dextromethorphan-guaiFENesin  mg/5 ml liquid 10 mL Q6H PRN    dextrose 50% injection 12.5 g PRN    dextrose 50% injection 25 g PRN    ferrous sulfate tablet 1 each Daily    glucagon (human recombinant) injection 1 mg PRN    glucose chewable tablet 16 g PRN    glucose chewable tablet 24 g PRN    hydrALAZINE injection 10 mg Q6H PRN    HYDROcodone-acetaminophen 7.5-325 mg per tablet 1 tablet Q6H PRN    insulin aspart U-100 injection 1-10 Units QID (AC + HS) PRN    insulin detemir U-100 injection 8 Units QHS    isosorbide mononitrate 24 hr tablet 60 mg Daily    melatonin tablet 6 mg Nightly PRN    metoprolol tartrate (LOPRESSOR) tablet 50 mg Daily    mineral oil enema 1 enema Daily PRN    multivitamin tablet Daily    naloxone 0.4 mg/mL injection 0.02 mg PRN    ondansetron injection 8 mg Q6H PRN    pantoprazole (PROTONIX) 40 mg in sodium chloride 0.9 % 100 mL IVPB (MB+) Continuous    piperacillin-tazobactam (ZOSYN) 4.5 g in dextrose 5 % in water (D5W) 5 % 100 mL IVPB (MB+) Q12H    simethicone chewable tablet 80 mg TID PRN    sodium chloride 0.9% flush 10 mL Q12H PRN    traMADoL tablet 50 mg Q8H PRN    traZODone tablet 50 mg Nightly PRN       Objective:     Vital Signs (Most Recent):  Temp: 97.4 °F (36.3 °C) (07/04/22 1500)  Pulse: 76 (07/04/22 1500)  Resp: 18 (07/04/22 1500)  BP:  (!) 140/76 (07/04/22 1500)  SpO2: 95 % (07/04/22 1500)  O2 Device (Oxygen Therapy): room air (07/04/22 0800)   Vital Signs (24h Range):  Temp:  [96.4 °F (35.8 °C)-98.6 °F (37 °C)] 97.4 °F (36.3 °C)  Pulse:  [76-91] 76  Resp:  [18-19] 18  SpO2:  [95 %-99 %] 95 %  BP: (120-172)/(59-92) 140/76     Weight: 122.5 kg (270 lb 1 oz) (07/04/22 0455)  Body mass index is 36.63 kg/m².  Body surface area is 2.49 meters squared.    I/O last 3 completed shifts:  In: 691 [Blood:691]  Out: 375 [Urine:375]    Physical Exam  Eyes:      Pupils: Pupils are equal, round, and reactive to light.   Cardiovascular:      Rate and Rhythm: Normal rate and regular rhythm.   Pulmonary:      Effort: No respiratory distress.   Abdominal:      Tenderness: There is no abdominal tenderness.   Musculoskeletal:      Cervical back: Neck supple.      Right lower leg: No edema.      Left lower leg: No edema.   Neurological:      Mental Status: He is alert.       Significant Labs:  BMP:   Recent Labs   Lab 07/04/22  0339   GLU 71*      K 5.1   *   CO2 16*   *   CREATININE 5.80*   CALCIUM 8.2*        Significant Imaging:

## 2022-07-04 NOTE — ASSESSMENT & PLAN NOTE
- EGD with bleeding duodenal ulcer as above, was clipped   - H. Pylori pending   - Protonix infusion  - GI following, appreciate assistance

## 2022-07-04 NOTE — SUBJECTIVE & OBJECTIVE
Interval History: Patient resting comfortably in bed with no acute complaints. Is not requiring supplemental oxygen today. Denies new rectal bleeding. The patient says he feels better after getting 2 units of pRBC yesterday, but that he still feels weak. His H&H is stable this morning, will continue to monitor, and as long as it stays stable may be able to discharge tomorrow.     Review of Systems   Constitutional:  Negative for activity change and appetite change.   HENT:  Negative for congestion and dental problem.    Eyes:  Negative for discharge and itching.   Respiratory:  Negative for apnea and chest tightness.    Cardiovascular:  Negative for chest pain and leg swelling.   Gastrointestinal:  Negative for abdominal distention.   Endocrine: Negative for cold intolerance and heat intolerance.   Genitourinary:  Negative for difficulty urinating and dysuria.   Musculoskeletal:  Negative for arthralgias and back pain.   Skin:  Negative for color change.   Allergic/Immunologic: Negative for environmental allergies.   Neurological:  Positive for weakness. Negative for dizziness and facial asymmetry.   Hematological:  Negative for adenopathy. Does not bruise/bleed easily.   Psychiatric/Behavioral:  Negative for agitation and behavioral problems.    Objective:     Vital Signs (Most Recent):  Temp: 98 °F (36.7 °C) (07/04/22 1100)  Pulse: 81 (07/04/22 1100)  Resp: 18 (07/04/22 1100)  BP: 120/73 (07/04/22 1100)  SpO2: 97 % (07/04/22 1100) Vital Signs (24h Range):  Temp:  [96.4 °F (35.8 °C)-98.6 °F (37 °C)] 98 °F (36.7 °C)  Pulse:  [77-91] 81  Resp:  [18-19] 18  SpO2:  [95 %-100 %] 97 %  BP: (120-172)/(59-92) 120/73     Weight: 122.5 kg (270 lb 1 oz)  Body mass index is 36.63 kg/m².    Intake/Output Summary (Last 24 hours) at 7/4/2022 1215  Last data filed at 7/4/2022 0455  Gross per 24 hour   Intake 691 ml   Output 375 ml   Net 316 ml      Physical Exam  Vitals reviewed.   Constitutional:       General: He is not in acute  distress.     Appearance: Normal appearance.      Interventions: He is not intubated.  HENT:      Head: Normocephalic and atraumatic.      Mouth/Throat:      Mouth: Mucous membranes are dry.      Pharynx: Oropharynx is clear.   Eyes:      Extraocular Movements: Extraocular movements intact.      Conjunctiva/sclera: Conjunctivae normal.      Pupils: Pupils are equal, round, and reactive to light.   Cardiovascular:      Rate and Rhythm: Normal rate.      Heart sounds: Normal heart sounds. No murmur heard.  Pulmonary:      Effort: Pulmonary effort is normal. No respiratory distress. He is not intubated.      Breath sounds: Normal breath sounds.   Abdominal:      General: Abdomen is flat. Bowel sounds are normal.      Palpations: Abdomen is soft.      Tenderness: There is no abdominal tenderness (epigastric). There is no guarding or rebound.   Musculoskeletal:         General: Normal range of motion.      Cervical back: Normal range of motion and neck supple.      Right lower leg: No edema.      Left lower leg: No edema.   Skin:     General: Skin is warm and dry.      Capillary Refill: Capillary refill takes less than 2 seconds.   Neurological:      General: No focal deficit present.      Mental Status: He is alert and oriented to person, place, and time.   Psychiatric:         Mood and Affect: Mood normal.         Behavior: Behavior normal.       Significant Labs: All pertinent labs within the past 24 hours have been reviewed.    Significant Imaging: I have reviewed all pertinent imaging results/findings within the past 24 hours.

## 2022-07-04 NOTE — PROGRESS NOTES
[facility] - [address] [place] [address] Medicine  Progress Note    Patient Name: Neymar Parra  MRN: [id number]  Patient Class: IP- Inpatient   Admission Date: 6/16/2022  Length of Stay: 18 days  Attending Physician: Earl Lemus Jr., MD  Primary Care Provider: [facility] megan Rodríguez        Subjective:     Principal Problem:Gastrointestinal hemorrhage associated with duodenal ulcer        HPI:  72 y.o. male transferred to the [facility] from [facility] for hyperkalemia and hypoxia. Pt reports he went to [facility] because he was having dyspnea and hemoptysis since 1 month which is worse today. Pt reports he was tested positive for COVID at the nursing home on 6/9/22. Per nursing home patient oxygen saturation was in the 80's on RA with tachypnea and labored breathing. Pt reports he normally does not wear oxygen at the nursing home but has been wearing oxygen more often recently. Pt reports he smoked 1-1.5 PPD for 20 years but quit 7 months ago. Per pt, he saw a pulmonologist (Dr. Aldana) for his hemoptysis and had some tests done the results of which he does not know. Per records, pt with a right pleural effusion recently and had a thoracentesis. Pt denies any fever, congestion, dysuria, N/V/D, chest pain, or any other complaints at this time.     In the ED, pt's K 7.2, d-dimer 0.76, BNP 6284, BUN/Cr 73/7.16. EKG showed some peaked t-waves and irregular rhythm. Chest xray showed Increased right lower lung pulmonary density could indicate pneumonia. Pt was treated in the ED with Calcium gluconate 1g, regular insulin 10 units, D50, IV solumedrol 125 mg, tylenol 650 mg and IV fluids NS 1L bolus. Pt will be admitted to the hospital service for management of hypoxia and hyperkalemia.      Overview/Hospital Course:        Interval History: Patient resting comfortably in bed with no acute complaints. Is not requiring supplemental oxygen today. Denies new rectal bleeding. The patient says he feels better after  getting 2 units of pRBC yesterday, but that he still feels weak. His H&H is stable this morning, will continue to monitor, and as long as it stays stable may be able to discharge tomorrow.     Review of Systems   Constitutional:  Negative for activity change and appetite change.   HENT:  Negative for congestion and dental problem.    Eyes:  Negative for discharge and itching.   Respiratory:  Negative for apnea and chest tightness.    Cardiovascular:  Negative for chest pain and leg swelling.   Gastrointestinal:  Negative for abdominal distention.   Endocrine: Negative for cold intolerance and heat intolerance.   Genitourinary:  Negative for difficulty urinating and dysuria.   Musculoskeletal:  Negative for arthralgias and back pain.   Skin:  Negative for color change.   Allergic/Immunologic: Negative for environmental allergies.   Neurological:  Positive for weakness. Negative for dizziness and facial asymmetry.   Hematological:  Negative for adenopathy. Does not bruise/bleed easily.   Psychiatric/Behavioral:  Negative for agitation and behavioral problems.    Objective:     Vital Signs (Most Recent):  Temp: 98 °F (36.7 °C) (07/04/22 1100)  Pulse: 81 (07/04/22 1100)  Resp: 18 (07/04/22 1100)  BP: 120/73 (07/04/22 1100)  SpO2: 97 % (07/04/22 1100) Vital Signs (24h Range):  Temp:  [96.4 °F (35.8 °C)-98.6 °F (37 °C)] 98 °F (36.7 °C)  Pulse:  [77-91] 81  Resp:  [18-19] 18  SpO2:  [95 %-100 %] 97 %  BP: (120-172)/(59-92) 120/73     Weight: 122.5 kg (270 lb 1 oz)  Body mass index is 36.63 kg/m².    Intake/Output Summary (Last 24 hours) at 7/4/2022 1215  Last data filed at 7/4/2022 0455  Gross per 24 hour   Intake 691 ml   Output 375 ml   Net 316 ml      Physical Exam  Vitals reviewed.   Constitutional:       General: He is not in acute distress.     Appearance: Normal appearance.      Interventions: He is not intubated.  HENT:      Head: Normocephalic and atraumatic.      Mouth/Throat:      Mouth: Mucous membranes are  dry.      Pharynx: Oropharynx is clear.   Eyes:      Extraocular Movements: Extraocular movements intact.      Conjunctiva/sclera: Conjunctivae normal.      Pupils: Pupils are equal, round, and reactive to light.   Cardiovascular:      Rate and Rhythm: Normal rate.      Heart sounds: Normal heart sounds. No murmur heard.  Pulmonary:      Effort: Pulmonary effort is normal. No respiratory distress. He is not intubated.      Breath sounds: Normal breath sounds.   Abdominal:      General: Abdomen is flat. Bowel sounds are normal.      Palpations: Abdomen is soft.      Tenderness: There is no abdominal tenderness (epigastric). There is no guarding or rebound.   Musculoskeletal:         General: Normal range of motion.      Cervical back: Normal range of motion and neck supple.      Right lower leg: No edema.      Left lower leg: No edema.   Skin:     General: Skin is warm and dry.      Capillary Refill: Capillary refill takes less than 2 seconds.   Neurological:      General: No focal deficit present.      Mental Status: He is alert and oriented to person, place, and time.   Psychiatric:         Mood and Affect: Mood normal.         Behavior: Behavior normal.       Significant Labs: All pertinent labs within the past 24 hours have been reviewed.    Significant Imaging: I have reviewed all pertinent imaging results/findings within the past 24 hours.      Assessment/Plan:      * Gastrointestinal hemorrhage associated with duodenal ulcer  - EGD with bleeding duodenal ulcer as above, was clipped   - H. Pylori pending   - Protonix infusion  - GI following, appreciate assistance       Anemia  - Patient with bleeding duodenal ulcer on EGD 7/2, ulcer clipped  - Got 2 units blood for low H&H yesterday, patient has had a total of 5 this admission  - H&H 7.4/21.8 today, stable, continue to monitor  - GI following, assistance appreciated     Acute on chronic renal failure  - Improvement in BUN/Cr at 115/5.8  - Nephrology  following    PNA (pneumonia)  - chest xray with worsening lower left lube consolidation and R sided pleural effusion  - continue IV zosyn, today is day 8    Elevated d-dimer  - D-dimer 1.83  - V/Q scan with low probability for pulmonary embolism     Pleural effusion  - Continue to monitor  - Seen by pulmonology, appreciate assistance   - No thoracentesis at this time    Type 2 diabetes mellitus without complication  - HgA1c 4.7 6/22  - detemir 8U qhs with SSI     Gastroesophageal reflux disease  Continue protonix infusion    Hypertension  Controlled, continue current meds      VTE Risk Mitigation (From admission, onward)         Ordered     Reason for No Pharmacological VTE Prophylaxis  Once        Question:  Reasons:  Answer:  Risk of Bleeding    06/16/22 0301     IP VTE HIGH RISK PATIENT  Once         06/16/22 0301     Place sequential compression device  Until discontinued         06/16/22 0301                Discharge Planning   EDWARDO:      Code Status: Full Code   Is the patient medically ready for discharge?:     Reason for patient still in hospital (select all that apply): Treatment  Discharge Plan A: Long-term acute care facility (LTAC) (Choice obtained for Specialty)                  Brooklynn Kaur MD  Department of Hospital Medicine   [facility] - [address] [place] [address]

## 2022-07-05 ENCOUNTER — ANESTHESIA (OUTPATIENT)
Dept: GASTROENTEROLOGY | Facility: HOSPITAL | Age: 72
DRG: 981 | End: 2022-07-05
Payer: MEDICARE

## 2022-07-05 ENCOUNTER — ANESTHESIA EVENT (OUTPATIENT)
Dept: GASTROENTEROLOGY | Facility: HOSPITAL | Age: 72
DRG: 981 | End: 2022-07-05
Payer: MEDICARE

## 2022-07-05 PROBLEM — R79.89 ELEVATED D-DIMER: Status: RESOLVED | Noted: 2022-06-22 | Resolved: 2022-07-05

## 2022-07-05 PROBLEM — K31.82 DIEULAFOY LESION OF DUODENUM: Status: ACTIVE | Noted: 2022-07-05

## 2022-07-05 PROBLEM — K29.80 DUODENITIS: Status: ACTIVE | Noted: 2022-07-05

## 2022-07-05 PROBLEM — K44.9 HH (HIATUS HERNIA): Status: ACTIVE | Noted: 2022-07-05

## 2022-07-05 LAB
ABO + RH BLD: NORMAL
ANION GAP SERPL CALCULATED.3IONS-SCNC: 14 MMOL/L (ref 7–16)
BASOPHILS # BLD AUTO: 0 K/UL (ref 0–0.2)
BASOPHILS NFR BLD AUTO: 0 % (ref 0–1)
BLD PROD TYP BPU: NORMAL
BLOOD UNIT EXPIRATION DATE: NORMAL
BLOOD UNIT TYPE CODE: 5100
BUN SERPL-MCNC: 103 MG/DL (ref 7–18)
BUN/CREAT SERPL: 18 (ref 6–20)
CALCIUM SERPL-MCNC: 8.4 MG/DL (ref 8.5–10.1)
CHLORIDE SERPL-SCNC: 116 MMOL/L (ref 98–107)
CO2 SERPL-SCNC: 18 MMOL/L (ref 21–32)
CREAT SERPL-MCNC: 5.85 MG/DL (ref 0.7–1.3)
CROSSMATCH INTERPRETATION: NORMAL
DIFFERENTIAL METHOD BLD: ABNORMAL
DISPENSE STATUS: NORMAL
EOSINOPHIL # BLD AUTO: 0.34 K/UL (ref 0–0.5)
EOSINOPHIL NFR BLD AUTO: 3.5 % (ref 1–4)
ERYTHROCYTE [DISTWIDTH] IN BLOOD BY AUTOMATED COUNT: 16.3 % (ref 11.5–14.5)
GLUCOSE SERPL-MCNC: 100 MG/DL (ref 70–105)
GLUCOSE SERPL-MCNC: 110 MG/DL (ref 70–105)
GLUCOSE SERPL-MCNC: 111 MG/DL (ref 70–105)
GLUCOSE SERPL-MCNC: 81 MG/DL (ref 74–106)
HCT VFR BLD AUTO: 19.4 % (ref 40–54)
HGB BLD-MCNC: 6.4 G/DL (ref 13.5–18)
IMM GRANULOCYTES # BLD AUTO: 0.03 K/UL (ref 0–0.04)
IMM GRANULOCYTES NFR BLD: 0.3 % (ref 0–0.4)
INDIRECT COOMBS: NORMAL
LYMPHOCYTES # BLD AUTO: 0.71 K/UL (ref 1–4.8)
LYMPHOCYTES NFR BLD AUTO: 7.3 % (ref 27–41)
MCH RBC QN AUTO: 30 PG (ref 27–31)
MCHC RBC AUTO-ENTMCNC: 33 G/DL (ref 32–36)
MCV RBC AUTO: 91.1 FL (ref 80–96)
MONOCYTES # BLD AUTO: 0.55 K/UL (ref 0–0.8)
MONOCYTES NFR BLD AUTO: 5.7 % (ref 2–6)
MPC BLD CALC-MCNC: 10.8 FL (ref 9.4–12.4)
NEUTROPHILS # BLD AUTO: 8.1 K/UL (ref 1.8–7.7)
NEUTROPHILS NFR BLD AUTO: 83.2 % (ref 53–65)
NRBC # BLD AUTO: 0 X10E3/UL
NRBC, AUTO (.00): 0 %
PLATELET # BLD AUTO: 77 K/UL (ref 150–400)
POTASSIUM SERPL-SCNC: 5.2 MMOL/L (ref 3.5–5.1)
RBC # BLD AUTO: 2.13 M/UL (ref 4.6–6.2)
RH BLD: NORMAL
SODIUM SERPL-SCNC: 143 MMOL/L (ref 136–145)
UNIT NUMBER: NORMAL
WBC # BLD AUTO: 9.73 K/UL (ref 4.5–11)

## 2022-07-05 PROCEDURE — 86923 COMPATIBILITY TEST ELECTRIC: CPT | Mod: 91

## 2022-07-05 PROCEDURE — 63600175 PHARM REV CODE 636 W HCPCS: Performed by: NURSE ANESTHETIST, CERTIFIED REGISTERED

## 2022-07-05 PROCEDURE — 36415 COLL VENOUS BLD VENIPUNCTURE: CPT

## 2022-07-05 PROCEDURE — 86923 COMPATIBILITY TEST ELECTRIC: CPT | Mod: 91 | Performed by: INTERNAL MEDICINE

## 2022-07-05 PROCEDURE — 99900035 HC TECH TIME PER 15 MIN (STAT)

## 2022-07-05 PROCEDURE — C9399 UNCLASSIFIED DRUGS OR BIOLOG: HCPCS

## 2022-07-05 PROCEDURE — 25000003 PHARM REV CODE 250: Performed by: FAMILY MEDICINE

## 2022-07-05 PROCEDURE — D9220A PRA ANESTHESIA: ICD-10-PCS | Mod: ,,, | Performed by: NURSE ANESTHETIST, CERTIFIED REGISTERED

## 2022-07-05 PROCEDURE — 99232 SBSQ HOSP IP/OBS MODERATE 35: CPT | Mod: GC,,, | Performed by: FAMILY MEDICINE

## 2022-07-05 PROCEDURE — 11000001 HC ACUTE MED/SURG PRIVATE ROOM

## 2022-07-05 PROCEDURE — 86580 TB INTRADERMAL TEST: CPT

## 2022-07-05 PROCEDURE — C9113 INJ PANTOPRAZOLE SODIUM, VIA: HCPCS | Performed by: INTERNAL MEDICINE

## 2022-07-05 PROCEDURE — 30200315 PPD INTRADERMAL TEST REV CODE 302

## 2022-07-05 PROCEDURE — 86923 COMPATIBILITY TEST ELECTRIC: CPT | Mod: 91 | Performed by: FAMILY MEDICINE

## 2022-07-05 PROCEDURE — 99232 PR SUBSEQUENT HOSPITAL CARE,LEVL II: ICD-10-PCS | Mod: GC,,, | Performed by: FAMILY MEDICINE

## 2022-07-05 PROCEDURE — D9220A PRA ANESTHESIA: Mod: ,,, | Performed by: NURSE ANESTHETIST, CERTIFIED REGISTERED

## 2022-07-05 PROCEDURE — 85025 COMPLETE CBC W/AUTO DIFF WBC: CPT

## 2022-07-05 PROCEDURE — P9016 RBC LEUKOCYTES REDUCED: HCPCS

## 2022-07-05 PROCEDURE — 86901 BLOOD TYPING SEROLOGIC RH(D): CPT

## 2022-07-05 PROCEDURE — 63600175 PHARM REV CODE 636 W HCPCS

## 2022-07-05 PROCEDURE — 63600175 PHARM REV CODE 636 W HCPCS: Performed by: FAMILY MEDICINE

## 2022-07-05 PROCEDURE — 43255 EGD CONTROL BLEEDING ANY: CPT

## 2022-07-05 PROCEDURE — 25000003 PHARM REV CODE 250: Performed by: HOSPITALIST

## 2022-07-05 PROCEDURE — 80048 BASIC METABOLIC PNL TOTAL CA: CPT

## 2022-07-05 PROCEDURE — 63600175 PHARM REV CODE 636 W HCPCS: Performed by: INTERNAL MEDICINE

## 2022-07-05 PROCEDURE — 82962 GLUCOSE BLOOD TEST: CPT

## 2022-07-05 PROCEDURE — 36430 TRANSFUSION BLD/BLD COMPNT: CPT

## 2022-07-05 PROCEDURE — 25000003 PHARM REV CODE 250: Performed by: NURSE ANESTHETIST, CERTIFIED REGISTERED

## 2022-07-05 PROCEDURE — 25000003 PHARM REV CODE 250: Performed by: INTERNAL MEDICINE

## 2022-07-05 PROCEDURE — 97110 THERAPEUTIC EXERCISES: CPT | Mod: CQ

## 2022-07-05 RX ORDER — HYDROCODONE BITARTRATE AND ACETAMINOPHEN 500; 5 MG/1; MG/1
TABLET ORAL
Status: DISCONTINUED | OUTPATIENT
Start: 2022-07-05 | End: 2022-07-07

## 2022-07-05 RX ORDER — PROPOFOL 10 MG/ML
VIAL (ML) INTRAVENOUS
Status: DISCONTINUED | OUTPATIENT
Start: 2022-07-05 | End: 2022-07-05

## 2022-07-05 RX ORDER — LIDOCAINE HYDROCHLORIDE 20 MG/ML
INJECTION, SOLUTION EPIDURAL; INFILTRATION; INTRACAUDAL; PERINEURAL
Status: DISCONTINUED | OUTPATIENT
Start: 2022-07-05 | End: 2022-07-05

## 2022-07-05 RX ADMIN — TUBERCULIN PURIFIED PROTEIN DERIVATIVE 5 UNITS: 5 INJECTION, SOLUTION INTRADERMAL at 03:07

## 2022-07-05 RX ADMIN — LIDOCAINE HYDROCHLORIDE 100 MG: 20 INJECTION, SOLUTION INTRAVENOUS at 12:07

## 2022-07-05 RX ADMIN — PROPOFOL 40 MG: 10 INJECTION, EMULSION INTRAVENOUS at 12:07

## 2022-07-05 RX ADMIN — SODIUM CHLORIDE: 9 INJECTION, SOLUTION INTRAVENOUS at 12:07

## 2022-07-05 RX ADMIN — PIPERACILLIN AND TAZOBACTAM 4.5 G: 4; .5 INJECTION, POWDER, FOR SOLUTION INTRAVENOUS at 08:07

## 2022-07-05 RX ADMIN — SODIUM CHLORIDE: 9 INJECTION, SOLUTION INTRAVENOUS at 10:07

## 2022-07-05 RX ADMIN — PROPOFOL 80 MG: 10 INJECTION, EMULSION INTRAVENOUS at 12:07

## 2022-07-05 RX ADMIN — HYDROCODONE BITARTRATE AND ACETAMINOPHEN 1 TABLET: 7.5; 325 TABLET ORAL at 09:07

## 2022-07-05 RX ADMIN — ATORVASTATIN CALCIUM 40 MG: 40 TABLET, FILM COATED ORAL at 08:07

## 2022-07-05 RX ADMIN — PANTOPRAZOLE SODIUM 8 MG/HR: 40 INJECTION, POWDER, FOR SOLUTION INTRAVENOUS at 06:07

## 2022-07-05 RX ADMIN — PANTOPRAZOLE SODIUM 8 MG/HR: 40 INJECTION, POWDER, FOR SOLUTION INTRAVENOUS at 01:07

## 2022-07-05 RX ADMIN — PIPERACILLIN AND TAZOBACTAM 4.5 G: 4; .5 INJECTION, POWDER, FOR SOLUTION INTRAVENOUS at 07:07

## 2022-07-05 RX ADMIN — INSULIN DETEMIR 8 UNITS: 100 INJECTION, SOLUTION SUBCUTANEOUS at 08:07

## 2022-07-05 NOTE — PLAN OF CARE
Spoke with zafar at [facility], berto completed application to [phone] as well as cxr, spoke with dr yang and she is to order tb test today and sign ss sheet, ss will fax both once available, pt will need covid test on day of dc, following

## 2022-07-05 NOTE — ASSESSMENT & PLAN NOTE
- Patient with bleeding duodenal ulcer on EGD 7/2, ulcer clipped  - patient has had a total of 5 units this admission  - H&H 6.4/19.4 today, another unit RBC ordered  - GI following, assistance appreciated

## 2022-07-05 NOTE — ASSESSMENT & PLAN NOTE
- chest xray with worsening lower left lube consolidation and R sided pleural effusion  - continue IV zosyn, today is day 9

## 2022-07-05 NOTE — PT/OT/SLP PROGRESS
Physical Therapy Treatment    Patient Name:  Neymar Parra   MRN:  [id number]    Recommendations:     Discharge Recommendations:  nursing facility, skilled, intermediate care facility/nursing home   Discharge Equipment Recommendations: none   Barriers to discharge: Inaccessible home and Decreased caregiver support    Assessment:     Neymar Parra is a 72 y.o. male admitted with a medical diagnosis of Gastrointestinal hemorrhage associated with duodenitis.  He presents with the following impairments/functional limitations:  weakness, impaired endurance, impaired sensation, impaired self care skills, impaired functional mobilty, gait instability, impaired balance, impaired cardiopulmonary response to activity.    Rehab Prognosis: Good and Fair; patient would benefit from acute skilled PT services to address these deficits and reach maximum level of function.    Recent Surgery: * No surgery found *      Plan:     During this hospitalization, patient to be seen 5 x/week to address the identified rehab impairments via gait training, therapeutic activities, therapeutic exercises and progress toward the following goals:    · Plan of Care Expires:  08/01/22    Subjective     Chief Complaint: SOB   Patient/Family Comments/goals: Pt. Agrees to PT tx. But refuses any OOB activity due to fatigue .   Pain/Comfort:  · Pain Rating 1: 0/10      Objective:     Communicated with Sandra Estrada RN prior to session.  Patient found HOB elevated with peripheral IV, telemetry, oxygen upon PT entry to room.     General Precautions: Standard, fall   Orthopedic Precautions:N/A   Braces: N/A  Respiratory Status: Nasal cannula, flow 2 L/min     Functional Mobility:  · Bed Mobility:     · Scooting: modified independence and with VC for hand placement, lowedered HOB, positioned feet/ legs for pt. to push self up.      AM-PAC 6 CLICK MOBILITY          Therapeutic Activities and Exercises:   Pt. Participated in bilateral LE strengthening: AP, Hip  IR. ER, Heel slides, Hip AB/ ADD and SLR x 10 reps each.    Patient left HOB elevated with all lines intact and call button in reach..    GOALS:   Multidisciplinary Problems     Physical Therapy Goals        Problem: Physical Therapy    Goal Priority Disciplines Outcome Goal Variances Interventions   Physical Therapy Goal     PT, PT/OT Ongoing, Progressing     Description: Short Term Goals to be met by: 2022    Patient will increase functional independence with mobility by performin. Supine to sit with independently  2. Sit to stand transfer with independently Rolling walker  3. Bed to chair transfer with independently using Rolling walker  4. Gait  x 100 feet with independently using Rolling walker  5. Lower extremity exercise program x30 reps per handout, with assistance as needed    Long Term Goals to be met by: 2022    Pt will regain full independent functional mobility with Rolling walker to return to home situation and prior activities of daily living.                    Time Tracking:     PT Received On: 22  PT Start Time: 1629     PT Stop Time: 1647  PT Total Time (min): 18 min     Billable Minutes: Therapeutic Exercise 17    Treatment Type: Treatment  PT/PTA: PTA     PTA Visit Number: 1     2022

## 2022-07-05 NOTE — ANESTHESIA POSTPROCEDURE EVALUATION
Anesthesia Post Evaluation    Patient: Neymar Parra    Procedure(s) Performed: * No procedures listed *    Final Anesthesia Type: MAC      Patient location during evaluation: GI PACU  Patient participation: Yes- Able to Participate  Level of consciousness: awake and alert  Post-procedure vital signs: reviewed and stable  Pain management: adequate  Airway patency: patent    PONV status at discharge: No PONV  Anesthetic complications: no      Cardiovascular status: blood pressure returned to baseline  Respiratory status: unassisted  Hydration status: euvolemic  Follow-up not needed.          Vitals Value Taken Time   /71 07/05/22 1329   Temp 36.7 °C (98 °F) 07/05/22 1307   Pulse 90 07/05/22 1331   Resp 19 07/05/22 1331   SpO2 100 % 07/05/22 1331   Vitals shown include unvalidated device data.      No case tracking events are documented in the log.      Pain/Esther Score: Esther Score: 8 (7/5/2022  1:04 PM)

## 2022-07-05 NOTE — H&P
[facility] - Endoscopy  Gastroenterology  H&P    Patient Name: Neymar Parra  MRN: [id number]  Admission Date: 6/16/2022  Code Status: Full Code    Attending Provider: Russ Sanon MD  Primary Care Physician: [facility]  Principal Problem:Gastrointestinal hemorrhage associated with duodenal ulcer    Subjective:     History of Present Illness: Pt has severe anemia and is s/p treatment of duodenal bleeding ulcer; for egd.    Past Medical History:   Diagnosis Date    Anticoagulant long-term use     Chronic renal disease, stage 4, severely decreased glomerular filtration rate (GFR) between 15-29 mL/min/1.73 square meter     Coronary artery disease     stents ~ 2017    COVID-19 6/16/2022    Duodenal adenoma     Hypertension        Past Surgical History:   Procedure Laterality Date    ABDOMINAL SURGERY      CORONARY ANGIOPLASTY WITH STENT PLACEMENT      ~ 2017    JOINT REPLACEMENT         Review of patient's allergies indicates:   Allergen Reactions    Gatifloxacin Anaphylaxis     Family History     Problem Relation (Age of Onset)    Diabetes Mother, Father        Tobacco Use    Smoking status: Former Smoker     Types: Cigarettes    Smokeless tobacco: Never Used    Tobacco comment: 1/3 PPD ~ 20 years: quit Dec 2021   Substance and Sexual Activity    Alcohol use: Not Currently     Comment: pint a week: Hx    Drug use: Never    Sexual activity: Not Currently     Review of Systems   Respiratory: Negative.    Cardiovascular: Positive for leg swelling.   Gastrointestinal: Negative.      Objective:     Vital Signs (Most Recent):  Temp: 98.7 °F (37.1 °C) (07/05/22 1222)  Pulse: 94 (07/05/22 1222)  Resp: (!) 26 (07/05/22 1222)  BP: (!) 123/95 (07/05/22 1222)  SpO2: 99 % (07/05/22 1222) Vital Signs (24h Range):  Temp:  [97.4 °F (36.3 °C)-98.8 °F (37.1 °C)] 98.7 °F (37.1 °C)  Pulse:  [66-97] 94  Resp:  [18-26] 26  SpO2:  [95 %-100 %] 99 %  BP: (123-166)/(55-95) 123/95     Weight: 122.5 kg (270  lb 1 oz) (07/04/22 8115)  Body mass index is 36.63 kg/m².    No intake or output data in the 24 hours ending 07/05/22 1241    Lines/Drains/Airways     Peripheral Intravenous Line  Duration                Peripheral IV - Single Lumen 07/05/22 1015 20 G Anterior;Left Upper Arm <1 day                Physical Exam  Vitals reviewed.   Constitutional:       General: He is not in acute distress.     Appearance: Normal appearance. He is well-developed. He is ill-appearing.   HENT:      Head: Normocephalic and atraumatic.      Nose: Nose normal.   Eyes:      Pupils: Pupils are equal, round, and reactive to light.   Cardiovascular:      Rate and Rhythm: Normal rate and regular rhythm.   Pulmonary:      Effort: Pulmonary effort is normal.      Breath sounds: Normal breath sounds. No wheezing.   Abdominal:      General: Abdomen is flat. Bowel sounds are normal. There is no distension.      Palpations: Abdomen is soft.      Tenderness: There is no abdominal tenderness. There is no guarding.   Skin:     General: Skin is warm and dry.      Coloration: Skin is not jaundiced.   Neurological:      Mental Status: He is alert.   Psychiatric:         Attention and Perception: Attention normal.         Mood and Affect: Affect normal.         Speech: Speech normal.         Behavior: Behavior is cooperative.      Comments: Pt was calm while speaking.         Significant Labs:  CBC:   Recent Labs   Lab 07/03/22  1745 07/04/22  0339 07/05/22  0146   WBC  --  9.50 9.73   HGB 6.6* 7.4* 6.4*   HCT 19.4* 21.8* 19.4*   PLT  --  83* 77*     CMP:   Recent Labs   Lab 07/05/22 0146   GLU 81   CALCIUM 8.4*      K 5.2*   CO2 18*   *   *   CREATININE 5.85*       Significant Imaging:  Imaging results within the past 24 hours have been reviewed.    Assessment/Plan:     Active Diagnoses:    Diagnosis Date Noted POA    PRINCIPAL PROBLEM:  Gastrointestinal hemorrhage associated with duodenal ulcer [K26.4]  Clinically Undetermined    PNA  (pneumonia) [J18.9] 06/27/2022 Yes    Acute on chronic renal failure [N17.9, N18.9] 06/16/2022 Yes    Anemia [D64.9] 06/16/2022 Yes    Pleural effusion [J90] 04/29/2022 Yes    Hypertension [I10] 12/12/2021 Yes    Gastroesophageal reflux disease [K21.9] 12/12/2021 Yes    Type 2 diabetes mellitus without complication [E11.9] 12/12/2021 Yes      Problems Resolved During this Admission:    Diagnosis Date Noted Date Resolved POA    Hyponatremia [E87.1] 06/25/2022 07/02/2022 No    Elevated d-dimer [R79.89] 06/22/2022 07/05/2022 Yes    Hyperkalemia [E87.5] 06/16/2022 06/21/2022 Yes    COVID-19 [U07.1] 06/16/2022 06/22/2022 Yes    COPD exacerbation [J44.1] 12/12/2021 07/03/2022 Yes       Imp: gi bleed, blood loss anemia  Plan: egd    Russ Sanon MD  Gastroenterology  [facility] - Endoscopy

## 2022-07-05 NOTE — PT/OT/SLP PROGRESS
Occupational Therapy      Patient Name:  Neymar Parra   MRN:  [id number]    Patient not seen today secondary to Off the floor for procedure/surgery (pt gone for EGD). Will follow-up 7/6/22.    7/5/2022

## 2022-07-05 NOTE — TRANSFER OF CARE
Anesthesia Transfer of Care Note    Patient: Neymar Parra    Procedure(s) Performed: * No procedures listed *    Patient location: GI    Anesthesia Type: general and MAC    Transport from OR: Transported from OR on room air with adequate spontaneous ventilation. Continuous ECG monitoring in transport. Continuous SpO2 monitoring in transport    Post pain: adequate analgesia    Post assessment: no apparent anesthetic complications    Post vital signs: stable    Level of consciousness: lethargic    Nausea/Vomiting: no nausea/vomiting    Complications: none    Transfer of care protocol was followed      Last vitals:   Visit Vitals  BP (!) 116/55   Pulse 90   Temp 36.7 °C (98 °F) (Skin)   Resp (!) 21   Ht 6' (1.829 m)   Wt 122.5 kg (270 lb 1 oz)   SpO2 95%   BMI 36.63 kg/m²

## 2022-07-05 NOTE — PROGRESS NOTES
[facility] - [address] [place] [address] Medicine  Progress Note    Patient Name: Neymar Parra  MRN: [id number]  Patient Class: IP- Inpatient   Admission Date: 6/16/2022  Length of Stay: 19 days  Attending Physician: Earl Lemus Jr., MD  Primary Care Provider: [facility] megan Rodríguez        Subjective:     Principal Problem:Gastrointestinal hemorrhage associated with duodenal ulcer        HPI:  72 y.o. male transferred to the [facility] from [facility] for hyperkalemia and hypoxia. Pt reports he went to [facility] because he was having dyspnea and hemoptysis since 1 month which is worse today. Pt reports he was tested positive for COVID at the nursing home on 6/9/22. Per nursing home patient oxygen saturation was in the 80's on RA with tachypnea and labored breathing. Pt reports he normally does not wear oxygen at the nursing home but has been wearing oxygen more often recently. Pt reports he smoked 1-1.5 PPD for 20 years but quit 7 months ago. Per pt, he saw a pulmonologist (Dr. Aldana) for his hemoptysis and had some tests done the results of which he does not know. Per records, pt with a right pleural effusion recently and had a thoracentesis. Pt denies any fever, congestion, dysuria, N/V/D, chest pain, or any other complaints at this time.     In the ED, pt's K 7.2, d-dimer 0.76, BNP 6284, BUN/Cr 73/7.16. EKG showed some peaked t-waves and irregular rhythm. Chest xray showed Increased right lower lung pulmonary density could indicate pneumonia. Pt was treated in the ED with Calcium gluconate 1g, regular insulin 10 units, D50, IV solumedrol 125 mg, tylenol 650 mg and IV fluids NS 1L bolus. Pt will be admitted to the hospital service for management of hypoxia and hyperkalemia.      Overview/Hospital Course:  07/04-Patient resting comfortably in bed with no acute complaints. Is not requiring supplemental oxygen today. Denies new rectal bleeding. The patient says he feels better after getting 2 units of  pRBC yesterday, but that he still feels weak. His H&H is stable this morning, will continue to monitor, and as long as it stays stable may be able to discharge tomorrow.          Interval History: Patient's H&H down again today to 6.4/19.4. Another unit of blood ordered to be transfused. The nurse reports patient has been having dark, tarry stool movements. Patient scheduled to get another EGD today.     Review of Systems   Constitutional:  Negative for activity change and appetite change.   HENT:  Negative for congestion and dental problem.    Eyes:  Negative for discharge and itching.   Respiratory:  Negative for apnea and chest tightness.    Cardiovascular:  Negative for chest pain and leg swelling.   Gastrointestinal:  Negative for abdominal distention.   Endocrine: Negative for cold intolerance and heat intolerance.   Genitourinary:  Negative for difficulty urinating and dysuria.   Musculoskeletal:  Negative for arthralgias and back pain.   Skin:  Negative for color change.   Allergic/Immunologic: Negative for environmental allergies.   Neurological:  Positive for weakness. Negative for dizziness and facial asymmetry.   Hematological:  Negative for adenopathy. Does not bruise/bleed easily.   Psychiatric/Behavioral:  Negative for agitation and behavioral problems.    Objective:     Vital Signs (Most Recent):  Temp: 98.8 °F (37.1 °C) (07/05/22 0738)  Pulse: 73 (07/05/22 0738)  Resp: 20 (07/05/22 0738)  BP: (!) 166/55 (07/05/22 0738)  SpO2: 100 % (07/05/22 0738)   Vital Signs (24h Range):  Temp:  [97.4 °F (36.3 °C)-98.8 °F (37.1 °C)] 98.8 °F (37.1 °C)  Pulse:  [73-97] 73  Resp:  [18-20] 20  SpO2:  [95 %-100 %] 100 %  BP: (120-166)/(55-76) 166/55     Weight: 122.5 kg (270 lb 1 oz)  Body mass index is 36.63 kg/m².  No intake or output data in the 24 hours ending 07/05/22 0846   Physical Exam  Vitals reviewed.   Constitutional:       General: He is not in acute distress.     Appearance: Normal appearance.       Interventions: He is not intubated.  HENT:      Head: Normocephalic and atraumatic.      Mouth/Throat:      Mouth: Mucous membranes are dry.      Pharynx: Oropharynx is clear.   Eyes:      Extraocular Movements: Extraocular movements intact.      Conjunctiva/sclera: Conjunctivae normal.      Pupils: Pupils are equal, round, and reactive to light.   Cardiovascular:      Rate and Rhythm: Normal rate.      Heart sounds: Normal heart sounds. No murmur heard.  Pulmonary:      Effort: Pulmonary effort is normal. No respiratory distress. He is not intubated.      Breath sounds: Normal breath sounds.   Abdominal:      General: Abdomen is flat. Bowel sounds are normal.      Palpations: Abdomen is soft.      Tenderness: There is no abdominal tenderness (epigastric). There is no guarding or rebound.   Musculoskeletal:         General: Normal range of motion.      Cervical back: Normal range of motion and neck supple.      Right lower leg: No edema.      Left lower leg: No edema.   Skin:     General: Skin is warm and dry.      Capillary Refill: Capillary refill takes less than 2 seconds.   Neurological:      General: No focal deficit present.      Mental Status: He is alert and oriented to person, place, and time.   Psychiatric:         Mood and Affect: Mood normal.         Behavior: Behavior normal.       Significant Labs: All pertinent labs within the past 24 hours have been reviewed.    Significant Imaging: I have reviewed all pertinent imaging results/findings within the past 24 hours.      Assessment/Plan:      * Gastrointestinal hemorrhage associated with duodenal ulcer  - EGD with bleeding duodenal ulcer as above, was clipped   - Repeat EGD today, concerns of further bleeding  - H. Pylori negative  - Protonix infusion  - GI following, appreciate assistance       Anemia  - Patient with bleeding duodenal ulcer on EGD 7/2, ulcer clipped  - patient has had a total of 5 units this admission  - H&H 6.4/19.4 today, another unit  RBC ordered  - GI following, assistance appreciated     Acute on chronic renal failure  - Improvement in BUN/Cr at 103/5.85  - Nephrology following    PNA (pneumonia)  - chest xray with worsening lower left lube consolidation and R sided pleural effusion  - continue IV zosyn, today is day 9    Pleural effusion  - Continue to monitor  - Seen by pulmonology, appreciate assistance   - No thoracentesis at this time    Type 2 diabetes mellitus without complication  - HgA1c 4.7 6/22  - detemir 8U qhs with SSI     Gastroesophageal reflux disease  Continue protonix infusion    Hypertension  Controlled, continue current meds      VTE Risk Mitigation (From admission, onward)         Ordered     Reason for No Pharmacological VTE Prophylaxis  Once        Question:  Reasons:  Answer:  Risk of Bleeding    06/16/22 0301     IP VTE HIGH RISK PATIENT  Once         06/16/22 0301     Place sequential compression device  Until discontinued         06/16/22 0301                Discharge Planning   EDWARDO:      Code Status: Full Code   Is the patient medically ready for discharge?:     Reason for patient still in hospital (select all that apply): Treatment  Discharge Plan A: Long-term acute care facility (LTAC) (Choice obtained for Specialty)                  Brooklynn Kaur MD  Department of Hospital Medicine   [address]

## 2022-07-05 NOTE — ASSESSMENT & PLAN NOTE
- EGD with bleeding duodenal ulcer as above, was clipped   - Repeat EGD today, concerns of further bleeding  - H. Pylori negative  - Protonix infusion  - GI following, appreciate assistance

## 2022-07-05 NOTE — ANESTHESIA PREPROCEDURE EVALUATION
07/05/2022  Neymar Parra is a 72 y.o., male.  Active Ambulatory Problems     Diagnosis Date Noted    Hypertension 12/12/2021    Gastroesophageal reflux disease 12/12/2021    Type 2 diabetes mellitus without complication 12/12/2021    Pleural effusion 04/29/2022    Acute on chronic renal failure 06/16/2022    Anemia 06/16/2022    PNA (pneumonia) 06/27/2022    Gastrointestinal hemorrhage associated with duodenal ulcer      Resolved Ambulatory Problems     Diagnosis Date Noted    COPD exacerbation 12/12/2021    Fracture of trochanter of femur 12/12/2021    Hemoptysis 04/29/2022    Hyperkalemia 06/16/2022    COVID-19 06/16/2022    Elevated d-dimer 06/22/2022    Hyponatremia 06/25/2022     Past Medical History:   Diagnosis Date    Anticoagulant long-term use     Chronic renal disease, stage 4, severely decreased glomerular filtration rate (GFR) between 15-29 mL/min/1.73 square meter     Coronary artery disease     Duodenal adenoma      Lab Results   Component Value Date    WBC 9.73 07/05/2022    HGB 6.4 (L) 07/05/2022    HCT 19.4 (L) 07/05/2022    MCV 91.1 07/05/2022    PLT 77 (L) 07/05/2022       Medication List with Changes/Refills   Current Medications    ACETAMINOPHEN (TYLENOL) 500 MG TABLET    Take 1,000 mg by mouth every 8 (eight) hours as needed for Pain.    ALBUTEROL-IPRATROPIUM (DUO-NEB) 2.5 MG-0.5 MG/3 ML NEBULIZER SOLUTION    Take 3 mLs by nebulization every 6 (six) hours as needed for Wheezing. Rescue    ALLOPURINOL (ZYLOPRIM) 100 MG TABLET    TAKE ONE TABLET BY MOUTH DAILY FOR GOUT    AMLODIPINE (NORVASC) 10 MG TABLET    Take 10 mg by mouth once daily.    ASPIRIN (ECOTRIN) 325 MG EC TABLET    Take 325 mg by mouth once daily.    ATORVASTATIN (LIPITOR) 80 MG TABLET    Take 40 mg by mouth every evening.    CLOPIDOGREL (PLAVIX) 75 MG TABLET    TAKE 1 TABLET BY MOUTH ONCE DAILY      HYDROCODONE-ACETAMINOPHEN (NORCO) 7.5-325 MG PER TABLET    Take 1 tablet by mouth every 6 (six) hours as needed for Pain.    ISOSORBIDE MONONITRATE (IMDUR) 60 MG 24 HR TABLET    Take 1 tablet by mouth once daily.    LISINOPRIL (PRINIVIL,ZESTRIL) 20 MG TABLET    Take 20 mg by mouth.    LORATADINE (CLARITIN) 10 MG TABLET    TAKE ONE TABLET BY MOUTH DAILY FOR ALLERGIES    METOPROLOL TARTRATE (LOPRESSOR) 25 MG TABLET    Take 25 mg by mouth once daily.    OMEPRAZOLE (PRILOSEC) 20 MG CAPSULE    Take 20 mg by mouth once daily.    TRAMADOL (ULTRAM) 50 MG TABLET    Take 50 mg by mouth every 8 (eight) hours as needed for Pain.    TRAZODONE (DESYREL) 100 MG TABLET    Take 300 mg by mouth every evening.           Pre-op Assessment    I have reviewed the Patient Summary Reports.     I have reviewed the Nursing Notes. I have reviewed the NPO Status.   I have reviewed the Medications.     Review of Systems  Anesthesia Hx:  No problems with previous Anesthesia    Social:  Former Smoker, No Alcohol Use    Hematology/Oncology:         -- Anemia: Current/Recent Cancer. surgery   Cardiovascular:   Hypertension CAD      Pulmonary:   Pneumonia COPD, moderate    Renal/:   Chronic Renal Disease, ESRD    Hepatic/GI:   PUD, GERD    Endocrine:   Diabetes, poorly controlled  Obesity / BMI > 30  Psych:   anxiety          Physical Exam  General: Well nourished, Cooperative and Alert    Airway:  Mallampati: III   Mouth Opening: Normal  TM Distance: Normal  Tongue: Normal  Neck ROM: Normal ROM    Dental:  Partial Dentures        Anesthesia Plan  Type of Anesthesia, risks & benefits discussed:    Anesthesia Type: MAC  Intra-op Monitoring Plan: Standard ASA Monitors  Post Op Pain Control Plan: multimodal analgesia  Induction:  IV  Informed Consent: Informed consent signed with the Patient and all parties understand the risks and agree with anesthesia plan.  All questions answered.   ASA Score: 4 Emergent  Day of Surgery Review of History &  Physical: H&P Update referred to the surgeon/provider.    Ready For Surgery From Anesthesia Perspective.     .

## 2022-07-05 NOTE — PLAN OF CARE
Problem: Adult Inpatient Plan of Care  Goal: Plan of Care Review  Outcome: Ongoing, Progressing  Goal: Optimal Comfort and Wellbeing  Outcome: Ongoing, Progressing     Problem: Diabetes Comorbidity  Goal: Blood Glucose Level Within Targeted Range  Outcome: Ongoing, Progressing     Problem: Fluid and Electrolyte Imbalance (Acute Kidney Injury/Impairment)  Goal: Fluid and Electrolyte Balance  Outcome: Ongoing, Progressing     Problem: Skin Injury Risk Increased  Goal: Skin Health and Integrity  Outcome: Ongoing, Progressing     Problem: Gas Exchange Impaired  Goal: Optimal Gas Exchange  Outcome: Ongoing, Progressing     Problem: Fall Injury Risk  Goal: Absence of Fall and Fall-Related Injury  Outcome: Ongoing, Progressing     Problem: Infection (Pneumonia)  Goal: Resolution of Infection Signs and Symptoms  Outcome: Ongoing, Progressing     Problem: Respiratory Compromise (Pneumonia)  Goal: Effective Oxygenation and Ventilation  Outcome: Ongoing, Progressing

## 2022-07-05 NOTE — PLAN OF CARE
[facility] webster about [facility], [facility]ay is almost completed and then will return to  [facility], following

## 2022-07-05 NOTE — SUBJECTIVE & OBJECTIVE
Interval History: Patient's H&H down again today to 6.4/19.4. Another unit of blood ordered to be transfused. The nurse reports patient has been having dark, tarry stool movements. Patient scheduled to get another EGD today.     Review of Systems   Constitutional:  Negative for activity change and appetite change.   HENT:  Negative for congestion and dental problem.    Eyes:  Negative for discharge and itching.   Respiratory:  Negative for apnea and chest tightness.    Cardiovascular:  Negative for chest pain and leg swelling.   Gastrointestinal:  Negative for abdominal distention.   Endocrine: Negative for cold intolerance and heat intolerance.   Genitourinary:  Negative for difficulty urinating and dysuria.   Musculoskeletal:  Negative for arthralgias and back pain.   Skin:  Negative for color change.   Allergic/Immunologic: Negative for environmental allergies.   Neurological:  Positive for weakness. Negative for dizziness and facial asymmetry.   Hematological:  Negative for adenopathy. Does not bruise/bleed easily.   Psychiatric/Behavioral:  Negative for agitation and behavioral problems.    Objective:     Vital Signs (Most Recent):  Temp: 98.8 °F (37.1 °C) (07/05/22 0738)  Pulse: 73 (07/05/22 0738)  Resp: 20 (07/05/22 0738)  BP: (!) 166/55 (07/05/22 0738)  SpO2: 100 % (07/05/22 0738)   Vital Signs (24h Range):  Temp:  [97.4 °F (36.3 °C)-98.8 °F (37.1 °C)] 98.8 °F (37.1 °C)  Pulse:  [73-97] 73  Resp:  [18-20] 20  SpO2:  [95 %-100 %] 100 %  BP: (120-166)/(55-76) 166/55     Weight: 122.5 kg (270 lb 1 oz)  Body mass index is 36.63 kg/m².  No intake or output data in the 24 hours ending 07/05/22 0846   Physical Exam  Vitals reviewed.   Constitutional:       General: He is not in acute distress.     Appearance: Normal appearance.      Interventions: He is not intubated.  HENT:      Head: Normocephalic and atraumatic.      Mouth/Throat:      Mouth: Mucous membranes are dry.      Pharynx: Oropharynx is clear.   Eyes:       Extraocular Movements: Extraocular movements intact.      Conjunctiva/sclera: Conjunctivae normal.      Pupils: Pupils are equal, round, and reactive to light.   Cardiovascular:      Rate and Rhythm: Normal rate.      Heart sounds: Normal heart sounds. No murmur heard.  Pulmonary:      Effort: Pulmonary effort is normal. No respiratory distress. He is not intubated.      Breath sounds: Normal breath sounds.   Abdominal:      General: Abdomen is flat. Bowel sounds are normal.      Palpations: Abdomen is soft.      Tenderness: There is no abdominal tenderness (epigastric). There is no guarding or rebound.   Musculoskeletal:         General: Normal range of motion.      Cervical back: Normal range of motion and neck supple.      Right lower leg: No edema.      Left lower leg: No edema.   Skin:     General: Skin is warm and dry.      Capillary Refill: Capillary refill takes less than 2 seconds.   Neurological:      General: No focal deficit present.      Mental Status: He is alert and oriented to person, place, and time.   Psychiatric:         Mood and Affect: Mood normal.         Behavior: Behavior normal.       Significant Labs: All pertinent labs within the past 24 hours have been reviewed.    Significant Imaging: I have reviewed all pertinent imaging results/findings within the past 24 hours.

## 2022-07-06 PROBLEM — J18.9 PNA (PNEUMONIA): Status: RESOLVED | Noted: 2022-06-27 | Resolved: 2022-07-06

## 2022-07-06 LAB
ANION GAP SERPL CALCULATED.3IONS-SCNC: 15 MMOL/L (ref 7–16)
BASOPHILS # BLD AUTO: 0.01 K/UL (ref 0–0.2)
BASOPHILS NFR BLD AUTO: 0.1 % (ref 0–1)
BUN SERPL-MCNC: 95 MG/DL (ref 7–18)
BUN/CREAT SERPL: 17 (ref 6–20)
CALCIUM SERPL-MCNC: 8.4 MG/DL (ref 8.5–10.1)
CHLORIDE SERPL-SCNC: 118 MMOL/L (ref 98–107)
CO2 SERPL-SCNC: 17 MMOL/L (ref 21–32)
CREAT SERPL-MCNC: 5.7 MG/DL (ref 0.7–1.3)
DIFFERENTIAL METHOD BLD: ABNORMAL
EOSINOPHIL # BLD AUTO: 0.35 K/UL (ref 0–0.5)
EOSINOPHIL NFR BLD AUTO: 3.2 % (ref 1–4)
ERYTHROCYTE [DISTWIDTH] IN BLOOD BY AUTOMATED COUNT: 15.9 % (ref 11.5–14.5)
GLUCOSE SERPL-MCNC: 102 MG/DL (ref 70–105)
GLUCOSE SERPL-MCNC: 131 MG/DL (ref 70–105)
GLUCOSE SERPL-MCNC: 140 MG/DL (ref 70–105)
GLUCOSE SERPL-MCNC: 72 MG/DL (ref 74–106)
GLUCOSE SERPL-MCNC: 80 MG/DL (ref 70–105)
HCT VFR BLD AUTO: 20.2 % (ref 40–54)
HCT VFR BLD AUTO: 20.4 % (ref 40–54)
HGB BLD-MCNC: 6.6 G/DL (ref 13.5–18)
HGB BLD-MCNC: 7 G/DL (ref 13.5–18)
IMM GRANULOCYTES # BLD AUTO: 0.04 K/UL (ref 0–0.04)
IMM GRANULOCYTES NFR BLD: 0.4 % (ref 0–0.4)
LYMPHOCYTES # BLD AUTO: 0.8 K/UL (ref 1–4.8)
LYMPHOCYTES NFR BLD AUTO: 7.4 % (ref 27–41)
MCH RBC QN AUTO: 30.3 PG (ref 27–31)
MCHC RBC AUTO-ENTMCNC: 34.3 G/DL (ref 32–36)
MCV RBC AUTO: 88.3 FL (ref 80–96)
MONOCYTES # BLD AUTO: 0.47 K/UL (ref 0–0.8)
MONOCYTES NFR BLD AUTO: 4.4 % (ref 2–6)
MPC BLD CALC-MCNC: 9.3 FL (ref 9.4–12.4)
NEUTROPHILS # BLD AUTO: 9.11 K/UL (ref 1.8–7.7)
NEUTROPHILS NFR BLD AUTO: 84.5 % (ref 53–65)
NRBC # BLD AUTO: 0 X10E3/UL
NRBC, AUTO (.00): 0 %
PLATELET # BLD AUTO: 60 K/UL (ref 150–400)
POTASSIUM SERPL-SCNC: 5 MMOL/L (ref 3.5–5.1)
RBC # BLD AUTO: 2.31 M/UL (ref 4.6–6.2)
SODIUM SERPL-SCNC: 145 MMOL/L (ref 136–145)
WBC # BLD AUTO: 10.78 K/UL (ref 4.5–11)

## 2022-07-06 PROCEDURE — C9399 UNCLASSIFIED DRUGS OR BIOLOG: HCPCS

## 2022-07-06 PROCEDURE — 36430 TRANSFUSION BLD/BLD COMPNT: CPT

## 2022-07-06 PROCEDURE — 25000003 PHARM REV CODE 250: Performed by: INTERNAL MEDICINE

## 2022-07-06 PROCEDURE — 63600175 PHARM REV CODE 636 W HCPCS

## 2022-07-06 PROCEDURE — 85025 COMPLETE CBC W/AUTO DIFF WBC: CPT

## 2022-07-06 PROCEDURE — 25000003 PHARM REV CODE 250: Performed by: FAMILY MEDICINE

## 2022-07-06 PROCEDURE — 27000221 HC OXYGEN, UP TO 24 HOURS

## 2022-07-06 PROCEDURE — 36415 COLL VENOUS BLD VENIPUNCTURE: CPT

## 2022-07-06 PROCEDURE — 36415 COLL VENOUS BLD VENIPUNCTURE: CPT | Performed by: FAMILY MEDICINE

## 2022-07-06 PROCEDURE — 25000003 PHARM REV CODE 250

## 2022-07-06 PROCEDURE — 82962 GLUCOSE BLOOD TEST: CPT

## 2022-07-06 PROCEDURE — 99232 PR SUBSEQUENT HOSPITAL CARE,LEVL II: ICD-10-PCS | Mod: GC,,, | Performed by: FAMILY MEDICINE

## 2022-07-06 PROCEDURE — 85014 HEMATOCRIT: CPT | Performed by: FAMILY MEDICINE

## 2022-07-06 PROCEDURE — 25000003 PHARM REV CODE 250: Performed by: HOSPITALIST

## 2022-07-06 PROCEDURE — 99232 SBSQ HOSP IP/OBS MODERATE 35: CPT | Mod: GC,,, | Performed by: FAMILY MEDICINE

## 2022-07-06 PROCEDURE — 99900035 HC TECH TIME PER 15 MIN (STAT)

## 2022-07-06 PROCEDURE — 63600175 PHARM REV CODE 636 W HCPCS: Performed by: INTERNAL MEDICINE

## 2022-07-06 PROCEDURE — 11000001 HC ACUTE MED/SURG PRIVATE ROOM

## 2022-07-06 PROCEDURE — 94761 N-INVAS EAR/PLS OXIMETRY MLT: CPT

## 2022-07-06 PROCEDURE — 80048 BASIC METABOLIC PNL TOTAL CA: CPT

## 2022-07-06 PROCEDURE — 97110 THERAPEUTIC EXERCISES: CPT | Mod: CQ

## 2022-07-06 PROCEDURE — C9113 INJ PANTOPRAZOLE SODIUM, VIA: HCPCS | Performed by: INTERNAL MEDICINE

## 2022-07-06 PROCEDURE — 63600175 PHARM REV CODE 636 W HCPCS: Performed by: FAMILY MEDICINE

## 2022-07-06 RX ORDER — SODIUM CHLORIDE 0.9 % (FLUSH) 0.9 %
10 SYRINGE (ML) INJECTION
Status: DISCONTINUED | OUTPATIENT
Start: 2022-07-06 | End: 2022-07-08 | Stop reason: HOSPADM

## 2022-07-06 RX ORDER — HYDROCODONE BITARTRATE AND ACETAMINOPHEN 500; 5 MG/1; MG/1
TABLET ORAL
Status: DISCONTINUED | OUTPATIENT
Start: 2022-07-06 | End: 2022-07-07

## 2022-07-06 RX ADMIN — AMLODIPINE BESYLATE 10 MG: 10 TABLET ORAL at 08:07

## 2022-07-06 RX ADMIN — ATORVASTATIN CALCIUM 40 MG: 40 TABLET, FILM COATED ORAL at 09:07

## 2022-07-06 RX ADMIN — PANTOPRAZOLE SODIUM 8 MG/HR: 40 INJECTION, POWDER, FOR SOLUTION INTRAVENOUS at 01:07

## 2022-07-06 RX ADMIN — PIPERACILLIN AND TAZOBACTAM 4.5 G: 4; .5 INJECTION, POWDER, FOR SOLUTION INTRAVENOUS at 09:07

## 2022-07-06 RX ADMIN — PIPERACILLIN AND TAZOBACTAM 4.5 G: 4; .5 INJECTION, POWDER, FOR SOLUTION INTRAVENOUS at 08:07

## 2022-07-06 RX ADMIN — FERROUS SULFATE TAB 325 MG (65 MG ELEMENTAL FE) 1 EACH: 325 (65 FE) TAB at 08:07

## 2022-07-06 RX ADMIN — INSULIN DETEMIR 8 UNITS: 100 INJECTION, SOLUTION SUBCUTANEOUS at 09:07

## 2022-07-06 RX ADMIN — ISOSORBIDE MONONITRATE 60 MG: 60 TABLET, EXTENDED RELEASE ORAL at 08:07

## 2022-07-06 RX ADMIN — METOPROLOL TARTRATE 50 MG: 50 TABLET, FILM COATED ORAL at 08:07

## 2022-07-06 RX ADMIN — OXYCODONE HYDROCHLORIDE AND ACETAMINOPHEN 500 MG: 500 TABLET ORAL at 08:07

## 2022-07-06 RX ADMIN — SODIUM CHLORIDE: 9 INJECTION, SOLUTION INTRAVENOUS at 11:07

## 2022-07-06 RX ADMIN — ALLOPURINOL 50 MG: 300 TABLET ORAL at 08:07

## 2022-07-06 RX ADMIN — PANTOPRAZOLE SODIUM 8 MG/HR: 40 INJECTION, POWDER, FOR SOLUTION INTRAVENOUS at 08:07

## 2022-07-06 RX ADMIN — THERA TABS 1 TABLET: TAB at 08:07

## 2022-07-06 RX ADMIN — PANTOPRAZOLE SODIUM 8 MG/HR: 40 INJECTION, POWDER, FOR SOLUTION INTRAVENOUS at 02:07

## 2022-07-06 NOTE — PT/OT/SLP PROGRESS
Physical Therapy Treatment    Patient Name:  Neymar Parra   MRN:  [id number]    Recommendations:     Discharge Recommendations:  nursing facility, skilled, intermediate care facility/nursing home   Discharge Equipment Recommendations: none   Barriers to discharge: on-going treatment    Assessment:     Neymar Parra is a 72 y.o. male admitted with a medical diagnosis of Gastrointestinal hemorrhage associated with duodenitis.  He presents with the following impairments/functional limitations:  weakness, impaired endurance, impaired sensation, impaired self care skills, impaired functional mobilty, gait instability, impaired balance, impaired cardiopulmonary response to activity.    Rehab Prognosis: Fair; patient would benefit from acute skilled PT services to address these deficits and reach maximum level of function.    Recent Surgery: * No surgery found *      Plan:     During this hospitalization, patient to be seen 5 x/week to address the identified rehab impairments via gait training, therapeutic activities, therapeutic exercises and progress toward the following goals:    · Plan of Care Expires:  08/01/22    Subjective     Chief Complaint: SOB  Patient/Family Comments/goals: Pt. Reports his SOB is improved today with use of deep breathing technique taught yesterday  Pain/Comfort:  · Pain Rating 1: 0/10      Objective:     Communicated with Sandra Estrada RN prior to session.  Patient found HOB elevated with peripheral IV, telemetry, oxygen upon PT entry to room.     General Precautions: Standard, fall   Orthopedic Precautions:N/A   Braces: N/A  Respiratory Status: Nasal cannula, flow 2 L/min     Functional Mobility:  · Pt. Refused any OOB activity or sitting EOB.       AM-PAC 6 CLICK MOBILITY          Therapeutic Activities and Exercises:   Pt. Participated in Supine bed exercises x 20 reps each bilateral of AP, Hip IR/ ER, Heel slides, Hip AB/ AD  With knee ext and SLR.      Patient left HOB elevated with all  lines intact and call button in reach..    GOALS:   Multidisciplinary Problems     Physical Therapy Goals        Problem: Physical Therapy    Goal Priority Disciplines Outcome Goal Variances Interventions   Physical Therapy Goal     PT, PT/OT Ongoing, Progressing     Description: Short Term Goals to be met by: 2022    Patient will increase functional independence with mobility by performin. Supine to sit with independently  2. Sit to stand transfer with independently Rolling walker  3. Bed to chair transfer with independently using Rolling walker  4. Gait  x 100 feet with independently using Rolling walker  5. Lower extremity exercise program x30 reps per handout, with assistance as needed    Long Term Goals to be met by: 2022    Pt will regain full independent functional mobility with Rolling walker to return to home situation and prior activities of daily living.                    Time Tracking:     PT Received On: 22  PT Start Time: 1508     PT Stop Time: 1524  PT Total Time (min): 16 min     Billable Minutes: Therapeutic Exercise 16    Treatment Type: Treatment  PT/PTA: PTA     PTA Visit Number: 2     2022

## 2022-07-06 NOTE — ASSESSMENT & PLAN NOTE
- Patient with bleeding duodenal ulcer on EGD 7/2, ulcer clipped and an EGD on 07/05 where bleeding duodenal vessel was clipped  - patient has had a total of 5 units this admission  - H&H 7/20.4 today, recheck this afternoon to see if patient needs another unit RBC  - GI following, assistance appreciated

## 2022-07-06 NOTE — CARE UPDATE
The patient was seen at bedside and it was determined that he is of sound mind and competent to make his own medical decisions. The patient stated that he would like his friend Caity Garcia, who is listed as a alternate  in his chart, to be part of his medical care and have access to information about his medical care. Patient voiced understanding to what this meant and demonstrated decision making capacity. NIKKI Grimm was at bedside as witness to this conversation.

## 2022-07-06 NOTE — PT/OT/SLP PROGRESS
Occupational Therapy      Patient Name:  Neymar Parra   MRN:  [id number]    Patient not seen today secondary to Patient unwilling to participate (Pt stated that he had just gotten back into bed and had already performed exercises with PT. Pt stated he really did not want to do anything.). Will follow-up 7/7/2022.    7/6/2022

## 2022-07-06 NOTE — PROGRESS NOTES
[address] Medicine  Progress Note    Patient Name: Neymar Parra  MRN: [id number]  Patient Class: IP- Inpatient   Admission Date: 6/16/2022  Length of Stay: 20 days  Attending Physician: Earl Lemus Jr., MD  Primary Care Provider: [facility] megan Rodríguez        Subjective:     Principal Problem:Gastrointestinal hemorrhage associated with duodenitis        HPI:  72 y.o. male transferred to the [facility] from [facility] for hyperkalemia and hypoxia. Pt reports he went to [facility] because he was having dyspnea and hemoptysis since 1 month which is worse today. Pt reports he was tested positive for COVID at the nursing home on 6/9/22. Per nursing home patient oxygen saturation was in the 80's on RA with tachypnea and labored breathing. Pt reports he normally does not wear oxygen at the nursing home but has been wearing oxygen more often recently. Pt reports he smoked 1-1.5 PPD for 20 years but quit 7 months ago. Per pt, he saw a pulmonologist (Dr. Aldana) for his hemoptysis and had some tests done the results of which he does not know. Per records, pt with a right pleural effusion recently and had a thoracentesis. Pt denies any fever, congestion, dysuria, N/V/D, chest pain, or any other complaints at this time.     In the ED, pt's K 7.2, d-dimer 0.76, BNP 6284, BUN/Cr 73/7.16. EKG showed some peaked t-waves and irregular rhythm. Chest xray showed Increased right lower lung pulmonary density could indicate pneumonia. Pt was treated in the ED with Calcium gluconate 1g, regular insulin 10 units, D50, IV solumedrol 125 mg, tylenol 650 mg and IV fluids NS 1L bolus. Pt will be admitted to the hospital service for management of hypoxia and hyperkalemia.      Overview/Hospital Course:  07/04-Patient resting comfortably in bed with no acute complaints. Is not requiring supplemental oxygen today. Denies new rectal bleeding. The patient says he feels better after getting 2 units of  pRBC yesterday, but that he still feels weak. His H&H is stable this morning, will continue to monitor, and as long as it stays stable may be able to discharge tomorrow.          Interval History: Patient says that he feels better this morning and has more energy. He got a third EGD yesterday where a bleeding vessel on his duodenum was clipped. The patient's H&H was 7/20.4 this morning. If it stays stable, he may be able to be discharged tomorrow.     Review of Systems   Constitutional:  Negative for activity change and appetite change.   HENT:  Negative for congestion and dental problem.    Eyes:  Negative for discharge and itching.   Respiratory:  Negative for apnea and chest tightness.    Cardiovascular:  Negative for chest pain and leg swelling.   Gastrointestinal:  Negative for abdominal distention.   Endocrine: Negative for cold intolerance and heat intolerance.   Genitourinary:  Negative for difficulty urinating and dysuria.   Musculoskeletal:  Negative for arthralgias and back pain.   Skin:  Negative for color change.   Allergic/Immunologic: Negative for environmental allergies.   Neurological:  Positive for weakness. Negative for dizziness and facial asymmetry.   Hematological:  Negative for adenopathy. Does not bruise/bleed easily.   Psychiatric/Behavioral:  Negative for agitation and behavioral problems.    Objective:     Vital Signs (Most Recent):  Temp: 98.3 °F (36.8 °C) (07/06/22 0733)  Pulse: 87 (07/06/22 0733)  Resp: 20 (07/06/22 0733)  BP: (!) 160/81 (07/06/22 0733)  SpO2: 100 % (07/06/22 0733)   Vital Signs (24h Range):  Temp:  [98 °F (36.7 °C)-98.9 °F (37.2 °C)] 98.3 °F (36.8 °C)  Pulse:  [] 87  Resp:  [19-30] 20  SpO2:  [93 %-100 %] 100 %  BP: (116-160)/(51-95) 160/81     Weight: 122.5 kg (270 lb 1 oz)  Body mass index is 36.63 kg/m².    Intake/Output Summary (Last 24 hours) at 7/6/2022 0820  Last data filed at 7/6/2022 9148  Gross per 24 hour   Intake 317 ml   Output 500 ml   Net -183 ml       Physical Exam  Vitals reviewed.   Constitutional:       General: He is not in acute distress.     Appearance: Normal appearance.      Interventions: He is not intubated.  HENT:      Head: Normocephalic and atraumatic.      Mouth/Throat:      Mouth: Mucous membranes are moist.      Pharynx: Oropharynx is clear.   Eyes:      Extraocular Movements: Extraocular movements intact.      Conjunctiva/sclera: Conjunctivae normal.      Pupils: Pupils are equal, round, and reactive to light.   Cardiovascular:      Rate and Rhythm: Normal rate.      Heart sounds: Normal heart sounds. No murmur heard.  Pulmonary:      Effort: Pulmonary effort is normal. No respiratory distress. He is not intubated.      Breath sounds: Normal breath sounds.   Abdominal:      General: Abdomen is flat. Bowel sounds are normal.      Palpations: Abdomen is soft.      Tenderness: There is no abdominal tenderness (epigastric). There is no guarding or rebound.   Musculoskeletal:         General: Normal range of motion.      Cervical back: Normal range of motion and neck supple.      Right lower leg: No edema.      Left lower leg: No edema.   Skin:     General: Skin is warm and dry.      Capillary Refill: Capillary refill takes less than 2 seconds.   Neurological:      General: No focal deficit present.      Mental Status: He is alert and oriented to person, place, and time.   Psychiatric:         Mood and Affect: Mood normal.         Behavior: Behavior normal.       Significant Labs: All pertinent labs within the past 24 hours have been reviewed.    Significant Imaging: I have reviewed all pertinent imaging results/findings within the past 24 hours.      Assessment/Plan:      * Gastrointestinal hemorrhage associated with duodenitis  - EGD with bleeding duodenal ulcer as above, was clipped   - Repeat EGD 07/05, bleeding duodenal vessel was clipped  - H. Pylori negative  - Protonix infusion  - GI following, appreciate assistance       Anemia  -  Patient with bleeding duodenal ulcer on EGD 7/2, ulcer clipped and an EGD on 07/05 where bleeding duodenal vessel was clipped  - patient has had a total of 5 units this admission  - H&H 7/20.4 today, recheck this afternoon to see if patient needs another unit RBC  - GI following, assistance appreciated     Acute on chronic renal failure  - Staying stable  - BUN/Crt 95/5.7  - Nephrology following    Pleural effusion  - Continue to monitor  - Seen by pulmonology, appreciate assistance   - No thoracentesis at this time    Type 2 diabetes mellitus without complication  - HgA1c 4.7 6/22  - detemir 8U qhs with SSI     Gastroesophageal reflux disease  Continue protonix infusion    Hypertension  Controlled, continue current meds      VTE Risk Mitigation (From admission, onward)         Ordered     Reason for No Pharmacological VTE Prophylaxis  Once        Question:  Reasons:  Answer:  Risk of Bleeding    06/16/22 0301     IP VTE HIGH RISK PATIENT  Once         06/16/22 0301     Place sequential compression device  Until discontinued         06/16/22 0301                Discharge Planning   EDWARDO:      Code Status: Full Code   Is the patient medically ready for discharge?:     Reason for patient still in hospital (select all that apply): Treatment  Discharge Plan A: Long-term acute care facility (LTAC) (Choice obtained for Specialty)                  Brooklynn Kaur MD  Department of Hospital Medicine   [address]

## 2022-07-06 NOTE — SUBJECTIVE & OBJECTIVE
Interval History: Patient says that he feels better this morning and has more energy. He got a third EGD yesterday where a bleeding vessel on his duodenum was clipped. The patient's H&H was 7/20.4 this morning. If it stays stable, he may be able to be discharged tomorrow.     Review of Systems   Constitutional:  Negative for activity change and appetite change.   HENT:  Negative for congestion and dental problem.    Eyes:  Negative for discharge and itching.   Respiratory:  Negative for apnea and chest tightness.    Cardiovascular:  Negative for chest pain and leg swelling.   Gastrointestinal:  Negative for abdominal distention.   Endocrine: Negative for cold intolerance and heat intolerance.   Genitourinary:  Negative for difficulty urinating and dysuria.   Musculoskeletal:  Negative for arthralgias and back pain.   Skin:  Negative for color change.   Allergic/Immunologic: Negative for environmental allergies.   Neurological:  Positive for weakness. Negative for dizziness and facial asymmetry.   Hematological:  Negative for adenopathy. Does not bruise/bleed easily.   Psychiatric/Behavioral:  Negative for agitation and behavioral problems.    Objective:     Vital Signs (Most Recent):  Temp: 98.3 °F (36.8 °C) (07/06/22 0733)  Pulse: 87 (07/06/22 0733)  Resp: 20 (07/06/22 0733)  BP: (!) 160/81 (07/06/22 0733)  SpO2: 100 % (07/06/22 0733)   Vital Signs (24h Range):  Temp:  [98 °F (36.7 °C)-98.9 °F (37.2 °C)] 98.3 °F (36.8 °C)  Pulse:  [] 87  Resp:  [19-30] 20  SpO2:  [93 %-100 %] 100 %  BP: (116-160)/(51-95) 160/81     Weight: 122.5 kg (270 lb 1 oz)  Body mass index is 36.63 kg/m².    Intake/Output Summary (Last 24 hours) at 7/6/2022 0820  Last data filed at 7/6/2022 0455  Gross per 24 hour   Intake 317 ml   Output 500 ml   Net -183 ml      Physical Exam  Vitals reviewed.   Constitutional:       General: He is not in acute distress.     Appearance: Normal appearance.      Interventions: He is not  intubated.  HENT:      Head: Normocephalic and atraumatic.      Mouth/Throat:      Mouth: Mucous membranes are moist.      Pharynx: Oropharynx is clear.   Eyes:      Extraocular Movements: Extraocular movements intact.      Conjunctiva/sclera: Conjunctivae normal.      Pupils: Pupils are equal, round, and reactive to light.   Cardiovascular:      Rate and Rhythm: Normal rate.      Heart sounds: Normal heart sounds. No murmur heard.  Pulmonary:      Effort: Pulmonary effort is normal. No respiratory distress. He is not intubated.      Breath sounds: Normal breath sounds.   Abdominal:      General: Abdomen is flat. Bowel sounds are normal.      Palpations: Abdomen is soft.      Tenderness: There is no abdominal tenderness (epigastric). There is no guarding or rebound.   Musculoskeletal:         General: Normal range of motion.      Cervical back: Normal range of motion and neck supple.      Right lower leg: No edema.      Left lower leg: No edema.   Skin:     General: Skin is warm and dry.      Capillary Refill: Capillary refill takes less than 2 seconds.   Neurological:      General: No focal deficit present.      Mental Status: He is alert and oriented to person, place, and time.   Psychiatric:         Mood and Affect: Mood normal.         Behavior: Behavior normal.       Significant Labs: All pertinent labs within the past 24 hours have been reviewed.    Significant Imaging: I have reviewed all pertinent imaging results/findings within the past 24 hours.

## 2022-07-06 NOTE — ASSESSMENT & PLAN NOTE
- EGD with bleeding duodenal ulcer as above, was clipped   - Repeat EGD 07/05, bleeding duodenal vessel was clipped  - H. Pylori negative  - Protonix infusion  - GI following, appreciate assistance

## 2022-07-06 NOTE — PLAN OF CARE
Problem: Adult Inpatient Plan of Care  Goal: Plan of Care Review  Outcome: Ongoing, Progressing  Goal: Optimal Comfort and Wellbeing  Outcome: Ongoing, Progressing     Problem: Skin Injury Risk Increased  Goal: Skin Health and Integrity  Outcome: Ongoing, Progressing     Problem: Gas Exchange Impaired  Goal: Optimal Gas Exchange  Outcome: Ongoing, Progressing     Problem: Fall Injury Risk  Goal: Absence of Fall and Fall-Related Injury  Outcome: Ongoing, Progressing

## 2022-07-06 NOTE — NURSING
Two individuals approached the nursing station requesting to be added as family members to the patient demographic. The primary individual, Valerie Ca, identified as the patient's daughter stating that a neighbor going by the name of Caity is claiming to be a relative but is nothing but a neighbor. Ms. Ca cited concerns for her father's financial well being as well as medical decisions being made without her knowledge. The nurse manager, Hazel Sotomayor, notified the resident on the case to speak with the 'alleged' family still present at the patient's bedside. Daughter  Valerie Jones [phone], Grandson Rolan Jones [phone].

## 2022-07-07 ENCOUNTER — ANESTHESIA (OUTPATIENT)
Dept: GASTROENTEROLOGY | Facility: HOSPITAL | Age: 72
DRG: 981 | End: 2022-07-07
Payer: MEDICARE

## 2022-07-07 ENCOUNTER — ANESTHESIA EVENT (OUTPATIENT)
Dept: GASTROENTEROLOGY | Facility: HOSPITAL | Age: 72
DRG: 981 | End: 2022-07-07
Payer: MEDICARE

## 2022-07-07 VITALS — HEART RATE: 87 BPM | DIASTOLIC BLOOD PRESSURE: 47 MMHG | SYSTOLIC BLOOD PRESSURE: 112 MMHG

## 2022-07-07 LAB
ABO + RH BLD: NORMAL
ANION GAP SERPL CALCULATED.3IONS-SCNC: 14 MMOL/L (ref 7–16)
ANISOCYTOSIS BLD QL SMEAR: ABNORMAL
BASOPHILS # BLD AUTO: 0.02 K/UL (ref 0–0.2)
BASOPHILS # BLD AUTO: 0.02 K/UL (ref 0–0.2)
BASOPHILS NFR BLD AUTO: 0.2 % (ref 0–1)
BASOPHILS NFR BLD AUTO: 0.2 % (ref 0–1)
BLD PROD TYP BPU: NORMAL
BLOOD UNIT EXPIRATION DATE: NORMAL
BLOOD UNIT TYPE CODE: 5100
BLOOD UNIT TYPE CODE: 7300
BUN SERPL-MCNC: 96 MG/DL (ref 7–18)
BUN/CREAT SERPL: 17 (ref 6–20)
CALCIUM SERPL-MCNC: 7.9 MG/DL (ref 8.5–10.1)
CHLORIDE SERPL-SCNC: 117 MMOL/L (ref 98–107)
CO2 SERPL-SCNC: 16 MMOL/L (ref 21–32)
CREAT SERPL-MCNC: 5.64 MG/DL (ref 0.7–1.3)
CRENATED CELLS: ABNORMAL
CROSSMATCH INTERPRETATION: NORMAL
DIFFERENTIAL METHOD BLD: ABNORMAL
DIFFERENTIAL METHOD BLD: ABNORMAL
DISPENSE STATUS: NORMAL
EOSINOPHIL # BLD AUTO: 0.07 K/UL (ref 0–0.5)
EOSINOPHIL # BLD AUTO: 0.58 K/UL (ref 0–0.5)
EOSINOPHIL NFR BLD AUTO: 0.7 % (ref 1–4)
EOSINOPHIL NFR BLD AUTO: 6.8 % (ref 1–4)
ERYTHROCYTE [DISTWIDTH] IN BLOOD BY AUTOMATED COUNT: 14.3 % (ref 11.5–14.5)
ERYTHROCYTE [DISTWIDTH] IN BLOOD BY AUTOMATED COUNT: 16.1 % (ref 11.5–14.5)
GLUCOSE SERPL-MCNC: 101 MG/DL (ref 70–105)
GLUCOSE SERPL-MCNC: 145 MG/DL (ref 70–105)
GLUCOSE SERPL-MCNC: 84 MG/DL (ref 74–106)
GLUCOSE SERPL-MCNC: 93 MG/DL (ref 70–105)
HCT VFR BLD AUTO: 16.5 % (ref 40–54)
HCT VFR BLD AUTO: 18.7 % (ref 40–54)
HCT VFR BLD AUTO: 23.5 % (ref 40–54)
HGB BLD-MCNC: 5.3 G/DL (ref 13.5–18)
HGB BLD-MCNC: 6.3 G/DL (ref 13.5–18)
HGB BLD-MCNC: 7.7 G/DL (ref 13.5–18)
IMM GRANULOCYTES # BLD AUTO: 0.02 K/UL (ref 0–0.04)
IMM GRANULOCYTES # BLD AUTO: 0.05 K/UL (ref 0–0.04)
IMM GRANULOCYTES NFR BLD: 0.2 % (ref 0–0.4)
IMM GRANULOCYTES NFR BLD: 0.5 % (ref 0–0.4)
LYMPHOCYTES # BLD AUTO: 0.69 K/UL (ref 1–4.8)
LYMPHOCYTES # BLD AUTO: 0.91 K/UL (ref 1–4.8)
LYMPHOCYTES NFR BLD AUTO: 10.7 % (ref 27–41)
LYMPHOCYTES NFR BLD AUTO: 6.5 % (ref 27–41)
LYMPHOCYTES NFR BLD MANUAL: 5 % (ref 27–41)
MCH RBC QN AUTO: 30.6 PG (ref 27–31)
MCH RBC QN AUTO: 30.7 PG (ref 27–31)
MCHC RBC AUTO-ENTMCNC: 32.8 G/DL (ref 32–36)
MCHC RBC AUTO-ENTMCNC: 33.7 G/DL (ref 32–36)
MCV RBC AUTO: 90.8 FL (ref 80–96)
MCV RBC AUTO: 93.6 FL (ref 80–96)
MONOCYTES # BLD AUTO: 0.33 K/UL (ref 0–0.8)
MONOCYTES # BLD AUTO: 0.47 K/UL (ref 0–0.8)
MONOCYTES NFR BLD AUTO: 3.1 % (ref 2–6)
MONOCYTES NFR BLD AUTO: 5.5 % (ref 2–6)
MONOCYTES NFR BLD MANUAL: 1 % (ref 2–6)
MPC BLD CALC-MCNC: 9.3 FL (ref 9.4–12.4)
MPC BLD CALC-MCNC: 9.8 FL (ref 9.4–12.4)
NEUTROPHILS # BLD AUTO: 6.51 K/UL (ref 1.8–7.7)
NEUTROPHILS # BLD AUTO: 9.45 K/UL (ref 1.8–7.7)
NEUTROPHILS NFR BLD AUTO: 76.6 % (ref 53–65)
NEUTROPHILS NFR BLD AUTO: 89 % (ref 53–65)
NEUTS SEG NFR BLD MANUAL: 94 % (ref 50–62)
NRBC # BLD AUTO: 0 X10E3/UL
NRBC # BLD AUTO: 0 X10E3/UL
NRBC, AUTO (.00): 0 %
NRBC, AUTO (.00): 0 %
PLATELET # BLD AUTO: 57 K/UL (ref 150–400)
PLATELET # BLD AUTO: 76 K/UL (ref 150–400)
POIKILOCYTOSIS BLD QL SMEAR: ABNORMAL
POTASSIUM SERPL-SCNC: 5.2 MMOL/L (ref 3.5–5.1)
RBC # BLD AUTO: 2.06 M/UL (ref 4.6–6.2)
RBC # BLD AUTO: 2.51 M/UL (ref 4.6–6.2)
SODIUM SERPL-SCNC: 142 MMOL/L (ref 136–145)
TB INDURATION 48 - 72 HR READ: 0 0 MM
UNIT NUMBER: NORMAL
WBC # BLD AUTO: 10.61 K/UL (ref 4.5–11)
WBC # BLD AUTO: 8.51 K/UL (ref 4.5–11)

## 2022-07-07 PROCEDURE — D9220A PRA ANESTHESIA: Mod: ANES,,, | Performed by: ANESTHESIOLOGY

## 2022-07-07 PROCEDURE — 82962 GLUCOSE BLOOD TEST: CPT

## 2022-07-07 PROCEDURE — P9016 RBC LEUKOCYTES REDUCED: HCPCS | Performed by: FAMILY MEDICINE

## 2022-07-07 PROCEDURE — C9399 UNCLASSIFIED DRUGS OR BIOLOG: HCPCS

## 2022-07-07 PROCEDURE — 43255 EGD CONTROL BLEEDING ANY: CPT

## 2022-07-07 PROCEDURE — 85014 HEMATOCRIT: CPT | Performed by: FAMILY MEDICINE

## 2022-07-07 PROCEDURE — 25000003 PHARM REV CODE 250: Performed by: INTERNAL MEDICINE

## 2022-07-07 PROCEDURE — 27202728 HC CATH, DWELLING, EXT

## 2022-07-07 PROCEDURE — 25000003 PHARM REV CODE 250: Performed by: NURSE ANESTHETIST, CERTIFIED REGISTERED

## 2022-07-07 PROCEDURE — 25000003 PHARM REV CODE 250

## 2022-07-07 PROCEDURE — D9220A PRA ANESTHESIA: ICD-10-PCS | Mod: CRNA,,, | Performed by: NURSE ANESTHETIST, CERTIFIED REGISTERED

## 2022-07-07 PROCEDURE — 36415 COLL VENOUS BLD VENIPUNCTURE: CPT | Performed by: FAMILY MEDICINE

## 2022-07-07 PROCEDURE — 20000000 HC ICU ROOM

## 2022-07-07 PROCEDURE — D9220A PRA ANESTHESIA: ICD-10-PCS | Mod: ANES,,, | Performed by: ANESTHESIOLOGY

## 2022-07-07 PROCEDURE — 99900035 HC TECH TIME PER 15 MIN (STAT)

## 2022-07-07 PROCEDURE — P9016 RBC LEUKOCYTES REDUCED: HCPCS

## 2022-07-07 PROCEDURE — 63600175 PHARM REV CODE 636 W HCPCS: Performed by: INTERNAL MEDICINE

## 2022-07-07 PROCEDURE — 94760 N-INVAS EAR/PLS OXIMETRY 1: CPT

## 2022-07-07 PROCEDURE — 85025 COMPLETE CBC W/AUTO DIFF WBC: CPT | Performed by: SURGERY

## 2022-07-07 PROCEDURE — D9220A PRA ANESTHESIA: Mod: CRNA,,, | Performed by: NURSE ANESTHETIST, CERTIFIED REGISTERED

## 2022-07-07 PROCEDURE — 36415 COLL VENOUS BLD VENIPUNCTURE: CPT

## 2022-07-07 PROCEDURE — 80048 BASIC METABOLIC PNL TOTAL CA: CPT

## 2022-07-07 PROCEDURE — 99233 PR SUBSEQUENT HOSPITAL CARE,LEVL III: ICD-10-PCS | Mod: GC,,, | Performed by: FAMILY MEDICINE

## 2022-07-07 PROCEDURE — 63600175 PHARM REV CODE 636 W HCPCS

## 2022-07-07 PROCEDURE — P9037 PLATE PHERES LEUKOREDU IRRAD: HCPCS

## 2022-07-07 PROCEDURE — 85025 COMPLETE CBC W/AUTO DIFF WBC: CPT

## 2022-07-07 PROCEDURE — 99233 SBSQ HOSP IP/OBS HIGH 50: CPT | Mod: GC,,, | Performed by: FAMILY MEDICINE

## 2022-07-07 PROCEDURE — 36430 TRANSFUSION BLD/BLD COMPNT: CPT

## 2022-07-07 PROCEDURE — C9113 INJ PANTOPRAZOLE SODIUM, VIA: HCPCS | Performed by: INTERNAL MEDICINE

## 2022-07-07 PROCEDURE — 25000003 PHARM REV CODE 250: Performed by: HOSPITALIST

## 2022-07-07 PROCEDURE — 25000003 PHARM REV CODE 250: Performed by: FAMILY MEDICINE

## 2022-07-07 PROCEDURE — 27000221 HC OXYGEN, UP TO 24 HOURS

## 2022-07-07 RX ORDER — HYDROCODONE BITARTRATE AND ACETAMINOPHEN 500; 5 MG/1; MG/1
TABLET ORAL
Status: DISCONTINUED | OUTPATIENT
Start: 2022-07-07 | End: 2022-07-07

## 2022-07-07 RX ORDER — SODIUM CHLORIDE 9 MG/ML
INJECTION, SOLUTION INTRAVENOUS
Status: COMPLETED
Start: 2022-07-07 | End: 2022-07-07

## 2022-07-07 RX ORDER — MORPHINE SULFATE 2 MG/ML
2 INJECTION, SOLUTION INTRAMUSCULAR; INTRAVENOUS ONCE
Status: COMPLETED | OUTPATIENT
Start: 2022-07-07 | End: 2022-07-07

## 2022-07-07 RX ORDER — HYDROCODONE BITARTRATE AND ACETAMINOPHEN 500; 5 MG/1; MG/1
TABLET ORAL
Status: DISCONTINUED | OUTPATIENT
Start: 2022-07-07 | End: 2022-07-08

## 2022-07-07 RX ORDER — KETAMINE HYDROCHLORIDE 50 MG/ML
INJECTION, SOLUTION INTRAMUSCULAR; INTRAVENOUS
Status: DISCONTINUED | OUTPATIENT
Start: 2022-07-07 | End: 2022-07-07

## 2022-07-07 RX ORDER — KETAMINE HYDROCHLORIDE 50 MG/ML
INJECTION, SOLUTION INTRAMUSCULAR; INTRAVENOUS
Status: COMPLETED
Start: 2022-07-07 | End: 2022-07-07

## 2022-07-07 RX ORDER — SODIUM CHLORIDE 0.9 % (FLUSH) 0.9 %
10 SYRINGE (ML) INJECTION
Status: CANCELLED | OUTPATIENT
Start: 2022-07-07

## 2022-07-07 RX ORDER — ETOMIDATE 2 MG/ML
INJECTION INTRAVENOUS
Status: DISCONTINUED | OUTPATIENT
Start: 2022-07-07 | End: 2022-07-07

## 2022-07-07 RX ADMIN — ETOMIDATE 2 MG: 20 INJECTION, SOLUTION INTRAVENOUS at 03:07

## 2022-07-07 RX ADMIN — PANTOPRAZOLE SODIUM 8 MG/HR: 40 INJECTION, POWDER, FOR SOLUTION INTRAVENOUS at 01:07

## 2022-07-07 RX ADMIN — ETOMIDATE 4 MG: 20 INJECTION, SOLUTION INTRAVENOUS at 03:07

## 2022-07-07 RX ADMIN — PANTOPRAZOLE SODIUM 8 MG/HR: 40 INJECTION, POWDER, FOR SOLUTION INTRAVENOUS at 05:07

## 2022-07-07 RX ADMIN — PIPERACILLIN AND TAZOBACTAM 4.5 G: 4; .5 INJECTION, POWDER, FOR SOLUTION INTRAVENOUS at 07:07

## 2022-07-07 RX ADMIN — SODIUM CHLORIDE 1000 ML: 9 INJECTION, SOLUTION INTRAVENOUS at 01:07

## 2022-07-07 RX ADMIN — PANTOPRAZOLE SODIUM 8 MG/HR: 40 INJECTION, POWDER, FOR SOLUTION INTRAVENOUS at 07:07

## 2022-07-07 RX ADMIN — MORPHINE SULFATE 2 MG: 2 INJECTION, SOLUTION INTRAMUSCULAR; INTRAVENOUS at 02:07

## 2022-07-07 RX ADMIN — INSULIN DETEMIR 8 UNITS: 100 INJECTION, SOLUTION SUBCUTANEOUS at 09:07

## 2022-07-07 RX ADMIN — ETOMIDATE 6 MG: 20 INJECTION, SOLUTION INTRAVENOUS at 03:07

## 2022-07-07 RX ADMIN — KETAMINE HYDROCHLORIDE 7.5 MG: 50 INJECTION, SOLUTION INTRAMUSCULAR; INTRAVENOUS at 03:07

## 2022-07-07 RX ADMIN — SODIUM CHLORIDE: 9 INJECTION, SOLUTION INTRAVENOUS at 08:07

## 2022-07-07 RX ADMIN — KETAMINE HYDROCHLORIDE 2.5 MG: 50 INJECTION, SOLUTION INTRAMUSCULAR; INTRAVENOUS at 03:07

## 2022-07-07 RX ADMIN — SODIUM CHLORIDE: 9 INJECTION, SOLUTION INTRAVENOUS at 03:07

## 2022-07-07 RX ADMIN — SIMETHICONE 80 MG: 80 TABLET, CHEWABLE ORAL at 03:07

## 2022-07-07 RX ADMIN — ATORVASTATIN CALCIUM 40 MG: 40 TABLET, FILM COATED ORAL at 09:07

## 2022-07-07 NOTE — SUBJECTIVE & OBJECTIVE
No current facility-administered medications on file prior to encounter.     Current Outpatient Medications on File Prior to Encounter   Medication Sig    acetaminophen (TYLENOL) 500 MG tablet Take 1,000 mg by mouth every 8 (eight) hours as needed for Pain.    albuterol-ipratropium (DUO-NEB) 2.5 mg-0.5 mg/3 mL nebulizer solution Take 3 mLs by nebulization every 6 (six) hours as needed for Wheezing. Rescue    allopurinoL (ZYLOPRIM) 100 MG tablet TAKE ONE TABLET BY MOUTH DAILY FOR GOUT    amLODIPine (NORVASC) 10 MG tablet Take 10 mg by mouth once daily.    aspirin (ECOTRIN) 325 MG EC tablet Take 325 mg by mouth once daily.    atorvastatin (LIPITOR) 80 MG tablet Take 40 mg by mouth every evening.    clopidogreL (PLAVIX) 75 mg tablet TAKE 1 TABLET BY MOUTH ONCE DAILY     HYDROcodone-acetaminophen (NORCO) 7.5-325 mg per tablet Take 1 tablet by mouth every 6 (six) hours as needed for Pain.    isosorbide mononitrate (IMDUR) 60 MG 24 hr tablet Take 1 tablet by mouth once daily.    lisinopriL (PRINIVIL,ZESTRIL) 20 MG tablet Take 20 mg by mouth.    loratadine (CLARITIN) 10 mg tablet TAKE ONE TABLET BY MOUTH DAILY FOR ALLERGIES    metoprolol tartrate (LOPRESSOR) 25 MG tablet Take 25 mg by mouth once daily.    omeprazole (PRILOSEC) 20 MG capsule Take 20 mg by mouth once daily.    traMADoL (ULTRAM) 50 mg tablet Take 50 mg by mouth every 8 (eight) hours as needed for Pain.    traZODone (DESYREL) 100 MG tablet Take 300 mg by mouth every evening.       Review of patient's allergies indicates:   Allergen Reactions    Gatifloxacin Anaphylaxis       Past Medical History:   Diagnosis Date    Anticoagulant long-term use     Chronic renal disease, stage 4, severely decreased glomerular filtration rate (GFR) between 15-29 mL/min/1.73 square meter     Coronary artery disease     stents ~ 2017    COVID-19 6/16/2022    Dieulafoy lesion of duodenum 7/5/2022    Duodenal adenoma     Duodenitis 7/5/2022    HH (hiatus hernia) 7/5/2022     Hypertension      Past Surgical History:   Procedure Laterality Date    ABDOMINAL SURGERY      CORONARY ANGIOPLASTY WITH STENT PLACEMENT      ~ 2017    JOINT REPLACEMENT       Family History       Problem Relation (Age of Onset)    Diabetes Mother, Father          Tobacco Use    Smoking status: Former Smoker     Types: Cigarettes    Smokeless tobacco: Never Used    Tobacco comment: 1/3 PPD ~ 20 years: quit Dec 2021   Substance and Sexual Activity    Alcohol use: Not Currently     Comment: pint a week: Hx    Drug use: Never    Sexual activity: Not Currently     Review of Systems   Gastrointestinal:  Positive for abdominal pain and blood in stool.   Skin:  Positive for pallor.   Objective:     Vital Signs (Most Recent):  Temp: 96.7 °F (35.9 °C) (07/07/22 1045)  Pulse: 92 (07/07/22 1045)  Resp: 20 (07/07/22 1045)  BP: (!) 92/52 (07/07/22 1045)  SpO2: 98 % (07/07/22 1045)   Vital Signs (24h Range):  Temp:  [96.4 °F (35.8 °C)-98.4 °F (36.9 °C)] 96.7 °F (35.9 °C)  Pulse:  [76-98] 92  Resp:  [18-20] 20  SpO2:  [98 %-100 %] 98 %  BP: ()/(42-76) 92/52     Weight: 122.5 kg (270 lb 1 oz)  Body mass index is 36.63 kg/m².    Physical Exam  Vitals reviewed.   HENT:      Head: Normocephalic.   Eyes:      Conjunctiva/sclera: Conjunctivae normal.   Pulmonary:      Effort: Pulmonary effort is normal.   Abdominal:      Palpations: Abdomen is soft.      Tenderness: There is abdominal tenderness.   Skin:     General: Skin is warm and dry.      Capillary Refill: Capillary refill takes less than 2 seconds.      Coloration: Skin is pale.   Neurological:      Mental Status: He is alert.       Significant Labs:  I have reviewed all pertinent lab results within the past 24 hours.  CBC:   Recent Labs   Lab 07/07/22  0408   WBC 8.51   RBC 2.06*   HGB 6.3*   HCT 18.7*   PLT 57*   MCV 90.8   MCH 30.6   MCHC 33.7     BMP:   Recent Labs   Lab 07/07/22  0408   GLU 84      K 5.2*   *   CO2 16*   BUN 96*   CREATININE 5.64*   CALCIUM  7.9*       Significant Diagnostics:  I have reviewed all pertinent imaging results/findings within the past 24 hours.

## 2022-07-07 NOTE — ASSESSMENT & PLAN NOTE
- Patient with bleeding duodenal ulcer on EGD 7/2, ulcer clipped and an EGD on 07/05 where bleeding duodenal vessel was clipped  - patient has had a total of 6 units this admission  - GI following, assistance appreciated  -Pt continues to have large amount of bloody bowel movement with clots. H & H 6.3/18.7 (7/7), platelet [id number] and during 1 unit blood transfusion, pt became hypotensive. Given 1 liter bolus and transferred to ICU  And consulted GS, Dr. Rees. Appreciate assistance (7/7)

## 2022-07-07 NOTE — SUBJECTIVE & OBJECTIVE
Interval History:     This morning, pt complains of abdominal pain. Pt had blood clots from stool early this morning. H & H 6.3/18.7 and 1 unit ordered. While receiving transfusion, pt was noted to be hypotensive 80s/40s and 1 liter of bolus given. Pt was transferred to ICU for further monitoring and eval. Spoke with Dr. Sanon and agrees with surgery consult for further eval. Dr. Rees will evaluate the pt.      Review of Systems   Constitutional:  Positive for fatigue.   Respiratory:  Negative for shortness of breath.    Cardiovascular:  Negative for chest pain and leg swelling.   Gastrointestinal:  Positive for abdominal pain and blood in stool. Negative for nausea and vomiting.   Genitourinary:  Negative for difficulty urinating and dysuria.   Musculoskeletal:  Negative for arthralgias and back pain.   Skin:  Negative for color change.   Allergic/Immunologic: Negative for environmental allergies.   Neurological:  Positive for weakness. Negative for dizziness and facial asymmetry.   Hematological:  Negative for adenopathy. Does not bruise/bleed easily.   Psychiatric/Behavioral:  Negative for agitation and behavioral problems.    Objective:     Vital Signs (Most Recent):  Temp: 97.7 °F (36.5 °C) (07/07/22 1215)  Pulse: 92 (07/07/22 1245)  Resp: (!) 35 (07/07/22 1245)  BP: (!) 102/51 (07/07/22 1245)  SpO2: 100 % (07/07/22 1245)   Vital Signs (24h Range):  Temp:  [96.4 °F (35.8 °C)-98.4 °F (36.9 °C)] 97.7 °F (36.5 °C)  Pulse:  [76-98] 92  Resp:  [17-41] 35  SpO2:  [98 %-100 %] 100 %  BP: ()/(31-76) 102/51     Weight: 122.5 kg (270 lb 1 oz)  Body mass index is 36.63 kg/m².    Intake/Output Summary (Last 24 hours) at 7/7/2022 1256  Last data filed at 7/7/2022 1045  Gross per 24 hour   Intake 1581.41 ml   Output --   Net 1581.41 ml        Physical Exam  Vitals reviewed.   Constitutional:       General: He is not in acute distress.     Appearance: Normal appearance. He is ill-appearing.      Interventions: He is  not intubated.  HENT:      Head: Normocephalic and atraumatic.      Mouth/Throat:      Mouth: Mucous membranes are dry.   Cardiovascular:      Rate and Rhythm: Normal rate and regular rhythm.      Heart sounds: Normal heart sounds. No murmur heard.  Pulmonary:      Effort: No respiratory distress. He is not intubated.      Breath sounds: Normal breath sounds.   Abdominal:      General: There is distension.      Tenderness: There is abdominal tenderness.   Musculoskeletal:      Right lower leg: No edema.      Left lower leg: No edema.   Skin:     General: Skin is dry.      Capillary Refill: Capillary refill takes less than 2 seconds.   Neurological:      General: No focal deficit present.      Mental Status: He is alert and oriented to person, place, and time.       Significant Labs: All pertinent labs within the past 24 hours have been reviewed.    Significant Imaging: I have reviewed all pertinent imaging results/findings within the past 24 hours.

## 2022-07-07 NOTE — ASSESSMENT & PLAN NOTE
Due to GI bleeding, pt became hypotensive  Holding amlodipine, isosorbide mononitrate and beta blocker at this time (7/7)

## 2022-07-07 NOTE — ANESTHESIA POSTPROCEDURE EVALUATION
Anesthesia Post Evaluation    Patient: Neymar Parra    Procedure(s) Performed: * No procedures listed *    Final Anesthesia Type: general      Patient location during evaluation: ICU  Post-procedure vital signs: reviewed and stable  Pain management: adequate  Airway patency: patent  LACEY mitigation strategies: Intraoperative administration of CPAP, nasopharyngeal airway, or oral appliance during sedation  PONV status at discharge: No PONV  Anesthetic complications: no      Cardiovascular status: hemodynamically stable  Respiratory status: unassisted  Hydration status: euvolemic  Follow-up not needed.          Vitals Value Taken Time   /69 07/07/22 1601   Temp  07/07/22 1604   Pulse 87 07/07/22 1604   Resp 15 07/07/22 1604   SpO2 100 % 07/07/22 1604   Vitals shown include unvalidated device data.      No case tracking events are documented in the log.      Pain/Esther Score: Pain Rating Prior to Med Admin: 8 (7/7/2022  2:12 PM)  Pain Rating Post Med Admin: 0 (7/7/2022  2:42 PM)         5

## 2022-07-07 NOTE — PT/OT/SLP PROGRESS
Occupational Therapy      Patient Name:  Neymar Parra   MRN:  [id number]    Patient not seen today secondary to Patient ill, Transfer to ICU, await clearance. Will follow-up once medically stable.    Heike Cason, OTR/L, CSRS  7/7/2022

## 2022-07-07 NOTE — PROGRESS NOTES
[facility] ICU  Adult Nutrition  Progress Note    SUMMARY       Recommendations    Recommendation/Intervention: Recommend advancing diet order as able and appropriate to Renal diet. If unable to advance diet, RD recommends initiating PPN.  Goals: Advance diet order or initate PPN, weight maintenance  Nutrition Goal Status: new    Assessment and Plan  Pt seen for follow up. Status change since last assessment, pt now in ICU with active GI Bleed per MD note. Weight is stable at 270 lbs. Pt NPO with NG tube. Once able and appropriate, recommend resuming previous diet. If unable to advance diet, RD recommends initiating PPN to provide adequate nutrition. Note, abdominal distention and tenderness present with large amount of bloody stools with clots per MD. Labs reviewed. Elevated BUN, Cr and Low GFR as expected given CKD4. Pt also with elevated K. Meds reviewed. Will continue to monitor. RD following.        Reason for Assessment    Reason For Assessment: RD follow-up  Diagnosis: other (see comments) (DEO)  Relevant Medical History: HTN, CAD, CKD4, anticoagulant long term use    Nutrition Risk Screen    Nutrition Risk Screen: no indicators present    Nutrition/Diet History    Spiritual, Cultural Beliefs, Orthodoxy Practices, Values that Affect Care: no    Anthropometrics    Temp: 97.7 °F (36.5 °C)  Height Method: Stated  Height: 6' (182.9 cm)  Height (inches): 72 in  Weight Method: Bed Scale  Weight: 122.5 kg (270 lb 1 oz)  Weight (lb): 270.07 lb  Ideal Body Weight (IBW), Male: 178 lb  % Ideal Body Weight, Male (lb): 147.63 %  BMI (Calculated): 36.6       Lab/Procedures/Meds   Latest Reference Range & Units 07/07/22 04:08   Sodium 136 - 145 mmol/L 142   Potassium 3.5 - 5.1 mmol/L 5.2 (H)   Chloride 98 - 107 mmol/L 117 (H)   CO2 21 - 32 mmol/L 16 (L)   Anion Gap 7 - 16 mmol/L 14   BUN 7 - 18 mg/dL 96 (H)   Creatinine 0.70 - 1.30 mg/dL 5.64 (H)   BUN/CREAT RATIO 6 - 20  17   eGFR if non  >=60  mL/min/1.73m² 11 (L)   Glucose 74 - 106 mg/dL 84   Calcium 8.5 - 10.1 mg/dL 7.9 (L)   (H): Data is abnormally high  (L): Data is abnormally low    Elevated BUN, Cr and low GFR as expected given CKD4. Elevated K. Will continue to monitor.     Pertinent Labs Reviewed: reviewed  Pertinent Medications Reviewed: reviewed  Pertinent Medications Comments: allopurinol, amlodipine, vit C, atorvastatin, ferrous sulfate, insulin, isosorbide mononitrate, metoprolol, mvi    Estimated/Assessed Needs    Weight Used For Calorie Calculations: 90 kg (198 lb 6.6 oz)  Energy Calorie Requirements (kcal): 6856-9469 kcals/day (25-30 kcals/kg Adj BW)     Protein Requirements: 72 g/day (0.8 g/kg adj BW)  Weight Used For Protein Calculations: 90 kg (198 lb 6.6 oz)  Fluid Requirements (mL): 6795-4570 mL/day     RDA Method (mL): 2250         Nutrition Prescription Ordered    Current Diet Order: NPO    Evaluation of Received Nutrient/Fluid Intake       % Intake of Estimated Energy Needs: 0 - 25 %  % Meal Intake: NPO    Nutrition Risk    Level of Risk/Frequency of Follow-up: high     Monitor and Evaluation      1. Monitor for resumed diet or initiation of PPN   2. Monitor weight changes  3. Monitor labs/meds  4. Monitor POC     Nutrition Follow-Up    RD Follow-up?: Yes

## 2022-07-07 NOTE — PT/OT/SLP PROGRESS
Physical Therapy      Patient Name:  Neymar Parra   MRN:  [id number]    Patient not seen today secondary to Nurse/ KAREN hold with pt. Not feeling well, receiving blood at time of visit. Will follow-up tomorrow.

## 2022-07-07 NOTE — ASSESSMENT & PLAN NOTE
- EGD with bleeding duodenal ulcer as above, was clipped   - Repeat EGD 07/05, bleeding duodenal vessel was clipped per GI.  - H. Pylori negative  - Protonix infusion  - GI following, appreciate assistance   -Pt continues to bleed with blood clots from bowel and H &H  Continues to decline. Pt became hypotensive, and transferred to ICU for further monitoring and eval. General surgery, Dr. Rees consulted for eval.   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 [facility].

## 2022-07-07 NOTE — H&P (VIEW-ONLY)
"[facility] [place] ICU  General Surgery  Consult Note    Patient Name: Neymar Parra  MRN: [id number]  Code Status: Full Code  Admission Date: 6/16/2022  Hospital Length of Stay: 21 days  Attending Physician: Earl Lemus Jr., MD  Primary Care Provider: [facility]    Patient information was obtained from ER records.     Inpatient consult to General Surgery  Consult performed by: LISE Ayala  Consult ordered by: Dilcia Phipps DO  Reason for consult: GI Bleed  Assessment/Recommendations: Follow h/h.  Continue transfusions, as necessary.  Repeat EGD, today, if possible by EGD.  Consider colonoscopy, if EGD not with active bleeding or clots.  CT angio not really feasible, due to acute and chronic elevation of creatinine.  Evaluate cardiac status/suitability for surgical intervention.  History of CHF in medical record.  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        Subjective:     Principal Problem: Gastrointestinal hemorrhage associated with duodenitis    History of Present Illness: 71 y/o nursing home resident with CAD with stents on plavix  Admitted several days ago with melena with multiple units of PRBCs  Three EGD this admission per dr felix with treatment of dieulafoy's lesion with injection of epi and clip   Most recent EGD 2 days ago with clipping of active bleeding of duodenitis visible vessel   [facility]      No current facility-administered medications on file prior to encounter.     Current Outpatient Medications on File Prior to Encounter   Medication Sig    acetaminophen (TYLENOL) 500 MG tablet Take 1,000 mg by mouth every 8 (eight) hours as needed for Pain.    albuterol-ipratropium (DUO-NEB) 2.5 mg-0.5 mg/3 mL nebulizer solution Take 3 mLs by nebulization every 6 (six) hours as needed for Wheezing. Rescue "    allopurinoL (ZYLOPRIM) 100 MG tablet TAKE ONE TABLET BY MOUTH DAILY FOR GOUT    amLODIPine (NORVASC) 10 MG tablet Take 10 mg by mouth once daily.    aspirin (ECOTRIN) 325 MG EC tablet Take 325 mg by mouth once daily.    atorvastatin (LIPITOR) 80 MG tablet Take 40 mg by mouth every evening.    clopidogreL (PLAVIX) 75 mg tablet TAKE 1 TABLET BY MOUTH ONCE DAILY     HYDROcodone-acetaminophen (NORCO) 7.5-325 mg per tablet Take 1 tablet by mouth every 6 (six) hours as needed for Pain.    isosorbide mononitrate (IMDUR) 60 MG 24 hr tablet Take 1 tablet by mouth once daily.    lisinopriL (PRINIVIL,ZESTRIL) 20 MG tablet Take 20 mg by mouth.    loratadine (CLARITIN) 10 mg tablet TAKE ONE TABLET BY MOUTH DAILY FOR ALLERGIES    metoprolol tartrate (LOPRESSOR) 25 MG tablet Take 25 mg by mouth once daily.    omeprazole (PRILOSEC) 20 MG capsule Take 20 mg by mouth once daily.    traMADoL (ULTRAM) 50 mg tablet Take 50 mg by mouth every 8 (eight) hours as needed for Pain.    traZODone (DESYREL) 100 MG tablet Take 300 mg by mouth every evening.       Review of patient's allergies indicates:   Allergen Reactions    Gatifloxacin Anaphylaxis       Past Medical History:   Diagnosis Date    Anticoagulant long-term use     Chronic renal disease, stage 4, severely decreased glomerular filtration rate (GFR) between 15-29 mL/min/1.73 square meter     Coronary artery disease     stents ~ 2017    COVID-19 6/16/2022    Dieulafoy lesion of duodenum 7/5/2022    Duodenal adenoma     Duodenitis 7/5/2022    HH (hiatus hernia) 7/5/2022    Hypertension      Past Surgical History:   Procedure Laterality Date    ABDOMINAL SURGERY      CORONARY ANGIOPLASTY WITH STENT PLACEMENT      ~ 2017    JOINT REPLACEMENT       Family History       Problem Relation (Age of Onset)    Diabetes Mother, Father          Tobacco Use    Smoking status: Former Smoker     Types: Cigarettes    Smokeless tobacco: Never Used    Tobacco comment: 1/3  PPD ~ 20 years: quit Dec 2021   Substance and Sexual Activity    Alcohol use: Not Currently     Comment: pint a week: Hx    Drug use: Never    Sexual activity: Not Currently     Review of Systems   Gastrointestinal:  Positive for abdominal pain and blood in stool.   Skin:  Positive for pallor.   Objective:     Vital Signs (Most Recent):  Temp: 96.7 °F (35.9 °C) (07/07/22 1045)  Pulse: 92 (07/07/22 1045)  Resp: 20 (07/07/22 1045)  BP: (!) 92/52 (07/07/22 1045)  SpO2: 98 % (07/07/22 1045)   Vital Signs (24h Range):  Temp:  [96.4 °F (35.8 °C)-98.4 °F (36.9 °C)] 96.7 °F (35.9 °C)  Pulse:  [76-98] 92  Resp:  [18-20] 20  SpO2:  [98 %-100 %] 98 %  BP: ()/(42-76) 92/52     Weight: 122.5 kg (270 lb 1 oz)  Body mass index is 36.63 kg/m².    Physical Exam  Vitals reviewed.   HENT:      Head: Normocephalic.   Eyes:      Conjunctiva/sclera: Conjunctivae normal.   Pulmonary:      Effort: Pulmonary effort is normal.   Abdominal:      Palpations: Abdomen is soft.      Tenderness: There is abdominal tenderness.   Skin:     General: Skin is warm and dry.      Capillary Refill: Capillary refill takes less than 2 seconds.      Coloration: Skin is pale.   Neurological:      Mental Status: He is alert.       Significant Labs:  I have reviewed all pertinent lab results within the past 24 hours.  CBC:   Recent Labs   Lab 07/07/22 0408   WBC 8.51   RBC 2.06*   HGB 6.3*   HCT 18.7*   PLT 57*   MCV 90.8   MCH 30.6   MCHC 33.7     BMP:   Recent Labs   Lab 07/07/22 0408   GLU 84      K 5.2*   *   CO2 16*   BUN 96*   CREATININE 5.64*   CALCIUM 7.9*       Significant Diagnostics:  I have reviewed all pertinent imaging results/findings within the past 24 hours.      Assessment/Plan:     * Gastrointestinal hemorrhage associated with duodenitis  07/07 transfuse 1 more unit of prbc and platelets for low platelet of 57, surgery to follow closely GI to rescope today  Has not had CTA due to renal failure        VTE Risk Mitigation  (From admission, onward)         Ordered     Reason for No Pharmacological VTE Prophylaxis  Once        Question:  Reasons:  Answer:  Risk of Bleeding    06/16/22 0301     IP VTE HIGH RISK PATIENT  Once         06/16/22 0301     Place sequential compression device  Until discontinued         06/16/22 0301                Thank you for your consult. I will follow-up with patient. Please contact us if you have any additional questions.    LISE Ayala  General Surgery  [facility]

## 2022-07-07 NOTE — PLAN OF CARE
Patient transferred to [facility] from the 5th floor. SS contacted RN, Erin,to notify her that his TB skin test needs to be read today. She states this has to be done by a  certified TB nurse. Phoned nursing supervisor, Sondra, to make her aware that this patient needs TB skin test read today. Per supervisor, she will have a staff member that is certified go read test.

## 2022-07-07 NOTE — NURSING
"1111: transfer in to icu from . Oriented patient to staff and unit. Fluid bolus infusing.Left arm noted to be more swollen than right. Pt states its "been like this". Iv patent, flushes easily with blood return noted. Will monitor.   1130:  and LISE Lucio here at bedside. Alexander placed per orders  1140: white absorbent pad completely saturated with maroon color stool, clots noted. Care provided.   Lab at bedside for h&h recheck.   1235: platelets are infusing, another maroon with clots bm noted, care provided.  1316: spoke with , will infuse 2 prbc and start ng tube to suction.  1329: L nare ng tube started  1350: pad saturated with maroon bm, more clots noted this time. Care provided.  1440: 2 prbcs complete.  updated on current vitals and status.   1450: maroon BM noted, mostly clots.  1505: GI lab here. Recheck h&h drawn.  1540: released from anesthesia.   1550: heart station here for echo  1620:  called.no answer  1725: Rolan Garvin here at bedside.updated him. Per new orders, patient may have clear liquids. Tolerating water at this time.   1830: half a saturated pad of dark maroon stool with clots noted. Care provided. Patient turned.  1835: Patients family at bedside, his cousin tanesha barroso. Per patient, ok to give her info. Plan of care dicussed at this time.    "

## 2022-07-07 NOTE — PLAN OF CARE
Problem: Adult Inpatient Plan of Care  Goal: Plan of Care Review  Outcome: Ongoing, Progressing  Goal: Optimal Comfort and Wellbeing  Outcome: Ongoing, Progressing     Problem: Diabetes Comorbidity  Goal: Blood Glucose Level Within Targeted Range  Outcome: Ongoing, Progressing     Problem: Oral Intake Inadequate (Acute Kidney Injury/Impairment)  Goal: Optimal Nutrition Intake  Outcome: Ongoing, Progressing     Problem: Renal Function Impairment (Acute Kidney Injury/Impairment)  Goal: Effective Renal Function  Outcome: Ongoing, Progressing     Problem: Skin Injury Risk Increased  Goal: Skin Health and Integrity  Outcome: Ongoing, Progressing     Problem: Gas Exchange Impaired  Goal: Optimal Gas Exchange  Outcome: Ongoing, Progressing

## 2022-07-07 NOTE — PROGRESS NOTES
[facility] Medicine  Progress Note    Patient Name: Neymar Parra  MRN: [id number]  Patient Class: IP- Inpatient   Admission Date: 6/16/2022  Length of Stay: 21 days  Attending Physician: Earl Lemus Jr., MD  Primary Care Provider: [facility] megan Robertlb        Subjective:     Principal Problem:Gastrointestinal hemorrhage associated with duodenitis        HPI:  72 y.o. male transferred to the [facility] from [facility] for hyperkalemia and hypoxia. Pt reports he went to [facility] because he was having dyspnea and hemoptysis since 1 month which is worse today. Pt reports he was tested positive for COVID at the nursing home on 6/9/22. Per nursing home patient oxygen saturation was in the 80's on RA with tachypnea and labored breathing. Pt reports he normally does not wear oxygen at the nursing home but has been wearing oxygen more often recently. Pt reports he smoked 1-1.5 PPD for 20 years but quit 7 months ago. Per pt, he saw a pulmonologist (Dr. Aldana) for his hemoptysis and had some tests done the results of which he does not know. Per records, pt with a right pleural effusion recently and had a thoracentesis. Pt denies any fever, congestion, dysuria, N/V/D, chest pain, or any other complaints at this time.     In the ED, pt's K 7.2, d-dimer 0.76, BNP 6284, BUN/Cr 73/7.16. EKG showed some peaked t-waves and irregular rhythm. Chest xray showed Increased right lower lung pulmonary density could indicate pneumonia. Pt was treated in the ED with Calcium gluconate 1g, regular insulin 10 units, D50, IV solumedrol 125 mg, tylenol 650 mg and IV fluids NS 1L bolus. Pt will be admitted to the hospital service for management of hypoxia and hyperkalemia.      Overview/Hospital Course:  07/04-Patient resting comfortably in bed with no acute complaints. Is not requiring supplemental oxygen today. Denies new rectal bleeding. The patient says he feels better after getting 2 units of pRBC yesterday, but  that he still feels weak. His H&H is stable this morning, will continue to monitor, and as long as it stays stable may be able to discharge tomorrow.          Interval History:     This morning, pt complains of abdominal pain. Pt had blood clots from stool early this morning. H & H 6.3/18.7 and 1 unit ordered. While receiving transfusion, pt was noted to be hypotensive 80s/40s and 1 liter of bolus given. Pt was transferred to ICU for further monitoring and eval. Spoke with Dr. Sanon and agrees with surgery consult for further eval. Dr. Rees will evaluate the pt.      Review of Systems   Constitutional:  Positive for fatigue.   Respiratory:  Negative for shortness of breath.    Cardiovascular:  Negative for chest pain and leg swelling.   Gastrointestinal:  Positive for abdominal pain and blood in stool. Negative for nausea and vomiting.   Genitourinary:  Negative for difficulty urinating and dysuria.   Musculoskeletal:  Negative for arthralgias and back pain.   Skin:  Negative for color change.   Allergic/Immunologic: Negative for environmental allergies.   Neurological:  Positive for weakness. Negative for dizziness and facial asymmetry.   Hematological:  Negative for adenopathy. Does not bruise/bleed easily.   Psychiatric/Behavioral:  Negative for agitation and behavioral problems.    Objective:     Vital Signs (Most Recent):  Temp: 97.7 °F (36.5 °C) (07/07/22 1215)  Pulse: 92 (07/07/22 1245)  Resp: (!) 35 (07/07/22 1245)  BP: (!) 102/51 (07/07/22 1245)  SpO2: 100 % (07/07/22 1245)   Vital Signs (24h Range):  Temp:  [96.4 °F (35.8 °C)-98.4 °F (36.9 °C)] 97.7 °F (36.5 °C)  Pulse:  [76-98] 92  Resp:  [17-41] 35  SpO2:  [98 %-100 %] 100 %  BP: ()/(31-76) 102/51     Weight: 122.5 kg (270 lb 1 oz)  Body mass index is 36.63 kg/m².    Intake/Output Summary (Last 24 hours) at 7/7/2022 1256  Last data filed at 7/7/2022 1045  Gross per 24 hour   Intake 1581.41 ml   Output --   Net 1581.41 ml        Physical  Exam  Vitals reviewed.   Constitutional:       General: He is not in acute distress.     Appearance: Normal appearance. He is ill-appearing.      Interventions: He is not intubated.  HENT:      Head: Normocephalic and atraumatic.      Mouth/Throat:      Mouth: Mucous membranes are dry.   Cardiovascular:      Rate and Rhythm: Normal rate and regular rhythm.      Heart sounds: Normal heart sounds. No murmur heard.  Pulmonary:      Effort: No respiratory distress. He is not intubated.      Breath sounds: Normal breath sounds.   Abdominal:      General: There is distension.      Tenderness: There is abdominal tenderness.   Musculoskeletal:      Right lower leg: No edema.      Left lower leg: No edema.   Skin:     General: Skin is dry.      Capillary Refill: Capillary refill takes less than 2 seconds.   Neurological:      General: No focal deficit present.      Mental Status: He is alert and oriented to person, place, and time.       Significant Labs: All pertinent labs within the past 24 hours have been reviewed.    Significant Imaging: I have reviewed all pertinent imaging results/findings within the past 24 hours.      Assessment/Plan:      * Gastrointestinal hemorrhage associated with duodenitis  - EGD with bleeding duodenal ulcer as above, was clipped   - Repeat EGD 07/05, bleeding duodenal vessel was clipped per GI.  - H. Pylori negative  - Protonix infusion  - GI following, appreciate assistance   -Pt continues to bleed with blood clots from bowel and H &H  Continues to decline. Pt became hypotensive, and transferred to ICU for further monitoring and eval. General surgery, Dr. Rees consulted for eval.   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 [facility]     Anemia  - Patient with bleeding duodenal ulcer on EGD 7/2, ulcer clipped and an EGD on 07/05 where bleeding duodenal vessel was clipped  - patient has had a total of 6 units this  admission  - GI following, assistance appreciated  -Pt continues to have large amount of bloody bowel movement with clots. H & H 6.3/18.7 (7/7), platelet [id number] and during 1 unit blood transfusion, pt became hypotensive. Given 1 liter bolus and transferred to ICU  And consulted GS, Dr. Rees. Appreciate assistance (7/7)      Hypertension  Due to GI bleeding, pt became hypotensive  Holding amlodipine, isosorbide mononitrate and beta blocker at this time (7/7)     Acute on chronic renal failure    - BUN/Crt 96/5.64  - Nephrology following. Appreciate assistance     Gastroesophageal reflux disease  Continue protonix infusion    Type 2 diabetes mellitus without complication  - HgA1c 4.7 6/22  - detemir 8U qhs with SSI     Pleural effusion  - Continue to monitor  - Seen by pulmonology, appreciate assistance   - No thoracentesis at this time      VTE Risk Mitigation (From admission, onward)         Ordered     Reason for No Pharmacological VTE Prophylaxis  Once        Question:  Reasons:  Answer:  Risk of Bleeding    06/16/22 0301     IP VTE HIGH RISK PATIENT  Once         06/16/22 0301     Place sequential compression device  Until discontinued         06/16/22 0301                Discharge Planning   EDWARDO:      Code Status: Full Code   Is the patient medically ready for discharge?:     Reason for patient still in hospital (select all that apply): Patient unstable  Discharge Plan A: Long-term acute care facility (LTAC) (Choice obtained for Specialty)                  Dilcia Phipps DO  Department of Hospital Medicine   [facility]

## 2022-07-07 NOTE — HPI
71 y/o nursing home resident with CAD with stents on plavix  Admitted several days ago with melena with multiple units of PRBCs  Three EGD this admission per dr felix with treatment of dieulafoy's lesion with injection of epi and clip   Most recent EGD 2 days ago with clipping of active bleeding of duodenitis visible vessel   [facility]

## 2022-07-07 NOTE — ANESTHESIA PREPROCEDURE EVALUATION
07/07/2022  Neymar Parra is a 72 y.o., male.      Pre-op Assessment    I have reviewed the Patient Summary Reports.    I have reviewed the NPO Status.   I have reviewed the Medications.     Review of Systems         Anesthesia Plan  Type of Anesthesia, risks & benefits discussed:    Anesthesia Type: Gen Natural Airway  Intra-op Monitoring Plan: Standard ASA Monitors  Induction:  IV  Informed Consent: Informed consent signed with the Patient and all parties understand the risks and agree with anesthesia plan.  All questions answered.   ASA Score: 4    Ready For Surgery From Anesthesia Perspective.     .  NPO  Greater than 6 hours  NAC  Allergic to gatifloxacin    Hct 17  Platelets 57  K 5.2  Cr 5.6  Ca 7.9    Duodenal adenoma Hypertension   Coronary artery disease Chronic renal disease, stage 4, severely decreased glomerular filtration rate (GFR) between 15-29 mL/min/1.73 square meter   Anticoagulant long-term use COVID-19   HH (hiatus hernia) Duodenitis   Dieulafoy lesion of duodenum    COVID positive on 6/6/22  GERD  DM 2  Anemia . . . Transfused 3 units pRBCs and some platelets today    Airway exam deferred (COVID precautions); adequate ROM at neck.

## 2022-07-07 NOTE — H&P
[facility]  Gastroenterology  H&P    Patient Name: Neymar Parra  MRN: [id number]  Admission Date: 6/16/2022  Code Status: Full Code    Attending Provider: Russ Sanon MD  Primary Care Physician: [facility]  Principal Problem:Gastrointestinal hemorrhage associated with duodenitis    Subjective:     History of Present Illness: Pt has recurrent gi bleeding with anemia. He's had multiple units of pRBC's over several days and I last treated a bleeding visible vessel in the duodenum 7/5/22.    Past Medical History:   Diagnosis Date    Anticoagulant long-term use     Chronic renal disease, stage 4, severely decreased glomerular filtration rate (GFR) between 15-29 mL/min/1.73 square meter     Coronary artery disease     stents ~ 2017    COVID-19 6/16/2022    Dieulafoy lesion of duodenum 7/5/2022    Duodenal adenoma     Duodenitis 7/5/2022    HH (hiatus hernia) 7/5/2022    Hypertension        Past Surgical History:   Procedure Laterality Date    ABDOMINAL SURGERY      CORONARY ANGIOPLASTY WITH STENT PLACEMENT      ~ 2017    JOINT REPLACEMENT         Review of patient's allergies indicates:   Allergen Reactions    Gatifloxacin Anaphylaxis     Family History     Problem Relation (Age of Onset)    Diabetes Mother, Father        Tobacco Use    Smoking status: Former Smoker     Types: Cigarettes    Smokeless tobacco: Never Used    Tobacco comment: 1/3 PPD ~ 20 years: quit Dec 2021   Substance and Sexual Activity    Alcohol use: Not Currently     Comment: pint a week: Hx    Drug use: Never    Sexual activity: Not Currently     Review of Systems   Respiratory: Negative.    Cardiovascular: Negative.    Gastrointestinal: Positive for blood in stool.     Objective:     Vital Signs (Most Recent):  Temp: 97.7 °F (36.5 °C) (07/07/22 1215)  Pulse: 94 (07/07/22 1430)  Resp: 18 (07/07/22 1430)  BP: 114/64 (07/07/22 1430)  SpO2: 100 % (07/07/22 1430) Vital Signs (24h  Range):  Temp:  [96.4 °F (35.8 °C)-98.4 °F (36.9 °C)] 97.7 °F (36.5 °C)  Pulse:  [] 94  Resp:  [16-41] 18  SpO2:  [98 %-100 %] 100 %  BP: ()/(31-76) 114/64     Weight: 122.5 kg (270 lb 1 oz) (07/07/22 1300)  Body mass index is 36.63 kg/m².      Intake/Output Summary (Last 24 hours) at 7/7/2022 1511  Last data filed at 7/7/2022 1345  Gross per 24 hour   Intake 2077.24 ml   Output --   Net 2077.24 ml       Lines/Drains/Airways     Drain  Duration                NG/OG Tube 07/07/22 1329 [place] sump Left nostril <1 day         Urethral Catheter 07/07/22 1130 <1 day          Peripheral Intravenous Line  Duration                Peripheral IV - Single Lumen 07/06/22 2350 20 G Left;Posterior Hand <1 day         Peripheral IV - Single Lumen 07/07/22 1118 18 G Anterior;Right Upper Arm <1 day                Physical Exam  Vitals reviewed.   Constitutional:       General: He is not in acute distress.     Appearance: Normal appearance. He is well-developed. He is ill-appearing.   HENT:      Head: Normocephalic and atraumatic.      Nose: Nose normal.   Eyes:      Pupils: Pupils are equal, round, and reactive to light.   Cardiovascular:      Rate and Rhythm: Normal rate and regular rhythm.   Pulmonary:      Effort: Pulmonary effort is normal.      Breath sounds: Normal breath sounds. No wheezing.   Abdominal:      General: Abdomen is flat. Bowel sounds are normal. There is no distension.      Palpations: Abdomen is soft.      Tenderness: There is no abdominal tenderness. There is no guarding.   Skin:     General: Skin is warm and dry.      Coloration: Skin is pale. Skin is not jaundiced.   Neurological:      Mental Status: He is alert.   Psychiatric:         Attention and Perception: Attention normal.         Mood and Affect: Affect normal.         Speech: Speech normal.         Behavior: Behavior is cooperative.      Comments: Pt was calm while speaking.         Significant Labs:  CBC:   Recent Labs   Lab  07/06/22  0335 07/06/22  1447 07/07/22  0408 07/07/22  1141   WBC 10.78  --  8.51  --    HGB 7.0* 6.6* 6.3* 5.3*   HCT 20.4* 20.2* 18.7* 16.5*   PLT 60*  --  57*  --        Significant Imaging:  Imaging results within the past 24 hours have been reviewed.    Assessment/Plan:     Active Diagnoses:    Diagnosis Date Noted POA    PRINCIPAL PROBLEM:  Gastrointestinal hemorrhage associated with duodenitis [K29.81]  Yes    Acute on chronic renal failure [N17.9, N18.9] 06/16/2022 Yes    Anemia [D64.9] 06/16/2022 Yes    Pleural effusion [J90] 04/29/2022 Yes    Hypertension [I10] 12/12/2021 Yes    Gastroesophageal reflux disease [K21.9] 12/12/2021 Yes    Type 2 diabetes mellitus without complication [E11.9] 12/12/2021 Yes      Problems Resolved During this Admission:    Diagnosis Date Noted Date Resolved POA    PNA (pneumonia) [J18.9] 06/27/2022 07/06/2022 Yes    Hyponatremia [E87.1] 06/25/2022 07/02/2022 No    Elevated d-dimer [R79.89] 06/22/2022 07/05/2022 Yes    Hyperkalemia [E87.5] 06/16/2022 06/21/2022 Yes    COVID-19 [U07.1] 06/16/2022 06/22/2022 Yes    COPD exacerbation [J44.1] 12/12/2021 07/03/2022 Yes       Imp: gi bleed, blood loss anemia  Plan: egd    Russ Sanon MD  Gastroenterology  [facility]

## 2022-07-07 NOTE — CONSULTS
"[facility] ICU  General Surgery  Consult Note    Patient Name: Neymar Parra  MRN: [id number]  Code Status: Full Code  Admission Date: 6/16/2022  Hospital Length of Stay: 21 days  Attending Physician: Earl Lemus Jr., MD  Primary Care Provider: [facility]    Patient information was obtained from ER records.     Inpatient consult to General Surgery  Consult performed by: LISE Ayala  Consult ordered by: Dilcia Phpips DO  Reason for consult: GI Bleed  Assessment/Recommendations: Follow h/h.  Continue transfusions, as necessary.  Repeat EGD, today, if possible by EGD.  Consider colonoscopy, if EGD not with active bleeding or clots.  CT angio not really feasible, due to acute and chronic elevation of creatinine.  Evaluate cardiac status/suitability for surgical intervention.  History of CHF in medical record.  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        Subjective:     Principal Problem: Gastrointestinal hemorrhage associated with duodenitis    History of Present Illness: 73 y/o nursing home resident with CAD with stents on plavix  Admitted several days ago with melena with multiple units of PRBCs  Three EGD this admission per dr felix with treatment of dieulafoy's lesion with injection of epi and clip   Most recent EGD 2 days ago with clipping of active bleeding of duodenitis visible vessel   [facility]      No current facility-administered medications on file prior to encounter.     Current Outpatient Medications on File Prior to Encounter   Medication Sig    acetaminophen (TYLENOL) 500 MG tablet Take 1,000 mg by mouth every 8 (eight) hours as needed for Pain.    albuterol-ipratropium (DUO-NEB) 2.5 mg-0.5 mg/3 mL nebulizer solution Take 3 mLs by nebulization every 6 (six) hours as needed for Wheezing. Rescue "    allopurinoL (ZYLOPRIM) 100 MG tablet TAKE ONE TABLET BY MOUTH DAILY FOR GOUT    amLODIPine (NORVASC) 10 MG tablet Take 10 mg by mouth once daily.    aspirin (ECOTRIN) 325 MG EC tablet Take 325 mg by mouth once daily.    atorvastatin (LIPITOR) 80 MG tablet Take 40 mg by mouth every evening.    clopidogreL (PLAVIX) 75 mg tablet TAKE 1 TABLET BY MOUTH ONCE DAILY     HYDROcodone-acetaminophen (NORCO) 7.5-325 mg per tablet Take 1 tablet by mouth every 6 (six) hours as needed for Pain.    isosorbide mononitrate (IMDUR) 60 MG 24 hr tablet Take 1 tablet by mouth once daily.    lisinopriL (PRINIVIL,ZESTRIL) 20 MG tablet Take 20 mg by mouth.    loratadine (CLARITIN) 10 mg tablet TAKE ONE TABLET BY MOUTH DAILY FOR ALLERGIES    metoprolol tartrate (LOPRESSOR) 25 MG tablet Take 25 mg by mouth once daily.    omeprazole (PRILOSEC) 20 MG capsule Take 20 mg by mouth once daily.    traMADoL (ULTRAM) 50 mg tablet Take 50 mg by mouth every 8 (eight) hours as needed for Pain.    traZODone (DESYREL) 100 MG tablet Take 300 mg by mouth every evening.       Review of patient's allergies indicates:   Allergen Reactions    Gatifloxacin Anaphylaxis       Past Medical History:   Diagnosis Date    Anticoagulant long-term use     Chronic renal disease, stage 4, severely decreased glomerular filtration rate (GFR) between 15-29 mL/min/1.73 square meter     Coronary artery disease     stents ~ 2017    COVID-19 6/16/2022    Dieulafoy lesion of duodenum 7/5/2022    Duodenal adenoma     Duodenitis 7/5/2022    HH (hiatus hernia) 7/5/2022    Hypertension      Past Surgical History:   Procedure Laterality Date    ABDOMINAL SURGERY      CORONARY ANGIOPLASTY WITH STENT PLACEMENT      ~ 2017    JOINT REPLACEMENT       Family History       Problem Relation (Age of Onset)    Diabetes Mother, Father          Tobacco Use    Smoking status: Former Smoker     Types: Cigarettes    Smokeless tobacco: Never Used    Tobacco comment: 1/3  PPD ~ 20 years: quit Dec 2021   Substance and Sexual Activity    Alcohol use: Not Currently     Comment: pint a week: Hx    Drug use: Never    Sexual activity: Not Currently     Review of Systems   Gastrointestinal:  Positive for abdominal pain and blood in stool.   Skin:  Positive for pallor.   Objective:     Vital Signs (Most Recent):  Temp: 96.7 °F (35.9 °C) (07/07/22 1045)  Pulse: 92 (07/07/22 1045)  Resp: 20 (07/07/22 1045)  BP: (!) 92/52 (07/07/22 1045)  SpO2: 98 % (07/07/22 1045)   Vital Signs (24h Range):  Temp:  [96.4 °F (35.8 °C)-98.4 °F (36.9 °C)] 96.7 °F (35.9 °C)  Pulse:  [76-98] 92  Resp:  [18-20] 20  SpO2:  [98 %-100 %] 98 %  BP: ()/(42-76) 92/52     Weight: 122.5 kg (270 lb 1 oz)  Body mass index is 36.63 kg/m².    Physical Exam  Vitals reviewed.   HENT:      Head: Normocephalic.   Eyes:      Conjunctiva/sclera: Conjunctivae normal.   Pulmonary:      Effort: Pulmonary effort is normal.   Abdominal:      Palpations: Abdomen is soft.      Tenderness: There is abdominal tenderness.   Skin:     General: Skin is warm and dry.      Capillary Refill: Capillary refill takes less than 2 seconds.      Coloration: Skin is pale.   Neurological:      Mental Status: He is alert.       Significant Labs:  I have reviewed all pertinent lab results within the past 24 hours.  CBC:   Recent Labs   Lab 07/07/22 0408   WBC 8.51   RBC 2.06*   HGB 6.3*   HCT 18.7*   PLT 57*   MCV 90.8   MCH 30.6   MCHC 33.7     BMP:   Recent Labs   Lab 07/07/22 0408   GLU 84      K 5.2*   *   CO2 16*   BUN 96*   CREATININE 5.64*   CALCIUM 7.9*       Significant Diagnostics:  I have reviewed all pertinent imaging results/findings within the past 24 hours.      Assessment/Plan:     * Gastrointestinal hemorrhage associated with duodenitis  07/07 transfuse 1 more unit of prbc and platelets for low platelet of 57, surgery to follow closely GI to rescope today  Has not had CTA due to renal failure        VTE Risk Mitigation  (From admission, onward)         Ordered     Reason for No Pharmacological VTE Prophylaxis  Once        Question:  Reasons:  Answer:  Risk of Bleeding    06/16/22 0301     IP VTE HIGH RISK PATIENT  Once         06/16/22 0301     Place sequential compression device  Until discontinued         06/16/22 0301                Thank you for your consult. I will follow-up with patient. Please contact us if you have any additional questions.    LISE Ayala  General Surgery  [facility]

## 2022-07-07 NOTE — ASSESSMENT & PLAN NOTE
07/07 transfuse 1 more unit of prbc and platelets for low platelet of 57, surgery to follow closely GI to rescope today  Has not had CTA due to renal failure

## 2022-07-08 ENCOUNTER — ANESTHESIA EVENT (OUTPATIENT)
Dept: SURGERY | Facility: HOSPITAL | Age: 72
DRG: 981 | End: 2022-07-08
Payer: MEDICARE

## 2022-07-08 ENCOUNTER — ANESTHESIA (OUTPATIENT)
Dept: SURGERY | Facility: HOSPITAL | Age: 72
DRG: 981 | End: 2022-07-08
Payer: MEDICARE

## 2022-07-08 VITALS — RESPIRATION RATE: 18 BRPM

## 2022-07-08 LAB
ABO + RH BLD: NORMAL
ABO + RH BLD: NORMAL
ALBUMIN SERPL BCP-MCNC: 1.5 G/DL (ref 3.5–5)
ALBUMIN/GLOB SERPL: 0.8 {RATIO}
ALP SERPL-CCNC: 37 U/L (ref 45–115)
ALT SERPL W P-5'-P-CCNC: 38 U/L (ref 16–61)
ANION GAP SERPL CALCULATED.3IONS-SCNC: 15 MMOL/L (ref 7–16)
AORTIC VALVE CUSP SEPERATION: 17.3 CM
AST SERPL W P-5'-P-CCNC: 20 U/L (ref 15–37)
AV INDEX (PROSTH): 1.01
AV MEAN GRADIENT: 4 MMHG
AV PEAK GRADIENT: 9 MMHG
AV VALVE AREA: 3.61 CM2
BASOPHILS # BLD AUTO: 0.01 K/UL (ref 0–0.2)
BASOPHILS # BLD AUTO: 0.02 K/UL (ref 0–0.2)
BASOPHILS NFR BLD AUTO: 0.1 % (ref 0–1)
BASOPHILS NFR BLD AUTO: 0.1 % (ref 0–1)
BILIRUB SERPL-MCNC: 0.5 MG/DL (ref 0–1.2)
BLD PROD TYP BPU: NORMAL
BLD PROD TYP BPU: NORMAL
BLOOD UNIT EXPIRATION DATE: NORMAL
BLOOD UNIT EXPIRATION DATE: NORMAL
BLOOD UNIT TYPE CODE: 5100
BLOOD UNIT TYPE CODE: 5100
BSA FOR ECHO PROCEDURE: 2.46 M2
BUN SERPL-MCNC: 87 MG/DL (ref 7–18)
BUN/CREAT SERPL: 15 (ref 6–20)
CALCIUM SERPL-MCNC: 7.1 MG/DL (ref 8.5–10.1)
CHLORIDE SERPL-SCNC: 120 MMOL/L (ref 98–107)
CO2 SERPL-SCNC: 16 MMOL/L (ref 21–32)
CREAT SERPL-MCNC: 5.67 MG/DL (ref 0.7–1.3)
CROSSMATCH INTERPRETATION: NORMAL
CROSSMATCH INTERPRETATION: NORMAL
CV ECHO LV RWT: 1.67 CM
DIFFERENTIAL METHOD BLD: ABNORMAL
DIFFERENTIAL METHOD BLD: ABNORMAL
DISPENSE STATUS: NORMAL
DISPENSE STATUS: NORMAL
DOP CALC AO PEAK VEL: 1.5 M/S
DOP CALC AO VTI: 21.51 CM
DOP CALC LVOT AREA: 3.6 CM2
DOP CALC LVOT DIAMETER: 2.13 CM
DOP CALC LVOT STROKE VOLUME: 77.68 CM3
DOP CALCLVOT PEAK VEL VTI: 21.81 CM
E WAVE DECELERATION TIME: 254 MSEC
E/A RATIO: 0.86
E/E' RATIO: 8.5 M/S
ECHO LV POSTERIOR WALL: 2.34 CM (ref 0.6–1.1)
EJECTION FRACTION: 55 %
EOSINOPHIL # BLD AUTO: 0.06 K/UL (ref 0–0.5)
EOSINOPHIL # BLD AUTO: 0.21 K/UL (ref 0–0.5)
EOSINOPHIL NFR BLD AUTO: 0.4 % (ref 1–4)
EOSINOPHIL NFR BLD AUTO: 2.2 % (ref 1–4)
ERYTHROCYTE [DISTWIDTH] IN BLOOD BY AUTOMATED COUNT: 14.9 % (ref 11.5–14.5)
ERYTHROCYTE [DISTWIDTH] IN BLOOD BY AUTOMATED COUNT: 15.2 % (ref 11.5–14.5)
FRACTIONAL SHORTENING: 40 % (ref 28–44)
GLOBULIN SER-MCNC: 1.8 G/DL (ref 2–4)
GLUCOSE SERPL-MCNC: 109 MG/DL (ref 70–105)
GLUCOSE SERPL-MCNC: 114 MG/DL (ref 70–105)
GLUCOSE SERPL-MCNC: 119 MG/DL (ref 70–105)
GLUCOSE SERPL-MCNC: 81 MG/DL (ref 70–105)
GLUCOSE SERPL-MCNC: 94 MG/DL (ref 74–106)
HCT VFR BLD AUTO: 18.9 % (ref 40–54)
HCT VFR BLD AUTO: 19 % (ref 40–54)
HCT VFR BLD AUTO: 21.1 % (ref 40–54)
HCT VFR BLD AUTO: 22.7 % (ref 40–54)
HGB BLD-MCNC: 6.4 G/DL (ref 13.5–18)
HGB BLD-MCNC: 6.4 G/DL (ref 13.5–18)
HGB BLD-MCNC: 6.9 G/DL (ref 13.5–18)
HGB BLD-MCNC: 7.5 G/DL (ref 13.5–18)
IMM GRANULOCYTES # BLD AUTO: 0.06 K/UL (ref 0–0.04)
IMM GRANULOCYTES # BLD AUTO: 0.09 K/UL (ref 0–0.04)
IMM GRANULOCYTES NFR BLD: 0.6 % (ref 0–0.4)
IMM GRANULOCYTES NFR BLD: 0.6 % (ref 0–0.4)
INTERVENTRICULAR SEPTUM: 1.35 CM (ref 0.6–1.1)
LEFT ATRIUM SIZE: 2.62 CM
LEFT INTERNAL DIMENSION IN SYSTOLE: 1.69 CM (ref 2.1–4)
LEFT VENTRICLE DIASTOLIC VOLUME INDEX: 12.21 ML/M2
LEFT VENTRICLE DIASTOLIC VOLUME: 29.55 ML
LEFT VENTRICLE MASS INDEX: 87 G/M2
LEFT VENTRICLE SYSTOLIC VOLUME INDEX: 3.4 ML/M2
LEFT VENTRICLE SYSTOLIC VOLUME: 8.26 ML
LEFT VENTRICULAR INTERNAL DIMENSION IN DIASTOLE: 2.8 CM (ref 3.5–6)
LEFT VENTRICULAR MASS: 209.77 G
LV LATERAL E/E' RATIO: 8.5 M/S
LV SEPTAL E/E' RATIO: 8.5 M/S
LYMPHOCYTES # BLD AUTO: 0.97 K/UL (ref 1–4.8)
LYMPHOCYTES # BLD AUTO: 1.34 K/UL (ref 1–4.8)
LYMPHOCYTES NFR BLD AUTO: 14.1 % (ref 27–41)
LYMPHOCYTES NFR BLD AUTO: 6.9 % (ref 27–41)
MCH RBC QN AUTO: 31.2 PG (ref 27–31)
MCH RBC QN AUTO: 31.5 PG (ref 27–31)
MCHC RBC AUTO-ENTMCNC: 33 G/DL (ref 32–36)
MCHC RBC AUTO-ENTMCNC: 33.7 G/DL (ref 32–36)
MCV RBC AUTO: 92.7 FL (ref 80–96)
MCV RBC AUTO: 95.4 FL (ref 80–96)
MONOCYTES # BLD AUTO: 0.56 K/UL (ref 0–0.8)
MONOCYTES # BLD AUTO: 0.77 K/UL (ref 0–0.8)
MONOCYTES NFR BLD AUTO: 5.5 % (ref 2–6)
MONOCYTES NFR BLD AUTO: 5.9 % (ref 2–6)
MPC BLD CALC-MCNC: 10.2 FL (ref 9.4–12.4)
MPC BLD CALC-MCNC: 10.7 FL (ref 9.4–12.4)
MV PEAK A VEL: 0.79 M/S
MV PEAK E VEL: 0.68 M/S
NEUTROPHILS # BLD AUTO: 12.17 K/UL (ref 1.8–7.7)
NEUTROPHILS # BLD AUTO: 7.35 K/UL (ref 1.8–7.7)
NEUTROPHILS NFR BLD AUTO: 77.1 % (ref 53–65)
NEUTROPHILS NFR BLD AUTO: 86.5 % (ref 53–65)
NRBC # BLD AUTO: 0.02 X10E3/UL
NRBC # BLD AUTO: 0.04 X10E3/UL
NRBC, AUTO (.00): 0.2 %
NRBC, AUTO (.00): 0.3 %
PISA TR MAX VEL: 2.86 M/S
PLATELET # BLD AUTO: 78 K/UL (ref 150–400)
PLATELET # BLD AUTO: 84 K/UL (ref 150–400)
POTASSIUM SERPL-SCNC: 5.7 MMOL/L (ref 3.5–5.1)
PROT SERPL-MCNC: 3.3 G/DL (ref 6.4–8.2)
RA PRESSURE: 3 MMHG
RA WIDTH: 3.35 CM
RBC # BLD AUTO: 2.05 M/UL (ref 4.6–6.2)
RBC # BLD AUTO: 2.38 M/UL (ref 4.6–6.2)
RIGHT ATRIAL AREA: 20.8 CM2
RIGHT VENTRICULAR END-DIASTOLIC DIMENSION: 2.31 CM
RIGHT VENTRICULAR LENGTH IN DIASTOLE (APICAL 4-CHAMBER VIEW): 7.89 CM
RV MID DIAMA: 2.3 CM
SODIUM SERPL-SCNC: 145 MMOL/L (ref 136–145)
TDI LATERAL: 0.08 M/S
TDI SEPTAL: 0.08 M/S
TDI: 0.08 M/S
TR MAX PG: 33 MMHG
TRICUSPID ANNULAR PLANE SYSTOLIC EXCURSION: 2.02 CM
TV REST PULMONARY ARTERY PRESSURE: 36 MMHG
UNIT NUMBER: NORMAL
UNIT NUMBER: NORMAL
WBC # BLD AUTO: 14.08 K/UL (ref 4.5–11)
WBC # BLD AUTO: 9.53 K/UL (ref 4.5–11)

## 2022-07-08 PROCEDURE — 20000000 HC ICU ROOM

## 2022-07-08 PROCEDURE — 82962 GLUCOSE BLOOD TEST: CPT

## 2022-07-08 PROCEDURE — 85025 COMPLETE CBC W/AUTO DIFF WBC: CPT | Performed by: SURGERY

## 2022-07-08 PROCEDURE — 43840 GSTRRPHY SUTR DUOL/GSTR ULCR: CPT | Mod: 80,,, | Performed by: SURGERY

## 2022-07-08 PROCEDURE — 25000003 PHARM REV CODE 250: Performed by: NURSE PRACTITIONER

## 2022-07-08 PROCEDURE — 25000003 PHARM REV CODE 250: Performed by: NURSE ANESTHETIST, CERTIFIED REGISTERED

## 2022-07-08 PROCEDURE — 63600175 PHARM REV CODE 636 W HCPCS

## 2022-07-08 PROCEDURE — 99900035 HC TECH TIME PER 15 MIN (STAT)

## 2022-07-08 PROCEDURE — 85014 HEMATOCRIT: CPT | Performed by: INTERNAL MEDICINE

## 2022-07-08 PROCEDURE — 85025 COMPLETE CBC W/AUTO DIFF WBC: CPT | Performed by: FAMILY MEDICINE

## 2022-07-08 PROCEDURE — D9220A PRA ANESTHESIA: ICD-10-PCS | Mod: CRNA,,, | Performed by: NURSE ANESTHETIST, CERTIFIED REGISTERED

## 2022-07-08 PROCEDURE — 27000689 HC BLADE LARYNGOSCOPE ANY SIZE: Performed by: NURSE ANESTHETIST, CERTIFIED REGISTERED

## 2022-07-08 PROCEDURE — 43840 GSTRRPHY SUTR DUOL/GSTR ULCR: CPT | Mod: ,,, | Performed by: SURGERY

## 2022-07-08 PROCEDURE — C9113 INJ PANTOPRAZOLE SODIUM, VIA: HCPCS | Performed by: SURGERY

## 2022-07-08 PROCEDURE — 36415 COLL VENOUS BLD VENIPUNCTURE: CPT | Performed by: INTERNAL MEDICINE

## 2022-07-08 PROCEDURE — 36000706: Performed by: SURGERY

## 2022-07-08 PROCEDURE — 37000009 HC ANESTHESIA EA ADD 15 MINS: Performed by: SURGERY

## 2022-07-08 PROCEDURE — P9016 RBC LEUKOCYTES REDUCED: HCPCS

## 2022-07-08 PROCEDURE — 25000003 PHARM REV CODE 250: Performed by: INTERNAL MEDICINE

## 2022-07-08 PROCEDURE — 27000716 HC OXISENSOR PROBE, ANY SIZE: Performed by: NURSE ANESTHETIST, CERTIFIED REGISTERED

## 2022-07-08 PROCEDURE — 36430 TRANSFUSION BLD/BLD COMPNT: CPT

## 2022-07-08 PROCEDURE — 27000655: Performed by: NURSE ANESTHETIST, CERTIFIED REGISTERED

## 2022-07-08 PROCEDURE — D9220A PRA ANESTHESIA: Mod: ANES,,, | Performed by: ANESTHESIOLOGY

## 2022-07-08 PROCEDURE — 25000003 PHARM REV CODE 250: Performed by: FAMILY MEDICINE

## 2022-07-08 PROCEDURE — D9220A PRA ANESTHESIA: Mod: CRNA,,, | Performed by: NURSE ANESTHETIST, CERTIFIED REGISTERED

## 2022-07-08 PROCEDURE — 99233 PR SUBSEQUENT HOSPITAL CARE,LEVL III: ICD-10-PCS | Mod: ,,, | Performed by: INTERNAL MEDICINE

## 2022-07-08 PROCEDURE — 63600175 PHARM REV CODE 636 W HCPCS: Performed by: INTERNAL MEDICINE

## 2022-07-08 PROCEDURE — 63600175 PHARM REV CODE 636 W HCPCS: Performed by: SURGERY

## 2022-07-08 PROCEDURE — 27000510 HC BLANKET BAIR HUGGER ANY SIZE: Performed by: NURSE ANESTHETIST, CERTIFIED REGISTERED

## 2022-07-08 PROCEDURE — 27000221 HC OXYGEN, UP TO 24 HOURS

## 2022-07-08 PROCEDURE — 99233 SBSQ HOSP IP/OBS HIGH 50: CPT | Mod: ,,, | Performed by: INTERNAL MEDICINE

## 2022-07-08 PROCEDURE — 94668 MNPJ CHEST WALL SBSQ: CPT

## 2022-07-08 PROCEDURE — 43840 PR REPAIR, PERF DUOD/GAST ULC-WND/INJ: ICD-10-PCS | Mod: 80,,, | Performed by: SURGERY

## 2022-07-08 PROCEDURE — C1729 CATH, DRAINAGE: HCPCS | Performed by: SURGERY

## 2022-07-08 PROCEDURE — 63600175 PHARM REV CODE 636 W HCPCS: Performed by: NURSE ANESTHETIST, CERTIFIED REGISTERED

## 2022-07-08 PROCEDURE — 80053 COMPREHEN METABOLIC PANEL: CPT | Performed by: FAMILY MEDICINE

## 2022-07-08 PROCEDURE — C9113 INJ PANTOPRAZOLE SODIUM, VIA: HCPCS | Performed by: INTERNAL MEDICINE

## 2022-07-08 PROCEDURE — 25000003 PHARM REV CODE 250: Performed by: SURGERY

## 2022-07-08 PROCEDURE — 27201423 OPTIME MED/SURG SUP & DEVICES STERILE SUPPLY: Performed by: SURGERY

## 2022-07-08 PROCEDURE — C9399 UNCLASSIFIED DRUGS OR BIOLOG: HCPCS

## 2022-07-08 PROCEDURE — D9220A PRA ANESTHESIA: ICD-10-PCS | Mod: ANES,,, | Performed by: ANESTHESIOLOGY

## 2022-07-08 PROCEDURE — 36000707: Performed by: SURGERY

## 2022-07-08 PROCEDURE — 36000708 HC OR TIME LEV III 1ST 15 MIN: Performed by: SURGERY

## 2022-07-08 PROCEDURE — 43840 PR REPAIR, PERF DUOD/GAST ULC-WND/INJ: ICD-10-PCS | Mod: ,,, | Performed by: SURGERY

## 2022-07-08 PROCEDURE — 27000165 HC TUBE, ETT CUFFED: Performed by: NURSE ANESTHETIST, CERTIFIED REGISTERED

## 2022-07-08 PROCEDURE — 36415 COLL VENOUS BLD VENIPUNCTURE: CPT | Performed by: FAMILY MEDICINE

## 2022-07-08 PROCEDURE — 37000008 HC ANESTHESIA 1ST 15 MINUTES: Performed by: SURGERY

## 2022-07-08 PROCEDURE — P9016 RBC LEUKOCYTES REDUCED: HCPCS | Performed by: INTERNAL MEDICINE

## 2022-07-08 PROCEDURE — 25000003 PHARM REV CODE 250

## 2022-07-08 PROCEDURE — 36000709 HC OR TIME LEV III EA ADD 15 MIN: Performed by: SURGERY

## 2022-07-08 RX ORDER — ACETAMINOPHEN 325 MG/1
650 TABLET ORAL EVERY 4 HOURS PRN
Status: DISCONTINUED | OUTPATIENT
Start: 2022-07-08 | End: 2022-07-20 | Stop reason: HOSPADM

## 2022-07-08 RX ORDER — GLYCOPYRROLATE 0.2 MG/ML
INJECTION INTRAMUSCULAR; INTRAVENOUS
Status: DISCONTINUED | OUTPATIENT
Start: 2022-07-08 | End: 2022-07-08

## 2022-07-08 RX ORDER — NEOSTIGMINE METHYLSULFATE 1 MG/ML
INJECTION, SOLUTION INTRAVENOUS
Status: DISCONTINUED | OUTPATIENT
Start: 2022-07-08 | End: 2022-07-08

## 2022-07-08 RX ORDER — LIDOCAINE HYDROCHLORIDE 20 MG/ML
INJECTION, SOLUTION EPIDURAL; INFILTRATION; INTRACAUDAL; PERINEURAL
Status: DISCONTINUED | OUTPATIENT
Start: 2022-07-08 | End: 2022-07-08

## 2022-07-08 RX ORDER — FENTANYL CITRATE 50 UG/ML
INJECTION, SOLUTION INTRAMUSCULAR; INTRAVENOUS
Status: DISCONTINUED | OUTPATIENT
Start: 2022-07-08 | End: 2022-07-08

## 2022-07-08 RX ORDER — ETOMIDATE 2 MG/ML
INJECTION INTRAVENOUS
Status: DISCONTINUED | OUTPATIENT
Start: 2022-07-08 | End: 2022-07-08

## 2022-07-08 RX ORDER — HYDROCODONE BITARTRATE AND ACETAMINOPHEN 500; 5 MG/1; MG/1
TABLET ORAL
Status: DISCONTINUED | OUTPATIENT
Start: 2022-07-08 | End: 2022-07-09

## 2022-07-08 RX ORDER — KETAMINE HYDROCHLORIDE 50 MG/ML
INJECTION, SOLUTION INTRAMUSCULAR; INTRAVENOUS
Status: DISCONTINUED | OUTPATIENT
Start: 2022-07-08 | End: 2022-07-08

## 2022-07-08 RX ORDER — PROPOFOL 10 MG/ML
VIAL (ML) INTRAVENOUS
Status: DISCONTINUED | OUTPATIENT
Start: 2022-07-08 | End: 2022-07-08

## 2022-07-08 RX ORDER — ACETAMINOPHEN 325 MG/1
650 TABLET ORAL EVERY 8 HOURS PRN
Status: DISCONTINUED | OUTPATIENT
Start: 2022-07-08 | End: 2022-07-20 | Stop reason: HOSPADM

## 2022-07-08 RX ORDER — SODIUM CHLORIDE 450 MG/100ML
INJECTION, SOLUTION INTRAVENOUS CONTINUOUS
Status: DISCONTINUED | OUTPATIENT
Start: 2022-07-08 | End: 2022-07-09

## 2022-07-08 RX ORDER — HYDROCODONE BITARTRATE AND ACETAMINOPHEN 500; 5 MG/1; MG/1
TABLET ORAL
Status: DISCONTINUED | OUTPATIENT
Start: 2022-07-08 | End: 2022-07-08

## 2022-07-08 RX ORDER — CEFAZOLIN SODIUM 1 G/3ML
INJECTION, POWDER, FOR SOLUTION INTRAMUSCULAR; INTRAVENOUS
Status: DISCONTINUED | OUTPATIENT
Start: 2022-07-08 | End: 2022-07-08

## 2022-07-08 RX ORDER — ONDANSETRON 2 MG/ML
4 INJECTION INTRAMUSCULAR; INTRAVENOUS EVERY 12 HOURS PRN
Status: DISCONTINUED | OUTPATIENT
Start: 2022-07-08 | End: 2022-07-20 | Stop reason: HOSPADM

## 2022-07-08 RX ORDER — MORPHINE SULFATE 8 MG/ML
6 INJECTION INTRAMUSCULAR; INTRAVENOUS; SUBCUTANEOUS EVERY 4 HOURS PRN
Status: DISCONTINUED | OUTPATIENT
Start: 2022-07-08 | End: 2022-07-20 | Stop reason: HOSPADM

## 2022-07-08 RX ORDER — PHENYLEPHRINE HYDROCHLORIDE 10 MG/ML
INJECTION INTRAVENOUS
Status: DISCONTINUED | OUTPATIENT
Start: 2022-07-08 | End: 2022-07-08

## 2022-07-08 RX ORDER — PANTOPRAZOLE SODIUM 40 MG/10ML
40 INJECTION, POWDER, LYOPHILIZED, FOR SOLUTION INTRAVENOUS DAILY
Status: DISCONTINUED | OUTPATIENT
Start: 2022-07-08 | End: 2022-07-12

## 2022-07-08 RX ORDER — ONDANSETRON 2 MG/ML
INJECTION INTRAMUSCULAR; INTRAVENOUS
Status: DISCONTINUED | OUTPATIENT
Start: 2022-07-08 | End: 2022-07-08

## 2022-07-08 RX ORDER — ROCURONIUM BROMIDE 10 MG/ML
INJECTION, SOLUTION INTRAVENOUS
Status: DISCONTINUED | OUTPATIENT
Start: 2022-07-08 | End: 2022-07-08

## 2022-07-08 RX ORDER — MORPHINE SULFATE 8 MG/ML
4 INJECTION INTRAMUSCULAR; INTRAVENOUS; SUBCUTANEOUS EVERY 4 HOURS PRN
Status: DISCONTINUED | OUTPATIENT
Start: 2022-07-08 | End: 2022-07-20 | Stop reason: HOSPADM

## 2022-07-08 RX ADMIN — KETAMINE HYDROCHLORIDE 25 MG: 50 INJECTION INTRAMUSCULAR; INTRAVENOUS at 01:07

## 2022-07-08 RX ADMIN — OXYCODONE HYDROCHLORIDE AND ACETAMINOPHEN 500 MG: 500 TABLET ORAL at 08:07

## 2022-07-08 RX ADMIN — ROCURONIUM BROMIDE 30 MG: 10 INJECTION, SOLUTION INTRAVENOUS at 02:07

## 2022-07-08 RX ADMIN — INSULIN DETEMIR 8 UNITS: 100 INJECTION, SOLUTION SUBCUTANEOUS at 09:07

## 2022-07-08 RX ADMIN — THERA TABS 1 TABLET: TAB at 08:07

## 2022-07-08 RX ADMIN — FENTANYL CITRATE 25 MCG: 50 INJECTION INTRAMUSCULAR; INTRAVENOUS at 01:07

## 2022-07-08 RX ADMIN — PANTOPRAZOLE SODIUM 40 MG: 40 INJECTION, POWDER, FOR SOLUTION INTRAVENOUS at 04:07

## 2022-07-08 RX ADMIN — NEOSTIGMINE METHYLSULFATE 5 MG: 1 INJECTION INTRAVENOUS at 03:07

## 2022-07-08 RX ADMIN — PROPOFOL 40 MG: 10 INJECTION, EMULSION INTRAVENOUS at 01:07

## 2022-07-08 RX ADMIN — ONDANSETRON 8 MG: 2 INJECTION INTRAMUSCULAR; INTRAVENOUS at 02:07

## 2022-07-08 RX ADMIN — SODIUM CHLORIDE: 4.5 INJECTION, SOLUTION INTRAVENOUS at 04:07

## 2022-07-08 RX ADMIN — ETOMIDATE 10 MG: 2 INJECTION, SOLUTION INTRAVENOUS at 01:07

## 2022-07-08 RX ADMIN — GLYCOPYRROLATE 0.8 MG: 0.2 INJECTION INTRAMUSCULAR; INTRAVENOUS at 03:07

## 2022-07-08 RX ADMIN — SODIUM CHLORIDE: 9 INJECTION, SOLUTION INTRAVENOUS at 03:07

## 2022-07-08 RX ADMIN — MORPHINE SULFATE 6 MG: 8 INJECTION INTRAVENOUS at 07:07

## 2022-07-08 RX ADMIN — PANTOPRAZOLE SODIUM 8 MG/HR: 40 INJECTION, POWDER, FOR SOLUTION INTRAVENOUS at 06:07

## 2022-07-08 RX ADMIN — MORPHINE SULFATE 6 MG: 8 INJECTION INTRAVENOUS at 11:07

## 2022-07-08 RX ADMIN — MORPHINE SULFATE 6 MG: 8 INJECTION INTRAVENOUS at 04:07

## 2022-07-08 RX ADMIN — SUGAMMADEX 200 MG: 100 INJECTION, SOLUTION INTRAVENOUS at 03:07

## 2022-07-08 RX ADMIN — FENTANYL CITRATE 25 MCG: 50 INJECTION INTRAMUSCULAR; INTRAVENOUS at 02:07

## 2022-07-08 RX ADMIN — ALLOPURINOL 50 MG: 300 TABLET ORAL at 08:07

## 2022-07-08 RX ADMIN — LIDOCAINE HYDROCHLORIDE 60 MG: 20 INJECTION, SOLUTION INTRAVENOUS at 01:07

## 2022-07-08 RX ADMIN — SODIUM CHLORIDE: 9 INJECTION, SOLUTION INTRAVENOUS at 12:07

## 2022-07-08 RX ADMIN — SODIUM CHLORIDE: 9 INJECTION, SOLUTION INTRAVENOUS at 01:07

## 2022-07-08 RX ADMIN — SODIUM CHLORIDE 250 ML: 9 INJECTION, SOLUTION INTRAVENOUS at 03:07

## 2022-07-08 RX ADMIN — PHENYLEPHRINE HYDROCHLORIDE 100 MCG: 10 INJECTION INTRAVENOUS at 02:07

## 2022-07-08 RX ADMIN — ROCURONIUM BROMIDE 50 MG: 10 INJECTION, SOLUTION INTRAVENOUS at 01:07

## 2022-07-08 RX ADMIN — PANTOPRAZOLE SODIUM 8 MG/HR: 40 INJECTION, POWDER, FOR SOLUTION INTRAVENOUS at 11:07

## 2022-07-08 RX ADMIN — KETAMINE HYDROCHLORIDE 25 MG: 50 INJECTION INTRAMUSCULAR; INTRAVENOUS at 02:07

## 2022-07-08 RX ADMIN — CEFAZOLIN 2 G: 1 INJECTION, POWDER, FOR SOLUTION INTRAMUSCULAR; INTRAVENOUS; PARENTERAL at 01:07

## 2022-07-08 RX ADMIN — GLYCOPYRROLATE 0.2 MG: 0.2 INJECTION INTRAMUSCULAR; INTRAVENOUS at 01:07

## 2022-07-08 RX ADMIN — FENTANYL CITRATE 50 MCG: 50 INJECTION INTRAMUSCULAR; INTRAVENOUS at 03:07

## 2022-07-08 RX ADMIN — PANTOPRAZOLE SODIUM 8 MG/HR: 40 INJECTION, POWDER, FOR SOLUTION INTRAVENOUS at 02:07

## 2022-07-08 RX ADMIN — FERROUS SULFATE TAB 325 MG (65 MG ELEMENTAL FE) 1 EACH: 325 (65 FE) TAB at 08:07

## 2022-07-08 NOTE — PT/OT/SLP PROGRESS
Occupational Therapy      Patient Name:  Neymar Parra   MRN:  [id number]    Patient not seen today secondary to Off the floor for procedure/surgery. Will follow-up on next treatment day.    7/8/2022

## 2022-07-08 NOTE — ANESTHESIA POSTPROCEDURE EVALUATION
Anesthesia Post Evaluation    Patient: Neymar Parra    Procedure(s) Performed: Procedure(s) (LRB):  LAPAROTOMY, EXPLORATORY,VAGOTOMY PYLOROPLASTY (N/A)    Final Anesthesia Type: general      Patient location during evaluation: PACU  Patient participation: Yes- Able to Participate  Level of consciousness: awake and sedated  Post-procedure vital signs: reviewed and stable  Pain management: adequate  Airway patency: patent    PONV status at discharge: No PONV  Anesthetic complications: no      Cardiovascular status: blood pressure returned to baseline  Respiratory status: unassisted  Hydration status: euvolemic  Follow-up not needed.          Vitals Value Taken Time   /50 07/08/22 1646   Temp 36.4 °C (97.5 °F) 07/08/22 1605   Pulse 101 07/08/22 1647   Resp 28 07/08/22 1647   SpO2 100 % 07/08/22 1647   Vitals shown include unvalidated device data.      No case tracking events are documented in the log.      Pain/Esther Score: Pain Rating Prior to Med Admin: 10 (7/8/2022  4:14 PM)  Pain Rating Post Med Admin: 0 (7/7/2022  2:42 PM)  Esther Score: 10 (7/8/2022  4:30 PM)

## 2022-07-08 NOTE — ASSESSMENT & PLAN NOTE
- Patient with bleeding duodenal ulcer on EGD 7/2, ulcer clipped and an EGD on 07/05 where bleeding duodenal vessel was clipped  - patient has had a total of 6 units this admission  - GI following, assistance appreciated  -Pt continues to have large amount of bloody bowel movement with clots. H & H 6.3/18.7 (7/7), platelet [id number] and during 1 unit blood transfusion, pt became hypotensive. Given 1 liter bolus and transferred to ICU  And consulted GS, Dr. Rees. Appreciate assistance (7/7)    7/8- Hgb is 6.9 this am. Currently hemodynamically stable. Repeat EGD yesterday.

## 2022-07-08 NOTE — PT/OT/SLP PROGRESS
Physical Therapy      Patient Name:  Neymar Prara   MRN:  [id number]    Patient not seen today secondary to Off the floor for procedure/surgery at GI lab. Will follow-up next visit 7/11/22.

## 2022-07-08 NOTE — ASSESSMENT & PLAN NOTE
07/07 HCT 19  transfuse 1 more unit of prbc and platelets for low platelet of 57, surgery to follow closely GI to rescope today  Has not had CTA due to renal failure     07/08 repeat EGD treated bleeding again duodenum with injection of epi and clips, hct 21 transfuse 1 more unit PRBC , if further bleeding, consider IR or surgery

## 2022-07-08 NOTE — PROGRESS NOTES
[facility]  Pulmonology  Progress Note    Patient Name: Neymar Parra  MRN: [id number]  Admission Date: 6/16/2022  Hospital Length of Stay: 22 days  Code Status: Full Code  Attending Provider: aJck Johnson DO  Primary Care Provider: [facility] megan [place]   Principal Problem: Gastrointestinal hemorrhage associated with duodenitis    Subjective:     Interval History: No acute events overnight. The patient is currently resting comfortably. H &H is 7.7/24.8. repeat EGD yesterday. Currently afebrile and vital signs are stable.    Objective:     Vital Signs (Most Recent):  Temp: 98.9 °F (37.2 °C) (07/08/22 1100)  Pulse: 87 (07/08/22 0945)  Resp: (!) 30 (07/08/22 0945)  BP: (!) 124/55 (07/08/22 0945)  SpO2: 100 % (07/08/22 0945)   Vital Signs (24h Range):  Temp:  [97.7 °F (36.5 °C)-99.3 °F (37.4 °C)] 98.9 °F (37.2 °C)  Pulse:  [] 87  Resp:  [11-41] 30  SpO2:  [87 %-100 %] 100 %  BP: ()/(31-71) 124/55     Weight: 98 kg (216 lb 0.8 oz)  Body mass index is 29.3 kg/m².      Intake/Output Summary (Last 24 hours) at 7/8/2022 1125  Last data filed at 7/8/2022 0901  Gross per 24 hour   Intake 3301.5 ml   Output 297 ml   Net 3004.5 ml       Physical Exam  Vitals reviewed.   Constitutional:       General: He is not in acute distress.     Appearance: Normal appearance. He is not ill-appearing.      Interventions: He is not intubated.  HENT:      Head: Normocephalic and atraumatic.      Right Ear: External ear normal.      Left Ear: External ear normal.      Nose: Nose normal.      Mouth/Throat:      Mouth: Mucous membranes are dry.   Eyes:      Extraocular Movements: Extraocular movements intact.      Conjunctiva/sclera: Conjunctivae normal.      Pupils: Pupils are equal, round, and reactive to light.   Cardiovascular:      Rate and Rhythm: Normal rate and regular rhythm.      Heart sounds: Normal heart sounds. No murmur heard.  Pulmonary:      Effort: No respiratory distress. He is not  intubated.      Breath sounds: Normal breath sounds. No wheezing or rales.   Abdominal:      General: There is no distension.      Palpations: Abdomen is soft.      Tenderness: There is abdominal tenderness.   Musculoskeletal:         General: Normal range of motion.      Cervical back: Normal range of motion and neck supple.      Right lower leg: No edema.      Left lower leg: No edema.   Skin:     General: Skin is warm and dry.      Capillary Refill: Capillary refill takes less than 2 seconds.   Neurological:      General: No focal deficit present.      Mental Status: He is alert and oriented to person, place, and time. Mental status is at baseline.      Cranial Nerves: No cranial nerve deficit.       Vents:  Oxygen Concentration (%): 28 (07/08/22 0716)    Lines/Drains/Airways       Drain  Duration                  Urethral Catheter 07/07/22 1130 <1 day              Peripheral Intravenous Line  Duration                  Peripheral IV - Single Lumen 07/06/22 2350 20 G Left;Posterior Hand 1 day         Peripheral IV - Single Lumen 07/07/22 1118 18 G Anterior;Right Upper Arm 1 day                    Significant Labs:    CBC/Anemia Profile:  Recent Labs   Lab 07/07/22  0408 07/07/22  1141 07/07/22  1500 07/07/22  2307 07/08/22  0208 07/08/22  0634   WBC 8.51  --  10.61  --  9.53  --    HGB 6.3*   < > 7.7* 6.4* 6.4* 6.9*   HCT 18.7*   < > 23.5* 18.9* 19.0* 21.1*   PLT 57*  --  76*  --  78*  --    MCV 90.8  --  93.6  --  92.7  --    RDW 16.1*  --  14.3  --  14.9*  --     < > = values in this interval not displayed.        Chemistries:  Recent Labs   Lab 07/07/22  0408 07/08/22  0208    145   K 5.2* 5.7*   * 120*   CO2 16* 16*   BUN 96* 87*   CREATININE 5.64* 5.67*   CALCIUM 7.9* 7.1*   ALBUMIN  --  1.5*   PROT  --  3.3*   BILITOT  --  0.5   ALKPHOS  --  37*   ALT  --  38   AST  --  20       All pertinent labs within the past 24 hours have been reviewed.    Significant Imaging:  I have reviewed all pertinent  imaging results/findings within the past 24 hours.    Assessment/Plan:     * Gastrointestinal hemorrhage associated with duodenitis  - EGD with bleeding duodenal ulcer as above, was clipped   - Repeat EGD 07/05, bleeding duodenal vessel was clipped per GI.  - H. Pylori negative  - Protonix infusion  - GI following, appreciate assistance   -Pt continues to bleed with blood clots from bowel and H &H  Continues to decline. Pt became hypotensive, and transferred to ICU for further monitoring and eval. General surgery, Dr. Rees consulted for eval.   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 [facility] documents [facility].   7/8- repeat EGD yesterday. Margustavo  this am.     Anemia  - Patient with bleeding duodenal ulcer on EGD 7/2, ulcer clipped and an EGD on 07/05 where bleeding duodenal vessel was clipped  - patient has had a total of 6 units this admission  - GI following, assistance appreciated  -Pt continues to have large amount of bloody bowel movement with clots. H & H 6.3/18.7 (7/7), platelet 08588 and during 1 unit blood transfusion, pt became hypotensive. Given 1 liter bolus and transferred to ICU  And consulted GS, Dr. Rees. Appreciate assistance (7/7)    7/8- Hgb is 6.9 this am. Currently hemodynamically stable. Repeat EGD yesterday.     Acute on chronic renal failure  - BUN/Crt 87/5.67  - Nephrology consulted  - last note reviewed from 7/4        Type 2 diabetes mellitus without complication  Accuchecks look good  Patient is on levemir 8 units and sliding scale                 Jack Johnson, DO  Pulmonology  [facility] ICU

## 2022-07-08 NOTE — PROGRESS NOTES
[facility]  General Surgery  Progress Note    Subjective:     History of Present Illness:  71 y/o nursing home resident with CAD with stents on plavix  Admitted several days ago with melena with multiple units of PRBCs  Three EGD this admission per dr felix with treatment of dieulafoy's lesion with injection of epi and clip   Most recent EGD 2 days ago with clipping of active bleeding of duodenitis visible vessel   [facility]      Post-Op Info:  * No surgery found *         Interval History: 4th EGD yesterday with clips placement small maroon stool    Medications:  Continuous Infusions:   pantoprozole (PROTONIX) IV infusion 8 mg/hr (07/08/22 0653)     Scheduled Meds:   allopurinoL  50 mg Oral Daily    ascorbic acid (vitamin C)  500 mg Oral Daily    atorvastatin  40 mg Oral QHS    ferrous sulfate  1 tablet Oral Daily    insulin detemir U-100  8 Units Subcutaneous QHS    multivitamin  1 tablet Oral Daily     PRN Meds:sodium chloride, sodium chloride, sodium chloride, acetaminophen, albuterol **AND** MDI Q6H PRN, bisacodyL, [COMPLETED] calcium gluconate IVPB **AND** calcium gluconate IVPB, cetirizine, dextromethorphan-guaiFENesin  mg/5 ml, dextrose 50%, dextrose 50%, glucagon (human recombinant), glucose, glucose, hydrALAZINE, HYDROcodone-acetaminophen, insulin aspart U-100, melatonin, mineral oil, naloxone, ondansetron, simethicone, sodium chloride 0.9%, sodium chloride 0.9%, traMADoL, traZODone     Review of patient's allergies indicates:   Allergen Reactions    Gatifloxacin Anaphylaxis     Objective:     Vital Signs (Most Recent):  Temp: 98.9 °F (37.2 °C) (07/08/22 1115)  Pulse: 100 (07/08/22 1115)  Resp: (!) 29 (07/08/22 1115)  BP: (!) 104/52 (07/08/22 1115)  SpO2: 100 % (07/08/22 1115)   Vital Signs (24h Range):  Temp:  [97.7 °F (36.5 °C)-99.3 °F (37.4 °C)] 98.9 °F (37.2 °C)  Pulse:  [] 100  Resp:  [11-41] 29  SpO2:  [87 %-100 %] 100  %  BP: ()/(31-71) 104/52     Weight: 98 kg (216 lb 0.8 oz)  Body mass index is 29.3 kg/m².    Intake/Output - Last 3 Shifts         07/06 0700 07/07 0659 07/07 0700 07/08 0659 07/08 0700 07/09 0659    P.O.  840 240    I.V. (mL/kg)  324 (3.3)     Blood 583.3 2795.6     IV Piggyback  100     Total Intake(mL/kg) 583.3 (4.8) 4059.6 (41.4) 240 (2.4)    Urine (mL/kg/hr) 1050 (0.4) 296 (0.1)     Stool 0 0 1    Blood  0     Total Output 1050 296 1    Net -466.7 +3763.6 +239           Urine Occurrence 4 x 3 x     Stool Occurrence 1 x 8 x             Physical Exam  Vitals and nursing note reviewed. Exam conducted with a chaperone present.   Cardiovascular:      Rate and Rhythm: Normal rate.   Pulmonary:      Effort: Pulmonary effort is normal.   Abdominal:      Palpations: Abdomen is soft.   Skin:     General: Skin is warm and dry.      Coloration: Skin is pale.   Neurological:      Mental Status: He is alert.       Significant Labs:  I have reviewed all pertinent lab results within the past 24 hours.  CBC:   Recent Labs   Lab 07/08/22  0208 07/08/22  0634   WBC 9.53  --    RBC 2.05*  --    HGB 6.4* 6.9*   HCT 19.0* 21.1*   PLT 78*  --    MCV 92.7  --    MCH 31.2*  --    MCHC 33.7  --      BMP:   Recent Labs   Lab 07/08/22  0208   GLU 94      K 5.7*   *   CO2 16*   BUN 87*   CREATININE 5.67*   CALCIUM 7.1*       Significant Diagnostics:  I have reviewed all pertinent imaging results/findings within the past 24 hours.    Assessment/Plan:     * Gastrointestinal hemorrhage associated with duodenitis  07/07 HCT 19  transfuse 1 more unit of prbc and platelets for low platelet of 57, surgery to follow closely GI to rescope today  Has not had CTA due to renal failure     07/08 repeat EGD treated bleeding again duodenum with injection of epi and clips, hct 21 transfuse 1 more unit PRBC , if further bleeding, consider IR or surgery         Danette Rene, ACNP  General Surgery  [facility]

## 2022-07-08 NOTE — SUBJECTIVE & OBJECTIVE
Interval History: No acute events overnight. The patient is currently resting comfortably. H &H is 7.7/24.8. repeat EGD yesterday. Currently afebrile and vital signs are stable.    Objective:     Vital Signs (Most Recent):  Temp: 98.9 °F (37.2 °C) (07/08/22 1100)  Pulse: 87 (07/08/22 0945)  Resp: (!) 30 (07/08/22 0945)  BP: (!) 124/55 (07/08/22 0945)  SpO2: 100 % (07/08/22 0945)   Vital Signs (24h Range):  Temp:  [97.7 °F (36.5 °C)-99.3 °F (37.4 °C)] 98.9 °F (37.2 °C)  Pulse:  [] 87  Resp:  [11-41] 30  SpO2:  [87 %-100 %] 100 %  BP: ()/(31-71) 124/55     Weight: 98 kg (216 lb 0.8 oz)  Body mass index is 29.3 kg/m².      Intake/Output Summary (Last 24 hours) at 7/8/2022 1125  Last data filed at 7/8/2022 0901  Gross per 24 hour   Intake 3301.5 ml   Output 297 ml   Net 3004.5 ml       Physical Exam  Vitals reviewed.   Constitutional:       General: He is not in acute distress.     Appearance: Normal appearance. He is not ill-appearing.      Interventions: He is not intubated.  HENT:      Head: Normocephalic and atraumatic.      Right Ear: External ear normal.      Left Ear: External ear normal.      Nose: Nose normal.      Mouth/Throat:      Mouth: Mucous membranes are dry.   Eyes:      Extraocular Movements: Extraocular movements intact.      Conjunctiva/sclera: Conjunctivae normal.      Pupils: Pupils are equal, round, and reactive to light.   Cardiovascular:      Rate and Rhythm: Normal rate and regular rhythm.      Heart sounds: Normal heart sounds. No murmur heard.  Pulmonary:      Effort: No respiratory distress. He is not intubated.      Breath sounds: Normal breath sounds. No wheezing or rales.   Abdominal:      General: There is no distension.      Palpations: Abdomen is soft.      Tenderness: There is abdominal tenderness.   Musculoskeletal:         General: Normal range of motion.      Cervical back: Normal range of motion and neck supple.      Right lower leg: No edema.      Left lower leg: No  edema.   Skin:     General: Skin is warm and dry.      Capillary Refill: Capillary refill takes less than 2 seconds.   Neurological:      General: No focal deficit present.      Mental Status: He is alert and oriented to person, place, and time. Mental status is at baseline.      Cranial Nerves: No cranial nerve deficit.       Vents:  Oxygen Concentration (%): 28 (07/08/22 0716)    Lines/Drains/Airways       Drain  Duration                  Urethral Catheter 07/07/22 1130 <1 day              Peripheral Intravenous Line  Duration                  Peripheral IV - Single Lumen 07/06/22 2350 20 G Left;Posterior Hand 1 day         Peripheral IV - Single Lumen 07/07/22 1118 18 G Anterior;Right Upper Arm 1 day                    Significant Labs:    CBC/Anemia Profile:  Recent Labs   Lab 07/07/22  0408 07/07/22  1141 07/07/22  1500 07/07/22  2307 07/08/22  0208 07/08/22  0634   WBC 8.51  --  10.61  --  9.53  --    HGB 6.3*   < > 7.7* 6.4* 6.4* 6.9*   HCT 18.7*   < > 23.5* 18.9* 19.0* 21.1*   PLT 57*  --  76*  --  78*  --    MCV 90.8  --  93.6  --  92.7  --    RDW 16.1*  --  14.3  --  14.9*  --     < > = values in this interval not displayed.        Chemistries:  Recent Labs   Lab 07/07/22  0408 07/08/22  0208    145   K 5.2* 5.7*   * 120*   CO2 16* 16*   BUN 96* 87*   CREATININE 5.64* 5.67*   CALCIUM 7.9* 7.1*   ALBUMIN  --  1.5*   PROT  --  3.3*   BILITOT  --  0.5   ALKPHOS  --  37*   ALT  --  38   AST  --  20       All pertinent labs within the past 24 hours have been reviewed.    Significant Imaging:  I have reviewed all pertinent imaging results/findings within the past 24 hours.

## 2022-07-08 NOTE — OP NOTE
[facility], [place] - Periop Services     Operative Note    SUMMARY     Date of Procedure: 7/8/2022     Procedure: Procedure(s) (LRB):  LAPAROTOMY, EXPLORATORY,VAGOTOMY PYLOROPLASTY (N/A); OVERSEWING OF ULCER     Surgeon(s) and Role:     * Marvin Rees MD - Primary     * Gerard Hines MD - Assisting        Pre-Operative Diagnosis: Gastrointestinal hemorrhage associated with duodenitis [K29.81]    Post-Operative Diagnosis: Post-Op Diagnosis Codes:     * Gastrointestinal hemorrhage associated with duodenitis [K29.81]    Anesthesia: General    Technical Procedures Used:  The patient was brought to the operating room placed in supine position.  General endotracheal anesthesia was administered.  The abdomen was prepped and draped standard fashion.  A midline incision was performed dissection was carried at the level of fascia which was sharply transected.  Peritoneal cavity was entered.  Lysis of adhesions was then undertaken, removing multiple filmy adhesions in the upper abdomen.  Duodenum and colon were tightly adherent to the gallbladder and liver.  These were carefully dissected away.  The duodenum was then expose partially Kocherized.  Subsequent to this the pylorus was identified and linear incision was performed on anterior surface of the duodenum extending through the pylorus into the stomach.  The ulcer was not immediately identified adjacent to the pylorus.  Further dissection was performed in the clips were identified that had been placed endoscopically.  There was no evidence of active bleeding.  The clips were dislodged, some of them intentionally in some unintentionally, but after removing the clips that was still no active bleeding.  The ulcer is small and difficult to visualize.  His the pyloroplasty/duodenitis me incision was extended to what was felt to be ulcer.  This point ulcer was oversewn with a single figure-of-eight Vicryl suture.  There was bile in the duodenum refluxing back from the 2nd  portion.  A gauze was placed in the duodenum to assess for any active bleeding while vagotomy was performed.  NG tube was passed to facilitate identification of the esophagus.  Triangular lobe of the liver with the in the sola was identified.  Dissection was performed along the lesser curvature and at the EG junction.  The right vagus was identified 1st, and  a small section was removed and sent to pathology.  There was good hemostasis.  The left vagus was then identified clipped and a segment was removed.  This was sent to pathology.  After confirming good hemostasis, we return to the pyloroplasty.  The duodenostomy which had extended through the pylorus and onto the distal aspect of the stomach was then closed transversely using continuous running chromic suture followed by interrupted Lembert silk sutures.  Nasogastric tube was then passed into the duodenum.  XIMENA drain was placed the right abdomen and directed up toward the liver laterally and additional XIMENA drain was placed through separate stab incision in the left abdomen and directed anterior to the pyloroplasty closure.  Following this, lysis of adhesions was performed in order to evaluate majority of small bowel.  There were no tumors palpable.  The abdomen was then irrigated.  XIMENA drains were secured with drain stitches.  Fascia was closed using continuous running PDS.  Soft tissues were irrigated clips were placed.  Stefani  VAC dressing was then placed.  The patient was awakened from anesthesia in stable condition.  Description of the Findings of the Procedure: Ulcer was noted in lateral and anterior aspect of the first portion duodenum.  There was no active bleeding identified.  Difficult to identify, but eventually seen at the edge of the distal duodenotomy.  Visualized clips were removed without evidence of bleeding.  Small ulcer was eventually visualized and oversewn with a single Vicryl suture in figure-of-eight fashion.  In addition, it was felt that the  ulcer was incorporated into the 2 layer pyloroplasty closure.  A truncal vagotomy was performed, removing a segment of the left and right vagus.  These were sent to pathology.  There was blood noted within the lumen of the entire small bowel and colon The small intestines were run to ensure that there was not a small bowel tumor causing bleeding.    Assistant(s): TODD CARRILLO MD    Complications: No    Estimated Blood Loss (EBL): 50 mL           Implants: * No implants in log *    Specimens:   Specimen (24h ago, onward)             Start     Ordered    07/08/22 1421  Surgical Pathology  RELEASE UPON ORDERING         07/08/22 1421                 Condition: Good      Complications:  None

## 2022-07-08 NOTE — ASSESSMENT & PLAN NOTE
- EGD with bleeding duodenal ulcer as above, was clipped   - Repeat EGD 07/05, bleeding duodenal vessel was clipped per GI.  - H. Pylori negative  - Protonix infusion  - GI following, appreciate assistance   -Pt continues to bleed with blood clots from bowel and H &H  Continues to decline. Pt became hypotensive, and transferred to ICU for further monitoring and eval. General surgery, Dr. Rees consulted for eval.   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 Will request documents from [facility].   7/8- repeat EGD yesterday. Eliel tay this am.

## 2022-07-08 NOTE — OR NURSING
Dentures removed and placed into denture cup, returned to ICU 7 by Wily JORDAN .    Alexander noted to be leaking at meatus.  Staff aware.    skni pads noted in place in heels.    1400  Family updated by phone.  Verbalized understanding of events and plan of care .

## 2022-07-08 NOTE — PLAN OF CARE
Problem: Adult Inpatient Plan of Care  Goal: Plan of Care Review  Outcome: Ongoing, Progressing  Goal: Patient-Specific Goal (Individualized)  Outcome: Ongoing, Progressing  Goal: Absence of Hospital-Acquired Illness or Injury  Outcome: Ongoing, Progressing  Goal: Optimal Comfort and Wellbeing  Outcome: Ongoing, Progressing  Goal: Readiness for Transition of Care  Outcome: Ongoing, Progressing     Problem: Diabetes Comorbidity  Goal: Blood Glucose Level Within Targeted Range  Outcome: Ongoing, Progressing     Problem: Fluid and Electrolyte Imbalance (Acute Kidney Injury/Impairment)  Goal: Fluid and Electrolyte Balance  Outcome: Ongoing, Progressing     Problem: Oral Intake Inadequate (Acute Kidney Injury/Impairment)  Goal: Optimal Nutrition Intake  Outcome: Ongoing, Progressing     Problem: Renal Function Impairment (Acute Kidney Injury/Impairment)  Goal: Effective Renal Function  Outcome: Ongoing, Progressing     Problem: Skin Injury Risk Increased  Goal: Skin Health and Integrity  Outcome: Ongoing, Progressing     Problem: Gas Exchange Impaired  Goal: Optimal Gas Exchange  Outcome: Ongoing, Progressing     Problem: Fall Injury Risk  Goal: Absence of Fall and Fall-Related Injury  Outcome: Ongoing, Progressing     Problem: Fluid Imbalance (Pneumonia)  Goal: Fluid Balance  Outcome: Ongoing, Progressing     Problem: Infection (Pneumonia)  Goal: Resolution of Infection Signs and Symptoms  Outcome: Ongoing, Progressing     Problem: Respiratory Compromise (Pneumonia)  Goal: Effective Oxygenation and Ventilation  Outcome: Ongoing, Progressing     Problem: Infection  Goal: Absence of Infection Signs and Symptoms  Outcome: Ongoing, Progressing

## 2022-07-08 NOTE — ANESTHESIA PROCEDURE NOTES
Intubation    Date/Time: 7/8/2022 1:33 PM  Performed by: Adriano Mccracken CRNA  Authorized by: Indra Betts MD     Intubation:     Induction:  Intravenous    Intubated:  Postinduction    Mask Ventilation:  Easy mask    Attempts:  1    Attempted By:  CRNA    Method of Intubation:  Direct    Blade:  Yousif 3    Laryngeal View Grade: Grade I - full view of cords      Difficult Airway Encountered?: No      Complications:  None    Airway Device:  Oral endotracheal tube    Airway Device Size:  7.5    Style/Cuff Inflation:  Cuffed (inflated to minimal occlusive pressure)    Inflation Amount (mL):  7    Tube secured:  23    Secured at:  The lips    Placement Verified By:  Capnometry    Complicating Factors:  Poor neck/head extension (beard)    Findings Post-Intubation:  BS equal bilateral and atraumatic/condition of teeth unchanged  Notes:      Smooth, tolerated well

## 2022-07-08 NOTE — SUBJECTIVE & OBJECTIVE
Interval History: 4th EGD yesterday with clips placement small maroon stool    Medications:  Continuous Infusions:   pantoprozole (PROTONIX) IV infusion 8 mg/hr (07/08/22 0653)     Scheduled Meds:   allopurinoL  50 mg Oral Daily    ascorbic acid (vitamin C)  500 mg Oral Daily    atorvastatin  40 mg Oral QHS    ferrous sulfate  1 tablet Oral Daily    insulin detemir U-100  8 Units Subcutaneous QHS    multivitamin  1 tablet Oral Daily     PRN Meds:sodium chloride, sodium chloride, sodium chloride, acetaminophen, albuterol **AND** MDI Q6H PRN, bisacodyL, [COMPLETED] calcium gluconate IVPB **AND** calcium gluconate IVPB, cetirizine, dextromethorphan-guaiFENesin  mg/5 ml, dextrose 50%, dextrose 50%, glucagon (human recombinant), glucose, glucose, hydrALAZINE, HYDROcodone-acetaminophen, insulin aspart U-100, melatonin, mineral oil, naloxone, ondansetron, simethicone, sodium chloride 0.9%, sodium chloride 0.9%, traMADoL, traZODone     Review of patient's allergies indicates:   Allergen Reactions    Gatifloxacin Anaphylaxis     Objective:     Vital Signs (Most Recent):  Temp: 98.9 °F (37.2 °C) (07/08/22 1115)  Pulse: 100 (07/08/22 1115)  Resp: (!) 29 (07/08/22 1115)  BP: (!) 104/52 (07/08/22 1115)  SpO2: 100 % (07/08/22 1115)   Vital Signs (24h Range):  Temp:  [97.7 °F (36.5 °C)-99.3 °F (37.4 °C)] 98.9 °F (37.2 °C)  Pulse:  [] 100  Resp:  [11-41] 29  SpO2:  [87 %-100 %] 100 %  BP: ()/(31-71) 104/52     Weight: 98 kg (216 lb 0.8 oz)  Body mass index is 29.3 kg/m².    Intake/Output - Last 3 Shifts         07/06 0700 07/07 0659 07/07 0700 07/08 0659 07/08 0700 07/09 0659    P.O.  840 240    I.V. (mL/kg)  324 (3.3)     Blood 583.3 2795.6     IV Piggyback  100     Total Intake(mL/kg) 583.3 (4.8) 4059.6 (41.4) 240 (2.4)    Urine (mL/kg/hr) 1050 (0.4) 296 (0.1)     Stool 0 0 1    Blood  0     Total Output 1050 296 1    Net -466.7 +3763.6 +239           Urine Occurrence 4 x 3 x     Stool Occurrence 1 x 8 x              Physical Exam  Vitals and nursing note reviewed. Exam conducted with a chaperone present.   Cardiovascular:      Rate and Rhythm: Normal rate.   Pulmonary:      Effort: Pulmonary effort is normal.   Abdominal:      Palpations: Abdomen is soft.   Skin:     General: Skin is warm and dry.      Coloration: Skin is pale.   Neurological:      Mental Status: He is alert.       Significant Labs:  I have reviewed all pertinent lab results within the past 24 hours.  CBC:   Recent Labs   Lab 07/08/22 0208 07/08/22  0634   WBC 9.53  --    RBC 2.05*  --    HGB 6.4* 6.9*   HCT 19.0* 21.1*   PLT 78*  --    MCV 92.7  --    MCH 31.2*  --    MCHC 33.7  --      BMP:   Recent Labs   Lab 07/08/22 0208   GLU 94      K 5.7*   *   CO2 16*   BUN 87*   CREATININE 5.67*   CALCIUM 7.1*       Significant Diagnostics:  I have reviewed all pertinent imaging results/findings within the past 24 hours.

## 2022-07-08 NOTE — PLAN OF CARE
Problem: Adult Inpatient Plan of Care  Goal: Patient-Specific Goal (Individualized)  Outcome: Ongoing, Progressing  Goal: Absence of Hospital-Acquired Illness or Injury  Outcome: Ongoing, Progressing  Goal: Optimal Comfort and Wellbeing  Outcome: Ongoing, Progressing     Problem: Diabetes Comorbidity  Goal: Blood Glucose Level Within Targeted Range  Outcome: Ongoing, Progressing     Problem: Fluid and Electrolyte Imbalance (Acute Kidney Injury/Impairment)  Goal: Fluid and Electrolyte Balance  Outcome: Ongoing, Progressing     Problem: Oral Intake Inadequate (Acute Kidney Injury/Impairment)  Goal: Optimal Nutrition Intake  Outcome: Ongoing, Progressing     Problem: Skin Injury Risk Increased  Goal: Skin Health and Integrity  Outcome: Ongoing, Progressing     Problem: Gas Exchange Impaired  Goal: Optimal Gas Exchange  Outcome: Ongoing, Progressing     Problem: Fall Injury Risk  Goal: Absence of Fall and Fall-Related Injury  Outcome: Ongoing, Progressing

## 2022-07-08 NOTE — HOSPITAL COURSE
7/8- patient developed some bleeding again yesterday with hypotension. He was transferred down to ICU for closer monitoring. He underwent an EGD: The distal esophagus had a small non-bleeding mucosal tear. A 3cm hiatus hernia was noted. Gastric mucosa is pale, consistent with severe anemia. Gastritis is seen without active bleeding or gastric ulcer. A large adherent  clot is present in the 1st portion of the duodenum at the site of previous bleeding and clip placement. 3cc of 1/100 epinephrine was injected submucosally and the clot would not suction off. Three more hemostasis clips were placed at the base of the clot. The more distal duodenum had blood in the lumen, likely from distal migration, but no ulcer. He had marroon BM this am.  7/9- Hgb has trended down to 6.1. We are going to transfuse 1 unit of PRBC's today. Potassium 5.9. No ECG changes. Echo with LVH and EF of 55%.  7/10- He was transfused yesterday. Hgb is 6.4 today. He is being transfused again today. No hematochezia. Still with some melena. Urine output is good but no improvement in creatinine.

## 2022-07-08 NOTE — ANESTHESIA PREPROCEDURE EVALUATION
07/08/2022  Neymar Parra is a 72 y.o., male.      Pre-op Assessment    I have reviewed the Patient Summary Reports.     I have reviewed the Nursing Notes. I have reviewed the NPO Status.   I have reviewed the Medications.     Review of Systems  Anesthesia Hx:  No problems with previous Anesthesia    Social:  Non-Smoker, No Alcohol Use    Hematology/Oncology:  Hematology Normal   Oncology Normal     EENT/Dental:EENT/Dental Normal   Cardiovascular:   Hypertension CAD      Pulmonary:  Pulmonary Normal    Renal/:   Chronic Renal Disease, CRI Severe CRI, CREAT 5.6   Hepatic/GI:   Hiatal Hernia, GERD Duodenal adenoma    SEVERE GI BLEED   Musculoskeletal:  Musculoskeletal Normal    Neurological:  Neurology Normal    Endocrine:   Diabetes, poorly controlled, type 2  Morbid Obesity / BMI > 40  Dermatological:  Skin Normal    Psych:  Psychiatric Normal           Physical Exam  General: Well nourished    Airway:  Mallampati: III / III  Mouth Opening: Normal  TM Distance: > 6 cm  Tongue: Normal  Neck ROM: Normal ROM    Chest/Lungs:  Clear to auscultation, Normal Respiratory Rate    Heart:  Rate: Normal  Rhythm: Regular Rhythm        Anesthesia Plan  Type of Anesthesia, risks & benefits discussed:    Anesthesia Type: Gen ETT  Intra-op Monitoring Plan: Standard ASA Monitors  Post Op Pain Control Plan: multimodal analgesia  Induction:  IV  Informed Consent: Informed consent signed with the Patient and all parties understand the risks and agree with anesthesia plan.  All questions answered. Patient consented to blood products? Yes  ASA Score: 4  Day of Surgery Review of History & Physical: H&P Update referred to the surgeon/provider.I have interviewed and examined the patient. I have reviewed the patient's H&P dated: There are no significant changes. H&P completed by Anesthesiologist.    Ready For Surgery From Anesthesia  Perspective.     .

## 2022-07-08 NOTE — INTERVAL H&P NOTE
The patient has been examined and the H&P has been reviewed:    I concur with the findings and changes have been noted since the H&P was written: Patient has had further bleeding today, and continues to have a drop in his hematocrit and require blood.  He has had four EGD's, all of which revealed an ulcer in the duodenum that is not actively bleeding at the time that they are looking.  Multiple clips have been placed and epinephrine has been injected.  I do not think we have any other options except surgery, at this point, due to his poor creatinine (which will preclude any interventional radiology efforts).  He had an echocardiogram, yesterday. revealing ejection fraction of 55%.  His major risk factor is recent COVID infection, but due to the fact that the bleeding will not stop, we were forced to surgery at this time.    Surgery risks, benefits and alternative options discussed and understood by patient/family.

## 2022-07-09 LAB
ABO + RH BLD: NORMAL
ALBUMIN SERPL BCP-MCNC: 1.3 G/DL (ref 3.5–5)
ALBUMIN/GLOB SERPL: 0.6 {RATIO}
ALP SERPL-CCNC: 32 U/L (ref 45–115)
ALT SERPL W P-5'-P-CCNC: 72 U/L (ref 16–61)
ANION GAP SERPL CALCULATED.3IONS-SCNC: 13 MMOL/L (ref 7–16)
ANION GAP SERPL CALCULATED.3IONS-SCNC: 17 MMOL/L (ref 7–16)
ANISOCYTOSIS BLD QL SMEAR: ABNORMAL
AST SERPL W P-5'-P-CCNC: 54 U/L (ref 15–37)
BASOPHILS # BLD AUTO: 0.02 K/UL (ref 0–0.2)
BASOPHILS NFR BLD AUTO: 0.2 % (ref 0–1)
BILIRUB SERPL-MCNC: 0.2 MG/DL (ref 0–1.2)
BLD PROD TYP BPU: NORMAL
BLOOD UNIT EXPIRATION DATE: NORMAL
BLOOD UNIT TYPE CODE: 5100
BUN SERPL-MCNC: 101 MG/DL (ref 7–18)
BUN SERPL-MCNC: 86 MG/DL (ref 7–18)
BUN/CREAT SERPL: 14 (ref 6–20)
BUN/CREAT SERPL: 16 (ref 6–20)
CALCIUM SERPL-MCNC: 7.7 MG/DL (ref 8.5–10.1)
CALCIUM SERPL-MCNC: 7.9 MG/DL (ref 8.5–10.1)
CHLORIDE SERPL-SCNC: 115 MMOL/L (ref 98–107)
CHLORIDE SERPL-SCNC: 122 MMOL/L (ref 98–107)
CO2 SERPL-SCNC: 12 MMOL/L (ref 21–32)
CO2 SERPL-SCNC: 13 MMOL/L (ref 21–32)
CREAT SERPL-MCNC: 6.29 MG/DL (ref 0.7–1.3)
CREAT SERPL-MCNC: 6.3 MG/DL (ref 0.7–1.3)
CRENATED CELLS: ABNORMAL
CROSSMATCH INTERPRETATION: NORMAL
DIFFERENTIAL METHOD BLD: ABNORMAL
DISPENSE STATUS: NORMAL
EOSINOPHIL # BLD AUTO: 0.35 K/UL (ref 0–0.5)
EOSINOPHIL NFR BLD AUTO: 3.1 % (ref 1–4)
EOSINOPHIL NFR BLD MANUAL: 1 % (ref 1–4)
ERYTHROCYTE [DISTWIDTH] IN BLOOD BY AUTOMATED COUNT: 15.5 % (ref 11.5–14.5)
GLOBULIN SER-MCNC: 2.2 G/DL (ref 2–4)
GLUCOSE SERPL-MCNC: 56 MG/DL (ref 70–105)
GLUCOSE SERPL-MCNC: 65 MG/DL (ref 70–105)
GLUCOSE SERPL-MCNC: 78 MG/DL (ref 70–105)
GLUCOSE SERPL-MCNC: 78 MG/DL (ref 74–106)
GLUCOSE SERPL-MCNC: 82 MG/DL (ref 70–105)
GLUCOSE SERPL-MCNC: 84 MG/DL (ref 70–105)
GLUCOSE SERPL-MCNC: 84 MG/DL (ref 74–106)
GLUCOSE SERPL-MCNC: 86 MG/DL (ref 70–105)
HCT VFR BLD AUTO: 17.6 % (ref 40–54)
HCT VFR BLD AUTO: 20.9 % (ref 40–54)
HGB BLD-MCNC: 6.1 G/DL (ref 13.5–18)
HGB BLD-MCNC: 6.8 G/DL (ref 13.5–18)
IMM GRANULOCYTES # BLD AUTO: 0.05 K/UL (ref 0–0.04)
IMM GRANULOCYTES NFR BLD: 0.4 % (ref 0–0.4)
INDIRECT COOMBS: NORMAL
LYMPHOCYTES # BLD AUTO: 0.8 K/UL (ref 1–4.8)
LYMPHOCYTES NFR BLD AUTO: 7.1 % (ref 27–41)
LYMPHOCYTES NFR BLD MANUAL: 4 % (ref 27–41)
MAGNESIUM SERPL-MCNC: 1.4 MG/DL (ref 1.7–2.3)
MCH RBC QN AUTO: 31.6 PG (ref 27–31)
MCHC RBC AUTO-ENTMCNC: 34.7 G/DL (ref 32–36)
MCV RBC AUTO: 91.2 FL (ref 80–96)
MONOCYTES # BLD AUTO: 0.51 K/UL (ref 0–0.8)
MONOCYTES NFR BLD AUTO: 4.5 % (ref 2–6)
MONOCYTES NFR BLD MANUAL: 4 % (ref 2–6)
MPC BLD CALC-MCNC: 10.5 FL (ref 9.4–12.4)
NEUTROPHILS # BLD AUTO: 9.5 K/UL (ref 1.8–7.7)
NEUTROPHILS NFR BLD AUTO: 84.7 % (ref 53–65)
NEUTS BAND NFR BLD MANUAL: 5 % (ref 1–5)
NEUTS SEG NFR BLD MANUAL: 86 % (ref 50–62)
NRBC # BLD AUTO: 0.04 X10E3/UL
NRBC, AUTO (.00): 0.4 %
PHOSPHATE SERPL-MCNC: 5.8 MG/DL (ref 2.5–4.5)
PLATELET # BLD AUTO: 72 K/UL (ref 150–400)
PLATELET MORPHOLOGY: ABNORMAL
POIKILOCYTOSIS BLD QL SMEAR: ABNORMAL
POTASSIUM SERPL-SCNC: 5.8 MMOL/L (ref 3.5–5.1)
POTASSIUM SERPL-SCNC: 5.9 MMOL/L (ref 3.5–5.1)
PROT SERPL-MCNC: 3.5 G/DL (ref 6.4–8.2)
RBC # BLD AUTO: 1.93 M/UL (ref 4.6–6.2)
RH BLD: NORMAL
SODIUM SERPL-SCNC: 138 MMOL/L (ref 136–145)
SODIUM SERPL-SCNC: 142 MMOL/L (ref 136–145)
UNIT NUMBER: NORMAL
WBC # BLD AUTO: 11.23 K/UL (ref 4.5–11)

## 2022-07-09 PROCEDURE — 83735 ASSAY OF MAGNESIUM: CPT | Performed by: SURGERY

## 2022-07-09 PROCEDURE — 20000000 HC ICU ROOM

## 2022-07-09 PROCEDURE — 99900035 HC TECH TIME PER 15 MIN (STAT)

## 2022-07-09 PROCEDURE — 80053 COMPREHEN METABOLIC PANEL: CPT | Performed by: SURGERY

## 2022-07-09 PROCEDURE — 25000003 PHARM REV CODE 250: Performed by: SURGERY

## 2022-07-09 PROCEDURE — 25000003 PHARM REV CODE 250

## 2022-07-09 PROCEDURE — 36415 COLL VENOUS BLD VENIPUNCTURE: CPT | Performed by: INTERNAL MEDICINE

## 2022-07-09 PROCEDURE — 82962 GLUCOSE BLOOD TEST: CPT

## 2022-07-09 PROCEDURE — P9016 RBC LEUKOCYTES REDUCED: HCPCS | Performed by: INTERNAL MEDICINE

## 2022-07-09 PROCEDURE — 27000221 HC OXYGEN, UP TO 24 HOURS

## 2022-07-09 PROCEDURE — 99233 PR SUBSEQUENT HOSPITAL CARE,LEVL III: ICD-10-PCS | Mod: ,,, | Performed by: INTERNAL MEDICINE

## 2022-07-09 PROCEDURE — S5010 5% DEXTROSE AND 0.45% SALINE: HCPCS | Performed by: INTERNAL MEDICINE

## 2022-07-09 PROCEDURE — 25000003 PHARM REV CODE 250: Performed by: FAMILY MEDICINE

## 2022-07-09 PROCEDURE — C9113 INJ PANTOPRAZOLE SODIUM, VIA: HCPCS | Performed by: SURGERY

## 2022-07-09 PROCEDURE — 25000003 PHARM REV CODE 250: Performed by: INTERNAL MEDICINE

## 2022-07-09 PROCEDURE — 85025 COMPLETE CBC W/AUTO DIFF WBC: CPT | Performed by: INTERNAL MEDICINE

## 2022-07-09 PROCEDURE — 99233 SBSQ HOSP IP/OBS HIGH 50: CPT | Mod: ,,, | Performed by: INTERNAL MEDICINE

## 2022-07-09 PROCEDURE — 86923 COMPATIBILITY TEST ELECTRIC: CPT | Mod: 91 | Performed by: SURGERY

## 2022-07-09 PROCEDURE — 80048 BASIC METABOLIC PNL TOTAL CA: CPT | Mod: XB | Performed by: INTERNAL MEDICINE

## 2022-07-09 PROCEDURE — 36430 TRANSFUSION BLD/BLD COMPNT: CPT

## 2022-07-09 PROCEDURE — 86923 COMPATIBILITY TEST ELECTRIC: CPT | Performed by: INTERNAL MEDICINE

## 2022-07-09 PROCEDURE — 85014 HEMATOCRIT: CPT | Performed by: INTERNAL MEDICINE

## 2022-07-09 PROCEDURE — 84100 ASSAY OF PHOSPHORUS: CPT | Performed by: SURGERY

## 2022-07-09 PROCEDURE — 63600175 PHARM REV CODE 636 W HCPCS: Performed by: SURGERY

## 2022-07-09 PROCEDURE — 36415 COLL VENOUS BLD VENIPUNCTURE: CPT | Performed by: SURGERY

## 2022-07-09 PROCEDURE — 86901 BLOOD TYPING SEROLOGIC RH(D): CPT | Performed by: INTERNAL MEDICINE

## 2022-07-09 RX ORDER — DEXTROSE MONOHYDRATE AND SODIUM CHLORIDE 5; .45 G/100ML; G/100ML
INJECTION, SOLUTION INTRAVENOUS CONTINUOUS
Status: DISCONTINUED | OUTPATIENT
Start: 2022-07-09 | End: 2022-07-11

## 2022-07-09 RX ORDER — HYDROCODONE BITARTRATE AND ACETAMINOPHEN 500; 5 MG/1; MG/1
TABLET ORAL
Status: DISCONTINUED | OUTPATIENT
Start: 2022-07-09 | End: 2022-07-20 | Stop reason: HOSPADM

## 2022-07-09 RX ADMIN — SODIUM CHLORIDE: 4.5 INJECTION, SOLUTION INTRAVENOUS at 08:07

## 2022-07-09 RX ADMIN — SODIUM CHLORIDE: 9 INJECTION, SOLUTION INTRAVENOUS at 02:07

## 2022-07-09 RX ADMIN — MORPHINE SULFATE 6 MG: 8 INJECTION INTRAVENOUS at 03:07

## 2022-07-09 RX ADMIN — THERA TABS 1 TABLET: TAB at 08:07

## 2022-07-09 RX ADMIN — ALLOPURINOL 50 MG: 300 TABLET ORAL at 08:07

## 2022-07-09 RX ADMIN — DEXTROSE MONOHYDRATE 12.5 G: 25 INJECTION, SOLUTION INTRAVENOUS at 10:07

## 2022-07-09 RX ADMIN — MORPHINE SULFATE 6 MG: 8 INJECTION INTRAVENOUS at 09:07

## 2022-07-09 RX ADMIN — FERROUS SULFATE TAB 325 MG (65 MG ELEMENTAL FE) 1 EACH: 325 (65 FE) TAB at 08:07

## 2022-07-09 RX ADMIN — SODIUM CHLORIDE 125 ML/HR: 4.5 INJECTION, SOLUTION INTRAVENOUS at 12:07

## 2022-07-09 RX ADMIN — DEXTROSE AND SODIUM CHLORIDE: 5; 450 INJECTION, SOLUTION INTRAVENOUS at 05:07

## 2022-07-09 RX ADMIN — DEXTROSE MONOHYDRATE 12.5 G: 25 INJECTION, SOLUTION INTRAVENOUS at 04:07

## 2022-07-09 RX ADMIN — OXYCODONE HYDROCHLORIDE AND ACETAMINOPHEN 500 MG: 500 TABLET ORAL at 08:07

## 2022-07-09 RX ADMIN — MORPHINE SULFATE 6 MG: 8 INJECTION INTRAVENOUS at 11:07

## 2022-07-09 RX ADMIN — PANTOPRAZOLE SODIUM 40 MG: 40 INJECTION, POWDER, FOR SOLUTION INTRAVENOUS at 08:07

## 2022-07-09 RX ADMIN — MORPHINE SULFATE 6 MG: 8 INJECTION INTRAVENOUS at 07:07

## 2022-07-09 NOTE — PROGRESS NOTES
[facility], [place]  Pulmonology  Progress Note    Patient Name: Neymar Parra  MRN: [id number]  Admission Date: 6/16/2022  Hospital Length of Stay: 23 days  Code Status: Full Code  Attending Provider: Jack Johnson DO  Primary Care Provider: [facility]   Principal Problem: Gastrointestinal hemorrhage associated with duodenitis    Subjective:     Interval History: No acute events overnight. The patient is currently resting comfortably. H &H is 7.7/24.8. repeat EGD yesterday. Currently afebrile and vital signs are stable.    Objective:     Vital Signs (Most Recent):  Temp: 99 °F (37.2 °C) (07/09/22 0800)  Pulse: 103 (07/09/22 0945)  Resp: (!) 25 (07/09/22 0945)  BP: (!) 116/48 (07/09/22 0945)  SpO2: 100 % (07/09/22 0945)   Vital Signs (24h Range):  Temp:  [97.5 °F (36.4 °C)-99 °F (37.2 °C)] 99 °F (37.2 °C)  Pulse:  [] 103  Resp:  [10-38] 25  SpO2:  [88 %-100 %] 100 %  BP: ()/(42-61) 116/48     Weight: 99.5 kg (219 lb 5.7 oz)  Body mass index is 29.75 kg/m².      Intake/Output Summary (Last 24 hours) at 7/9/2022 1049  Last data filed at 7/9/2022 0703  Gross per 24 hour   Intake 3052.08 ml   Output 1165 ml   Net 1887.08 ml         Physical Exam  Vitals reviewed.   Constitutional:       General: He is not in acute distress.     Appearance: Normal appearance. He is not ill-appearing.      Interventions: He is not intubated.  HENT:      Head: Normocephalic and atraumatic.      Right Ear: External ear normal.      Left Ear: External ear normal.      Nose: Nose normal.      Mouth/Throat:      Mouth: Mucous membranes are dry.   Eyes:      Extraocular Movements: Extraocular movements intact.      Conjunctiva/sclera: Conjunctivae normal.      Pupils: Pupils are equal, round, and reactive to light.   Cardiovascular:      Rate and Rhythm: Normal rate and regular rhythm.      Heart sounds: Normal heart sounds. No murmur heard.  Pulmonary:      Effort: No respiratory distress. He is  not intubated.      Breath sounds: Normal breath sounds. No wheezing or rales.   Abdominal:      General: There is no distension.      Palpations: Abdomen is soft.      Tenderness: There is abdominal tenderness.   Musculoskeletal:         General: Normal range of motion.      Cervical back: Normal range of motion and neck supple.      Right lower leg: No edema.      Left lower leg: No edema.   Skin:     General: Skin is warm and dry.      Capillary Refill: Capillary refill takes less than 2 seconds.   Neurological:      General: No focal deficit present.      Mental Status: He is alert and oriented to person, place, and time. Mental status is at baseline.      Cranial Nerves: No cranial nerve deficit.       Vents:  Oxygen Concentration (%): 28 (07/09/22 0724)    Lines/Drains/Airways       Drain  Duration                  Urethral Catheter 07/07/22 1130 1 day         Closed/Suction Drain 07/08/22 1439 Abdomen Bulb <1 day         Closed/Suction Drain 07/08/22 1439 Superior;Midline Abdomen Bulb 10 Fr. <1 day         NG/OG Tube 07/08/22 1412 Garvin sump 18 Fr. Right nostril <1 day              Peripheral Intravenous Line  Duration                  Peripheral IV - Single Lumen 07/06/22 2350 20 G Left;Posterior Hand 2 days         Peripheral IV - Single Lumen 07/07/22 1118 18 G Anterior;Right Upper Arm 1 day                    Significant Labs:    CBC/Anemia Profile:  Recent Labs   Lab 07/08/22  0208 07/08/22  0634 07/08/22  1924 07/09/22  0554   WBC 9.53  --  14.08* 11.23*   HGB 6.4* 6.9* 7.5* 6.1*   HCT 19.0* 21.1* 22.7* 17.6*   PLT 78*  --  84* 72*   MCV 92.7  --  95.4 91.2   RDW 14.9*  --  15.2* 15.5*          Chemistries:  Recent Labs   Lab 07/08/22  0208 07/09/22  0554    138   K 5.7* 5.9*   * 115*   CO2 16* 12*   BUN 87* 101*   CREATININE 5.67* 6.29*   CALCIUM 7.1* 7.7*   ALBUMIN 1.5* 1.3*   PROT 3.3* 3.5*   BILITOT 0.5 0.2   ALKPHOS 37* 32*   ALT 38 72*   AST 20 54*   MG  --  1.4*   PHOS  --  5.8*          All pertinent labs within the past 24 hours have been reviewed.    Significant Imaging:  I have reviewed all pertinent imaging results/findings within the past 24 hours.    Assessment/Plan:     * Gastrointestinal hemorrhage associated with duodenitis  - EGD with bleeding duodenal ulcer as above, was clipped   - Repeat EGD 07/05, bleeding duodenal vessel was clipped per GI.  - H. Pylori negative  - Protonix infusion  - GI following, appreciate assistance   -Pt continues to bleed with blood clots from bowel and H &H  Continues to decline. Pt became hypotensive, and transferred to ICU for further monitoring and eval. General surgery, Dr. Rees consulted for eval.   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 [facility]   7/8- repeat EGD yesterday. Margustavo bm this am.   7/9 no further bleeding. Hgb now 6.1 today. Will continue to monitor    Anemia  - Patient with bleeding duodenal ulcer on EGD 7/2, ulcer clipped and an EGD on 07/05 where bleeding duodenal vessel was clipped  - patient has had a total of 6 units this admission  - GI following, assistance appreciated  -Pt continues to have large amount of bloody bowel movement with clots. H & H 6.3/18.7 (7/7), platelet [id number] and during 1 unit blood transfusion, pt became hypotensive. Given 1 liter bolus and transferred to ICU  And consulted GS, Dr. Rees. Appreciate assistance (7/7)    7/8- Hgb is 6.9 this am. Currently hemodynamically stable. Repeat EGD yesterday.   7/9- Hgb is 6.1 today. We are going to transfuse 1 unit----no further bleeding    Acute on chronic renal failure  - BUN/Crt 87/5.67  - Nephrology consulted  - last note reviewed from 7/4 7/9- BUN is 101 and creatinine is 6.29  About 900 of UOP in last 24 hours       Type 2 diabetes mellitus without complication  Accuchecks look good  Patient is on levemir 8 units and sliding scale                 Jack Johnson,  DO  Pulmonology  [facility]

## 2022-07-09 NOTE — SUBJECTIVE & OBJECTIVE
Interval History: No acute events overnight. The patient is currently resting comfortably. H &H is 7.7/24.8. repeat EGD yesterday. Currently afebrile and vital signs are stable.    Objective:     Vital Signs (Most Recent):  Temp: 99 °F (37.2 °C) (07/09/22 0800)  Pulse: 103 (07/09/22 0945)  Resp: (!) 25 (07/09/22 0945)  BP: (!) 116/48 (07/09/22 0945)  SpO2: 100 % (07/09/22 0945)   Vital Signs (24h Range):  Temp:  [97.5 °F (36.4 °C)-99 °F (37.2 °C)] 99 °F (37.2 °C)  Pulse:  [] 103  Resp:  [10-38] 25  SpO2:  [88 %-100 %] 100 %  BP: ()/(42-61) 116/48     Weight: 99.5 kg (219 lb 5.7 oz)  Body mass index is 29.75 kg/m².      Intake/Output Summary (Last 24 hours) at 7/9/2022 1049  Last data filed at 7/9/2022 0703  Gross per 24 hour   Intake 3052.08 ml   Output 1165 ml   Net 1887.08 ml         Physical Exam  Vitals reviewed.   Constitutional:       General: He is not in acute distress.     Appearance: Normal appearance. He is not ill-appearing.      Interventions: He is not intubated.  HENT:      Head: Normocephalic and atraumatic.      Right Ear: External ear normal.      Left Ear: External ear normal.      Nose: Nose normal.      Mouth/Throat:      Mouth: Mucous membranes are dry.   Eyes:      Extraocular Movements: Extraocular movements intact.      Conjunctiva/sclera: Conjunctivae normal.      Pupils: Pupils are equal, round, and reactive to light.   Cardiovascular:      Rate and Rhythm: Normal rate and regular rhythm.      Heart sounds: Normal heart sounds. No murmur heard.  Pulmonary:      Effort: No respiratory distress. He is not intubated.      Breath sounds: Normal breath sounds. No wheezing or rales.   Abdominal:      General: There is no distension.      Palpations: Abdomen is soft.      Tenderness: There is abdominal tenderness.   Musculoskeletal:         General: Normal range of motion.      Cervical back: Normal range of motion and neck supple.      Right lower leg: No edema.      Left lower leg:  No edema.   Skin:     General: Skin is warm and dry.      Capillary Refill: Capillary refill takes less than 2 seconds.   Neurological:      General: No focal deficit present.      Mental Status: He is alert and oriented to person, place, and time. Mental status is at baseline.      Cranial Nerves: No cranial nerve deficit.       Vents:  Oxygen Concentration (%): 28 (07/09/22 0724)    Lines/Drains/Airways       Drain  Duration                  Urethral Catheter 07/07/22 1130 1 day         Closed/Suction Drain 07/08/22 1439 Abdomen Bulb <1 day         Closed/Suction Drain 07/08/22 1439 Superior;Midline Abdomen Bulb 10 Fr. <1 day         NG/OG Tube 07/08/22 1412 Geary sump 18 Fr. Right nostril <1 day              Peripheral Intravenous Line  Duration                  Peripheral IV - Single Lumen 07/06/22 2350 20 G Left;Posterior Hand 2 days         Peripheral IV - Single Lumen 07/07/22 1118 18 G Anterior;Right Upper Arm 1 day                    Significant Labs:    CBC/Anemia Profile:  Recent Labs   Lab 07/08/22  0208 07/08/22  0634 07/08/22  1924 07/09/22  0554   WBC 9.53  --  14.08* 11.23*   HGB 6.4* 6.9* 7.5* 6.1*   HCT 19.0* 21.1* 22.7* 17.6*   PLT 78*  --  84* 72*   MCV 92.7  --  95.4 91.2   RDW 14.9*  --  15.2* 15.5*          Chemistries:  Recent Labs   Lab 07/08/22  0208 07/09/22  0554    138   K 5.7* 5.9*   * 115*   CO2 16* 12*   BUN 87* 101*   CREATININE 5.67* 6.29*   CALCIUM 7.1* 7.7*   ALBUMIN 1.5* 1.3*   PROT 3.3* 3.5*   BILITOT 0.5 0.2   ALKPHOS 37* 32*   ALT 38 72*   AST 20 54*   MG  --  1.4*   PHOS  --  5.8*         All pertinent labs within the past 24 hours have been reviewed.    Significant Imaging:  I have reviewed all pertinent imaging results/findings within the past 24 hours.

## 2022-07-09 NOTE — ASSESSMENT & PLAN NOTE
- Patient with bleeding duodenal ulcer on EGD 7/2, ulcer clipped and an EGD on 07/05 where bleeding duodenal vessel was clipped  - patient has had a total of 6 units this admission  - GI following, assistance appreciated  -Pt continues to have large amount of bloody bowel movement with clots. H & H 6.3/18.7 (7/7), platelet 33065 and during 1 unit blood transfusion, pt became hypotensive. Given 1 liter bolus and transferred to ICU  And consulted , Dr. Rees. Appreciate assistance (7/7)    7/8- Hgb is 6.9 this am. Currently hemodynamically stable. Repeat EGD yesterday.   7/9- Hgb is 6.1 today. We are going to transfuse 1 unit----no further bleeding

## 2022-07-09 NOTE — ASSESSMENT & PLAN NOTE
- EGD with bleeding duodenal ulcer as above, was clipped   - Repeat EGD 07/05, bleeding duodenal vessel was clipped per GI.  - H. Pylori negative  - Protonix infusion  - GI following, appreciate assistance   -Pt continues to bleed with blood clots from bowel and H &H  Continues to decline. Pt became hypotensive, and transferred to ICU for further monitoring and eval. General surgery, Dr. Rees consulted for eval.   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 [facility]   7/8- repeat EGD yesterday. Eliel  this am.   7/9 no further bleeding. Hgb now 6.1 today. Will continue to monitor

## 2022-07-09 NOTE — ASSESSMENT & PLAN NOTE
- BUN/Crt 87/5.67  - Nephrology consulted  - last note reviewed from 7/4 7/9- BUN is 101 and creatinine is 6.29  About 900 of UOP in last 24 hours

## 2022-07-10 LAB
ABO + RH BLD: NORMAL
ABO + RH BLD: NORMAL
ALBUMIN SERPL BCP-MCNC: 1.2 G/DL (ref 3.5–5)
ALBUMIN SERPL BCP-MCNC: 1.4 G/DL (ref 3.5–5)
ALBUMIN/GLOB SERPL: 0.5 {RATIO}
ALBUMIN/GLOB SERPL: 0.5 {RATIO}
ALP SERPL-CCNC: 40 U/L (ref 45–115)
ALP SERPL-CCNC: 47 U/L (ref 45–115)
ALT SERPL W P-5'-P-CCNC: 27 U/L (ref 16–61)
ALT SERPL W P-5'-P-CCNC: 30 U/L (ref 16–61)
ANION GAP SERPL CALCULATED.3IONS-SCNC: 13 MMOL/L (ref 7–16)
ANISOCYTOSIS BLD QL SMEAR: ABNORMAL
AST SERPL W P-5'-P-CCNC: 20 U/L (ref 15–37)
AST SERPL W P-5'-P-CCNC: 25 U/L (ref 15–37)
BASOPHILS # BLD AUTO: 0 K/UL (ref 0–0.2)
BASOPHILS NFR BLD AUTO: 0 % (ref 0–1)
BILIRUB SERPL-MCNC: 0.4 MG/DL (ref 0–1.2)
BILIRUB SERPL-MCNC: 0.5 MG/DL (ref 0–1.2)
BLD PROD TYP BPU: NORMAL
BLD PROD TYP BPU: NORMAL
BLOOD UNIT EXPIRATION DATE: NORMAL
BLOOD UNIT EXPIRATION DATE: NORMAL
BLOOD UNIT TYPE CODE: 5100
BLOOD UNIT TYPE CODE: 5100
BUN SERPL-MCNC: 69 MG/DL (ref 7–18)
BUN SERPL-MCNC: 80 MG/DL (ref 7–18)
BUN SERPL-MCNC: 82 MG/DL (ref 7–18)
BUN/CREAT SERPL: 13 (ref 6–20)
CALCIUM SERPL-MCNC: 6.5 MG/DL (ref 8.5–10.1)
CALCIUM SERPL-MCNC: 7.9 MG/DL (ref 8.5–10.1)
CALCIUM SERPL-MCNC: 8 MG/DL (ref 8.5–10.1)
CHLORIDE SERPL-SCNC: 112 MMOL/L (ref 98–107)
CHLORIDE SERPL-SCNC: 120 MMOL/L (ref 98–107)
CHLORIDE SERPL-SCNC: 122 MMOL/L (ref 98–107)
CO2 SERPL-SCNC: 11 MMOL/L (ref 21–32)
CO2 SERPL-SCNC: 12 MMOL/L (ref 21–32)
CO2 SERPL-SCNC: 13 MMOL/L (ref 21–32)
CREAT SERPL-MCNC: 5.44 MG/DL (ref 0.7–1.3)
CREAT SERPL-MCNC: 6.35 MG/DL (ref 0.7–1.3)
CREAT SERPL-MCNC: 6.38 MG/DL (ref 0.7–1.3)
CRENATED CELLS: ABNORMAL
CROSSMATCH INTERPRETATION: NORMAL
CROSSMATCH INTERPRETATION: NORMAL
DIFFERENTIAL METHOD BLD: ABNORMAL
DISPENSE STATUS: NORMAL
DISPENSE STATUS: NORMAL
EOSINOPHIL # BLD AUTO: 0.42 K/UL (ref 0–0.5)
EOSINOPHIL NFR BLD AUTO: 4.6 % (ref 1–4)
ERYTHROCYTE [DISTWIDTH] IN BLOOD BY AUTOMATED COUNT: 15.6 % (ref 11.5–14.5)
GLOBULIN SER-MCNC: 2.3 G/DL (ref 2–4)
GLOBULIN SER-MCNC: 2.6 G/DL (ref 2–4)
GLUCOSE SERPL-MCNC: 101 MG/DL (ref 74–106)
GLUCOSE SERPL-MCNC: 106 MG/DL (ref 70–105)
GLUCOSE SERPL-MCNC: 107 MG/DL (ref 70–105)
GLUCOSE SERPL-MCNC: 795 MG/DL (ref 74–106)
GLUCOSE SERPL-MCNC: 92 MG/DL (ref 74–106)
GLUCOSE SERPL-MCNC: 96 MG/DL (ref 70–105)
GLUCOSE SERPL-MCNC: 97 MG/DL (ref 70–105)
HCT VFR BLD AUTO: 19.6 % (ref 40–54)
HCT VFR BLD AUTO: 22.2 % (ref 40–54)
HGB BLD-MCNC: 6.4 G/DL (ref 13.5–18)
HGB BLD-MCNC: 7.2 G/DL (ref 13.5–18)
IMM GRANULOCYTES # BLD AUTO: 0.03 K/UL (ref 0–0.04)
IMM GRANULOCYTES NFR BLD: 0.3 % (ref 0–0.4)
LYMPHOCYTES # BLD AUTO: 0.5 K/UL (ref 1–4.8)
LYMPHOCYTES NFR BLD AUTO: 5.5 % (ref 27–41)
MCH RBC QN AUTO: 31.2 PG (ref 27–31)
MCHC RBC AUTO-ENTMCNC: 32.7 G/DL (ref 32–36)
MCV RBC AUTO: 95.6 FL (ref 80–96)
MONOCYTES # BLD AUTO: 0.48 K/UL (ref 0–0.8)
MONOCYTES NFR BLD AUTO: 5.3 % (ref 2–6)
MPC BLD CALC-MCNC: 9.7 FL (ref 9.4–12.4)
NEUTROPHILS # BLD AUTO: 7.66 K/UL (ref 1.8–7.7)
NEUTROPHILS NFR BLD AUTO: 84.3 % (ref 53–65)
NRBC # BLD AUTO: 0.03 X10E3/UL
NRBC, AUTO (.00): 0.3 %
OVALOCYTES BLD QL SMEAR: ABNORMAL
PLATELET # BLD AUTO: 79 K/UL (ref 150–400)
PLATELET MORPHOLOGY: ABNORMAL
POLYCHROMASIA BLD QL SMEAR: ABNORMAL
POTASSIUM SERPL-SCNC: 4.5 MMOL/L (ref 3.5–5.1)
POTASSIUM SERPL-SCNC: 5.5 MMOL/L (ref 3.5–5.1)
POTASSIUM SERPL-SCNC: 5.6 MMOL/L (ref 3.5–5.1)
PROT SERPL-MCNC: 3.5 G/DL (ref 6.4–8.2)
PROT SERPL-MCNC: 4 G/DL (ref 6.4–8.2)
RBC # BLD AUTO: 2.05 M/UL (ref 4.6–6.2)
SODIUM SERPL-SCNC: 131 MMOL/L (ref 136–145)
SODIUM SERPL-SCNC: 140 MMOL/L (ref 136–145)
SODIUM SERPL-SCNC: 141 MMOL/L (ref 136–145)
UNIT NUMBER: NORMAL
UNIT NUMBER: NORMAL
WBC # BLD AUTO: 9.09 K/UL (ref 4.5–11)

## 2022-07-10 PROCEDURE — 25000003 PHARM REV CODE 250: Performed by: HOSPITALIST

## 2022-07-10 PROCEDURE — 25000003 PHARM REV CODE 250: Performed by: FAMILY MEDICINE

## 2022-07-10 PROCEDURE — 20000000 HC ICU ROOM

## 2022-07-10 PROCEDURE — 82962 GLUCOSE BLOOD TEST: CPT

## 2022-07-10 PROCEDURE — 36430 TRANSFUSION BLD/BLD COMPNT: CPT

## 2022-07-10 PROCEDURE — 85014 HEMATOCRIT: CPT | Performed by: INTERNAL MEDICINE

## 2022-07-10 PROCEDURE — 99233 PR SUBSEQUENT HOSPITAL CARE,LEVL III: ICD-10-PCS | Mod: ,,, | Performed by: INTERNAL MEDICINE

## 2022-07-10 PROCEDURE — 63600175 PHARM REV CODE 636 W HCPCS: Performed by: SURGERY

## 2022-07-10 PROCEDURE — C9113 INJ PANTOPRAZOLE SODIUM, VIA: HCPCS | Performed by: SURGERY

## 2022-07-10 PROCEDURE — P9016 RBC LEUKOCYTES REDUCED: HCPCS | Performed by: SURGERY

## 2022-07-10 PROCEDURE — S5010 5% DEXTROSE AND 0.45% SALINE: HCPCS | Performed by: INTERNAL MEDICINE

## 2022-07-10 PROCEDURE — 99900035 HC TECH TIME PER 15 MIN (STAT)

## 2022-07-10 PROCEDURE — 80048 BASIC METABOLIC PNL TOTAL CA: CPT | Performed by: INTERNAL MEDICINE

## 2022-07-10 PROCEDURE — 85025 COMPLETE CBC W/AUTO DIFF WBC: CPT | Performed by: INTERNAL MEDICINE

## 2022-07-10 PROCEDURE — 36415 COLL VENOUS BLD VENIPUNCTURE: CPT | Performed by: INTERNAL MEDICINE

## 2022-07-10 PROCEDURE — 99233 SBSQ HOSP IP/OBS HIGH 50: CPT | Mod: ,,, | Performed by: INTERNAL MEDICINE

## 2022-07-10 PROCEDURE — 27000221 HC OXYGEN, UP TO 24 HOURS

## 2022-07-10 PROCEDURE — 25000003 PHARM REV CODE 250

## 2022-07-10 PROCEDURE — 80053 COMPREHEN METABOLIC PANEL: CPT | Performed by: INTERNAL MEDICINE

## 2022-07-10 PROCEDURE — 25000003 PHARM REV CODE 250: Performed by: INTERNAL MEDICINE

## 2022-07-10 RX ORDER — SODIUM BICARBONATE 650 MG/1
650 TABLET ORAL 2 TIMES DAILY
Status: DISCONTINUED | OUTPATIENT
Start: 2022-07-10 | End: 2022-07-20 | Stop reason: HOSPADM

## 2022-07-10 RX ORDER — HYDROCODONE BITARTRATE AND ACETAMINOPHEN 500; 5 MG/1; MG/1
TABLET ORAL
Status: DISCONTINUED | OUTPATIENT
Start: 2022-07-10 | End: 2022-07-20 | Stop reason: HOSPADM

## 2022-07-10 RX ADMIN — SODIUM CHLORIDE: 9 INJECTION, SOLUTION INTRAVENOUS at 12:07

## 2022-07-10 RX ADMIN — OXYCODONE HYDROCHLORIDE AND ACETAMINOPHEN 500 MG: 500 TABLET ORAL at 08:07

## 2022-07-10 RX ADMIN — THERA TABS 1 TABLET: TAB at 08:07

## 2022-07-10 RX ADMIN — ATORVASTATIN CALCIUM 40 MG: 40 TABLET, FILM COATED ORAL at 08:07

## 2022-07-10 RX ADMIN — MORPHINE SULFATE 6 MG: 8 INJECTION INTRAVENOUS at 08:07

## 2022-07-10 RX ADMIN — DEXTROSE AND SODIUM CHLORIDE: 5; 450 INJECTION, SOLUTION INTRAVENOUS at 06:07

## 2022-07-10 RX ADMIN — SODIUM BICARBONATE 650 MG: 650 TABLET ORAL at 08:07

## 2022-07-10 RX ADMIN — ALLOPURINOL 50 MG: 300 TABLET ORAL at 08:07

## 2022-07-10 RX ADMIN — FERROUS SULFATE TAB 325 MG (65 MG ELEMENTAL FE) 1 EACH: 325 (65 FE) TAB at 08:07

## 2022-07-10 RX ADMIN — PANTOPRAZOLE SODIUM 40 MG: 40 INJECTION, POWDER, FOR SOLUTION INTRAVENOUS at 08:07

## 2022-07-10 RX ADMIN — DEXTROSE AND SODIUM CHLORIDE: 5; 450 INJECTION, SOLUTION INTRAVENOUS at 09:07

## 2022-07-10 RX ADMIN — MORPHINE SULFATE 6 MG: 8 INJECTION INTRAVENOUS at 10:07

## 2022-07-10 RX ADMIN — DEXTROSE AND SODIUM CHLORIDE 125 ML/HR: 5; 450 INJECTION, SOLUTION INTRAVENOUS at 01:07

## 2022-07-10 RX ADMIN — SODIUM CHLORIDE: 9 INJECTION, SOLUTION INTRAVENOUS at 09:07

## 2022-07-10 NOTE — PROGRESS NOTES
[facility] ICU  Pulmonology  Progress Note    Patient Name: Neymar Parra  MRN: [id number]  Admission Date: 6/16/2022  Hospital Length of Stay: 24 days  Code Status: Full Code  Attending Provider: Jack Johnson DO  Primary Care Provider: [facility]   Principal Problem: Gastrointestinal hemorrhage associated with duodenitis    Subjective:     Interval History: No acute events overnight. The patient is currently resting comfortably. WE transfused a unit or PRBC's yesterday and he will be transfused again today. No hematochezia but he did have some melena.    Objective:     Vital Signs (Most Recent):  Temp: 99.2 °F (37.3 °C) (07/10/22 1015)  Pulse: 110 (07/10/22 1030)  Resp: 16 (07/10/22 1038)  BP: (!) 123/59 (07/10/22 1030)  SpO2: 100 % (07/10/22 1030)   Vital Signs (24h Range):  Temp:  [98 °F (36.7 °C)-99.4 °F (37.4 °C)] 99.2 °F (37.3 °C)  Pulse:  [] 110  Resp:  [10-36] 16  SpO2:  [90 %-100 %] 100 %  BP: (103-135)/(41-77) 123/59     Weight: 98.7 kg (217 lb 9.5 oz)  Body mass index is 29.51 kg/m².      Intake/Output Summary (Last 24 hours) at 7/10/2022 1040  Last data filed at 7/10/2022 0915  Gross per 24 hour   Intake 3435.42 ml   Output 2605 ml   Net 830.42 ml         Physical Exam  Vitals reviewed.   Constitutional:       General: He is not in acute distress.     Appearance: Normal appearance. He is not ill-appearing.      Interventions: He is not intubated.  HENT:      Head: Normocephalic and atraumatic.      Right Ear: External ear normal.      Left Ear: External ear normal.      Nose: Nose normal.      Mouth/Throat:      Mouth: Mucous membranes are dry.   Eyes:      Extraocular Movements: Extraocular movements intact.      Conjunctiva/sclera: Conjunctivae normal.      Pupils: Pupils are equal, round, and reactive to light.   Cardiovascular:      Rate and Rhythm: Normal rate and regular rhythm.      Heart sounds: Normal heart sounds. No murmur heard.  Pulmonary:       Effort: No respiratory distress. He is not intubated.      Breath sounds: Normal breath sounds. No wheezing or rales.   Abdominal:      General: There is no distension.      Palpations: Abdomen is soft.      Tenderness: There is abdominal tenderness.   Musculoskeletal:         General: Normal range of motion.      Cervical back: Normal range of motion and neck supple.      Right lower leg: No edema.      Left lower leg: No edema.   Skin:     General: Skin is warm and dry.      Capillary Refill: Capillary refill takes less than 2 seconds.   Neurological:      General: No focal deficit present.      Mental Status: He is alert and oriented to person, place, and time. Mental status is at baseline.      Cranial Nerves: No cranial nerve deficit.       Vents:  Oxygen Concentration (%): 28 (07/10/22 0724)    Lines/Drains/Airways       Drain  Duration                  Closed/Suction Drain 07/08/22 1439 Abdomen Bulb 1 day         Closed/Suction Drain 07/08/22 1439 Superior;Midline Abdomen Bulb 10 Fr. 1 day         NG/OG Tube 07/08/22 1412 Davison sump 18 Fr. Right nostril 1 day              Peripheral Intravenous Line  Duration                  Peripheral IV - Single Lumen 07/06/22 2350 20 G Left;Posterior Hand 3 days         Peripheral IV - Single Lumen 07/07/22 1118 18 G Anterior;Right Upper Arm 2 days                    Significant Labs:    CBC/Anemia Profile:  Recent Labs   Lab 07/08/22 1924 07/09/22  0554 07/09/22  1848 07/10/22  0156   WBC 14.08* 11.23*  --  9.09   HGB 7.5* 6.1* 6.8* 6.4*   HCT 22.7* 17.6* 20.9* 19.6*   PLT 84* 72*  --  79*   MCV 95.4 91.2  --  95.6   RDW 15.2* 15.5*  --  15.6*          Chemistries:  Recent Labs   Lab 07/09/22  0554 07/09/22  1848 07/10/22  0156    142 141   K 5.9* 5.8* 5.6*   * 122* 122*   CO2 12* 13* 12*   * 86* 82*   CREATININE 6.29* 6.30* 6.35*   CALCIUM 7.7* 7.9* 8.0*   ALBUMIN 1.3*  --   --    PROT 3.5*  --   --    BILITOT 0.2  --   --    ALKPHOS 32*  --   --     ALT 72*  --   --    AST 54*  --   --    MG 1.4*  --   --    PHOS 5.8*  --   --          All pertinent labs within the past 24 hours have been reviewed.    Significant Imaging:  I have reviewed all pertinent imaging results/findings within the past 24 hours.    Assessment/Plan:     * Gastrointestinal hemorrhage associated with duodenitis  - EGD with bleeding duodenal ulcer as above, was clipped   - Repeat EGD 07/05, bleeding duodenal vessel was clipped per GI.  - H. Pylori negative  - Protonix infusion  - GI following, appreciate assistance   -Pt continues to bleed with blood clots from bowel and H &H  Continues to decline. Pt became hypotensive, and transferred to ICU for further monitoring and eval. General surgery, Dr. Rees consulted for eval.   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 [facility]   7/8- repeat EGD yesterday. Maroon bm this am.   7/9 no further bleeding. Hgb now 6.1 today. Will continue to monitor    Spoke with Dr. Ziegler  And for now would like to keep patient NPO and keep NG tube in place    Anemia  - Patient with bleeding duodenal ulcer on EGD 7/2, ulcer clipped and an EGD on 07/05 where bleeding duodenal vessel was clipped  - patient has had a total of 6 units this admission  - GI following, assistance appreciated  -Pt continues to have large amount of bloody bowel movement with clots. H & H 6.3/18.7 (7/7), platelet 35970 and during 1 unit blood transfusion, pt became hypotensive. Given 1 liter bolus and transferred to ICU  And consulted GS, Dr. Rees. Appreciate assistance (7/7)    7/8- Hgb is 6.9 this am. Currently hemodynamically stable. Repeat EGD yesterday.   7/9- Hgb is 6.1 today. We are going to transfuse 1 unit----no further bleeding  7/10- transfusing again today. No hematochezia but still with some melena    Acute on chronic renal failure  - BUN/Crt 87/5.67  - Nephrology consulted  - last note reviewed from 7/4    7/9- BUN is 101 and creatinine is 6.29  About 900 of UOP in last 24 hours  7/10 no significant change in creatinine. He does have good UOP. He has metabolic acidosis that is felt to be related to his kidney function. Will start some oral sodium bicarbonate       Type 2 diabetes mellitus without complication  Accuchecks look good  Patient is on levemir 8 units and sliding scale  He had some issues with hypoglycemia yesterday  He is currently NPO so we started some fluids with dextrose  Hold levemir                 Jack Johnson, DO  Pulmonology  [facility] [place] ICU

## 2022-07-10 NOTE — ASSESSMENT & PLAN NOTE
- Patient with bleeding duodenal ulcer on EGD 7/2, ulcer clipped and an EGD on 07/05 where bleeding duodenal vessel was clipped  - patient has had a total of 6 units this admission  - GI following, assistance appreciated  -Pt continues to have large amount of bloody bowel movement with clots. H & H 6.3/18.7 (7/7), platelet 20779 and during 1 unit blood transfusion, pt became hypotensive. Given 1 liter bolus and transferred to ICU  And consulted , Dr. Rees. Appreciate assistance (7/7)    7/8- Hgb is 6.9 this am. Currently hemodynamically stable. Repeat EGD yesterday.   7/9- Hgb is 6.1 today. We are going to transfuse 1 unit----no further bleeding  7/10- transfusing again today. No hematochezia but still with some melena

## 2022-07-10 NOTE — ASSESSMENT & PLAN NOTE
Accuchecks look good  Patient is on levemir 8 units and sliding scale  He had some issues with hypoglycemia yesterday  He is currently NPO so we started some fluids with dextrose  Hold levemir

## 2022-07-10 NOTE — PROGRESS NOTES
Surgery    Severe anemia    Tachycardia paragraphs states had a bowel movement    Continue NG tube, transfuse 2 units packed red blood cells, get out of bed

## 2022-07-10 NOTE — ASSESSMENT & PLAN NOTE
- EGD with bleeding duodenal ulcer as above, was clipped   - Repeat EGD 07/05, bleeding duodenal vessel was clipped per GI.  - H. Pylori negative  - Protonix infusion  - GI following, appreciate assistance   -Pt continues to bleed with blood clots from bowel and H &H  Continues to decline. Pt became hypotensive, and transferred to ICU for further monitoring and eval. General surgery, Dr. Rees consulted for eval.   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 [facility]   7/8- repeat EGD yesterday. Eliel tay this am.   7/9 no further bleeding. Hgb now 6.1 today. Will continue to monitor    Spoke with Dr. Ziegler  And for now would like to keep patient NPO and keep NG tube in place

## 2022-07-10 NOTE — ASSESSMENT & PLAN NOTE
- BUN/Crt 87/5.67  - Nephrology consulted  - last note reviewed from 7/4 7/9- BUN is 101 and creatinine is 6.29  About 900 of UOP in last 24 hours  7/10 no significant change in creatinine. He does have good UOP. He has metabolic acidosis that is felt to be related to his kidney function. Will start some oral sodium bicarbonate

## 2022-07-10 NOTE — SUBJECTIVE & OBJECTIVE
Interval History: No acute events overnight. The patient is currently resting comfortably. WE transfused a unit or PRBC's yesterday and he will be transfused again today. No hematochezia but he did have some melena.    Objective:     Vital Signs (Most Recent):  Temp: 99.2 °F (37.3 °C) (07/10/22 1015)  Pulse: 110 (07/10/22 1030)  Resp: 16 (07/10/22 1038)  BP: (!) 123/59 (07/10/22 1030)  SpO2: 100 % (07/10/22 1030)   Vital Signs (24h Range):  Temp:  [98 °F (36.7 °C)-99.4 °F (37.4 °C)] 99.2 °F (37.3 °C)  Pulse:  [] 110  Resp:  [10-36] 16  SpO2:  [90 %-100 %] 100 %  BP: (103-135)/(41-77) 123/59     Weight: 98.7 kg (217 lb 9.5 oz)  Body mass index is 29.51 kg/m².      Intake/Output Summary (Last 24 hours) at 7/10/2022 1040  Last data filed at 7/10/2022 0915  Gross per 24 hour   Intake 3435.42 ml   Output 2605 ml   Net 830.42 ml         Physical Exam  Vitals reviewed.   Constitutional:       General: He is not in acute distress.     Appearance: Normal appearance. He is not ill-appearing.      Interventions: He is not intubated.  HENT:      Head: Normocephalic and atraumatic.      Right Ear: External ear normal.      Left Ear: External ear normal.      Nose: Nose normal.      Mouth/Throat:      Mouth: Mucous membranes are dry.   Eyes:      Extraocular Movements: Extraocular movements intact.      Conjunctiva/sclera: Conjunctivae normal.      Pupils: Pupils are equal, round, and reactive to light.   Cardiovascular:      Rate and Rhythm: Normal rate and regular rhythm.      Heart sounds: Normal heart sounds. No murmur heard.  Pulmonary:      Effort: No respiratory distress. He is not intubated.      Breath sounds: Normal breath sounds. No wheezing or rales.   Abdominal:      General: There is no distension.      Palpations: Abdomen is soft.      Tenderness: There is abdominal tenderness.   Musculoskeletal:         General: Normal range of motion.      Cervical back: Normal range of motion and neck supple.      Right  lower leg: No edema.      Left lower leg: No edema.   Skin:     General: Skin is warm and dry.      Capillary Refill: Capillary refill takes less than 2 seconds.   Neurological:      General: No focal deficit present.      Mental Status: He is alert and oriented to person, place, and time. Mental status is at baseline.      Cranial Nerves: No cranial nerve deficit.       Vents:  Oxygen Concentration (%): 28 (07/10/22 0724)    Lines/Drains/Airways       Drain  Duration                  Closed/Suction Drain 07/08/22 1439 Abdomen Bulb 1 day         Closed/Suction Drain 07/08/22 1439 Superior;Midline Abdomen Bulb 10 Fr. 1 day         NG/OG Tube 07/08/22 1412 Crenshaw sump 18 Fr. Right nostril 1 day              Peripheral Intravenous Line  Duration                  Peripheral IV - Single Lumen 07/06/22 2350 20 G Left;Posterior Hand 3 days         Peripheral IV - Single Lumen 07/07/22 1118 18 G Anterior;Right Upper Arm 2 days                    Significant Labs:    CBC/Anemia Profile:  Recent Labs   Lab 07/08/22  1924 07/09/22  0554 07/09/22  1848 07/10/22  0156   WBC 14.08* 11.23*  --  9.09   HGB 7.5* 6.1* 6.8* 6.4*   HCT 22.7* 17.6* 20.9* 19.6*   PLT 84* 72*  --  79*   MCV 95.4 91.2  --  95.6   RDW 15.2* 15.5*  --  15.6*          Chemistries:  Recent Labs   Lab 07/09/22  0554 07/09/22  1848 07/10/22  0156    142 141   K 5.9* 5.8* 5.6*   * 122* 122*   CO2 12* 13* 12*   * 86* 82*   CREATININE 6.29* 6.30* 6.35*   CALCIUM 7.7* 7.9* 8.0*   ALBUMIN 1.3*  --   --    PROT 3.5*  --   --    BILITOT 0.2  --   --    ALKPHOS 32*  --   --    ALT 72*  --   --    AST 54*  --   --    MG 1.4*  --   --    PHOS 5.8*  --   --          All pertinent labs within the past 24 hours have been reviewed.    Significant Imaging:  I have reviewed all pertinent imaging results/findings within the past 24 hours.

## 2022-07-11 LAB
ANION GAP SERPL CALCULATED.3IONS-SCNC: 15 MMOL/L (ref 7–16)
BASOPHILS # BLD AUTO: 0.01 K/UL (ref 0–0.2)
BASOPHILS NFR BLD AUTO: 0.1 % (ref 0–1)
BUN SERPL-MCNC: 75 MG/DL (ref 7–18)
BUN/CREAT SERPL: 12 (ref 6–20)
CALCIUM SERPL-MCNC: 8 MG/DL (ref 8.5–10.1)
CHLORIDE SERPL-SCNC: 121 MMOL/L (ref 98–107)
CHLORIDE UR-SCNC: 53 MMOL/L
CO2 SERPL-SCNC: 13 MMOL/L (ref 21–32)
CREAT SERPL-MCNC: 6.23 MG/DL (ref 0.7–1.3)
DIFFERENTIAL METHOD BLD: ABNORMAL
EOSINOPHIL # BLD AUTO: 0.19 K/UL (ref 0–0.5)
EOSINOPHIL NFR BLD AUTO: 2.6 % (ref 1–4)
ERYTHROCYTE [DISTWIDTH] IN BLOOD BY AUTOMATED COUNT: 15.5 % (ref 11.5–14.5)
ESTROGEN SERPL-MCNC: NORMAL PG/ML
GLUCOSE SERPL-MCNC: 112 MG/DL (ref 70–105)
GLUCOSE SERPL-MCNC: 69 MG/DL (ref 74–106)
GLUCOSE SERPL-MCNC: 90 MG/DL (ref 70–105)
GLUCOSE SERPL-MCNC: 92 MG/DL (ref 70–105)
GLUCOSE SERPL-MCNC: 96 MG/DL (ref 70–105)
HCT VFR BLD AUTO: 24.1 % (ref 40–54)
HGB BLD-MCNC: 7.8 G/DL (ref 13.5–18)
IMM GRANULOCYTES # BLD AUTO: 0.03 K/UL (ref 0–0.04)
IMM GRANULOCYTES NFR BLD: 0.4 % (ref 0–0.4)
INSULIN SERPL-ACNC: NORMAL U[IU]/ML
LAB AP GROSS DESCRIPTION: NORMAL
LAB AP LABORATORY NOTES: NORMAL
LYMPHOCYTES # BLD AUTO: 0.42 K/UL (ref 1–4.8)
LYMPHOCYTES NFR BLD AUTO: 5.8 % (ref 27–41)
MCH RBC QN AUTO: 31.5 PG (ref 27–31)
MCHC RBC AUTO-ENTMCNC: 32.4 G/DL (ref 32–36)
MCV RBC AUTO: 97.2 FL (ref 80–96)
MONOCYTES # BLD AUTO: 0.49 K/UL (ref 0–0.8)
MONOCYTES NFR BLD AUTO: 6.8 % (ref 2–6)
MPC BLD CALC-MCNC: 9.2 FL (ref 9.4–12.4)
NEUTROPHILS # BLD AUTO: 6.04 K/UL (ref 1.8–7.7)
NEUTROPHILS NFR BLD AUTO: 84.3 % (ref 53–65)
NRBC # BLD AUTO: 0.04 X10E3/UL
NRBC, AUTO (.00): 0.6 %
PLATELET # BLD AUTO: 96 K/UL (ref 150–400)
POTASSIUM SERPL-SCNC: 5.5 MMOL/L (ref 3.5–5.1)
POTASSIUM UR-SCNC: 10 MMOL/L (ref 25–125)
RBC # BLD AUTO: 2.48 M/UL (ref 4.6–6.2)
SODIUM SERPL-SCNC: 143 MMOL/L (ref 136–145)
SODIUM UR-SCNC: 57 MMOL/L (ref 40–220)
T3RU NFR SERPL: NORMAL %
WBC # BLD AUTO: 7.18 K/UL (ref 4.5–11)

## 2022-07-11 PROCEDURE — 84300 ASSAY OF URINE SODIUM: CPT | Performed by: INTERNAL MEDICINE

## 2022-07-11 PROCEDURE — 99233 PR SUBSEQUENT HOSPITAL CARE,LEVL III: ICD-10-PCS | Mod: ,,, | Performed by: INTERNAL MEDICINE

## 2022-07-11 PROCEDURE — 97110 THERAPEUTIC EXERCISES: CPT

## 2022-07-11 PROCEDURE — 25000003 PHARM REV CODE 250: Performed by: FAMILY MEDICINE

## 2022-07-11 PROCEDURE — 82962 GLUCOSE BLOOD TEST: CPT

## 2022-07-11 PROCEDURE — 84133 ASSAY OF URINE POTASSIUM: CPT | Performed by: INTERNAL MEDICINE

## 2022-07-11 PROCEDURE — 25000003 PHARM REV CODE 250: Performed by: SURGERY

## 2022-07-11 PROCEDURE — 94761 N-INVAS EAR/PLS OXIMETRY MLT: CPT

## 2022-07-11 PROCEDURE — 97530 THERAPEUTIC ACTIVITIES: CPT

## 2022-07-11 PROCEDURE — 85025 COMPLETE CBC W/AUTO DIFF WBC: CPT | Performed by: INTERNAL MEDICINE

## 2022-07-11 PROCEDURE — 27000221 HC OXYGEN, UP TO 24 HOURS

## 2022-07-11 PROCEDURE — 99233 SBSQ HOSP IP/OBS HIGH 50: CPT | Mod: ,,, | Performed by: INTERNAL MEDICINE

## 2022-07-11 PROCEDURE — 25000003 PHARM REV CODE 250: Performed by: INTERNAL MEDICINE

## 2022-07-11 PROCEDURE — 80048 BASIC METABOLIC PNL TOTAL CA: CPT | Performed by: INTERNAL MEDICINE

## 2022-07-11 PROCEDURE — 36415 COLL VENOUS BLD VENIPUNCTURE: CPT | Performed by: INTERNAL MEDICINE

## 2022-07-11 PROCEDURE — 94668 MNPJ CHEST WALL SBSQ: CPT

## 2022-07-11 PROCEDURE — 94664 DEMO&/EVAL PT USE INHALER: CPT

## 2022-07-11 PROCEDURE — 20000000 HC ICU ROOM

## 2022-07-11 PROCEDURE — 63600175 PHARM REV CODE 636 W HCPCS: Performed by: SURGERY

## 2022-07-11 PROCEDURE — S5010 5% DEXTROSE AND 0.45% SALINE: HCPCS | Performed by: INTERNAL MEDICINE

## 2022-07-11 PROCEDURE — 25000003 PHARM REV CODE 250

## 2022-07-11 PROCEDURE — C9113 INJ PANTOPRAZOLE SODIUM, VIA: HCPCS | Performed by: SURGERY

## 2022-07-11 PROCEDURE — 99900035 HC TECH TIME PER 15 MIN (STAT)

## 2022-07-11 RX ORDER — CALCIUM GLUCONATE 20 MG/ML
1 INJECTION, SOLUTION INTRAVENOUS ONCE
Status: COMPLETED | OUTPATIENT
Start: 2022-07-11 | End: 2022-07-11

## 2022-07-11 RX ORDER — DEXTROSE MONOHYDRATE 50 MG/ML
INJECTION, SOLUTION INTRAVENOUS CONTINUOUS
Status: DISCONTINUED | OUTPATIENT
Start: 2022-07-11 | End: 2022-07-20 | Stop reason: HOSPADM

## 2022-07-11 RX ADMIN — ALLOPURINOL 50 MG: 300 TABLET ORAL at 09:07

## 2022-07-11 RX ADMIN — DEXTROSE MONOHYDRATE: 50 INJECTION, SOLUTION INTRAVENOUS at 11:07

## 2022-07-11 RX ADMIN — SODIUM BICARBONATE 650 MG: 650 TABLET ORAL at 09:07

## 2022-07-11 RX ADMIN — DEXTROSE MONOHYDRATE: 50 INJECTION, SOLUTION INTRAVENOUS at 10:07

## 2022-07-11 RX ADMIN — ATORVASTATIN CALCIUM 40 MG: 40 TABLET, FILM COATED ORAL at 09:07

## 2022-07-11 RX ADMIN — SODIUM BICARBONATE 650 MG: 650 TABLET ORAL at 08:07

## 2022-07-11 RX ADMIN — HYDRALAZINE HYDROCHLORIDE 10 MG: 20 INJECTION INTRAMUSCULAR; INTRAVENOUS at 10:07

## 2022-07-11 RX ADMIN — FERROUS SULFATE TAB 325 MG (65 MG ELEMENTAL FE) 1 EACH: 325 (65 FE) TAB at 08:07

## 2022-07-11 RX ADMIN — OXYCODONE HYDROCHLORIDE AND ACETAMINOPHEN 500 MG: 500 TABLET ORAL at 08:07

## 2022-07-11 RX ADMIN — DEXTROSE AND SODIUM CHLORIDE: 5; 450 INJECTION, SOLUTION INTRAVENOUS at 02:07

## 2022-07-11 RX ADMIN — MORPHINE SULFATE 4 MG: 8 INJECTION INTRAVENOUS at 11:07

## 2022-07-11 RX ADMIN — BISACODYL 10 MG: 5 TABLET, COATED ORAL at 08:07

## 2022-07-11 RX ADMIN — TRAZODONE HYDROCHLORIDE 50 MG: 50 TABLET ORAL at 09:07

## 2022-07-11 RX ADMIN — PANTOPRAZOLE SODIUM 40 MG: 40 INJECTION, POWDER, FOR SOLUTION INTRAVENOUS at 08:07

## 2022-07-11 RX ADMIN — CALCIUM GLUCONATE 1 G: 20 INJECTION, SOLUTION INTRAVENOUS at 10:07

## 2022-07-11 RX ADMIN — MORPHINE SULFATE 4 MG: 8 INJECTION INTRAVENOUS at 10:07

## 2022-07-11 RX ADMIN — MORPHINE SULFATE 4 MG: 8 INJECTION INTRAVENOUS at 07:07

## 2022-07-11 RX ADMIN — THERA TABS 1 TABLET: TAB at 08:07

## 2022-07-11 NOTE — PROGRESS NOTES
[facility]  Nephrology  Progress Note    Patient Name: Neymar Parra  MRN: [id number]  Admission Date: 6/16/2022  Hospital Length of Stay: 25 days  Attending Provider: Jack Johnson DO   Primary Care Physician: [facility]  Principal Problem:Gastrointestinal hemorrhage associated with duodenitis    Subjective:     HPI: This patient with history of HTN, DM, CKD who has seen Dr. Crowder in the past 2 years ago is currently in a nursing facility.  The patient presented with SOB and diagnosed with COVID.  Serum creatinine has trended up to 7.15.  Serum potassium is 6.4.  Nephrology has been consulted for renal issues.  At the bedside, the patient mentions feeling fine.  He states that his breathing has improved.      Interval History: The patient is sitting up in bed resting.  His renal function is about the the same with a creatinine of 6.2.  He mentions that his breathing has  improved.    Review of patient's allergies indicates:   Allergen Reactions    Gatifloxacin Anaphylaxis     Current Facility-Administered Medications   Medication Frequency    0.9%  NaCl infusion (for blood administration) Q24H PRN    0.9%  NaCl infusion (for blood administration) Q24H PRN    acetaminophen tablet 1,000 mg Q6H PRN    acetaminophen tablet 650 mg Q8H PRN    acetaminophen tablet 650 mg Q4H PRN    albuterol inhaler 2 puff Q6H PRN    allopurinol split tablet 50 mg Daily    ascorbic acid (vitamin C) tablet 500 mg Daily    atorvastatin tablet 40 mg QHS    bisacodyL EC tablet 10 mg Daily PRN    calcium gluconate 1 g in NS IVPB (premixed) Q10 Min PRN    calcium gluconate 1 g in NS IVPB (premixed) Once    cetirizine tablet 10 mg Daily PRN    dextromethorphan-guaiFENesin  mg/5 ml liquid 10 mL Q6H PRN    dextrose 5 % infusion Continuous    dextrose 50% injection 12.5 g PRN    dextrose 50% injection 25 g PRN    ferrous sulfate tablet 1 each Daily    glucagon (human recombinant)  injection 1 mg PRN    glucose chewable tablet 16 g PRN    glucose chewable tablet 24 g PRN    hydrALAZINE injection 10 mg Q6H PRN    HYDROcodone-acetaminophen 7.5-325 mg per tablet 1 tablet Q6H PRN    insulin aspart U-100 injection 1-10 Units QID (AC + HS) PRN    insulin detemir U-100 injection 8 Units QHS    melatonin tablet 6 mg Nightly PRN    mineral oil enema 1 enema Daily PRN    morphine injection 4 mg Q4H PRN    morphine injection 6 mg Q4H PRN    multivitamin tablet Daily    naloxone 0.4 mg/mL injection 0.02 mg PRN    ondansetron injection 4 mg Q12H PRN    ondansetron injection 8 mg Q6H PRN    pantoprazole injection 40 mg Daily    simethicone chewable tablet 80 mg TID PRN    sodium bicarbonate tablet 650 mg BID    sodium chloride 0.9% flush 10 mL Q12H PRN    traMADoL tablet 50 mg Q8H PRN    traZODone tablet 50 mg Nightly PRN       Objective:     Vital Signs (Most Recent):  Temp: 97.9 °F (36.6 °C) (07/11/22 1102)  Pulse: 102 (07/11/22 1102)  Resp: (!) 28 (07/11/22 1102)  BP: 136/61 (07/11/22 1102)  SpO2: 99 % (07/11/22 1102)  O2 Device (Oxygen Therapy): nasal cannula (07/11/22 0700)   Vital Signs (24h Range):  Temp:  [97.9 °F (36.6 °C)-99 °F (37.2 °C)] 97.9 °F (36.6 °C)  Pulse:  [] 102  Resp:  [10-36] 28  SpO2:  [85 %-100 %] 99 %  BP: ()/() 136/61     Weight: 98.8 kg (217 lb 13 oz) (07/11/22 0230)  Body mass index is 29.54 kg/m².  Body surface area is 2.24 meters squared.    I/O last 3 completed shifts:  In: 3277.8 [I.V.:2652.1; Blood:625.8]  Out: 2635 [Urine:2175; Drains:460]    Physical Exam  Vitals reviewed.   HENT:      Head: Normocephalic and atraumatic.   Cardiovascular:      Rate and Rhythm: Regular rhythm.      Pulses: Normal pulses.   Pulmonary:      Effort: Pulmonary effort is normal.   Abdominal:      Palpations: Abdomen is soft.   Neurological:      Mental Status: He is alert.   Psychiatric:         Mood and Affect: Mood normal.       Significant Labs:  BMP:    Recent Labs   Lab 07/09/22  0554 07/09/22  1848 07/11/22  0240   GLU 78   < > 69*      < > 143   K 5.9*   < > 5.5*   *   < > 121*   CO2 12*   < > 13*   *   < > 75*   CREATININE 6.29*   < > 6.23*   CALCIUM 7.7*   < > 8.0*   MG 1.4*  --   --     < > = values in this interval not displayed.     CMP:   Recent Labs   Lab 07/10/22  2139 07/11/22  0240    69*   CALCIUM 7.9* 8.0*   ALBUMIN 1.4*  --    PROT 4.0*  --     143   K 5.5* 5.5*   CO2 13* 13*   * 121*   BUN 80* 75*   CREATININE 6.38* 6.23*   ALKPHOS 47  --    ALT 30  --    AST 25  --    BILITOT 0.5  --         Significant Imaging:  Labs: Reviewed    Assessment/Plan:     Acute on chronic renal failure  Creatinine improved to 5.8  7/11/2022 Serum creatinine is 6.2 which has been stable.      CKD V no uremic symptoms  Anemia  Thank you for your consult. I will follow-up with patient. Please contact us if you have any additional questions.    Jorge Butts Jr, MD  Nephrology  [facility], [place]

## 2022-07-11 NOTE — ASSESSMENT & PLAN NOTE
- Patient with bleeding duodenal ulcer on EGD 7/2, ulcer clipped and an EGD on 07/05 where bleeding duodenal vessel was clipped  -  OR on 7/8 with Dr. Rees

## 2022-07-11 NOTE — ASSESSMENT & PLAN NOTE
- on levemir 8 units and sliding scale  - hypoglycemic this am, D5W for IVF  - discuss advance to clear diet with surgery

## 2022-07-11 NOTE — ASSESSMENT & PLAN NOTE
07/07 HCT 19  transfuse 1 more unit of prbc and platelets for low platelet of 57, surgery to follow closely GI to rescope today  Has not had CTA due to renal failure     07/08 repeat EGD treated bleeding again duodenum with injection of epi and clips, hct 21 transfuse 1 more unit PRBC , if further bleeding, consider IR or surgery     07/11/2022 ex lap with oversew bleeding duodenal ulcer POD 3 hct improved with post op prbc transfusion 24, ngt low output, dc when ok with claudette jimenez to transfer out of icu from surgery standpoint

## 2022-07-11 NOTE — PT/OT/SLP PROGRESS
Occupational Therapy   Treatment    Name: Neymar Parra  MRN: [id number]  Admitting Diagnosis:  Gastrointestinal hemorrhage associated with duodenitis  3 Days Post-Op    Recommendations:     Discharge Recommendations: nursing facility, skilled, intermediate care facility/nursing home  Discharge Equipment Recommendations:   (to be determined)  Barriers to discharge:  None    Assessment:     Neymar Parra is a 72 y.o. male with a medical diagnosis of Gastrointestinal hemorrhage associated with duodenitis.  He presents with complaint of abdomen and back pain. Performance deficits affecting function are weakness, impaired endurance, impaired self care skills, impaired functional mobilty, gait instability, impaired balance, decreased upper extremity function, decreased lower extremity function, pain.     Rehab Prognosis:  Good; patient would benefit from acute skilled OT services to address these deficits and reach maximum level of function.       Plan:     Patient to be seen 5 x/week to address the above listed problems via self-care/home management, therapeutic activities, therapeutic exercises  · Plan of Care Expires: 07/29/22  · Plan of Care Reviewed with: patient    Subjective     Pain/Comfort:  · Pain Rating 1: 10/10  · Location 1: abdomen  · Pain Addressed 1: Reposition, Distraction, Nurse notified  · Pain Rating Post-Intervention 1: 10/10  · Pain Rating 2: 10/10  · Location 2: back  · Pain Addressed 2: Reposition, Distraction, Nurse notified  · Pain Rating Post-Intervention 2: 10/10    Objective:     Communicated with: NIKKI Robledo prior to session.  Patient found HOB elevated with blood pressure cuff, XIMENA drain, peripheral IV, telemetry, SCD, pulse ox (continuous), oxygen upon OT entry to room.    General Precautions: Standard, fall   Orthopedic Precautions:N/A   Braces: N/A  Respiratory Status: Nasal cannula, flow 3 L/min     Occupational Performance:     Bed Mobility:    · Patient completed Rolling/Turning to  Left with  moderate assistance  · Patient completed Supine to Sit with moderate assistance     Functional Mobility/Transfers:  · Patient completed Sit <> Stand Transfer with minimum assistance and of x 2 persons  with  rolling walker and gait belt for assistance   · Patient completed Bed <> Chair Transfer using Step Transfer technique with minimum assistance and of x 2 persons with rolling walker and gait belt for assistance  · Functional Mobility: min a x 2    Activities of Daily Living:  · Upper Body Dressing: maximal assistance donning gown as a robe  · Lower Body Dressing: dependence .      AMPAC 6 Click ADL: 12    Treatment & Education:  Pt performed AAROM exercises x 2 sets of 15 reps all planes.(B) shoulder flexion/extension and adduction/abduction and (B) elbow, wrist and finger flexion/extension.  Pt participated well with tx,    Patient left up in chair with all lines intact, call button in reach and nurse notifiedEducation:      GOALS:   Multidisciplinary Problems     Occupational Therapy Goals        Problem: Occupational Therapy    Goal Priority Disciplines Outcome Interventions   Occupational Therapy Goal     OT, PT/OT Ongoing, Progressing    Description: STG:  Pt will perform grooming with setup  Pt will bathe with Alanna with setup at EOB   Pt will perform UE dressing with Danita  Pt will perform LE dressing with Alanna  Pt will sit EOB x 10 min with SBA   Pt will transfer bed/chair/bsc with CGA with RW  Pt will perform standing task x 3 min with CGA with RW  Pt will tolerate 15 minutes of tx without fatigue      LT.Restore to max I with self care and mobility.                       Time Tracking:     OT Date of Treatment: 22  OT Start Time: 1335  OT Stop Time: 1404  OT Total Time (min): 29 min    Billable Minutes:Therapeutic Activity 15  Therapeutic Exercise 10    OT/MARYA: OT          2022

## 2022-07-11 NOTE — PT/OT/SLP PROGRESS
Physical Therapy Treatment    Patient Name:  Neymar Parra   MRN:  [id number]    Recommendations:     Discharge Recommendations:  rehabilitation facility, nursing facility, skilled   Discharge Equipment Recommendations: none   Barriers to discharge: Decreased caregiver support    Assessment:     Neymar Parra is a 72 y.o. male admitted with a medical diagnosis of Gastrointestinal hemorrhage associated with duodenitis.  He presents with the following impairments/functional limitations:  weakness, gait instability, pain, impaired self care skills, impaired balance Patient with pain limiting mobility.    Rehab Prognosis: Good; patient would benefit from acute skilled PT services to address these deficits and reach maximum level of function.    Recent Surgery: Procedure(s) (LRB):  LAPAROTOMY, EXPLORATORY (N/A)  VAGOTOMY (N/A)  PYLOROPLASTY (N/A) 3 Days Post-Op    Plan:     During this hospitalization, patient to be seen 5 x/week to address the identified rehab impairments via gait training, therapeutic activities, therapeutic exercises and progress toward the following goals:    · Plan of Care Expires:  08/01/22    Subjective     Chief Complaint: Abdominal pain  Patient/Family Comments/goals: Awaiting dc placement  Pain/Comfort:  · Pain Rating 1: 5/10  · Location 1: abdomen  · Pain Addressed 1: Cessation of Activity      Objective:     Communicated with nurse prior to session.  Patient found up in chair with blood pressure cuff, peripheral IV, telemetry, SCD, pulse ox (continuous) upon PT entry to room.     General Precautions: Standard, fall   Orthopedic Precautions:N/A   Braces:    Respiratory Status: Nasal cannula, flow 2  L/min     Functional Mobility:  · Bed Mobility:     · Supine to Sit: minimum assistance  · Sit to Supine: minimum assistance  · Transfers:     · Sit to Stand:  moderate assistance with no AD      AM-PAC 6 CLICK MOBILITY  Turning over in bed (including adjusting bedclothes, sheets and blankets)?:  2  Sitting down on and standing up from a chair with arms (e.g., wheelchair, bedside commode, etc.): 2  Moving from lying on back to sitting on the side of the bed?: 2  Moving to and from a bed to a chair (including a wheelchair)?: 2  Need to walk in hospital room?: 2  Climbing 3-5 steps with a railing?: 1  Basic Mobility Total Score: 11       Therapeutic Activities and Exercises:   na    Patient left supine with call button in reach..    GOALS:   Multidisciplinary Problems     Physical Therapy Goals        Problem: Physical Therapy    Goal Priority Disciplines Outcome Goal Variances Interventions   Physical Therapy Goal     PT, PT/OT Ongoing, Progressing     Description: Short Term Goals to be met by: 2022    Patient will increase functional independence with mobility by performin. Supine to sit with independently  2. Sit to stand transfer with independently Rolling walker  3. Bed to chair transfer with independently using Rolling walker  4. Gait  x 100 feet with independently using Rolling walker  5. Lower extremity exercise program x30 reps per handout, with assistance as needed    Long Term Goals to be met by: 2022    Pt will regain full independent functional mobility with Rolling walker to return to home situation and prior activities of daily living.                    Time Tracking:     PT Received On: 22  PT Start Time: 1745     PT Stop Time: 1800  PT Total Time (min): 15 min     Billable Minutes: Therapeutic Activity 15    Treatment Type: Treatment  PT/PTA: PT     PTA Visit Number: 0     2022

## 2022-07-11 NOTE — PROGRESS NOTES
[facility]  Adult Nutrition  Consult Note         Reason for Assessment  Reason For Assessment: RD follow-up   Nutrition Risk Screen: no indicators present     Assessment and Plan  Pt seen for nutrition follow up today. Pt has been NPO/CLD x 5 days due to continued GI bleeding. Pt now POD 4 from exploratory laparotomy, vagotomy, pyloroplasty, oversewing of ulcer due to GI hemorrhage with associated duodenitis.     Due to CLD/NPO status for prolonged period with sporadic diet advancements with NPO statuses, recommend pursuing alternate means of nutrition support via PPN until pt can have diet advanced more permanently.     Skin Integrity  Siddharth Risk Assessment  Sensory Perception: 3-->slightly limited  Moisture: 4-->rarely moist  Activity: 2-->chairfast  Mobility: 2-->very limited  Nutrition: 2-->probably inadequate  Friction and Shear: 2-->potential problem  Siddharth Score: 15    Nutrition Diagnosis  Inadequate energy intake   related to Compromised organ/organs related to G.I. function as evidenced by pt requiring NPO status for surgery/due to GI bleed     Nutrition Diagnosis Status: Chronic/ continues        Nutrition Risk  Level of Risk/Frequency of Follow-up: high  Comments on nutrition risk: high risk r/t NPO/CLD x 5 days    Recent Labs   Lab 07/11/22  0240 07/11/22  0517 07/11/22  1147   GLU 69*  --   --    POCGLU  --    < > 96    < > = values in this interval not displayed.     Comments on Glucose: GLU WNL at this time     Nutrition Prescription / Recommendations  Recommendation/Intervention: Recommend advancing diet order as able and appropriate to Renal diet. If unable to advance diet, RD recommends initiating PPN.  Goals: Advance diet order or initate PPN, weight maintenance  Nutrition Goal Status: new  Current Diet Order: NPO  Chewing or Swallowing Difficulty?: No Chewing or swallowing difficulty  Recommended Diet: Renal (Low Protein)  Recommended Oral Supplement: Suplena [420 kcals, 11g  Protein, 46g Carbs(6g Fiber, 15g Sugar), 23g Fat] twice daily  Is Nutrition Support Recommended: Yes   Parenteral Nutrition if pt remains NPO x 24 hrs recommend pursuing TPN or PPN depending on how long pt expected to remain NPO.       Is Education Recommended: No  Monitor and Evaluation  % current Intake: NPO  % intake to meet estimated needs: 75 - 100 %  Food and Nutrient Intake: energy intake (Pt remains NPO, monitor for possible advancement to CLD today)  Anthropometric Measurements: weight, weight change (weight stable at this time, but risk for weight instability present)  Biochemical Data, Medical Tests and Procedures: electrolyte and renal panel, gastrointestinal profile, glucose/endocrine profile, inflammatory profile, lipid profile     Current Medical Diagnosis and Past Medical History  Diagnosis: other (see comments) (DEO)  Past Medical History:   Diagnosis Date    Anticoagulant long-term use     Chronic renal disease, stage 4, severely decreased glomerular filtration rate (GFR) between 15-29 mL/min/1.73 square meter     Coronary artery disease     stents ~ 2017    COVID-19 6/16/2022    Dieulafoy lesion of duodenum 7/5/2022    Duodenal adenoma     Duodenitis 7/5/2022    HH (hiatus hernia) 7/5/2022    Hypertension      Nutrition/Diet History  Spiritual, Cultural Beliefs, Spiritism Practices, Values that Affect Care: no  Lab/Procedures/Meds  Recent Labs   Lab 07/09/22  0554 07/09/22  1848 07/10/22  2139 07/11/22  0240      < > 140 143   K 5.9*   < > 5.5* 5.5*   *   < > 80* 75*   CREATININE 6.29*   < > 6.38* 6.23*   CALCIUM 7.7*   < > 7.9* 8.0*   ALBUMIN 1.3*   < > 1.4*  --    *   < > 120* 121*   ALT 72*   < > 30  --    AST 54*   < > 25  --    PHOS 5.8*  --   --   --     < > = values in this interval not displayed.     Last A1c:   Lab Results   Component Value Date    HGBA1C 4.7 06/06/2022     Lab Results   Component Value Date    RBC 2.48 (L) 07/11/2022    HGB 7.8 (L) 07/11/2022     HCT 24.1 (L) 07/11/2022    MCV 97.2 (H) 07/11/2022    MCH 31.5 (H) 07/11/2022    MCHC 32.4 07/11/2022    TIBC 172 (L) 06/16/2022     Pertinent Labs Reviewed: reviewed  Pertinent Labs Comments: k 5.5(H)-MD notes chronically high due to transfusions, Cl 121(H), BUN 75(H), Creat 6.23(H)-CKD V per nephrology; Ca 8.0(L)  Pertinent Medications Reviewed: reviewed  Pertinent Medications Comments: allopurinol, vitamin C, atorvastatin, calcium gluconate, ferrous sulfate, insulin, MVI, pantoprazole, sodium bicarbonate, D5     Anthropometrics  Temp: 97.8 °F (36.6 °C)  Height Method: Stated  Height: 6' (182.9 cm)  Height (inches): 72 in  Weight Method: Bed Scale  Weight: 98.8 kg (217 lb 13 oz)  Weight (lb): 217.82 lb  Ideal Body Weight (IBW), Male: 178 lb  % Ideal Body Weight, Male (lb): 147.63 %  BMI (Calculated): 29.5     Estimated/Assessed Needs  Weight Used For Calorie Calculations: 90 kg (198 lb 6.6 oz)     Energy Calorie Requirements (kcal): 9102-3338 kcals/day (25-30 kcals/kg Adj BW)  Weight Used For Protein Calculations: 90 kg (198 lb 6.6 oz)  Protein Requirements: 72 g/day (0.8 g/kg adj BW)    Fluid Requirements (mL): 5608-6869 mL/day  RDA Method (mL): 2250     Nutrition by Nursing  Diet/Nutrition Received: NPO  Intake (%): 100%     Diet/Feeding Tolerance: poor, other (see comments)  Last Bowel Movement: 07/10/22      Nutrition Follow-Up  RD Follow-up?: Yes

## 2022-07-11 NOTE — SUBJECTIVE & OBJECTIVE
Interval History: The patient is sitting up in bed resting.  His renal function is about the the same with a creatinine of 6.2.  He mentions that his breathing has  improved.    Review of patient's allergies indicates:   Allergen Reactions    Gatifloxacin Anaphylaxis     Current Facility-Administered Medications   Medication Frequency    0.9%  NaCl infusion (for blood administration) Q24H PRN    0.9%  NaCl infusion (for blood administration) Q24H PRN    acetaminophen tablet 1,000 mg Q6H PRN    acetaminophen tablet 650 mg Q8H PRN    acetaminophen tablet 650 mg Q4H PRN    albuterol inhaler 2 puff Q6H PRN    allopurinol split tablet 50 mg Daily    ascorbic acid (vitamin C) tablet 500 mg Daily    atorvastatin tablet 40 mg QHS    bisacodyL EC tablet 10 mg Daily PRN    calcium gluconate 1 g in NS IVPB (premixed) Q10 Min PRN    calcium gluconate 1 g in NS IVPB (premixed) Once    cetirizine tablet 10 mg Daily PRN    dextromethorphan-guaiFENesin  mg/5 ml liquid 10 mL Q6H PRN    dextrose 5 % infusion Continuous    dextrose 50% injection 12.5 g PRN    dextrose 50% injection 25 g PRN    ferrous sulfate tablet 1 each Daily    glucagon (human recombinant) injection 1 mg PRN    glucose chewable tablet 16 g PRN    glucose chewable tablet 24 g PRN    hydrALAZINE injection 10 mg Q6H PRN    HYDROcodone-acetaminophen 7.5-325 mg per tablet 1 tablet Q6H PRN    insulin aspart U-100 injection 1-10 Units QID (AC + HS) PRN    insulin detemir U-100 injection 8 Units QHS    melatonin tablet 6 mg Nightly PRN    mineral oil enema 1 enema Daily PRN    morphine injection 4 mg Q4H PRN    morphine injection 6 mg Q4H PRN    multivitamin tablet Daily    naloxone 0.4 mg/mL injection 0.02 mg PRN    ondansetron injection 4 mg Q12H PRN    ondansetron injection 8 mg Q6H PRN    pantoprazole injection 40 mg Daily    simethicone chewable tablet 80 mg TID PRN    sodium bicarbonate tablet 650 mg BID    sodium chloride 0.9% flush 10 mL Q12H PRN    traMADoL  tablet 50 mg Q8H PRN    traZODone tablet 50 mg Nightly PRN       Objective:     Vital Signs (Most Recent):  Temp: 97.9 °F (36.6 °C) (07/11/22 1102)  Pulse: 102 (07/11/22 1102)  Resp: (!) 28 (07/11/22 1102)  BP: 136/61 (07/11/22 1102)  SpO2: 99 % (07/11/22 1102)  O2 Device (Oxygen Therapy): nasal cannula (07/11/22 0700)   Vital Signs (24h Range):  Temp:  [97.9 °F (36.6 °C)-99 °F (37.2 °C)] 97.9 °F (36.6 °C)  Pulse:  [] 102  Resp:  [10-36] 28  SpO2:  [85 %-100 %] 99 %  BP: ()/() 136/61     Weight: 98.8 kg (217 lb 13 oz) (07/11/22 0230)  Body mass index is 29.54 kg/m².  Body surface area is 2.24 meters squared.    I/O last 3 completed shifts:  In: 3277.8 [I.V.:2652.1; Blood:625.8]  Out: 2635 [Urine:2175; Drains:460]    Physical Exam  Vitals reviewed.   HENT:      Head: Normocephalic and atraumatic.   Cardiovascular:      Rate and Rhythm: Regular rhythm.      Pulses: Normal pulses.   Pulmonary:      Effort: Pulmonary effort is normal.   Abdominal:      Palpations: Abdomen is soft.   Neurological:      Mental Status: He is alert.   Psychiatric:         Mood and Affect: Mood normal.       Significant Labs:  BMP:   Recent Labs   Lab 07/09/22  0554 07/09/22  1848 07/11/22  0240   GLU 78   < > 69*      < > 143   K 5.9*   < > 5.5*   *   < > 121*   CO2 12*   < > 13*   *   < > 75*   CREATININE 6.29*   < > 6.23*   CALCIUM 7.7*   < > 8.0*   MG 1.4*  --   --     < > = values in this interval not displayed.     CMP:   Recent Labs   Lab 07/10/22  2139 07/11/22  0240    69*   CALCIUM 7.9* 8.0*   ALBUMIN 1.4*  --    PROT 4.0*  --     143   K 5.5* 5.5*   CO2 13* 13*   * 121*   BUN 80* 75*   CREATININE 6.38* 6.23*   ALKPHOS 47  --    ALT 30  --    AST 25  --    BILITOT 0.5  --         Significant Imaging:  Labs: Reviewed

## 2022-07-11 NOTE — PROGRESS NOTES
[facility]  Pulmonology  Progress Note    Patient Name: Neymar Parra  MRN: [id number]  Admission Date: 6/16/2022  Hospital Length of Stay: 25 days  Code Status: Full Code  Attending Provider: Jack Johnson DO  Primary Care Provider: [facility]   Principal Problem: Gastrointestinal hemorrhage associated with duodenitis    Subjective:     Interval History: Complains that he would like NGT out and would like to eat.     Objective:     Vital Signs (Most Recent):  Temp: 98 °F (36.7 °C) (07/11/22 0705)  Pulse: 87 (07/11/22 1000)  Resp: 12 (07/11/22 1000)  BP: (!) 142/58 (07/11/22 1000)  SpO2: 100 % (07/11/22 1000)   Vital Signs (24h Range):  Temp:  [98 °F (36.7 °C)-99.2 °F (37.3 °C)] 98 °F (36.7 °C)  Pulse:  [] 87  Resp:  [10-36] 12  SpO2:  [85 %-100 %] 100 %  BP: ()/() 142/58     Weight: 98.8 kg (217 lb 13 oz)  Body mass index is 29.54 kg/m².      Intake/Output Summary (Last 24 hours) at 7/11/2022 1011  Last data filed at 7/11/2022 0500  Gross per 24 hour   Intake 1777.83 ml   Output 1120 ml   Net 657.83 ml       Physical Exam  Vitals reviewed.   Constitutional:       General: He is not in acute distress.     Appearance: Normal appearance. He is not ill-appearing.   HENT:      Head: Normocephalic and atraumatic.      Right Ear: External ear normal.      Left Ear: External ear normal.      Mouth/Throat:      Mouth: Mucous membranes are moist.      Pharynx: Oropharynx is clear.   Eyes:      Conjunctiva/sclera: Conjunctivae normal.      Pupils: Pupils are equal, round, and reactive to light.   Cardiovascular:      Rate and Rhythm: Normal rate and regular rhythm.      Heart sounds: Normal heart sounds.   Pulmonary:      Effort: Pulmonary effort is normal. No respiratory distress.      Breath sounds: Normal breath sounds. No wheezing, rhonchi or rales.   Abdominal:      General: Abdomen is flat. Bowel sounds are normal.      Palpations: Abdomen is soft.       Tenderness: There is no abdominal tenderness. There is no guarding.   Musculoskeletal:      Cervical back: Normal range of motion and neck supple.      Comments: Bilateral UE edema   Lymphadenopathy:      Cervical: No cervical adenopathy.   Skin:     General: Skin is warm and dry.   Neurological:      General: No focal deficit present.      Mental Status: He is alert. Mental status is at baseline.      Cranial Nerves: No cranial nerve deficit.   Psychiatric:         Mood and Affect: Mood normal.       Vents:  Oxygen Concentration (%): 32 (07/11/22 0700)    Lines/Drains/Airways       Drain  Duration                  Closed/Suction Drain 07/08/22 1439 Abdomen Bulb 2 days         Closed/Suction Drain 07/08/22 1439 Superior;Midline Abdomen Bulb 10 Fr. 2 days         NG/OG Tube 07/08/22 1412 [place] sump 18 Fr. Right nostril 2 days              Peripheral Intravenous Line  Duration                  Peripheral IV - Single Lumen 07/06/22 2350 20 G Left;Posterior Hand 4 days         Peripheral IV - Single Lumen 07/07/22 1118 18 G Anterior;Right Upper Arm 3 days                    Significant Labs:    CBC/Anemia Profile:  Recent Labs   Lab 07/10/22  0156 07/10/22  2033 07/11/22  0240   WBC 9.09  --  7.18   HGB 6.4* 7.2* 7.8*   HCT 19.6* 22.2* 24.1*   PLT 79*  --  96*   MCV 95.6  --  97.2*   RDW 15.6*  --  15.5*        Chemistries:  Recent Labs   Lab 07/10/22  2033 07/10/22  2139 07/11/22  0240   * 140 143   K 4.5 5.5* 5.5*   * 120* 121*   CO2 11* 13* 13*   BUN 69* 80* 75*   CREATININE 5.44* 6.38* 6.23*   CALCIUM 6.5* 7.9* 8.0*   ALBUMIN 1.2* 1.4*  --    PROT 3.5* 4.0*  --    BILITOT 0.4 0.5  --    ALKPHOS 40* 47  --    ALT 27 30  --    AST 20 25  --        All pertinent labs within the past 24 hours have been reviewed.    Significant Imaging:  I have reviewed all pertinent imaging results/findings within the past 24 hours.    Assessment/Plan:     Anemia  - Patient with bleeding duodenal ulcer on EGD 7/2, ulcer  clipped and an EGD on 07/05 where bleeding duodenal vessel was clipped  -  OR on 7/8 with Dr. Rees    Acute on chronic renal failure  - BUN/Crt 87/5.67  - Nephrology was consulted, patient has not been seen in quite a while  - last note reviewed from 7/4   - continues to have non-anion gap acidosis, check urine electrolytes  - potassium persistently elevated, likely secondary to transfusion  - change IVF to D5     Hyperkalemia  - persistently elevated  - repeat in am  - change IVF to D5W    Type 2 diabetes mellitus without complication  - on levemir 8 units and sliding scale  - hypoglycemic this am, D5W for IVF  - discuss advance to clear diet with surgery    Hypertension  - stable  - remains NPO        Transfer to floor today. Discussed NGT removal and advancing diet with Dr Rees, will make decision after he evaluates patient today.          Silvano Fitzgerald MD  Pulmonology  [facility]

## 2022-07-11 NOTE — PROGRESS NOTES
[facility] ICU  General Surgery  Progress Note    Subjective:     History of Present Illness:  71 y/o nursing home resident with CAD with stents on plavix  Admitted several days ago with melena with multiple units of PRBCs  Three EGD this admission per dr felix with treatment of dieulafoy's lesion with injection of epi and clip   Most recent EGD 2 days ago with clipping of active bleeding of duodenitis visible vessel   [facility]      Post-Op Info:  Procedure(s) (LRB):  LAPAROTOMY, EXPLORATORY (N/A)  VAGOTOMY (N/A)  PYLOROPLASTY (N/A)   3 Days Post-Op     No new subjective & objective note has been filed under this hospital service since the last note was generated.    Assessment/Plan: travis dressing midline with seal     * Gastrointestinal hemorrhage associated with duodenitis  07/07 HCT 19  transfuse 1 more unit of prbc and platelets for low platelet of 57, surgery to follow closely GI to rescope today  Has not had CTA due to renal failure     07/08 repeat EGD treated bleeding again duodenum with injection of epi and clips, hct 21 transfuse 1 more unit PRBC , if further bleeding, consider IR or surgery     07/11/2022 ex lap with oversew bleeding duodenal ulcer POD 3 hct improved with post op prbc transfusion 24, ngt low output, dc when ok with claudette jimenez to transfer out of icu from surgery standpoint        Danette Rene, GTP  General Surgery  [facility] ICU

## 2022-07-11 NOTE — PLAN OF CARE
Problem: Fluid and Electrolyte Imbalance (Acute Kidney Injury/Impairment)  Goal: Fluid and Electrolyte Balance  Outcome: Ongoing, Progressing     Problem: Oral Intake Inadequate (Acute Kidney Injury/Impairment)  Goal: Optimal Nutrition Intake  Outcome: Ongoing, Progressing     Problem: Renal Function Impairment (Acute Kidney Injury/Impairment)  Goal: Effective Renal Function  Outcome: Ongoing, Progressing     Problem: Skin Injury Risk Increased  Goal: Skin Health and Integrity  Outcome: Ongoing, Progressing

## 2022-07-11 NOTE — PLAN OF CARE
Per unit rounds. Pt still has NG tube, after Dr Rees sees pt today may remove NG tube.  JPx2.  And PT/ OT consulted. Per Dr Fitzgerald, he is ok with pt going to [facility] if qualifies. SS notified Elisabeth to check and see if pt qualifies and will notify MD.

## 2022-07-11 NOTE — ASSESSMENT & PLAN NOTE
- BUN/Crt 87/5.67  - Nephrology was consulted, patient has not been seen in quite a while  - last note reviewed from 7/4   - continues to have non-anion gap acidosis, check urine electrolytes  - potassium persistently elevated, likely secondary to transfusion  - change IVF to D5

## 2022-07-12 LAB
ANION GAP SERPL CALCULATED.3IONS-SCNC: 11 MMOL/L (ref 7–16)
BASOPHILS # BLD AUTO: 0.01 K/UL (ref 0–0.2)
BASOPHILS NFR BLD AUTO: 0.2 % (ref 0–1)
BUN SERPL-MCNC: 69 MG/DL (ref 7–18)
BUN/CREAT SERPL: 12 (ref 6–20)
CALCIUM SERPL-MCNC: 7.8 MG/DL (ref 8.5–10.1)
CHLORIDE SERPL-SCNC: 121 MMOL/L (ref 98–107)
CO2 SERPL-SCNC: 14 MMOL/L (ref 21–32)
CREAT SERPL-MCNC: 5.98 MG/DL (ref 0.7–1.3)
DIFFERENTIAL METHOD BLD: ABNORMAL
EOSINOPHIL # BLD AUTO: 0.19 K/UL (ref 0–0.5)
EOSINOPHIL NFR BLD AUTO: 3.6 % (ref 1–4)
ERYTHROCYTE [DISTWIDTH] IN BLOOD BY AUTOMATED COUNT: 15.5 % (ref 11.5–14.5)
GLUCOSE SERPL-MCNC: 80 MG/DL (ref 74–106)
GLUCOSE SERPL-MCNC: 86 MG/DL (ref 70–105)
GLUCOSE SERPL-MCNC: 88 MG/DL (ref 70–105)
GLUCOSE SERPL-MCNC: 94 MG/DL (ref 70–105)
HCT VFR BLD AUTO: 22.6 % (ref 40–54)
HGB BLD-MCNC: 7.7 G/DL (ref 13.5–18)
IMM GRANULOCYTES # BLD AUTO: 0.02 K/UL (ref 0–0.04)
IMM GRANULOCYTES NFR BLD: 0.4 % (ref 0–0.4)
LYMPHOCYTES # BLD AUTO: 0.44 K/UL (ref 1–4.8)
LYMPHOCYTES NFR BLD AUTO: 8.3 % (ref 27–41)
MCH RBC QN AUTO: 31.6 PG (ref 27–31)
MCHC RBC AUTO-ENTMCNC: 34.1 G/DL (ref 32–36)
MCV RBC AUTO: 92.6 FL (ref 80–96)
MONOCYTES # BLD AUTO: 0.48 K/UL (ref 0–0.8)
MONOCYTES NFR BLD AUTO: 9 % (ref 2–6)
MPC BLD CALC-MCNC: 8.9 FL (ref 9.4–12.4)
NEUTROPHILS # BLD AUTO: 4.17 K/UL (ref 1.8–7.7)
NEUTROPHILS NFR BLD AUTO: 78.5 % (ref 53–65)
NRBC # BLD AUTO: 0.02 X10E3/UL
NRBC, AUTO (.00): 0.4 %
PLATELET # BLD AUTO: 125 K/UL (ref 150–400)
POTASSIUM SERPL-SCNC: 5.2 MMOL/L (ref 3.5–5.1)
RBC # BLD AUTO: 2.44 M/UL (ref 4.6–6.2)
SODIUM SERPL-SCNC: 141 MMOL/L (ref 136–145)
WBC # BLD AUTO: 5.31 K/UL (ref 4.5–11)

## 2022-07-12 PROCEDURE — 97110 THERAPEUTIC EXERCISES: CPT

## 2022-07-12 PROCEDURE — 25000003 PHARM REV CODE 250: Performed by: SURGERY

## 2022-07-12 PROCEDURE — 99900035 HC TECH TIME PER 15 MIN (STAT)

## 2022-07-12 PROCEDURE — 85025 COMPLETE CBC W/AUTO DIFF WBC: CPT | Performed by: INTERNAL MEDICINE

## 2022-07-12 PROCEDURE — 94664 DEMO&/EVAL PT USE INHALER: CPT

## 2022-07-12 PROCEDURE — 97116 GAIT TRAINING THERAPY: CPT

## 2022-07-12 PROCEDURE — 94761 N-INVAS EAR/PLS OXIMETRY MLT: CPT

## 2022-07-12 PROCEDURE — 94668 MNPJ CHEST WALL SBSQ: CPT

## 2022-07-12 PROCEDURE — 99232 PR SUBSEQUENT HOSPITAL CARE,LEVL II: ICD-10-PCS | Mod: ,,, | Performed by: INTERNAL MEDICINE

## 2022-07-12 PROCEDURE — 63600175 PHARM REV CODE 636 W HCPCS: Performed by: SURGERY

## 2022-07-12 PROCEDURE — C9113 INJ PANTOPRAZOLE SODIUM, VIA: HCPCS | Performed by: SURGERY

## 2022-07-12 PROCEDURE — 99232 SBSQ HOSP IP/OBS MODERATE 35: CPT | Mod: ,,, | Performed by: INTERNAL MEDICINE

## 2022-07-12 PROCEDURE — 36415 COLL VENOUS BLD VENIPUNCTURE: CPT | Performed by: INTERNAL MEDICINE

## 2022-07-12 PROCEDURE — 20000000 HC ICU ROOM

## 2022-07-12 PROCEDURE — 25000003 PHARM REV CODE 250: Performed by: INTERNAL MEDICINE

## 2022-07-12 PROCEDURE — 82962 GLUCOSE BLOOD TEST: CPT

## 2022-07-12 PROCEDURE — 80048 BASIC METABOLIC PNL TOTAL CA: CPT | Performed by: INTERNAL MEDICINE

## 2022-07-12 RX ORDER — METOPROLOL TARTRATE 25 MG/1
25 TABLET, FILM COATED ORAL 2 TIMES DAILY
Status: DISCONTINUED | OUTPATIENT
Start: 2022-07-12 | End: 2022-07-20 | Stop reason: HOSPADM

## 2022-07-12 RX ORDER — PANTOPRAZOLE SODIUM 40 MG/1
40 TABLET, DELAYED RELEASE ORAL DAILY
Status: DISCONTINUED | OUTPATIENT
Start: 2022-07-13 | End: 2022-07-20 | Stop reason: HOSPADM

## 2022-07-12 RX ADMIN — METOPROLOL TARTRATE 25 MG: 25 TABLET, FILM COATED ORAL at 08:07

## 2022-07-12 RX ADMIN — ATORVASTATIN CALCIUM 40 MG: 40 TABLET, FILM COATED ORAL at 08:07

## 2022-07-12 RX ADMIN — HYDROCODONE BITARTRATE AND ACETAMINOPHEN 1 TABLET: 7.5; 325 TABLET ORAL at 08:07

## 2022-07-12 RX ADMIN — HYDROCODONE BITARTRATE AND ACETAMINOPHEN 1 TABLET: 7.5; 325 TABLET ORAL at 02:07

## 2022-07-12 RX ADMIN — SODIUM BICARBONATE 650 MG: 650 TABLET ORAL at 08:07

## 2022-07-12 RX ADMIN — FERROUS SULFATE TAB 325 MG (65 MG ELEMENTAL FE) 1 EACH: 325 (65 FE) TAB at 08:07

## 2022-07-12 RX ADMIN — TRAZODONE HYDROCHLORIDE 50 MG: 50 TABLET ORAL at 08:07

## 2022-07-12 RX ADMIN — ALLOPURINOL 50 MG: 300 TABLET ORAL at 08:07

## 2022-07-12 RX ADMIN — BISACODYL 10 MG: 5 TABLET, COATED ORAL at 08:07

## 2022-07-12 RX ADMIN — THERA TABS 1 TABLET: TAB at 08:07

## 2022-07-12 RX ADMIN — OXYCODONE HYDROCHLORIDE AND ACETAMINOPHEN 500 MG: 500 TABLET ORAL at 08:07

## 2022-07-12 RX ADMIN — METOPROLOL TARTRATE 25 MG: 25 TABLET, FILM COATED ORAL at 01:07

## 2022-07-12 RX ADMIN — PANTOPRAZOLE SODIUM 40 MG: 40 INJECTION, POWDER, FOR SOLUTION INTRAVENOUS at 08:07

## 2022-07-12 RX ADMIN — DEXTROSE MONOHYDRATE: 50 INJECTION, SOLUTION INTRAVENOUS at 02:07

## 2022-07-12 NOTE — PLAN OF CARE
Problem: Diabetes Comorbidity  Goal: Blood Glucose Level Within Targeted Range  Outcome: Ongoing, Progressing  Intervention: Monitor and Manage Glycemia  Flowsheets (Taken 7/12/2022 0921)  Glycemic Management: blood glucose monitored     Problem: Fluid and Electrolyte Imbalance (Acute Kidney Injury/Impairment)  Goal: Fluid and Electrolyte Balance  Outcome: Ongoing, Progressing     Problem: Oral Intake Inadequate (Acute Kidney Injury/Impairment)  Goal: Optimal Nutrition Intake  Outcome: Ongoing, Progressing

## 2022-07-12 NOTE — PROGRESS NOTES
General Surgery  Abdominal dressing ALYSON intact with seal  Tolerating NG Tube out  Trial cl liquids  Decrease ivf 50cc/hr  Change iv PPI to PO  hct 22.6 will monitor

## 2022-07-12 NOTE — PT/OT/SLP PROGRESS
Occupational Therapy   Treatment    Name: Neymar Parra  MRN: [id number]  Admitting Diagnosis:  Gastrointestinal hemorrhage associated with duodenitis  4 Days Post-Op    Recommendations:     Discharge Recommendations: nursing facility, skilled, rehabilitation facility  Discharge Equipment Recommendations:   (To be determined)  Barriers to discharge:  None    Assessment:     Neymar Parra is a 72 y.o. male with a medical diagnosis of Gastrointestinal hemorrhage associated with duodenitis.  He presents with complaint of abdominal pain.pt agreed to UE exercises. Performance deficits affecting function are weakness, impaired endurance.     Rehab Prognosis:  Good; patient would benefit from acute skilled OT services to address these deficits and reach maximum level of function.       Plan:     Patient to be seen 5 x/week to address the above listed problems via self-care/home management, therapeutic activities, therapeutic exercises  · Plan of Care Expires: 07/29/22  · Plan of Care Reviewed with: patient    Subjective     Pain/Comfort:  · Pain Rating 1: 7/10  · Location 1: abdomen  · Pain Addressed 1: Distraction  · Pain Rating Post-Intervention 1: 7/10    Objective:     Communicated with: NIKKI Robledo prior to session.  Patient found up in chair with blood pressure cuff, catalan catheter, peripheral IV, pulse ox (continuous), telemetry upon OT entry to room.    General Precautions: Standard, fall   Orthopedic Precautions:N/A   Braces: N/A  Respiratory Status: Room air     Occupational Performance:     Bed Mobility:    ·      Functional Mobility/Transfers:  ·   · Functional Mobility:     Activities of Daily Living:  · Pt washed face with setup. Max a with combing hair and min a with combing beard.      AMPAC 6 Click ADL:      Treatment & Education:  Pt performed UE strengthening exercises. AAROM with 1 lb hand wt x 3 sets of 10 reps (R) shoulder flexion/extension and adduction/abduction and  wrist flexion/extension.2 lb hand  wt x 3 sets of 10 reps (R) elbow flexion/extension. AAROM x 3 sets of 10 reps all planes on (L) UE. (B) UE positioned in elevation due to edema.  Pt participated well with exercises, but required cueing to stay on task.    Patient left up in chair with all lines intact, call button in reach and nurse notifiedEducation:      GOALS:   Multidisciplinary Problems     Occupational Therapy Goals        Problem: Occupational Therapy    Goal Priority Disciplines Outcome Interventions   Occupational Therapy Goal     OT, PT/OT Ongoing, Progressing    Description: STG:  Pt will perform grooming with setup  Pt will bathe with Alanna with setup at EOB   Pt will perform UE dressing with Danita  Pt will perform LE dressing with Alanna  Pt will sit EOB x 10 min with SBA   Pt will transfer bed/chair/bsc with CGA with RW  Pt will perform standing task x 3 min with CGA with RW  Pt will tolerate 15 minutes of tx without fatigue      LT.Restore to max I with self care and mobility.                       Time Tracking:     OT Date of Treatment: 22  OT Start Time: 1100  OT Stop Time: 1134  OT Total Time (min): 34 min    Billable Minutes:Therapeutic Exercise 28    OT/MARYA: OT          2022

## 2022-07-12 NOTE — ASSESSMENT & PLAN NOTE
- Patient with bleeding duodenal ulcer on EGD 7/2, ulcer clipped and an EGD on 07/05 where bleeding duodenal vessel was clipped  -  OR on 7/8 with Dr. Rees  - counts stable

## 2022-07-12 NOTE — PROGRESS NOTES
[facility]  Pulmonology  Progress Note    Patient Name: Neymar Parra  MRN: [id number]  Admission Date: 6/16/2022  Hospital Length of Stay: 26 days  Code Status: Full Code  Attending Provider: Silvano Fitzgerald MD  Primary Care Provider: [facility] megan Robertlb   Principal Problem: Gastrointestinal hemorrhage associated with duodenitis    Subjective:     Interval History: NGT removed yesterday. Working with nursing to get to chair this am.     Objective:     Vital Signs (Most Recent):  Temp: (P) 99.2 °F (37.3 °C) (07/12/22 0705)  Pulse: 89 (07/12/22 0600)  Resp: 20 (07/12/22 0600)  BP: (!) 120/58 (07/12/22 0600)  SpO2: 95 % (07/12/22 0705)   Vital Signs (24h Range):  Temp:  [97.8 °F (36.6 °C)-99.2 °F (37.3 °C)] (P) 99.2 °F (37.3 °C)  Pulse:  [] 89  Resp:  [7-34] 20  SpO2:  [92 %-100 %] 95 %  BP: ()/(49-85) 120/58     Weight: 100.8 kg (222 lb 3.6 oz)  Body mass index is 30.14 kg/m².      Intake/Output Summary (Last 24 hours) at 7/12/2022 0814  Last data filed at 7/12/2022 0600  Gross per 24 hour   Intake 3437.92 ml   Output 1405 ml   Net 2032.92 ml       Physical Exam  Vitals reviewed.   Constitutional:       General: He is not in acute distress.     Appearance: Normal appearance. He is not ill-appearing.   HENT:      Head: Normocephalic and atraumatic.      Right Ear: External ear normal.      Left Ear: External ear normal.      Mouth/Throat:      Mouth: Mucous membranes are moist.      Pharynx: Oropharynx is clear.   Eyes:      Conjunctiva/sclera: Conjunctivae normal.      Pupils: Pupils are equal, round, and reactive to light.   Cardiovascular:      Rate and Rhythm: Normal rate and regular rhythm.      Heart sounds: Normal heart sounds.   Pulmonary:      Effort: Pulmonary effort is normal. No respiratory distress.      Breath sounds: Normal breath sounds. No wheezing, rhonchi or rales.   Abdominal:      General: Abdomen is flat. Bowel sounds are normal.      Palpations: Abdomen  is soft.      Tenderness: There is no abdominal tenderness. There is no guarding.   Musculoskeletal:      Cervical back: Neck supple.      Comments: Bilateral UE edema   Lymphadenopathy:      Cervical: No cervical adenopathy.   Skin:     General: Skin is warm and dry.   Neurological:      General: No focal deficit present.      Mental Status: He is alert. Mental status is at baseline.      Cranial Nerves: No cranial nerve deficit.   Psychiatric:         Mood and Affect: Mood normal.       Vents:  Oxygen Concentration (%): 21 (07/12/22 0705)    Lines/Drains/Airways       Drain  Duration                  Closed/Suction Drain 07/08/22 1439 Left Abdomen Bulb 3 days         Closed/Suction Drain 07/08/22 1439 Superior;Midline;Right Abdomen Bulb 10 Fr. 3 days              Peripheral Intravenous Line  Duration                  Peripheral IV - Single Lumen 07/06/22 2350 20 G Left;Posterior Hand 5 days         Peripheral IV - Single Lumen 07/07/22 1118 18 G Anterior;Right Upper Arm 4 days                    Significant Labs:    CBC/Anemia Profile:  Recent Labs   Lab 07/10/22  2033 07/11/22  0240 07/12/22  0213   WBC  --  7.18 5.31   HGB 7.2* 7.8* 7.7*   HCT 22.2* 24.1* 22.6*   PLT  --  96* 125*   MCV  --  97.2* 92.6   RDW  --  15.5* 15.5*        Chemistries:  Recent Labs   Lab 07/10/22  2033 07/10/22  2139 07/11/22  0240 07/12/22  0213   * 140 143 141   K 4.5 5.5* 5.5* 5.2*   * 120* 121* 121*   CO2 11* 13* 13* 14*   BUN 69* 80* 75* 69*   CREATININE 5.44* 6.38* 6.23* 5.98*   CALCIUM 6.5* 7.9* 8.0* 7.8*   ALBUMIN 1.2* 1.4*  --   --    PROT 3.5* 4.0*  --   --    BILITOT 0.4 0.5  --   --    ALKPHOS 40* 47  --   --    ALT 27 30  --   --    AST 20 25  --   --        All pertinent labs within the past 24 hours have been reviewed.    Significant Imaging:  I have reviewed all pertinent imaging results/findings within the past 24 hours.    Assessment/Plan:     Anemia  - Patient with bleeding duodenal ulcer on EGD 7/2, ulcer  clipped and an EGD on 07/05 where bleeding duodenal vessel was clipped  -  OR on 7/8 with Dr. Rees  - len stable    Acute on chronic renal failure  - Creatinine stable  - Nephrology following  - continues to have non-anion gap acidosis, check urine electrolytes. UAG +14    Hyperkalemia  - persistently elevated, improving  - D5W    Type 2 diabetes mellitus without complication  - on levemir 8 units and sliding scale  - holding levemir  - D5W for IVF  - discuss advancing diet with surgery    Hypertension  - stable  - remains NPO                 Silvano Fitzgerald MD  Pulmonology  [facility] ICU

## 2022-07-12 NOTE — ASSESSMENT & PLAN NOTE
- Creatinine stable  - Nephrology following  - continues to have non-anion gap acidosis, check urine electrolytes. UAG +14

## 2022-07-12 NOTE — SUBJECTIVE & OBJECTIVE
Interval History: NGT removed yesterday. Working with nursing to get to chair this am.     Objective:     Vital Signs (Most Recent):  Temp: (P) 99.2 °F (37.3 °C) (07/12/22 0705)  Pulse: 89 (07/12/22 0600)  Resp: 20 (07/12/22 0600)  BP: (!) 120/58 (07/12/22 0600)  SpO2: 95 % (07/12/22 0705)   Vital Signs (24h Range):  Temp:  [97.8 °F (36.6 °C)-99.2 °F (37.3 °C)] (P) 99.2 °F (37.3 °C)  Pulse:  [] 89  Resp:  [7-34] 20  SpO2:  [92 %-100 %] 95 %  BP: ()/(49-85) 120/58     Weight: 100.8 kg (222 lb 3.6 oz)  Body mass index is 30.14 kg/m².      Intake/Output Summary (Last 24 hours) at 7/12/2022 0814  Last data filed at 7/12/2022 0600  Gross per 24 hour   Intake 3437.92 ml   Output 1405 ml   Net 2032.92 ml       Physical Exam  Vitals reviewed.   Constitutional:       General: He is not in acute distress.     Appearance: Normal appearance. He is not ill-appearing.   HENT:      Head: Normocephalic and atraumatic.      Right Ear: External ear normal.      Left Ear: External ear normal.      Mouth/Throat:      Mouth: Mucous membranes are moist.      Pharynx: Oropharynx is clear.   Eyes:      Conjunctiva/sclera: Conjunctivae normal.      Pupils: Pupils are equal, round, and reactive to light.   Cardiovascular:      Rate and Rhythm: Normal rate and regular rhythm.      Heart sounds: Normal heart sounds.   Pulmonary:      Effort: Pulmonary effort is normal. No respiratory distress.      Breath sounds: Normal breath sounds. No wheezing, rhonchi or rales.   Abdominal:      General: Abdomen is flat. Bowel sounds are normal.      Palpations: Abdomen is soft.      Tenderness: There is no abdominal tenderness. There is no guarding.   Musculoskeletal:      Cervical back: Neck supple.      Comments: Bilateral UE edema   Lymphadenopathy:      Cervical: No cervical adenopathy.   Skin:     General: Skin is warm and dry.   Neurological:      General: No focal deficit present.      Mental Status: He is alert. Mental status is at  baseline.      Cranial Nerves: No cranial nerve deficit.   Psychiatric:         Mood and Affect: Mood normal.       Vents:  Oxygen Concentration (%): 21 (07/12/22 0705)    Lines/Drains/Airways       Drain  Duration                  Closed/Suction Drain 07/08/22 1439 Left Abdomen Bulb 3 days         Closed/Suction Drain 07/08/22 1439 Superior;Midline;Right Abdomen Bulb 10 Fr. 3 days              Peripheral Intravenous Line  Duration                  Peripheral IV - Single Lumen 07/06/22 2350 20 G Left;Posterior Hand 5 days         Peripheral IV - Single Lumen 07/07/22 1118 18 G Anterior;Right Upper Arm 4 days                    Significant Labs:    CBC/Anemia Profile:  Recent Labs   Lab 07/10/22  2033 07/11/22  0240 07/12/22  0213   WBC  --  7.18 5.31   HGB 7.2* 7.8* 7.7*   HCT 22.2* 24.1* 22.6*   PLT  --  96* 125*   MCV  --  97.2* 92.6   RDW  --  15.5* 15.5*        Chemistries:  Recent Labs   Lab 07/10/22  2033 07/10/22  2139 07/11/22  0240 07/12/22  0213   * 140 143 141   K 4.5 5.5* 5.5* 5.2*   * 120* 121* 121*   CO2 11* 13* 13* 14*   BUN 69* 80* 75* 69*   CREATININE 5.44* 6.38* 6.23* 5.98*   CALCIUM 6.5* 7.9* 8.0* 7.8*   ALBUMIN 1.2* 1.4*  --   --    PROT 3.5* 4.0*  --   --    BILITOT 0.4 0.5  --   --    ALKPHOS 40* 47  --   --    ALT 27 30  --   --    AST 20 25  --   --        All pertinent labs within the past 24 hours have been reviewed.    Significant Imaging:  I have reviewed all pertinent imaging results/findings within the past 24 hours.

## 2022-07-12 NOTE — ASSESSMENT & PLAN NOTE
- on levemir 8 units and sliding scale  - holding levemir  - D5W for IVF  - discuss advancing diet with surgery

## 2022-07-13 LAB
ANION GAP SERPL CALCULATED.3IONS-SCNC: 11 MMOL/L (ref 7–16)
BASOPHILS # BLD AUTO: 0.01 K/UL (ref 0–0.2)
BASOPHILS NFR BLD AUTO: 0.2 % (ref 0–1)
BUN SERPL-MCNC: 72 MG/DL (ref 7–18)
BUN/CREAT SERPL: 12 (ref 6–20)
CALCIUM SERPL-MCNC: 8.7 MG/DL (ref 8.5–10.1)
CHLORIDE SERPL-SCNC: 116 MMOL/L (ref 98–107)
CO2 SERPL-SCNC: 17 MMOL/L (ref 21–32)
CREAT SERPL-MCNC: 6.2 MG/DL (ref 0.7–1.3)
DIFFERENTIAL METHOD BLD: ABNORMAL
EOSINOPHIL # BLD AUTO: 0.25 K/UL (ref 0–0.5)
EOSINOPHIL NFR BLD AUTO: 4.7 % (ref 1–4)
ERYTHROCYTE [DISTWIDTH] IN BLOOD BY AUTOMATED COUNT: 15.8 % (ref 11.5–14.5)
GLUCOSE SERPL-MCNC: 81 MG/DL (ref 74–106)
GLUCOSE SERPL-MCNC: 83 MG/DL (ref 70–105)
GLUCOSE SERPL-MCNC: 84 MG/DL (ref 70–105)
GLUCOSE SERPL-MCNC: 93 MG/DL (ref 70–105)
HCT VFR BLD AUTO: 25 % (ref 40–54)
HGB BLD-MCNC: 8.4 G/DL (ref 13.5–18)
IMM GRANULOCYTES # BLD AUTO: 0.02 K/UL (ref 0–0.04)
IMM GRANULOCYTES NFR BLD: 0.4 % (ref 0–0.4)
LYMPHOCYTES # BLD AUTO: 0.59 K/UL (ref 1–4.8)
LYMPHOCYTES NFR BLD AUTO: 11.2 % (ref 27–41)
MCH RBC QN AUTO: 31.6 PG (ref 27–31)
MCHC RBC AUTO-ENTMCNC: 33.6 G/DL (ref 32–36)
MCV RBC AUTO: 94 FL (ref 80–96)
MONOCYTES # BLD AUTO: 0.78 K/UL (ref 0–0.8)
MONOCYTES NFR BLD AUTO: 14.8 % (ref 2–6)
MPC BLD CALC-MCNC: 9.3 FL (ref 9.4–12.4)
NEUTROPHILS # BLD AUTO: 3.63 K/UL (ref 1.8–7.7)
NEUTROPHILS NFR BLD AUTO: 68.7 % (ref 53–65)
NRBC # BLD AUTO: 0.04 X10E3/UL
NRBC, AUTO (.00): 0.8 %
PLATELET # BLD AUTO: 163 K/UL (ref 150–400)
POTASSIUM SERPL-SCNC: 5.6 MMOL/L (ref 3.5–5.1)
RBC # BLD AUTO: 2.66 M/UL (ref 4.6–6.2)
SODIUM SERPL-SCNC: 138 MMOL/L (ref 136–145)
WBC # BLD AUTO: 5.28 K/UL (ref 4.5–11)

## 2022-07-13 PROCEDURE — 25000003 PHARM REV CODE 250: Performed by: INTERNAL MEDICINE

## 2022-07-13 PROCEDURE — 25000003 PHARM REV CODE 250: Performed by: SURGERY

## 2022-07-13 PROCEDURE — 11000001 HC ACUTE MED/SURG PRIVATE ROOM

## 2022-07-13 PROCEDURE — 36415 COLL VENOUS BLD VENIPUNCTURE: CPT | Performed by: INTERNAL MEDICINE

## 2022-07-13 PROCEDURE — 99233 PR SUBSEQUENT HOSPITAL CARE,LEVL III: ICD-10-PCS | Mod: ,,, | Performed by: INTERNAL MEDICINE

## 2022-07-13 PROCEDURE — 97530 THERAPEUTIC ACTIVITIES: CPT | Mod: CQ

## 2022-07-13 PROCEDURE — 85025 COMPLETE CBC W/AUTO DIFF WBC: CPT | Performed by: INTERNAL MEDICINE

## 2022-07-13 PROCEDURE — 80048 BASIC METABOLIC PNL TOTAL CA: CPT | Performed by: INTERNAL MEDICINE

## 2022-07-13 PROCEDURE — 97116 GAIT TRAINING THERAPY: CPT | Mod: CQ

## 2022-07-13 PROCEDURE — 94668 MNPJ CHEST WALL SBSQ: CPT

## 2022-07-13 PROCEDURE — 93010 EKG 12-LEAD: ICD-10-PCS | Mod: ,,, | Performed by: INTERNAL MEDICINE

## 2022-07-13 PROCEDURE — 93010 ELECTROCARDIOGRAM REPORT: CPT | Mod: ,,, | Performed by: INTERNAL MEDICINE

## 2022-07-13 PROCEDURE — 97110 THERAPEUTIC EXERCISES: CPT | Mod: CQ

## 2022-07-13 PROCEDURE — 99233 SBSQ HOSP IP/OBS HIGH 50: CPT | Mod: ,,, | Performed by: INTERNAL MEDICINE

## 2022-07-13 PROCEDURE — 93005 ELECTROCARDIOGRAM TRACING: CPT

## 2022-07-13 PROCEDURE — 82962 GLUCOSE BLOOD TEST: CPT

## 2022-07-13 PROCEDURE — 97110 THERAPEUTIC EXERCISES: CPT

## 2022-07-13 RX ORDER — DOCUSATE SODIUM 100 MG/1
100 CAPSULE, LIQUID FILLED ORAL DAILY
Status: DISCONTINUED | OUTPATIENT
Start: 2022-07-13 | End: 2022-07-15

## 2022-07-13 RX ADMIN — DOCUSATE SODIUM 100 MG: 100 CAPSULE, LIQUID FILLED ORAL at 09:07

## 2022-07-13 RX ADMIN — METOPROLOL TARTRATE 25 MG: 25 TABLET, FILM COATED ORAL at 09:07

## 2022-07-13 RX ADMIN — FERROUS SULFATE TAB 325 MG (65 MG ELEMENTAL FE) 1 EACH: 325 (65 FE) TAB at 09:07

## 2022-07-13 RX ADMIN — HYDROCODONE BITARTRATE AND ACETAMINOPHEN 1 TABLET: 7.5; 325 TABLET ORAL at 05:07

## 2022-07-13 RX ADMIN — THERA TABS 1 TABLET: TAB at 09:07

## 2022-07-13 RX ADMIN — SODIUM BICARBONATE 650 MG: 650 TABLET ORAL at 09:07

## 2022-07-13 RX ADMIN — ALLOPURINOL 50 MG: 300 TABLET ORAL at 09:07

## 2022-07-13 RX ADMIN — ATORVASTATIN CALCIUM 40 MG: 40 TABLET, FILM COATED ORAL at 09:07

## 2022-07-13 RX ADMIN — PANTOPRAZOLE SODIUM 40 MG: 40 TABLET, DELAYED RELEASE ORAL at 09:07

## 2022-07-13 RX ADMIN — OXYCODONE HYDROCHLORIDE AND ACETAMINOPHEN 500 MG: 500 TABLET ORAL at 09:07

## 2022-07-13 NOTE — ASSESSMENT & PLAN NOTE
Creatinine improved to 5.8  7/11/2022 Serum creatinine is 6.2 which has been stable.  7/12/2022  Today, serum creatinine is 5.9

## 2022-07-13 NOTE — PT/OT/SLP PROGRESS
Physical Therapy Treatment    Patient Name:  Neymar Parra   MRN:  [id number]    Recommendations:     Discharge Recommendations:  nursing facility, skilled, rehabilitation facility   Discharge Equipment Recommendations: none   Barriers to discharge: Inaccessible home, Decreased caregiver support and on-going medical treatment    Assessment:     Neymar Parra is a 72 y.o. male admitted with a medical diagnosis of Gastrointestinal hemorrhage associated with duodenitis.  He presents with the following impairments/functional limitations:  weakness, impaired endurance, decreased upper extremity function. Pt. Participated well with little convincing this date.     Rehab Prognosis: Fair; patient would benefit from acute skilled PT services to address these deficits and reach maximum level of function.    Recent Surgery: Procedure(s) (LRB):  LAPAROTOMY, EXPLORATORY (N/A)  VAGOTOMY (N/A)  PYLOROPLASTY (N/A) 5 Days Post-Op    Plan:     During this hospitalization, patient to be seen 5 x/week to address the identified rehab impairments via gait training, therapeutic activities, therapeutic exercises and progress toward the following goals:    · Plan of Care Expires:  08/01/22    Subjective     Chief Complaint: fatigue  Patient/Family Comments/goals: Pt. States he is not feeling well but agrees to treatment.   Pain/Comfort:  · Pain Rating 1: 0/10      Objective:     Communicated with Nursing prior to session.  Patient found supine with blood pressure cuff, Condom Catheter, peripheral IV, pulse ox (continuous), telemetry upon PT entry to room.     General Precautions: Standard, fall   Orthopedic Precautions:N/A   Braces: N/A  Respiratory Status: Room air     Functional Mobility:  · Bed Mobility:     · Rolling Left:  contact guard assistance and minimum assistance  · Scooting: minimum assistance  · Supine to Sit: minimum assistance  · Sit to Supine: minimum assistance  · Transfers:     · Sit to Stand:  mod assistance from recliner  with rolling walker  · Bed to Chair: minimum assistance with  rolling walker  using  Step Transfer  · Gait: Pt. participated in gait x 10 feet with RW to fatigue with CGA t@ gait belt and chair trail.      AM-PAC 6 CLICK MOBILITY  Turning over in bed (including adjusting bedclothes, sheets and blankets)?: 2  Sitting down on and standing up from a chair with arms (e.g., wheelchair, bedside commode, etc.): 3  Moving from lying on back to sitting on the side of the bed?: 2  Moving to and from a bed to a chair (including a wheelchair)?: 3  Need to walk in hospital room?: 3  Climbing 3-5 steps with a railing?: 1  Basic Mobility Total Score: 14       Therapeutic Activities and Exercises:   Pt. Participated in supine AP, Hip IR/ ER and Heel slides x 15 reps each prior to mobility.      Patient left HOB elevated with all lines intact, call button in reach and nursing notified..    GOALS:   Multidisciplinary Problems     Physical Therapy Goals        Problem: Physical Therapy    Goal Priority Disciplines Outcome Goal Variances Interventions   Physical Therapy Goal     PT, PT/OT Ongoing, Progressing     Description: Short Term Goals to be met by: 2022    Patient will increase functional independence with mobility by performin. Supine to sit with independently  2. Sit to stand transfer with independently Rolling walker  3. Bed to chair transfer with independently using Rolling walker  4. Gait  x 100 feet with independently using Rolling walker  5. Lower extremity exercise program x30 reps per handout, with assistance as needed    Long Term Goals to be met by: 2022    Pt will regain full independent functional mobility with Rolling walker to return to home situation and prior activities of daily living.                    Time Tracking:     PT Received On: 22  PT Start Time: 1450     PT Stop Time: 1515  PT Total Time (min): 25 min     Billable Minutes: Therapeutic Exercise 10 Gait Training 15    Treatment  Type: Treatment  PT/PTA: PTA     PTA Visit Number: 1 07/13/2022

## 2022-07-13 NOTE — PT/OT/SLP PROGRESS
Occupational Therapy   Treatment    Name: Nyemar Parra  MRN: [id number]  Admitting Diagnosis:  Gastrointestinal hemorrhage associated with duodenitis  5 Days Post-Op    Recommendations:     Discharge Recommendations: nursing facility, skilled, rehabilitation facility  Discharge Equipment Recommendations:   (to be determined)  Barriers to discharge:  None    Assessment:     Neymar Parra is a 72 y.o. male with a medical diagnosis of Gastrointestinal hemorrhage associated with duodenitis.  He presents with decline in functional status. Performance deficits affecting function are weakness, impaired endurance, impaired cardiopulmonary response to activity, decreased upper extremity function.     Rehab Prognosis:  Good; patient would benefit from acute skilled OT services to address these deficits and reach maximum level of function.       Plan:     Patient to be seen 5 x/week to address the above listed problems via self-care/home management, therapeutic activities, therapeutic exercises  · Plan of Care Expires: 07/29/22  · Plan of Care Reviewed with: patient    Subjective     Pain/Comfort:  ·      Objective:     Communicated with: NIKKI Rivera prior to session.  Patient found HOB elevated with blood pressure cuff, catalan catheter, peripheral IV, pulse ox (continuous), telemetry upon OT entry to room.    General Precautions: Standard, fall   Orthopedic Precautions:N/A   Braces: N/A  Respiratory Status: Room air     Occupational Performance:     Bed Mobility:    ·      Functional Mobility/Transfers:  ·   · Functional Mobility:     Activities of Daily Living:  ·       AMPAC 6 Click ADL:      Treatment & Education:  Pt performed UE strengthening exercise. AAROM with 1 lb hand wt x 2 sets of 15 reps (R) shoulder flexion/extension and adduction/abduction. 2 lb hand wt x 2 sets of 15 reps (R) elbow and wrist flexion/extension. AAROM x 2 sets of 15 reps (L) shoulder flexion/extension and adduction/abduction.1 lb hand wt (L)  elbow and wrist flexion/extension. Red theraband x 2 sets of 15 reps (R) elbow flexion and extension and (L) elbow extension. Hand exerciser x 2 sets of 25 reps (B) x 2 bands.  Pt required cueing for proper breathing technique and to stay focused on task. (B) UE supported in elevation due to edema.    Patient left HOB elevated with all lines intact, call button in reach and nurse notifiedEducation:      GOALS:   Multidisciplinary Problems     Occupational Therapy Goals        Problem: Occupational Therapy    Goal Priority Disciplines Outcome Interventions   Occupational Therapy Goal     OT, PT/OT Ongoing, Progressing    Description: STG:  Pt will perform grooming with setup  Pt will bathe with Alanna with setup at EOB   Pt will perform UE dressing with Danita  Pt will perform LE dressing with Alanna  Pt will sit EOB x 10 min with SBA   Pt will transfer bed/chair/bsc with CGA with RW  Pt will perform standing task x 3 min with CGA with RW  Pt will tolerate 15 minutes of tx without fatigue      LT.Restore to max I with self care and mobility.                       Time Tracking:     OT Date of Treatment: 22  OT Start Time: 152  OT Stop Time: 155  OT Total Time (min): 31 min    Billable Minutes:Therapeutic Exercise 28    OT/MARYA: OT          2022

## 2022-07-13 NOTE — PT/OT/SLP PROGRESS
Physical Therapy Treatment    Patient Name:  Neymar Parra   MRN:  [id number]    Recommendations:     Discharge Recommendations:  rehabilitation facility, nursing facility, skilled   Discharge Equipment Recommendations: none   Barriers to discharge: Decreased caregiver support    Assessment:     Neymar Parra is a 72 y.o. male admitted with a medical diagnosis of Gastrointestinal hemorrhage associated with duodenitis.  He presents with the following impairments/functional limitations:  weakness, gait instability, pain, impaired self care skills, impaired balance Patient progressing ambulation today. Abdominal pain makes transfers difficult. Overall looking better.    Rehab Prognosis: Good; patient would benefit from acute skilled PT services to address these deficits and reach maximum level of function.    Recent Surgery: Procedure(s) (LRB):  LAPAROTOMY, EXPLORATORY (N/A)  VAGOTOMY (N/A)  PYLOROPLASTY (N/A) 4 Days Post-Op    Plan:     During this hospitalization, patient to be seen 5 x/week to address the identified rehab impairments via gait training, therapeutic activities, therapeutic exercises and progress toward the following goals:    · Plan of Care Expires:  08/01/22    Subjective     Chief Complaint: Abdominal pain  Patient/Family Comments/goals: Patient agrees to oob activity  Pain/Comfort:  · Pain Rating 1: 5/10  · Location 1: abdomen  · Pain Addressed 1: Cessation of Activity      Objective:     Communicated with nurse prior to session.  Patient found supine with blood pressure cuff, peripheral IV, telemetry, SCD, pulse ox (continuous) upon PT entry to room.     General Precautions: Standard, fall   Orthopedic Precautions:N/A   Braces:    Respiratory Status: Room air     Functional Mobility:  · Bed Mobility:     · Supine to Sit: moderate assistance  · Sit to Supine: moderate assistance  · Transfers:     · Sit to Stand:  moderate assistance with rolling walker  · Gait: ambulated 80 feet with rolling walker  cga, 75% decreased edwige      AM-PAC 6 CLICK MOBILITY  Turning over in bed (including adjusting bedclothes, sheets and blankets)?: 2  Sitting down on and standing up from a chair with arms (e.g., wheelchair, bedside commode, etc.): 3  Moving from lying on back to sitting on the side of the bed?: 2  Moving to and from a bed to a chair (including a wheelchair)?: 3  Need to walk in hospital room?: 3  Climbing 3-5 steps with a railing?: 1  Basic Mobility Total Score: 14       Therapeutic Activities and Exercises:   BLE: aps, saq, abd-add, slr 3x10    Patient left supine with call button in reach..    GOALS:   Multidisciplinary Problems     Physical Therapy Goals        Problem: Physical Therapy    Goal Priority Disciplines Outcome Goal Variances Interventions   Physical Therapy Goal     PT, PT/OT Ongoing, Progressing     Description: Short Term Goals to be met by: 2022    Patient will increase functional independence with mobility by performin. Supine to sit with independently  2. Sit to stand transfer with independently Rolling walker  3. Bed to chair transfer with independently using Rolling walker  4. Gait  x 100 feet with independently using Rolling walker  5. Lower extremity exercise program x30 reps per handout, with assistance as needed    Long Term Goals to be met by: 2022    Pt will regain full independent functional mobility with Rolling walker to return to home situation and prior activities of daily living.                    Time Tracking:     PT Received On: 22  PT Start Time:      PT Stop Time:   PT Total Time (min): 25 min     Billable Minutes: Gait Training 10 and Therapeutic Exercise 15    Treatment Type: Treatment  PT/PTA: PT     PTA Visit Number: 0     2022

## 2022-07-13 NOTE — PROGRESS NOTES
[facility]  Nephrology  Progress Note    Patient Name: Neymar Parra  MRN: [id number]  Admission Date: 6/16/2022  Hospital Length of Stay: 26 days  Attending Provider: Silvano Fitzgerald MD   Primary Care Physician: [facility]  Principal Problem:Gastrointestinal hemorrhage associated with duodenitis    Subjective:     HPI: This patient with history of HTN, DM, CKD who has seen Dr. Crowder in the past 2 years ago is currently in a nursing facility.  The patient presented with SOB and diagnosed with COVID.  Serum creatinine has trended up to 7.15.  Serum potassium is 6.4.  Nephrology has been consulted for renal issues.  At the bedside, the patient mentions feeling fine.  He states that his breathing has improved.      Interval History: The patient is s/p surgery today.  He is doing acceptable. Serum creatinine is 5.9 which is better.    Review of patient's allergies indicates:   Allergen Reactions    Gatifloxacin Anaphylaxis     Current Facility-Administered Medications   Medication Frequency    0.9%  NaCl infusion (for blood administration) Q24H PRN    0.9%  NaCl infusion (for blood administration) Q24H PRN    acetaminophen tablet 1,000 mg Q6H PRN    acetaminophen tablet 650 mg Q8H PRN    acetaminophen tablet 650 mg Q4H PRN    albuterol inhaler 2 puff Q6H PRN    allopurinol split tablet 50 mg Daily    ascorbic acid (vitamin C) tablet 500 mg Daily    atorvastatin tablet 40 mg QHS    bisacodyL EC tablet 10 mg Daily PRN    calcium gluconate 1 g in NS IVPB (premixed) Q10 Min PRN    cetirizine tablet 10 mg Daily PRN    dextromethorphan-guaiFENesin  mg/5 ml liquid 10 mL Q6H PRN    dextrose 5 % infusion Continuous    dextrose 50% injection 12.5 g PRN    dextrose 50% injection 25 g PRN    ferrous sulfate tablet 1 each Daily    glucagon (human recombinant) injection 1 mg PRN    glucose chewable tablet 16 g PRN    glucose chewable tablet 24 g PRN    hydrALAZINE  injection 10 mg Q6H PRN    HYDROcodone-acetaminophen 7.5-325 mg per tablet 1 tablet Q6H PRN    insulin aspart U-100 injection 1-10 Units QID (AC + HS) PRN    insulin detemir U-100 injection 8 Units QHS    melatonin tablet 6 mg Nightly PRN    metoprolol tartrate (LOPRESSOR) tablet 25 mg BID    mineral oil enema 1 enema Daily PRN    morphine injection 4 mg Q4H PRN    morphine injection 6 mg Q4H PRN    multivitamin tablet Daily    naloxone 0.4 mg/mL injection 0.02 mg PRN    ondansetron injection 4 mg Q12H PRN    ondansetron injection 8 mg Q6H PRN    [START ON 7/13/2022] pantoprazole EC tablet 40 mg Daily    simethicone chewable tablet 80 mg TID PRN    sodium bicarbonate tablet 650 mg BID    sodium chloride 0.9% flush 10 mL Q12H PRN    traMADoL tablet 50 mg Q8H PRN    traZODone tablet 50 mg Nightly PRN       Objective:     Vital Signs (Most Recent):  Temp: 98.1 °F (36.7 °C) (07/12/22 1915)  Pulse: 62 (07/12/22 2000)  Resp: 18 (07/12/22 2051)  BP: 127/62 (07/12/22 2000)  SpO2: 97 % (07/12/22 2000)  O2 Device (Oxygen Therapy): room air (07/12/22 1946) Vital Signs (24h Range):  Temp:  [97.8 °F (36.6 °C)-99.2 °F (37.3 °C)] 98.1 °F (36.7 °C)  Pulse:  [] 62  Resp:  [7-34] 18  SpO2:  [87 %-100 %] 97 %  BP: (107-190)/(49-85) 127/62     Weight: 100.8 kg (222 lb 3.6 oz) (07/12/22 0337)  Body mass index is 30.14 kg/m².  Body surface area is 2.26 meters squared.    I/O last 3 completed shifts:  In: 4262.1 [I.V.:4212.1; IV Piggyback:50]  Out: 1815 [Urine:1600; Drains:215]    Physical Exam  Vitals reviewed.   HENT:      Head: Normocephalic and atraumatic.   Cardiovascular:      Rate and Rhythm: Regular rhythm.   Pulmonary:      Effort: Pulmonary effort is normal.   Abdominal:      Palpations: Abdomen is soft.   Neurological:      General: No focal deficit present.      Mental Status: He is alert.       Significant Labs:  BMP:   Recent Labs   Lab 07/09/22  0554 07/09/22  1848 07/12/22  0213   GLU 78   < > 80       < > 141   K 5.9*   < > 5.2*   *   < > 121*   CO2 12*   < > 14*   *   < > 69*   CREATININE 6.29*   < > 5.98*   CALCIUM 7.7*   < > 7.8*   MG 1.4*  --   --     < > = values in this interval not displayed.     CBC:   Recent Labs   Lab 07/12/22  0213   WBC 5.31   RBC 2.44*   HGB 7.7*   HCT 22.6*   *   MCV 92.6   MCH 31.6*   MCHC 34.1        Significant Imaging:  Labs: Reviewed    Assessment/Plan:     Acute on chronic renal failure  Creatinine improved to 5.8  7/11/2022 Serum creatinine is 6.2 which has been stable.  7/12/2022  Today, serum creatinine is 5.9    Type 2 diabetes mellitus without complication  Chronic condition        Thank you for your consult. I will follow-up with patient. Please contact us if you have any additional questions.    Jorge Butts Jr, MD  Nephrology  [facility]

## 2022-07-13 NOTE — ASSESSMENT & PLAN NOTE
- on levemir 8 units and sliding scale  - holding levemir  - D5W for IVF  - discuss advancing diet with surgery, full liquid. If tolerates diet will try to DC IVF

## 2022-07-13 NOTE — ASSESSMENT & PLAN NOTE
- EGD with bleeding duodenal ulcer was clipped   - Repeat EGD 07/05, bleeding duodenal vessel was clipped per GI.  - H. Pylori negative  - Protonix daily  - ongoing bleed with blood clots from bowel and decreasing H&H. Pt became hypotensive, and transferred to ICU for further monitoring and eval. General surgery, Dr. Rees consulted for eval.   - to OR 7/8  - doing well NGT removed. Try to advance diet today

## 2022-07-13 NOTE — SUBJECTIVE & OBJECTIVE
Interval History: Doing well. Remains NPO. Does have some mild abdominal pain today.     Objective:     Vital Signs (Most Recent):  Temp: 98 °F (36.7 °C) (07/13/22 0701)  Pulse: 86 (07/13/22 0600)  Resp: 12 (07/13/22 0600)  BP: (!) 130/55 (07/13/22 0600)  SpO2: 99 % (07/13/22 0600)   Vital Signs (24h Range):  Temp:  [97.8 °F (36.6 °C)-98.7 °F (37.1 °C)] 98 °F (36.7 °C)  Pulse:  [] 86  Resp:  [9-25] 12  SpO2:  [94 %-100 %] 99 %  BP: (116-150)/(53-86) 130/55     Weight: 101.1 kg (222 lb 14.2 oz)  Body mass index is 30.23 kg/m².      Intake/Output Summary (Last 24 hours) at 7/13/2022 0842  Last data filed at 7/13/2022 0600  Gross per 24 hour   Intake 1424.17 ml   Output 1170 ml   Net 254.17 ml       Physical Exam  Vitals reviewed.   Constitutional:       General: He is not in acute distress.     Appearance: Normal appearance.   HENT:      Head: Normocephalic and atraumatic.      Right Ear: External ear normal.      Left Ear: External ear normal.      Mouth/Throat:      Mouth: Mucous membranes are moist.      Pharynx: Oropharynx is clear.   Eyes:      Conjunctiva/sclera: Conjunctivae normal.      Pupils: Pupils are equal, round, and reactive to light.   Cardiovascular:      Rate and Rhythm: Normal rate and regular rhythm.      Heart sounds: Normal heart sounds.   Pulmonary:      Effort: Pulmonary effort is normal. No respiratory distress.      Breath sounds: Normal breath sounds. No wheezing, rhonchi or rales.   Abdominal:      General: Abdomen is flat.      Palpations: Abdomen is soft.      Tenderness: There is abdominal tenderness. There is no guarding or rebound.      Comments: Hypoactive bowel sounds. Mild tenderness to palpation   Musculoskeletal:      Cervical back: Neck supple.      Comments: Bilateral UE edema   Lymphadenopathy:      Cervical: No cervical adenopathy.   Skin:     General: Skin is warm and dry.   Neurological:      General: No focal deficit present.      Mental Status: He is alert. Mental  status is at baseline.      Cranial Nerves: No cranial nerve deficit.   Psychiatric:         Mood and Affect: Mood normal.       Vents:  Oxygen Concentration (%): 21 (07/12/22 1946)    Lines/Drains/Airways       Drain  Duration                  Closed/Suction Drain 07/08/22 1439 Left Abdomen Bulb 4 days         Closed/Suction Drain 07/08/22 1439 Superior;Midline;Right Abdomen Bulb 10 Fr. 4 days              Peripheral Intravenous Line  Duration                  Peripheral IV - Single Lumen 07/07/22 1118 18 G Anterior;Right Upper Arm 5 days         Peripheral IV - Single Lumen 07/13/22 0515 20 G Anterior;Proximal;Right Forearm <1 day                    Significant Labs:    CBC/Anemia Profile:  Recent Labs   Lab 07/12/22 0213 07/13/22  0436   WBC 5.31 5.28   HGB 7.7* 8.4*   HCT 22.6* 25.0*   * 163   MCV 92.6 94.0   RDW 15.5* 15.8*        Chemistries:  Recent Labs   Lab 07/12/22 0213 07/13/22  0436    138   K 5.2* 5.6*   * 116*   CO2 14* 17*   BUN 69* 72*   CREATININE 5.98* 6.20*   CALCIUM 7.8* 8.7       All pertinent labs within the past 24 hours have been reviewed.    Significant Imaging:  I have reviewed all pertinent imaging results/findings within the past 24 hours.

## 2022-07-13 NOTE — PROGRESS NOTES
[facility]  Pulmonology  Progress Note    Patient Name: Neymar Parra  MRN: [id number]  Admission Date: 6/16/2022  Hospital Length of Stay: 27 days  Code Status: Full Code  Attending Provider: Silvano Fitzgerald MD  Primary Care Provider: [facility] megan [place]   Principal Problem: Gastrointestinal hemorrhage associated with duodenitis    Subjective:     Interval History: Doing well. Remains NPO. Does have some mild abdominal pain today.     Objective:     Vital Signs (Most Recent):  Temp: 98 °F (36.7 °C) (07/13/22 0701)  Pulse: 86 (07/13/22 0600)  Resp: 12 (07/13/22 0600)  BP: (!) 130/55 (07/13/22 0600)  SpO2: 99 % (07/13/22 0600)   Vital Signs (24h Range):  Temp:  [97.8 °F (36.6 °C)-98.7 °F (37.1 °C)] 98 °F (36.7 °C)  Pulse:  [] 86  Resp:  [9-25] 12  SpO2:  [94 %-100 %] 99 %  BP: (116-150)/(53-86) 130/55     Weight: 101.1 kg (222 lb 14.2 oz)  Body mass index is 30.23 kg/m².      Intake/Output Summary (Last 24 hours) at 7/13/2022 0842  Last data filed at 7/13/2022 0600  Gross per 24 hour   Intake 1424.17 ml   Output 1170 ml   Net 254.17 ml       Physical Exam  Vitals reviewed.   Constitutional:       General: He is not in acute distress.     Appearance: Normal appearance.   HENT:      Head: Normocephalic and atraumatic.      Right Ear: External ear normal.      Left Ear: External ear normal.      Mouth/Throat:      Mouth: Mucous membranes are moist.      Pharynx: Oropharynx is clear.   Eyes:      Conjunctiva/sclera: Conjunctivae normal.      Pupils: Pupils are equal, round, and reactive to light.   Cardiovascular:      Rate and Rhythm: Normal rate and regular rhythm.      Heart sounds: Normal heart sounds.   Pulmonary:      Effort: Pulmonary effort is normal. No respiratory distress.      Breath sounds: Normal breath sounds. No wheezing, rhonchi or rales.   Abdominal:      General: Abdomen is flat.      Palpations: Abdomen is soft.      Tenderness: There is abdominal tenderness. There  is no guarding or rebound.      Comments: Hypoactive bowel sounds. Mild tenderness to palpation   Musculoskeletal:      Cervical back: Neck supple.      Comments: Bilateral UE edema   Lymphadenopathy:      Cervical: No cervical adenopathy.   Skin:     General: Skin is warm and dry.   Neurological:      General: No focal deficit present.      Mental Status: He is alert. Mental status is at baseline.      Cranial Nerves: No cranial nerve deficit.   Psychiatric:         Mood and Affect: Mood normal.       Vents:  Oxygen Concentration (%): 21 (07/12/22 1946)    Lines/Drains/Airways       Drain  Duration                  Closed/Suction Drain 07/08/22 1439 Left Abdomen Bulb 4 days         Closed/Suction Drain 07/08/22 1439 Superior;Midline;Right Abdomen Bulb 10 Fr. 4 days              Peripheral Intravenous Line  Duration                  Peripheral IV - Single Lumen 07/07/22 1118 18 G Anterior;Right Upper Arm 5 days         Peripheral IV - Single Lumen 07/13/22 0515 20 G Anterior;Proximal;Right Forearm <1 day                    Significant Labs:    CBC/Anemia Profile:  Recent Labs   Lab 07/12/22 0213 07/13/22  0436   WBC 5.31 5.28   HGB 7.7* 8.4*   HCT 22.6* 25.0*   * 163   MCV 92.6 94.0   RDW 15.5* 15.8*        Chemistries:  Recent Labs   Lab 07/12/22 0213 07/13/22  0436    138   K 5.2* 5.6*   * 116*   CO2 14* 17*   BUN 69* 72*   CREATININE 5.98* 6.20*   CALCIUM 7.8* 8.7       All pertinent labs within the past 24 hours have been reviewed.    Significant Imaging:  I have reviewed all pertinent imaging results/findings within the past 24 hours.    Assessment/Plan:     * Gastrointestinal hemorrhage associated with duodenitis  - EGD with bleeding duodenal ulcer was clipped   - Repeat EGD 07/05, bleeding duodenal vessel was clipped per GI.  - H. Pylori negative  - Protonix daily  - ongoing bleed with blood clots from bowel and decreasing H&H. Pt became hypotensive, and transferred to ICU for further  monitoring and eval. General surgery, Dr. Rees consulted for eval.   - to OR 7/8  - doing well NGT removed. Try to advance diet today    Acute on chronic renal failure  - Creatinine stable  - Nephrology following  - continues to have non-anion gap acidosis, check urine electrolytes. UAG +14    Hyperkalemia  - persistently elevated, improving. 5.6 today  - D5W    Type 2 diabetes mellitus without complication  - on levemir 8 units and sliding scale  - holding levemir  - D5W for IVF  - discuss advancing diet with surgery, full liquid. If tolerates diet will try to DC IVF    Hypertension  - stable  - remains NPO      Has had floor order for 3 days. Advance diet per surgery. Discharge planning         Silvano Fitzgerald MD  Pulmonology  [facility] ICU

## 2022-07-13 NOTE — PROGRESS NOTES
[facility] - [facility]  Adult Nutrition  Follow-up Note         Reason for Assessment  Reason For Assessment: RD follow-up   Nutrition Risk Screen: no indicators present      Assessment and Plan  Pt is a 72 yoM. Pt transferred out of ICU today. Now advanced to FLD. No note of how well he is eating FLD at this time. Follow intakes. Adding Suplena BID to increase kcal/PRO provision while on FLD and aid in pt meeting estimated nutrient needs.     Monitor for further diet advancement and adjust diet recommendations further as appropriate.      Nutrition Risk  Level of Risk/Frequency of Follow-up: high    Recent Labs   Lab 07/13/22  0436 07/13/22  0525 07/13/22  1125   GLU 81  --   --    POCGLU  --    < > 93    < > = values in this interval not displayed.     Comments on Glucose: GLU WNL    Nutrition Prescription / Recommendations  Recommendation/Intervention: Recommend continuing full liquid diet with diet advancement as tolerated. Add Suplena BID for added kcal/PRO while also limiting K provisions due to CKD   Goals: Advance diet order or initate PPN, weight maintenance  Nutrition Goal Status: new  Current Diet Order: Full Liquid Diet   Recommended Diet: Full liquid (blenderized)  With continued advancement as tolerated   Recommended Oral Supplement: Suplena [420 kcals, 11g Protein, 46g Carbs(6g Fiber, 15g Sugar), 23g Fat] twice daily  Is Nutrition Support Recommended: No  Is Education Recommended: No  Monitor and Evaluation  % current Intake: P.O. intake of 0 - 10%  % intake to meet estimated needs: P.O. + Supplements  Food and Nutrient Intake: energy intake (pt now advanced to FLD, monitor intakes and tolerance)  Anthropometric Measurements: weight, weight change (weight stable at this time, but risk for weight instability present)  Biochemical Data, Medical Tests and Procedures: electrolyte and renal panel, gastrointestinal profile, glucose/endocrine profile, inflammatory profile, lipid  profile     Current Medical Diagnosis and Past Medical History  Diagnosis: other (see comments) (DEO)  Past Medical History:   Diagnosis Date    Anticoagulant long-term use     Chronic renal disease, stage 4, severely decreased glomerular filtration rate (GFR) between 15-29 mL/min/1.73 square meter     Coronary artery disease     stents ~ 2017    COVID-19 6/16/2022    Dieulafoy lesion of duodenum 7/5/2022    Duodenal adenoma     Duodenitis 7/5/2022    HH (hiatus hernia) 7/5/2022    Hypertension      Nutrition/Diet History  Spiritual, Cultural Beliefs, Orthodoxy Practices, Values that Affect Care: no     Lab/Procedures/Meds  Recent Labs   Lab 07/10/22  2139 07/11/22  0240 07/13/22  0436      < > 138   K 5.5*   < > 5.6*   BUN 80*   < > 72*   CREATININE 6.38*   < > 6.20*   CALCIUM 7.9*   < > 8.7   ALBUMIN 1.4*  --   --    *   < > 116*   ALT 30  --   --    AST 25  --   --     < > = values in this interval not displayed.     Last A1c:   Lab Results   Component Value Date    HGBA1C 4.7 06/06/2022     Lab Results   Component Value Date    RBC 2.66 (L) 07/13/2022    HGB 8.4 (L) 07/13/2022    HCT 25.0 (L) 07/13/2022    MCV 94.0 07/13/2022    MCH 31.6 (H) 07/13/2022    MCHC 33.6 07/13/2022    TIBC 172 (L) 06/16/2022     Pertinent Labs Reviewed: reviewed  Pertinent Labs Comments: k 5.6(H)-MD notes chronically high due to transfusions, Cl 116(H-trending down), BUN 72(H), Creat 6.20(H)-CKD V per nephrology  Pertinent Medications Reviewed: reviewed  Pertinent Medications Comments: allopurinol, vitamin C, atorvastatin, docusate sodium, ferrous sulfate, lopressor, MVI, pantoprazole EC, sodium bicarbonate, D5 infusion     Anthropometrics  Temp: 97.8 °F (36.6 °C)  Height Method: Stated  Height: 6' (182.9 cm)  Height (inches): 72 in  Weight Method: Bed Scale  Weight: 101.1 kg (222 lb 14.2 oz)  Weight (lb): 222.89 lb  Ideal Body Weight (IBW), Male: 178 lb  % Ideal Body Weight, Male (lb): 147.63 %  BMI (Calculated):  30.2     Estimated/Assessed Needs  Weight Used For Calorie Calculations: 90 kg (198 lb 6.6 oz)     Energy Calorie Requirements (kcal): 1999-3079 kcals/day (25-30 kcals/kg Adj BW)  Weight Used For Protein Calculations: 90 kg (198 lb 6.6 oz)  Protein Requirements: 72 g/day (0.8 g/kg adj BW)    Fluid Requirements (mL): 0470-8334 mL/day  RDA Method (mL): 2250     Nutrition by Nursing  Diet/Nutrition Received: ice chips  Intake (%): 100%     Diet/Feeding Tolerance: other (see comments)  Last Bowel Movement: 07/10/22    Nutrition Follow-Up  RD Follow-up?: Yes

## 2022-07-13 NOTE — SUBJECTIVE & OBJECTIVE
Interval History: The patient is s/p surgery today.  He is doing acceptable. Serum creatinine is 5.9 which is better.    Review of patient's allergies indicates:   Allergen Reactions    Gatifloxacin Anaphylaxis     Current Facility-Administered Medications   Medication Frequency    0.9%  NaCl infusion (for blood administration) Q24H PRN    0.9%  NaCl infusion (for blood administration) Q24H PRN    acetaminophen tablet 1,000 mg Q6H PRN    acetaminophen tablet 650 mg Q8H PRN    acetaminophen tablet 650 mg Q4H PRN    albuterol inhaler 2 puff Q6H PRN    allopurinol split tablet 50 mg Daily    ascorbic acid (vitamin C) tablet 500 mg Daily    atorvastatin tablet 40 mg QHS    bisacodyL EC tablet 10 mg Daily PRN    calcium gluconate 1 g in NS IVPB (premixed) Q10 Min PRN    cetirizine tablet 10 mg Daily PRN    dextromethorphan-guaiFENesin  mg/5 ml liquid 10 mL Q6H PRN    dextrose 5 % infusion Continuous    dextrose 50% injection 12.5 g PRN    dextrose 50% injection 25 g PRN    ferrous sulfate tablet 1 each Daily    glucagon (human recombinant) injection 1 mg PRN    glucose chewable tablet 16 g PRN    glucose chewable tablet 24 g PRN    hydrALAZINE injection 10 mg Q6H PRN    HYDROcodone-acetaminophen 7.5-325 mg per tablet 1 tablet Q6H PRN    insulin aspart U-100 injection 1-10 Units QID (AC + HS) PRN    insulin detemir U-100 injection 8 Units QHS    melatonin tablet 6 mg Nightly PRN    metoprolol tartrate (LOPRESSOR) tablet 25 mg BID    mineral oil enema 1 enema Daily PRN    morphine injection 4 mg Q4H PRN    morphine injection 6 mg Q4H PRN    multivitamin tablet Daily    naloxone 0.4 mg/mL injection 0.02 mg PRN    ondansetron injection 4 mg Q12H PRN    ondansetron injection 8 mg Q6H PRN    [START ON 7/13/2022] pantoprazole EC tablet 40 mg Daily    simethicone chewable tablet 80 mg TID PRN    sodium bicarbonate tablet 650 mg BID    sodium chloride 0.9% flush 10 mL Q12H PRN    traMADoL tablet 50 mg Q8H PRN    traZODone  tablet 50 mg Nightly PRN       Objective:     Vital Signs (Most Recent):  Temp: 98.1 °F (36.7 °C) (07/12/22 1915)  Pulse: 62 (07/12/22 2000)  Resp: 18 (07/12/22 2051)  BP: 127/62 (07/12/22 2000)  SpO2: 97 % (07/12/22 2000)  O2 Device (Oxygen Therapy): room air (07/12/22 1946) Vital Signs (24h Range):  Temp:  [97.8 °F (36.6 °C)-99.2 °F (37.3 °C)] 98.1 °F (36.7 °C)  Pulse:  [] 62  Resp:  [7-34] 18  SpO2:  [87 %-100 %] 97 %  BP: (107-190)/(49-85) 127/62     Weight: 100.8 kg (222 lb 3.6 oz) (07/12/22 0337)  Body mass index is 30.14 kg/m².  Body surface area is 2.26 meters squared.    I/O last 3 completed shifts:  In: 4262.1 [I.V.:4212.1; IV Piggyback:50]  Out: 1815 [Urine:1600; Drains:215]    Physical Exam  Vitals reviewed.   HENT:      Head: Normocephalic and atraumatic.   Cardiovascular:      Rate and Rhythm: Regular rhythm.   Pulmonary:      Effort: Pulmonary effort is normal.   Abdominal:      Palpations: Abdomen is soft.   Neurological:      General: No focal deficit present.      Mental Status: He is alert.       Significant Labs:  BMP:   Recent Labs   Lab 07/09/22  0554 07/09/22  1848 07/12/22 0213   GLU 78   < > 80      < > 141   K 5.9*   < > 5.2*   *   < > 121*   CO2 12*   < > 14*   *   < > 69*   CREATININE 6.29*   < > 5.98*   CALCIUM 7.7*   < > 7.8*   MG 1.4*  --   --     < > = values in this interval not displayed.     CBC:   Recent Labs   Lab 07/12/22 0213   WBC 5.31   RBC 2.44*   HGB 7.7*   HCT 22.6*   *   MCV 92.6   MCH 31.6*   MCHC 34.1        Significant Imaging:  Labs: Reviewed

## 2022-07-14 LAB
ANION GAP SERPL CALCULATED.3IONS-SCNC: 19 MMOL/L (ref 7–16)
BUN SERPL-MCNC: 69 MG/DL (ref 7–18)
BUN/CREAT SERPL: 11 (ref 6–20)
CALCIUM SERPL-MCNC: 8.3 MG/DL (ref 8.5–10.1)
CHLORIDE SERPL-SCNC: 113 MMOL/L (ref 98–107)
CO2 SERPL-SCNC: 14 MMOL/L (ref 21–32)
CREAT SERPL-MCNC: 6.25 MG/DL (ref 0.7–1.3)
GLUCOSE SERPL-MCNC: 116 MG/DL (ref 70–105)
GLUCOSE SERPL-MCNC: 130 MG/DL (ref 70–105)
GLUCOSE SERPL-MCNC: 64 MG/DL (ref 70–105)
GLUCOSE SERPL-MCNC: 65 MG/DL (ref 70–105)
GLUCOSE SERPL-MCNC: 95 MG/DL (ref 74–106)
POTASSIUM SERPL-SCNC: 5.5 MMOL/L (ref 3.5–5.1)
SODIUM SERPL-SCNC: 140 MMOL/L (ref 136–145)

## 2022-07-14 PROCEDURE — 11000001 HC ACUTE MED/SURG PRIVATE ROOM

## 2022-07-14 PROCEDURE — 99232 PR SUBSEQUENT HOSPITAL CARE,LEVL II: ICD-10-PCS | Mod: ,,, | Performed by: FAMILY MEDICINE

## 2022-07-14 PROCEDURE — 94761 N-INVAS EAR/PLS OXIMETRY MLT: CPT

## 2022-07-14 PROCEDURE — 97116 GAIT TRAINING THERAPY: CPT

## 2022-07-14 PROCEDURE — 99232 SBSQ HOSP IP/OBS MODERATE 35: CPT | Mod: ,,, | Performed by: FAMILY MEDICINE

## 2022-07-14 PROCEDURE — 25000003 PHARM REV CODE 250: Performed by: SURGERY

## 2022-07-14 PROCEDURE — 25000003 PHARM REV CODE 250: Performed by: FAMILY MEDICINE

## 2022-07-14 PROCEDURE — 97110 THERAPEUTIC EXERCISES: CPT

## 2022-07-14 PROCEDURE — 36415 COLL VENOUS BLD VENIPUNCTURE: CPT | Performed by: FAMILY MEDICINE

## 2022-07-14 PROCEDURE — 99900035 HC TECH TIME PER 15 MIN (STAT)

## 2022-07-14 PROCEDURE — 25000003 PHARM REV CODE 250: Performed by: INTERNAL MEDICINE

## 2022-07-14 PROCEDURE — 25000003 PHARM REV CODE 250: Performed by: NURSE PRACTITIONER

## 2022-07-14 PROCEDURE — 97535 SELF CARE MNGMENT TRAINING: CPT

## 2022-07-14 PROCEDURE — 80048 BASIC METABOLIC PNL TOTAL CA: CPT | Performed by: FAMILY MEDICINE

## 2022-07-14 PROCEDURE — 82962 GLUCOSE BLOOD TEST: CPT

## 2022-07-14 RX ADMIN — SODIUM ZIRCONIUM CYCLOSILICATE 10 G: 10 POWDER, FOR SUSPENSION ORAL at 04:07

## 2022-07-14 RX ADMIN — TRAZODONE HYDROCHLORIDE 50 MG: 50 TABLET ORAL at 09:07

## 2022-07-14 RX ADMIN — MELATONIN 6 MG: at 09:07

## 2022-07-14 RX ADMIN — HYDROCODONE BITARTRATE AND ACETAMINOPHEN 1 TABLET: 7.5; 325 TABLET ORAL at 04:07

## 2022-07-14 RX ADMIN — DOCUSATE SODIUM 100 MG: 100 CAPSULE, LIQUID FILLED ORAL at 09:07

## 2022-07-14 RX ADMIN — ALLOPURINOL 50 MG: 300 TABLET ORAL at 09:07

## 2022-07-14 RX ADMIN — OXYCODONE HYDROCHLORIDE AND ACETAMINOPHEN 500 MG: 500 TABLET ORAL at 09:07

## 2022-07-14 RX ADMIN — HYDROCODONE BITARTRATE AND ACETAMINOPHEN 1 TABLET: 7.5; 325 TABLET ORAL at 12:07

## 2022-07-14 RX ADMIN — FERROUS SULFATE TAB 325 MG (65 MG ELEMENTAL FE) 1 EACH: 325 (65 FE) TAB at 09:07

## 2022-07-14 RX ADMIN — SODIUM BICARBONATE 650 MG: 650 TABLET ORAL at 09:07

## 2022-07-14 RX ADMIN — ATORVASTATIN CALCIUM 40 MG: 40 TABLET, FILM COATED ORAL at 09:07

## 2022-07-14 RX ADMIN — METOPROLOL TARTRATE 25 MG: 25 TABLET, FILM COATED ORAL at 09:07

## 2022-07-14 RX ADMIN — THERA TABS 1 TABLET: TAB at 09:07

## 2022-07-14 RX ADMIN — SODIUM POLYSTYRENE SULFONATE 15 G: 15 SUSPENSION ORAL; RECTAL at 09:07

## 2022-07-14 RX ADMIN — PANTOPRAZOLE SODIUM 40 MG: 40 TABLET, DELAYED RELEASE ORAL at 09:07

## 2022-07-14 NOTE — PT/OT/SLP PROGRESS
Physical Therapy Treatment    Patient Name:  Neymar Parra   MRN:  [id number]    Recommendations:     Discharge Recommendations:  nursing facility, skilled, rehabilitation facility   Discharge Equipment Recommendations: none   Barriers to discharge: Decreased caregiver support    Assessment:     Neymar Parra is a 72 y.o. male admitted with a medical diagnosis of Gastrointestinal hemorrhage associated with duodenitis.  He presents with the following impairments/functional limitations:  weakness, gait instability, pain, impaired self care skills, impaired balance Patient still having abdominal pain limiting mobility independent. Able to ambulate with walker in hallways. Needs continued therapy.    Rehab Prognosis: Good; patient would benefit from acute skilled PT services to address these deficits and reach maximum level of function.    Recent Surgery: Procedure(s) (LRB):  LAPAROTOMY, EXPLORATORY (N/A)  VAGOTOMY (N/A)  PYLOROPLASTY (N/A) 6 Days Post-Op    Plan:     During this hospitalization, patient to be seen 5 x/week to address the identified rehab impairments via gait training, therapeutic activities, therapeutic exercises and progress toward the following goals:    · Plan of Care Expires:  08/01/22    Subjective     Chief Complaint: abdominal pain  Patient/Family Comments/goals: [facility]  Pain/Comfort:  · Pain Rating 1: 4/10  · Location 1: abdomen  · Pain Addressed 1: Cessation of Activity      Objective:     Communicated with nurse prior to session.  Patient found supine with blood pressure cuff, peripheral IV, telemetry, SCD, pulse ox (continuous) upon PT entry to room.     General Precautions: Standard, fall   Orthopedic Precautions:N/A   Braces:    Respiratory Status: Room air     Functional Mobility:  · Bed Mobility:     · Supine to Sit: contact guard assistance  · Sit to Supine: minimum assistance  · Transfers:     · Sit to Stand:  minimum assistance with rolling walker  · Gait:  ambulated 90 feet with walker cga, 75% decreased edwige      AM-PAC 6 CLICK MOBILITY  Turning over in bed (including adjusting bedclothes, sheets and blankets)?: 3  Sitting down on and standing up from a chair with arms (e.g., wheelchair, bedside commode, etc.): 3  Moving from lying on back to sitting on the side of the bed?: 3  Moving to and from a bed to a chair (including a wheelchair)?: 3  Need to walk in hospital room?: 3  Climbing 3-5 steps with a railing?: 1  Basic Mobility Total Score: 16       Therapeutic Activities and Exercises:   BLE: aps, hs, saq, abd-add, mre ext 3x10    Patient left up in chair with call button in reach..    GOALS:   Multidisciplinary Problems     Physical Therapy Goals        Problem: Physical Therapy    Goal Priority Disciplines Outcome Goal Variances Interventions   Physical Therapy Goal     PT, PT/OT Ongoing, Progressing     Description: Short Term Goals to be met by: 2022    Patient will increase functional independence with mobility by performin. Supine to sit with independently  2. Sit to stand transfer with independently Rolling walker  3. Bed to chair transfer with independently using Rolling walker  4. Gait  x 100 feet with independently using Rolling walker  5. Lower extremity exercise program x30 reps per handout, with assistance as needed    Long Term Goals to be met by: 2022    Pt will regain full independent functional mobility with Rolling walker to return to home situation and prior activities of daily living.                    Time Tracking:     PT Received On: 22  PT Start Time: 1000     PT Stop Time: 1025  PT Total Time (min): 25 min     Billable Minutes: there ex 15, gait 10      Treatment Type: Treatment  PT/PTA: PT     PTA Visit Number: 0     2022

## 2022-07-14 NOTE — ASSESSMENT & PLAN NOTE
07/07 HCT 19  transfuse 1 more unit of prbc and platelets for low platelet of 57, surgery to follow closely GI to rescope today  Has not had CTA due to renal failure     07/08 repeat EGD treated bleeding again duodenum with injection of epi and clips, hct 21 transfuse 1 more unit PRBC , if further bleeding, consider IR or surgery     07/11/2022 ex lap with oversew bleeding duodenal ulcer POD 3 hct improved with post op prbc transfusion 24, ngt low output, dc when ok with dr ray, ok to transfer out of icu from surgery standpoint    07/12 some abd discomfort continue npo kub    07/13 full liquids as tolerate    07/14 reports some abdominal tighness no n/v tolerating full liquids, no further bleeding frail, needs swing bed, deborah serous

## 2022-07-14 NOTE — ASSESSMENT & PLAN NOTE
- Creatinine stable  - Nephrology following  - continues to have non-anion gap acidosis, check urine electrolytes. UAG +14  7/14  - Cr 6.2  - nephrology following

## 2022-07-14 NOTE — SUBJECTIVE & OBJECTIVE
Interval History: Pt resting in bed. Eating breakfast. Denies any CP or SOB. Abd tenderness noted. No acute distress noted.     Review of Systems   Respiratory:  Negative for cough and shortness of breath.    Cardiovascular:  Negative for chest pain.   Gastrointestinal:  Positive for abdominal pain and nausea.   Objective:     Vital Signs (Most Recent):  Temp: 98 °F (36.7 °C) (07/14/22 1000)  Pulse: 89 (07/14/22 1000)  Resp: 20 (07/14/22 1000)  BP: (!) 134/52 (07/14/22 1000)  SpO2: 98 % (07/14/22 1000)   Vital Signs (24h Range):  Temp:  [97.9 °F (36.6 °C)-98.4 °F (36.9 °C)] 98 °F (36.7 °C)  Pulse:  [] 89  Resp:  [8-40] 20  SpO2:  [96 %-100 %] 98 %  BP: ()/(43-68) 134/52     Weight: 103.4 kg (228 lb)  Body mass index is 30.92 kg/m².    Intake/Output Summary (Last 24 hours) at 7/14/2022 1333  Last data filed at 7/14/2022 0600  Gross per 24 hour   Intake 120 ml   Output 850 ml   Net -730 ml      Physical Exam  Vitals and nursing note reviewed.   Constitutional:       General: He is awake. He is not in acute distress.     Appearance: Normal appearance.   HENT:      Head: Normocephalic.      Mouth/Throat:      Mouth: Mucous membranes are moist.   Eyes:      General: Lids are normal.   Cardiovascular:      Rate and Rhythm: Normal rate and regular rhythm.      Pulses: Normal pulses.           Radial pulses are 2+ on the right side and 2+ on the left side.      Heart sounds: Normal heart sounds.      Comments: Bilateral UE edema   Pulmonary:      Effort: Pulmonary effort is normal.      Breath sounds: Normal breath sounds.   Abdominal:      General: Bowel sounds are normal.      Palpations: Abdomen is soft.      Tenderness: There is abdominal tenderness.          Comments: Midline incision with ALYSON  RLQ and LLQ XIMENA drain    Musculoskeletal:         General: Normal range of motion.      Cervical back: Neck supple.      Right lower leg: Edema present.      Left lower leg: Edema present.   Skin:     General: Skin  is warm and dry.      Capillary Refill: Capillary refill takes less than 2 seconds.   Neurological:      Mental Status: He is alert. Mental status is at baseline.      GCS: GCS eye subscore is 4. GCS verbal subscore is 5. GCS motor subscore is 6.      Sensory: Sensation is intact.      Motor: Motor function is intact.   Psychiatric:         Mood and Affect: Mood normal.         Speech: Speech normal.         Behavior: Behavior is cooperative.       Significant Labs: All pertinent labs within the past 24 hours have been reviewed.  Recent Lab Results         07/14/22  1220   07/14/22  0941   07/14/22  0446   07/13/22  2034        Anion Gap 19             BUN 69             BUN/CREAT RATIO 11             Calcium 8.3             Chloride 113             CO2 14             Creatinine 6.25             eGFR if non  9             Glucose 95             POC Glucose   64   65   83       Potassium 5.5             Sodium 140                     Significant Imaging: I have reviewed all pertinent imaging results/findings within the past 24 hours.

## 2022-07-14 NOTE — ASSESSMENT & PLAN NOTE
- Patient with bleeding duodenal ulcer on EGD 7/2, ulcer clipped and an EGD on 07/05 where bleeding duodenal vessel was clipped  - patient has had a total of 6 units this admission  - GI following, assistance appreciated  -Pt continues to have large amount of bloody bowel movement with clots. H & H 6.3/18.7 (7/7), platelet 35194 and during 1 unit blood transfusion, pt became hypotensive. Given 1 liter bolus and transferred to ICU  And consulted GS, Dr. Rees. Appreciate assistance (7/7)  7/14  - H/H 8/25  - has been stable   - rec'd 16u of PRBCs and 1u of plt

## 2022-07-14 NOTE — ASSESSMENT & PLAN NOTE
- HgA1c 4.7 6/22  - detemir 8U qhs with SSI   - holding levemir  - D5W for IVF  - discuss advancing diet with surgery, full liquid. If tolerates diet will try to DC IVF  7/14  - advanced to full liquids  - will continue to monitor BG trend  - remains on D5W at present   - BG trend 64-93

## 2022-07-14 NOTE — PROGRESS NOTES
[facility] - [address] [place] [address] Medicine  Progress Note    Patient Name: Neymar Parra  MRN: [id number]  Patient Class: IP- Inpatient   Admission Date: 6/16/2022  Length of Stay: 28 days  Attending Physician: Rj Fernandez MD  Primary Care Provider: [facility] megan Rodríguez        Subjective:     Principal Problem:Gastrointestinal hemorrhage associated with duodenitis        HPI:  72 y.o. male transferred to the [facility] from [facility] for hyperkalemia and hypoxia. Pt reports he went to [facility] because he was having dyspnea and hemoptysis since 1 month which is worse today. Pt reports he was tested positive for COVID at the nursing home on 6/9/22. Per nursing home patient oxygen saturation was in the 80's on RA with tachypnea and labored breathing. Pt reports he normally does not wear oxygen at the nursing home but has been wearing oxygen more often recently. Pt reports he smoked 1-1.5 PPD for 20 years but quit 7 months ago. Per pt, he saw a pulmonologist (Dr. Aldana) for his hemoptysis and had some tests done the results of which he does not know. Per records, pt with a right pleural effusion recently and had a thoracentesis. Pt denies any fever, congestion, dysuria, N/V/D, chest pain, or any other complaints at this time.     In the ED, pt's K 7.2, d-dimer 0.76, BNP 6284, BUN/Cr 73/7.16. EKG showed some peaked t-waves and irregular rhythm. Chest xray showed Increased right lower lung pulmonary density could indicate pneumonia. Pt was treated in the ED with Calcium gluconate 1g, regular insulin 10 units, D50, IV solumedrol 125 mg, tylenol 650 mg and IV fluids NS 1L bolus. Pt will be admitted to the hospital service for management of hypoxia and hyperkalemia.      Overview/Hospital Course:  07/04-Patient resting comfortably in bed with no acute complaints. Is not requiring supplemental oxygen today. Denies new rectal bleeding. The patient says he feels better after getting 2 units of pRBC  yesterday, but that he still feels weak. His H&H is stable this morning, will continue to monitor, and as long as it stays stable may be able to discharge tomorrow.    7/14- transferred out of ICU overnight; midline incision with ALYSON drain and bilateral XIMENA drains; full liquid diet per surgery; SS following for placement-- will need SWB       Interval History: Pt resting in bed. Eating breakfast. Denies any CP or SOB. Abd tenderness noted. No acute distress noted.     Review of Systems   Respiratory:  Negative for cough and shortness of breath.    Cardiovascular:  Negative for chest pain.   Gastrointestinal:  Positive for abdominal pain and nausea.   Objective:     Vital Signs (Most Recent):  Temp: 98 °F (36.7 °C) (07/14/22 1000)  Pulse: 89 (07/14/22 1000)  Resp: 20 (07/14/22 1000)  BP: (!) 134/52 (07/14/22 1000)  SpO2: 98 % (07/14/22 1000)   Vital Signs (24h Range):  Temp:  [97.9 °F (36.6 °C)-98.4 °F (36.9 °C)] 98 °F (36.7 °C)  Pulse:  [] 89  Resp:  [8-40] 20  SpO2:  [96 %-100 %] 98 %  BP: ()/(43-68) 134/52     Weight: 103.4 kg (228 lb)  Body mass index is 30.92 kg/m².    Intake/Output Summary (Last 24 hours) at 7/14/2022 1333  Last data filed at 7/14/2022 0600  Gross per 24 hour   Intake 120 ml   Output 850 ml   Net -730 ml      Physical Exam  Vitals and nursing note reviewed.   Constitutional:       General: He is awake. He is not in acute distress.     Appearance: Normal appearance.   HENT:      Head: Normocephalic.      Mouth/Throat:      Mouth: Mucous membranes are moist.   Eyes:      General: Lids are normal.   Cardiovascular:      Rate and Rhythm: Normal rate and regular rhythm.      Pulses: Normal pulses.           Radial pulses are 2+ on the right side and 2+ on the left side.      Heart sounds: Normal heart sounds.      Comments: Bilateral UE edema   Pulmonary:      Effort: Pulmonary effort is normal.      Breath sounds: Normal breath sounds.   Abdominal:      General: Bowel sounds are normal.       Palpations: Abdomen is soft.      Tenderness: There is abdominal tenderness.          Comments: Midline incision with ALYSON  RLQ and LLQ XIMENA drain    Musculoskeletal:         General: Normal range of motion.      Cervical back: Neck supple.      Right lower leg: Edema present.      Left lower leg: Edema present.   Skin:     General: Skin is warm and dry.      Capillary Refill: Capillary refill takes less than 2 seconds.   Neurological:      Mental Status: He is alert. Mental status is at baseline.      GCS: GCS eye subscore is 4. GCS verbal subscore is 5. GCS motor subscore is 6.      Sensory: Sensation is intact.      Motor: Motor function is intact.   Psychiatric:         Mood and Affect: Mood normal.         Speech: Speech normal.         Behavior: Behavior is cooperative.       Significant Labs: All pertinent labs within the past 24 hours have been reviewed.  Recent Lab Results         07/14/22  1220   07/14/22  0941   07/14/22  0446   07/13/22  2034        Anion Gap 19             BUN 69             BUN/CREAT RATIO 11             Calcium 8.3             Chloride 113             CO2 14             Creatinine 6.25             eGFR if non  9             Glucose 95             POC Glucose   64   65   83       Potassium 5.5             Sodium 140                     Significant Imaging: I have reviewed all pertinent imaging results/findings within the past 24 hours.      Assessment/Plan:      * Gastrointestinal hemorrhage associated with duodenitis  - EGD with bleeding duodenal ulcer as above, was clipped   - Repeat EGD 07/05, bleeding duodenal vessel was clipped per GI.  - H. Pylori negative  - Protonix infusion  - GI following, appreciate assistance   -Pt continues to bleed with blood clots from bowel and H &H  Continues to decline. Pt became hypotensive, and transferred to ICU for further monitoring and eval. General surgery, Dr. Rees consulted for eval.   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 [facility]   - ongoing bleed with blood clots from bowel and decreasing H&H. Pt became hypotensive, and transferred to ICU for further monitoring and eval. General surgery, Dr. Rees consulted for eval.   - to OR 7/8  - doing well NGT removed. Try to advance diet today  7/14  - full liquids per surgery  - midline incision with ALYSON and XIMENA drains     Anemia  - Patient with bleeding duodenal ulcer on EGD 7/2, ulcer clipped and an EGD on 07/05 where bleeding duodenal vessel was clipped  - patient has had a total of 6 units this admission  - GI following, assistance appreciated  -Pt continues to have large amount of bloody bowel movement with clots. H & H 6.3/18.7 (7/7), platelet 04223 and during 1 unit blood transfusion, pt became hypotensive. Given 1 liter bolus and transferred to ICU  And consulted GS, Dr. Rees. Appreciate assistance (7/7)  7/14  - H/H 8/25  - has been stable   - rec'd 16u of PRBCs and 1u of plt     Acute on chronic renal failure   - Creatinine stable  - Nephrology following  - continues to have non-anion gap acidosis, check urine electrolytes. UAG +14  7/14  - Cr 6.2  - nephrology following     Hyperkalemia  - 5.4 today  - Improving  - Continue lokelma   - Switch from sodium bicarb to lactated ringers for IVF  7/14  - D5W   - lokelma and kayexelate given  - recheck in AM     Pleural effusion  - Continue to monitor  - Seen by pulmonology, appreciate assistance   - No thoracentesis at this time    Type 2 diabetes mellitus without complication  - HgA1c 4.7 6/22  - detemir 8U qhs with SSI   - holding levemir  - D5W for IVF  - discuss advancing diet with surgery, full liquid. If tolerates diet will try to DC IVF  7/14  - advanced to full liquids  - will continue to monitor BG trend  - remains on D5W at present   - BG trend 64-93       Gastroesophageal reflux disease  Continue protonix infusion  7/14  - transitioned to PO  protonix     Hypertension  Due to GI bleeding, pt became hypotensive  Holding amlodipine, isosorbide mononitrate and beta blocker at this time (7/7)   7/14  - metoprolol has been resumed  - BP stable       VTE Risk Mitigation (From admission, onward)         Ordered     Reason for No Pharmacological VTE Prophylaxis  Once        Question:  Reasons:  Answer:  Risk of Bleeding    06/16/22 0301     IP VTE HIGH RISK PATIENT  Once         06/16/22 0301     Place sequential compression device  Until discontinued         06/16/22 0301                Discharge Planning   EDWARDO:      Code Status: Full Code   Is the patient medically ready for discharge?:     Reason for patient still in hospital (select all that apply): Treatment  Discharge Plan A: Long-term acute care facility (LTAC) (Choice obtained for Specialty)        SRIKANTH Nolen  Department of Hospital Medicine   [address]

## 2022-07-14 NOTE — ASSESSMENT & PLAN NOTE
Due to GI bleeding, pt became hypotensive  Holding amlodipine, isosorbide mononitrate and beta blocker at this time (7/7)   7/14  - metoprolol has been resumed  - BP stable

## 2022-07-14 NOTE — ASSESSMENT & PLAN NOTE
- 5.4 today  - Improving  - Continue lokelma   - Switch from sodium bicarb to lactated ringers for IVF  7/14  - D5W   - lokelma and kayexelate given  - recheck in AM

## 2022-07-14 NOTE — PROGRESS NOTES
[address]  General Surgery  Progress Note    Subjective:     History of Present Illness:  71 y/o nursing home resident with CAD with stents on plavix  Admitted several days ago with melena with multiple units of PRBCs  Three EGD this admission per dr felix with treatment of dieulafoy's lesion with injection of epi and clip   Most recent EGD 2 days ago with clipping of active bleeding of duodenitis visible vessel   [facility]      Post-Op Info:  Procedure(s) (LRB):  LAPAROTOMY, EXPLORATORY (N/A)  VAGOTOMY (N/A)  PYLOROPLASTY (N/A)   6 Days Post-Op     No new subjective & objective note has been filed under this hospital service since the last note was generated.    Assessment/Plan:     * Gastrointestinal hemorrhage associated with duodenitis  07/07 HCT 19  transfuse 1 more unit of prbc and platelets for low platelet of 57, surgery to follow closely GI to rescope today  Has not had CTA due to renal failure     07/08 repeat EGD treated bleeding again duodenum with injection of epi and clips, hct 21 transfuse 1 more unit PRBC , if further bleeding, consider IR or surgery     07/11/2022 ex lap with oversew bleeding duodenal ulcer POD 3 hct improved with post op prbc transfusion 24, ngt low output, dc when ok with dr ray, ok to transfer out of icu from surgery standpoint    07/12 some abd discomfort continue npo kub    07/13 full liquids as tolerate    07/14 reports some abdominal tighness no n/v tolerating full liquids, no further bleeding frail, needs swing bed, deborah serous         Danette Rene, GTP  General Surgery  [address]

## 2022-07-14 NOTE — ASSESSMENT & PLAN NOTE
- EGD with bleeding duodenal ulcer as above, was clipped   - Repeat EGD 07/05, bleeding duodenal vessel was clipped per GI.  - H. Pylori negative  - Protonix infusion  - GI following, appreciate assistance   -Pt continues to bleed with blood clots from bowel and H &H  Continues to decline. Pt became hypotensive, and transferred to ICU for further monitoring and eval. General surgery, Dr. Rees consulted for eval.   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 [facility].   - ongoing bleed with blood clots from bowel and decreasing H&H. Pt became hypotensive, and transferred to ICU for further monitoring and eval. General surgery, Dr. Rees consulted for eval.   - to OR 7/8  - doing well NGT removed. Try to advance diet today  7/14  - full liquids per surgery  - midline incision with ALYSON and XIMENA drains    Xeljanz Counseling: I discussed with the patient the risks of Xeljanz therapy including increased risk of infection, liver issues, headache, diarrhea, or cold symptoms. Live vaccines should be avoided. They were instructed to call if they have any problems.

## 2022-07-14 NOTE — PT/OT/SLP PROGRESS
Occupational Therapy   Treatment    Name: Neymar Parra  MRN: [id number]  Admitting Diagnosis:  Gastrointestinal hemorrhage associated with duodenitis  6 Days Post-Op    Recommendations:     Discharge Recommendations: nursing facility, skilled, rehabilitation facility  Discharge Equipment Recommendations:  other (see comments) (to be determined)  Barriers to discharge:  None    Assessment:     Neymar Parra is a 72 y.o. male with a medical diagnosis of Gastrointestinal hemorrhage associated with duodenitis.  He presents with decline in functional status. Pt agreed to OT treatment. Performance deficits affecting function are weakness, impaired endurance, impaired self care skills.     Rehab Prognosis:  Good; patient would benefit from acute skilled OT services to address these deficits and reach maximum level of function.       Plan:     Patient to be seen 5 x/week to address the above listed problems via self-care/home management, therapeutic activities, therapeutic exercises  · Plan of Care Expires: 07/29/22  · Plan of Care Reviewed with: patient    Subjective     Pain/Comfort:  · Pain Rating 1: 0/10  · Pain Rating Post-Intervention 1: 0/10    Objective:     Communicated with: NIKKI Hall prior to session.  Patient found HOB elevated with XIMENA drain, telemetry upon OT entry to room.    General Precautions: Standard, fall   Orthopedic Precautions:N/A   Braces: N/A  Respiratory Status: Room air     Occupational Performance:     Bed Mobility:    ·      Functional Mobility/Transfers:  ·   · Functional Mobility:     Activities of Daily Living:  · Grooming: Pt brushed teeth with setup. Pt brushed denture with (S). Pt combed hair/beard with min a. Pt washed face with setup .      AMPAC 6 Click ADL:      Treatment & Education:  Pt performed UE strengthening exercises.AAROM x 2 sets of 15 reps (L) shoulder flexion/extension and adduction/abduction.1 lb hand wt x 2 sets of 15 reps (R) shoulder flexion/extension and (L) elbow  and wrist flexion/extension. 2 lb hand wt x 2 sets of 15 reps (R) elbow and wrist flexion/extension. Red theraband x 2 sets of 15 reps (R) elbow extension.  Pt left with (L) UE supported in elevation due to edema.    Patient left HOB elevated with all lines intact, call button in reach and nurse notifiedEducation:      GOALS:   Multidisciplinary Problems     Occupational Therapy Goals        Problem: Occupational Therapy    Goal Priority Disciplines Outcome Interventions   Occupational Therapy Goal     OT, PT/OT Ongoing, Progressing    Description: STG:  Pt will perform grooming with setup  Pt will bathe with Alanna with setup at EOB   Pt will perform UE dressing with Danita  Pt will perform LE dressing with Alanna  Pt will sit EOB x 10 min with SBA   Pt will transfer bed/chair/bsc with CGA with RW  Pt will perform standing task x 3 min with CGA with RW  Pt will tolerate 15 minutes of tx without fatigue      LT.Restore to max I with self care and mobility.                       Time Tracking:     OT Date of Treatment: 22  OT Start Time: 1440  OT Stop Time: 1505  OT Total Time (min): 25 min    Billable Minutes:Self Care/Home Management 10  Therapeutic Exercise 15    OT/MARYA: OT          2022

## 2022-07-15 LAB
ABO + RH BLD: NORMAL
ANION GAP SERPL CALCULATED.3IONS-SCNC: 14 MMOL/L (ref 7–16)
BASOPHILS # BLD AUTO: 0 K/UL (ref 0–0.2)
BASOPHILS NFR BLD AUTO: 0 % (ref 0–1)
BLD PROD TYP BPU: NORMAL
BLOOD UNIT EXPIRATION DATE: NORMAL
BLOOD UNIT TYPE CODE: 5100
BUN SERPL-MCNC: 64 MG/DL (ref 7–18)
BUN/CREAT SERPL: 11 (ref 6–20)
CALCIUM SERPL-MCNC: 8.1 MG/DL (ref 8.5–10.1)
CHLORIDE SERPL-SCNC: 117 MMOL/L (ref 98–107)
CO2 SERPL-SCNC: 15 MMOL/L (ref 21–32)
CREAT SERPL-MCNC: 5.82 MG/DL (ref 0.7–1.3)
CROSSMATCH INTERPRETATION: NORMAL
DIFFERENTIAL METHOD BLD: ABNORMAL
DISPENSE STATUS: NORMAL
EOSINOPHIL # BLD AUTO: 0.1 K/UL (ref 0–0.5)
EOSINOPHIL NFR BLD AUTO: 2.7 % (ref 1–4)
ERYTHROCYTE [DISTWIDTH] IN BLOOD BY AUTOMATED COUNT: 15.9 % (ref 11.5–14.5)
GLUCOSE SERPL-MCNC: 103 MG/DL (ref 74–106)
GLUCOSE SERPL-MCNC: 104 MG/DL (ref 70–105)
GLUCOSE SERPL-MCNC: 125 MG/DL (ref 70–105)
GLUCOSE SERPL-MCNC: 129 MG/DL (ref 70–105)
GLUCOSE SERPL-MCNC: 130 MG/DL (ref 70–105)
HCT VFR BLD AUTO: 20.6 % (ref 40–54)
HCT VFR BLD AUTO: 25.9 % (ref 40–54)
HGB BLD-MCNC: 7.2 G/DL (ref 13.5–18)
HGB BLD-MCNC: 8.9 G/DL (ref 13.5–18)
IMM GRANULOCYTES # BLD AUTO: 0.02 K/UL (ref 0–0.04)
IMM GRANULOCYTES NFR BLD: 0.5 % (ref 0–0.4)
INDIRECT COOMBS: NORMAL
LYMPHOCYTES # BLD AUTO: 0.6 K/UL (ref 1–4.8)
LYMPHOCYTES NFR BLD AUTO: 15.9 % (ref 27–41)
MCH RBC QN AUTO: 32 PG (ref 27–31)
MCHC RBC AUTO-ENTMCNC: 35 G/DL (ref 32–36)
MCV RBC AUTO: 91.6 FL (ref 80–96)
MONOCYTES # BLD AUTO: 0.47 K/UL (ref 0–0.8)
MONOCYTES NFR BLD AUTO: 12.5 % (ref 2–6)
MPC BLD CALC-MCNC: 9.2 FL (ref 9.4–12.4)
NEUTROPHILS # BLD AUTO: 2.58 K/UL (ref 1.8–7.7)
NEUTROPHILS NFR BLD AUTO: 68.4 % (ref 53–65)
NRBC # BLD AUTO: 0.03 X10E3/UL
NRBC, AUTO (.00): 0.8 %
PLATELET # BLD AUTO: 204 K/UL (ref 150–400)
POTASSIUM SERPL-SCNC: 5.2 MMOL/L (ref 3.5–5.1)
RBC # BLD AUTO: 2.25 M/UL (ref 4.6–6.2)
RH BLD: NORMAL
SODIUM SERPL-SCNC: 141 MMOL/L (ref 136–145)
UNIT NUMBER: NORMAL
WBC # BLD AUTO: 3.77 K/UL (ref 4.5–11)

## 2022-07-15 PROCEDURE — 36430 TRANSFUSION BLD/BLD COMPNT: CPT

## 2022-07-15 PROCEDURE — 25000003 PHARM REV CODE 250: Performed by: SURGERY

## 2022-07-15 PROCEDURE — 85014 HEMATOCRIT: CPT | Performed by: FAMILY MEDICINE

## 2022-07-15 PROCEDURE — 85025 COMPLETE CBC W/AUTO DIFF WBC: CPT | Performed by: NURSE PRACTITIONER

## 2022-07-15 PROCEDURE — 25000003 PHARM REV CODE 250: Performed by: INTERNAL MEDICINE

## 2022-07-15 PROCEDURE — 25000003 PHARM REV CODE 250: Performed by: NURSE PRACTITIONER

## 2022-07-15 PROCEDURE — 36415 COLL VENOUS BLD VENIPUNCTURE: CPT | Performed by: NURSE PRACTITIONER

## 2022-07-15 PROCEDURE — P9016 RBC LEUKOCYTES REDUCED: HCPCS | Performed by: NURSE PRACTITIONER

## 2022-07-15 PROCEDURE — 82962 GLUCOSE BLOOD TEST: CPT

## 2022-07-15 PROCEDURE — 99232 PR SUBSEQUENT HOSPITAL CARE,LEVL II: ICD-10-PCS | Mod: ,,, | Performed by: FAMILY MEDICINE

## 2022-07-15 PROCEDURE — 80048 BASIC METABOLIC PNL TOTAL CA: CPT | Performed by: FAMILY MEDICINE

## 2022-07-15 PROCEDURE — 99232 SBSQ HOSP IP/OBS MODERATE 35: CPT | Mod: ,,, | Performed by: FAMILY MEDICINE

## 2022-07-15 PROCEDURE — 86923 COMPATIBILITY TEST ELECTRIC: CPT | Performed by: NURSE PRACTITIONER

## 2022-07-15 PROCEDURE — 11000001 HC ACUTE MED/SURG PRIVATE ROOM

## 2022-07-15 PROCEDURE — 97110 THERAPEUTIC EXERCISES: CPT

## 2022-07-15 PROCEDURE — 86850 RBC ANTIBODY SCREEN: CPT | Performed by: NURSE PRACTITIONER

## 2022-07-15 PROCEDURE — 36415 COLL VENOUS BLD VENIPUNCTURE: CPT | Performed by: FAMILY MEDICINE

## 2022-07-15 RX ORDER — DOCUSATE SODIUM 100 MG/1
100 CAPSULE, LIQUID FILLED ORAL 2 TIMES DAILY
Status: DISCONTINUED | OUTPATIENT
Start: 2022-07-15 | End: 2022-07-20 | Stop reason: HOSPADM

## 2022-07-15 RX ORDER — HYDROCODONE BITARTRATE AND ACETAMINOPHEN 500; 5 MG/1; MG/1
TABLET ORAL
Status: DISCONTINUED | OUTPATIENT
Start: 2022-07-15 | End: 2022-07-20 | Stop reason: HOSPADM

## 2022-07-15 RX ADMIN — SODIUM ZIRCONIUM CYCLOSILICATE 10 G: 10 POWDER, FOR SUSPENSION ORAL at 09:07

## 2022-07-15 RX ADMIN — ALLOPURINOL 50 MG: 300 TABLET ORAL at 09:07

## 2022-07-15 RX ADMIN — TRAMADOL HYDROCHLORIDE 50 MG: 50 TABLET, COATED ORAL at 09:07

## 2022-07-15 RX ADMIN — SODIUM CHLORIDE: 9 INJECTION, SOLUTION INTRAVENOUS at 11:07

## 2022-07-15 RX ADMIN — OXYCODONE HYDROCHLORIDE AND ACETAMINOPHEN 500 MG: 500 TABLET ORAL at 09:07

## 2022-07-15 RX ADMIN — ATORVASTATIN CALCIUM 40 MG: 40 TABLET, FILM COATED ORAL at 09:07

## 2022-07-15 RX ADMIN — PANTOPRAZOLE SODIUM 40 MG: 40 TABLET, DELAYED RELEASE ORAL at 09:07

## 2022-07-15 RX ADMIN — DOCUSATE SODIUM 100 MG: 100 CAPSULE, LIQUID FILLED ORAL at 09:07

## 2022-07-15 RX ADMIN — FERROUS SULFATE TAB 325 MG (65 MG ELEMENTAL FE) 1 EACH: 325 (65 FE) TAB at 09:07

## 2022-07-15 RX ADMIN — METOPROLOL TARTRATE 25 MG: 25 TABLET, FILM COATED ORAL at 09:07

## 2022-07-15 RX ADMIN — THERA TABS 1 TABLET: TAB at 09:07

## 2022-07-15 RX ADMIN — SODIUM BICARBONATE 650 MG: 650 TABLET ORAL at 09:07

## 2022-07-15 RX ADMIN — HYDROCODONE BITARTRATE AND ACETAMINOPHEN 1 TABLET: 7.5; 325 TABLET ORAL at 04:07

## 2022-07-15 RX ADMIN — HYDROCODONE BITARTRATE AND ACETAMINOPHEN 1 TABLET: 7.5; 325 TABLET ORAL at 10:07

## 2022-07-15 NOTE — ASSESSMENT & PLAN NOTE
- HgA1c 4.7 6/22  - detemir 8U qhs with SSI   - holding levemir  - D5W for IVF  - discuss advancing diet with surgery, full liquid. If tolerates diet will try to DC IVF  7/14  - advanced to full liquids  - will continue to monitor BG trend  - remains on D5W at present   - BG trend 64-93   7/15  - BG trend 103-130

## 2022-07-15 NOTE — ASSESSMENT & PLAN NOTE
Creatinine improved to 5.8  7/11/2022 Serum creatinine is 6.2 which has been stable.  7/12/2022  Today, serum creatinine is 5.9  7/14/2022 The patient with CKD.  No uremic symptoms.  Appears stronger today.

## 2022-07-15 NOTE — SUBJECTIVE & OBJECTIVE
Interval History: The patient is sitting up resting.  He appears to be more comfortable.  Serum creatinine is 5.8.    Review of patient's allergies indicates:   Allergen Reactions    Gatifloxacin Anaphylaxis     Current Facility-Administered Medications   Medication Frequency    0.9%  NaCl infusion (for blood administration) Q24H PRN    0.9%  NaCl infusion (for blood administration) Q24H PRN    0.9%  NaCl infusion (for blood administration) Q24H PRN    acetaminophen tablet 1,000 mg Q6H PRN    acetaminophen tablet 650 mg Q8H PRN    acetaminophen tablet 650 mg Q4H PRN    albuterol inhaler 2 puff Q6H PRN    allopurinol split tablet 50 mg Daily    ascorbic acid (vitamin C) tablet 500 mg Daily    atorvastatin tablet 40 mg QHS    bisacodyL EC tablet 10 mg Daily PRN    calcium gluconate 1 g in NS IVPB (premixed) Q10 Min PRN    cetirizine tablet 10 mg Daily PRN    dextromethorphan-guaiFENesin  mg/5 ml liquid 10 mL Q6H PRN    dextrose 5 % infusion Continuous    dextrose 50% injection 12.5 g PRN    dextrose 50% injection 25 g PRN    docusate sodium capsule 100 mg Daily    ferrous sulfate tablet 1 each Daily    glucagon (human recombinant) injection 1 mg PRN    glucose chewable tablet 16 g PRN    glucose chewable tablet 24 g PRN    hydrALAZINE injection 10 mg Q6H PRN    HYDROcodone-acetaminophen 7.5-325 mg per tablet 1 tablet Q6H PRN    insulin aspart U-100 injection 1-10 Units QID (AC + HS) PRN    melatonin tablet 6 mg Nightly PRN    metoprolol tartrate (LOPRESSOR) tablet 25 mg BID    mineral oil enema 1 enema Daily PRN    morphine injection 4 mg Q4H PRN    morphine injection 6 mg Q4H PRN    multivitamin tablet Daily    naloxone 0.4 mg/mL injection 0.02 mg PRN    ondansetron injection 4 mg Q12H PRN    ondansetron injection 8 mg Q6H PRN    pantoprazole EC tablet 40 mg Daily    simethicone chewable tablet 80 mg TID PRN    sodium bicarbonate tablet 650 mg BID    sodium chloride 0.9% flush 10 mL Q12H PRN    traMADoL tablet  50 mg Q8H PRN    traZODone tablet 50 mg Nightly PRN       Objective:     Vital Signs (Most Recent):  Temp: 98 °F (36.7 °C) (07/15/22 0705)  Pulse: 68 (07/15/22 0705)  Resp: 20 (07/15/22 0908)  BP: (!) 102/51 (07/15/22 0705)  SpO2: 96 % (07/15/22 0705)  O2 Device (Oxygen Therapy): room air (07/15/22 0705)   Vital Signs (24h Range):  Temp:  [97.9 °F (36.6 °C)-98.8 °F (37.1 °C)] 98 °F (36.7 °C)  Pulse:  [62-85] 68  Resp:  [17-20] 20  SpO2:  [90 %-97 %] 96 %  BP: (102-169)/(49-73) 102/51     Weight: 103.4 kg (228 lb) (07/14/22 0543)  Body mass index is 30.92 kg/m².  Body surface area is 2.29 meters squared.    I/O last 3 completed shifts:  In: 520 [P.O.:520]  Out: 901 [Urine:800; Drains:100; Stool:1]    Physical Exam  Vitals reviewed.   HENT:      Head: Normocephalic and atraumatic.   Eyes:      Pupils: Pupils are equal, round, and reactive to light.   Cardiovascular:      Rate and Rhythm: Regular rhythm.   Pulmonary:      Effort: Pulmonary effort is normal.   Abdominal:      Palpations: Abdomen is soft.   Neurological:      Mental Status: He is alert.   Psychiatric:         Mood and Affect: Mood normal.       Significant Labs:  BMP:   Recent Labs   Lab 07/09/22  0554 07/09/22  1848 07/15/22  0249   GLU 78   < > 103      < > 141   K 5.9*   < > 5.2*   *   < > 117*   CO2 12*   < > 15*   *   < > 64*   CREATININE 6.29*   < > 5.82*   CALCIUM 7.7*   < > 8.1*   MG 1.4*  --   --     < > = values in this interval not displayed.     CBC:   Recent Labs   Lab 07/15/22  0249   WBC 3.77*   RBC 2.25*   HGB 7.2*   HCT 20.6*      MCV 91.6   MCH 32.0*   MCHC 35.0        Significant Imaging:  Labs: Reviewed

## 2022-07-15 NOTE — PROGRESS NOTES
[address]  General Surgery  Progress Note    Subjective:     History of Present Illness:  73 y/o nursing home resident with CAD with stents on plavix  Admitted several days ago with melena with multiple units of PRBCs  Three EGD this admission per dr felix with treatment of dieulafoy's lesion with injection of epi and clip   Most recent EGD 2 days ago with clipping of active bleeding of duodenitis visible vessel   [facility]      Post-Op Info:  Procedure(s) (LRB):  LAPAROTOMY, EXPLORATORY (N/A)  VAGOTOMY (N/A)  PYLOROPLASTY (N/A)   7 Days Post-Op     No new subjective & objective note has been filed under this hospital service since the last note was generated.    Assessment/Plan:     * Gastrointestinal hemorrhage associated with duodenitis  07/07 HCT 19  transfuse 1 more unit of prbc and platelets for low platelet of 57, surgery to follow closely GI to rescope today  Has not had CTA due to renal failure     07/08 repeat EGD treated bleeding again duodenum with injection of epi and clips, hct 21 transfuse 1 more unit PRBC , if further bleeding, consider IR or surgery     07/11/2022 ex lap with oversew bleeding duodenal ulcer POD 3 hct improved with post op prbc transfusion 24, ngt low output, dc when ok with dr ray, ok to transfer out of icu from surgery standpoint    07/12 some abd discomfort continue npo kub    07/13 full liquids as tolerate    07/14 reports some abdominal tighness no n/v tolerating full liquids, no further bleeding frail, needs swing bed, deborah serous     07/15 deborah serous, travis dsg with seal, tolerating full liquids, does not want to advance diet no further bleeding        Danette Rene, GTP  General Surgery  [address]

## 2022-07-15 NOTE — ASSESSMENT & PLAN NOTE
- Creatinine stable  - Nephrology following  - continues to have non-anion gap acidosis, check urine electrolytes. UAG +14  7/14  - Cr 6.2  - nephrology following   7/15  - Cr 5.8

## 2022-07-15 NOTE — PLAN OF CARE
Spoke with zafar hackett at [facility] [phone], pt will need PCR covid before family can come sign papers, notified amira np and she reports that pt is still acutley ill and not ready for dc yet, PCR will be done once pt more stable, ss to f/u with zafar at [facility] next week

## 2022-07-15 NOTE — PROGRESS NOTES
[facility] [place] - [address] [place] [address]  Nephrology  Progress Note    Patient Name: Neymar Parra  MRN: [id number]  Admission Date: 6/16/2022  Hospital Length of Stay: 28 days  Attending Provider: Rj Fernandez MD   Primary Care Physician: [facility] megan Robertlb  Principal Problem:Gastrointestinal hemorrhage associated with duodenitis    Subjective:     HPI: This patient with history of HTN, DM, CKD who has seen Dr. Crowder in the past 2 years ago is currently in a nursing facility.  The patient presented with SOB and diagnosed with COVID.  Serum creatinine has trended up to 7.15.  Serum potassium is 6.4.  Nephrology has been consulted for renal issues.  At the bedside, the patient mentions feeling fine.  He states that his breathing has improved.      Interval History: The patient is resting.  He is more comfortable today.  No other changes.    Review of patient's allergies indicates:   Allergen Reactions    Gatifloxacin Anaphylaxis     Current Facility-Administered Medications   Medication Frequency    0.9%  NaCl infusion (for blood administration) Q24H PRN    0.9%  NaCl infusion (for blood administration) Q24H PRN    acetaminophen tablet 1,000 mg Q6H PRN    acetaminophen tablet 650 mg Q8H PRN    acetaminophen tablet 650 mg Q4H PRN    albuterol inhaler 2 puff Q6H PRN    allopurinol split tablet 50 mg Daily    ascorbic acid (vitamin C) tablet 500 mg Daily    atorvastatin tablet 40 mg QHS    bisacodyL EC tablet 10 mg Daily PRN    calcium gluconate 1 g in NS IVPB (premixed) Q10 Min PRN    cetirizine tablet 10 mg Daily PRN    dextromethorphan-guaiFENesin  mg/5 ml liquid 10 mL Q6H PRN    dextrose 5 % infusion Continuous    dextrose 50% injection 12.5 g PRN    dextrose 50% injection 25 g PRN    docusate sodium capsule 100 mg Daily    ferrous sulfate tablet 1 each Daily    glucagon (human recombinant) injection 1 mg PRN    glucose chewable tablet 16 g PRN    glucose chewable  tablet 24 g PRN    hydrALAZINE injection 10 mg Q6H PRN    HYDROcodone-acetaminophen 7.5-325 mg per tablet 1 tablet Q6H PRN    insulin aspart U-100 injection 1-10 Units QID (AC + HS) PRN    melatonin tablet 6 mg Nightly PRN    metoprolol tartrate (LOPRESSOR) tablet 25 mg BID    mineral oil enema 1 enema Daily PRN    morphine injection 4 mg Q4H PRN    morphine injection 6 mg Q4H PRN    multivitamin tablet Daily    naloxone 0.4 mg/mL injection 0.02 mg PRN    ondansetron injection 4 mg Q12H PRN    ondansetron injection 8 mg Q6H PRN    pantoprazole EC tablet 40 mg Daily    simethicone chewable tablet 80 mg TID PRN    sodium bicarbonate tablet 650 mg BID    sodium chloride 0.9% flush 10 mL Q12H PRN    traMADoL tablet 50 mg Q8H PRN    traZODone tablet 50 mg Nightly PRN       Objective:     Vital Signs (Most Recent):  Temp: 98 °F (36.7 °C) (07/14/22 1636)  Pulse: 85 (07/14/22 1636)  Resp: 18 (07/14/22 1657)  BP: (!) 169/65 (07/14/22 1636)  SpO2: (!) 90 % (07/14/22 1636)  O2 Device (Oxygen Therapy): room air (07/14/22 0748)   Vital Signs (24h Range):  Temp:  [97.9 °F (36.6 °C)-98.4 °F (36.9 °C)] 98 °F (36.7 °C)  Pulse:  [] 85  Resp:  [18-25] 18  SpO2:  [90 %-98 %] 90 %  BP: (113-169)/(43-65) 169/65     Weight: 103.4 kg (228 lb) (07/14/22 0543)  Body mass index is 30.92 kg/m².  Body surface area is 2.29 meters squared.    I/O last 3 completed shifts:  In: 120 [P.O.:120]  Out: 850 [Urine:750; Drains:100]    Physical Exam  Vitals reviewed.   HENT:      Head: Atraumatic.   Cardiovascular:      Rate and Rhythm: Regular rhythm.   Pulmonary:      Effort: Pulmonary effort is normal.   Abdominal:      Palpations: Abdomen is soft.   Neurological:      Mental Status: He is alert.   Psychiatric:         Mood and Affect: Mood normal.       Significant Labs:  BMP:   Recent Labs   Lab 07/09/22  0554 07/09/22  1848 07/14/22  1220   GLU 78   < > 95      < > 140   K 5.9*   < > 5.5*   *   < > 113*   CO2 12*    < > 14*   *   < > 69*   CREATININE 6.29*   < > 6.25*   CALCIUM 7.7*   < > 8.3*   MG 1.4*  --   --     < > = values in this interval not displayed.     CBC:   Recent Labs   Lab 07/13/22  0436   WBC 5.28   RBC 2.66*   HGB 8.4*   HCT 25.0*      MCV 94.0   MCH 31.6*   MCHC 33.6        Significant Imaging:  Labs: Reviewed    Assessment/Plan:     Acute on chronic renal failure  Creatinine improved to 5.8  7/11/2022 Serum creatinine is 6.2 which has been stable.  7/12/2022  Today, serum creatinine is 5.9  7/14/2022 The patient with CKD.  No uremic symptoms.  Appears stronger today.    Type 2 diabetes mellitus without complication  Chronic condition        Thank you for your consult. I will follow-up with patient. Please contact us if you have any additional questions.    Jorge Butts Jr, MD  Nephrology  [address]

## 2022-07-15 NOTE — ASSESSMENT & PLAN NOTE
- Patient with bleeding duodenal ulcer on EGD 7/2, ulcer clipped and an EGD on 07/05 where bleeding duodenal vessel was clipped  - patient has had a total of 6 units this admission  - GI following, assistance appreciated  -Pt continues to have large amount of bloody bowel movement with clots. H & H 6.3/18.7 (7/7), platelet 19891 and during 1 unit blood transfusion, pt became hypotensive. Given 1 liter bolus and transferred to ICU  And consulted GS, Dr. Rees. Appreciate assistance (7/7)  7/14  - H/H 8/25  - has been stable   - rec'd 16u of PRBCs and 1u of plt   7/15  - H/H decreased this AM; 7/20  - transfuse 1u PRBCs

## 2022-07-15 NOTE — PT/OT/SLP PROGRESS
Physical Therapy      Patient Name:  Neymar Parra   MRN:  [id number]    Patient not seen today secondary to Patient unwilling to participate in am post OT treatment and Blood transfusion infusing in pm. Will follow-up Monday 7/18/2022.

## 2022-07-15 NOTE — SUBJECTIVE & OBJECTIVE
Interval History: The patient is resting.  He is more comfortable today.  No other changes.    Review of patient's allergies indicates:   Allergen Reactions    Gatifloxacin Anaphylaxis     Current Facility-Administered Medications   Medication Frequency    0.9%  NaCl infusion (for blood administration) Q24H PRN    0.9%  NaCl infusion (for blood administration) Q24H PRN    acetaminophen tablet 1,000 mg Q6H PRN    acetaminophen tablet 650 mg Q8H PRN    acetaminophen tablet 650 mg Q4H PRN    albuterol inhaler 2 puff Q6H PRN    allopurinol split tablet 50 mg Daily    ascorbic acid (vitamin C) tablet 500 mg Daily    atorvastatin tablet 40 mg QHS    bisacodyL EC tablet 10 mg Daily PRN    calcium gluconate 1 g in NS IVPB (premixed) Q10 Min PRN    cetirizine tablet 10 mg Daily PRN    dextromethorphan-guaiFENesin  mg/5 ml liquid 10 mL Q6H PRN    dextrose 5 % infusion Continuous    dextrose 50% injection 12.5 g PRN    dextrose 50% injection 25 g PRN    docusate sodium capsule 100 mg Daily    ferrous sulfate tablet 1 each Daily    glucagon (human recombinant) injection 1 mg PRN    glucose chewable tablet 16 g PRN    glucose chewable tablet 24 g PRN    hydrALAZINE injection 10 mg Q6H PRN    HYDROcodone-acetaminophen 7.5-325 mg per tablet 1 tablet Q6H PRN    insulin aspart U-100 injection 1-10 Units QID (AC + HS) PRN    melatonin tablet 6 mg Nightly PRN    metoprolol tartrate (LOPRESSOR) tablet 25 mg BID    mineral oil enema 1 enema Daily PRN    morphine injection 4 mg Q4H PRN    morphine injection 6 mg Q4H PRN    multivitamin tablet Daily    naloxone 0.4 mg/mL injection 0.02 mg PRN    ondansetron injection 4 mg Q12H PRN    ondansetron injection 8 mg Q6H PRN    pantoprazole EC tablet 40 mg Daily    simethicone chewable tablet 80 mg TID PRN    sodium bicarbonate tablet 650 mg BID    sodium chloride 0.9% flush 10 mL Q12H PRN    traMADoL tablet 50 mg Q8H PRN    traZODone tablet 50 mg Nightly PRN       Objective:     Vital  Signs (Most Recent):  Temp: 98 °F (36.7 °C) (07/14/22 1636)  Pulse: 85 (07/14/22 1636)  Resp: 18 (07/14/22 1657)  BP: (!) 169/65 (07/14/22 1636)  SpO2: (!) 90 % (07/14/22 1636)  O2 Device (Oxygen Therapy): room air (07/14/22 0748)   Vital Signs (24h Range):  Temp:  [97.9 °F (36.6 °C)-98.4 °F (36.9 °C)] 98 °F (36.7 °C)  Pulse:  [] 85  Resp:  [18-25] 18  SpO2:  [90 %-98 %] 90 %  BP: (113-169)/(43-65) 169/65     Weight: 103.4 kg (228 lb) (07/14/22 0543)  Body mass index is 30.92 kg/m².  Body surface area is 2.29 meters squared.    I/O last 3 completed shifts:  In: 120 [P.O.:120]  Out: 850 [Urine:750; Drains:100]    Physical Exam  Vitals reviewed.   HENT:      Head: Atraumatic.   Cardiovascular:      Rate and Rhythm: Regular rhythm.   Pulmonary:      Effort: Pulmonary effort is normal.   Abdominal:      Palpations: Abdomen is soft.   Neurological:      Mental Status: He is alert.   Psychiatric:         Mood and Affect: Mood normal.       Significant Labs:  BMP:   Recent Labs   Lab 07/09/22  0554 07/09/22  1848 07/14/22  1220   GLU 78   < > 95      < > 140   K 5.9*   < > 5.5*   *   < > 113*   CO2 12*   < > 14*   *   < > 69*   CREATININE 6.29*   < > 6.25*   CALCIUM 7.7*   < > 8.3*   MG 1.4*  --   --     < > = values in this interval not displayed.     CBC:   Recent Labs   Lab 07/13/22  0436   WBC 5.28   RBC 2.66*   HGB 8.4*   HCT 25.0*      MCV 94.0   MCH 31.6*   MCHC 33.6        Significant Imaging:  Labs: Reviewed

## 2022-07-15 NOTE — PROGRESS NOTES
[facility] - [address] [place] [address]  Nephrology  Progress Note    Patient Name: Neymar Parra  MRN: [id number]  Admission Date: 6/16/2022  Hospital Length of Stay: 29 days  Attending Provider: Rj Fernandez MD   Primary Care Physician: [facility]  Principal Problem:Gastrointestinal hemorrhage associated with duodenitis    Subjective:     HPI: This patient with history of HTN, DM, CKD who has seen Dr. Crowder in the past 2 years ago is currently in a nursing facility.  The patient presented with SOB and diagnosed with COVID.  Serum creatinine has trended up to 7.15.  Serum potassium is 6.4.  Nephrology has been consulted for renal issues.  At the bedside, the patient mentions feeling fine.  He states that his breathing has improved.      Interval History: The patient is sitting up resting.  He appears to be more comfortable.  Serum creatinine is 5.8.    Review of patient's allergies indicates:   Allergen Reactions    Gatifloxacin Anaphylaxis     Current Facility-Administered Medications   Medication Frequency    0.9%  NaCl infusion (for blood administration) Q24H PRN    0.9%  NaCl infusion (for blood administration) Q24H PRN    0.9%  NaCl infusion (for blood administration) Q24H PRN    acetaminophen tablet 1,000 mg Q6H PRN    acetaminophen tablet 650 mg Q8H PRN    acetaminophen tablet 650 mg Q4H PRN    albuterol inhaler 2 puff Q6H PRN    allopurinol split tablet 50 mg Daily    ascorbic acid (vitamin C) tablet 500 mg Daily    atorvastatin tablet 40 mg QHS    bisacodyL EC tablet 10 mg Daily PRN    calcium gluconate 1 g in NS IVPB (premixed) Q10 Min PRN    cetirizine tablet 10 mg Daily PRN    dextromethorphan-guaiFENesin  mg/5 ml liquid 10 mL Q6H PRN    dextrose 5 % infusion Continuous    dextrose 50% injection 12.5 g PRN    dextrose 50% injection 25 g PRN    docusate sodium capsule 100 mg Daily    ferrous sulfate tablet 1 each Daily    glucagon (human  recombinant) injection 1 mg PRN    glucose chewable tablet 16 g PRN    glucose chewable tablet 24 g PRN    hydrALAZINE injection 10 mg Q6H PRN    HYDROcodone-acetaminophen 7.5-325 mg per tablet 1 tablet Q6H PRN    insulin aspart U-100 injection 1-10 Units QID (AC + HS) PRN    melatonin tablet 6 mg Nightly PRN    metoprolol tartrate (LOPRESSOR) tablet 25 mg BID    mineral oil enema 1 enema Daily PRN    morphine injection 4 mg Q4H PRN    morphine injection 6 mg Q4H PRN    multivitamin tablet Daily    naloxone 0.4 mg/mL injection 0.02 mg PRN    ondansetron injection 4 mg Q12H PRN    ondansetron injection 8 mg Q6H PRN    pantoprazole EC tablet 40 mg Daily    simethicone chewable tablet 80 mg TID PRN    sodium bicarbonate tablet 650 mg BID    sodium chloride 0.9% flush 10 mL Q12H PRN    traMADoL tablet 50 mg Q8H PRN    traZODone tablet 50 mg Nightly PRN       Objective:     Vital Signs (Most Recent):  Temp: 98 °F (36.7 °C) (07/15/22 0705)  Pulse: 68 (07/15/22 0705)  Resp: 20 (07/15/22 0908)  BP: (!) 102/51 (07/15/22 0705)  SpO2: 96 % (07/15/22 0705)  O2 Device (Oxygen Therapy): room air (07/15/22 0705)   Vital Signs (24h Range):  Temp:  [97.9 °F (36.6 °C)-98.8 °F (37.1 °C)] 98 °F (36.7 °C)  Pulse:  [62-85] 68  Resp:  [17-20] 20  SpO2:  [90 %-97 %] 96 %  BP: (102-169)/(49-73) 102/51     Weight: 103.4 kg (228 lb) (07/14/22 0543)  Body mass index is 30.92 kg/m².  Body surface area is 2.29 meters squared.    I/O last 3 completed shifts:  In: 520 [P.O.:520]  Out: 901 [Urine:800; Drains:100; Stool:1]    Physical Exam  Vitals reviewed.   HENT:      Head: Normocephalic and atraumatic.   Eyes:      Pupils: Pupils are equal, round, and reactive to light.   Cardiovascular:      Rate and Rhythm: Regular rhythm.   Pulmonary:      Effort: Pulmonary effort is normal.   Abdominal:      Palpations: Abdomen is soft.   Neurological:      Mental Status: He is alert.   Psychiatric:         Mood and Affect: Mood normal.        Significant Labs:  BMP:   Recent Labs   Lab 07/09/22  0554 07/09/22  1848 07/15/22  0249   GLU 78   < > 103      < > 141   K 5.9*   < > 5.2*   *   < > 117*   CO2 12*   < > 15*   *   < > 64*   CREATININE 6.29*   < > 5.82*   CALCIUM 7.7*   < > 8.1*   MG 1.4*  --   --     < > = values in this interval not displayed.     CBC:   Recent Labs   Lab 07/15/22  0249   WBC 3.77*   RBC 2.25*   HGB 7.2*   HCT 20.6*      MCV 91.6   MCH 32.0*   MCHC 35.0        Significant Imaging:  Labs: Reviewed    Assessment/Plan:     Acute on chronic renal failure  Creatinine improved to 5.8  7/11/2022 Serum creatinine is 6.2 which has been stable.  7/12/2022  Today, serum creatinine is 5.9  7/14/2022 The patient with CKD.  No uremic symptoms.  Appears stronger today.  7/15/2022  CKD V.  Creatinine is 5.8 today.    Type 2 diabetes mellitus without complication  Chronic condition    Hypertension  Controlled        Thank you for your consult. I will follow-up with patient. Please contact us if you have any additional questions.    Jorge Butts Jr, MD  Nephrology  [facility] - [address] [place] [address]

## 2022-07-15 NOTE — PT/OT/SLP PROGRESS
Occupational Therapy   Treatment    Name: Neymar Parra  MRN: [id number]  Admitting Diagnosis:  Gastrointestinal hemorrhage associated with duodenitis  7 Days Post-Op    Recommendations:     Discharge Recommendations: rehabilitation facility, nursing facility, skilled  Discharge Equipment Recommendations:  none  Barriers to discharge:  None    Assessment:     Neymar Parra is a 72 y.o. male with a medical diagnosis of Gastrointestinal hemorrhage associated with duodenitis.  He presents with weakness and decline with ADLs. Performance deficits affecting function are weakness, impaired endurance, impaired self care skills, impaired functional mobility, impaired balance, decreased upper extremity function, decreased lower extremity function, decreased safety awareness.     Rehab Prognosis:  Fair; patient would benefit from acute skilled OT services to address these deficits and reach maximum level of function.       Plan:     Patient to be seen 5 x/week to address the above listed problems via self-care/home management, therapeutic activities, therapeutic exercises  · Plan of Care Expires: 07/29/22  · Plan of Care Reviewed with: patient    Subjective     Pain/Comfort:  · Pain Rating 1: 0/10  · Pain Rating Post-Intervention 1: 0/10    Objective:     Communicated with: Nurse prior to session.  Patient found HOB elevated with peripheral IV upon OT entry to room.    General Precautions: Standard, fall   Orthopedic Precautions:N/A   Braces: N/A     Occupational Performance:     Bed Mobility:    · Patient completed Rolling/Turning to Left with  moderate assistance  · Patient completed Rolling/Turning to Right with moderate assistance  · Patient completed Scooting/Bridging with moderate assistance     Functional Mobility/Transfers:  · Not tested  · Functional Mobility: n/a    Activities of Daily Living:  · Not tested      [facility] 6 Click ADL:      Treatment & Education:  Pt performed B UE strengthening exercises to  include:   Shoulder flexion (R UE only)   Chest press (R UE only)   Elbow flexion   Elbow extension   Bilateral rowing  All exercises performed 3x10 reps using yellow tband and 1# weight.    Patient left HOB elevated with all lines intact and call button in reachEducation:      GOALS:   Multidisciplinary Problems     Occupational Therapy Goals        Problem: Occupational Therapy    Goal Priority Disciplines Outcome Interventions   Occupational Therapy Goal     OT, PT/OT Ongoing, Progressing    Description: STG:  Pt will perform grooming with setup  Pt will bathe with Alanna with setup at EOB   Pt will perform UE dressing with Danita  Pt will perform LE dressing with Alanna  Pt will sit EOB x 10 min with SBA   Pt will transfer bed/chair/bsc with CGA with RW  Pt will perform standing task x 3 min with CGA with RW  Pt will tolerate 15 minutes of tx without fatigue      LT.Restore to max I with self care and mobility.                       Time Tracking:     OT Date of Treatment: 07/15/22  OT Start Time: 1049  OT Stop Time: 1101  OT Total Time (min): 12 min    Billable Minutes:Therapeutic Exercise 12 min    OT/MARYA: OT          7/15/2022

## 2022-07-15 NOTE — ASSESSMENT & PLAN NOTE
- 5.4 today  - Improving  - Continue lokelma   - Switch from sodium bicarb to lactated ringers for IVF  7/14  - D5W   - lokelma and kayexelate given  - recheck in AM   7/15  - 5.2 this AM

## 2022-07-15 NOTE — ASSESSMENT & PLAN NOTE
07/07 HCT 19  transfuse 1 more unit of prbc and platelets for low platelet of 57, surgery to follow closely GI to rescope today  Has not had CTA due to renal failure     07/08 repeat EGD treated bleeding again duodenum with injection of epi and clips, hct 21 transfuse 1 more unit PRBC , if further bleeding, consider IR or surgery     07/11/2022 ex lap with oversew bleeding duodenal ulcer POD 3 hct improved with post op prbc transfusion 24, ngt low output, dc when ok with dr ray, ok to transfer out of icu from surgery standpoint    07/12 some abd discomfort continue npo kub    07/13 full liquids as tolerate    07/14 reports some abdominal tighness no n/v tolerating full liquids, no further bleeding frail, needs swing bed, deborah serous     07/15 deborah serous, travis dsg with seal, tolerating full liquids, does not want to advance diet no further bleeding

## 2022-07-15 NOTE — ASSESSMENT & PLAN NOTE
Creatinine improved to 5.8  7/11/2022 Serum creatinine is 6.2 which has been stable.  7/12/2022  Today, serum creatinine is 5.9  7/14/2022 The patient with CKD.  No uremic symptoms.  Appears stronger today.  7/15/2022  CKD V.  Creatinine is 5.8 today.

## 2022-07-15 NOTE — ASSESSMENT & PLAN NOTE
- EGD with bleeding duodenal ulcer as above, was clipped   - Repeat EGD 07/05, bleeding duodenal vessel was clipped per GI.  - H. Pylori negative  - Protonix infusion  - GI following, appreciate assistance   -Pt continues to bleed with blood clots from bowel and H &H  Continues to decline. Pt became hypotensive, and transferred to ICU for further monitoring and eval. General surgery, Dr. Rees consulted for eval.   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 [facility]   - ongoing bleed with blood clots from bowel and decreasing H&H. Pt became hypotensive, and transferred to ICU for further monitoring and eval. General surgery, Dr. Rees consulted for eval.   - to OR 7/8  - doing well NGT removed. Try to advance diet today  7/14  - full liquids per surgery  - midline incision with ALYSON and XIMENA drains

## 2022-07-15 NOTE — SUBJECTIVE & OBJECTIVE
Interval History: Pt resting in bed. Tolerated breakfast. Still with some abd tenderness and distention today. No BM. Denies any CP or SOB.     Review of Systems   Respiratory:  Negative for cough and shortness of breath.    Cardiovascular:  Negative for chest pain.   Gastrointestinal:  Positive for abdominal pain and nausea.   Objective:     Vital Signs (Most Recent):  Temp: 98.6 °F (37 °C) (07/15/22 1134)  Pulse: 62 (07/15/22 1139)  Resp: 18 (07/15/22 1139)  BP: (!) 143/68 (07/15/22 1139)  SpO2: 98 % (07/15/22 1139)   Vital Signs (24h Range):  Temp:  [97.9 °F (36.6 °C)-98.8 °F (37.1 °C)] 98.6 °F (37 °C)  Pulse:  [61-85] 62  Resp:  [17-20] 18  SpO2:  [90 %-100 %] 98 %  BP: (102-169)/(49-73) 143/68     Weight: 103.4 kg (228 lb)  Body mass index is 30.92 kg/m².    Intake/Output Summary (Last 24 hours) at 7/15/2022 1242  Last data filed at 7/15/2022 1044  Gross per 24 hour   Intake 850 ml   Output 5052 ml   Net -4202 ml      Physical Exam  Vitals and nursing note reviewed.   Constitutional:       General: He is awake. He is not in acute distress.     Appearance: Normal appearance.   HENT:      Head: Normocephalic.      Mouth/Throat:      Mouth: Mucous membranes are moist.   Eyes:      General: Lids are normal.   Cardiovascular:      Rate and Rhythm: Normal rate and regular rhythm.      Pulses: Normal pulses.           Radial pulses are 2+ on the right side and 2+ on the left side.      Heart sounds: Normal heart sounds.      Comments: Bilateral UE edema   Pulmonary:      Effort: Pulmonary effort is normal.      Breath sounds: Normal breath sounds.   Abdominal:      General: Bowel sounds are normal. There is distension.      Palpations: Abdomen is soft.      Tenderness: There is abdominal tenderness.          Comments: Midline incision with ALYSON  RLQ and LLQ XIMENA drain    Musculoskeletal:         General: Normal range of motion.      Cervical back: Neck supple.      Right lower leg: Edema present.      Left lower leg:  Edema present.   Skin:     General: Skin is warm and dry.      Capillary Refill: Capillary refill takes less than 2 seconds.   Neurological:      Mental Status: He is alert. Mental status is at baseline.      GCS: GCS eye subscore is 4. GCS verbal subscore is 5. GCS motor subscore is 6.      Sensory: Sensation is intact.      Motor: Motor function is intact.   Psychiatric:         Mood and Affect: Mood normal.         Speech: Speech normal.         Behavior: Behavior is cooperative.       Significant Labs: All pertinent labs within the past 24 hours have been reviewed.  Recent Lab Results  (Last 5 results in the past 24 hours)        07/15/22  1042   07/15/22  0940   07/15/22  0516   07/15/22  0249   07/14/22 2003        Unit Blood Type Code   5100  [P]             Unit Expiration   [id number]  [P]             Anion Gap       14         Baso #       0.00         Basophil %       0.0         BUN       64         BUN/CREAT RATIO       11         Calcium       8.1         Chloride       117         CO2       15         Creatinine       5.82         CROSSMATCH INTERPRETATION   Compatible  [P]             Differential Type       Auto         DISPENSE STATUS   Issued  [P]             eGFR if non        10         Eos #       0.10         Eosinophil %       2.7         Glucose       103         Group & Rh   O POS             Hematocrit       20.6         Hemoglobin       7.2         Immature Grans (Abs)       0.02         Immature Granulocytes       0.5         INDIRECT RAJWINDER   NEG             Lymph #       0.60         Lymph %       15.9         MCH       32.0         MCHC       35.0         MCV       91.6         Mono #       0.47         Mono %       12.5         MPV       9.2         Neutrophils, Abs       2.58         Neutrophils Relative       68.4         nRBC       0.8         NUCLEATED RBC ABSOLUTE       0.03         Platelets       204         POC Glucose 125     104     130       Potassium        5.2         Product Code   [id number]  [P]             RBC       2.25         RDW       15.9         Sodium       141         Unit ABO/Rh   O POS  [P]             UNIT NUMBER   [id number]  [P]             WBC       3.77                                 [P] - Preliminary Result               Significant Imaging: I have reviewed all pertinent imaging results/findings within the past 24 hours.

## 2022-07-15 NOTE — PROGRESS NOTES
[facility] - [address] [place] [address] Medicine  Progress Note    Patient Name: Neymar Parra  MRN: [id number]  Patient Class: IP- Inpatient   Admission Date: 6/16/2022  Length of Stay: 29 days  Attending Physician: Rj Fernandez MD  Primary Care Provider: [facility] megan Rodríguez        Subjective:     Principal Problem:Gastrointestinal hemorrhage associated with duodenitis        HPI:  72 y.o. male transferred to the [facility] from [facility] for hyperkalemia and hypoxia. Pt reports he went to [facility] because he was having dyspnea and hemoptysis since 1 month which is worse today. Pt reports he was tested positive for COVID at the nursing home on 6/9/22. Per nursing home patient oxygen saturation was in the 80's on RA with tachypnea and labored breathing. Pt reports he normally does not wear oxygen at the nursing home but has been wearing oxygen more often recently. Pt reports he smoked 1-1.5 PPD for 20 years but quit 7 months ago. Per pt, he saw a pulmonologist (Dr. Aldana) for his hemoptysis and had some tests done the results of which he does not know. Per records, pt with a right pleural effusion recently and had a thoracentesis. Pt denies any fever, congestion, dysuria, N/V/D, chest pain, or any other complaints at this time.     In the ED, pt's K 7.2, d-dimer 0.76, BNP 6284, BUN/Cr 73/7.16. EKG showed some peaked t-waves and irregular rhythm. Chest xray showed Increased right lower lung pulmonary density could indicate pneumonia. Pt was treated in the ED with Calcium gluconate 1g, regular insulin 10 units, D50, IV solumedrol 125 mg, tylenol 650 mg and IV fluids NS 1L bolus. Pt will be admitted to the hospital service for management of hypoxia and hyperkalemia.      Overview/Hospital Course:  Overview of hospital stay from admission to present:   6/15- [facility] resident presented to [facility] ED with dyspnea and hypoxia; RA sats 80s; K 7.2; Cr 7.16; covid positive 6/9/22 6/20- Cr 6.13-  acute on chronic; K has normalized; some SOB- COPD exacerbation  - hyponatremia 123   - abd pain with slight drop in H/H--9->7  - CXR with worsening consolidation in LLL- completed 5 days of zithromax and steroids-- escalated to zosyn; H/H - 1u PRBCS (pt's bed at [facility] held until )   - hx of duodenal adenoma- H/H continues to drop- FOBT positive; GI consulted   - CXR with worsening LLL consolidation and RLL infiltrate and pleural effusion; pt's bed  at [facility]: EGD with esophageal candidiasis, moderate sized hiatal hernia, large duodenal bulb ulcer with adherent clot  : pulm consulted for recurrent R sided effusion- not enough fluid to drain; H/H - 1u PRBCs   : repeat EGD- Dieulafoy's lesion- duodenal bulb; s/p endoscopic therapy (clipping + epinephrine injection) and Duodenal bulb ulcer with visible vessel,s/p endoscopic therapy with clipping/epinephrine injection; rec'd 1u PRBCs   7/3: continued drop in H/H - 2u PRBCs   : H/H stable : H/H down to - 1u PRBCs- EGD- Mucosa of the esophagus was normal, overlying a small hiatus hernia. Blood was noted around the pyloric channel and no gastric ulcer was seen. Clot was present in the first portion of the duodenum with two clips attached to the mucosa. In that area, there appeared to be duodenitis and a visible vessel was noted with active bleeding. The bleeding was controlled with a clip.   : H/H stable : H/H - bloody BM with clots; total of 5u PRBCs and 1u PLTs; hypotensive 80/40s- transferred to ICU-; surgery consulted; EGD- The distal esophagus had a small non-bleeding mucosal tear. A 3cm hiatus hernia was noted. Gastric mucosa is pale, consistent with severe anemia. Gastritis is seen without active bleeding or gastric ulcer. A large adherent  clot is present in the 1st portion of the duodenum at the site of previous bleeding and clip placement. 3cc of 1/100 epinephrine was injected  submucosally and the clot would not suction off. Three more hemostasis clips were placed at the base of the clot. The more distal duodenum had blood in the lumen, likely from distal migration, but no ulcer.  7/8: OR for exp lap with vagotomy pyloroplasty and oversewing of ulcer- midline incision with ALYSON drain/dsg and jewels XIMENA drains; 2u PRBCs   7/9: 1u PRBCs   7/10: H/H 6/19- 2u PRBCs   7/11: NGT removed; remains NPO   7/12: HR 120s and elevated BP; lopressor ordered; clear liquids   7/13: transferred to floor; clear liquid-> full liquid   7/15: H/H 7/20; transfuse 1u PRBCs       07/04-Patient resting comfortably in bed with no acute complaints. Is not requiring supplemental oxygen today. Denies new rectal bleeding. The patient says he feels better after getting 2 units of pRBC yesterday, but that he still feels weak. His H&H is stable this morning, will continue to monitor, and as long as it stays stable may be able to discharge tomorrow.    7/14- transferred out of ICU overnight; midline incision with ALYSON drain and bilateral XIMENA drains; full liquid diet per surgery; SS following for placement-- will need SWB   7/15: decrease in H/H 7/20- transfuse 1u PRBCs- surgery following; SS--- will need covid PCR before any further paperwork regarding placement at [facility]       Interval History: Pt resting in bed. Tolerated breakfast. Still with some abd tenderness and distention today. No BM. Denies any CP or SOB.     Review of Systems   Respiratory:  Negative for cough and shortness of breath.    Cardiovascular:  Negative for chest pain.   Gastrointestinal:  Positive for abdominal pain and nausea.   Objective:     Vital Signs (Most Recent):  Temp: 98.6 °F (37 °C) (07/15/22 1134)  Pulse: 62 (07/15/22 1139)  Resp: 18 (07/15/22 1139)  BP: (!) 143/68 (07/15/22 1139)  SpO2: 98 % (07/15/22 1139)   Vital Signs (24h Range):  Temp:  [97.9 °F (36.6 °C)-98.8 °F (37.1 °C)] 98.6 °F (37 °C)  Pulse:  [61-85] 62  Resp:  [17-20] 18  SpO2:   [90 %-100 %] 98 %  BP: (102-169)/(49-73) 143/68     Weight: 103.4 kg (228 lb)  Body mass index is 30.92 kg/m².    Intake/Output Summary (Last 24 hours) at 7/15/2022 1242  Last data filed at 7/15/2022 1044  Gross per 24 hour   Intake 850 ml   Output 5052 ml   Net -4202 ml      Physical Exam  Vitals and nursing note reviewed.   Constitutional:       General: He is awake. He is not in acute distress.     Appearance: Normal appearance.   HENT:      Head: Normocephalic.      Mouth/Throat:      Mouth: Mucous membranes are moist.   Eyes:      General: Lids are normal.   Cardiovascular:      Rate and Rhythm: Normal rate and regular rhythm.      Pulses: Normal pulses.           Radial pulses are 2+ on the right side and 2+ on the left side.      Heart sounds: Normal heart sounds.      Comments: Bilateral UE edema   Pulmonary:      Effort: Pulmonary effort is normal.      Breath sounds: Normal breath sounds.   Abdominal:      General: Bowel sounds are normal. There is distension.      Palpations: Abdomen is soft.      Tenderness: There is abdominal tenderness.          Comments: Midline incision with ALYSON  RLQ and LLQ XIMENA drain    Musculoskeletal:         General: Normal range of motion.      Cervical back: Neck supple.      Right lower leg: Edema present.      Left lower leg: Edema present.   Skin:     General: Skin is warm and dry.      Capillary Refill: Capillary refill takes less than 2 seconds.   Neurological:      Mental Status: He is alert. Mental status is at baseline.      GCS: GCS eye subscore is 4. GCS verbal subscore is 5. GCS motor subscore is 6.      Sensory: Sensation is intact.      Motor: Motor function is intact.   Psychiatric:         Mood and Affect: Mood normal.         Speech: Speech normal.         Behavior: Behavior is cooperative.       Significant Labs: All pertinent labs within the past 24 hours have been reviewed.  Recent Lab Results  (Last 5 results in the past 24 hours)        07/15/22  1042    07/15/22  0940   07/15/22  0516   07/15/22  0249   07/14/22 2003        Unit Blood Type Code   5100  [P]             Unit Expiration   [id number]  [P]             Anion Gap       14         Baso #       0.00         Basophil %       0.0         BUN       64         BUN/CREAT RATIO       11         Calcium       8.1         Chloride       117         CO2       15         Creatinine       5.82         CROSSMATCH INTERPRETATION   Compatible  [P]             Differential Type       Auto         DISPENSE STATUS   Issued  [P]             eGFR if non        10         Eos #       0.10         Eosinophil %       2.7         Glucose       103         Group & Rh   O POS             Hematocrit       20.6         Hemoglobin       7.2         Immature Grans (Abs)       0.02         Immature Granulocytes       0.5         INDIRECT RAJWINDER   NEG             Lymph #       0.60         Lymph %       15.9         MCH       32.0         MCHC       35.0         MCV       91.6         Mono #       0.47         Mono %       12.5         MPV       9.2         Neutrophils, Abs       2.58         Neutrophils Relative       68.4         nRBC       0.8         NUCLEATED RBC ABSOLUTE       0.03         Platelets       204         POC Glucose 125     104     130       Potassium       5.2         Product Code   [id number]  [P]             RBC       2.25         RDW       15.9         Sodium       141         Unit ABO/Rh   O POS  [P]             UNIT NUMBER   [id number]  [P]             WBC       3.77                                 [P] - Preliminary Result               Significant Imaging: I have reviewed all pertinent imaging results/findings within the past 24 hours.      Assessment/Plan:      * Gastrointestinal hemorrhage associated with duodenitis  - EGD with bleeding duodenal ulcer as above, was clipped   - Repeat EGD 07/05, bleeding duodenal vessel was clipped per GI.  - H. Pylori negative  - Protonix infusion  - GI  following, appreciate assistance   -Pt continues to bleed with blood clots from bowel and H &H  Continues to decline. Pt became hypotensive, and transferred to ICU for further monitoring and eval. General surgery, Dr. Rees consulted for eval.   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 [facility]   - ongoing bleed with blood clots from bowel and decreasing H&H. Pt became hypotensive, and transferred to ICU for further monitoring and eval. General surgery, Dr. Rees consulted for eval.   - to OR 7/8  - doing well NGT removed. Try to advance diet today  7/14  - full liquids per surgery  - midline incision with ALYSON and XIMENA drains     Esophagitis        Anemia  - Patient with bleeding duodenal ulcer on EGD 7/2, ulcer clipped and an EGD on 07/05 where bleeding duodenal vessel was clipped  - patient has had a total of 6 units this admission  - GI following, assistance appreciated  -Pt continues to have large amount of bloody bowel movement with clots. H & H 6.3/18.7 (7/7), platelet [id number] and during 1 unit blood transfusion, pt became hypotensive. Given 1 liter bolus and transferred to ICU  And consulted GS, Dr. Rees. Appreciate assistance (7/7)  7/14  - H/H 8/25  - has been stable   - rec'd 16u of PRBCs and 1u of plt   7/15  - H/H decreased this AM; 7/20  - transfuse 1u PRBCs     Acute on chronic renal failure   - Creatinine stable  - Nephrology following  - continues to have non-anion gap acidosis, check urine electrolytes. UAG +14  7/14  - Cr 6.2  - nephrology following   7/15  - Cr 5.8    Hyperkalemia  - 5.4 today  - Improving  - Continue lokelma   - Switch from sodium bicarb to lactated ringers for IVF  7/14  - D5W   - lokelma and kayexelate given  - recheck in AM   7/15  - 5.2 this AM     Pleural effusion  - Continue to monitor  - Seen by pulmonology, appreciate assistance   - No thoracentesis at this time    Type 2 diabetes mellitus without  complication  - HgA1c 4.7 6/22  - detemir 8U qhs with SSI   - holding levemir  - D5W for IVF  - discuss advancing diet with surgery, full liquid. If tolerates diet will try to DC IVF  7/14  - advanced to full liquids  - will continue to monitor BG trend  - remains on D5W at present   - BG trend 64-93   7/15  - BG trend 103-130       Gastroesophageal reflux disease  Continue protonix infusion  7/14  - transitioned to PO protonix     Hypertension  Due to GI bleeding, pt became hypotensive  Holding amlodipine, isosorbide mononitrate and beta blocker at this time (7/7)   7/14  - metoprolol has been resumed  - BP stable       VTE Risk Mitigation (From admission, onward)         Ordered     Reason for No Pharmacological VTE Prophylaxis  Once        Question:  Reasons:  Answer:  Risk of Bleeding    06/16/22 0301     IP VTE HIGH RISK PATIENT  Once         06/16/22 0301     Place sequential compression device  Until discontinued         06/16/22 0301                Discharge Planning   EDWARDO:      Code Status: Full Code   Is the patient medically ready for discharge?:     Reason for patient still in hospital (select all that apply): Treatment  Discharge Plan A: Long-term acute care facility (LTAC) (Choice obtained for Specialty)          SRIKANTH Nolen  Department of Hospital Medicine   [address]

## 2022-07-16 LAB
ANION GAP SERPL CALCULATED.3IONS-SCNC: 13 MMOL/L (ref 7–16)
BASOPHILS # BLD AUTO: 0.01 K/UL (ref 0–0.2)
BASOPHILS NFR BLD AUTO: 0.4 % (ref 0–1)
BUN SERPL-MCNC: 55 MG/DL (ref 7–18)
BUN/CREAT SERPL: 10 (ref 6–20)
CALCIUM SERPL-MCNC: 8.1 MG/DL (ref 8.5–10.1)
CHLORIDE SERPL-SCNC: 117 MMOL/L (ref 98–107)
CO2 SERPL-SCNC: 16 MMOL/L (ref 21–32)
CREAT SERPL-MCNC: 5.48 MG/DL (ref 0.7–1.3)
DIFFERENTIAL METHOD BLD: ABNORMAL
EOSINOPHIL # BLD AUTO: 0.09 K/UL (ref 0–0.5)
EOSINOPHIL NFR BLD AUTO: 3.7 % (ref 1–4)
EOSINOPHIL NFR BLD MANUAL: 4 % (ref 1–4)
ERYTHROCYTE [DISTWIDTH] IN BLOOD BY AUTOMATED COUNT: 15.6 % (ref 11.5–14.5)
GLUCOSE SERPL-MCNC: 102 MG/DL (ref 70–105)
GLUCOSE SERPL-MCNC: 168 MG/DL (ref 70–105)
GLUCOSE SERPL-MCNC: 218 MG/DL (ref 70–105)
GLUCOSE SERPL-MCNC: 76 MG/DL (ref 74–106)
GLUCOSE SERPL-MCNC: 83 MG/DL (ref 70–105)
HCT VFR BLD AUTO: 24.5 % (ref 40–54)
HGB BLD-MCNC: 8.3 G/DL (ref 13.5–18)
IMM GRANULOCYTES # BLD AUTO: 0.05 K/UL (ref 0–0.04)
IMM GRANULOCYTES NFR BLD: 2 % (ref 0–0.4)
LYMPHOCYTES # BLD AUTO: 0.32 K/UL (ref 1–4.8)
LYMPHOCYTES NFR BLD AUTO: 13.1 % (ref 27–41)
LYMPHOCYTES NFR BLD MANUAL: 15 % (ref 27–41)
MCH RBC QN AUTO: 31 PG (ref 27–31)
MCHC RBC AUTO-ENTMCNC: 33.9 G/DL (ref 32–36)
MCV RBC AUTO: 91.4 FL (ref 80–96)
MONOCYTES # BLD AUTO: 0.34 K/UL (ref 0–0.8)
MONOCYTES NFR BLD AUTO: 13.9 % (ref 2–6)
MONOCYTES NFR BLD MANUAL: 14 % (ref 2–6)
MPC BLD CALC-MCNC: 9.3 FL (ref 9.4–12.4)
NEUTROPHILS # BLD AUTO: 1.63 K/UL (ref 1.8–7.7)
NEUTROPHILS NFR BLD AUTO: 66.9 % (ref 53–65)
NEUTS BAND NFR BLD MANUAL: 4 % (ref 1–5)
NEUTS SEG NFR BLD MANUAL: 63 % (ref 50–62)
NRBC # BLD AUTO: 0.02 X10E3/UL
NRBC, AUTO (.00): 0.8 %
PLATELET # BLD AUTO: 204 K/UL (ref 150–400)
PLATELET MORPHOLOGY: NORMAL
POLYCHROMASIA BLD QL SMEAR: ABNORMAL
POTASSIUM SERPL-SCNC: 5.5 MMOL/L (ref 3.5–5.1)
RBC # BLD AUTO: 2.68 M/UL (ref 4.6–6.2)
SODIUM SERPL-SCNC: 140 MMOL/L (ref 136–145)
WBC # BLD AUTO: 2.44 K/UL (ref 4.5–11)

## 2022-07-16 PROCEDURE — 25000003 PHARM REV CODE 250: Performed by: INTERNAL MEDICINE

## 2022-07-16 PROCEDURE — 25000003 PHARM REV CODE 250: Performed by: SURGERY

## 2022-07-16 PROCEDURE — 80048 BASIC METABOLIC PNL TOTAL CA: CPT | Performed by: FAMILY MEDICINE

## 2022-07-16 PROCEDURE — 25000003 PHARM REV CODE 250: Performed by: NURSE PRACTITIONER

## 2022-07-16 PROCEDURE — 99232 PR SUBSEQUENT HOSPITAL CARE,LEVL II: ICD-10-PCS | Mod: ,,, | Performed by: FAMILY MEDICINE

## 2022-07-16 PROCEDURE — 85025 COMPLETE CBC W/AUTO DIFF WBC: CPT | Performed by: NURSE PRACTITIONER

## 2022-07-16 PROCEDURE — 11000001 HC ACUTE MED/SURG PRIVATE ROOM

## 2022-07-16 PROCEDURE — 82962 GLUCOSE BLOOD TEST: CPT

## 2022-07-16 PROCEDURE — 36415 COLL VENOUS BLD VENIPUNCTURE: CPT | Performed by: FAMILY MEDICINE

## 2022-07-16 PROCEDURE — 82310 ASSAY OF CALCIUM: CPT | Performed by: FAMILY MEDICINE

## 2022-07-16 PROCEDURE — 99232 SBSQ HOSP IP/OBS MODERATE 35: CPT | Mod: ,,, | Performed by: FAMILY MEDICINE

## 2022-07-16 RX ORDER — AMLODIPINE BESYLATE 10 MG/1
10 TABLET ORAL DAILY
Status: DISCONTINUED | OUTPATIENT
Start: 2022-07-16 | End: 2022-07-20 | Stop reason: HOSPADM

## 2022-07-16 RX ADMIN — SODIUM POLYSTYRENE SULFONATE 15 G: 15 SUSPENSION ORAL; RECTAL at 08:07

## 2022-07-16 RX ADMIN — FERROUS SULFATE TAB 325 MG (65 MG ELEMENTAL FE) 1 EACH: 325 (65 FE) TAB at 08:07

## 2022-07-16 RX ADMIN — DOCUSATE SODIUM 100 MG: 100 CAPSULE, LIQUID FILLED ORAL at 09:07

## 2022-07-16 RX ADMIN — ALLOPURINOL 50 MG: 300 TABLET ORAL at 08:07

## 2022-07-16 RX ADMIN — AMLODIPINE BESYLATE 10 MG: 10 TABLET ORAL at 08:07

## 2022-07-16 RX ADMIN — PANTOPRAZOLE SODIUM 40 MG: 40 TABLET, DELAYED RELEASE ORAL at 08:07

## 2022-07-16 RX ADMIN — SODIUM BICARBONATE 650 MG: 650 TABLET ORAL at 09:07

## 2022-07-16 RX ADMIN — HYDROCODONE BITARTRATE AND ACETAMINOPHEN 1 TABLET: 7.5; 325 TABLET ORAL at 05:07

## 2022-07-16 RX ADMIN — ATORVASTATIN CALCIUM 40 MG: 40 TABLET, FILM COATED ORAL at 09:07

## 2022-07-16 RX ADMIN — METOPROLOL TARTRATE 25 MG: 25 TABLET, FILM COATED ORAL at 08:07

## 2022-07-16 RX ADMIN — OXYCODONE HYDROCHLORIDE AND ACETAMINOPHEN 500 MG: 500 TABLET ORAL at 08:07

## 2022-07-16 RX ADMIN — DOCUSATE SODIUM 100 MG: 100 CAPSULE, LIQUID FILLED ORAL at 08:07

## 2022-07-16 RX ADMIN — THERA TABS 1 TABLET: TAB at 08:07

## 2022-07-16 RX ADMIN — METOPROLOL TARTRATE 25 MG: 25 TABLET, FILM COATED ORAL at 09:07

## 2022-07-16 RX ADMIN — SODIUM BICARBONATE 650 MG: 650 TABLET ORAL at 08:07

## 2022-07-16 NOTE — PROGRESS NOTES
[facility] - [address] [place] [address]  General Surgery  Progress Note    Subjective:     Interval History:  Patient transfused a unit of blood yesterday.  Otherwise feeling okay no events or issues.  No nausea or vomiting.  Tolerating his diet for the most part.    Post-Op Info:  Procedure(s) (LRB):  LAPAROTOMY, EXPLORATORY (N/A)  VAGOTOMY (N/A)  PYLOROPLASTY (N/A)   8 Days Post-Op      Medications:  Continuous Infusions:   dextrose 5 % 50 mL/hr at 07/13/22 0600     Scheduled Meds:   allopurinoL  50 mg Oral Daily    amLODIPine  10 mg Oral Daily    ascorbic acid (vitamin C)  500 mg Oral Daily    atorvastatin  40 mg Oral QHS    docusate sodium  100 mg Oral BID    ferrous sulfate  1 tablet Oral Daily    metoprolol tartrate  25 mg Oral BID    multivitamin  1 tablet Oral Daily    pantoprazole  40 mg Oral Daily    sodium bicarbonate  650 mg Oral BID     PRN Meds:sodium chloride, sodium chloride, sodium chloride, acetaminophen, acetaminophen, acetaminophen, albuterol **AND** MDI Q6H PRN, bisacodyL, [COMPLETED] calcium gluconate IVPB **AND** calcium gluconate IVPB, cetirizine, dextromethorphan-guaiFENesin  mg/5 ml, dextrose 50%, dextrose 50%, glucagon (human recombinant), glucose, glucose, hydrALAZINE, HYDROcodone-acetaminophen, insulin aspart U-100, melatonin, mineral oil, morphine, morphine, naloxone, ondansetron, ondansetron, simethicone, sodium chloride 0.9%, traMADoL, traZODone     Objective:     Vital Signs (Most Recent):  Temp: 98.6 °F (37 °C) (07/16/22 0706)  Pulse: 82 (07/16/22 0706)  Resp: (!) 22 (07/16/22 0706)  BP: (!) 161/73 (07/16/22 0706)  SpO2: 97 % (07/16/22 0706) Vital Signs (24h Range):  Temp:  [98.4 °F (36.9 °C)-100.2 °F (37.9 °C)] 98.6 °F (37 °C)  Pulse:  [60-85] 82  Resp:  [18-22] 22  SpO2:  [95 %-100 %] 97 %  BP: (139-161)/(58-87) 161/73       Intake/Output Summary (Last 24 hours) at 7/16/2022 1118  Last data filed at 7/16/2022 0800  Gross per 24 hour   Intake 949.5 ml   Output  1600 ml   Net -650.5 ml       Physical Exam  Constitutional:       General: He is not in acute distress.  HENT:      Head: Normocephalic.   Cardiovascular:      Rate and Rhythm: Normal rate and regular rhythm.      Pulses: Normal pulses.   Pulmonary:      Effort: Pulmonary effort is normal. No respiratory distress.      Breath sounds: Normal breath sounds.   Abdominal:      General: Abdomen is flat. There is no distension.      Palpations: Abdomen is soft.      Tenderness: There is no abdominal tenderness.   Musculoskeletal:         General: Normal range of motion.   Skin:     General: Skin is warm.   Neurological:      General: No focal deficit present.      Mental Status: He is oriented to person, place, and time.         Significant Labs:  CBC:   Recent Labs   Lab 07/16/22 0332   WBC 2.44*   RBC 2.68*   HGB 8.3*   HCT 24.5*      MCV 91.4   MCH 31.0   MCHC 33.9     CMP:   Recent Labs   Lab 07/16/22 0332   GLU 76   CALCIUM 8.1*      K 5.5*   CO2 16*   *   BUN 55*   CREATININE 5.48*       Significant Diagnostics:  None    Assessment/Plan:     Active Diagnoses:    Diagnosis Date Noted POA    PRINCIPAL PROBLEM:  Gastrointestinal hemorrhage associated with duodenitis [K29.81]  Yes    Esophagitis [K20.90]  Unknown    Hyperkalemia [E87.5] 06/16/2022 Yes    Acute on chronic renal failure [N17.9, N18.9] 06/16/2022 Yes    Anemia [D64.9] 06/16/2022 Yes    Pleural effusion [J90] 04/29/2022 Yes    Hypertension [I10] 12/12/2021 Yes    Gastroesophageal reflux disease [K21.9] 12/12/2021 Yes    Type 2 diabetes mellitus without complication [E11.9] 12/12/2021 Yes      Problems Resolved During this Admission:    Diagnosis Date Noted Date Resolved POA    PNA (pneumonia) [J18.9] 06/27/2022 07/06/2022 Yes    Hyponatremia [E87.1] 06/25/2022 07/02/2022 No    Elevated d-dimer [R79.89] 06/22/2022 07/05/2022 Yes    COVID-19 [U07.1] 06/16/2022 06/22/2022 Yes    COPD exacerbation [J44.1] 12/12/2021  07/03/2022 Yes     Advance diet    Gerard Hines MD  General Surgery  [facility] - [facility]

## 2022-07-16 NOTE — SUBJECTIVE & OBJECTIVE
Interval History: Pt resting in bed. Eating breakfast. Denies any CP or SOB. Some abd tenderness. No needs voiced.     Review of Systems   Respiratory:  Negative for cough and shortness of breath.    Cardiovascular:  Negative for chest pain.   Gastrointestinal:  Positive for abdominal pain.   Objective:     Vital Signs (Most Recent):  Temp: 98.6 °F (37 °C) (07/16/22 0706)  Pulse: 82 (07/16/22 0706)  Resp: (!) 22 (07/16/22 0706)  BP: (!) 161/73 (07/16/22 0706)  SpO2: 97 % (07/16/22 0706)   Vital Signs (24h Range):  Temp:  [98.4 °F (36.9 °C)-100.2 °F (37.9 °C)] 98.6 °F (37 °C)  Pulse:  [60-85] 82  Resp:  [18-22] 22  SpO2:  [95 %-100 %] 97 %  BP: (136-161)/(58-87) 161/73     Weight: 109.8 kg (242 lb 1 oz)  Body mass index is 32.83 kg/m².    Intake/Output Summary (Last 24 hours) at 7/16/2022 1021  Last data filed at 7/16/2022 0800  Gross per 24 hour   Intake 1049.5 ml   Output 6101 ml   Net -5051.5 ml      Physical Exam  Vitals and nursing note reviewed.   Constitutional:       General: He is awake. He is not in acute distress.     Appearance: Normal appearance.   HENT:      Head: Normocephalic.      Mouth/Throat:      Mouth: Mucous membranes are moist.   Eyes:      General: Lids are normal.   Cardiovascular:      Rate and Rhythm: Normal rate and regular rhythm.      Pulses: Normal pulses.           Radial pulses are 2+ on the right side and 2+ on the left side.      Heart sounds: Normal heart sounds.      Comments: Bilateral UE edema   Pulmonary:      Effort: Pulmonary effort is normal.      Breath sounds: Normal breath sounds.   Abdominal:      General: Bowel sounds are normal. There is distension.      Palpations: Abdomen is soft.      Tenderness: There is abdominal tenderness.          Comments: Midline incision with ALYSON  RLQ and LLQ XIMENA drain    Musculoskeletal:         General: Normal range of motion.      Cervical back: Neck supple.      Right lower leg: Edema present.      Left lower leg: Edema present.    Skin:     General: Skin is warm and dry.      Capillary Refill: Capillary refill takes less than 2 seconds.   Neurological:      Mental Status: He is alert. Mental status is at baseline.      GCS: GCS eye subscore is 4. GCS verbal subscore is 5. GCS motor subscore is 6.      Sensory: Sensation is intact.      Motor: Motor function is intact.   Psychiatric:         Mood and Affect: Mood normal.         Speech: Speech normal.         Behavior: Behavior is cooperative.       Significant Labs: All pertinent labs within the past 24 hours have been reviewed.  Recent Lab Results  (Last 5 results in the past 24 hours)        07/16/22  0515   07/16/22  0332   07/15/22  2012   07/15/22  1731   07/15/22  1431        Anion Gap   13             Bands   4             Baso #   0.01             Basophil %   0.4             BUN   55             BUN/CREAT RATIO   10             Calcium   8.1             Chloride   117             CO2   16             Creatinine   5.48             Differential Type   Manual             eGFR if non    11             Eos #   0.09             Eosinophil %   3.7                4             Glucose   76             Hematocrit   24.5     25.9         Hemoglobin   8.3     8.9         Immature Grans (Abs)   0.05             Immature Granulocytes   2.0             Lymph #   0.32             Lymph %   13.1                15             MCH   31.0             MCHC   33.9             MCV   91.4             Mono #   0.34             Mono %   13.9                14             MPV   9.3             Neutrophils, Abs   1.63             Neutrophils Relative   66.9             nRBC   0.8             NUCLEATED RBC ABSOLUTE   0.02             PLATELET MORPHOLOGY   Normal             Platelets   204             POC Glucose 83     129     130       Poly   Few             Potassium   5.5             RBC   2.68             RDW   15.6             Segmented Neutrophils, Man %   63             Sodium   140              WBC   2.44                                    Significant Imaging: I have reviewed all pertinent imaging results/findings within the past 24 hours.

## 2022-07-16 NOTE — ASSESSMENT & PLAN NOTE
- 5.4 today  - Improving  - Continue lokelma   - Switch from sodium bicarb to lactated ringers for IVF  7/14  - D5W   - lokelma and kayexelate given  - recheck in AM   7/15  - 5.2 this AM   - lokelma given   7/16  - 5.5 this AM  - kayexelate

## 2022-07-16 NOTE — ASSESSMENT & PLAN NOTE
- Creatinine stable  - Nephrology following  - continues to have non-anion gap acidosis, check urine electrolytes. UAG +14  7/14  - Cr 6.2  - nephrology following   7/15  - Cr 5.8  7/16  - 5.4

## 2022-07-16 NOTE — PROGRESS NOTES
[facility] - [address] [place] [address]  Nephrology  Progress Note    Patient Name: Neymar Parra  MRN: [id number]  Admission Date: 6/16/2022  Hospital Length of Stay: 30 days  Attending Provider: Rj Fernandez MD   Primary Care Physician: [facility] megan Denton  Principal Problem:Gastrointestinal hemorrhage associated with duodenitis    Consults  Subjective:     Interval History: The patient reports having abdominal pain.    Review of patient's allergies indicates:   Allergen Reactions    Gatifloxacin Anaphylaxis     Current Facility-Administered Medications   Medication Frequency    0.9%  NaCl infusion (for blood administration) Q24H PRN    0.9%  NaCl infusion (for blood administration) Q24H PRN    0.9%  NaCl infusion (for blood administration) Q24H PRN    acetaminophen tablet 1,000 mg Q6H PRN    acetaminophen tablet 650 mg Q8H PRN    acetaminophen tablet 650 mg Q4H PRN    albuterol inhaler 2 puff Q6H PRN    allopurinol split tablet 50 mg Daily    amLODIPine tablet 10 mg Daily    ascorbic acid (vitamin C) tablet 500 mg Daily    atorvastatin tablet 40 mg QHS    bisacodyL EC tablet 10 mg Daily PRN    calcium gluconate 1 g in NS IVPB (premixed) Q10 Min PRN    cetirizine tablet 10 mg Daily PRN    dextromethorphan-guaiFENesin  mg/5 ml liquid 10 mL Q6H PRN    dextrose 5 % infusion Continuous    dextrose 50% injection 12.5 g PRN    dextrose 50% injection 25 g PRN    docusate sodium capsule 100 mg BID    ferrous sulfate tablet 1 each Daily    glucagon (human recombinant) injection 1 mg PRN    glucose chewable tablet 16 g PRN    glucose chewable tablet 24 g PRN    hydrALAZINE injection 10 mg Q6H PRN    HYDROcodone-acetaminophen 7.5-325 mg per tablet 1 tablet Q6H PRN    insulin aspart U-100 injection 1-10 Units QID (AC + HS) PRN    melatonin tablet 6 mg Nightly PRN    metoprolol tartrate (LOPRESSOR) tablet 25 mg BID    mineral oil enema 1 enema Daily PRN    morphine  injection 4 mg Q4H PRN    morphine injection 6 mg Q4H PRN    multivitamin tablet Daily    naloxone 0.4 mg/mL injection 0.02 mg PRN    ondansetron injection 4 mg Q12H PRN    ondansetron injection 8 mg Q6H PRN    pantoprazole EC tablet 40 mg Daily    simethicone chewable tablet 80 mg TID PRN    sodium bicarbonate tablet 650 mg BID    sodium chloride 0.9% flush 10 mL Q12H PRN    traMADoL tablet 50 mg Q8H PRN    traZODone tablet 50 mg Nightly PRN       Objective:     Vital Signs (Most Recent):  Temp: 98.2 °F (36.8 °C) (07/16/22 1057)  Pulse: 76 (07/16/22 1057)  Resp: 18 (07/16/22 1057)  BP: (!) 163/98 (07/16/22 1057)  SpO2: 97 % (07/16/22 1057)  O2 Device (Oxygen Therapy): room air (Simultaneous filing. User may not have seen previous data.) (07/15/22 1415) Vital Signs (24h Range):  Temp:  [98.2 °F (36.8 °C)-100.2 °F (37.9 °C)] 98.2 °F (36.8 °C)  Pulse:  [76-85] 76  Resp:  [18-22] 18  SpO2:  [95 %-97 %] 97 %  BP: (140-163)/(73-98) 163/98     Weight: 109.8 kg (242 lb 1 oz) (07/16/22 0000)  Body mass index is 32.83 kg/m².  Body surface area is 2.36 meters squared.    I/O last 3 completed shifts:  In: 1562.5 [P.O.:1200; Blood:362.5]  Out: 6552 [Urine:6501; Drains:50; Stool:1]    Physical Exam   Lungs-clear  Cor-no murmur or rub  Abd-soft    Significant Labs:sure  CBC:   Recent Labs   Lab 07/16/22  0332   WBC 2.44*   RBC 2.68*   HGB 8.3*   HCT 24.5*      MCV 91.4   MCH 31.0   MCHC 33.9     CMP:   Recent Labs   Lab 07/10/22  2139 07/11/22  0240 07/16/22  0332      < > 76   CALCIUM 7.9*   < > 8.1*   ALBUMIN 1.4*  --   --    PROT 4.0*  --   --       < > 140   K 5.5*   < > 5.5*   CO2 13*   < > 16*   *   < > 117*   BUN 80*   < > 55*   CREATININE 6.38*   < > 5.48*   ALKPHOS 47  --   --    ALT 30  --   --    AST 25  --   --    BILITOT 0.5  --   --     < > = values in this interval not displayed.     All labs within the past 24 hours have been reviewed.    Significant  Imaging:      Assessment/Plan:     Active Diagnoses:    Diagnosis Date Noted POA    PRINCIPAL PROBLEM:  Gastrointestinal hemorrhage associated with duodenitis [K29.81]  Yes    Esophagitis [K20.90]  Unknown    Hyperkalemia [E87.5] 06/16/2022 Yes    Acute on chronic renal failure [N17.9, N18.9] 06/16/2022 Yes    Anemia [D64.9] 06/16/2022 Yes    Pleural effusion [J90] 04/29/2022 Yes    Hypertension [I10] 12/12/2021 Yes    Gastroesophageal reflux disease [K21.9] 12/12/2021 Yes    Type 2 diabetes mellitus without complication [E11.9] 12/12/2021 Yes      Problems Resolved During this Admission:    Diagnosis Date Noted Date Resolved POA    PNA (pneumonia) [J18.9] 06/27/2022 07/06/2022 Yes    Hyponatremia [E87.1] 06/25/2022 07/02/2022 No    Elevated d-dimer [R79.89] 06/22/2022 07/05/2022 Yes    COVID-19 [U07.1] 06/16/2022 06/22/2022 Yes    COPD exacerbation [J44.1] 12/12/2021 07/03/2022 Yes       Plan   Consider starting hemodialysis in the near future    Thank you for your consult. I will follow-up with patient. Please contact us if you have any additional questions.    Jose C Buitrago MD  Nephrology  [facility] - [address] [place] [address]

## 2022-07-16 NOTE — ASSESSMENT & PLAN NOTE
Due to GI bleeding, pt became hypotensive  Holding amlodipine, isosorbide mononitrate and beta blocker at this time (7/7)   7/14  - metoprolol has been resumed  - BP stable   7/16  - BP trending up  - norvasc resumed

## 2022-07-16 NOTE — PROGRESS NOTES
[facility] - [address] [place] [address] Medicine  Progress Note    Patient Name: eNymar Parra  MRN: [id number]  Patient Class: IP- Inpatient   Admission Date: 6/16/2022  Length of Stay: 30 days  Attending Physician: Rj Fernandez MD  Primary Care Provider: [facility] megan Rodríguez        Subjective:     Principal Problem:Gastrointestinal hemorrhage associated with duodenitis        HPI:  72 y.o. male transferred to the [facility] from [facility] for hyperkalemia and hypoxia. Pt reports he went to [facility] because he was having dyspnea and hemoptysis since 1 month which is worse today. Pt reports he was tested positive for COVID at the nursing home on 6/9/22. Per nursing home patient oxygen saturation was in the 80's on RA with tachypnea and labored breathing. Pt reports he normally does not wear oxygen at the nursing home but has been wearing oxygen more often recently. Pt reports he smoked 1-1.5 PPD for 20 years but quit 7 months ago. Per pt, he saw a pulmonologist (Dr. Aldana) for his hemoptysis and had some tests done the results of which he does not know. Per records, pt with a right pleural effusion recently and had a thoracentesis. Pt denies any fever, congestion, dysuria, N/V/D, chest pain, or any other complaints at this time.     In the ED, pt's K 7.2, d-dimer 0.76, BNP 6284, BUN/Cr 73/7.16. EKG showed some peaked t-waves and irregular rhythm. Chest xray showed Increased right lower lung pulmonary density could indicate pneumonia. Pt was treated in the ED with Calcium gluconate 1g, regular insulin 10 units, D50, IV solumedrol 125 mg, tylenol 650 mg and IV fluids NS 1L bolus. Pt will be admitted to the hospital service for management of hypoxia and hyperkalemia.      Overview/Hospital Course:  Overview of hospital stay from admission to present:   6/15- [facility] resident presented to [facility] ED with dyspnea and hypoxia; RA sats 80s; K 7.2; Cr 7.16; covid positive 6/9/22 6/20- Cr 6.13-  acute on chronic; K has normalized; some SOB- COPD exacerbation  - hyponatremia 123   - abd pain with slight drop in H/H--9->7  - CXR with worsening consolidation in LLL- completed 5 days of zithromax and steroids-- escalated to zosyn; H/H - 1u PRBCS (pt's bed at [facility] held until )   - hx of duodenal adenoma- H/H continues to drop- FOBT positive; GI consulted   - CXR with worsening LLL consolidation and RLL infiltrate and pleural effusion; pt's bed  at [facility]: EGD with esophageal candidiasis, moderate sized hiatal hernia, large duodenal bulb ulcer with adherent clot  : pulm consulted for recurrent R sided effusion- not enough fluid to drain; H/H - 1u PRBCs   : repeat EGD- Dieulafoy's lesion- duodenal bulb; s/p endoscopic therapy (clipping + epinephrine injection) and Duodenal bulb ulcer with visible vessel,s/p endoscopic therapy with clipping/epinephrine injection; rec'd 1u PRBCs   7/3: continued drop in H/H - 2u PRBCs   : H/H stable : H/H down to - 1u PRBCs- EGD- Mucosa of the esophagus was normal, overlying a small hiatus hernia. Blood was noted around the pyloric channel and no gastric ulcer was seen. Clot was present in the first portion of the duodenum with two clips attached to the mucosa. In that area, there appeared to be duodenitis and a visible vessel was noted with active bleeding. The bleeding was controlled with a clip.   : H/H stable : H/H - bloody BM with clots; total of 5u PRBCs and 1u PLTs; hypotensive 80/40s- transferred to ICU-; surgery consulted; EGD- The distal esophagus had a small non-bleeding mucosal tear. A 3cm hiatus hernia was noted. Gastric mucosa is pale, consistent with severe anemia. Gastritis is seen without active bleeding or gastric ulcer. A large adherent  clot is present in the 1st portion of the duodenum at the site of previous bleeding and clip placement. 3cc of 1/100 epinephrine was injected  submucosally and the clot would not suction off. Three more hemostasis clips were placed at the base of the clot. The more distal duodenum had blood in the lumen, likely from distal migration, but no ulcer.  7/8: OR for exp lap with vagotomy pyloroplasty and oversewing of ulcer- midline incision with ALYSON drain/dsg and jewels XIMENA drains; 2u PRBCs   7/9: 1u PRBCs   7/10: H/H 6/19- 2u PRBCs   7/11: NGT removed; remains NPO   7/12: HR 120s and elevated BP; lopressor ordered; clear liquids   7/13: transferred to floor; clear liquid-> full liquid   7/15: H/H 7/20; transfuse 1u PRBCs       07/04-Patient resting comfortably in bed with no acute complaints. Is not requiring supplemental oxygen today. Denies new rectal bleeding. The patient says he feels better after getting 2 units of pRBC yesterday, but that he still feels weak. His H&H is stable this morning, will continue to monitor, and as long as it stays stable may be able to discharge tomorrow.    7/14- transferred out of ICU overnight; midline incision with ALYSON drain and bilateral XIMENA drains; full liquid diet per surgery; SS following for placement-- will need SWB   7/15: decrease in H/H 7/20- transfuse 1u PRBCs- surgery following; SS--- will need covid PCR before any further paperwork regarding placement at [facility]   7/16: NAEO; denies any needs; H/H 8/24 after 1u yesterday; continue current POC      Interval History: Pt resting in bed. Eating breakfast. Denies any CP or SOB. Some abd tenderness. No needs voiced.     Review of Systems   Respiratory:  Negative for cough and shortness of breath.    Cardiovascular:  Negative for chest pain.   Gastrointestinal:  Positive for abdominal pain.   Objective:     Vital Signs (Most Recent):  Temp: 98.6 °F (37 °C) (07/16/22 0706)  Pulse: 82 (07/16/22 0706)  Resp: (!) 22 (07/16/22 0706)  BP: (!) 161/73 (07/16/22 0706)  SpO2: 97 % (07/16/22 0706)   Vital Signs (24h Range):  Temp:  [98.4 °F (36.9 °C)-100.2 °F (37.9 °C)] 98.6 °F (37  °C)  Pulse:  [60-85] 82  Resp:  [18-22] 22  SpO2:  [95 %-100 %] 97 %  BP: (136-161)/(58-87) 161/73     Weight: 109.8 kg (242 lb 1 oz)  Body mass index is 32.83 kg/m².    Intake/Output Summary (Last 24 hours) at 7/16/2022 1021  Last data filed at 7/16/2022 0800  Gross per 24 hour   Intake 1049.5 ml   Output 6101 ml   Net -5051.5 ml      Physical Exam  Vitals and nursing note reviewed.   Constitutional:       General: He is awake. He is not in acute distress.     Appearance: Normal appearance.   HENT:      Head: Normocephalic.      Mouth/Throat:      Mouth: Mucous membranes are moist.   Eyes:      General: Lids are normal.   Cardiovascular:      Rate and Rhythm: Normal rate and regular rhythm.      Pulses: Normal pulses.           Radial pulses are 2+ on the right side and 2+ on the left side.      Heart sounds: Normal heart sounds.      Comments: Bilateral UE edema   Pulmonary:      Effort: Pulmonary effort is normal.      Breath sounds: Normal breath sounds.   Abdominal:      General: Bowel sounds are normal. There is distension.      Palpations: Abdomen is soft.      Tenderness: There is abdominal tenderness.          Comments: Midline incision with ALYSON  RLQ and LLQ XIMENA drain    Musculoskeletal:         General: Normal range of motion.      Cervical back: Neck supple.      Right lower leg: Edema present.      Left lower leg: Edema present.   Skin:     General: Skin is warm and dry.      Capillary Refill: Capillary refill takes less than 2 seconds.   Neurological:      Mental Status: He is alert. Mental status is at baseline.      GCS: GCS eye subscore is 4. GCS verbal subscore is 5. GCS motor subscore is 6.      Sensory: Sensation is intact.      Motor: Motor function is intact.   Psychiatric:         Mood and Affect: Mood normal.         Speech: Speech normal.         Behavior: Behavior is cooperative.       Significant Labs: All pertinent labs within the past 24 hours have been reviewed.  Recent Lab Results   (Last 5 results in the past 24 hours)        07/16/22  0515   07/16/22  0332   07/15/22  2012   07/15/22  1731   07/15/22  1431        Anion Gap   13             Bands   4             Baso #   0.01             Basophil %   0.4             BUN   55             BUN/CREAT RATIO   10             Calcium   8.1             Chloride   117             CO2   16             Creatinine   5.48             Differential Type   Manual             eGFR if non    11             Eos #   0.09             Eosinophil %   3.7                4             Glucose   76             Hematocrit   24.5     25.9         Hemoglobin   8.3     8.9         Immature Grans (Abs)   0.05             Immature Granulocytes   2.0             Lymph #   0.32             Lymph %   13.1                15             MCH   31.0             MCHC   33.9             MCV   91.4             Mono #   0.34             Mono %   13.9                14             MPV   9.3             Neutrophils, Abs   1.63             Neutrophils Relative   66.9             nRBC   0.8             NUCLEATED RBC ABSOLUTE   0.02             PLATELET MORPHOLOGY   Normal             Platelets   204             POC Glucose 83     129     130       Poly   Few             Potassium   5.5             RBC   2.68             RDW   15.6             Segmented Neutrophils, Man %   63             Sodium   140             WBC   2.44                                    Significant Imaging: I have reviewed all pertinent imaging results/findings within the past 24 hours.      Assessment/Plan:      * Gastrointestinal hemorrhage associated with duodenitis  - EGD with bleeding duodenal ulcer as above, was clipped   - Repeat EGD 07/05, bleeding duodenal vessel was clipped per GI.  - H. Pylori negative  - Protonix infusion  - GI following, appreciate assistance   -Pt continues to bleed with blood clots from bowel and H &H  Continues to decline. Pt became hypotensive, and transferred to ICU  for further monitoring and eval. General surgery, Dr. Rees consulted for eval.   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 [facility]   - ongoing bleed with blood clots from bowel and decreasing H&H. Pt became hypotensive, and transferred to ICU for further monitoring and eval. General surgery, Dr. Rees consulted for eval.   - to OR 7/8  - doing well NGT removed. Try to advance diet today  7/14  - full liquids per surgery  - midline incision with ALYSON and XIMENA drains     Esophagitis        Anemia  - Patient with bleeding duodenal ulcer on EGD 7/2, ulcer clipped and an EGD on 07/05 where bleeding duodenal vessel was clipped  - patient has had a total of 6 units this admission  - GI following, assistance appreciated  -Pt continues to have large amount of bloody bowel movement with clots. H & H 6.3/18.7 (7/7), platelet [id number] and during 1 unit blood transfusion, pt became hypotensive. Given 1 liter bolus and transferred to ICU  And consulted , Dr. Rees. Appreciate assistance (7/7)  7/14  - H/H 8/25  - has been stable   - rec'd 16u of PRBCs and 1u of plt   7/15  - H/H decreased this AM; 7/20  - transfuse 1u PRBCs   7/16  - 8/24 after blood     Acute on chronic renal failure   - Creatinine stable  - Nephrology following  - continues to have non-anion gap acidosis, check urine electrolytes. UAG +14  7/14  - Cr 6.2  - nephrology following   7/15  - Cr 5.8  7/16  - 5.4     Hyperkalemia  - 5.4 today  - Improving  - Continue lokelma   - Switch from sodium bicarb to lactated ringers for IVF  7/14  - D5W   - lokelma and kayexelate given  - recheck in AM   7/15  - 5.2 this AM   - lokelma given   7/16  - 5.5 this AM  - kayexelate     Pleural effusion  - Continue to monitor  - Seen by pulmonology, appreciate assistance   - No thoracentesis at this time    Type 2 diabetes mellitus without complication  - HgA1c 4.7 6/22  - detemir 8U qhs with SSI   - holding  levemir  - D5W for IVF  - discuss advancing diet with surgery, full liquid. If tolerates diet will try to DC IVF  7/14  - advanced to full liquids  - will continue to monitor BG trend  - remains on D5W at present   - BG trend 64-93   7/15  - BG trend 103-130       Gastroesophageal reflux disease  Continue protonix infusion  7/14  - transitioned to PO protonix     Hypertension  Due to GI bleeding, pt became hypotensive  Holding amlodipine, isosorbide mononitrate and beta blocker at this time (7/7)   7/14  - metoprolol has been resumed  - BP stable   7/16  - BP trending up  - norvasc resumed       VTE Risk Mitigation (From admission, onward)         Ordered     Reason for No Pharmacological VTE Prophylaxis  Once        Question:  Reasons:  Answer:  Risk of Bleeding    06/16/22 0301     IP VTE HIGH RISK PATIENT  Once         06/16/22 0301     Place sequential compression device  Until discontinued         06/16/22 0301                Discharge Planning   EDWARDO:      Code Status: Full Code   Is the patient medically ready for discharge?:     Reason for patient still in hospital (select all that apply): Treatment  Discharge Plan A: Long-term acute care facility (LTAC) (Choice obtained for Specialty)          SRIKANTH Nolen  Department of Hospital Medicine   [facility] - [address] [place] [address]

## 2022-07-16 NOTE — ASSESSMENT & PLAN NOTE
- Patient with bleeding duodenal ulcer on EGD 7/2, ulcer clipped and an EGD on 07/05 where bleeding duodenal vessel was clipped  - patient has had a total of 6 units this admission  - GI following, assistance appreciated  -Pt continues to have large amount of bloody bowel movement with clots. H & H 6.3/18.7 (7/7), platelet 83744 and during 1 unit blood transfusion, pt became hypotensive. Given 1 liter bolus and transferred to ICU  And consulted GS, Dr. Rees. Appreciate assistance (7/7)  7/14  - H/H 8/25  - has been stable   - rec'd 16u of PRBCs and 1u of plt   7/15  - H/H decreased this AM; 7/20  - transfuse 1u PRBCs   7/16  - 8/24 after blood

## 2022-07-17 LAB
ANION GAP SERPL CALCULATED.3IONS-SCNC: 13 MMOL/L (ref 7–16)
ANISOCYTOSIS BLD QL SMEAR: ABNORMAL
BASOPHILS # BLD AUTO: 0.01 K/UL (ref 0–0.2)
BASOPHILS NFR BLD AUTO: 0.2 % (ref 0–1)
BUN SERPL-MCNC: 55 MG/DL (ref 7–18)
BUN/CREAT SERPL: 10 (ref 6–20)
CALCIUM SERPL-MCNC: 8 MG/DL (ref 8.5–10.1)
CHLORIDE SERPL-SCNC: 116 MMOL/L (ref 98–107)
CO2 SERPL-SCNC: 16 MMOL/L (ref 21–32)
CREAT SERPL-MCNC: 5.32 MG/DL (ref 0.7–1.3)
DIFFERENTIAL METHOD BLD: ABNORMAL
EOSINOPHIL # BLD AUTO: 0.14 K/UL (ref 0–0.5)
EOSINOPHIL NFR BLD AUTO: 3.4 % (ref 1–4)
EOSINOPHIL NFR BLD MANUAL: 3 % (ref 1–4)
ERYTHROCYTE [DISTWIDTH] IN BLOOD BY AUTOMATED COUNT: 15.8 % (ref 11.5–14.5)
GLUCOSE SERPL-MCNC: 125 MG/DL (ref 70–105)
GLUCOSE SERPL-MCNC: 157 MG/DL (ref 70–105)
GLUCOSE SERPL-MCNC: 83 MG/DL (ref 74–106)
GLUCOSE SERPL-MCNC: 94 MG/DL (ref 70–105)
GLUCOSE SERPL-MCNC: 96 MG/DL (ref 70–105)
HCT VFR BLD AUTO: 24.9 % (ref 40–54)
HGB BLD-MCNC: 8.4 G/DL (ref 13.5–18)
IMM GRANULOCYTES # BLD AUTO: 0.02 K/UL (ref 0–0.04)
IMM GRANULOCYTES NFR BLD: 0.5 % (ref 0–0.4)
LYMPHOCYTES # BLD AUTO: 0.62 K/UL (ref 1–4.8)
LYMPHOCYTES NFR BLD AUTO: 15.2 % (ref 27–41)
LYMPHOCYTES NFR BLD MANUAL: 15 % (ref 27–41)
MCH RBC QN AUTO: 30.9 PG (ref 27–31)
MCHC RBC AUTO-ENTMCNC: 33.7 G/DL (ref 32–36)
MCV RBC AUTO: 91.5 FL (ref 80–96)
MONOCYTES # BLD AUTO: 0.64 K/UL (ref 0–0.8)
MONOCYTES NFR BLD AUTO: 15.7 % (ref 2–6)
MONOCYTES NFR BLD MANUAL: 13 % (ref 2–6)
MPC BLD CALC-MCNC: 9.2 FL (ref 9.4–12.4)
NEUTROPHILS # BLD AUTO: 2.65 K/UL (ref 1.8–7.7)
NEUTROPHILS NFR BLD AUTO: 65 % (ref 53–65)
NEUTS SEG NFR BLD MANUAL: 69 % (ref 50–62)
NRBC # BLD AUTO: 0 X10E3/UL
NRBC, AUTO (.00): 0 %
PLATELET # BLD AUTO: 215 K/UL (ref 150–400)
PLATELET MORPHOLOGY: NORMAL
POLYCHROMASIA BLD QL SMEAR: ABNORMAL
POTASSIUM SERPL-SCNC: 5.1 MMOL/L (ref 3.5–5.1)
RBC # BLD AUTO: 2.72 M/UL (ref 4.6–6.2)
SODIUM SERPL-SCNC: 140 MMOL/L (ref 136–145)
WBC # BLD AUTO: 4.08 K/UL (ref 4.5–11)

## 2022-07-17 PROCEDURE — 25000003 PHARM REV CODE 250: Performed by: INTERNAL MEDICINE

## 2022-07-17 PROCEDURE — 63600175 PHARM REV CODE 636 W HCPCS: Performed by: SURGERY

## 2022-07-17 PROCEDURE — 25000003 PHARM REV CODE 250: Performed by: SURGERY

## 2022-07-17 PROCEDURE — 25000003 PHARM REV CODE 250: Performed by: NURSE PRACTITIONER

## 2022-07-17 PROCEDURE — 82962 GLUCOSE BLOOD TEST: CPT

## 2022-07-17 PROCEDURE — 94761 N-INVAS EAR/PLS OXIMETRY MLT: CPT

## 2022-07-17 PROCEDURE — 80048 BASIC METABOLIC PNL TOTAL CA: CPT | Performed by: FAMILY MEDICINE

## 2022-07-17 PROCEDURE — 99232 SBSQ HOSP IP/OBS MODERATE 35: CPT | Mod: GC,,, | Performed by: FAMILY MEDICINE

## 2022-07-17 PROCEDURE — 36415 COLL VENOUS BLD VENIPUNCTURE: CPT | Performed by: NURSE PRACTITIONER

## 2022-07-17 PROCEDURE — 11000001 HC ACUTE MED/SURG PRIVATE ROOM

## 2022-07-17 PROCEDURE — 99232 PR SUBSEQUENT HOSPITAL CARE,LEVL II: ICD-10-PCS | Mod: GC,,, | Performed by: FAMILY MEDICINE

## 2022-07-17 PROCEDURE — 85025 COMPLETE CBC W/AUTO DIFF WBC: CPT | Performed by: NURSE PRACTITIONER

## 2022-07-17 RX ADMIN — FERROUS SULFATE TAB 325 MG (65 MG ELEMENTAL FE) 1 EACH: 325 (65 FE) TAB at 09:07

## 2022-07-17 RX ADMIN — HYDROCODONE BITARTRATE AND ACETAMINOPHEN 1 TABLET: 7.5; 325 TABLET ORAL at 09:07

## 2022-07-17 RX ADMIN — SODIUM BICARBONATE 650 MG: 650 TABLET ORAL at 09:07

## 2022-07-17 RX ADMIN — PANTOPRAZOLE SODIUM 40 MG: 40 TABLET, DELAYED RELEASE ORAL at 09:07

## 2022-07-17 RX ADMIN — METOPROLOL TARTRATE 25 MG: 25 TABLET, FILM COATED ORAL at 09:07

## 2022-07-17 RX ADMIN — THERA TABS 1 TABLET: TAB at 09:07

## 2022-07-17 RX ADMIN — AMLODIPINE BESYLATE 10 MG: 10 TABLET ORAL at 09:07

## 2022-07-17 RX ADMIN — OXYCODONE HYDROCHLORIDE AND ACETAMINOPHEN 500 MG: 500 TABLET ORAL at 09:07

## 2022-07-17 RX ADMIN — ALLOPURINOL 50 MG: 300 TABLET ORAL at 09:07

## 2022-07-17 RX ADMIN — ATORVASTATIN CALCIUM 40 MG: 40 TABLET, FILM COATED ORAL at 09:07

## 2022-07-17 RX ADMIN — INSULIN ASPART 1 UNITS: 100 INJECTION, SOLUTION INTRAVENOUS; SUBCUTANEOUS at 09:07

## 2022-07-17 RX ADMIN — DOCUSATE SODIUM 100 MG: 100 CAPSULE, LIQUID FILLED ORAL at 09:07

## 2022-07-17 NOTE — ASSESSMENT & PLAN NOTE
- HgA1c 4.7 6/22  - detemir 8U qhs with SSI   - holding levemir  - D5W for IVF  - discuss advancing diet with surgery, full liquid. If tolerates diet will try to DC IVF  7/14  - advanced to full liquids  - will continue to monitor BG trend  - remains on D5W at present   - BG trend 64-93   7/15  - BG trend 103-130   7/17  -

## 2022-07-17 NOTE — ASSESSMENT & PLAN NOTE
- EGD with bleeding duodenal ulcer as above, was clipped   - Repeat EGD 07/05, bleeding duodenal vessel was clipped per GI.  - H. Pylori negative  - Protonix infusion  - GI following, appreciate assistance   -Pt continues to bleed with blood clots from bowel and H &H  Continues to decline. Pt became hypotensive, and transferred to ICU for further monitoring and eval. General surgery, Dr. Rees consulted for eval.   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 [facility]   - ongoing bleed with blood clots from bowel and decreasing H&H. Pt became hypotensive, and transferred to ICU for further monitoring and eval. General surgery, Dr. Rees consulted for eval.   - to OR 7/8  - doing well NGT removed. Try to advance diet today  7/14  - full liquids per surgery  - midline incision with ALYSON and XIMENA drains   7/17  - reg diet; tolerating

## 2022-07-17 NOTE — ASSESSMENT & PLAN NOTE
- 5.4 today  - Improving  - Continue lokelma   - Switch from sodium bicarb to lactated ringers for IVF  7/14  - D5W   - lokelma and kayexelate given  - recheck in AM   7/15  - 5.2 this AM   - lokelma given   7/16  - 5.5 this AM  - kayexelate   7/17  - 5.5 this AM    Parent(s)

## 2022-07-17 NOTE — ASSESSMENT & PLAN NOTE
- Patient with bleeding duodenal ulcer on EGD 7/2, ulcer clipped and an EGD on 07/05 where bleeding duodenal vessel was clipped  - patient has had a total of 6 units this admission  - GI following, assistance appreciated  -Pt continues to have large amount of bloody bowel movement with clots. H & H 6.3/18.7 (7/7), platelet 16418 and during 1 unit blood transfusion, pt became hypotensive. Given 1 liter bolus and transferred to ICU  And consulted GS, Dr. Rees. Appreciate assistance (7/7)  7/14  - H/H 8/25  - has been stable   - rec'd 16u of PRBCs and 1u of plt   7/15  - H/H decreased this AM; 7/20  - transfuse 1u PRBCs   7/16  - 8/24 after blood

## 2022-07-17 NOTE — PROGRESS NOTES
[facility], [place] - [address] [place] [address] Medicine  Progress Note    Patient Name: Neymar Parra  MRN: [id number]  Patient Class: IP- Inpatient   Admission Date: 6/16/2022  Length of Stay: 31 days  Attending Physician: Rj Fernandez MD  Primary Care Provider: [facility] megan Rodríguez        Subjective:     Principal Problem:Gastrointestinal hemorrhage associated with duodenitis        HPI:  72 y.o. male transferred to the [facility] from [facility] for hyperkalemia and hypoxia. Pt reports he went to [facility] because he was having dyspnea and hemoptysis since 1 month which is worse today. Pt reports he was tested positive for COVID at the nursing home on 6/9/22. Per nursing home patient oxygen saturation was in the 80's on RA with tachypnea and labored breathing. Pt reports he normally does not wear oxygen at the nursing home but has been wearing oxygen more often recently. Pt reports he smoked 1-1.5 PPD for 20 years but quit 7 months ago. Per pt, he saw a pulmonologist (Dr. Aldana) for his hemoptysis and had some tests done the results of which he does not know. Per records, pt with a right pleural effusion recently and had a thoracentesis. Pt denies any fever, congestion, dysuria, N/V/D, chest pain, or any other complaints at this time.     In the ED, pt's K 7.2, d-dimer 0.76, BNP 6284, BUN/Cr 73/7.16. EKG showed some peaked t-waves and irregular rhythm. Chest xray showed Increased right lower lung pulmonary density could indicate pneumonia. Pt was treated in the ED with Calcium gluconate 1g, regular insulin 10 units, D50, IV solumedrol 125 mg, tylenol 650 mg and IV fluids NS 1L bolus. Pt will be admitted to the hospital service for management of hypoxia and hyperkalemia.      Overview/Hospital Course:  Overview of hospital stay from admission to present:   6/15- [facility] resident presented to [facility] ED with dyspnea and hypoxia; RA sats 80s; K 7.2; Cr 7.16; covid positive 6/9/22 6/20- Cr 6.13-  acute on chronic; K has normalized; some SOB- COPD exacerbation  - hyponatremia 123   - abd pain with slight drop in H/H--9->7  - CXR with worsening consolidation in LLL- completed 5 days of zithromax and steroids-- escalated to zosyn; H/H - 1u PRBCS (pt's bed at [facility] held until )   - hx of duodenal adenoma- H/H continues to drop- FOBT positive; GI consulted   - CXR with worsening LLL consolidation and RLL infiltrate and pleural effusion; pt's bed  at [facility]: EGD with esophageal candidiasis, moderate sized hiatal hernia, large duodenal bulb ulcer with adherent clot  : pulm consulted for recurrent R sided effusion- not enough fluid to drain; H/H - 1u PRBCs   : repeat EGD- Dieulafoy's lesion- duodenal bulb; s/p endoscopic therapy (clipping + epinephrine injection) and Duodenal bulb ulcer with visible vessel,s/p endoscopic therapy with clipping/epinephrine injection; rec'd 1u PRBCs   7/3: continued drop in H/H - 2u PRBCs   : H/H stable : H/H down to - 1u PRBCs- EGD- Mucosa of the esophagus was normal, overlying a small hiatus hernia. Blood was noted around the pyloric channel and no gastric ulcer was seen. Clot was present in the first portion of the duodenum with two clips attached to the mucosa. In that area, there appeared to be duodenitis and a visible vessel was noted with active bleeding. The bleeding was controlled with a clip.   : H/H stable : H/H - bloody BM with clots; total of 5u PRBCs and 1u PLTs; hypotensive 80/40s- transferred to ICU-; surgery consulted; EGD- The distal esophagus had a small non-bleeding mucosal tear. A 3cm hiatus hernia was noted. Gastric mucosa is pale, consistent with severe anemia. Gastritis is seen without active bleeding or gastric ulcer. A large adherent  clot is present in the 1st portion of the duodenum at the site of previous bleeding and clip placement. 3cc of 1/100 epinephrine was injected  submucosally and the clot would not suction off. Three more hemostasis clips were placed at the base of the clot. The more distal duodenum had blood in the lumen, likely from distal migration, but no ulcer.  7/8: OR for exp lap with vagotomy pyloroplasty and oversewing of ulcer- midline incision with ALYSON drain/dsg and jewels XIMENA drains; 2u PRBCs   7/9: 1u PRBCs   7/10: H/H 6/19- 2u PRBCs   7/11: NGT removed; remains NPO   7/12: HR 120s and elevated BP; lopressor ordered; clear liquids   7/13: transferred to floor; clear liquid-> full liquid   7/15: H/H 7/20; transfuse 1u PRBCs       07/04-Patient resting comfortably in bed with no acute complaints. Is not requiring supplemental oxygen today. Denies new rectal bleeding. The patient says he feels better after getting 2 units of pRBC yesterday, but that he still feels weak. His H&H is stable this morning, will continue to monitor, and as long as it stays stable may be able to discharge tomorrow.    7/14- transferred out of ICU overnight; midline incision with ALYSON drain and bilateral XIMENA drains; full liquid diet per surgery; SS following for placement-- will need SWB   7/15: decrease in H/H 7/20- transfuse 1u PRBCs- surgery following; SS--- will need covid PCR before any further paperwork regarding placement at [facility]   7/16: NAEO; denies any needs; H/H 8/24 after 1u yesterday; continue current POC  7/17: NAEO; advanced to reg diet; covid PCR ordered       Interval History: Pt resting in bed. Tolerated breakfast. Denies any CP, SOB. Does still have some abd tenderness- improving. Denies any needs or complaints.     Review of Systems   Respiratory:  Negative for cough and shortness of breath.    Cardiovascular:  Negative for chest pain.   Gastrointestinal:  Positive for abdominal pain.   Objective:     Vital Signs (Most Recent):  Temp: 98.8 °F (37.1 °C) (07/17/22 0716)  Pulse: 80 (07/17/22 0716)  Resp: 20 (07/17/22 0716)  BP: (!) 152/87 (07/17/22 0716)  SpO2: 97 % (07/17/22  0716)   Vital Signs (24h Range):  Temp:  [97.9 °F (36.6 °C)-99.4 °F (37.4 °C)] 98.8 °F (37.1 °C)  Pulse:  [63-80] 80  Resp:  [18-20] 20  SpO2:  [97 %-98 %] 97 %  BP: (125-163)/(65-98) 152/87     Weight: 103.5 kg (228 lb 2.8 oz)  Body mass index is 30.95 kg/m².    Intake/Output Summary (Last 24 hours) at 7/17/2022 1047  Last data filed at 7/17/2022 0800  Gross per 24 hour   Intake 467 ml   Output 1150 ml   Net -683 ml      Physical Exam  Vitals and nursing note reviewed.   Constitutional:       General: He is awake. He is not in acute distress.     Appearance: Normal appearance.   HENT:      Head: Normocephalic.      Mouth/Throat:      Mouth: Mucous membranes are moist.   Eyes:      General: Lids are normal.   Cardiovascular:      Rate and Rhythm: Normal rate and regular rhythm.      Pulses: Normal pulses.           Radial pulses are 2+ on the right side and 2+ on the left side.      Heart sounds: Normal heart sounds.      Comments: Bilateral UE edema   Pulmonary:      Effort: Pulmonary effort is normal.      Breath sounds: Normal breath sounds.   Abdominal:      General: Bowel sounds are normal. There is distension.      Palpations: Abdomen is soft.      Tenderness: There is abdominal tenderness.          Comments: Midline incision with ALYSON  RLQ and LLQ XIMENA drain    Musculoskeletal:         General: Normal range of motion.      Cervical back: Neck supple.      Right lower leg: Edema present.      Left lower leg: Edema present.   Skin:     General: Skin is warm and dry.      Capillary Refill: Capillary refill takes less than 2 seconds.   Neurological:      Mental Status: He is alert. Mental status is at baseline.      GCS: GCS eye subscore is 4. GCS verbal subscore is 5. GCS motor subscore is 6.      Sensory: Sensation is intact.      Motor: Motor function is intact.   Psychiatric:         Mood and Affect: Mood normal.         Speech: Speech normal.         Behavior: Behavior is cooperative.       Significant Labs:  All pertinent labs within the past 24 hours have been reviewed.  Recent Lab Results  (Last 5 results in the past 24 hours)        07/17/22  0513   07/17/22  0318   07/16/22 2018 07/16/22  1815   07/16/22  1058        Anion Gap   13             Aniso   Few             Baso #   0.01             Basophil %   0.2             BUN   55             BUN/CREAT RATIO   10             Calcium   8.0             Chloride   116             CO2   16             Creatinine   5.32             Differential Type   Manual             eGFR if non    11             Eos #   0.14             Eosinophil %   3.4                3             Glucose   83             Hematocrit   24.9             Hemoglobin   8.4             Immature Grans (Abs)   0.02             Immature Granulocytes   0.5             Lymph #   0.62             Lymph %   15.2                15             MCH   30.9             MCHC   33.7             MCV   91.5             Mono #   0.64             Mono %   15.7                13             MPV   9.2             Neutrophils, Abs   2.65             Neutrophils Relative   65.0             nRBC   0.0             NUCLEATED RBC ABSOLUTE   0.00             PLATELET MORPHOLOGY   Normal             Platelets   215             POC Glucose 96     168   218   102       Poly   Few             Potassium   5.1             RBC   2.72             RDW   15.8             Segmented Neutrophils, Man %   69             Sodium   140             WBC   4.08                                    Significant Imaging: I have reviewed all pertinent imaging results/findings within the past 24 hours.      Assessment/Plan:      * Gastrointestinal hemorrhage associated with duodenitis  - EGD with bleeding duodenal ulcer as above, was clipped   - Repeat EGD 07/05, bleeding duodenal vessel was clipped per GI.  - H. Pylori negative  - Protonix infusion  - GI following, appreciate assistance   -Pt continues to bleed with blood clots from  bowel and H &H  Continues to decline. Pt became hypotensive, and transferred to ICU for further monitoring and eval. General surgery, Dr. Rees consulted for eval.   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 [facility]   - ongoing bleed with blood clots from bowel and decreasing H&H. Pt became hypotensive, and transferred to ICU for further monitoring and eval. General surgery, Dr. Rees consulted for eval.   - to OR 7/8  - doing well NGT removed. Try to advance diet today  7/14  - full liquids per surgery  - midline incision with ALYSON and XIMENA drains   7/17  - reg diet; tolerating     Esophagitis        Anemia  - Patient with bleeding duodenal ulcer on EGD 7/2, ulcer clipped and an EGD on 07/05 where bleeding duodenal vessel was clipped  - patient has had a total of 6 units this admission  - GI following, assistance appreciated  -Pt continues to have large amount of bloody bowel movement with clots. H & H 6.3/18.7 (7/7), platelet 35681 and during 1 unit blood transfusion, pt became hypotensive. Given 1 liter bolus and transferred to ICU  And consulted GS, Dr. Rees. Appreciate assistance (7/7)  7/14  - H/H 8/25  - has been stable   - rec'd 16u of PRBCs and 1u of plt   7/15  - H/H decreased this AM; 7/20  - transfuse 1u PRBCs   7/16  - 8/24 after blood     Acute on chronic renal failure   - Creatinine stable  - Nephrology following  - continues to have non-anion gap acidosis, check urine electrolytes. UAG +14  7/14  - Cr 6.2  - nephrology following   7/15  - Cr 5.8  7/16  - 5.4     Hyperkalemia  - 5.4 today  - Improving  - Continue lokelma   - Switch from sodium bicarb to lactated ringers for IVF  7/14  - D5W   - lokelma and kayexelate given  - recheck in AM   7/15  - 5.2 this AM   - lokelma given   7/16  - 5.5 this AM  - kayexelate   7/17  - 5.5 this AM     Pleural effusion  - Continue to monitor  - Seen by pulmonology, appreciate assistance   - No  thoracentesis at this time    Type 2 diabetes mellitus without complication  - HgA1c 4.7 6/22  - detemir 8U qhs with SSI   - holding levemir  - D5W for IVF  - discuss advancing diet with surgery, full liquid. If tolerates diet will try to DC IVF  7/14  - advanced to full liquids  - will continue to monitor BG trend  - remains on D5W at present   - BG trend 64-93   7/15  - BG trend 103-130   7/17  -       Gastroesophageal reflux disease  Continue protonix infusion  7/14  - transitioned to PO protonix     Hypertension  Due to GI bleeding, pt became hypotensive  Holding amlodipine, isosorbide mononitrate and beta blocker at this time (7/7)   7/14  - metoprolol has been resumed  - BP stable   7/16  - BP trending up  - norvasc resumed       VTE Risk Mitigation (From admission, onward)         Ordered     Reason for No Pharmacological VTE Prophylaxis  Once        Question:  Reasons:  Answer:  Risk of Bleeding    06/16/22 0301     IP VTE HIGH RISK PATIENT  Once         06/16/22 0301     Place sequential compression device  Until discontinued         06/16/22 0301                Discharge Planning   EDWARDO:      Code Status: Full Code   Is the patient medically ready for discharge?:     Reason for patient still in hospital (select all that apply): Treatment  Discharge Plan A: Long-term acute care facility (LTAC) (Choice obtained for Specialty)          SRIKANTH Nolen  Department of Hospital Medicine   [address]

## 2022-07-17 NOTE — SUBJECTIVE & OBJECTIVE
Interval History: Pt resting in bed. Tolerated breakfast. Denies any CP, SOB. Does still have some abd tenderness- improving. Denies any needs or complaints.     Review of Systems   Respiratory:  Negative for cough and shortness of breath.    Cardiovascular:  Negative for chest pain.   Gastrointestinal:  Positive for abdominal pain.   Objective:     Vital Signs (Most Recent):  Temp: 98.8 °F (37.1 °C) (07/17/22 0716)  Pulse: 80 (07/17/22 0716)  Resp: 20 (07/17/22 0716)  BP: (!) 152/87 (07/17/22 0716)  SpO2: 97 % (07/17/22 0716)   Vital Signs (24h Range):  Temp:  [97.9 °F (36.6 °C)-99.4 °F (37.4 °C)] 98.8 °F (37.1 °C)  Pulse:  [63-80] 80  Resp:  [18-20] 20  SpO2:  [97 %-98 %] 97 %  BP: (125-163)/(65-98) 152/87     Weight: 103.5 kg (228 lb 2.8 oz)  Body mass index is 30.95 kg/m².    Intake/Output Summary (Last 24 hours) at 7/17/2022 1047  Last data filed at 7/17/2022 0800  Gross per 24 hour   Intake 467 ml   Output 1150 ml   Net -683 ml      Physical Exam  Vitals and nursing note reviewed.   Constitutional:       General: He is awake. He is not in acute distress.     Appearance: Normal appearance.   HENT:      Head: Normocephalic.      Mouth/Throat:      Mouth: Mucous membranes are moist.   Eyes:      General: Lids are normal.   Cardiovascular:      Rate and Rhythm: Normal rate and regular rhythm.      Pulses: Normal pulses.           Radial pulses are 2+ on the right side and 2+ on the left side.      Heart sounds: Normal heart sounds.      Comments: Bilateral UE edema   Pulmonary:      Effort: Pulmonary effort is normal.      Breath sounds: Normal breath sounds.   Abdominal:      General: Bowel sounds are normal. There is distension.      Palpations: Abdomen is soft.      Tenderness: There is abdominal tenderness.          Comments: Midline incision with ALYSON  RLQ and LLQ XIMENA drain    Musculoskeletal:         General: Normal range of motion.      Cervical back: Neck supple.      Right lower leg: Edema present.       Left lower leg: Edema present.   Skin:     General: Skin is warm and dry.      Capillary Refill: Capillary refill takes less than 2 seconds.   Neurological:      Mental Status: He is alert. Mental status is at baseline.      GCS: GCS eye subscore is 4. GCS verbal subscore is 5. GCS motor subscore is 6.      Sensory: Sensation is intact.      Motor: Motor function is intact.   Psychiatric:         Mood and Affect: Mood normal.         Speech: Speech normal.         Behavior: Behavior is cooperative.       Significant Labs: All pertinent labs within the past 24 hours have been reviewed.  Recent Lab Results  (Last 5 results in the past 24 hours)        07/17/22  0513   07/17/22  0318   07/16/22  2018   07/16/22  1815   07/16/22  1058        Anion Gap   13             Aniso   Few             Baso #   0.01             Basophil %   0.2             BUN   55             BUN/CREAT RATIO   10             Calcium   8.0             Chloride   116             CO2   16             Creatinine   5.32             Differential Type   Manual             eGFR if non    11             Eos #   0.14             Eosinophil %   3.4                3             Glucose   83             Hematocrit   24.9             Hemoglobin   8.4             Immature Grans (Abs)   0.02             Immature Granulocytes   0.5             Lymph #   0.62             Lymph %   15.2                15             MCH   30.9             MCHC   33.7             MCV   91.5             Mono #   0.64             Mono %   15.7                13             MPV   9.2             Neutrophils, Abs   2.65             Neutrophils Relative   65.0             nRBC   0.0             NUCLEATED RBC ABSOLUTE   0.00             PLATELET MORPHOLOGY   Normal             Platelets   215             POC Glucose 96     168   218   102       Poly   Few             Potassium   5.1             RBC   2.72             RDW   15.8             Segmented Neutrophils, Man %    69             Sodium   140             WBC   4.08                                    Significant Imaging: I have reviewed all pertinent imaging results/findings within the past 24 hours.

## 2022-07-18 LAB
ANION GAP SERPL CALCULATED.3IONS-SCNC: 13 MMOL/L (ref 7–16)
BASOPHILS # BLD AUTO: 0.02 K/UL (ref 0–0.2)
BASOPHILS NFR BLD AUTO: 0.4 % (ref 0–1)
BUN SERPL-MCNC: 45 MG/DL (ref 7–18)
BUN/CREAT SERPL: 9 (ref 6–20)
CALCIUM SERPL-MCNC: 8 MG/DL (ref 8.5–10.1)
CHLORIDE SERPL-SCNC: 118 MMOL/L (ref 98–107)
CO2 SERPL-SCNC: 17 MMOL/L (ref 21–32)
CREAT SERPL-MCNC: 5.06 MG/DL (ref 0.7–1.3)
DIFFERENTIAL METHOD BLD: ABNORMAL
EOSINOPHIL # BLD AUTO: 0.08 K/UL (ref 0–0.5)
EOSINOPHIL NFR BLD AUTO: 1.5 % (ref 1–4)
ERYTHROCYTE [DISTWIDTH] IN BLOOD BY AUTOMATED COUNT: 15.4 % (ref 11.5–14.5)
GLUCOSE SERPL-MCNC: 100 MG/DL (ref 70–105)
GLUCOSE SERPL-MCNC: 120 MG/DL (ref 70–105)
GLUCOSE SERPL-MCNC: 141 MG/DL (ref 70–105)
GLUCOSE SERPL-MCNC: 78 MG/DL (ref 74–106)
GLUCOSE SERPL-MCNC: 81 MG/DL (ref 70–105)
HCT VFR BLD AUTO: 26.2 % (ref 40–54)
HGB BLD-MCNC: 9 G/DL (ref 13.5–18)
IMM GRANULOCYTES # BLD AUTO: 0.02 K/UL (ref 0–0.04)
IMM GRANULOCYTES NFR BLD: 0.4 % (ref 0–0.4)
LYMPHOCYTES # BLD AUTO: 0.95 K/UL (ref 1–4.8)
LYMPHOCYTES NFR BLD AUTO: 17.3 % (ref 27–41)
MCH RBC QN AUTO: 31.6 PG (ref 27–31)
MCHC RBC AUTO-ENTMCNC: 34.4 G/DL (ref 32–36)
MCV RBC AUTO: 91.9 FL (ref 80–96)
MONOCYTES # BLD AUTO: 0.71 K/UL (ref 0–0.8)
MONOCYTES NFR BLD AUTO: 12.9 % (ref 2–6)
MPC BLD CALC-MCNC: 9.5 FL (ref 9.4–12.4)
NEUTROPHILS # BLD AUTO: 3.72 K/UL (ref 1.8–7.7)
NEUTROPHILS NFR BLD AUTO: 67.5 % (ref 53–65)
NRBC # BLD AUTO: 0 X10E3/UL
NRBC, AUTO (.00): 0 %
PHOSPHATE SERPL-MCNC: 4.4 MG/DL (ref 2.5–4.5)
PLATELET # BLD AUTO: 223 K/UL (ref 150–400)
POTASSIUM SERPL-SCNC: 5.1 MMOL/L (ref 3.5–5.1)
RBC # BLD AUTO: 2.85 M/UL (ref 4.6–6.2)
SODIUM SERPL-SCNC: 143 MMOL/L (ref 136–145)
WBC # BLD AUTO: 5.5 K/UL (ref 4.5–11)

## 2022-07-18 PROCEDURE — 11000001 HC ACUTE MED/SURG PRIVATE ROOM

## 2022-07-18 PROCEDURE — 84100 ASSAY OF PHOSPHORUS: CPT | Performed by: INTERNAL MEDICINE

## 2022-07-18 PROCEDURE — 25000003 PHARM REV CODE 250: Performed by: NURSE PRACTITIONER

## 2022-07-18 PROCEDURE — 82962 GLUCOSE BLOOD TEST: CPT

## 2022-07-18 PROCEDURE — 25000003 PHARM REV CODE 250: Performed by: SURGERY

## 2022-07-18 PROCEDURE — 85025 COMPLETE CBC W/AUTO DIFF WBC: CPT | Performed by: NURSE PRACTITIONER

## 2022-07-18 PROCEDURE — 99233 SBSQ HOSP IP/OBS HIGH 50: CPT | Mod: ,,, | Performed by: HOSPITALIST

## 2022-07-18 PROCEDURE — 99233 PR SUBSEQUENT HOSPITAL CARE,LEVL III: ICD-10-PCS | Mod: ,,, | Performed by: HOSPITALIST

## 2022-07-18 PROCEDURE — 80048 BASIC METABOLIC PNL TOTAL CA: CPT | Performed by: FAMILY MEDICINE

## 2022-07-18 PROCEDURE — 25000003 PHARM REV CODE 250: Performed by: INTERNAL MEDICINE

## 2022-07-18 PROCEDURE — 94761 N-INVAS EAR/PLS OXIMETRY MLT: CPT

## 2022-07-18 PROCEDURE — 36415 COLL VENOUS BLD VENIPUNCTURE: CPT | Performed by: NURSE PRACTITIONER

## 2022-07-18 RX ADMIN — OXYCODONE HYDROCHLORIDE AND ACETAMINOPHEN 500 MG: 500 TABLET ORAL at 08:07

## 2022-07-18 RX ADMIN — SODIUM BICARBONATE 650 MG: 650 TABLET ORAL at 08:07

## 2022-07-18 RX ADMIN — PANTOPRAZOLE SODIUM 40 MG: 40 TABLET, DELAYED RELEASE ORAL at 08:07

## 2022-07-18 RX ADMIN — DOCUSATE SODIUM 100 MG: 100 CAPSULE, LIQUID FILLED ORAL at 08:07

## 2022-07-18 RX ADMIN — THERA TABS 1 TABLET: TAB at 08:07

## 2022-07-18 RX ADMIN — METOPROLOL TARTRATE 25 MG: 25 TABLET, FILM COATED ORAL at 09:07

## 2022-07-18 RX ADMIN — ATORVASTATIN CALCIUM 40 MG: 40 TABLET, FILM COATED ORAL at 09:07

## 2022-07-18 RX ADMIN — FERROUS SULFATE TAB 325 MG (65 MG ELEMENTAL FE) 1 EACH: 325 (65 FE) TAB at 08:07

## 2022-07-18 RX ADMIN — AMLODIPINE BESYLATE 10 MG: 10 TABLET ORAL at 08:07

## 2022-07-18 RX ADMIN — SODIUM BICARBONATE 650 MG: 650 TABLET ORAL at 09:07

## 2022-07-18 RX ADMIN — ALLOPURINOL 50 MG: 300 TABLET ORAL at 08:07

## 2022-07-18 RX ADMIN — METOPROLOL TARTRATE 25 MG: 25 TABLET, FILM COATED ORAL at 08:07

## 2022-07-18 RX ADMIN — DOCUSATE SODIUM 100 MG: 100 CAPSULE, LIQUID FILLED ORAL at 09:07

## 2022-07-18 NOTE — PROGRESS NOTES
[facility] - [address] [place] [address] Medicine  Progress Note    Patient Name: Neymar Parra  MRN: [id number]  Patient Class: IP- Inpatient   Admission Date: 6/16/2022  Length of Stay: 32 days  Attending Physician: Shari Cortes MD  Primary Care Provider: [facility] megan Rodríguez        Subjective:     Principal Problem:Gastrointestinal hemorrhage associated with duodenitis        HPI:  72 y.o. male transferred to the [facility] from [facility] for hyperkalemia and hypoxia. Pt reports he went to [facility] because he was having dyspnea and hemoptysis since 1 month which is worse today. Pt reports he was tested positive for COVID at the nursing home on 6/9/22. Per nursing home patient oxygen saturation was in the 80's on RA with tachypnea and labored breathing. Pt reports he normally does not wear oxygen at the nursing home but has been wearing oxygen more often recently. Pt reports he smoked 1-1.5 PPD for 20 years but quit 7 months ago. Per pt, he saw a pulmonologist (Dr. Aldana) for his hemoptysis and had some tests done the results of which he does not know. Per records, pt with a right pleural effusion recently and had a thoracentesis. Pt denies any fever, congestion, dysuria, N/V/D, chest pain, or any other complaints at this time.     In the ED, pt's K 7.2, d-dimer 0.76, BNP 6284, BUN/Cr 73/7.16. EKG showed some peaked t-waves and irregular rhythm. Chest xray showed Increased right lower lung pulmonary density could indicate pneumonia. Pt was treated in the ED with Calcium gluconate 1g, regular insulin 10 units, D50, IV solumedrol 125 mg, tylenol 650 mg and IV fluids NS 1L bolus. Pt will be admitted to the hospital service for management of hypoxia and hyperkalemia.      Overview/Hospital Course:  Overview of hospital stay from admission to present:   6/15- [facility] resident presented to [facility] ED with dyspnea and hypoxia; RA sats 80s; K 7.2; Cr 7.16; covid positive 6/9/22 6/20- Cr  - acute on chronic; K has normalized; some SOB- COPD exacerbation  - hyponatremia 123   - abd pain with slight drop in H/H--9->7  - CXR with worsening consolidation in LLL- completed 5 days of zithromax and steroids-- escalated to zosyn; H/H - 1u PRBCS ([place] [facility] [place]   - hx of duodenal adenoma- H/H continues to drop- FOBT positive; GI consulted   - CXR with worsening LLL consolidation and RLL infiltrate and pleural effusion; [place] [facility] EGD with esophageal candidiasis, moderate sized hiatal hernia, large duodenal bulb ulcer with adherent clot  : pulm consulted for recurrent R sided effusion- not enough fluid to drain; H/H - 1u PRBCs   : repeat EGD- Dieulafoy's lesion- duodenal bulb; s/p endoscopic therapy (clipping + epinephrine injection) and Duodenal bulb ulcer with visible vessel,s/p endoscopic therapy with clipping/epinephrine injection; rec'd 1u PRBCs   7/3: continued drop in H/H - 2u PRBCs   : H/H stable : H/H down to - 1u PRBCs- EGD- Mucosa of the esophagus was normal, overlying a small hiatus hernia. Blood was noted around the pyloric channel and no gastric ulcer was seen. Clot was present in the first portion of the duodenum with two clips attached to the mucosa. In that area, there appeared to be duodenitis and a visible vessel was noted with active bleeding. The bleeding was controlled with a clip.   : H/H stable : H/H - bloody BM with clots; total of 5u PRBCs and 1u PLTs; hypotensive 80/40s- transferred to ICU-; surgery consulted; EGD- The distal esophagus had a small non-bleeding mucosal tear. A 3cm hiatus hernia was noted. Gastric mucosa is pale, consistent with severe anemia. Gastritis is seen without active bleeding or gastric ulcer. A large adherent  clot is present in the 1st portion of the duodenum at the site of previous bleeding and clip placement. 3cc of 100 epinephrine was  injected submucosally and the clot would not suction off. Three more hemostasis clips were placed at the base of the clot. The more distal duodenum had blood in the lumen, likely from distal migration, but no ulcer.  7/8: OR for exp lap with vagotomy pyloroplasty and oversewing of ulcer- midline incision with AYLSON drain/dsg and jewels XIMENA drains; 2u PRBCs   7/9: 1u PRBCs   7/10: H/H 6/19- 2u PRBCs   7/11: NGT removed; remains NPO   7/12: HR 120s and elevated BP; lopressor ordered; clear liquids   7/13: transferred to floor; clear liquid-> full liquid   7/15: H/H 7/20; transfuse 1u PRBCs       07/04-Patient resting comfortably in bed with no acute complaints. Is not requiring supplemental oxygen today. Denies new rectal bleeding. The patient says he feels better after getting 2 units of pRBC yesterday, but that he still feels weak. His H&H is stable this morning, will continue to monitor, and as long as it stays stable may be able to discharge tomorrow.    7/14- transferred out of ICU overnight; midline incision with ALYSON drain and bilateral XIMENA drains; full liquid diet per surgery; SS following for placement-- will need SWB   7/15: decrease in H/H 7/20- transfuse 1u PRBCs- surgery following; SS--- will need covid PCR before any further paperwork regarding placement at [facility]   7/16: NAEO; denies any needs; H/H 8/24 after 1u yesterday; continue current POC  7/17: NAEO; advanced to reg diet; covid PCR ordered   7/18: NAEO; abd tenderness improving; tolerating reg diet; continues with ALYSON and XIMENA drains; PCR pending       Interval History: Pt resting in bed. Awake and alert. Abd tender to palpation- improving. Denies any CP or SOB. Still with ALYSON and XIMENA drains. Tolerating diet.     Review of Systems   Respiratory:  Negative for cough and shortness of breath.    Cardiovascular:  Negative for chest pain.   Gastrointestinal:  Negative for abdominal pain.   Objective:     Vital Signs (Most Recent):  Temp: 98.2 °F (36.8 °C)  (07/18/22 0711)  Pulse: 65 (07/18/22 0711)  Resp: 18 (07/18/22 0711)  BP: (!) 140/70 (07/18/22 0711)  SpO2: 98 % (07/18/22 0738)   Vital Signs (24h Range):  Temp:  [97.7 °F (36.5 °C)-98.8 °F (37.1 °C)] 98.2 °F (36.8 °C)  Pulse:  [65-85] 65  Resp:  [18-20] 18  SpO2:  [97 %-99 %] 98 %  BP: (140-156)/(69-87) 140/70     Weight: 103.8 kg (228 lb 13.4 oz)  Body mass index is 31.04 kg/m².    Intake/Output Summary (Last 24 hours) at 7/18/2022 1002  Last data filed at 7/18/2022 0800  Gross per 24 hour   Intake 267 ml   Output 675 ml   Net -408 ml      Physical Exam  Vitals and nursing note reviewed.   Constitutional:       General: He is awake. He is not in acute distress.     Appearance: Normal appearance.   HENT:      Head: Normocephalic.      Mouth/Throat:      Mouth: Mucous membranes are moist.   Eyes:      General: Lids are normal.   Cardiovascular:      Rate and Rhythm: Normal rate and regular rhythm.      Pulses: Normal pulses.           Radial pulses are 2+ on the right side and 2+ on the left side.      Heart sounds: Normal heart sounds.      Comments: Bilateral UE edema   Pulmonary:      Effort: Pulmonary effort is normal.      Breath sounds: Normal breath sounds.   Abdominal:      General: Bowel sounds are normal. There is distension.      Palpations: Abdomen is soft.      Tenderness: There is abdominal tenderness.          Comments: Midline incision with ALYSON  RLQ and LLQ XIMENA drain    Musculoskeletal:         General: Normal range of motion.      Cervical back: Neck supple.      Right lower leg: Edema present.      Left lower leg: Edema present.   Skin:     General: Skin is warm and dry.      Capillary Refill: Capillary refill takes less than 2 seconds.   Neurological:      Mental Status: He is alert. Mental status is at baseline.      GCS: GCS eye subscore is 4. GCS verbal subscore is 5. GCS motor subscore is 6.      Sensory: Sensation is intact.      Motor: Motor function is intact.   Psychiatric:         Mood  and Affect: Mood normal.         Speech: Speech normal.         Behavior: Behavior is cooperative.       Significant Labs: All pertinent labs within the past 24 hours have been reviewed.  Recent Lab Results  (Last 5 results in the past 24 hours)        07/18/22  0512   07/18/22  0308   07/17/22  2044   07/17/22  1634   07/17/22  1126        Anion Gap   13             Baso #   0.02             Basophil %   0.4             BUN   45             BUN/CREAT RATIO   9             Calcium   8.0             Chloride   118             CO2   17             Creatinine   5.06             Differential Type   Auto             eGFR if non    12             Eos #   0.08             Eosinophil %   1.5             Glucose   78             Hematocrit   26.2             Hemoglobin   9.0             Immature Grans (Abs)   0.02             Immature Granulocytes   0.4             Lymph #   0.95             Lymph %   17.3             MCH   31.6             MCHC   34.4             MCV   91.9             Mono #   0.71             Mono %   12.9             MPV   9.5             Neutrophils, Abs   3.72             Neutrophils Relative   67.5             nRBC   0.0             NUCLEATED RBC ABSOLUTE   0.00             Phosphorus   4.4             Platelets   223             POC Glucose 81     157   125   94       Potassium   5.1             RBC   2.85             RDW   15.4             Sodium   143             WBC   5.50                                    Significant Imaging: I have reviewed all pertinent imaging results/findings within the past 24 hours.      Assessment/Plan:      * Gastrointestinal hemorrhage associated with duodenitis  - EGD with bleeding duodenal ulcer as above, was clipped   - Repeat EGD 07/05, bleeding duodenal vessel was clipped per GI.  - H. Pylori negative  - Protonix infusion  - GI following, appreciate assistance   -Pt continues to bleed with blood clots from bowel and H &H  Continues to decline. Pt  became hypotensive, and transferred to ICU for further monitoring and eval. General surgery, Dr. Rees consulted for eval.   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 [facility]   - ongoing bleed with blood clots from bowel and decreasing H&H. Pt became hypotensive, and transferred to ICU for further monitoring and eval. General surgery, Dr. Rees consulted for eval.   - to OR 7/8  - doing well NGT removed. Try to advance diet today  7/14  - full liquids per surgery  - midline incision with ALYSON and XIMENA drains   7/17  - reg diet; tolerating     Esophagitis        Anemia  - Patient with bleeding duodenal ulcer on EGD 7/2, ulcer clipped and an EGD on 07/05 where bleeding duodenal vessel was clipped  - patient has had a total of 6 units this admission  - GI following, assistance appreciated  -Pt continues to have large amount of bloody bowel movement with clots. H & H 6.3/18.7 (7/7), platelet [id number] and during 1 unit blood transfusion, pt became hypotensive. Given 1 liter bolus and transferred to ICU  And consulted GS, Dr. Rees. Appreciate assistance (7/7)  7/14  - H/H 8/25  - has been stable   - rec'd 16u of PRBCs and 1u of plt   7/15  - H/H decreased this AM; 7/20  - transfuse 1u PRBCs   7/16  - 8/24 after blood   7/17  - remains stable     Acute on chronic renal failure   - Creatinine stable  - Nephrology following  - continues to have non-anion gap acidosis, check urine electrolytes. UAG +14  7/14  - Cr 6.2  - nephrology following   7/15  - Cr 5.8  7/16  - 5.4     Hyperkalemia  - 5.4 today  - Improving  - Continue lokelma   - Switch from sodium bicarb to lactated ringers for IVF  7/14  - D5W   - lokelma and kayexelate given  - recheck in AM   7/15  - 5.2 this AM   - lokelma given   7/16  - 5.5 this AM  - kayexelate   7/17  - 5.5 this AM     Pleural effusion  - Continue to monitor  - Seen by pulmonology, appreciate assistance   - No thoracentesis at  this time    Type 2 diabetes mellitus without complication  - HgA1c 4.7 6/22  - detemir 8U qhs with SSI   - holding levemir  - D5W for IVF  - discuss advancing diet with surgery, full liquid. If tolerates diet will try to DC IVF  7/14  - advanced to full liquids  - will continue to monitor BG trend  - remains on D5W at present   - BG trend 64-93   7/15  - BG trend 103-130   7/17  -   7/18  -        Gastroesophageal reflux disease  Continue protonix infusion  7/14  - transitioned to PO protonix     Hypertension  Due to GI bleeding, pt became hypotensive  Holding amlodipine, isosorbide mononitrate and beta blocker at this time (7/7)   7/14  - metoprolol has been resumed  - BP stable   7/16  - BP trending up  - norvasc resumed       VTE Risk Mitigation (From admission, onward)         Ordered     Reason for No Pharmacological VTE Prophylaxis  Once        Question:  Reasons:  Answer:  Risk of Bleeding    06/16/22 0301     IP VTE HIGH RISK PATIENT  Once         06/16/22 0301     Place sequential compression device  Until discontinued         06/16/22 0301                Discharge Planning   EDWARDO:      Code Status: Full Code   Is the patient medically ready for discharge?:     Reason for patient still in hospital (select all that apply): Treatment  Discharge Plan A: Long-term acute care facility (LTAC) (Choice obtained for Specialty)          SRIKANTH Nolen  Department of Hospital Medicine   [address]

## 2022-07-18 NOTE — PLAN OF CARE
Chart reviewed. SS awaiting PCR covid result to come back in order to complete placement process. SS to send result to Kiara at [facility]. SS following.

## 2022-07-18 NOTE — PROGRESS NOTES
[facility] - [facility]  Adult Nutrition  Progress Note    SUMMARY       Recommendations    Recommendation/Intervention: Diet advanced to [place] soft. Tolerating diet well and continues on supplements. Suggest addding renal diet to diet order.  Goals: Weight maintenance, % intake  Nutrition Goal Status: progressing towards goal    Assessment and Plan   Latest Reference Range & Units 07/18/22 03:08   Sodium 136 - 145 mmol/L 143   Potassium 3.5 - 5.1 mmol/L 5.1   Chloride 98 - 107 mmol/L 118 (H)   CO2 21 - 32 mmol/L 17 (L)   Anion Gap 7 - 16 mmol/L 13   BUN 7 - 18 mg/dL 45 (H)   Creatinine 0.70 - 1.30 mg/dL 5.06 (H)   BUN/CREAT RATIO 6 - 20  9   eGFR if non African American >=60 mL/min/1.73m² 12 (L)   Glucose 74 - 106 mg/dL 78   Calcium 8.5 - 10.1 mg/dL 8.0 (L)   Phosphorus 2.5 - 4.5 mg/dL 4.4   (H): Data is abnormally high  (L): Data is abnormally low    Patient with ESRD and not on dialysis. Receiving supplements of Suplena due to reanl disease and not on dailysis. Crea 5.06, GFR 12, BUN 45. Current phos and K+ are wnl.     No new Assessment & Plan notes have been filed under this hospital service since the last note was generated.  Service: Nutrition         Reason for Assessment    Reason For Assessment: RD follow-up  Diagnosis: other (see comments) (DEO)  Relevant Medical History: HTN, CAD, CKD4, anticoagulant long term use  General Information Comments: sporadic diet orders; most recent oral diet was 7/6-renal diet. Pt has now been NPO/CLD x 5 days    Nutrition Risk Screen    Nutrition Risk Screen: no indicators present    Nutrition/Diet History    Spiritual, Cultural Beliefs, Scientologist Practices, Values that Affect Care: no    Anthropometrics    Temp: 98.3 °F (36.8 °C)  Height Method: Stated  Height: 6' (182.9 cm)  Height (inches): 72 in  Weight Method: Bed Scale  Weight: 103.8 kg (228 lb 13.4 oz)  Weight (lb): 228.84 lb  Ideal Body Weight (IBW), Male: 178 lb  % Ideal Body Weight, Male  (lb): 147.63 %  BMI (Calculated): 31       Lab/Procedures/Meds    Pertinent Labs Reviewed: reviewed  Pertinent Labs Comments: k 5.5(H)-MD notes chronically high due to transfusions, Cl 121(H), BUN 75(H), Creat 6.23(H)-CKD V per nephrology; Ca 8.0(L)  Pertinent Medications Reviewed: reviewed  Pertinent Medications Comments: allopurinol, vitamin C, atorvastatin, calcium gluconate, ferrous sulfate, insulin, MVI, pantoprazole, sodium bicarbonate, D5    Physical Findings/Assessment         Estimated/Assessed Needs    Weight Used For Calorie Calculations: 90 kg (198 lb 6.6 oz)  Energy Calorie Requirements (kcal): 2563-6142 kcals/day (25-30 kcals/kg Adj BW)     Protein Requirements: 72 g/day (0.8 g/kg adj BW)  Weight Used For Protein Calculations: 90 kg (198 lb 6.6 oz)  Fluid Requirements (mL): 8680-6692 mL/day     RDA Method (mL): 2250         Nutrition Prescription Ordered    Current Diet Order: [facility] soft  Nutrition Order Comments: NGT discontinued on 7/11/22 and then started diet advancement  Oral Nutrition Supplement: suplena    Evaluation of Received Nutrient/Fluid Intake    Energy Calories Required: meeting needs  Protein Required: meeting needs  Fluid Required: meeting needs  Comments: Patient tolerating po diet with supplements  Tolerance: tolerating  % Intake of Estimated Energy Needs: 75 - 100 %  % Meal Intake: Other:     Nutrition Risk    Level of Risk/Frequency of Follow-up: moderate - high     Monitor and Evaluation    Food and Nutrient Intake: energy intake  Food and Nutrient Adminstration: diet order  Anthropometric Measurements: weight change, weight  Biochemical Data, Medical Tests and Procedures: electrolyte and renal panel, glucose/endocrine profile, gastrointestinal profile, inflammatory profile, lipid profile     Nutrition Follow-Up    RD Follow-up?: Yes

## 2022-07-18 NOTE — ASSESSMENT & PLAN NOTE
- Patient with bleeding duodenal ulcer on EGD 7/2, ulcer clipped and an EGD on 07/05 where bleeding duodenal vessel was clipped  - patient has had a total of 6 units this admission  - GI following, assistance appreciated  -Pt continues to have large amount of bloody bowel movement with clots. H & H 6.3/18.7 (7/7), platelet 22623 and during 1 unit blood transfusion, pt became hypotensive. Given 1 liter bolus and transferred to ICU  And consulted GS, Dr. Rees. Appreciate assistance (7/7)  7/14  - H/H 8/25  - has been stable   - rec'd 16u of PRBCs and 1u of plt   7/15  - H/H decreased this AM; 7/20  - transfuse 1u PRBCs   7/16  - 8/24 after blood   7/17  - remains stable

## 2022-07-18 NOTE — PT/OT/SLP PROGRESS
Physical Therapy      Patient Name:  Neymar Parra   MRN:  [id number]    Patient not seen today secondary to Patient unwilling to participate. Pt declined PT on 2 attempts this afternoon. Will follow-up tomorrow.

## 2022-07-18 NOTE — PROGRESS NOTES
[address]  Nephrology  Progress Note    Patient Name: Neymar Parra  MRN: [id number]  Admission Date: 6/16/2022  Hospital Length of Stay: 32 days  Attending Provider: Shari Cortse MD   Primary Care Physician: [facility] megan Gunlock  Principal Problem:Gastrointestinal hemorrhage associated with duodenitis    Consults  Subjective:     Interval History: The patient reports having abdominal pain today    Review of patient's allergies indicates:   Allergen Reactions    Gatifloxacin Anaphylaxis     Current Facility-Administered Medications   Medication Frequency    0.9%  NaCl infusion (for blood administration) Q24H PRN    0.9%  NaCl infusion (for blood administration) Q24H PRN    0.9%  NaCl infusion (for blood administration) Q24H PRN    acetaminophen tablet 1,000 mg Q6H PRN    acetaminophen tablet 650 mg Q8H PRN    acetaminophen tablet 650 mg Q4H PRN    albuterol inhaler 2 puff Q6H PRN    allopurinol split tablet 50 mg Daily    amLODIPine tablet 10 mg Daily    ascorbic acid (vitamin C) tablet 500 mg Daily    atorvastatin tablet 40 mg QHS    bisacodyL EC tablet 10 mg Daily PRN    calcium gluconate 1 g in NS IVPB (premixed) Q10 Min PRN    cetirizine tablet 10 mg Daily PRN    dextromethorphan-guaiFENesin  mg/5 ml liquid 10 mL Q6H PRN    dextrose 5 % infusion Continuous    dextrose 50% injection 12.5 g PRN    dextrose 50% injection 25 g PRN    docusate sodium capsule 100 mg BID    ferrous sulfate tablet 1 each Daily    glucagon (human recombinant) injection 1 mg PRN    glucose chewable tablet 16 g PRN    glucose chewable tablet 24 g PRN    hydrALAZINE injection 10 mg Q6H PRN    HYDROcodone-acetaminophen 7.5-325 mg per tablet 1 tablet Q6H PRN    insulin aspart U-100 injection 1-10 Units QID (AC + HS) PRN    melatonin tablet 6 mg Nightly PRN    metoprolol tartrate (LOPRESSOR) tablet 25 mg BID    mineral oil enema 1 enema Daily PRN     morphine injection 4 mg Q4H PRN    morphine injection 6 mg Q4H PRN    multivitamin tablet Daily    naloxone 0.4 mg/mL injection 0.02 mg PRN    ondansetron injection 4 mg Q12H PRN    ondansetron injection 8 mg Q6H PRN    pantoprazole EC tablet 40 mg Daily    simethicone chewable tablet 80 mg TID PRN    sodium bicarbonate tablet 650 mg BID    sodium chloride 0.9% flush 10 mL Q12H PRN    traMADoL tablet 50 mg Q8H PRN    traZODone tablet 50 mg Nightly PRN       Objective:     Vital Signs (Most Recent):  Temp: 98.7 °F (37.1 °C) (07/18/22 0000)  Pulse: 65 (07/18/22 0000)  Resp: 18 (07/18/22 0000)  BP: (!) 143/69 (07/18/22 0000)  SpO2: 97 % (07/18/22 0000)  O2 Device (Oxygen Therapy): room air (07/17/22 1918) Vital Signs (24h Range):  Temp:  [97.7 °F (36.5 °C)-99.4 °F (37.4 °C)] 98.7 °F (37.1 °C)  Pulse:  [65-85] 65  Resp:  [18-20] 18  SpO2:  [97 %-98 %] 97 %  BP: (125-156)/(65-87) 143/69     Weight: 103.8 kg (228 lb 13.4 oz) (07/18/22 0000)  Body mass index is 31.04 kg/m².  Body surface area is 2.3 meters squared.    I/O last 3 completed shifts:  In: 734 [P.O.:734]  Out: 1825 [Urine:1500; Drains:325]    Physical Exam   Lungs-clear  Cor-no murmur  Abd-soft    Significant Labs:sure  CBC:   Recent Labs   Lab 07/17/22 0318   WBC 4.08*   RBC 2.72*   HGB 8.4*   HCT 24.9*      MCV 91.5   MCH 30.9   MCHC 33.7     CMP:   Recent Labs   Lab 07/17/22 0318   GLU 83   CALCIUM 8.0*      K 5.1   CO2 16*   *   BUN 55*   CREATININE 5.32*     All labs within the past 24 hours have been reviewed.    Significant Imaging:      Assessment/Plan:     Active Diagnoses:    Diagnosis Date Noted POA    PRINCIPAL PROBLEM:  Gastrointestinal hemorrhage associated with duodenitis [K29.81]  Yes    Esophagitis [K20.90]  Unknown    Hyperkalemia [E87.5] 06/16/2022 Yes    Acute on chronic renal failure [N17.9, N18.9] 06/16/2022 Yes    Anemia [D64.9] 06/16/2022 Yes    Pleural effusion [J90] 04/29/2022 Yes    Hypertension  [I10] 12/12/2021 Yes    Gastroesophageal reflux disease [K21.9] 12/12/2021 Yes    Type 2 diabetes mellitus without complication [E11.9] 12/12/2021 Yes      Problems Resolved During this Admission:    Diagnosis Date Noted Date Resolved POA    PNA (pneumonia) [J18.9] 06/27/2022 07/06/2022 Yes    Hyponatremia [E87.1] 06/25/2022 07/02/2022 No    Elevated d-dimer [R79.89] 06/22/2022 07/05/2022 Yes    COVID-19 [U07.1] 06/16/2022 06/22/2022 Yes    COPD exacerbation [J44.1] 12/12/2021 07/03/2022 Yes       Imp:  The creatinine continues to trend downward    Thank you for your consult. I will follow-up with patient. Please contact us if you have any additional questions.    Jose C Buitrago MD  Nephrology  [facility] - [address] [place] [address]

## 2022-07-18 NOTE — SUBJECTIVE & OBJECTIVE
Interval History: Pt resting in bed. Awake and alert. Abd tender to palpation- improving. Denies any CP or SOB. Still with ALYSON and XIMENA drains. Tolerating diet.     Review of Systems   Respiratory:  Negative for cough and shortness of breath.    Cardiovascular:  Negative for chest pain.   Gastrointestinal:  Negative for abdominal pain.   Objective:     Vital Signs (Most Recent):  Temp: 98.2 °F (36.8 °C) (07/18/22 0711)  Pulse: 65 (07/18/22 0711)  Resp: 18 (07/18/22 0711)  BP: (!) 140/70 (07/18/22 0711)  SpO2: 98 % (07/18/22 0738)   Vital Signs (24h Range):  Temp:  [97.7 °F (36.5 °C)-98.8 °F (37.1 °C)] 98.2 °F (36.8 °C)  Pulse:  [65-85] 65  Resp:  [18-20] 18  SpO2:  [97 %-99 %] 98 %  BP: (140-156)/(69-87) 140/70     Weight: 103.8 kg (228 lb 13.4 oz)  Body mass index is 31.04 kg/m².    Intake/Output Summary (Last 24 hours) at 7/18/2022 1002  Last data filed at 7/18/2022 0800  Gross per 24 hour   Intake 267 ml   Output 675 ml   Net -408 ml      Physical Exam  Vitals and nursing note reviewed.   Constitutional:       General: He is awake. He is not in acute distress.     Appearance: Normal appearance.   HENT:      Head: Normocephalic.      Mouth/Throat:      Mouth: Mucous membranes are moist.   Eyes:      General: Lids are normal.   Cardiovascular:      Rate and Rhythm: Normal rate and regular rhythm.      Pulses: Normal pulses.           Radial pulses are 2+ on the right side and 2+ on the left side.      Heart sounds: Normal heart sounds.      Comments: Bilateral UE edema   Pulmonary:      Effort: Pulmonary effort is normal.      Breath sounds: Normal breath sounds.   Abdominal:      General: Bowel sounds are normal. There is distension.      Palpations: Abdomen is soft.      Tenderness: There is abdominal tenderness.          Comments: Midline incision with ALYSON  RLQ and LLQ XIMENA drain    Musculoskeletal:         General: Normal range of motion.      Cervical back: Neck supple.      Right lower leg: Edema present.       Left lower leg: Edema present.   Skin:     General: Skin is warm and dry.      Capillary Refill: Capillary refill takes less than 2 seconds.   Neurological:      Mental Status: He is alert. Mental status is at baseline.      GCS: GCS eye subscore is 4. GCS verbal subscore is 5. GCS motor subscore is 6.      Sensory: Sensation is intact.      Motor: Motor function is intact.   Psychiatric:         Mood and Affect: Mood normal.         Speech: Speech normal.         Behavior: Behavior is cooperative.       Significant Labs: All pertinent labs within the past 24 hours have been reviewed.  Recent Lab Results  (Last 5 results in the past 24 hours)        07/18/22  0512   07/18/22  0308   07/17/22  2044   07/17/22  1634   07/17/22  1126        Anion Gap   13             Baso #   0.02             Basophil %   0.4             BUN   45             BUN/CREAT RATIO   9             Calcium   8.0             Chloride   118             CO2   17             Creatinine   5.06             Differential Type   Auto             eGFR if non    12             Eos #   0.08             Eosinophil %   1.5             Glucose   78             Hematocrit   26.2             Hemoglobin   9.0             Immature Grans (Abs)   0.02             Immature Granulocytes   0.4             Lymph #   0.95             Lymph %   17.3             MCH   31.6             MCHC   34.4             MCV   91.9             Mono #   0.71             Mono %   12.9             MPV   9.5             Neutrophils, Abs   3.72             Neutrophils Relative   67.5             nRBC   0.0             NUCLEATED RBC ABSOLUTE   0.00             Phosphorus   4.4             Platelets   223             POC Glucose 81     157   125   94       Potassium   5.1             RBC   2.85             RDW   15.4             Sodium   143             WBC   5.50                                    Significant Imaging: I have reviewed all pertinent imaging results/findings  within the past 24 hours.

## 2022-07-18 NOTE — ASSESSMENT & PLAN NOTE
- HgA1c 4.7 6/22  - detemir 8U qhs with SSI   - holding levemir  - D5W for IVF  - discuss advancing diet with surgery, full liquid. If tolerates diet will try to DC IVF  7/14  - advanced to full liquids  - will continue to monitor BG trend  - remains on D5W at present   - BG trend 64-93   7/15  - BG trend 103-130   7/17  -   7/18  -

## 2022-07-18 NOTE — ASSESSMENT & PLAN NOTE
- 5.4 today  - Improving  - Continue lokelma   - Switch from sodium bicarb to lactated ringers for IVF  7/14  - D5W   - lokelma and kayexelate given  - recheck in AM   7/15  - 5.2 this AM   - lokelma given   7/16  - 5.5 this AM  - kayexelate   7/17  - 5.5 this AM

## 2022-07-18 NOTE — PT/OT/SLP PROGRESS
"Occupational Therapy      Patient Name:  Neymar Parra   MRN:  [id number]    Patient not seen today secondary to Patient unwilling to participate (OT attempted tx x3 attempts; pt states, "I don't feel up to therapy today."). Will follow-up 7/19/22.    7/18/2022  "

## 2022-07-18 NOTE — PLAN OF CARE
Problem: Adult Inpatient Plan of Care  Goal: Plan of Care Review  Outcome: Ongoing, Progressing  Goal: Optimal Comfort and Wellbeing  Outcome: Ongoing, Progressing     Problem: Diabetes Comorbidity  Goal: Blood Glucose Level Within Targeted Range  Outcome: Ongoing, Progressing     Problem: Skin Injury Risk Increased  Goal: Skin Health and Integrity  Outcome: Ongoing, Progressing     Problem: Gas Exchange Impaired  Goal: Optimal Gas Exchange  Outcome: Ongoing, Progressing     Problem: Fall Injury Risk  Goal: Absence of Fall and Fall-Related Injury  Outcome: Ongoing, Progressing     Problem: Respiratory Compromise (Pneumonia)  Goal: Effective Oxygenation and Ventilation  Outcome: Ongoing, Progressing

## 2022-07-19 LAB
ANION GAP SERPL CALCULATED.3IONS-SCNC: 14 MMOL/L (ref 7–16)
BASOPHILS # BLD AUTO: 0.02 K/UL (ref 0–0.2)
BASOPHILS NFR BLD AUTO: 0.3 % (ref 0–1)
BUN SERPL-MCNC: 43 MG/DL (ref 7–18)
BUN/CREAT SERPL: 9 (ref 6–20)
CALCIUM SERPL-MCNC: 8.1 MG/DL (ref 8.5–10.1)
CHLORIDE SERPL-SCNC: 117 MMOL/L (ref 98–107)
CO2 SERPL-SCNC: 16 MMOL/L (ref 21–32)
CREAT SERPL-MCNC: 4.71 MG/DL (ref 0.7–1.3)
DIFFERENTIAL METHOD BLD: ABNORMAL
EOSINOPHIL # BLD AUTO: 0.07 K/UL (ref 0–0.5)
EOSINOPHIL NFR BLD AUTO: 1.1 % (ref 1–4)
ERYTHROCYTE [DISTWIDTH] IN BLOOD BY AUTOMATED COUNT: 15.3 % (ref 11.5–14.5)
GLUCOSE SERPL-MCNC: 116 MG/DL (ref 70–105)
GLUCOSE SERPL-MCNC: 125 MG/DL (ref 70–105)
GLUCOSE SERPL-MCNC: 130 MG/DL (ref 70–105)
GLUCOSE SERPL-MCNC: 79 MG/DL (ref 74–106)
GLUCOSE SERPL-MCNC: 86 MG/DL (ref 70–105)
HCT VFR BLD AUTO: 25.9 % (ref 40–54)
HGB BLD-MCNC: 8.8 G/DL (ref 13.5–18)
IMM GRANULOCYTES # BLD AUTO: 0.04 K/UL (ref 0–0.04)
IMM GRANULOCYTES NFR BLD: 0.6 % (ref 0–0.4)
LYMPHOCYTES # BLD AUTO: 0.99 K/UL (ref 1–4.8)
LYMPHOCYTES NFR BLD AUTO: 16 % (ref 27–41)
M SARS-COV-2 SPIKE AB, INTERP, S: POSITIVE
M SARS-COV-2 SPIKE AB, QUANT, S: >250 U/ML
MCH RBC QN AUTO: 31.1 PG (ref 27–31)
MCHC RBC AUTO-ENTMCNC: 34 G/DL (ref 32–36)
MCV RBC AUTO: 91.5 FL (ref 80–96)
MONOCYTES # BLD AUTO: 0.65 K/UL (ref 0–0.8)
MONOCYTES NFR BLD AUTO: 10.5 % (ref 2–6)
MPC BLD CALC-MCNC: 9.4 FL (ref 9.4–12.4)
NEUTROPHILS # BLD AUTO: 4.41 K/UL (ref 1.8–7.7)
NEUTROPHILS NFR BLD AUTO: 71.5 % (ref 53–65)
NRBC # BLD AUTO: 0 X10E3/UL
NRBC, AUTO (.00): 0 %
PLATELET # BLD AUTO: 240 K/UL (ref 150–400)
POTASSIUM SERPL-SCNC: 5.2 MMOL/L (ref 3.5–5.1)
RBC # BLD AUTO: 2.83 M/UL (ref 4.6–6.2)
SODIUM SERPL-SCNC: 142 MMOL/L (ref 136–145)
WBC # BLD AUTO: 6.18 K/UL (ref 4.5–11)

## 2022-07-19 PROCEDURE — 82962 GLUCOSE BLOOD TEST: CPT

## 2022-07-19 PROCEDURE — 11000001 HC ACUTE MED/SURG PRIVATE ROOM

## 2022-07-19 PROCEDURE — 82310 ASSAY OF CALCIUM: CPT | Performed by: FAMILY MEDICINE

## 2022-07-19 PROCEDURE — 99233 SBSQ HOSP IP/OBS HIGH 50: CPT | Mod: ,,, | Performed by: HOSPITALIST

## 2022-07-19 PROCEDURE — 94761 N-INVAS EAR/PLS OXIMETRY MLT: CPT

## 2022-07-19 PROCEDURE — 25000003 PHARM REV CODE 250: Performed by: INTERNAL MEDICINE

## 2022-07-19 PROCEDURE — 36415 COLL VENOUS BLD VENIPUNCTURE: CPT | Performed by: FAMILY MEDICINE

## 2022-07-19 PROCEDURE — 25000003 PHARM REV CODE 250: Performed by: SURGERY

## 2022-07-19 PROCEDURE — 97110 THERAPEUTIC EXERCISES: CPT

## 2022-07-19 PROCEDURE — 80048 BASIC METABOLIC PNL TOTAL CA: CPT | Performed by: FAMILY MEDICINE

## 2022-07-19 PROCEDURE — 99233 PR SUBSEQUENT HOSPITAL CARE,LEVL III: ICD-10-PCS | Mod: ,,, | Performed by: HOSPITALIST

## 2022-07-19 PROCEDURE — 25000003 PHARM REV CODE 250: Performed by: NURSE PRACTITIONER

## 2022-07-19 PROCEDURE — 85025 COMPLETE CBC W/AUTO DIFF WBC: CPT | Performed by: NURSE PRACTITIONER

## 2022-07-19 RX ADMIN — ATORVASTATIN CALCIUM 40 MG: 40 TABLET, FILM COATED ORAL at 08:07

## 2022-07-19 RX ADMIN — AMLODIPINE BESYLATE 10 MG: 10 TABLET ORAL at 08:07

## 2022-07-19 RX ADMIN — SODIUM BICARBONATE 650 MG: 650 TABLET ORAL at 08:07

## 2022-07-19 RX ADMIN — METOPROLOL TARTRATE 25 MG: 25 TABLET, FILM COATED ORAL at 08:07

## 2022-07-19 RX ADMIN — ALLOPURINOL 50 MG: 300 TABLET ORAL at 08:07

## 2022-07-19 RX ADMIN — DOCUSATE SODIUM 100 MG: 100 CAPSULE, LIQUID FILLED ORAL at 08:07

## 2022-07-19 RX ADMIN — PANTOPRAZOLE SODIUM 40 MG: 40 TABLET, DELAYED RELEASE ORAL at 08:07

## 2022-07-19 RX ADMIN — OXYCODONE HYDROCHLORIDE AND ACETAMINOPHEN 500 MG: 500 TABLET ORAL at 08:07

## 2022-07-19 RX ADMIN — FERROUS SULFATE TAB 325 MG (65 MG ELEMENTAL FE) 1 EACH: 325 (65 FE) TAB at 08:07

## 2022-07-19 RX ADMIN — SODIUM POLYSTYRENE SULFONATE 15 G: 15 SUSPENSION ORAL; RECTAL at 08:07

## 2022-07-19 RX ADMIN — THERA TABS 1 TABLET: TAB at 08:07

## 2022-07-19 NOTE — ASSESSMENT & PLAN NOTE
7/18/2022  The serum creatinine is 5 which continues to show signs of improvement.  7/19/2022  Renal function continues to improve.  Creatinine is 4.7 today.

## 2022-07-19 NOTE — ASSESSMENT & PLAN NOTE
- Creatinine stable  - Nephrology following  - continues to have non-anion gap acidosis, check urine electrolytes. UAG +14  7/14  - Cr 6.2  - nephrology following   7/15  - Cr 5.8  7/16  - 5.4   7/19  - 4.7

## 2022-07-19 NOTE — SUBJECTIVE & OBJECTIVE
Interval History: Pt resting in bed. Eating breakfast. Endorses some abdominal pain- relates to gas like pain. Has not had BM today or yesterday. Denies any CP or SOB. Awaiting covid PCR for continued placement workup. Ok with surgery to remove XIMENA drains.     Review of Systems   Respiratory:  Negative for cough and shortness of breath.    Cardiovascular:  Negative for chest pain.   Gastrointestinal:  Positive for abdominal pain.   All other systems reviewed and are negative.  Objective:     Vital Signs (Most Recent):  Temp: 98.7 °F (37.1 °C) (07/19/22 0716)  Pulse: 84 (07/19/22 0716)  Resp: 20 (07/19/22 0716)  BP: (!) 157/88 (07/19/22 0716)  SpO2: 95 % (07/19/22 0716)   Vital Signs (24h Range):  Temp:  [97.3 °F (36.3 °C)-98.7 °F (37.1 °C)] 98.7 °F (37.1 °C)  Pulse:  [62-84] 84  Resp:  [18-20] 20  SpO2:  [95 %-99 %] 95 %  BP: (132-168)/(69-93) 157/88     Weight: 102.5 kg (225 lb 15.5 oz)  Body mass index is 30.65 kg/m².    Intake/Output Summary (Last 24 hours) at 7/19/2022 1038  Last data filed at 7/19/2022 0800  Gross per 24 hour   Intake 737 ml   Output 1090 ml   Net -353 ml      Physical Exam  Vitals and nursing note reviewed.   Constitutional:       General: He is awake. He is not in acute distress.     Appearance: Normal appearance.   HENT:      Head: Normocephalic.      Mouth/Throat:      Mouth: Mucous membranes are moist.   Eyes:      General: Lids are normal.   Cardiovascular:      Rate and Rhythm: Normal rate and regular rhythm.      Pulses: Normal pulses.           Radial pulses are 2+ on the right side and 2+ on the left side.      Heart sounds: Normal heart sounds.   Pulmonary:      Effort: Pulmonary effort is normal.      Breath sounds: Normal breath sounds.   Abdominal:      General: Bowel sounds are normal. There is distension.      Palpations: Abdomen is soft.      Tenderness: There is abdominal tenderness.          Comments: Midline incision with ALYSON  RLQ and LLQ XIMENA drain    Musculoskeletal:          General: Normal range of motion.      Cervical back: Neck supple.      Right lower leg: Edema present.      Left lower leg: Edema present.   Skin:     General: Skin is warm and dry.      Capillary Refill: Capillary refill takes less than 2 seconds.   Neurological:      Mental Status: He is alert. Mental status is at baseline.      GCS: GCS eye subscore is 4. GCS verbal subscore is 5. GCS motor subscore is 6.      Sensory: Sensation is intact.      Motor: Motor function is intact.   Psychiatric:         Mood and Affect: Mood normal.         Speech: Speech normal.         Behavior: Behavior is cooperative.       Significant Labs: All pertinent labs within the past 24 hours have been reviewed.  Recent Lab Results  (Last 5 results in the past 24 hours)        07/19/22  0508   07/19/22  0331   07/18/22  2021   07/18/22  1514   07/18/22  1053        Anion Gap   14             Baso #   0.02             Basophil %   0.3             BUN   43             BUN/CREAT RATIO   9             Calcium   8.1             Chloride   117             CO2   16             Creatinine   4.71             Differential Type   Auto             eGFR if non    13             Eos #   0.07             Eosinophil %   1.1             Glucose   79             Hematocrit   25.9             Hemoglobin   8.8             Immature Grans (Abs)   0.04             Immature Granulocytes   0.6             Lymph #   0.99             Lymph %   16.0             MCH   31.1             MCHC   34.0             MCV   91.5             Mono #   0.65             Mono %   10.5             MPV   9.4             Neutrophils, Abs   4.41             Neutrophils Relative   71.5             nRBC   0.0             NUCLEATED RBC ABSOLUTE   0.00             Platelets   240             POC Glucose 86     141   120   100       Potassium   5.2             RBC   2.83             RDW   15.3             Sodium   142             WBC   6.18                                     Significant Imaging: I have reviewed all pertinent imaging results/findings within the past 24 hours.

## 2022-07-19 NOTE — PT/OT/SLP PROGRESS
"Physical Therapy      Patient Name:  Neymar Parra   MRN:  [id number]    Patient not seen today secondary to Patient unwilling to participate. pt continually shaking head "no" when informed that I was there to work with him from physical therapy and refusing all offers including ambulation and/or exercise at bedside. Will follow-up next treatment session.    PT POC discussed with Cristiane Crane DPT        "

## 2022-07-19 NOTE — ASSESSMENT & PLAN NOTE
Creatinine improved to 5.8  7/11/2022 Serum creatinine is 6.2 which has been stable.  7/12/2022  Today, serum creatinine is 5.9  7/14/2022 The patient with CKD.  No uremic symptoms.  Appears stronger today.  7/15/2022  CKD V.  Creatinine is 5.8 today.  7/18/2022  The serum creatinine is 5 which continues to show signs of improvement.

## 2022-07-19 NOTE — PROGRESS NOTES
[facility] - [address] [place] [address] Medicine  Progress Note    Patient Name: Neymar Parra  MRN: [id number]  Patient Class: IP- Inpatient   Admission Date: 6/16/2022  Length of Stay: 33 days  Attending Physician: Shari Cortes MD  Primary Care Provider: [facility]        Subjective:     Principal Problem:Gastrointestinal hemorrhage associated with duodenitis        HPI:  72 y.o. male transferred to the [facility] from [facility] for hyperkalemia and hypoxia. Pt reports he went to [facility] because he was having dyspnea and hemoptysis since 1 month which is worse today. Pt reports he was tested positive for COVID at the nursing home on 6/9/22. Per nursing home patient oxygen saturation was in the 80's on RA with tachypnea and labored breathing. Pt reports he normally does not wear oxygen at the nursing home but has been wearing oxygen more often recently. Pt reports he smoked 1-1.5 PPD for 20 years but quit 7 months ago. Per pt, he saw a pulmonologist (Dr. Aldana) for his hemoptysis and had some tests done the results of which he does not know. Per records, pt with a right pleural effusion recently and had a thoracentesis. Pt denies any fever, congestion, dysuria, N/V/D, chest pain, or any other complaints at this time.     In the ED, pt's K 7.2, d-dimer 0.76, BNP 6284, BUN/Cr 73/7.16. EKG showed some peaked t-waves and irregular rhythm. Chest xray showed Increased right lower lung pulmonary density could indicate pneumonia. Pt was treated in the ED with Calcium gluconate 1g, regular insulin 10 units, D50, IV solumedrol 125 mg, tylenol 650 mg and IV fluids NS 1L bolus. Pt will be admitted to the hospital service for management of hypoxia and hyperkalemia.      Overview/Hospital Course:  Overview of hospital stay from admission to present:   6/15- [facility] resident presented to [facility] ED with dyspnea and hypoxia; RA sats 80s; K 7.2; Cr 7.16; covid positive 6/9/22 6/20- Cr  - acute on chronic; K has normalized; some SOB- COPD exacerbation  - hyponatremia 123   - abd pain with slight drop in H/H--9->7  - CXR with worsening consolidation in LLL- completed 5 days of zithromax and steroids-- escalated to zosyn; H/H - 1u PRBCS (pt's bed at [facility] held until )   - hx of duodenal adenoma- H/H continues to drop- FOBT positive; GI consulted   - CXR with worsening LLL consolidation and RLL infiltrate and pleural effusion; pt's bed  at [facility]: EGD with esophageal candidiasis, moderate sized hiatal hernia, large duodenal bulb ulcer with adherent clot  : pulm consulted for recurrent R sided effusion- not enough fluid to drain; H/H - 1u PRBCs   : repeat EGD- Dieulafoy's lesion- duodenal bulb; s/p endoscopic therapy (clipping + epinephrine injection) and Duodenal bulb ulcer with visible vessel,s/p endoscopic therapy with clipping/epinephrine injection; rec'd 1u PRBCs   7/3: continued drop in H/H - 2u PRBCs   : H/H stable : H/H down to - 1u PRBCs- EGD- Mucosa of the esophagus was normal, overlying a small hiatus hernia. Blood was noted around the pyloric channel and no gastric ulcer was seen. Clot was present in the first portion of the duodenum with two clips attached to the mucosa. In that area, there appeared to be duodenitis and a visible vessel was noted with active bleeding. The bleeding was controlled with a clip.   : H/H stable : H/H - bloody BM with clots; total of 5u PRBCs and 1u PLTs; hypotensive 80/40s- transferred to ICU-; surgery consulted; EGD- The distal esophagus had a small non-bleeding mucosal tear. A 3cm hiatus hernia was noted. Gastric mucosa is pale, consistent with severe anemia. Gastritis is seen without active bleeding or gastric ulcer. A large adherent  clot is present in the 1st portion of the duodenum at the site of previous bleeding and clip placement. 3cc of 100 epinephrine was  injected submucosally and the clot would not suction off. Three more hemostasis clips were placed at the base of the clot. The more distal duodenum had blood in the lumen, likely from distal migration, but no ulcer.  7/8: OR for exp lap with vagotomy pyloroplasty and oversewing of ulcer- midline incision with ALYSON drain/dsg and jewels XIMENA drains; 2u PRBCs   7/9: 1u PRBCs   7/10: H/H 6/19- 2u PRBCs   7/11: NGT removed; remains NPO   7/12: HR 120s and elevated BP; lopressor ordered; clear liquids   7/13: transferred to floor; clear liquid-> full liquid   7/15: H/H 7/20; transfuse 1u PRBCs   7/19: XIMENA drains pulled; pending covid pcr for continued placement workup       07/04-Patient resting comfortably in bed with no acute complaints. Is not requiring supplemental oxygen today. Denies new rectal bleeding. The patient says he feels better after getting 2 units of pRBC yesterday, but that he still feels weak. His H&H is stable this morning, will continue to monitor, and as long as it stays stable may be able to discharge tomorrow.    7/14- transferred out of ICU overnight; midline incision with ALYSON drain and bilateral XIMENA drains; full liquid diet per surgery; SS following for placement-- will need SWB   7/15: decrease in H/H 7/20- transfuse 1u PRBCs- surgery following; SS--- will need covid PCR before any further paperwork regarding placement at [facility]   7/16: NAEO; denies any needs; H/H 8/24 after 1u yesterday; continue current POC  7/17: NAEO; advanced to reg diet; covid PCR ordered   7/18: NAEO; abd tenderness improving; tolerating reg diet; continues with ALYSON and XIMENA drains; PCR pending   7/19: does have some abd discomfort- relates to gas pains; XIMENA drains to be pulled today; tolerating diet; pending placement       Interval History: Pt resting in bed. Eating breakfast. Endorses some abdominal pain- relates to gas like pain. Has not had BM today or yesterday. Denies any CP or SOB. Awaiting covid PCR for continued  placement workup. Ok with surgery to remove XIMENA drains.     Review of Systems   Respiratory:  Negative for cough and shortness of breath.    Cardiovascular:  Negative for chest pain.   Gastrointestinal:  Positive for abdominal pain.   All other systems reviewed and are negative.  Objective:     Vital Signs (Most Recent):  Temp: 98.7 °F (37.1 °C) (07/19/22 0716)  Pulse: 84 (07/19/22 0716)  Resp: 20 (07/19/22 0716)  BP: (!) 157/88 (07/19/22 0716)  SpO2: 95 % (07/19/22 0716)   Vital Signs (24h Range):  Temp:  [97.3 °F (36.3 °C)-98.7 °F (37.1 °C)] 98.7 °F (37.1 °C)  Pulse:  [62-84] 84  Resp:  [18-20] 20  SpO2:  [95 %-99 %] 95 %  BP: (132-168)/(69-93) 157/88     Weight: 102.5 kg (225 lb 15.5 oz)  Body mass index is 30.65 kg/m².    Intake/Output Summary (Last 24 hours) at 7/19/2022 1038  Last data filed at 7/19/2022 0800  Gross per 24 hour   Intake 737 ml   Output 1090 ml   Net -353 ml      Physical Exam  Vitals and nursing note reviewed.   Constitutional:       General: He is awake. He is not in acute distress.     Appearance: Normal appearance.   HENT:      Head: Normocephalic.      Mouth/Throat:      Mouth: Mucous membranes are moist.   Eyes:      General: Lids are normal.   Cardiovascular:      Rate and Rhythm: Normal rate and regular rhythm.      Pulses: Normal pulses.           Radial pulses are 2+ on the right side and 2+ on the left side.      Heart sounds: Normal heart sounds.   Pulmonary:      Effort: Pulmonary effort is normal.      Breath sounds: Normal breath sounds.   Abdominal:      General: Bowel sounds are normal. There is distension.      Palpations: Abdomen is soft.      Tenderness: There is abdominal tenderness.          Comments: Midline incision with ALYSON  RLQ and LLQ XIMENA drain    Musculoskeletal:         General: Normal range of motion.      Cervical back: Neck supple.      Right lower leg: Edema present.      Left lower leg: Edema present.   Skin:     General: Skin is warm and dry.      Capillary  Refill: Capillary refill takes less than 2 seconds.   Neurological:      Mental Status: He is alert. Mental status is at baseline.      GCS: GCS eye subscore is 4. GCS verbal subscore is 5. GCS motor subscore is 6.      Sensory: Sensation is intact.      Motor: Motor function is intact.   Psychiatric:         Mood and Affect: Mood normal.         Speech: Speech normal.         Behavior: Behavior is cooperative.       Significant Labs: All pertinent labs within the past 24 hours have been reviewed.  Recent Lab Results  (Last 5 results in the past 24 hours)        07/19/22  0508   07/19/22  0331   07/18/22  2021   07/18/22  1514   07/18/22  1053        Anion Gap   14             Baso #   0.02             Basophil %   0.3             BUN   43             BUN/CREAT RATIO   9             Calcium   8.1             Chloride   117             CO2   16             Creatinine   4.71             Differential Type   Auto             eGFR if non    13             Eos #   0.07             Eosinophil %   1.1             Glucose   79             Hematocrit   25.9             Hemoglobin   8.8             Immature Grans (Abs)   0.04             Immature Granulocytes   0.6             Lymph #   0.99             Lymph %   16.0             MCH   31.1             MCHC   34.0             MCV   91.5             Mono #   0.65             Mono %   10.5             MPV   9.4             Neutrophils, Abs   4.41             Neutrophils Relative   71.5             nRBC   0.0             NUCLEATED RBC ABSOLUTE   0.00             Platelets   240             POC Glucose 86     141   120   100       Potassium   5.2             RBC   2.83             RDW   15.3             Sodium   142             WBC   6.18                                    Significant Imaging: I have reviewed all pertinent imaging results/findings within the past 24 hours.      Assessment/Plan:      * Gastrointestinal hemorrhage associated with duodenitis  - EGD  with bleeding duodenal ulcer as above, was clipped   - Repeat EGD 07/05, bleeding duodenal vessel was clipped per GI.  - H. Pylori negative  - Protonix infusion  - GI following, appreciate assistance   -Pt continues to bleed with blood clots from bowel and H &H  Continues to decline. Pt became hypotensive, and transferred to ICU for further monitoring and eval. General surgery, Dr. Rees consulted for eval.   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 [facility]   - ongoing bleed with blood clots from bowel and decreasing H&H. Pt became hypotensive, and transferred to ICU for further monitoring and eval. General surgery, Dr. Rees consulted for eval.   - to OR 7/8  - doing well NGT removed. Try to advance diet today  7/14  - full liquids per surgery  - midline incision with ALYSON and XIMENA drains   7/17  - reg diet; tolerating   7/19  - XIMENA drains to be pulled today  - surgery ok with LTAC placement     Esophagitis        Anemia  - Patient with bleeding duodenal ulcer on EGD 7/2, ulcer clipped and an EGD on 07/05 where bleeding duodenal vessel was clipped  - patient has had a total of 6 units this admission  - GI following, assistance appreciated  -Pt continues to have large amount of bloody bowel movement with clots. H & H 6.3/18.7 (7/7), platelet 05031 and during 1 unit blood transfusion, pt became hypotensive. Given 1 liter bolus and transferred to ICU  And consulted GS, Dr. Rees. Appreciate assistance (7/7)  7/14  - H/H 8/25  - has been stable   - rec'd 16u of PRBCs and 1u of plt   7/15  - H/H decreased this AM; 7/20  - transfuse 1u PRBCs   7/16  - 8/24 after blood   7/17  - remains stable     Acute on chronic renal failure   - Creatinine stable  - Nephrology following  - continues to have non-anion gap acidosis, check urine electrolytes. UAG +14  7/14  - Cr 6.2  - nephrology following   7/15  - Cr 5.8  7/16  - 5.4   7/19  - 4.7     Hyperkalemia  -  5.4 today  - Improving  - Continue lokelma   - Switch from sodium bicarb to lactated ringers for IVF  7/14  - D5W   - lokelma and kayexelate given  - recheck in AM   7/15  - 5.2 this AM   - lokelma given   7/16  - 5.5 this AM  - kayexelate   7/17  - 5.5 this AM   7/19  - elevated again this AM  - 5.2  - kayexelate x 2 doses   - recheck in AM     Pleural effusion  - Continue to monitor  - Seen by pulmonology, appreciate assistance   - No thoracentesis at this time    Type 2 diabetes mellitus without complication  - HgA1c 4.7 6/22  - detemir 8U qhs with SSI   - holding levemir  - D5W for IVF  - discuss advancing diet with surgery, full liquid. If tolerates diet will try to DC IVF  7/14  - advanced to full liquids  - will continue to monitor BG trend  - remains on D5W at present   - BG trend 64-93   7/15  - BG trend 103-130   7/17  -   7/18  -    7/19  -   - BG remains stable       Gastroesophageal reflux disease  Continue protonix infusion  7/14  - transitioned to PO protonix     Hypertension  Due to GI bleeding, pt became hypotensive  Holding amlodipine, isosorbide mononitrate and beta blocker at this time (7/7)   7/14  - metoprolol has been resumed  - BP stable   7/16  - BP trending up  - norvasc resumed   7/19  - BP stable       VTE Risk Mitigation (From admission, onward)         Ordered     Reason for No Pharmacological VTE Prophylaxis  Once        Question:  Reasons:  Answer:  Risk of Bleeding    06/16/22 0301     IP VTE HIGH RISK PATIENT  Once         06/16/22 0301     Place sequential compression device  Until discontinued         06/16/22 0301                Discharge Planning   EDWARDO:      Code Status: Full Code   Is the patient medically ready for discharge?:     Reason for patient still in hospital (select all that apply): Treatment and Pending disposition  Discharge Plan A: Long-term acute care facility (LTAC) (Choice obtained for Specialty)        SRIKANTH Nolen  Department [facility]  [facility]

## 2022-07-19 NOTE — ASSESSMENT & PLAN NOTE
- HgA1c 4.7 6/22  - detemir 8U qhs with SSI   - holding levemir  - D5W for IVF  - discuss advancing diet with surgery, full liquid. If tolerates diet will try to DC IVF  7/14  - advanced to full liquids  - will continue to monitor BG trend  - remains on D5W at present   - BG trend 64-93   7/15  - BG trend 103-130   7/17  -   7/18  -    7/19  -   - BG remains stable

## 2022-07-19 NOTE — PLAN OF CARE
PCR result came back positive. Result sent to Kiara Smith at the [facility]. Per Kiara, they will not be able to accept patient until next week. Additionally, she contacted Caity, the patient's next of kin, and Caity is not able to go sign patient's admission paperwork. Kiara is reaching out to patient's daughter to see if she will be able to go sign paperwork. SS discussed with patient's next of kin, Caity. Choice obtained for patient to go to [facility] while he awaits placement at the [facility]. SS called referral to Vianey at [facility]. She states they would more than likely be able to accept the patient tomorrow but she is going to call me back in the morning to let me know. SS discussed the above with Jaleesa Torres NP.

## 2022-07-19 NOTE — PLAN OF CARE
Problem: Adult Inpatient Plan of Care  Goal: Plan of Care Review  Outcome: Ongoing, Progressing  Goal: Optimal Comfort and Wellbeing  Outcome: Ongoing, Progressing     Problem: Diabetes Comorbidity  Goal: Blood Glucose Level Within Targeted Range  Outcome: Ongoing, Progressing     Problem: Skin Injury Risk Increased  Goal: Skin Health and Integrity  Outcome: Ongoing, Progressing     Problem: Gas Exchange Impaired  Goal: Optimal Gas Exchange  Outcome: Ongoing, Progressing     Problem: Fall Injury Risk  Goal: Absence of Fall and Fall-Related Injury  Outcome: Ongoing, Progressing     Problem: Infection  Goal: Absence of Infection Signs and Symptoms  Outcome: Ongoing, Progressing

## 2022-07-19 NOTE — SUBJECTIVE & OBJECTIVE
Interval History: The patient is sitting up in bed.  He voices no complaints today.  Serum creatinine is 4.7 which is improved.    Review of patient's allergies indicates:   Allergen Reactions    Gatifloxacin Anaphylaxis     Current Facility-Administered Medications   Medication Frequency    0.9%  NaCl infusion (for blood administration) Q24H PRN    0.9%  NaCl infusion (for blood administration) Q24H PRN    0.9%  NaCl infusion (for blood administration) Q24H PRN    acetaminophen tablet 1,000 mg Q6H PRN    acetaminophen tablet 650 mg Q8H PRN    acetaminophen tablet 650 mg Q4H PRN    albuterol inhaler 2 puff Q6H PRN    allopurinol split tablet 50 mg Daily    amLODIPine tablet 10 mg Daily    ascorbic acid (vitamin C) tablet 500 mg Daily    atorvastatin tablet 40 mg QHS    bisacodyL EC tablet 10 mg Daily PRN    calcium gluconate 1 g in NS IVPB (premixed) Q10 Min PRN    cetirizine tablet 10 mg Daily PRN    dextromethorphan-guaiFENesin  mg/5 ml liquid 10 mL Q6H PRN    dextrose 5 % infusion Continuous    dextrose 50% injection 12.5 g PRN    dextrose 50% injection 25 g PRN    docusate sodium capsule 100 mg BID    ferrous sulfate tablet 1 each Daily    glucagon (human recombinant) injection 1 mg PRN    glucose chewable tablet 16 g PRN    glucose chewable tablet 24 g PRN    hydrALAZINE injection 10 mg Q6H PRN    HYDROcodone-acetaminophen 7.5-325 mg per tablet 1 tablet Q6H PRN    insulin aspart U-100 injection 1-10 Units QID (AC + HS) PRN    melatonin tablet 6 mg Nightly PRN    metoprolol tartrate (LOPRESSOR) tablet 25 mg BID    mineral oil enema 1 enema Daily PRN    morphine injection 4 mg Q4H PRN    morphine injection 6 mg Q4H PRN    multivitamin tablet Daily    naloxone 0.4 mg/mL injection 0.02 mg PRN    ondansetron injection 4 mg Q12H PRN    ondansetron injection 8 mg Q6H PRN    pantoprazole EC tablet 40 mg Daily    simethicone chewable tablet 80 mg TID PRN    sodium bicarbonate tablet 650 mg BID    sodium chloride  0.9% flush 10 mL Q12H PRN    sodium polystyrene 15 gram/60 mL suspension 15 g BID    traMADoL tablet 50 mg Q8H PRN    traZODone tablet 50 mg Nightly PRN       Objective:     Vital Signs (Most Recent):  Temp: 98.4 °F (36.9 °C) (07/19/22 0400)  Pulse: 75 (07/19/22 0400)  Resp: 20 (07/19/22 0400)  BP: (!) 148/76 (07/19/22 0400)  SpO2: 97 % (07/19/22 0400)  O2 Device (Oxygen Therapy): room air (07/18/22 2013)   Vital Signs (24h Range):  Temp:  [97.3 °F (36.3 °C)-98.7 °F (37.1 °C)] 98.4 °F (36.9 °C)  Pulse:  [62-75] 75  Resp:  [18-20] 20  SpO2:  [95 %-99 %] 97 %  BP: (132-168)/(69-93) 148/76     Weight: 102.5 kg (225 lb 15.5 oz) (07/19/22 0400)  Body mass index is 30.65 kg/m².  Body surface area is 2.28 meters squared.    I/O last 3 completed shifts:  In: 737 [P.O.:737]  Out: 1590 [Urine:1400; Drains:190]    Physical Exam  Vitals reviewed.   HENT:      Head: Normocephalic and atraumatic.      Mouth/Throat:      Mouth: Mucous membranes are moist.   Cardiovascular:      Rate and Rhythm: Regular rhythm.   Pulmonary:      Effort: Pulmonary effort is normal.   Abdominal:      Palpations: Abdomen is soft.   Neurological:      Mental Status: He is alert.   Psychiatric:         Mood and Affect: Mood normal.       Significant Labs:  BMP:   Recent Labs   Lab 07/19/22  0331   GLU 79      K 5.2*   *   CO2 16*   BUN 43*   CREATININE 4.71*   CALCIUM 8.1*     CBC:   Recent Labs   Lab 07/19/22  0331   WBC 6.18   RBC 2.83*   HGB 8.8*   HCT 25.9*      MCV 91.5   MCH 31.1*   MCHC 34.0        Significant Imaging:  Labs: Reviewed

## 2022-07-19 NOTE — SUBJECTIVE & OBJECTIVE
Interval History: The patient is sitting up in the bed.  He is resting comfortably.  No shortness of breath or chest pain.  Serum creatinine is 5.  No other changes.    Review of patient's allergies indicates:   Allergen Reactions    Gatifloxacin Anaphylaxis     Current Facility-Administered Medications   Medication Frequency    0.9%  NaCl infusion (for blood administration) Q24H PRN    0.9%  NaCl infusion (for blood administration) Q24H PRN    0.9%  NaCl infusion (for blood administration) Q24H PRN    acetaminophen tablet 1,000 mg Q6H PRN    acetaminophen tablet 650 mg Q8H PRN    acetaminophen tablet 650 mg Q4H PRN    albuterol inhaler 2 puff Q6H PRN    allopurinol split tablet 50 mg Daily    amLODIPine tablet 10 mg Daily    ascorbic acid (vitamin C) tablet 500 mg Daily    atorvastatin tablet 40 mg QHS    bisacodyL EC tablet 10 mg Daily PRN    calcium gluconate 1 g in NS IVPB (premixed) Q10 Min PRN    cetirizine tablet 10 mg Daily PRN    dextromethorphan-guaiFENesin  mg/5 ml liquid 10 mL Q6H PRN    dextrose 5 % infusion Continuous    dextrose 50% injection 12.5 g PRN    dextrose 50% injection 25 g PRN    docusate sodium capsule 100 mg BID    ferrous sulfate tablet 1 each Daily    glucagon (human recombinant) injection 1 mg PRN    glucose chewable tablet 16 g PRN    glucose chewable tablet 24 g PRN    hydrALAZINE injection 10 mg Q6H PRN    HYDROcodone-acetaminophen 7.5-325 mg per tablet 1 tablet Q6H PRN    insulin aspart U-100 injection 1-10 Units QID (AC + HS) PRN    melatonin tablet 6 mg Nightly PRN    metoprolol tartrate (LOPRESSOR) tablet 25 mg BID    mineral oil enema 1 enema Daily PRN    morphine injection 4 mg Q4H PRN    morphine injection 6 mg Q4H PRN    multivitamin tablet Daily    naloxone 0.4 mg/mL injection 0.02 mg PRN    ondansetron injection 4 mg Q12H PRN    ondansetron injection 8 mg Q6H PRN    pantoprazole EC tablet 40 mg Daily    simethicone chewable tablet 80 mg TID PRN    sodium bicarbonate  tablet 650 mg BID    sodium chloride 0.9% flush 10 mL Q12H PRN    traMADoL tablet 50 mg Q8H PRN    traZODone tablet 50 mg Nightly PRN       Objective:     Vital Signs (Most Recent):  Temp: 97.8 °F (36.6 °C) (07/18/22 1500)  Pulse: 75 (07/18/22 1500)  Resp: 18 (07/18/22 1500)  BP: (!) 168/93 (07/18/22 1500)  SpO2: 99 % (07/18/22 1500)  O2 Device (Oxygen Therapy): room air (07/18/22 0738)   Vital Signs (24h Range):  Temp:  [97.8 °F (36.6 °C)-98.7 °F (37.1 °C)] 97.8 °F (36.6 °C)  Pulse:  [62-82] 75  Resp:  [18-20] 18  SpO2:  [97 %-99 %] 99 %  BP: (132-168)/(69-93) 168/93     Weight: 103.8 kg (228 lb 13.4 oz) (07/18/22 0000)  Body mass index is 31.04 kg/m².  Body surface area is 2.3 meters squared.    I/O last 3 completed shifts:  In: 797 [P.O.:797]  Out: 1875 [Urine:1700; Drains:175]    Physical Exam  Vitals reviewed.   HENT:      Head: Normocephalic and atraumatic.      Mouth/Throat:      Mouth: Mucous membranes are moist.   Cardiovascular:      Rate and Rhythm: Regular rhythm.   Pulmonary:      Effort: Pulmonary effort is normal.   Abdominal:      Palpations: Abdomen is soft.   Neurological:      General: No focal deficit present.      Mental Status: He is alert.       Significant Labs:  BMP:   Recent Labs   Lab 07/18/22  0308   GLU 78      K 5.1   *   CO2 17*   BUN 45*   CREATININE 5.06*   CALCIUM 8.0*     CBC:   Recent Labs   Lab 07/18/22  0308   WBC 5.50   RBC 2.85*   HGB 9.0*   HCT 26.2*      MCV 91.9   MCH 31.6*   MCHC 34.4        Significant Imaging:  Labs: Reviewed

## 2022-07-19 NOTE — PROGRESS NOTES
General Surgery    Ex lap for bleeding  duodenal ulcer per dr ray   tolerating diet   hct stable  XIMENA drains serous, incision with skin staples intact looks good  DC XIMENA drain  [facility] from [facility] standpoint  Midline staples DC Friday 07-

## 2022-07-19 NOTE — ASSESSMENT & PLAN NOTE
- EGD with bleeding duodenal ulcer as above, was clipped   - Repeat EGD 07/05, bleeding duodenal vessel was clipped per GI.  - H. Pylori negative  - Protonix infusion  - GI following, appreciate assistance   -Pt continues to bleed with blood clots from bowel and H &H  Continues to decline. Pt became hypotensive, and transferred to ICU for further monitoring and eval. General surgery, Dr. Rees consulted for eval.   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 [facility]   - ongoing bleed with blood clots from bowel and decreasing H&H. Pt became hypotensive, and transferred to ICU for further monitoring and eval. General surgery, Dr. Rees consulted for eval.   - to OR 7/8  - doing well NGT removed. Try to advance diet today  7/14  - full liquids per surgery  - midline incision with ALYSON and XIMENA drains   7/17  - reg diet; tolerating   7/19  - XIMENA drains to be pulled today  - surgery ok with LTAC placement

## 2022-07-19 NOTE — ASSESSMENT & PLAN NOTE
- 5.4 today  - Improving  - Continue lokelma   - Switch from sodium bicarb to lactated ringers for IVF  7/14  - D5W   - lokelma and kayexelate given  - recheck in AM   7/15  - 5.2 this AM   - lokelma given   7/16  - 5.5 this AM  - kayexelate   7/17  - 5.5 this AM   7/19  - elevated again this AM  - 5.2  - kayexelate x 2 doses   - recheck in AM

## 2022-07-19 NOTE — PROGRESS NOTES
[address]  Nephrology  Progress Note    Patient Name: Neymar Parra  MRN: [id number]  Admission Date: 6/16/2022  Hospital Length of Stay: 33 days  Attending Provider: Shari Cortes MD   Primary Care Physician: [facility]gan [place]  Principal Problem:Gastrointestinal hemorrhage associated with duodenitis    Subjective:     HPI: This patient with history of HTN, DM, CKD who has seen Dr. Crowder in the past 2 years ago is currently in a nursing facility.  The patient presented with SOB and diagnosed with COVID.  Serum creatinine has trended up to 7.15.  Serum potassium is 6.4.  Nephrology has been consulted for renal issues.  At the bedside, the patient mentions feeling fine.  He states that his breathing has improved.      Interval History: The patient is sitting up in bed.  He voices no complaints today.  Serum creatinine is 4.7 which is improved.    Review of patient's allergies indicates:   Allergen Reactions    Gatifloxacin Anaphylaxis     Current Facility-Administered Medications   Medication Frequency    0.9%  NaCl infusion (for blood administration) Q24H PRN    0.9%  NaCl infusion (for blood administration) Q24H PRN    0.9%  NaCl infusion (for blood administration) Q24H PRN    acetaminophen tablet 1,000 mg Q6H PRN    acetaminophen tablet 650 mg Q8H PRN    acetaminophen tablet 650 mg Q4H PRN    albuterol inhaler 2 puff Q6H PRN    allopurinol split tablet 50 mg Daily    amLODIPine tablet 10 mg Daily    ascorbic acid (vitamin C) tablet 500 mg Daily    atorvastatin tablet 40 mg QHS    bisacodyL EC tablet 10 mg Daily PRN    calcium gluconate 1 g in NS IVPB (premixed) Q10 Min PRN    cetirizine tablet 10 mg Daily PRN    dextromethorphan-guaiFENesin  mg/5 ml liquid 10 mL Q6H PRN    dextrose 5 % infusion Continuous    dextrose 50% injection 12.5 g PRN    dextrose 50% injection 25 g PRN    docusate sodium capsule 100 mg BID    ferrous sulfate  tablet 1 each Daily    glucagon (human recombinant) injection 1 mg PRN    glucose chewable tablet 16 g PRN    glucose chewable tablet 24 g PRN    hydrALAZINE injection 10 mg Q6H PRN    HYDROcodone-acetaminophen 7.5-325 mg per tablet 1 tablet Q6H PRN    insulin aspart U-100 injection 1-10 Units QID (AC + HS) PRN    melatonin tablet 6 mg Nightly PRN    metoprolol tartrate (LOPRESSOR) tablet 25 mg BID    mineral oil enema 1 enema Daily PRN    morphine injection 4 mg Q4H PRN    morphine injection 6 mg Q4H PRN    multivitamin tablet Daily    naloxone 0.4 mg/mL injection 0.02 mg PRN    ondansetron injection 4 mg Q12H PRN    ondansetron injection 8 mg Q6H PRN    pantoprazole EC tablet 40 mg Daily    simethicone chewable tablet 80 mg TID PRN    sodium bicarbonate tablet 650 mg BID    sodium chloride 0.9% flush 10 mL Q12H PRN    sodium polystyrene 15 gram/60 mL suspension 15 g BID    traMADoL tablet 50 mg Q8H PRN    traZODone tablet 50 mg Nightly PRN       Objective:     Vital Signs (Most Recent):  Temp: 98.4 °F (36.9 °C) (07/19/22 0400)  Pulse: 75 (07/19/22 0400)  Resp: 20 (07/19/22 0400)  BP: (!) 148/76 (07/19/22 0400)  SpO2: 97 % (07/19/22 0400)  O2 Device (Oxygen Therapy): room air (07/18/22 2013)   Vital Signs (24h Range):  Temp:  [97.3 °F (36.3 °C)-98.7 °F (37.1 °C)] 98.4 °F (36.9 °C)  Pulse:  [62-75] 75  Resp:  [18-20] 20  SpO2:  [95 %-99 %] 97 %  BP: (132-168)/(69-93) 148/76     Weight: 102.5 kg (225 lb 15.5 oz) (07/19/22 0400)  Body mass index is 30.65 kg/m².  Body surface area is 2.28 meters squared.    I/O last 3 completed shifts:  In: 737 [P.O.:737]  Out: 1590 [Urine:1400; Drains:190]    Physical Exam  Vitals reviewed.   HENT:      Head: Normocephalic and atraumatic.      Mouth/Throat:      Mouth: Mucous membranes are moist.   Cardiovascular:      Rate and Rhythm: Regular rhythm.   Pulmonary:      Effort: Pulmonary effort is normal.   Abdominal:      Palpations: Abdomen is soft.    Neurological:      Mental Status: He is alert.   Psychiatric:         Mood and Affect: Mood normal.       Significant Labs:  BMP:   Recent Labs   Lab 07/19/22  0331   GLU 79      K 5.2*   *   CO2 16*   BUN 43*   CREATININE 4.71*   CALCIUM 8.1*     CBC:   Recent Labs   Lab 07/19/22  0331   WBC 6.18   RBC 2.83*   HGB 8.8*   HCT 25.9*      MCV 91.5   MCH 31.1*   MCHC 34.0        Significant Imaging:  Labs: Reviewed    Assessment/Plan:     Acute on chronic renal failure    7/18/2022  The serum creatinine is 5 which continues to show signs of improvement.  7/19/2022  Renal function continues to improve.  Creatinine is 4.7 today.    Type 2 diabetes mellitus without complication  Chronic condition  The patient is doing acceptable.  7/19/2022  Glucoses are acceptable.        Thank you for your consult. I will follow-up with patient. Please contact us if you have any additional questions.    Jorge Butts Jr, MD  Nephrology  [facility] - [address] [place] [address]

## 2022-07-19 NOTE — ASSESSMENT & PLAN NOTE
Due to GI bleeding, pt became hypotensive  Holding amlodipine, isosorbide mononitrate and beta blocker at this time (7/7)   7/14  - metoprolol has been resumed  - BP stable   7/16  - BP trending up  - norvasc resumed   7/19  - BP stable

## 2022-07-19 NOTE — PT/OT/SLP PROGRESS
Occupational Therapy   Treatment    Name: Neymar Parra  MRN: [id number]  Admitting Diagnosis:  Gastrointestinal hemorrhage associated with duodenitis  11 Days Post-Op    Recommendations:     Discharge Recommendations: rehabilitation facility, nursing facility, skilled  Discharge Equipment Recommendations:  none  Barriers to discharge:  Other (Comment) (ongoing medical treament)    Assessment:     Neymar Parra is a 72 y.o. male with a medical diagnosis of Gastrointestinal hemorrhage associated with duodenitis.  He presents with weakness, decreased functional mobility, and decline in ADLs. Performance deficits affecting function are weakness, impaired endurance, impaired self care skills, impaired functional mobility, gait instability, impaired balance.     Rehab Prognosis:  Good; patient would benefit from acute skilled OT services to address these deficits and reach maximum level of function.       Plan:     Patient to be seen 5 x/week to address the above listed problems via self-care/home management, therapeutic activities, therapeutic exercises  · Plan of Care Expires: 07/29/22  · Plan of Care Reviewed with: patient    Subjective     Pain/Comfort:  · Pain Rating 1: 4/10  · Location 1: abdomen    Objective:     Communicated with: NIKKI Khan prior to session.  Patient found supine with XIMENA drain, peripheral IV, telemetry upon OT entry to room.    General Precautions: Standard, fall   Orthopedic Precautions:N/A   Braces:    Respiratory Status: Room air     Occupational Performance:     Bed Mobility:    · Patient completed Supine to Sit with minimum assistance  · Patient completed Sit to Supine with stand by assistance     Functional Mobility/Transfers:  · Not performed    Activities of Daily Living:  · Grooming: modified independence to wash face sitting at EOB with setup      [facility] 6 Click ADL:      Treatment & Education:  Pt completed x 15 reps each bicep curls 2#db, IR/ER 2#db, chest press 2#db, rows red tband, and  tricep press red tband in order to improve BUE strength and endurance to perform ADL and ADL t/f with less assist.     Patient left supine with all lines intact, call button in reach and NIKKI Khan notifiedEducation:      GOALS:   Multidisciplinary Problems     Occupational Therapy Goals        Problem: Occupational Therapy    Goal Priority Disciplines Outcome Interventions   Occupational Therapy Goal     OT, PT/OT Ongoing, Progressing    Description: STG:  Pt will perform grooming with setup  Pt will bathe with Alanna with setup at EOB   Pt will perform UE dressing with Danita  Pt will perform LE dressing with Alanna  Pt will sit EOB x 10 min with SBA   Pt will transfer bed/chair/bsc with CGA with RW  Pt will perform standing task x 3 min with CGA with RW  Pt will tolerate 15 minutes of tx without fatigue      LT.Restore to max I with self care and mobility.                       Time Tracking:     OT Date of Treatment: 22  OT Start Time: 935  OT Stop Time: 949  OT Total Time (min): 14 min    Billable Minutes:Therapeutic Exercise 12 minutes    OT/MARYA: OT          2022

## 2022-07-19 NOTE — PROGRESS NOTES
[facility] - [address] [place] [address]  Nephrology  Progress Note    Patient Name: Neymar Parra  MRN: [id number]  Admission Date: 6/16/2022  Hospital Length of Stay: 32 days  Attending Provider: Shari Cortes MD   Primary Care Physician: [facility] megan [place]  Principal Problem:Gastrointestinal hemorrhage associated with duodenitis    Subjective:     HPI: This patient with history of HTN, DM, CKD who has seen Dr. Crowder in the past 2 years ago is currently in a nursing facility.  The patient presented with SOB and diagnosed with COVID.  Serum creatinine has trended up to 7.15.  Serum potassium is 6.4.  Nephrology has been consulted for renal issues.  At the bedside, the patient mentions feeling fine.  He states that his breathing has improved.      Interval History: The patient is sitting up in the bed.  He is resting comfortably.  No shortness of breath or chest pain.  Serum creatinine is 5.  No other changes.    Review of patient's allergies indicates:   Allergen Reactions    Gatifloxacin Anaphylaxis     Current Facility-Administered Medications   Medication Frequency    0.9%  NaCl infusion (for blood administration) Q24H PRN    0.9%  NaCl infusion (for blood administration) Q24H PRN    0.9%  NaCl infusion (for blood administration) Q24H PRN    acetaminophen tablet 1,000 mg Q6H PRN    acetaminophen tablet 650 mg Q8H PRN    acetaminophen tablet 650 mg Q4H PRN    albuterol inhaler 2 puff Q6H PRN    allopurinol split tablet 50 mg Daily    amLODIPine tablet 10 mg Daily    ascorbic acid (vitamin C) tablet 500 mg Daily    atorvastatin tablet 40 mg QHS    bisacodyL EC tablet 10 mg Daily PRN    calcium gluconate 1 g in NS IVPB (premixed) Q10 Min PRN    cetirizine tablet 10 mg Daily PRN    dextromethorphan-guaiFENesin  mg/5 ml liquid 10 mL Q6H PRN    dextrose 5 % infusion Continuous    dextrose 50% injection 12.5 g PRN    dextrose 50% injection 25 g PRN    docusate sodium  capsule 100 mg BID    ferrous sulfate tablet 1 each Daily    glucagon (human recombinant) injection 1 mg PRN    glucose chewable tablet 16 g PRN    glucose chewable tablet 24 g PRN    hydrALAZINE injection 10 mg Q6H PRN    HYDROcodone-acetaminophen 7.5-325 mg per tablet 1 tablet Q6H PRN    insulin aspart U-100 injection 1-10 Units QID (AC + HS) PRN    melatonin tablet 6 mg Nightly PRN    metoprolol tartrate (LOPRESSOR) tablet 25 mg BID    mineral oil enema 1 enema Daily PRN    morphine injection 4 mg Q4H PRN    morphine injection 6 mg Q4H PRN    multivitamin tablet Daily    naloxone 0.4 mg/mL injection 0.02 mg PRN    ondansetron injection 4 mg Q12H PRN    ondansetron injection 8 mg Q6H PRN    pantoprazole EC tablet 40 mg Daily    simethicone chewable tablet 80 mg TID PRN    sodium bicarbonate tablet 650 mg BID    sodium chloride 0.9% flush 10 mL Q12H PRN    traMADoL tablet 50 mg Q8H PRN    traZODone tablet 50 mg Nightly PRN       Objective:     Vital Signs (Most Recent):  Temp: 97.8 °F (36.6 °C) (07/18/22 1500)  Pulse: 75 (07/18/22 1500)  Resp: 18 (07/18/22 1500)  BP: (!) 168/93 (07/18/22 1500)  SpO2: 99 % (07/18/22 1500)  O2 Device (Oxygen Therapy): room air (07/18/22 0738)   Vital Signs (24h Range):  Temp:  [97.8 °F (36.6 °C)-98.7 °F (37.1 °C)] 97.8 °F (36.6 °C)  Pulse:  [62-82] 75  Resp:  [18-20] 18  SpO2:  [97 %-99 %] 99 %  BP: (132-168)/(69-93) 168/93     Weight: 103.8 kg (228 lb 13.4 oz) (07/18/22 0000)  Body mass index is 31.04 kg/m².  Body surface area is 2.3 meters squared.    I/O last 3 completed shifts:  In: 797 [P.O.:797]  Out: 1875 [Urine:1700; Drains:175]    Physical Exam  Vitals reviewed.   HENT:      Head: Normocephalic and atraumatic.      Mouth/Throat:      Mouth: Mucous membranes are moist.   Cardiovascular:      Rate and Rhythm: Regular rhythm.   Pulmonary:      Effort: Pulmonary effort is normal.   Abdominal:      Palpations: Abdomen is soft.   Neurological:      General:  No focal deficit present.      Mental Status: He is alert.       Significant Labs:  BMP:   Recent Labs   Lab 07/18/22  0308   GLU 78      K 5.1   *   CO2 17*   BUN 45*   CREATININE 5.06*   CALCIUM 8.0*     CBC:   Recent Labs   Lab 07/18/22  0308   WBC 5.50   RBC 2.85*   HGB 9.0*   HCT 26.2*      MCV 91.9   MCH 31.6*   MCHC 34.4        Significant Imaging:  Labs: Reviewed    Assessment/Plan:     Acute on chronic renal failure  Creatinine improved to 5.8  7/11/2022 Serum creatinine is 6.2 which has been stable.  7/12/2022  Today, serum creatinine is 5.9  7/14/2022 The patient with CKD.  No uremic symptoms.  Appears stronger today.  7/15/2022  CKD V.  Creatinine is 5.8 today.  7/18/2022  The serum creatinine is 5 which continues to show signs of improvement.    Type 2 diabetes mellitus without complication  Chronic condition  The patient is doing acceptable.        Thank you for your consult. I will follow-up with patient. Please contact us if you have any additional questions.    Jorge Butts Jr, MD  Nephrology  [facility] - [address] [place] [address]

## 2022-07-19 NOTE — ASSESSMENT & PLAN NOTE
- Patient with bleeding duodenal ulcer on EGD 7/2, ulcer clipped and an EGD on 07/05 where bleeding duodenal vessel was clipped  - patient has had a total of 6 units this admission  - GI following, assistance appreciated  -Pt continues to have large amount of bloody bowel movement with clots. H & H 6.3/18.7 (7/7), platelet 45035 and during 1 unit blood transfusion, pt became hypotensive. Given 1 liter bolus and transferred to ICU  And consulted [facility], Dr. Rees. Appreciate assistance (7/7)  7/14  - H/H 8/25  - has been stable   - rec'd 16u of PRBCs and 1u of plt   7/15  - H/H decreased this AM; 7/20  - transfuse 1u PRBCs   7/16  - 8/24 after blood   7/17  - remains stable

## 2022-07-20 ENCOUNTER — HOSPITAL ENCOUNTER (INPATIENT)
Facility: HOSPITAL | Age: 72
LOS: 5 days | Discharge: NURSING, REHAB OR LONG-TERM CARE | DRG: 378 | End: 2022-07-25
Attending: FAMILY MEDICINE | Admitting: FAMILY MEDICINE
Payer: MEDICARE

## 2022-07-20 VITALS
OXYGEN SATURATION: 97 % | SYSTOLIC BLOOD PRESSURE: 155 MMHG | TEMPERATURE: 99 F | BODY MASS INDEX: 30.61 KG/M2 | WEIGHT: 226 LBS | HEIGHT: 72 IN | HEART RATE: 82 BPM | DIASTOLIC BLOOD PRESSURE: 87 MMHG | RESPIRATION RATE: 18 BRPM

## 2022-07-20 DIAGNOSIS — K92.2 GASTRIC HEMORRHAGE: ICD-10-CM

## 2022-07-20 LAB
ANION GAP SERPL CALCULATED.3IONS-SCNC: 13 MMOL/L (ref 7–16)
BUN SERPL-MCNC: 39 MG/DL (ref 7–18)
BUN/CREAT SERPL: 8 (ref 6–20)
CALCIUM SERPL-MCNC: 8.1 MG/DL (ref 8.5–10.1)
CHLORIDE SERPL-SCNC: 113 MMOL/L (ref 98–107)
CO2 SERPL-SCNC: 20 MMOL/L (ref 21–32)
CREAT SERPL-MCNC: 4.87 MG/DL (ref 0.7–1.3)
GLUCOSE SERPL-MCNC: 131 MG/DL (ref 70–105)
GLUCOSE SERPL-MCNC: 79 MG/DL (ref 74–106)
GLUCOSE SERPL-MCNC: 95 MG/DL (ref 70–105)
POTASSIUM SERPL-SCNC: 4.9 MMOL/L (ref 3.5–5.1)
SODIUM SERPL-SCNC: 141 MMOL/L (ref 136–145)

## 2022-07-20 PROCEDURE — 25000003 PHARM REV CODE 250: Performed by: FAMILY MEDICINE

## 2022-07-20 PROCEDURE — 99239 HOSP IP/OBS DSCHRG MGMT >30: CPT | Mod: ,,, | Performed by: HOSPITALIST

## 2022-07-20 PROCEDURE — 25000003 PHARM REV CODE 250: Performed by: NURSE PRACTITIONER

## 2022-07-20 PROCEDURE — 80048 BASIC METABOLIC PNL TOTAL CA: CPT | Performed by: FAMILY MEDICINE

## 2022-07-20 PROCEDURE — 82962 GLUCOSE BLOOD TEST: CPT

## 2022-07-20 PROCEDURE — 25000003 PHARM REV CODE 250: Performed by: SURGERY

## 2022-07-20 PROCEDURE — 25000003 PHARM REV CODE 250: Performed by: INTERNAL MEDICINE

## 2022-07-20 PROCEDURE — 36415 COLL VENOUS BLD VENIPUNCTURE: CPT | Performed by: HOSPITALIST

## 2022-07-20 PROCEDURE — 99239 PR HOSPITAL DISCHARGE DAY,>30 MIN: ICD-10-PCS | Mod: ,,, | Performed by: HOSPITALIST

## 2022-07-20 PROCEDURE — 63600175 PHARM REV CODE 636 W HCPCS: Performed by: FAMILY MEDICINE

## 2022-07-20 PROCEDURE — 99306 1ST NF CARE HIGH MDM 50: CPT | Mod: AI,,, | Performed by: FAMILY MEDICINE

## 2022-07-20 PROCEDURE — 36415 COLL VENOUS BLD VENIPUNCTURE: CPT | Performed by: FAMILY MEDICINE

## 2022-07-20 PROCEDURE — 99306 PR NURSING FACILITY CARE, INIT, HIGH SEVERITY: ICD-10-PCS | Mod: AI,,, | Performed by: FAMILY MEDICINE

## 2022-07-20 PROCEDURE — 11000004 HC SNF PRIVATE

## 2022-07-20 PROCEDURE — 99900035 HC TECH TIME PER 15 MIN (STAT)

## 2022-07-20 RX ORDER — AMOXICILLIN 250 MG
1 CAPSULE ORAL 2 TIMES DAILY
Status: DISCONTINUED | OUTPATIENT
Start: 2022-07-20 | End: 2022-07-25 | Stop reason: HOSPADM

## 2022-07-20 RX ORDER — PANTOPRAZOLE SODIUM 40 MG/1
40 TABLET, DELAYED RELEASE ORAL DAILY
Qty: 30 TABLET | Refills: 11 | Status: ON HOLD
Start: 2022-07-20 | End: 2022-08-05 | Stop reason: HOSPADM

## 2022-07-20 RX ORDER — TRAMADOL HYDROCHLORIDE 50 MG/1
50 TABLET ORAL EVERY 8 HOURS PRN
Status: DISCONTINUED | OUTPATIENT
Start: 2022-07-20 | End: 2022-07-25 | Stop reason: HOSPADM

## 2022-07-20 RX ORDER — ALLOPURINOL 100 MG/1
50 TABLET ORAL DAILY
Start: 2022-07-20

## 2022-07-20 RX ORDER — POLYETHYLENE GLYCOL 3350 17 G/17G
17 POWDER, FOR SOLUTION ORAL 2 TIMES DAILY PRN
Status: DISCONTINUED | OUTPATIENT
Start: 2022-07-20 | End: 2022-07-25 | Stop reason: HOSPADM

## 2022-07-20 RX ORDER — HYDROCODONE BITARTRATE AND ACETAMINOPHEN 7.5; 325 MG/1; MG/1
1 TABLET ORAL EVERY 6 HOURS PRN
Status: DISCONTINUED | OUTPATIENT
Start: 2022-07-20 | End: 2022-07-25 | Stop reason: HOSPADM

## 2022-07-20 RX ORDER — ACETAMINOPHEN 325 MG/1
650 TABLET ORAL EVERY 6 HOURS PRN
Status: DISCONTINUED | OUTPATIENT
Start: 2022-07-20 | End: 2022-07-25 | Stop reason: HOSPADM

## 2022-07-20 RX ORDER — TALC
6 POWDER (GRAM) TOPICAL NIGHTLY PRN
Status: DISCONTINUED | OUTPATIENT
Start: 2022-07-20 | End: 2022-07-25 | Stop reason: HOSPADM

## 2022-07-20 RX ORDER — METOPROLOL TARTRATE 25 MG/1
25 TABLET, FILM COATED ORAL 2 TIMES DAILY
Status: DISCONTINUED | OUTPATIENT
Start: 2022-07-20 | End: 2022-07-25 | Stop reason: HOSPADM

## 2022-07-20 RX ORDER — ACETAMINOPHEN 500 MG
1000 TABLET ORAL EVERY 8 HOURS PRN
Status: DISCONTINUED | OUTPATIENT
Start: 2022-07-20 | End: 2022-07-25 | Stop reason: HOSPADM

## 2022-07-20 RX ORDER — TRAZODONE HYDROCHLORIDE 150 MG/1
300 TABLET ORAL NIGHTLY
Status: DISCONTINUED | OUTPATIENT
Start: 2022-07-20 | End: 2022-07-25 | Stop reason: HOSPADM

## 2022-07-20 RX ORDER — IPRATROPIUM BROMIDE AND ALBUTEROL SULFATE 2.5; .5 MG/3ML; MG/3ML
3 SOLUTION RESPIRATORY (INHALATION) EVERY 6 HOURS PRN
Status: DISCONTINUED | OUTPATIENT
Start: 2022-07-20 | End: 2022-07-25 | Stop reason: HOSPADM

## 2022-07-20 RX ORDER — METOPROLOL TARTRATE 25 MG/1
25 TABLET, FILM COATED ORAL 2 TIMES DAILY
Qty: 60 TABLET | Refills: 11
Start: 2022-07-20 | End: 2023-07-20

## 2022-07-20 RX ORDER — PANTOPRAZOLE SODIUM 40 MG/1
40 TABLET, DELAYED RELEASE ORAL DAILY
Status: DISCONTINUED | OUTPATIENT
Start: 2022-07-21 | End: 2022-07-25 | Stop reason: HOSPADM

## 2022-07-20 RX ORDER — ASCORBIC ACID 500 MG
500 TABLET ORAL DAILY
Start: 2022-07-20

## 2022-07-20 RX ORDER — DOCUSATE SODIUM 283 MG/5ML
1 LIQUID RECTAL DAILY PRN
Status: DISCONTINUED | OUTPATIENT
Start: 2022-07-20 | End: 2022-07-25 | Stop reason: HOSPADM

## 2022-07-20 RX ORDER — ASCORBIC ACID 500 MG
500 TABLET ORAL DAILY
Status: DISCONTINUED | OUTPATIENT
Start: 2022-07-21 | End: 2022-07-25 | Stop reason: HOSPADM

## 2022-07-20 RX ORDER — SODIUM BICARBONATE 650 MG/1
650 TABLET ORAL 2 TIMES DAILY
Status: DISCONTINUED | OUTPATIENT
Start: 2022-07-20 | End: 2022-07-25 | Stop reason: HOSPADM

## 2022-07-20 RX ORDER — SODIUM BICARBONATE 650 MG/1
650 TABLET ORAL 2 TIMES DAILY
Qty: 60 TABLET | Refills: 11 | Status: ON HOLD
Start: 2022-07-20 | End: 2022-08-05 | Stop reason: HOSPADM

## 2022-07-20 RX ORDER — MAG HYDROX/ALUMINUM HYD/SIMETH 200-200-20
15 SUSPENSION, ORAL (FINAL DOSE FORM) ORAL EVERY 6 HOURS PRN
Status: DISCONTINUED | OUTPATIENT
Start: 2022-07-20 | End: 2022-07-25 | Stop reason: HOSPADM

## 2022-07-20 RX ORDER — AMLODIPINE BESYLATE 5 MG/1
10 TABLET ORAL DAILY
Status: DISCONTINUED | OUTPATIENT
Start: 2022-07-21 | End: 2022-07-25 | Stop reason: HOSPADM

## 2022-07-20 RX ORDER — ENOXAPARIN SODIUM 100 MG/ML
30 INJECTION SUBCUTANEOUS EVERY 24 HOURS
Status: DISCONTINUED | OUTPATIENT
Start: 2022-07-20 | End: 2022-07-25 | Stop reason: HOSPADM

## 2022-07-20 RX ORDER — CETIRIZINE HYDROCHLORIDE 10 MG/1
10 TABLET ORAL DAILY
Status: DISCONTINUED | OUTPATIENT
Start: 2022-07-21 | End: 2022-07-25 | Stop reason: HOSPADM

## 2022-07-20 RX ORDER — ALLOPURINOL 100 MG/1
100 TABLET ORAL DAILY
Status: DISCONTINUED | OUTPATIENT
Start: 2022-07-21 | End: 2022-07-20

## 2022-07-20 RX ORDER — ATORVASTATIN CALCIUM 40 MG/1
40 TABLET, FILM COATED ORAL NIGHTLY
Status: DISCONTINUED | OUTPATIENT
Start: 2022-07-20 | End: 2022-07-25 | Stop reason: HOSPADM

## 2022-07-20 RX ADMIN — TRAZODONE HYDROCHLORIDE 300 MG: 150 TABLET ORAL at 09:07

## 2022-07-20 RX ADMIN — FERROUS SULFATE TAB 325 MG (65 MG ELEMENTAL FE) 1 EACH: 325 (65 FE) TAB at 08:07

## 2022-07-20 RX ADMIN — ATORVASTATIN CALCIUM 40 MG: 40 TABLET, FILM COATED ORAL at 09:07

## 2022-07-20 RX ADMIN — METOPROLOL TARTRATE 25 MG: 25 TABLET, FILM COATED ORAL at 09:07

## 2022-07-20 RX ADMIN — PANTOPRAZOLE SODIUM 40 MG: 40 TABLET, DELAYED RELEASE ORAL at 08:07

## 2022-07-20 RX ADMIN — SODIUM BICARBONATE 650 MG: 650 TABLET ORAL at 08:07

## 2022-07-20 RX ADMIN — SENNOSIDES AND DOCUSATE SODIUM 1 TABLET: 8.6; 5 TABLET ORAL at 09:07

## 2022-07-20 RX ADMIN — OXYCODONE HYDROCHLORIDE AND ACETAMINOPHEN 500 MG: 500 TABLET ORAL at 08:07

## 2022-07-20 RX ADMIN — ALLOPURINOL 50 MG: 300 TABLET ORAL at 08:07

## 2022-07-20 RX ADMIN — ENOXAPARIN SODIUM 30 MG: 100 INJECTION SUBCUTANEOUS at 06:07

## 2022-07-20 RX ADMIN — DOCUSATE SODIUM 100 MG: 100 CAPSULE, LIQUID FILLED ORAL at 08:07

## 2022-07-20 RX ADMIN — THERA TABS 1 TABLET: TAB at 08:07

## 2022-07-20 RX ADMIN — AMLODIPINE BESYLATE 10 MG: 10 TABLET ORAL at 08:07

## 2022-07-20 RX ADMIN — SODIUM BICARBONATE 650 MG TABLET 650 MG: at 09:07

## 2022-07-20 RX ADMIN — METOPROLOL TARTRATE 25 MG: 25 TABLET, FILM COATED ORAL at 08:07

## 2022-07-20 NOTE — PROGRESS NOTES
[address]  Nephrology  Progress Note    Patient Name: Neymar Parra  MRN: [id number]  Admission Date: 6/16/2022  Hospital Length of Stay: 34 days  Attending Provider: Shari Cortes MD   Primary Care Physician: [facility] megan [place]  Principal Problem:Gastrointestinal hemorrhage associated with duodenitis    Subjective:     HPI: This patient with history of HTN, DM, CKD who has seen Dr. Crowder in the past 2 years ago is currently in a nursing facility.  The patient presented with SOB and diagnosed with COVID.  Serum creatinine has trended up to 7.15.  Serum potassium is 6.4.  Nephrology has been consulted for renal issues.  At the bedside, the patient mentions feeling fine.  He states that his breathing has improved.      Interval History: The patient is sitting up in bed.  He is without complaints.  No shortness of breath or chest pain.  Serum creatinine is 4.8 which is improved.    Review of patient's allergies indicates:   Allergen Reactions    Gatifloxacin Anaphylaxis     Current Facility-Administered Medications   Medication Frequency    0.9%  NaCl infusion (for blood administration) Q24H PRN    0.9%  NaCl infusion (for blood administration) Q24H PRN    0.9%  NaCl infusion (for blood administration) Q24H PRN    acetaminophen tablet 1,000 mg Q6H PRN    acetaminophen tablet 650 mg Q8H PRN    acetaminophen tablet 650 mg Q4H PRN    albuterol inhaler 2 puff Q6H PRN    allopurinol split tablet 50 mg Daily    amLODIPine tablet 10 mg Daily    ascorbic acid (vitamin C) tablet 500 mg Daily    atorvastatin tablet 40 mg QHS    bisacodyL EC tablet 10 mg Daily PRN    calcium gluconate 1 g in NS IVPB (premixed) Q10 Min PRN    cetirizine tablet 10 mg Daily PRN    dextromethorphan-guaiFENesin  mg/5 ml liquid 10 mL Q6H PRN    dextrose 5 % infusion Continuous    dextrose 50% injection 12.5 g PRN    dextrose 50% injection 25 g PRN    docusate sodium capsule  100 mg BID    ferrous sulfate tablet 1 each Daily    glucagon (human recombinant) injection 1 mg PRN    glucose chewable tablet 16 g PRN    glucose chewable tablet 24 g PRN    hydrALAZINE injection 10 mg Q6H PRN    HYDROcodone-acetaminophen 7.5-325 mg per tablet 1 tablet Q6H PRN    insulin aspart U-100 injection 1-10 Units QID (AC + HS) PRN    melatonin tablet 6 mg Nightly PRN    metoprolol tartrate (LOPRESSOR) tablet 25 mg BID    mineral oil enema 1 enema Daily PRN    morphine injection 4 mg Q4H PRN    morphine injection 6 mg Q4H PRN    multivitamin tablet Daily    naloxone 0.4 mg/mL injection 0.02 mg PRN    ondansetron injection 4 mg Q12H PRN    ondansetron injection 8 mg Q6H PRN    pantoprazole EC tablet 40 mg Daily    simethicone chewable tablet 80 mg TID PRN    sodium bicarbonate tablet 650 mg BID    sodium chloride 0.9% flush 10 mL Q12H PRN    traMADoL tablet 50 mg Q8H PRN    traZODone tablet 50 mg Nightly PRN       Objective:     Vital Signs (Most Recent):  Temp: 98.9 °F (37.2 °C) (07/20/22 1000)  Pulse: 82 (07/20/22 1000)  Resp: 18 (07/20/22 1000)  BP: (!) 155/87 (07/20/22 1000)  SpO2: 97 % (07/20/22 1000)  O2 Device (Oxygen Therapy): room air (07/19/22 0716)   Vital Signs (24h Range):  Temp:  [98 °F (36.7 °C)-98.9 °F (37.2 °C)] 98.9 °F (37.2 °C)  Pulse:  [79-84] 82  Resp:  [18] 18  SpO2:  [96 %-98 %] 97 %  BP: (151-163)/(68-89) 155/87     Weight: 102.5 kg (225 lb 15.5 oz) (07/20/22 0455)  Body mass index is 30.65 kg/m².  Body surface area is 2.28 meters squared.    I/O last 3 completed shifts:  In: 237 [P.O.:237]  Out: 1565 [Urine:1475; Drains:90]    Physical Exam  Vitals reviewed.   Constitutional:       Appearance: He is obese.   HENT:      Head: Normocephalic and atraumatic.      Mouth/Throat:      Mouth: Mucous membranes are moist.   Cardiovascular:      Rate and Rhythm: Regular rhythm.   Pulmonary:      Effort: Pulmonary effort is normal.   Abdominal:      Palpations: Abdomen is  soft.   Neurological:      Mental Status: He is alert.   Psychiatric:         Mood and Affect: Mood normal.       Significant Labs:  BMP:   Recent Labs   Lab 07/20/22  0504   GLU 79      K 4.9   *   CO2 20*   BUN 39*   CREATININE 4.87*   CALCIUM 8.1*     CBC:   Recent Labs   Lab 07/19/22  0331   WBC 6.18   RBC 2.83*   HGB 8.8*   HCT 25.9*      MCV 91.5   MCH 31.1*   MCHC 34.0        Significant Imaging:  Labs: Reviewed    Assessment/Plan:     Acute on chronic renal failure    7/18/2022  The serum creatinine is 5 which continues to show signs of improvement.  7/19/2022  Renal function continues to improve.  Creatinine is 4.7 today.  7/20/2022  Renal function appears to be stable.  Creatinine is 4.8.    Type 2 diabetes mellitus without complication  Chronic condition  The patient is doing acceptable.  7/19/2022  Glucoses are acceptable.  7/20/2022 Glucoses are stable.    Hypertension  Controlled        Thank you for your consult. I will follow-up with patient. Please contact us if you have any additional questions.    Jorge Butts Jr, MD  Nephrology  [facility] - [address] [place] [address]

## 2022-07-20 NOTE — NURSING
Dr. Castañeda notified pt's order for allopurinol was entered as 100 mg dosage when it should be 50 mg. Dr. Castañeda states he can't order a half tablet; he will d/c order and bring issue up in morning meeting in the am.

## 2022-07-20 NOTE — H&P
[facility] - Medical Surgical Unit  [facility] Medicine  History & Physical    Patient Name: Neymar Parra  MRN: [id number]  Patient Class: IP- Swing  Admission Date: 7/20/2022  Attending Physician: Adriano Castañeda DO   Primary Care Provider: [facility]         Patient information was obtained from ER records.     Subjective:     Principal Problem:Gastric hemorrhage    Chief Complaint: No chief complaint on file.       HPI: Patient is a 72-year-old comes in transfer to [facility] bed from [address] [place] [address].  Patient is in for having a GI hemorrhage with infection the patient had a vessel clipped on 07/08/2022 by Dr. Rees.  He also has esophagitis.  Anemia.  Full effusion.  Diabetes.  Hypertension.  Hyperkalemia.  The patient was transferred initially from [facility] to [facility] with shortness of breath and hemoptysis.  He had duodenal vessel that was clipped on per GI on 07/08/2022 nephrology consulted because of a creatinine of 7 0.15 the ocean had COVID on 06/09/2022 patient sent for PT and OT for XIMENA drains to be dressed and then removed on 07/22/2022.  He also has midline staples in that were removed on 07/22/2022 patient will have Wound Care, and continue monitoring for any GI bleed.      Past Medical History:   Diagnosis Date    Anticoagulant long-term use     Chronic renal disease, stage 4, severely decreased glomerular filtration rate (GFR) between 15-29 mL/min/1.73 square meter     Coronary artery disease     stents ~ 2017    COVID-19 6/16/2022    Dieulafoy lesion of duodenum 7/5/2022    Duodenal adenoma     Duodenitis 7/5/2022    HH (hiatus hernia) 7/5/2022    Hypertension        Past Surgical History:   Procedure Laterality Date    ABDOMINAL SURGERY      CORONARY ANGIOPLASTY WITH STENT PLACEMENT      ~ 2017    JOINT REPLACEMENT      PYLOROPLASTY N/A 7/8/2022    Procedure: PYLOROPLASTY;  Surgeon: Marvin Rees MD;  Location: [facility];  Service: General;  Laterality:  N/A;    VAGOTOMY N/A 7/8/2022    Procedure: VAGOTOMY;  Surgeon: Marvin Rees MD;  Location: [facility];  Service: General;  Laterality: N/A;       Review of patient's allergies indicates:   Allergen Reactions    Gatifloxacin Anaphylaxis       Current Facility-Administered Medications on File Prior to Encounter   Medication    [COMPLETED] sodium polystyrene 15 gram/60 mL suspension 15 g    [DISCONTINUED] 0.9%  NaCl infusion (for blood administration)    [DISCONTINUED] 0.9%  NaCl infusion (for blood administration)    [DISCONTINUED] 0.9%  NaCl infusion (for blood administration)    [DISCONTINUED] acetaminophen tablet 1,000 mg    [DISCONTINUED] acetaminophen tablet 650 mg    [DISCONTINUED] acetaminophen tablet 650 mg    [DISCONTINUED] albuterol inhaler 2 puff    [DISCONTINUED] allopurinol split tablet 50 mg    [DISCONTINUED] amLODIPine tablet 10 mg    [DISCONTINUED] ascorbic acid (vitamin C) tablet 500 mg    [DISCONTINUED] atorvastatin tablet 40 mg    [DISCONTINUED] bisacodyL EC tablet 10 mg    [DISCONTINUED] calcium gluconate 1 g in NS IVPB (premixed)    [DISCONTINUED] cetirizine tablet 10 mg    [DISCONTINUED] dextromethorphan-guaiFENesin  mg/5 ml liquid 10 mL    [DISCONTINUED] dextrose 5 % infusion    [DISCONTINUED] dextrose 50% injection 12.5 g    [DISCONTINUED] dextrose 50% injection 25 g    [DISCONTINUED] docusate sodium capsule 100 mg    [DISCONTINUED] ferrous sulfate tablet 1 each    [DISCONTINUED] glucagon (human recombinant) injection 1 mg    [DISCONTINUED] glucose chewable tablet 16 g    [DISCONTINUED] glucose chewable tablet 24 g    [DISCONTINUED] hydrALAZINE injection 10 mg    [DISCONTINUED] HYDROcodone-acetaminophen 7.5-325 mg per tablet 1 tablet    [DISCONTINUED] insulin aspart U-100 injection 1-10 Units    [DISCONTINUED] melatonin tablet 6 mg    [DISCONTINUED] metoprolol tartrate (LOPRESSOR) tablet 25 mg    [DISCONTINUED] mineral oil enema 1 enema     [DISCONTINUED] morphine injection 4 mg    [DISCONTINUED] morphine injection 6 mg    [DISCONTINUED] multivitamin tablet    [DISCONTINUED] naloxone 0.4 mg/mL injection 0.02 mg    [DISCONTINUED] ondansetron injection 4 mg    [DISCONTINUED] ondansetron injection 8 mg    [DISCONTINUED] pantoprazole EC tablet 40 mg    [DISCONTINUED] simethicone chewable tablet 80 mg    [DISCONTINUED] sodium bicarbonate tablet 650 mg    [DISCONTINUED] sodium chloride 0.9% flush 10 mL    [DISCONTINUED] traMADoL tablet 50 mg    [DISCONTINUED] traZODone tablet 50 mg     Current Outpatient Medications on File Prior to Encounter   Medication Sig    acetaminophen (TYLENOL) 500 MG tablet Take 1,000 mg by mouth every 8 (eight) hours as needed for Pain.    albuterol-ipratropium (DUO-NEB) 2.5 mg-0.5 mg/3 mL nebulizer solution Take 3 mLs by nebulization every 6 (six) hours as needed for Wheezing. Rescue    allopurinoL (ZYLOPRIM) 100 MG tablet Take 0.5 tablets (50 mg total) by mouth once daily.    amLODIPine (NORVASC) 10 MG tablet Take 10 mg by mouth once daily.    ascorbic acid, vitamin C, (VITAMIN C) 500 MG tablet Take 1 tablet (500 mg total) by mouth once daily.    atorvastatin (LIPITOR) 80 MG tablet Take 40 mg by mouth every evening.    HYDROcodone-acetaminophen (NORCO) 7.5-325 mg per tablet Take 1 tablet by mouth every 6 (six) hours as needed for Pain.    loratadine (CLARITIN) 10 mg tablet TAKE ONE TABLET BY MOUTH DAILY FOR ALLERGIES    metoprolol tartrate (LOPRESSOR) 25 MG tablet Take 1 tablet (25 mg total) by mouth 2 (two) times daily.    multivitamin Tab Take 1 tablet by mouth once daily.    pantoprazole (PROTONIX) 40 MG tablet Take 1 tablet (40 mg total) by mouth once daily.    sodium bicarbonate 650 MG tablet Take 1 tablet (650 mg total) by mouth 2 (two) times daily.    traMADoL (ULTRAM) 50 mg tablet Take 50 mg by mouth every 8 (eight) hours as needed for Pain.    traZODone (DESYREL) 100 MG tablet Take 300 mg by  mouth every evening.    [DISCONTINUED] allopurinoL (ZYLOPRIM) 100 MG tablet TAKE ONE TABLET BY MOUTH DAILY FOR GOUT    [DISCONTINUED] aspirin (ECOTRIN) 325 MG EC tablet Take 325 mg by mouth once daily.    [DISCONTINUED] clopidogreL (PLAVIX) 75 mg tablet TAKE 1 TABLET BY MOUTH ONCE DAILY     [DISCONTINUED] isosorbide mononitrate (IMDUR) 60 MG 24 hr tablet Take 1 tablet by mouth once daily.    [DISCONTINUED] lisinopriL (PRINIVIL,ZESTRIL) 20 MG tablet Take 20 mg by mouth.    [DISCONTINUED] metoprolol tartrate (LOPRESSOR) 25 MG tablet Take 25 mg by mouth once daily.    [DISCONTINUED] omeprazole (PRILOSEC) 20 MG capsule Take 20 mg by mouth once daily.     Family History       Problem Relation (Age of Onset)    Diabetes Mother, Father          Tobacco Use    Smoking status: Former Smoker     Types: Cigarettes    Smokeless tobacco: Never Used    Tobacco comment: 1/3 PPD ~ 20 years: quit Dec 2021   Substance and Sexual Activity    Alcohol use: Not Currently     Comment: pint a week: Hx    Drug use: Never    Sexual activity: Not Currently     Review of Systems   Constitutional:  Positive for fatigue.   HENT: Negative.     Eyes: Negative.    Respiratory: Negative.     Cardiovascular: Negative.    Gastrointestinal: Negative.         Patient with midline incisional area with staples in place.  A XIMENA drain.  Still some tenderness abdomen otherwise there is no sign of infection.  No vomiting.  No nausea.  No diarrhea.   Endocrine: Negative.    Genitourinary: Negative.    Musculoskeletal: Negative.    Skin: Negative.    Allergic/Immunologic: Negative.    Neurological: Negative.    Hematological: Negative.    Psychiatric/Behavioral: Negative.     Objective:     Vital Signs (Most Recent):  Temp: 99.1 °F (37.3 °C) (07/20/22 1347)  Pulse: 83 (07/20/22 1347)  Resp: 19 (07/20/22 1347)  BP: (!) 156/88 (07/20/22 1347)  SpO2: 97 % (07/20/22 1347)   Vital Signs (24h Range):  Temp:  [98 °F (36.7 °C)-99.1 °F (37.3 °C)] 99.1 °F  (37.3 °C)  Pulse:  [79-84] 83  Resp:  [18-19] 19  SpO2:  [96 %-98 %] 97 %  BP: (155-163)/(68-89) 156/88     Weight: 102.5 kg (225 lb 15.5 oz)  Body mass index is 30.65 kg/m².    Physical Exam  Constitutional:       Appearance: Normal appearance. He is normal weight.   HENT:      Head: Normocephalic and atraumatic.      Nose: Nose normal.      Mouth/Throat:      Mouth: Mucous membranes are moist.      Pharynx: Oropharynx is clear.   Eyes:      Extraocular Movements: Extraocular movements intact.      Conjunctiva/sclera: Conjunctivae normal.      Pupils: Pupils are equal, round, and reactive to light.   Cardiovascular:      Rate and Rhythm: Normal rate.   Pulmonary:      Effort: Pulmonary effort is normal.      Breath sounds: Normal breath sounds.   Abdominal:      General: Abdomen is flat. Bowel sounds are normal.      Palpations: Abdomen is soft.      Tenderness: There is abdominal tenderness.   Genitourinary:     Penis: Normal.       Testes: Normal.   Musculoskeletal:         General: Normal range of motion.      Cervical back: Normal range of motion and neck supple.   Skin:     General: Skin is warm and dry.      Capillary Refill: Capillary refill takes less than 2 seconds.   Neurological:      General: No focal deficit present.      Mental Status: He is alert and oriented to person, place, and time. Mental status is at baseline.   Psychiatric:         Mood and Affect: Mood normal.         Behavior: Behavior normal.         Thought Content: Thought content normal.         Judgment: Judgment normal.     Surgical site looks good with no sign of infection.  No dehiscence of the wound.    CRANIAL NERVES     CN III, IV, VI   Pupils are equal, round, and reactive to light.     Significant Labs: All pertinent labs within the past 24 hours have been reviewed.    Significant Imaging: I have reviewed all pertinent imaging results/findings within the past 24 hours.    Assessment/Plan:     No notes have been filed under this  hospital service.  Service: Hospital Medicine    VTE Risk Mitigation (From admission, onward)         Ordered     enoxaparin injection 30 mg  Daily         07/20/22 1736     IP VTE HIGH RISK PATIENT  Once         07/20/22 1736     Place JERMAINE hose  Until discontinued         07/20/22 1736     Place sequential compression device  Until discontinued         07/20/22 1736                   Adriano Castañeda DO  Department of Hospital Medicine   [facility] - Medical [facility] Unit

## 2022-07-20 NOTE — ASSESSMENT & PLAN NOTE
- 5.4 today  - Improving  - Continue lokelma   - Switch from sodium bicarb to lactated ringers for IVF  7/14  - D5W   - lokelma and kayexelate given  - recheck in AM   7/15  - 5.2 this AM   - lokelma given   7/16  - 5.5 this AM  - kayexelate   7/17  - 5.5 this AM   7/19  - elevated again this AM  - 5.2  - kayexelate x 2 doses   - recheck in AM     07/20--k+ 4.9 today

## 2022-07-20 NOTE — PLAN OF CARE
[facility] Awaiting call back.

## 2022-07-20 NOTE — ASSESSMENT & PLAN NOTE
Chronic condition  The patient is doing acceptable.  7/19/2022  Glucoses are acceptable.  7/20/2022 Glucoses are stable.

## 2022-07-20 NOTE — PLAN OF CARE
SS spoke with Vianey at [facility]. Patient has been accepted and can go today. Packet made. Patient's family aware of transfer. MD notified.

## 2022-07-20 NOTE — DISCHARGE SUMMARY
[facility] - [address] [place] [address] Medicine  Discharge Summary      Patient Name: Neymar Parra  MRN: [id number]  Patient Class: IP- Inpatient  Admission Date: 6/16/2022  Hospital Length of Stay: 34 days  Discharge Date and Time:  07/20/2022 11:47 AM  Attending Physician: Shari Cortes MD   Discharging Provider: SRIKANTH Curtis  Primary Care Provider: [facility] megan [place]      HPI:   72 y.o. male transferred to the [facility] from [facility] for hyperkalemia and hypoxia. Pt reports he went to [facility] because he was having dyspnea and hemoptysis since 1 month which is worse today. Pt reports he was tested positive for COVID at the nursing home on 6/9/22. Per nursing home patient oxygen saturation was in the 80's on RA with tachypnea and labored breathing. Pt reports he normally does not wear oxygen at the nursing home but has been wearing oxygen more often recently. Pt reports he smoked 1-1.5 PPD for 20 years but quit 7 months ago. Per pt, he saw a pulmonologist (Dr. Aldana) for his hemoptysis and had some tests done the results of which he does not know. Per records, pt with a right pleural effusion recently and had a thoracentesis. Pt denies any fever, congestion, dysuria, N/V/D, chest pain, or any other complaints at this time.     In the ED, pt's K 7.2, d-dimer 0.76, BNP 6284, BUN/Cr 73/7.16. EKG showed some peaked t-waves and irregular rhythm. Chest xray showed Increased right lower lung pulmonary density could indicate pneumonia. Pt was treated in the ED with Calcium gluconate 1g, regular insulin 10 units, D50, IV solumedrol 125 mg, tylenol 650 mg and IV fluids NS 1L bolus. Pt will be admitted to the hospital service for management of hypoxia and hyperkalemia.      Procedure(s) (LRB):  LAPAROTOMY, EXPLORATORY (N/A)  VAGOTOMY (N/A)  PYLOROPLASTY (N/A)      Hospital Course:   Overview of hospital stay from admission to present:   6/15- [facility] resident presented to [facility]  ED with dyspnea and hypoxia; RA sats 80s; K 7.2; Cr 7.16; covid positive 22- Cr 6.13- acute on chronic; K has normalized; some SOB- COPD exacerbation  - hyponatremia 123   - abd pain with slight drop in H/H--9->7  - CXR with worsening consolidation in LLL- completed 5 days of zithromax and steroids-- escalated to zosyn; H/H - 1u PRBCS (pt's bed at [facility] held until )   - hx of duodenal adenoma- H/H continues to drop- FOBT positive; GI consulted   - CXR with worsening LLL consolidation and RLL infiltrate and pleural effusion; pt's bed  at [facility]: EGD with esophageal candidiasis, moderate sized hiatal hernia, large duodenal bulb ulcer with adherent clot  : pulm consulted for recurrent R sided effusion- not enough fluid to drain; H/H - 1u PRBCs   : repeat EGD- Dieulafoy's lesion- duodenal bulb; s/p endoscopic therapy (clipping + epinephrine injection) and Duodenal bulb ulcer with visible vessel,s/p endoscopic therapy with clipping/epinephrine injection; rec'd 1u PRBCs   7/3: continued drop in H/H - 2u PRBCs   : H/H stable : H/H down to - 1u PRBCs- EGD- Mucosa of the esophagus was normal, overlying a small hiatus hernia. Blood was noted around the pyloric channel and no gastric ulcer was seen. Clot was present in the first portion of the duodenum with two clips attached to the mucosa. In that area, there appeared to be duodenitis and a visible vessel was noted with active bleeding. The bleeding was controlled with a clip.   : H/H stable : H/H - bloody BM with clots; total of 5u PRBCs and 1u PLTs; hypotensive 80/40s- transferred to ICU-; surgery consulted; EGD- The distal esophagus had a small non-bleeding mucosal tear. A 3cm hiatus hernia was noted. Gastric mucosa is pale, consistent with severe anemia. Gastritis is seen without active bleeding or gastric ulcer. A large adherent  clot is present in the 1st portion of the  duodenum at the site of previous bleeding and clip placement. 3cc of 1/100 epinephrine was injected submucosally and the clot would not suction off. Three more hemostasis clips were placed at the base of the clot. The more distal duodenum had blood in the lumen, likely from distal migration, but no ulcer.  7/8: OR for exp lap with vagotomy pyloroplasty and oversewing of ulcer- midline incision with ALYSON drain/dsg and jewels JAKE drains; 2u PRBCs   7/9: 1u PRBCs   7/10: H/H 6/19- 2u PRBCs   7/11: NGT removed; remains NPO   7/12: HR 120s and elevated BP; lopressor ordered; clear liquids   7/13: transferred to floor; clear liquid-> full liquid   7/15: H/H 7/20; transfuse 1u PRBCs   7/19: JAKE drains pulled; pending covid pcr for continued placement workup   07/20--Jake pulled on yesterday, Covid PCR positive and [facility] at this time, however, has been accepted to [facility] and is stable for transfer today.  Will need to have staples removed on 07/22/2022.  Follow up with G/S and GI outpatient.  No new issues or concerns and is stable for transfer.        07/04-Patient resting comfortably in bed with no acute complaints. Is not requiring supplemental oxygen today. Denies new rectal bleeding. The patient says he feels better after getting 2 units of pRBC yesterday, but that he still feels weak. His H&H is stable this morning, will continue to monitor, and as long as it stays stable may be able to discharge tomorrow.    7/14- transferred out of ICU overnight; midline incision with ALYSON drain and bilateral JAKE drains; full liquid diet per surgery; SS following for placement-- will need SWB   7/15: decrease in H/H 7/20- transfuse 1u PRBCs- surgery following; SS--- will need covid PCR before any further paperwork regarding [facility]   7/16: NAEO; denies any needs; H/H 8/24 after 1u yesterday; continue current POC  7/17: NAEO; advanced to reg diet; covid PCR ordered   7/18: NAEO; abd tenderness improving;  tolerating reg diet; continues with ALYSON and XIMENA drains; PCR pending   7/19: does have some abd discomfort- relates to gas pains; XIMENA drains to be pulled today; tolerating diet; pending placement        Goals of Care Treatment Preferences:  Code Status: Full Code      Consults:   Consults (From admission, onward)        Status Ordering Provider     Inpatient consult to General Surgery  Once        Provider:  Daron Rees MD    Completed KORINA LEGER JR     Inpatient consult to Gastroenterology  Once        Provider:  KATHERINE Arredondo MD    Completed EFE REGAN     Inpatient consult to Social Work  Once        Provider:  (Not yet assigned)    Completed ALTAGRACIA BAKER     Inpatient consult to Nephrology  Once        Provider:  Jorge Butts Jr., MD    Acknowledged DARON REES     IP consult to case management  Once        Provider:  (Not yet assigned)    Completed NAT DAMON          * Gastrointestinal hemorrhage associated with duodenitis  - EGD with bleeding duodenal ulcer as above, was clipped   - Repeat EGD 07/05, bleeding duodenal vessel was clipped per GI.  - H. Pylori negative  - Protonix infusion  - GI following, appreciate assistance   -Pt continues to bleed with blood clots from bowel and H &H  Continues to decline. Pt became hypotensive, and transferred to ICU for further monitoring and eval. General surgery, Dr. Rees consulted for eval.   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 [facility]   - ongoing bleed with blood clots from bowel and decreasing H&H. Pt became hypotensive, and transferred to ICU for further monitoring and eval. General surgery, Dr. Rees consulted for eval.   - to OR 7/8  - doing well NGT removed. Try to advance diet today  7/14  - full liquids per surgery  - midline incision with ALYSON and XIMENA drains   7/17  - reg diet; tolerating   7/19  - XIMENA drains to be pulled today  - surgery ok with LTAC  placement     07/20--Staples to be removed on 07/22    Esophagitis        Anemia  - Patient with bleeding duodenal ulcer on EGD 7/2, ulcer clipped and an EGD on 07/05 where bleeding duodenal vessel was clipped  - patient has had a total of 6 units this admission  - GI following, assistance appreciated  -Pt continues to have large amount of bloody bowel movement with clots. H & H 6.3/18.7 (7/7), platelet [id number] and during 1 unit blood transfusion, pt became hypotensive. Given 1 liter bolus and transferred to ICU  And consulted GS, Dr. Rees. Appreciate assistance (7/7)  7/14  - H/H 8/25  - has been stable   - rec'd 16u of PRBCs and 1u of plt   7/15  - H/H decreased this AM; 7/20  - transfuse 1u PRBCs   7/16  - 8/24 after blood   7/17  - remains stable     07/20--H & H 8/25 cont to monitor while inpatient at [facility]    Acute on chronic renal failure   - Creatinine stable  - Nephrology following  - continues to have non-anion gap acidosis, check urine electrolytes. UAG +14  7/14  - Cr 6.2  - nephrology following   7/15  - Cr 5.8  7/16  - 5.4   7/19  - 4.7     07/20--sCr 4.87, follow up with nephrologist outpatient    Hyperkalemia  - 5.4 today  - Improving  - Continue lokelma   - Switch from sodium bicarb to lactated ringers for IVF  7/14  - D5W   - lokelma and kayexelate given  - recheck in AM   7/15  - 5.2 this AM   - lokelma given   7/16  - 5.5 this AM  - kayexelate   7/17  - 5.5 this AM   7/19  - elevated again this AM  - 5.2  - kayexelate x 2 doses   - recheck in AM     07/20--k+ 4.9 today    Pleural effusion  - Continue to monitor  - Seen by pulmonology, appreciate assistance   - No thoracentesis at this time    Type 2 diabetes mellitus without complication  - HgA1c 4.7 6/22  - detemir 8U qhs with SSI   - holding levemir  - D5W for IVF  - discuss advancing diet with surgery, full liquid. If tolerates diet will try to DC IVF  7/14  - advanced to full liquids  - will continue to monitor BG trend  - remains on D5W at  present   - BG trend 64-93   7/15  - BG trend 103-130   7/17  -   7/18  -    7/19  -   - BG remains stable       Gastroesophageal reflux disease  Continue protonix infusion  7/14  - transitioned to PO protonix     Hypertension  Due to GI bleeding, pt became hypotensive  Holding amlodipine, isosorbide mononitrate and beta blocker at this time (7/7)   7/14  - metoprolol has been resumed  - BP stable   7/16  - BP trending up  - norvasc resumed   7/19  - BP stable       Final Active Diagnoses:    Diagnosis Date Noted POA    PRINCIPAL PROBLEM:  Gastrointestinal hemorrhage associated with duodenitis [K29.81]  Yes    Esophagitis [K20.90]  Unknown    Hyperkalemia [E87.5] 06/16/2022 Yes    Acute on chronic renal failure [N17.9, N18.9] 06/16/2022 Yes    Anemia [D64.9] 06/16/2022 Yes    Pleural effusion [J90] 04/29/2022 Yes    Hypertension [I10] 12/12/2021 Yes    Gastroesophageal reflux disease [K21.9] 12/12/2021 Yes    Type 2 diabetes mellitus without complication [E11.9] 12/12/2021 Yes      Problems Resolved During this Admission:    Diagnosis Date Noted Date Resolved POA    PNA (pneumonia) [J18.9] 06/27/2022 07/06/2022 Yes    Hyponatremia [E87.1] 06/25/2022 07/02/2022 No    Elevated d-dimer [R79.89] 06/22/2022 07/05/2022 Yes    COVID-19 [U07.1] 06/16/2022 06/22/2022 Yes    COPD exacerbation [J44.1] 12/12/2021 07/03/2022 Yes       Discharged Condition: stable    Disposition: Hospice/Medical Facility    Follow Up:   Contact information for follow-up providers     [facility] Follow up in 1 week(s).    Why: after discharge from [facility]  Contact information:  [address] [place] [place] [address]  [phone]             Russ Sanon MD Follow up in 4 week(s).    Specialty: Gastroenterology  Why: Hospital follow up, Duodenal ulcer  Contact information:  [address] [place] [address]  [facility] [address]  [phone]             Marvin Rees MD Follow up in  2 week(s).    Specialties: General Surgery, Surgery  Why: Hospital follow up  Contact information:  [address]  [place] [address]  [phone]                   Contact information for after-discharge care     Destination     [facility] .    Service: Skilled Nursing  Contact information:  [address] [address] [address]  [phone]                           Patient Instructions:      Diet Dysphagia Soft     Notify your health care provider if you experience any of the following:  temperature >100.4     Notify your health care provider if you experience any of the following:  persistent nausea and vomiting or diarrhea     Notify your health care provider if you experience any of the following:  severe uncontrolled pain     Notify your health care provider if you experience any of the following:  redness, tenderness, or signs of infection (pain, swelling, redness, odor or green/yellow discharge around incision site)     Notify your health care provider if you experience any of the following:  difficulty breathing or increased cough     Notify your health care provider if you experience any of the following:  severe persistent headache     Notify your health care provider if you experience any of the following:  worsening rash     Notify your health care provider if you experience any of the following:  persistent dizziness, light-headedness, or visual disturbances     Notify your health care provider if you experience any of the following:  increased confusion or weakness     Change dressing (specify)   Order Comments: Remove Staples on 07/22/2022     Activity as tolerated       Significant Diagnostic Studies: Labs:   BMP:   Recent Labs   Lab 07/19/22  0331 07/20/22  0504   GLU 79 79    141   K 5.2* 4.9   * 113*   CO2 16* 20*   BUN 43* 39*   CREATININE 4.71* 4.87*   CALCIUM 8.1* 8.1*    and CBC   Recent Labs   Lab 07/19/22  0331   WBC 6.18   HGB 8.8*   HCT 25.9*           Pending Diagnostic Studies:     Procedure Component Value Units Date/Time    EXTRA TUBES [[id number]] Collected: 07/20/22 0604    Order Status: Sent Lab Status: In process Updated: 07/20/22 0616    Specimen: Blood, Venous     Narrative:      The following orders were created for panel order EXTRA TUBES.  Procedure                               Abnormality         Status                     ---------                               -----------         ------                     Lavender Top Hold[[id number]]                                In process                   Please view results for these tests on the individual orders.    EXTRA TUBES [[id number]] Collected: 07/14/22 1231    Order Status: Sent Lab Status: In process Updated: 07/14/22 1231    Specimen: Blood, Venous     Narrative:      The following orders were created for panel order EXTRA TUBES.  Procedure                               Abnormality         Status                     ---------                               -----------         ------                     Red Top Hold[[id number]]                                     In process                   Please view results for these tests on the individual orders.    EXTRA TUBES [[id number]] Collected: 07/07/22 1511    Order Status: Sent Lab Status: In process Updated: 07/07/22 1511    Specimen: Blood, Venous     Narrative:      The following orders were created for panel order EXTRA TUBES.  Procedure                               Abnormality         Status                     ---------                               -----------         ------                     Red Top Hold[[id number]]                                     In process                   Please view results for these tests on the individual orders.    EXTRA TUBES [[id number]] Collected: 07/07/22 1229    Order Status: Sent Lab Status: In process Updated: 07/07/22 1229    Specimen: Blood, Venous     Narrative:      The following orders were created  for panel order EXTRA TUBES.  Procedure                               Abnormality         Status                     ---------                               -----------         ------                     Gold Top Hold[[id number]]                                    In process                   Please view results for these tests on the individual orders.    EXTRA TUBES [[id number]] Collected: 07/05/22 0748    Order Status: Sent Lab Status: In process Updated: 07/07/22 0749    Specimen: Blood, Venous     Narrative:      The following orders were created for panel order EXTRA TUBES.  Procedure                               Abnormality         Status                     ---------                               -----------         ------                     Light Blue Top Hold[[id number]]                                                         Light Green Top Hold[[id number]]                             In process                 Lavender Top Hold[[id number]]                                                           Gold Top Hold[[id number]]                                    In process                 Pink Top Hold[[id number]]                                    In process                   Please view results for these tests on the individual orders.    EXTRA TUBES [[id number]] Collected: 07/06/22 1501    Order Status: Sent Lab Status: In process Updated: 07/06/22 1501    Specimen: Blood, Venous     Narrative:      The following orders were created for panel order EXTRA TUBES.  Procedure                               Abnormality         Status                     ---------                               -----------         ------                     Red Top Hold[[id number]]                                     In process                   Please view results for these tests on the individual orders.    US Kidney [[id number]]     Order Status: Sent Lab Status: No result          Medications:  Reconciled Home Medications:       Medication List      START taking these medications    ascorbic acid (vitamin C) 500 MG tablet  Commonly known as: VITAMIN C  Take 1 tablet (500 mg total) by mouth once daily.     multivitamin Tab  Take 1 tablet by mouth once daily.     pantoprazole 40 MG tablet  Commonly known as: PROTONIX  Take 1 tablet (40 mg total) by mouth once daily.     sodium bicarbonate 650 MG tablet  Take 1 tablet (650 mg total) by mouth 2 (two) times daily.        CHANGE how you take these medications    allopurinoL 100 MG tablet  Commonly known as: ZYLOPRIM  Take 0.5 tablets (50 mg total) by mouth once daily.  What changed: See the new instructions.     metoprolol tartrate 25 MG tablet  Commonly known as: LOPRESSOR  Take 1 tablet (25 mg total) by mouth 2 (two) times daily.  What changed: when to take this        CONTINUE taking these medications    acetaminophen 500 MG tablet  Commonly known as: TYLENOL  Take 1,000 mg by mouth every 8 (eight) hours as needed for Pain.     albuterol-ipratropium 2.5 mg-0.5 mg/3 mL nebulizer solution  Commonly known as: DUO-NEB  Take 3 mLs by nebulization every 6 (six) hours as needed for Wheezing. Rescue     amLODIPine 10 MG tablet  Commonly known as: NORVASC  Take 10 mg by mouth once daily.     atorvastatin 80 MG tablet  Commonly known as: LIPITOR  Take 40 mg by mouth every evening.     HYDROcodone-acetaminophen 7.5-325 mg per tablet  Commonly known as: NORCO  Take 1 tablet by mouth every 6 (six) hours as needed for Pain.     loratadine 10 mg tablet  Commonly known as: CLARITIN  TAKE ONE TABLET BY MOUTH DAILY FOR ALLERGIES     traMADoL 50 mg tablet  Commonly known as: ULTRAM  Take 50 mg by mouth every 8 (eight) hours as needed for Pain.     traZODone 100 MG tablet  Commonly known as: DESYREL  Take 300 mg by mouth every evening.        STOP taking these medications    aspirin 325 MG EC tablet  Commonly known as: ECOTRIN     clopidogreL 75 mg tablet  Commonly known as: PLAVIX     isosorbide mononitrate 60  MG 24 hr tablet  Commonly known as: IMDUR     lisinopriL 20 MG tablet  Commonly known as: PRINIVIL,ZESTRIL     omeprazole 20 MG capsule  Commonly known as: PRILOSEC            Indwelling Lines/Drains at time of discharge:   Lines/Drains/Airways     None                 Time spent on the discharge of patient: >30 minutes         SRIKANTH Curtis  Department of Hospital Medicine  [address]

## 2022-07-20 NOTE — PLAN OF CARE
[facility] - Medical Surgical Unit  Initial Discharge Assessment       Primary Care Provider: [facility]    Admission Diagnosis: GI hemorrhage with duodenitis  Esophagitis  Pleural Effusion  Weakness  HTN  Diabetes  Hyperkalemia    Admission Date: 7/20/2022  Expected Discharge Date:     Discharge Barriers Identified: None    Payor: MEDICARE / Plan: MEDICARE PART A & B / Product Type: Government /     Extended Emergency Contact Information  Primary Emergency Contact: Caity Garcia  Mobile Phone: [phone]  Relation: Relative  Preferred language: English   needed? No    Discharge Plan A: New Nursing Home placement - [facility] care facility  Discharge Plan B: Return to Nursing Home      [facility] - [place], [place] - [address]  [address]  [place] [address]  Phone: [phone] Fax: [phone]      Initial Assessment (most recent)     Adult Discharge Assessment - 07/20/22 1642        Discharge Assessment    Assessment Type Discharge Planning Assessment     Confirmed/corrected address, phone number and insurance Yes     Confirmed Demographics Correct on Facesheet     Source of Information patient;family;health record     If unable to respond/provide information was family/caregiver contacted? Yes     Contact Name/Number Caity Garcia [phone]     Communicated EDWARDO with patient/caregiver Date not available/Unable to determine     Reason For Admission GI hemorrhag with duodenitis s/p vessel clipping, Anemia, pleural effusion, weakness     Lives With facility resident     Facility Arrived From: [facility]     Do you expect to return to your current living situation? No   pt. is going to the [facility]    Do you have help at home or someone to help you manage your care at home? No     Prior to hospitilization cognitive status: Unable to Assess     Current cognitive status: Not Oriented to Time     Walking or Climbing Stairs Difficulty ambulation  difficulty, requires equipment;ambulation difficulty, assistance 1 person;stair climbing difficulty, requires equipment;stair climbing difficulty, assistance 1 person;transferring difficulty, assistance 1 person;transferring difficulty, requires equipment     Mobility Management RW or w/c     Dressing/Bathing Difficulty bathing difficulty, assistance 1 person;dressing difficulty, assistance 1 person     Home Accessibility wheelchair accessible     Home Layout Able to live on 1st floor     Equipment Currently Used at Home walker, rolling;wheelchair;glucometer;hospital bed     Readmission within 30 days? No     Patient currently being followed by outpatient case management? No     Do you currently have service(s) that help you manage your care at home? No     Do you take prescription medications? Yes     Do you have prescription coverage? Yes     Coverage Medicare     Do you have any problems affording any of your prescribed medications? No     Is the patient taking medications as prescribed? yes     Who is going to help you get home at discharge? going to NH     How do you get to doctors appointments? family or friend will provide     Are you on dialysis? No     Do you take coumadin? No     Discharge Plan A New Nursing Home placement - halfway care facility     Discharge Plan B Return to Nursing Home     DME Needed Upon Discharge  none     Discharge Plan discussed with: Patient     Discharge Barriers Identified None               Pt. Admitted to swing bed services for continued wound care, pain management, PT/OT services, and supportive care following hospital stay for diagnosis of GI hemorrhage with duodenitis s/p vessel clipping by surgeon, esophagitis, Anemia, pleural effusion, and weakness. Pt. Was a resident of the [facility] but has plans to [facility] [place] on Monday. Pt. Referral had been sent by previous facility and pt. Is aware of transfer planned for Monday. Will  cont. To follow pt. Throughout stay and assist as needed.

## 2022-07-20 NOTE — SUBJECTIVE & OBJECTIVE
Interval History: The patient is sitting up in bed.  He is without complaints.  No shortness of breath or chest pain.  Serum creatinine is 4.8 which is improved.    Review of patient's allergies indicates:   Allergen Reactions    Gatifloxacin Anaphylaxis     Current Facility-Administered Medications   Medication Frequency    0.9%  NaCl infusion (for blood administration) Q24H PRN    0.9%  NaCl infusion (for blood administration) Q24H PRN    0.9%  NaCl infusion (for blood administration) Q24H PRN    acetaminophen tablet 1,000 mg Q6H PRN    acetaminophen tablet 650 mg Q8H PRN    acetaminophen tablet 650 mg Q4H PRN    albuterol inhaler 2 puff Q6H PRN    allopurinol split tablet 50 mg Daily    amLODIPine tablet 10 mg Daily    ascorbic acid (vitamin C) tablet 500 mg Daily    atorvastatin tablet 40 mg QHS    bisacodyL EC tablet 10 mg Daily PRN    calcium gluconate 1 g in NS IVPB (premixed) Q10 Min PRN    cetirizine tablet 10 mg Daily PRN    dextromethorphan-guaiFENesin  mg/5 ml liquid 10 mL Q6H PRN    dextrose 5 % infusion Continuous    dextrose 50% injection 12.5 g PRN    dextrose 50% injection 25 g PRN    docusate sodium capsule 100 mg BID    ferrous sulfate tablet 1 each Daily    glucagon (human recombinant) injection 1 mg PRN    glucose chewable tablet 16 g PRN    glucose chewable tablet 24 g PRN    hydrALAZINE injection 10 mg Q6H PRN    HYDROcodone-acetaminophen 7.5-325 mg per tablet 1 tablet Q6H PRN    insulin aspart U-100 injection 1-10 Units QID (AC + HS) PRN    melatonin tablet 6 mg Nightly PRN    metoprolol tartrate (LOPRESSOR) tablet 25 mg BID    mineral oil enema 1 enema Daily PRN    morphine injection 4 mg Q4H PRN    morphine injection 6 mg Q4H PRN    multivitamin tablet Daily    naloxone 0.4 mg/mL injection 0.02 mg PRN    ondansetron injection 4 mg Q12H PRN    ondansetron injection 8 mg Q6H PRN    pantoprazole EC tablet 40 mg Daily    simethicone chewable tablet 80 mg TID PRN    sodium bicarbonate  tablet 650 mg BID    sodium chloride 0.9% flush 10 mL Q12H PRN    traMADoL tablet 50 mg Q8H PRN    traZODone tablet 50 mg Nightly PRN       Objective:     Vital Signs (Most Recent):  Temp: 98.9 °F (37.2 °C) (07/20/22 1000)  Pulse: 82 (07/20/22 1000)  Resp: 18 (07/20/22 1000)  BP: (!) 155/87 (07/20/22 1000)  SpO2: 97 % (07/20/22 1000)  O2 Device (Oxygen Therapy): room air (07/19/22 0716)   Vital Signs (24h Range):  Temp:  [98 °F (36.7 °C)-98.9 °F (37.2 °C)] 98.9 °F (37.2 °C)  Pulse:  [79-84] 82  Resp:  [18] 18  SpO2:  [96 %-98 %] 97 %  BP: (151-163)/(68-89) 155/87     Weight: 102.5 kg (225 lb 15.5 oz) (07/20/22 0455)  Body mass index is 30.65 kg/m².  Body surface area is 2.28 meters squared.    I/O last 3 completed shifts:  In: 237 [P.O.:237]  Out: 1565 [Urine:1475; Drains:90]    Physical Exam  Vitals reviewed.   Constitutional:       Appearance: He is obese.   HENT:      Head: Normocephalic and atraumatic.      Mouth/Throat:      Mouth: Mucous membranes are moist.   Cardiovascular:      Rate and Rhythm: Regular rhythm.   Pulmonary:      Effort: Pulmonary effort is normal.   Abdominal:      Palpations: Abdomen is soft.   Neurological:      Mental Status: He is alert.   Psychiatric:         Mood and Affect: Mood normal.       Significant Labs:  BMP:   Recent Labs   Lab 07/20/22  0504   GLU 79      K 4.9   *   CO2 20*   BUN 39*   CREATININE 4.87*   CALCIUM 8.1*     CBC:   Recent Labs   Lab 07/19/22  0331   WBC 6.18   RBC 2.83*   HGB 8.8*   HCT 25.9*      MCV 91.5   MCH 31.1*   MCHC 34.0        Significant Imaging:  Labs: Reviewed

## 2022-07-20 NOTE — ASSESSMENT & PLAN NOTE
- Creatinine stable  - Nephrology following  - continues to have non-anion gap acidosis, check urine electrolytes. UAG +14  7/14  - Cr 6.2  - nephrology following   7/15  - Cr 5.8  7/16  - 5.4   7/19  - 4.7     07/20--sCr 4.87, follow up with nephrologist outpatient

## 2022-07-20 NOTE — ASSESSMENT & PLAN NOTE
- EGD with bleeding duodenal ulcer as above, was clipped   - Repeat EGD 07/05, bleeding duodenal vessel was clipped per GI.  - H. Pylori negative  - Protonix infusion  - GI following, appreciate assistance   -Pt continues to bleed with blood clots from bowel and H &H  Continues to decline. Pt became hypotensive, and transferred to ICU for further monitoring and eval. General surgery, Dr. Rees consulted for eval.   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 [facility]   - ongoing bleed with blood clots from bowel and decreasing H&H. Pt became hypotensive, and transferred to ICU for further monitoring and eval. General surgery, Dr. Rees consulted for eval.   - to OR 7/8  - doing well NGT removed. Try to advance diet today  7/14  - full liquids per surgery  - midline incision with ALYSON and XIMENA drains   7/17  - reg diet; tolerating   7/19  - XIMENA drains to be pulled today  - surgery ok with LTAC placement     07/20--Staples to be removed on 07/22

## 2022-07-20 NOTE — NURSING
Pt arrive to floor at 1340 via stretcher. No acute distress noted. Pt has abdominal incision to midline with staples intact; no redness, swelling, drainage. Gauze noted to RUQ of abdomen with small amount of serosanguineous drainage. Skin assessment completed with ROB Manjarrez LPN. Bed in lowest positon, call bell within reach, side rails raised x3, instructed pt not to get up without assistance.

## 2022-07-20 NOTE — ASSESSMENT & PLAN NOTE
7/18/2022  The serum creatinine is 5 which continues to show signs of improvement.  7/19/2022  Renal function continues to improve.  Creatinine is 4.7 today.  7/20/2022  Renal function appears to be stable.  Creatinine is 4.8.

## 2022-07-20 NOTE — SUBJECTIVE & OBJECTIVE
Past Medical History:   Diagnosis Date    Anticoagulant long-term use     Chronic renal disease, stage 4, severely decreased glomerular filtration rate (GFR) between 15-29 mL/min/1.73 square meter     Coronary artery disease     stents ~ 2017    COVID-19 6/16/2022    Dieulafoy lesion of duodenum 7/5/2022    Duodenal adenoma     Duodenitis 7/5/2022    HH (hiatus hernia) 7/5/2022    Hypertension        Past Surgical History:   Procedure Laterality Date    ABDOMINAL SURGERY      CORONARY ANGIOPLASTY WITH STENT PLACEMENT      ~ 2017    JOINT REPLACEMENT      PYLOROPLASTY N/A 7/8/2022    Procedure: PYLOROPLASTY;  Surgeon: Marvin Rees MD;  Location: [facility] OR;  Service: General;  Laterality: N/A;    VAGOTOMY N/A 7/8/2022    Procedure: VAGOTOMY;  Surgeon: Marvin Rees MD;  Location: [facility] OR;  Service: General;  Laterality: N/A;       Review of patient's allergies indicates:   Allergen Reactions    Gatifloxacin Anaphylaxis       Current Facility-Administered Medications on File Prior to Encounter   Medication    [COMPLETED] sodium polystyrene 15 gram/60 mL suspension 15 g    [DISCONTINUED] 0.9%  NaCl infusion (for blood administration)    [DISCONTINUED] 0.9%  NaCl infusion (for blood administration)    [DISCONTINUED] 0.9%  NaCl infusion (for blood administration)    [DISCONTINUED] acetaminophen tablet 1,000 mg    [DISCONTINUED] acetaminophen tablet 650 mg    [DISCONTINUED] acetaminophen tablet 650 mg    [DISCONTINUED] albuterol inhaler 2 puff    [DISCONTINUED] allopurinol split tablet 50 mg    [DISCONTINUED] amLODIPine tablet 10 mg    [DISCONTINUED] ascorbic acid (vitamin C) tablet 500 mg    [DISCONTINUED] atorvastatin tablet 40 mg    [DISCONTINUED] bisacodyL EC tablet 10 mg    [DISCONTINUED] calcium gluconate 1 g in NS IVPB (premixed)    [DISCONTINUED] cetirizine tablet 10 mg    [DISCONTINUED] dextromethorphan-guaiFENesin  mg/5 ml liquid 10 mL    [DISCONTINUED] dextrose 5 % infusion    [DISCONTINUED]  dextrose 50% injection 12.5 g    [DISCONTINUED] dextrose 50% injection 25 g    [DISCONTINUED] docusate sodium capsule 100 mg    [DISCONTINUED] ferrous sulfate tablet 1 each    [DISCONTINUED] glucagon (human recombinant) injection 1 mg    [DISCONTINUED] glucose chewable tablet 16 g    [DISCONTINUED] glucose chewable tablet 24 g    [DISCONTINUED] hydrALAZINE injection 10 mg    [DISCONTINUED] HYDROcodone-acetaminophen 7.5-325 mg per tablet 1 tablet    [DISCONTINUED] insulin aspart U-100 injection 1-10 Units    [DISCONTINUED] melatonin tablet 6 mg    [DISCONTINUED] metoprolol tartrate (LOPRESSOR) tablet 25 mg    [DISCONTINUED] mineral oil enema 1 enema    [DISCONTINUED] morphine injection 4 mg    [DISCONTINUED] morphine injection 6 mg    [DISCONTINUED] multivitamin tablet    [DISCONTINUED] naloxone 0.4 mg/mL injection 0.02 mg    [DISCONTINUED] ondansetron injection 4 mg    [DISCONTINUED] ondansetron injection 8 mg    [DISCONTINUED] pantoprazole EC tablet 40 mg    [DISCONTINUED] simethicone chewable tablet 80 mg    [DISCONTINUED] sodium bicarbonate tablet 650 mg    [DISCONTINUED] sodium chloride 0.9% flush 10 mL    [DISCONTINUED] traMADoL tablet 50 mg    [DISCONTINUED] traZODone tablet 50 mg     Current Outpatient Medications on File Prior to Encounter   Medication Sig    acetaminophen (TYLENOL) 500 MG tablet Take 1,000 mg by mouth every 8 (eight) hours as needed for Pain.    albuterol-ipratropium (DUO-NEB) 2.5 mg-0.5 mg/3 mL nebulizer solution Take 3 mLs by nebulization every 6 (six) hours as needed for Wheezing. Rescue    allopurinoL (ZYLOPRIM) 100 MG tablet Take 0.5 tablets (50 mg total) by mouth once daily.    amLODIPine (NORVASC) 10 MG tablet Take 10 mg by mouth once daily.    ascorbic acid, vitamin C, (VITAMIN C) 500 MG tablet Take 1 tablet (500 mg total) by mouth once daily.    atorvastatin (LIPITOR) 80 MG tablet Take 40 mg by mouth every evening.    HYDROcodone-acetaminophen (NORCO) 7.5-325 mg per tablet Take 1  tablet by mouth every 6 (six) hours as needed for Pain.    loratadine (CLARITIN) 10 mg tablet TAKE ONE TABLET BY MOUTH DAILY FOR ALLERGIES    metoprolol tartrate (LOPRESSOR) 25 MG tablet Take 1 tablet (25 mg total) by mouth 2 (two) times daily.    multivitamin Tab Take 1 tablet by mouth once daily.    pantoprazole (PROTONIX) 40 MG tablet Take 1 tablet (40 mg total) by mouth once daily.    sodium bicarbonate 650 MG tablet Take 1 tablet (650 mg total) by mouth 2 (two) times daily.    traMADoL (ULTRAM) 50 mg tablet Take 50 mg by mouth every 8 (eight) hours as needed for Pain.    traZODone (DESYREL) 100 MG tablet Take 300 mg by mouth every evening.    [DISCONTINUED] allopurinoL (ZYLOPRIM) 100 MG tablet TAKE ONE TABLET BY MOUTH DAILY FOR GOUT    [DISCONTINUED] aspirin (ECOTRIN) 325 MG EC tablet Take 325 mg by mouth once daily.    [DISCONTINUED] clopidogreL (PLAVIX) 75 mg tablet TAKE 1 TABLET BY MOUTH ONCE DAILY     [DISCONTINUED] isosorbide mononitrate (IMDUR) 60 MG 24 hr tablet Take 1 tablet by mouth once daily.    [DISCONTINUED] lisinopriL (PRINIVIL,ZESTRIL) 20 MG tablet Take 20 mg by mouth.    [DISCONTINUED] metoprolol tartrate (LOPRESSOR) 25 MG tablet Take 25 mg by mouth once daily.    [DISCONTINUED] omeprazole (PRILOSEC) 20 MG capsule Take 20 mg by mouth once daily.     Family History       Problem Relation (Age of Onset)    Diabetes Mother, Father          Tobacco Use    Smoking status: Former Smoker     Types: Cigarettes    Smokeless tobacco: Never Used    Tobacco comment: 1/3 PPD ~ 20 years: quit Dec 2021   Substance and Sexual Activity    Alcohol use: Not Currently     Comment: pint a week: Hx    Drug use: Never    Sexual activity: Not Currently     Review of Systems   Constitutional:  Positive for fatigue.   HENT: Negative.     Eyes: Negative.    Respiratory: Negative.     Cardiovascular: Negative.    Gastrointestinal: Negative.         Patient with midline incisional area with staples in place.  A XIMENA drain.   Still some tenderness abdomen otherwise there is no sign of infection.  No vomiting.  No nausea.  No diarrhea.   Endocrine: Negative.    Genitourinary: Negative.    Musculoskeletal: Negative.    Skin: Negative.    Allergic/Immunologic: Negative.    Neurological: Negative.    Hematological: Negative.    Psychiatric/Behavioral: Negative.     Objective:     Vital Signs (Most Recent):  Temp: 99.1 °F (37.3 °C) (07/20/22 1347)  Pulse: 83 (07/20/22 1347)  Resp: 19 (07/20/22 1347)  BP: (!) 156/88 (07/20/22 1347)  SpO2: 97 % (07/20/22 1347)   Vital Signs (24h Range):  Temp:  [98 °F (36.7 °C)-99.1 °F (37.3 °C)] 99.1 °F (37.3 °C)  Pulse:  [79-84] 83  Resp:  [18-19] 19  SpO2:  [96 %-98 %] 97 %  BP: (155-163)/(68-89) 156/88     Weight: 102.5 kg (225 lb 15.5 oz)  Body mass index is 30.65 kg/m².    Physical Exam  Constitutional:       Appearance: Normal appearance. He is normal weight.   HENT:      Head: Normocephalic and atraumatic.      Nose: Nose normal.      Mouth/Throat:      Mouth: Mucous membranes are moist.      Pharynx: Oropharynx is clear.   Eyes:      Extraocular Movements: Extraocular movements intact.      Conjunctiva/sclera: Conjunctivae normal.      Pupils: Pupils are equal, round, and reactive to light.   Cardiovascular:      Rate and Rhythm: Normal rate.   Pulmonary:      Effort: Pulmonary effort is normal.      Breath sounds: Normal breath sounds.   Abdominal:      General: Abdomen is flat. Bowel sounds are normal.      Palpations: Abdomen is soft.      Tenderness: There is abdominal tenderness.   Genitourinary:     Penis: Normal.       Testes: Normal.   Musculoskeletal:         General: Normal range of motion.      Cervical back: Normal range of motion and neck supple.   Skin:     General: Skin is warm and dry.      Capillary Refill: Capillary refill takes less than 2 seconds.   Neurological:      General: No focal deficit present.      Mental Status: He is alert and oriented to person, place, and time. Mental  status is at baseline.   Psychiatric:         Mood and Affect: Mood normal.         Behavior: Behavior normal.         Thought Content: Thought content normal.         Judgment: Judgment normal.     Surgical site looks good with no sign of infection.  No dehiscence of the wound.    CRANIAL NERVES     CN III, IV, VI   Pupils are equal, round, and reactive to light.     Significant Labs: All pertinent labs within the past 24 hours have been reviewed.    Significant Imaging: I have reviewed all pertinent imaging results/findings within the past 24 hours.

## 2022-07-20 NOTE — HPI
Patient is a 72-year-old comes in transfer to swing bed from [address] [place] [address].  Patient is in for having a GI hemorrhage with infection the patient had a vessel clipped on 07/08/2022 by Dr. Rees.  He also has esophagitis.  Anemia.  Full effusion.  Diabetes.  Hypertension.  Hyperkalemia.  The patient was transferred initially from [facility] to [facility] with shortness of breath and hemoptysis.  He had duodenal vessel that was clipped on per GI on 07/08/2022 nephrology consulted because of a creatinine of 7 0.15 the ocean had COVID on 06/09/2022 patient sent for PT and OT for XIMENA drains to be dressed and then removed on 07/22/2022.  He also has midline staples in that were removed on 07/22/2022 patient will have Wound Care, and continue monitoring for any GI bleed.

## 2022-07-20 NOTE — PLAN OF CARE
[facility] - [facility] Telemetry  Discharge Final Note    Primary Care Provider: [facility] [place]    Expected Discharge Date: 7/20/2022    Final Discharge Note (most recent)     Final Note - 07/20/22 0941        Final Note    Assessment Type Final Discharge Note     Anticipated Discharge Disposition Swing Bed     What phone number can be called within the next 1-3 days to see how you are doing after discharge? [phone]        Post-Acute Status    Post-Acute Authorization Placement     Post-Acute Placement Status Set-up Complete/Auth obtained     Patient choice form signed by patient/caregiver List with quality metrics by geographic area provided;List from CMS Compare;List from System Post-Acute Care     Discharge Delays None known at this time               Patient discharging to [facility] today for swingbed. Family aware of transfer. Packet given to nurse. No other d/c needs noted at this time.     Important Message from Medicare  Important Message from Medicare regarding Discharge Appeal Rights: Given to patient/caregiver, Explained to patient/caregiver, Signed/date by patient/caregiver     Date IMM was signed: 07/20/22  Time IMM was signed: 0942    After-discharge care            Destination     [facility]   Service: Skilled Nursing    [id number] [place] [id number]   Phone: [phone]

## 2022-07-20 NOTE — ASSESSMENT & PLAN NOTE
- Patient with bleeding duodenal ulcer on EGD 7/2, ulcer clipped and an EGD on 07/05 where bleeding duodenal vessel was clipped  - patient has had a total of 6 units this admission  - GI following, assistance appreciated  -Pt continues to have large amount of bloody bowel movement with clots. H & H 6.3/18.7 (7/7), platelet [id number] and during 1 unit blood transfusion, pt became hypotensive. Given 1 liter bolus and transferred to ICU  And consulted , Dr. Rees. Appreciate assistance (7/7)  7/14  - H/H 8/25  - has been stable   - rec'd 16u of PRBCs and 1u of plt   7/15  - H/H decreased this AM; 7/20  - transfuse 1u PRBCs   7/16  - 8/24 after blood   7/17  - remains stable     07/20--H & H 8/25 cont to monitor while inpatient at [facility]

## 2022-07-21 LAB
ANION GAP SERPL CALCULATED.3IONS-SCNC: 14 MMOL/L (ref 7–16)
BASOPHILS # BLD AUTO: 0.03 K/UL (ref 0–0.2)
BASOPHILS NFR BLD AUTO: 0.5 % (ref 0–1)
BUN SERPL-MCNC: 33 MG/DL (ref 7–18)
BUN/CREAT SERPL: 7 (ref 6–20)
CALCIUM SERPL-MCNC: 7.6 MG/DL (ref 8.5–10.1)
CHLORIDE SERPL-SCNC: 112 MMOL/L (ref 98–107)
CO2 SERPL-SCNC: 20 MMOL/L (ref 21–32)
CREAT SERPL-MCNC: 4.52 MG/DL (ref 0.7–1.3)
DIFFERENTIAL METHOD BLD: ABNORMAL
EOSINOPHIL # BLD AUTO: 0.07 K/UL (ref 0–0.5)
EOSINOPHIL NFR BLD AUTO: 1.1 % (ref 1–4)
ERYTHROCYTE [DISTWIDTH] IN BLOOD BY AUTOMATED COUNT: 14.8 % (ref 11.5–14.5)
GLUCOSE SERPL-MCNC: 144 MG/DL (ref 70–105)
GLUCOSE SERPL-MCNC: 79 MG/DL (ref 74–106)
GLUCOSE SERPL-MCNC: 84 MG/DL (ref 70–105)
HCT VFR BLD AUTO: 23.3 % (ref 40–54)
HGB BLD-MCNC: 7.8 G/DL (ref 13.5–18)
LYMPHOCYTES # BLD AUTO: 1.15 K/UL (ref 1–4.8)
LYMPHOCYTES NFR BLD AUTO: 18.2 % (ref 27–41)
MCH RBC QN AUTO: 31.1 PG (ref 27–31)
MCHC RBC AUTO-ENTMCNC: 33.5 G/DL (ref 32–36)
MCV RBC AUTO: 92.8 FL (ref 80–96)
MONOCYTES # BLD AUTO: 0.67 K/UL (ref 0–0.8)
MONOCYTES NFR BLD AUTO: 10.6 % (ref 2–6)
MPC BLD CALC-MCNC: 9.1 FL (ref 9.4–12.4)
NEUTROPHILS # BLD AUTO: 4.4 K/UL (ref 1.8–7.7)
NEUTROPHILS NFR BLD AUTO: 69.6 % (ref 53–65)
PLATELET # BLD AUTO: 228 K/UL (ref 150–400)
POTASSIUM SERPL-SCNC: 4.5 MMOL/L (ref 3.5–5.1)
PREALB SERPL NEPH-MCNC: 16 MG/DL (ref 20–40)
RBC # BLD AUTO: 2.51 M/UL (ref 4.6–6.2)
SODIUM SERPL-SCNC: 141 MMOL/L (ref 136–145)
WBC # BLD AUTO: 6.32 K/UL (ref 4.5–11)

## 2022-07-21 PROCEDURE — 85025 COMPLETE CBC W/AUTO DIFF WBC: CPT | Performed by: FAMILY MEDICINE

## 2022-07-21 PROCEDURE — 82962 GLUCOSE BLOOD TEST: CPT

## 2022-07-21 PROCEDURE — 99900035 HC TECH TIME PER 15 MIN (STAT)

## 2022-07-21 PROCEDURE — 11000004 HC SNF PRIVATE

## 2022-07-21 PROCEDURE — 25000003 PHARM REV CODE 250: Performed by: FAMILY MEDICINE

## 2022-07-21 PROCEDURE — 63600175 PHARM REV CODE 636 W HCPCS: Performed by: FAMILY MEDICINE

## 2022-07-21 PROCEDURE — 84134 ASSAY OF PREALBUMIN: CPT | Performed by: FAMILY MEDICINE

## 2022-07-21 PROCEDURE — 80048 BASIC METABOLIC PNL TOTAL CA: CPT | Performed by: FAMILY MEDICINE

## 2022-07-21 PROCEDURE — 36415 COLL VENOUS BLD VENIPUNCTURE: CPT | Performed by: FAMILY MEDICINE

## 2022-07-21 PROCEDURE — 94761 N-INVAS EAR/PLS OXIMETRY MLT: CPT

## 2022-07-21 PROCEDURE — 97161 PT EVAL LOW COMPLEX 20 MIN: CPT

## 2022-07-21 RX ADMIN — SODIUM BICARBONATE 650 MG TABLET 650 MG: at 09:07

## 2022-07-21 RX ADMIN — TRAZODONE HYDROCHLORIDE 300 MG: 150 TABLET ORAL at 09:07

## 2022-07-21 RX ADMIN — OXYCODONE HYDROCHLORIDE AND ACETAMINOPHEN 500 MG: 500 TABLET ORAL at 09:07

## 2022-07-21 RX ADMIN — PANTOPRAZOLE SODIUM 40 MG: 40 TABLET, DELAYED RELEASE ORAL at 09:07

## 2022-07-21 RX ADMIN — METOPROLOL TARTRATE 25 MG: 25 TABLET, FILM COATED ORAL at 09:07

## 2022-07-21 RX ADMIN — ENOXAPARIN SODIUM 30 MG: 100 INJECTION SUBCUTANEOUS at 04:07

## 2022-07-21 RX ADMIN — CETIRIZINE HYDROCHLORIDE 10 MG: 10 TABLET, FILM COATED ORAL at 09:07

## 2022-07-21 RX ADMIN — AMLODIPINE BESYLATE 10 MG: 5 TABLET ORAL at 09:07

## 2022-07-21 RX ADMIN — ATORVASTATIN CALCIUM 40 MG: 40 TABLET, FILM COATED ORAL at 09:07

## 2022-07-21 RX ADMIN — MULTIPLE VITAMINS W/ MINERALS TAB 1 TABLET: TAB at 09:07

## 2022-07-21 NOTE — PLAN OF CARE
Problem: Physical Therapy  Goal: Physical Therapy Goal  Description: Goals to be met by: 2 weeks     Patient will increase functional independence with mobility by performin. Supine to sit with Modified [place]  2. Sit to supine with Modified [place]  3. Rolling to Left and Right with Modified [place].  4. Sit to stand transfer with Modified [place]  5. Bed to chair transfer with Modified [place] using Rolling Walker  6. Gait  x 200 feet with Contact Guard Assistance using Rolling Walker.     Goals to be met by: 4 weeks     Patient will increase functional independence with mobility by performin. Gait  x 300 feet with Modified [place] using Rolling Walker.   2. Ascend/descend 8 stair with bilateral Handrails Stand-by Assistance using No Assistive Device.     Outcome: Ongoing, Progressing

## 2022-07-21 NOTE — PT/OT/SLP EVAL
Physical Therapy Evaluation    Patient Name:  Neymar Parra   MRN:  [id number]    Recommendations:     Discharge Recommendations:  [facility]   Discharge Equipment Recommendations: walker, rolling   Barriers to discharge: None    Assessment:     Neymar Parra is a 72 y.o. male admitted with a medical diagnosis of Gastric hemorrhage with duodenitis and s/p vessel clipping on 7/18/22.  He presents with the following impairments/functional limitations:  weakness, impaired functional mobility, impaired endurance, decreased lower extremity function, pain .    Rehab Prognosis: Good; patient would benefit from acute skilled PT services to address these deficits and reach maximum level of function.    Recent Surgery: vessel clippin 7/18/22      Plan:     During this hospitalization, patient to be seen 5 x/week to address the identified rehab impairments via gait training, therapeutic activities, therapeutic exercises, neuromuscular re-education and progress toward the following goals:    · Plan of Care Expires:       Subjective     Chief Complaint: abdominal pain  Patient/Family Comments/goals: to improve strength and mobility to return to OF  Pain/Comfort:  · Pain Rating 1: 8/10  · Location - Side 1: Bilateral  · Location - Orientation 1: anterior  · Location 1: abdomen  · Pain Addressed 1: Distraction, Reposition  · Pain Rating Post-Intervention 1: 8/10    Patients cultural, spiritual, Congregation conflicts given the current situation: no    Living Environment:  [facility]  Prior to admission, patients level of function was independent.  Equipment used at home: oxygen.  DME owned (not currently used): none.  Upon [facility]    Objective:     Communicated with patient prior to session.  Patient found supine     upon PT entry to room.    General Precautions: Standard, fall   Orthopedic  Precautions:N/A   Braces: N/A  Respiratory Status: Room air    Exams:  · Cognitive Exam:  Patient is oriented to Person, Place, Time and Situation  · Gross Motor Coordination:  WFL  · RUE ROM: WFL except shoulders  · RUE Strength: 3-  · LUE ROM: WFL except shoulder  · LUE Strength: 3-  · RLE ROM: WFL  · RLE Strength: 3+  · LLE ROM: WFL  · LLE Strength: 3+    Functional Mobility:  · Bed Mobility:     · Rolling Left:  stand by assistance  · Rolling Right: stand by assistance  · Scooting: stand by assistance  · Supine to Sit: contact guard assistance  · Sit to Supine: stand by assistance  · Transfers:     · Sit to Stand:  contact guard assistance with rolling walker  · Bed to Chair: contact guard assistance with  rolling walker  using  Step Transfer  · Gait: 80 feet with RW CGA; reciprocal steps; mild SOB  · Balance: Fair      AM-PAC 6 CLICK MOBILITY  Total Score:17     Patient left supine with call button in reach.    Case conference with Erum Nicolas, PTA and POC reviewed.    GOALS:   Multidisciplinary Problems     Physical Therapy Goals        Problem: Physical Therapy    Goal Priority Disciplines Outcome Goal Variances Interventions   Physical Therapy Goal     PT, PT/OT Ongoing, Progressing     Description: Goals to be met by: 2 weeks     Patient will increase functional independence with mobility by performin. Supine to sit with Modified [place]  2. Sit to supine with Modified [place]  3. Rolling to Left and Right with Modified [place].  4. Sit to stand transfer with Modified [place]  5. Bed to chair transfer with Modified [place] using Rolling Walker  6. Gait  x 200 feet with Contact Guard Assistance using Rolling Walker.     Goals to be met by: 4 weeks     Patient will increase functional independence with mobility by performin. Gait  x 300 feet with Modified [place] using Rolling Walker.   2. Ascend/descend 8 stair with bilateral Handrails Stand-by Assistance using No  Assistive Device.                      History:     Past Medical History:   Diagnosis Date    Anticoagulant long-term use     Chronic renal disease, stage 4, severely decreased glomerular filtration rate (GFR) between 15-29 mL/min/1.73 square meter     Coronary artery disease     stents ~ 2017    COVID-19 6/16/2022    Dieulafoy lesion of duodenum 7/5/2022    Duodenal adenoma     Duodenitis 7/5/2022    HH (hiatus hernia) 7/5/2022    Hypertension        Past Surgical History:   Procedure Laterality Date    ABDOMINAL SURGERY      CORONARY ANGIOPLASTY WITH STENT PLACEMENT      ~ 2017    JOINT REPLACEMENT      PYLOROPLASTY N/A 7/8/2022    Procedure: PYLOROPLASTY;  Surgeon: Marvin Rees MD;  [facility];  Service: General;  Laterality: N/A;    VAGOTOMY N/A 7/8/2022    Procedure: VAGOTOMY;  Surgeon: Marvin Rees MD;  [facility] OR;  Service: General;  Laterality: N/A;       Time Tracking:     PT Received On: 07/21/22  PT Start Time: 1425     PT Stop Time: 1440  PT Total Time (min): 15 min     Billable Minutes: Evaluation 15 minutes      07/21/2022

## 2022-07-21 NOTE — PLAN OF CARE
Problem: Adult Inpatient Plan of Care  Goal: Plan of Care Review  Outcome: Ongoing, Progressing  Goal: Patient-Specific Goal (Individualized)  Outcome: Ongoing, Progressing  Goal: Absence of Hospital-Acquired Illness or Injury  Outcome: Ongoing, Progressing  Goal: Optimal Comfort and Wellbeing  Outcome: Ongoing, Progressing  Goal: Readiness for Transition of Care  Outcome: Ongoing, Progressing     Problem: Diabetes Comorbidity  Goal: Blood Glucose Level Within Targeted Range  Outcome: Ongoing, Progressing     Problem: Fluid and Electrolyte Imbalance (Acute Kidney Injury/Impairment)  Goal: Fluid and Electrolyte Balance  Outcome: Ongoing, Progressing     Problem: Fall Injury Risk  Goal: Absence of Fall and Fall-Related Injury  Outcome: Ongoing, Progressing     Problem: Skin Injury Risk Increased  Goal: Skin Health and Integrity  Outcome: Ongoing, Progressing

## 2022-07-22 ENCOUNTER — TELEPHONE (OUTPATIENT)
Dept: CARDIOLOGY | Facility: HOSPITAL | Age: 72
End: 2022-07-22
Payer: MEDICAID

## 2022-07-22 LAB
GLUCOSE SERPL-MCNC: 114 MG/DL (ref 70–105)
GLUCOSE SERPL-MCNC: 118 MG/DL (ref 70–105)

## 2022-07-22 PROCEDURE — 97530 THERAPEUTIC ACTIVITIES: CPT | Mod: CQ

## 2022-07-22 PROCEDURE — 97165 OT EVAL LOW COMPLEX 30 MIN: CPT

## 2022-07-22 PROCEDURE — 94761 N-INVAS EAR/PLS OXIMETRY MLT: CPT

## 2022-07-22 PROCEDURE — 25000003 PHARM REV CODE 250: Performed by: FAMILY MEDICINE

## 2022-07-22 PROCEDURE — 97110 THERAPEUTIC EXERCISES: CPT | Mod: CQ

## 2022-07-22 PROCEDURE — 82962 GLUCOSE BLOOD TEST: CPT

## 2022-07-22 PROCEDURE — 63600175 PHARM REV CODE 636 W HCPCS: Performed by: FAMILY MEDICINE

## 2022-07-22 PROCEDURE — 97116 GAIT TRAINING THERAPY: CPT | Mod: CQ

## 2022-07-22 PROCEDURE — 11000004 HC SNF PRIVATE

## 2022-07-22 RX ADMIN — METOPROLOL TARTRATE 25 MG: 25 TABLET, FILM COATED ORAL at 09:07

## 2022-07-22 RX ADMIN — CETIRIZINE HYDROCHLORIDE 10 MG: 10 TABLET, FILM COATED ORAL at 09:07

## 2022-07-22 RX ADMIN — SODIUM BICARBONATE 650 MG TABLET 650 MG: at 09:07

## 2022-07-22 RX ADMIN — SODIUM BICARBONATE 650 MG TABLET 650 MG: at 10:07

## 2022-07-22 RX ADMIN — MULTIPLE VITAMINS W/ MINERALS TAB 1 TABLET: TAB at 09:07

## 2022-07-22 RX ADMIN — HYDROCODONE BITARTRATE AND ACETAMINOPHEN 1 TABLET: 7.5; 325 TABLET ORAL at 11:07

## 2022-07-22 RX ADMIN — OXYCODONE HYDROCHLORIDE AND ACETAMINOPHEN 500 MG: 500 TABLET ORAL at 09:07

## 2022-07-22 RX ADMIN — METOPROLOL TARTRATE 25 MG: 25 TABLET, FILM COATED ORAL at 10:07

## 2022-07-22 RX ADMIN — AMLODIPINE BESYLATE 10 MG: 5 TABLET ORAL at 09:07

## 2022-07-22 RX ADMIN — TRAZODONE HYDROCHLORIDE 300 MG: 150 TABLET ORAL at 10:07

## 2022-07-22 RX ADMIN — PANTOPRAZOLE SODIUM 40 MG: 40 TABLET, DELAYED RELEASE ORAL at 09:07

## 2022-07-22 RX ADMIN — ENOXAPARIN SODIUM 30 MG: 100 INJECTION SUBCUTANEOUS at 05:07

## 2022-07-22 RX ADMIN — ATORVASTATIN CALCIUM 40 MG: 40 TABLET, FILM COATED ORAL at 10:07

## 2022-07-22 NOTE — PT/OT/SLP EVAL
Occupational Therapy   Evaluation    Name: Neymar Parra  MRN: [id number]  Admitting Diagnosis:  Gastric hemorrhage  Recent Surgery: * No surgery found *      Recommendations:     Discharge Recommendations: [facility], nursing facility, skilled, nursing facility, basic  Discharge Equipment Recommendations:  bedside commode, walker, rolling  Barriers to discharge:       Assessment:     Neymar Parra is a 72 y.o. male with a medical diagnosis of Gastric hemorrhage.  He presents with generalized muscle weakness and a decline in functional abilities. Performance deficits affecting function: weakness, impaired endurance, impaired self care skills, impaired functional mobility, gait instability, impaired balance, decreased upper extremity function, decreased lower extremity function.      Rehab Prognosis: Good and Fair; patient would benefit from acute skilled OT services to address these deficits and reach maximum level of function.       Plan:     Patient to be seen 5 x/week to address the above listed problems via self-care/home management, therapeutic activities, therapeutic exercises, neuromuscular re-education  · Plan of Care Expires:    · Plan of Care Reviewed with: patient    Subjective     Chief Complaint: decreased functional independence   Patient/Family Comments/goals: increase functional abilities    Occupational Profile:  Living Environment: Prior to December 2021, Pt lived at home.  Pt states he has been in and out of nursing homes and hospitals since December 2021.   Previous level of function: Patient has required assistance with self care and functional mobility tasks since December of 2021.   Roles and Routines:   Equipment Used at Home:  oxygen  Assistance upon Discharge: caregivers will assist as needed    Pain/Comfort:  ·      Patients cultural, spiritual, Baptism conflicts given the current situation: no    Objective:     Communicated with: nurse prior to session.  Patient  found HOB elevated with   upon OT entry to room.    General Precautions: Standard, fall   Orthopedic Precautions:N/A   Braces: N/A  Respiratory Status: Room air    Occupational Performance:    Bed Mobility:    · Patient completed Rolling/Turning to Left with  stand by assistance  · Patient completed Rolling/Turning to Right with stand by assistance  · Patient completed Scooting/Bridging with contact guard assistance  · Patient completed Supine to Sit with contact guard assistance  · Patient completed Sit to Supine with contact guard assistance    Functional Mobility/Transfers:  · Patient completed Sit <> Stand Transfer with contact guard assistance  with  rolling walker     Activities of Daily Living:  · Feeding:  setup assist  · Grooming: setup assist  · Bathing: moderate assistance due to increase assistance needed with LB tasks  · Upper Body Dressing: minimum assistance due to limited (L)UE AROM  · Lower Body Dressing: moderate assistance due to difficulty bending/reaching feet  · Toileting: moderate assistance due to steadying assistance needed and assistance needed with toileting hygiene and clothing management     Cognitive/Visual Perceptual:  Cognitive/Psychosocial Skills:  -       Oriented to: Person, Place, Time and Situation   -       Follows Commands/attention:Follows two-step commands and Follows multistep  commands  -       Communication: clear/fluent  -       Memory: No Deficits noted  -       Safety awareness/insight to disability: intact   -       Mood/Affect/Coping skills/emotional control: Appropriate to situation    Physical Exam:  Dominant hand: -       right  Upper Extremity Range of Motion:  -       Right Upper Extremity: WFL  -       Left Upper Extremity: AAROM  Upper Extremity Strength: -       Right Upper Extremity: +3/5  -       Left Upper Extremity: 2/5  Gross motor coordination:   impaired due to generalized muscle weakness    AMPAC 6 Click ADL:  AMPAC Total Score: 12    Treatment &  Education:  OT's role and POC discussed.  Education:    Patient left HOB elevated with all lines intact and call button in reach    GOALS:   Multidisciplinary Problems     Occupational Therapy Goals        Problem: Occupational Therapy    Goal Priority Disciplines Outcome Interventions   Occupational Therapy Goal     OT, PT/OT Ongoing, Progressing    Description: Goals to be met by: Discharge     Patient will increase functional independence with ADLs by performing:    UE Dressing with Contact Guard Assistance.  LE Dressing with Minimal Assistance.  Toileting from bedside commode with Minimal Assistance for hygiene and clothing management.   Bathing from  edge of bed with Minimal Assistance.  Toilet transfer to bedside commode with Minimal Assistance.  Increased functional strength to 4/5 for increased safety and independence with self care and mobility tasks.                     History:     Past Medical History:   Diagnosis Date    Anticoagulant long-term use     Chronic renal disease, stage 4, severely decreased glomerular filtration rate (GFR) between 15-29 mL/min/1.73 square meter     Coronary artery disease     stents ~ 2017    COVID-19 6/16/2022    Dieulafoy lesion of duodenum 7/5/2022    Duodenal adenoma     Duodenitis 7/5/2022    HH (hiatus hernia) 7/5/2022    Hypertension        Past Surgical History:   Procedure Laterality Date    ABDOMINAL SURGERY      CORONARY ANGIOPLASTY WITH STENT PLACEMENT      ~ 2017    JOINT REPLACEMENT      PYLOROPLASTY N/A 7/8/2022    Procedure: PYLOROPLASTY;  Surgeon: Marvin Rees MD;  Location: [facility];  Service: General;  Laterality: N/A;    VAGOTOMY N/A 7/8/2022    Procedure: VAGOTOMY;  Surgeon: Marvin Rees MD;  Location: [facility] OR;  Service: General;  Laterality: N/A;       Time Tracking:     OT Date of Treatment: 07/22/22  OT Start Time: 1245  OT Stop Time: 1300  OT Total Time (min): 15 min    Billable Minutes:Evaluation low  complexity    7/22/2022

## 2022-07-22 NOTE — NURSING
16 staples removed from midline incision without difficulty. No drainage noted. Steri strips applied.

## 2022-07-22 NOTE — PLAN OF CARE
Problem: Occupational Therapy  Goal: Occupational Therapy Goal  Description: Goals to be met by: Discharge     Patient will increase functional independence with ADLs by performing:    UE Dressing with Contact Guard Assistance.  LE Dressing with Minimal Assistance.  Toileting from bedside commode with Minimal Assistance for hygiene and clothing management.   Bathing from  edge of bed with Minimal Assistance.  Toilet transfer to bedside commode with Minimal Assistance.  Increased functional strength to 4/5 for increased safety and independence with self care and mobility tasks.    Outcome: Ongoing, Progressing

## 2022-07-22 NOTE — PLAN OF CARE
Spoke with Kiara at the [facility] in [place] and she stated that they will be able to accept pt. On Monday and would like for him to be there by 1:00pm. Will plan for d/c to [facility] on Monday. Follow.

## 2022-07-22 NOTE — PLAN OF CARE
Problem: Adult Inpatient Plan of Care  Goal: Plan of Care Review  Outcome: Ongoing, Progressing  Goal: Patient-Specific Goal (Individualized)  Outcome: Ongoing, Progressing  Goal: Absence of Hospital-Acquired Illness or Injury  Outcome: Ongoing, Progressing  Goal: Optimal Comfort and Wellbeing  Outcome: Ongoing, Progressing  Goal: Readiness for Transition of Care  Outcome: Ongoing, Progressing     Problem: Diabetes Comorbidity  Goal: Blood Glucose Level Within Targeted Range  Outcome: Ongoing, Progressing     Problem: Oral Intake Inadequate (Acute Kidney Injury/Impairment)  Goal: Optimal Nutrition Intake  Outcome: Ongoing, Progressing     Problem: Fall Injury Risk  Goal: Absence of Fall and Fall-Related Injury  Outcome: Ongoing, Progressing     Problem: Skin Injury Risk Increased  Goal: Skin Health and Integrity  Outcome: Ongoing, Progressing

## 2022-07-22 NOTE — PT/OT/SLP PROGRESS
Physical Therapy Treatment    Patient Name:  Neymar Parra   MRN:  [id number]    Recommendations:     Discharge Recommendations:  's [facility] hospital   Discharge Equipment Recommendations: walker, rolling   Barriers to discharge: None    Assessment:     Neymar Parra is a 72 y.o. male admitted with a medical diagnosis of Gastric hemorrhage.  He presents with the following impairments/functional limitations:  weakness, impaired endurance, pain .    Pt tolerated PT well today with 8/10 pain with abdomen region. Nurse notified and pt received pain meds before tx. Pt ambulated 70ft using RW CGA with slow edwige. Pt requested to go back to room after performing Nustep due to abdomen pain and wanted to get back in bed to eat lunch. Nurse notified.     Rehab Prognosis: Good; patient would benefit from acute skilled PT services to address these deficits and reach maximum level of function.    Recent Surgery: * No surgery found *      Plan:     During this hospitalization, patient to be seen 5 x/week to address the identified rehab impairments via gait training, therapeutic exercises, therapeutic activities and progress toward the following goals:    · Plan of Care Expires:       Subjective     Chief Complaint: abdomen pain  Patient/Family Comments/goals: decrease pain/ return to PLOF  Pain/Comfort:  · Pain Rating 1: 8/10  · Location - Side 1: Bilateral  · Location - Orientation 1: medial  · Location 1: abdomen  · Pain Addressed 1: Pre-medicate for activity      Objective:     Communicated with patient prior to session.  Patient found HOB elevated with   upon PT entry to room.     General Precautions: Standard, fall   Orthopedic Precautions:N/A   Braces:    Respiratory Status: Room air     Functional Mobility:  · Bed Mobility:     · Rolling Left:  stand by assistance  · Rolling Right: stand by assistance  · Supine to Sit: stand by assistance  · Sit to Supine: stand by assistance  · Transfers:     · Sit to Stand:   stand by assistance with rolling walker  · Bed to Chair: stand by assistance with  rolling walker  using  Step Transfer  · Gait: 70ft CGA using RW  · Balance: Good      AM-PAC 6 CLICK MOBILITY  Turning over in bed (including adjusting bedclothes, sheets and blankets)?: 4  Sitting down on and standing up from a chair with arms (e.g., wheelchair, bedside commode, etc.): 4  Moving from lying on back to sitting on the side of the bed?: 4  Moving to and from a bed to a chair (including a wheelchair)?: 4  Need to walk in hospital room?: 3  Climbing 3-5 steps with a railing?: 1  Basic Mobility Total Score: 20       Gait Training:  Pt ambulated x70ft using RW CGA with 1 seated rest break at 35ft slow edwige      Therapeutic Activities and Exercises:  Nustep x10min  Bed mobs SBA supine to sitting EOB  Sit to stand using RW CGA before ambulating   Stand pivot t/f  x2  EOB to WC SBA then WC to bed SBA    Patient left up in bed with lunch tray with call button in reach and nurse notified.    GOALS:   Multidisciplinary Problems     Physical Therapy Goals        Problem: Physical Therapy    Goal Priority Disciplines Outcome Goal Variances Interventions   Physical Therapy Goal     PT, PT/OT Ongoing, Progressing     Description: Goals to be met by: 2 weeks     Patient will increase functional independence with mobility by performin. Supine to sit with Modified [place]  2. Sit to supine with Modified [place]  3. Rolling to Left and Right with Modified [place].  4. Sit to stand transfer with Modified [place]  5. Bed to chair transfer with Modified [place] using Rolling Walker  6. Gait  x 200 feet with Contact Guard Assistance using Rolling Walker.     Goals to be met by: 4 weeks     Patient will increase functional independence with mobility by performin. Gait  x 300 feet with Modified [place] using Rolling Walker.   2. Ascend/descend 8 stair with bilateral Handrails Stand-by Assistance  using No Assistive Device.                      Time Tracking:     PT Received On: 07/22/22  PT Start Time: 1115     PT Stop Time: 1155  PT Total Time (min): 40 min     Billable Minutes: Gait Training 10, Therapeutic Activity 15 and Therapeutic Exercise 15    Treatment Type: Treatment  PT/PTA: PTA     PTA Visit Number: 1     07/22/2022

## 2022-07-23 LAB
GLUCOSE SERPL-MCNC: 104 MG/DL (ref 70–105)
GLUCOSE SERPL-MCNC: 96 MG/DL (ref 70–105)

## 2022-07-23 PROCEDURE — 25000003 PHARM REV CODE 250: Performed by: FAMILY MEDICINE

## 2022-07-23 PROCEDURE — 94761 N-INVAS EAR/PLS OXIMETRY MLT: CPT

## 2022-07-23 PROCEDURE — 63600175 PHARM REV CODE 636 W HCPCS: Performed by: FAMILY MEDICINE

## 2022-07-23 PROCEDURE — 11000004 HC SNF PRIVATE

## 2022-07-23 PROCEDURE — 82962 GLUCOSE BLOOD TEST: CPT

## 2022-07-23 PROCEDURE — 99900035 HC TECH TIME PER 15 MIN (STAT)

## 2022-07-23 PROCEDURE — 27000947

## 2022-07-23 RX ADMIN — ATORVASTATIN CALCIUM 40 MG: 40 TABLET, FILM COATED ORAL at 08:07

## 2022-07-23 RX ADMIN — METOPROLOL TARTRATE 25 MG: 25 TABLET, FILM COATED ORAL at 09:07

## 2022-07-23 RX ADMIN — AMLODIPINE BESYLATE 10 MG: 5 TABLET ORAL at 09:07

## 2022-07-23 RX ADMIN — OXYCODONE HYDROCHLORIDE AND ACETAMINOPHEN 500 MG: 500 TABLET ORAL at 09:07

## 2022-07-23 RX ADMIN — CETIRIZINE HYDROCHLORIDE 10 MG: 10 TABLET, FILM COATED ORAL at 09:07

## 2022-07-23 RX ADMIN — SODIUM BICARBONATE 650 MG TABLET 650 MG: at 08:07

## 2022-07-23 RX ADMIN — PANTOPRAZOLE SODIUM 40 MG: 40 TABLET, DELAYED RELEASE ORAL at 09:07

## 2022-07-23 RX ADMIN — METOPROLOL TARTRATE 25 MG: 25 TABLET, FILM COATED ORAL at 08:07

## 2022-07-23 RX ADMIN — MULTIPLE VITAMINS W/ MINERALS TAB 1 TABLET: TAB at 09:07

## 2022-07-23 RX ADMIN — ENOXAPARIN SODIUM 30 MG: 100 INJECTION SUBCUTANEOUS at 05:07

## 2022-07-23 RX ADMIN — HYDROCODONE BITARTRATE AND ACETAMINOPHEN 1 TABLET: 7.5; 325 TABLET ORAL at 09:07

## 2022-07-23 RX ADMIN — TRAZODONE HYDROCHLORIDE 300 MG: 150 TABLET ORAL at 08:07

## 2022-07-23 RX ADMIN — SODIUM BICARBONATE 650 MG TABLET 650 MG: at 09:07

## 2022-07-24 LAB
GLUCOSE SERPL-MCNC: 102 MG/DL (ref 70–105)
GLUCOSE SERPL-MCNC: 161 MG/DL (ref 70–105)

## 2022-07-24 PROCEDURE — 11000004 HC SNF PRIVATE

## 2022-07-24 PROCEDURE — 94761 N-INVAS EAR/PLS OXIMETRY MLT: CPT

## 2022-07-24 PROCEDURE — 27000947

## 2022-07-24 PROCEDURE — 63600175 PHARM REV CODE 636 W HCPCS: Performed by: FAMILY MEDICINE

## 2022-07-24 PROCEDURE — 99900035 HC TECH TIME PER 15 MIN (STAT)

## 2022-07-24 PROCEDURE — 82962 GLUCOSE BLOOD TEST: CPT

## 2022-07-24 PROCEDURE — 25000003 PHARM REV CODE 250: Performed by: FAMILY MEDICINE

## 2022-07-24 RX ADMIN — METOPROLOL TARTRATE 25 MG: 25 TABLET, FILM COATED ORAL at 08:07

## 2022-07-24 RX ADMIN — AMLODIPINE BESYLATE 10 MG: 5 TABLET ORAL at 08:07

## 2022-07-24 RX ADMIN — SODIUM BICARBONATE 650 MG TABLET 650 MG: at 08:07

## 2022-07-24 RX ADMIN — MULTIPLE VITAMINS W/ MINERALS TAB 1 TABLET: TAB at 08:07

## 2022-07-24 RX ADMIN — SODIUM BICARBONATE 650 MG TABLET 650 MG: at 09:07

## 2022-07-24 RX ADMIN — CETIRIZINE HYDROCHLORIDE 10 MG: 10 TABLET, FILM COATED ORAL at 08:07

## 2022-07-24 RX ADMIN — ENOXAPARIN SODIUM 30 MG: 100 INJECTION SUBCUTANEOUS at 05:07

## 2022-07-24 RX ADMIN — ATORVASTATIN CALCIUM 40 MG: 40 TABLET, FILM COATED ORAL at 09:07

## 2022-07-24 RX ADMIN — TRAZODONE HYDROCHLORIDE 300 MG: 150 TABLET ORAL at 09:07

## 2022-07-24 RX ADMIN — METOPROLOL TARTRATE 25 MG: 25 TABLET, FILM COATED ORAL at 09:07

## 2022-07-24 RX ADMIN — OXYCODONE HYDROCHLORIDE AND ACETAMINOPHEN 500 MG: 500 TABLET ORAL at 08:07

## 2022-07-24 RX ADMIN — PANTOPRAZOLE SODIUM 40 MG: 40 TABLET, DELAYED RELEASE ORAL at 08:07

## 2022-07-24 RX ADMIN — SENNOSIDES AND DOCUSATE SODIUM 1 TABLET: 8.6; 5 TABLET ORAL at 08:07

## 2022-07-25 VITALS
RESPIRATION RATE: 18 BRPM | DIASTOLIC BLOOD PRESSURE: 68 MMHG | BODY MASS INDEX: 26.67 KG/M2 | SYSTOLIC BLOOD PRESSURE: 126 MMHG | OXYGEN SATURATION: 94 % | TEMPERATURE: 100 F | HEIGHT: 72 IN | HEART RATE: 73 BPM | WEIGHT: 196.88 LBS

## 2022-07-25 LAB — GLUCOSE SERPL-MCNC: 95 MG/DL (ref 70–105)

## 2022-07-25 PROCEDURE — 94761 N-INVAS EAR/PLS OXIMETRY MLT: CPT

## 2022-07-25 PROCEDURE — 99316 PR NURSING FAC DISCHRGE DAY,MORE 30 MIN: ICD-10-PCS | Mod: ,,, | Performed by: FAMILY MEDICINE

## 2022-07-25 PROCEDURE — 99316 NF DSCHRG MGMT 30 MIN+: CPT | Mod: ,,, | Performed by: FAMILY MEDICINE

## 2022-07-25 PROCEDURE — 27000947

## 2022-07-25 PROCEDURE — 25000003 PHARM REV CODE 250: Performed by: FAMILY MEDICINE

## 2022-07-25 PROCEDURE — 82962 GLUCOSE BLOOD TEST: CPT

## 2022-07-25 RX ADMIN — MULTIPLE VITAMINS W/ MINERALS TAB 1 TABLET: TAB at 09:07

## 2022-07-25 RX ADMIN — OXYCODONE HYDROCHLORIDE AND ACETAMINOPHEN 500 MG: 500 TABLET ORAL at 09:07

## 2022-07-25 RX ADMIN — METOPROLOL TARTRATE 25 MG: 25 TABLET, FILM COATED ORAL at 09:07

## 2022-07-25 RX ADMIN — CETIRIZINE HYDROCHLORIDE 10 MG: 10 TABLET, FILM COATED ORAL at 09:07

## 2022-07-25 RX ADMIN — SODIUM BICARBONATE 650 MG TABLET 650 MG: at 09:07

## 2022-07-25 RX ADMIN — PANTOPRAZOLE SODIUM 40 MG: 40 TABLET, DELAYED RELEASE ORAL at 09:07

## 2022-07-25 RX ADMIN — AMLODIPINE BESYLATE 10 MG: 5 TABLET ORAL at 09:07

## 2022-07-25 RX ADMIN — SENNOSIDES AND DOCUSATE SODIUM 1 TABLET: 8.6; 5 TABLET ORAL at 09:07

## 2022-07-25 NOTE — PLAN OF CARE
[facility] Medical Surgical Unit  Discharge Final Note    Primary Care Provider: [facility] megan Marcos    Expected Discharge Date: 7/25/2022    Final Discharge Note (most recent)     Final Note - 07/25/22 0959        Final Note    Assessment Type Final Discharge Note     Anticipated Discharge Disposition assisted Nursing Home   [facility]    What phone number can be called within the next 1-3 days to see how you are doing after discharge? [phone]     Hospital Resources/Appts/Education Provided Provided patient/caregiver with written discharge plan information        Post-Acute Status    Post-Acute Authorization Placement   [facility]    Post-Acute Placement Status Referrals Sent   [facility]    Coverage Medicare     Patient choice form signed by patient/caregiver List with quality metrics by geographic area provided;List from CMS Compare;List from System Post-Acute Care     Discharge Delays None known at this time                 Important Message from Medicare      Pt. Has planned d/c to [facility] today via Ambulance transport to their facility. Transfer packet left at nurses station. Pt. And family aware. No other d/c needs identified.

## 2022-07-25 NOTE — HOSPITAL COURSE
Patient is 72-year-old comes in with diabetes, hypertension, COVID positive on 06/09/2022 and had a femur fracture.  He also had COPD.  The patient has had diet of 2000 calorie ADA, the patient experienced having a GI hemorrhage with duodenitis status post vessel clipping that was done on 07/18/2022 by Dr. Rees.  He had esophageal it is and anemia, he also had pleural effusion, weakness, and hyperkalemia.  The patient has staples in the midline incision.  No XIMENA drain.  The patient will be going to the [facility]

## 2022-07-25 NOTE — NURSING
[facility] here to get pt at [facility] ALTHEA

## 2022-07-25 NOTE — DISCHARGE SUMMARY
[facility]  Hospital Medicine  Discharge Summary      Patient Name: Neymar Parra  MRN: [id number]  Patient Class: IP- Swing  Admission Date: 7/20/2022  Hospital Length of Stay: 5 days  Discharge Date and Time:  07/25/2022 9:07 AM  Attending Physician: Adriano Castañeda DO   Discharging Provider: Adriano Castañeda DO  Primary Care Provider: [facility] [place]      HPI:   Patient is a 72-year-old comes in transfer to swing bed from [address] [place] [address].  Patient is in for having a GI hemorrhage with infection the patient had a vessel clipped on 07/08/2022 by Dr. Rees.  He also has esophagitis.  Anemia.  Full effusion.  Diabetes.  Hypertension.  Hyperkalemia.  The patient was transferred initially from [facility] to [facility] with shortness of breath and hemoptysis.  He had duodenal vessel that was clipped on per GI on 07/08/2022 nephrology consulted because of a creatinine of 7 0.15 the ocean had COVID on 06/09/2022 patient sent for PT and OT for XIMENA drains to be dressed and then removed on 07/22/2022.  He also has midline staples in that were removed on 07/22/2022 patient will have Wound Care, and continue monitoring for any GI bleed.      * No surgery found *      Hospital Course:   Patient is 72-year-old comes in with diabetes, hypertension, COVID positive on 06/09/2022 and had a femur fracture.  He also had COPD.  The patient has had diet of 2000 calorie ADA, the patient experienced having a GI hemorrhage with duodenitis status post vessel clipping that was done on 07/18/2022 by Dr. Rees.  He had esophageal it is and anemia, he also had pleural effusion, weakness, and hyperkalemia.  The patient has staples in the midline incision.  No XIMENA drain.  The patient will be going to the [facility]       Goals of Care Treatment Preferences:  Code Status: DNR      Consults:   Consults (From admission, onward)        Status Ordering Provider     Inpatient consult to  Registered Dietitian/Nutritionist  Once        Provider:  (Not yet assigned)    Acknowledged DARNELL ROBERTS          No new Assessment & Plan notes have been filed under this hospital service since the last note was generated.  Service: Hospital Medicine    Final Active Diagnoses:    Diagnosis Date Noted POA    PRINCIPAL PROBLEM:  Gastric hemorrhage [K92.2] 07/20/2022 Unknown      Problems Resolved During this Admission:       Discharged Condition: good    Disposition:     Follow Up:    Patient Instructions:   No discharge procedures on file.    Significant Diagnostic Studies: Labs:   BMP: No results for input(s): GLU, NA, K, CL, CO2, BUN, CREATININE, CALCIUM, MG in the last 48 hours., CMP No results for input(s): NA, K, CL, CO2, GLU, BUN, CREATININE, CALCIUM, PROT, ALBUMIN, BILITOT, ALKPHOS, AST, ALT, ANIONGAP, ESTGFRAFRICA, EGFRNONAA in the last 48 hours., CBC No results for input(s): WBC, HGB, HCT, PLT in the last 48 hours., INR   Lab Results   Component Value Date    INR 1.23 (H) 07/03/2022    INR 1.12 (H) 06/16/2022    INR 1.08 06/07/2022   , Lipid Panel   Lab Results   Component Value Date    CHOL 118 06/27/2022    HDL 63 (H) 06/27/2022    LDLCALC 38 06/27/2022    TRIG 84 06/27/2022    CHOLHDL 1.9 06/27/2022    and Troponin No results for input(s): TROPONINI in the last 168 hours.    Pending Diagnostic Studies:     None         Medications:  Reconciled Home Medications:      Medication List      CHANGE how you take these medications    allopurinoL 100 MG tablet  Commonly known as: ZYLOPRIM  Take 0.5 tablets (50 mg total) by mouth once daily.  What changed: Another medication with the same name was removed. Continue taking this medication, and follow the directions you see here.     metoprolol tartrate 25 MG tablet  Commonly known as: LOPRESSOR  Take 1 tablet (25 mg total) by mouth 2 (two) times daily.  What changed: Another medication with the same name was removed. Continue taking this medication, and  follow the directions you see here.        CONTINUE taking these medications    acetaminophen 500 MG tablet  Commonly known as: TYLENOL  Take 1,000 mg by mouth every 8 (eight) hours as needed for Pain.     albuterol-ipratropium 2.5 mg-0.5 mg/3 mL nebulizer solution  Commonly known as: DUO-NEB  Take 3 mLs by nebulization every 6 (six) hours as needed for Wheezing. Rescue     amLODIPine 10 MG tablet  Commonly known as: NORVASC  Take 10 mg by mouth once daily.     ascorbic acid (vitamin C) 500 MG tablet  Commonly known as: VITAMIN C  Take 1 tablet (500 mg total) by mouth once daily.     atorvastatin 80 MG tablet  Commonly known as: LIPITOR  Take 40 mg by mouth every evening.     HYDROcodone-acetaminophen 7.5-325 mg per tablet  Commonly known as: NORCO  Take 1 tablet by mouth every 6 (six) hours as needed for Pain.     loratadine 10 mg tablet  Commonly known as: CLARITIN  TAKE ONE TABLET BY MOUTH DAILY FOR ALLERGIES     multivitamin Tab  Take 1 tablet by mouth once daily.     pantoprazole 40 MG tablet  Commonly known as: PROTONIX  Take 1 tablet (40 mg total) by mouth once daily.     sodium bicarbonate 650 MG tablet  Take 1 tablet (650 mg total) by mouth 2 (two) times daily.     traMADoL 50 mg tablet  Commonly known as: ULTRAM  Take 50 mg by mouth every 8 (eight) hours as needed for Pain.     traZODone 100 MG tablet  Commonly known as: DESYREL  Take 300 mg by mouth every evening.        ASK your doctor about these medications    aspirin 325 MG EC tablet  Commonly known as: ECOTRIN  Take 325 mg by mouth once daily.     clopidogreL 75 mg tablet  Commonly known as: PLAVIX  TAKE 1 TABLET BY MOUTH ONCE DAILY     isosorbide mononitrate 60 MG 24 hr tablet  Commonly known as: IMDUR  Take 1 tablet by mouth once daily.     lisinopriL 20 MG tablet  Commonly known as: PRINIVIL,ZESTRIL  Take 20 mg by mouth.     omeprazole 20 MG capsule  Commonly known as: PRILOSEC  Take 20 mg by mouth once daily.            Indwelling Lines/Drains at  time of discharge:   Lines/Drains/Airways     None                 Time spent on the discharge of patient: 45 minutes         Adriano Castañeda DO  Department of Hospital Medicine  [facility]

## 2022-07-25 NOTE — PLAN OF CARE
Problem: Adult Inpatient Plan of Care  Goal: Plan of Care Review  Outcome: Met  Goal: Patient-Specific Goal (Individualized)  Outcome: Met  Goal: Absence of Hospital-Acquired Illness or Injury  Outcome: Met  Goal: Optimal Comfort and Wellbeing  Outcome: Met  Goal: Readiness for Transition of Care  Outcome: Met     Problem: Diabetes Comorbidity  Goal: Blood Glucose Level Within Targeted Range  Outcome: Met     Problem: Fluid and Electrolyte Imbalance (Acute Kidney Injury/Impairment)  Goal: Fluid and Electrolyte Balance  Outcome: Met     Problem: Oral Intake Inadequate (Acute Kidney Injury/Impairment)  Goal: Optimal Nutrition Intake  Outcome: Met     Problem: Renal Function Impairment (Acute Kidney Injury/Impairment)  Goal: Effective Renal Function  Outcome: Met     Problem: Fall Injury Risk  Goal: Absence of Fall and Fall-Related Injury  Outcome: Met     Problem: Skin Injury Risk Increased  Goal: Skin Health and Integrity  Outcome: Met

## 2022-08-02 ENCOUNTER — ANESTHESIA EVENT (OUTPATIENT)
Dept: GASTROENTEROLOGY | Facility: HOSPITAL | Age: 72
DRG: 378 | End: 2022-08-02
Payer: MEDICARE

## 2022-08-02 ENCOUNTER — HOSPITAL ENCOUNTER (INPATIENT)
Facility: HOSPITAL | Age: 72
LOS: 4 days | Discharge: NURSING, REHAB OR LONG-TERM CARE | DRG: 378 | End: 2022-08-06
Attending: FAMILY MEDICINE | Admitting: FAMILY MEDICINE
Payer: MEDICARE

## 2022-08-02 ENCOUNTER — ANESTHESIA (OUTPATIENT)
Dept: GASTROENTEROLOGY | Facility: HOSPITAL | Age: 72
DRG: 378 | End: 2022-08-02
Payer: MEDICARE

## 2022-08-02 DIAGNOSIS — K92.2 GI BLEED: ICD-10-CM

## 2022-08-02 DIAGNOSIS — K21.9 GASTROESOPHAGEAL REFLUX DISEASE, UNSPECIFIED WHETHER ESOPHAGITIS PRESENT: ICD-10-CM

## 2022-08-02 DIAGNOSIS — I10 PRIMARY HYPERTENSION: ICD-10-CM

## 2022-08-02 DIAGNOSIS — I25.10 CORONARY ARTERY DISEASE INVOLVING NATIVE CORONARY ARTERY OF NATIVE HEART WITHOUT ANGINA PECTORIS: ICD-10-CM

## 2022-08-02 DIAGNOSIS — R07.9 CHEST PAIN: ICD-10-CM

## 2022-08-02 DIAGNOSIS — N18.5 CKD (CHRONIC KIDNEY DISEASE), STAGE V: ICD-10-CM

## 2022-08-02 DIAGNOSIS — J43.9 PULMONARY EMPHYSEMA, UNSPECIFIED EMPHYSEMA TYPE: ICD-10-CM

## 2022-08-02 DIAGNOSIS — K29.81 GASTROINTESTINAL HEMORRHAGE ASSOCIATED WITH DUODENITIS: ICD-10-CM

## 2022-08-02 DIAGNOSIS — D62 ACUTE BLOOD LOSS ANEMIA: ICD-10-CM

## 2022-08-02 DIAGNOSIS — K92.2 GASTRIC HEMORRHAGE: Primary | ICD-10-CM

## 2022-08-02 DIAGNOSIS — I10 ESSENTIAL (PRIMARY) HYPERTENSION: ICD-10-CM

## 2022-08-02 PROBLEM — E11.9 TYPE 2 DIABETES MELLITUS WITHOUT COMPLICATION: Status: RESOLVED | Noted: 2021-12-12 | Resolved: 2022-08-02

## 2022-08-02 PROBLEM — J44.9 COPD (CHRONIC OBSTRUCTIVE PULMONARY DISEASE): Status: ACTIVE | Noted: 2022-08-02

## 2022-08-02 LAB
ALBUMIN SERPL BCP-MCNC: 1.9 G/DL (ref 3.5–5)
ALBUMIN/GLOB SERPL: 0.6 {RATIO}
ALP SERPL-CCNC: 93 U/L (ref 45–115)
ALT SERPL W P-5'-P-CCNC: 42 U/L (ref 16–61)
ANION GAP SERPL CALCULATED.3IONS-SCNC: 15 MMOL/L (ref 7–16)
APTT PPP: 40.8 SECONDS (ref 25.2–37.3)
AST SERPL W P-5'-P-CCNC: 29 U/L (ref 15–37)
BASOPHILS # BLD AUTO: 0.04 K/UL (ref 0–0.2)
BASOPHILS NFR BLD AUTO: 0.5 % (ref 0–1)
BILIRUB SERPL-MCNC: 0.4 MG/DL (ref 0–1.2)
BUN SERPL-MCNC: 47 MG/DL (ref 7–18)
BUN/CREAT SERPL: 10 (ref 6–20)
CALCIUM SERPL-MCNC: 8.4 MG/DL (ref 8.5–10.1)
CHLORIDE SERPL-SCNC: 114 MMOL/L (ref 98–107)
CO2 SERPL-SCNC: 19 MMOL/L (ref 21–32)
CREAT SERPL-MCNC: 4.77 MG/DL (ref 0.7–1.3)
DIFFERENTIAL METHOD BLD: ABNORMAL
EOSINOPHIL # BLD AUTO: 0.21 K/UL (ref 0–0.5)
EOSINOPHIL NFR BLD AUTO: 2.6 % (ref 1–4)
ERYTHROCYTE [DISTWIDTH] IN BLOOD BY AUTOMATED COUNT: 15.4 % (ref 11.5–14.5)
FERRITIN SERPL-MCNC: 146 NG/ML (ref 26–388)
GLOBULIN SER-MCNC: 3.3 G/DL (ref 2–4)
GLUCOSE SERPL-MCNC: 121 MG/DL (ref 70–105)
GLUCOSE SERPL-MCNC: 138 MG/DL (ref 70–105)
GLUCOSE SERPL-MCNC: 78 MG/DL (ref 74–106)
GLUCOSE SERPL-MCNC: 93 MG/DL (ref 70–105)
HCT VFR BLD AUTO: 24.4 % (ref 40–54)
HGB BLD-MCNC: 7.8 G/DL (ref 13.5–18)
HGB BLD-MCNC: 8 G/DL (ref 13.5–18)
HGB BLD-MCNC: 8.5 G/DL (ref 13.5–18)
IMM GRANULOCYTES # BLD AUTO: 0.04 K/UL (ref 0–0.04)
IMM GRANULOCYTES NFR BLD: 0.5 % (ref 0–0.4)
IMM RETICS NFR: 32 % (ref 2.3–13.4)
INDIRECT COOMBS: NORMAL
INR BLD: 1.23
IRON SATN MFR SERPL: 21 % (ref 14–50)
IRON SERPL-MCNC: 30 ΜG/DL (ref 65–175)
LYMPHOCYTES # BLD AUTO: 2.12 K/UL (ref 1–4.8)
LYMPHOCYTES NFR BLD AUTO: 26.4 % (ref 27–41)
MAGNESIUM SERPL-MCNC: 1.8 MG/DL (ref 1.7–2.3)
MCH RBC QN AUTO: 31.4 PG (ref 27–31)
MCHC RBC AUTO-ENTMCNC: 34.8 G/DL (ref 32–36)
MCV RBC AUTO: 90 FL (ref 80–96)
MONOCYTES # BLD AUTO: 0.62 K/UL (ref 0–0.8)
MONOCYTES NFR BLD AUTO: 7.7 % (ref 2–6)
MPC BLD CALC-MCNC: 8.5 FL (ref 9.4–12.4)
NEUTROPHILS # BLD AUTO: 4.99 K/UL (ref 1.8–7.7)
NEUTROPHILS NFR BLD AUTO: 62.3 % (ref 53–65)
NRBC # BLD AUTO: 0 X10E3/UL
NRBC, AUTO (.00): 0 %
PLATELET # BLD AUTO: 225 K/UL (ref 150–400)
POTASSIUM SERPL-SCNC: 5.5 MMOL/L (ref 3.5–5.1)
PROT SERPL-MCNC: 5.2 G/DL (ref 6.4–8.2)
PROTHROMBIN TIME: 15 SECONDS (ref 11.7–14.7)
RBC # BLD AUTO: 2.71 M/UL (ref 4.6–6.2)
RETICS # AUTO: 0.06 X10E6/UL (ref 0.02–0.11)
RETICS/RBC NFR AUTO: 2.6 % (ref 0.4–2.2)
RH BLD: NORMAL
SODIUM SERPL-SCNC: 142 MMOL/L (ref 136–145)
TIBC SERPL-MCNC: 143 ΜG/DL (ref 250–450)
VIT B12 SERPL-MCNC: 1199 PG/ML (ref 193–986)
WBC # BLD AUTO: 8.02 K/UL (ref 4.5–11)

## 2022-08-02 PROCEDURE — C9113 INJ PANTOPRAZOLE SODIUM, VIA: HCPCS | Performed by: HOSPITALIST

## 2022-08-02 PROCEDURE — 82728 ASSAY OF FERRITIN: CPT | Performed by: HOSPITALIST

## 2022-08-02 PROCEDURE — 11000001 HC ACUTE MED/SURG PRIVATE ROOM

## 2022-08-02 PROCEDURE — 93005 ELECTROCARDIOGRAM TRACING: CPT

## 2022-08-02 PROCEDURE — 93010 ELECTROCARDIOGRAM REPORT: CPT | Mod: ,,, | Performed by: INTERNAL MEDICINE

## 2022-08-02 PROCEDURE — 25000003 PHARM REV CODE 250: Performed by: FAMILY MEDICINE

## 2022-08-02 PROCEDURE — 36415 COLL VENOUS BLD VENIPUNCTURE: CPT | Performed by: HOSPITALIST

## 2022-08-02 PROCEDURE — 43239 EGD BIOPSY SINGLE/MULTIPLE: CPT

## 2022-08-02 PROCEDURE — 85610 PROTHROMBIN TIME: CPT | Performed by: HOSPITALIST

## 2022-08-02 PROCEDURE — 99223 1ST HOSP IP/OBS HIGH 75: CPT | Mod: AI,GC,, | Performed by: HOSPITALIST

## 2022-08-02 PROCEDURE — 83540 ASSAY OF IRON: CPT | Performed by: HOSPITALIST

## 2022-08-02 PROCEDURE — 83735 ASSAY OF MAGNESIUM: CPT | Performed by: HOSPITALIST

## 2022-08-02 PROCEDURE — 27000284 HC CANNULA NASAL: Performed by: NURSE ANESTHETIST, CERTIFIED REGISTERED

## 2022-08-02 PROCEDURE — 99223 PR INITIAL HOSPITAL CARE,LEVL III: ICD-10-PCS | Mod: 25,,, | Performed by: STUDENT IN AN ORGANIZED HEALTH CARE EDUCATION/TRAINING PROGRAM

## 2022-08-02 PROCEDURE — 43239 PR EGD, FLEX, W/BIOPSY, SGL/MULTI: ICD-10-PCS | Mod: ,,, | Performed by: STUDENT IN AN ORGANIZED HEALTH CARE EDUCATION/TRAINING PROGRAM

## 2022-08-02 PROCEDURE — D9220A PRA ANESTHESIA: Mod: ,,, | Performed by: NURSE ANESTHETIST, CERTIFIED REGISTERED

## 2022-08-02 PROCEDURE — 85045 AUTOMATED RETICULOCYTE COUNT: CPT

## 2022-08-02 PROCEDURE — 80053 COMPREHEN METABOLIC PANEL: CPT | Performed by: HOSPITALIST

## 2022-08-02 PROCEDURE — 25000003 PHARM REV CODE 250: Performed by: NURSE ANESTHETIST, CERTIFIED REGISTERED

## 2022-08-02 PROCEDURE — 36415 COLL VENOUS BLD VENIPUNCTURE: CPT

## 2022-08-02 PROCEDURE — 86901 BLOOD TYPING SEROLOGIC RH(D): CPT | Performed by: HOSPITALIST

## 2022-08-02 PROCEDURE — D9220A PRA ANESTHESIA: ICD-10-PCS | Mod: ,,, | Performed by: NURSE ANESTHETIST, CERTIFIED REGISTERED

## 2022-08-02 PROCEDURE — 85025 COMPLETE CBC W/AUTO DIFF WBC: CPT | Performed by: HOSPITALIST

## 2022-08-02 PROCEDURE — 85018 HEMOGLOBIN: CPT | Performed by: HOSPITALIST

## 2022-08-02 PROCEDURE — 85730 THROMBOPLASTIN TIME PARTIAL: CPT | Performed by: HOSPITALIST

## 2022-08-02 PROCEDURE — 82962 GLUCOSE BLOOD TEST: CPT

## 2022-08-02 PROCEDURE — 25000003 PHARM REV CODE 250

## 2022-08-02 PROCEDURE — 82607 VITAMIN B-12: CPT | Performed by: HOSPITALIST

## 2022-08-02 PROCEDURE — 99223 PR INITIAL HOSPITAL CARE,LEVL III: ICD-10-PCS | Mod: AI,GC,, | Performed by: HOSPITALIST

## 2022-08-02 PROCEDURE — 63600175 PHARM REV CODE 636 W HCPCS: Performed by: NURSE ANESTHETIST, CERTIFIED REGISTERED

## 2022-08-02 PROCEDURE — 63600175 PHARM REV CODE 636 W HCPCS: Performed by: HOSPITALIST

## 2022-08-02 PROCEDURE — 43239 EGD BIOPSY SINGLE/MULTIPLE: CPT | Mod: ,,, | Performed by: STUDENT IN AN ORGANIZED HEALTH CARE EDUCATION/TRAINING PROGRAM

## 2022-08-02 PROCEDURE — 99223 1ST HOSP IP/OBS HIGH 75: CPT | Mod: 25,,, | Performed by: STUDENT IN AN ORGANIZED HEALTH CARE EDUCATION/TRAINING PROGRAM

## 2022-08-02 PROCEDURE — 93010 EKG 12-LEAD: ICD-10-PCS | Mod: ,,, | Performed by: INTERNAL MEDICINE

## 2022-08-02 PROCEDURE — 86923 COMPATIBILITY TEST ELECTRIC: CPT | Performed by: STUDENT IN AN ORGANIZED HEALTH CARE EDUCATION/TRAINING PROGRAM

## 2022-08-02 PROCEDURE — 25000003 PHARM REV CODE 250: Performed by: HOSPITALIST

## 2022-08-02 RX ORDER — MUPIROCIN 20 MG/G
OINTMENT TOPICAL 2 TIMES DAILY
Status: DISCONTINUED | OUTPATIENT
Start: 2022-08-02 | End: 2022-08-06 | Stop reason: HOSPADM

## 2022-08-02 RX ORDER — TRAZODONE HYDROCHLORIDE 50 MG/1
50 TABLET ORAL NIGHTLY PRN
Status: DISCONTINUED | OUTPATIENT
Start: 2022-08-02 | End: 2022-08-06 | Stop reason: HOSPADM

## 2022-08-02 RX ORDER — SODIUM CHLORIDE 9 MG/ML
INJECTION, SOLUTION INTRAVENOUS CONTINUOUS
Status: DISCONTINUED | OUTPATIENT
Start: 2022-08-02 | End: 2022-08-06 | Stop reason: HOSPADM

## 2022-08-02 RX ORDER — ONDANSETRON 2 MG/ML
8 INJECTION INTRAMUSCULAR; INTRAVENOUS EVERY 6 HOURS PRN
Status: DISCONTINUED | OUTPATIENT
Start: 2022-08-02 | End: 2022-08-06 | Stop reason: HOSPADM

## 2022-08-02 RX ORDER — ACETAMINOPHEN 500 MG
1000 TABLET ORAL EVERY 6 HOURS PRN
Status: DISCONTINUED | OUTPATIENT
Start: 2022-08-02 | End: 2022-08-06 | Stop reason: HOSPADM

## 2022-08-02 RX ORDER — BISACODYL 5 MG
10 TABLET, DELAYED RELEASE (ENTERIC COATED) ORAL DAILY PRN
Status: DISCONTINUED | OUTPATIENT
Start: 2022-08-02 | End: 2022-08-06 | Stop reason: HOSPADM

## 2022-08-02 RX ORDER — GUAIFENESIN/DEXTROMETHORPHAN 100-10MG/5
10 SYRUP ORAL EVERY 6 HOURS PRN
Status: DISCONTINUED | OUTPATIENT
Start: 2022-08-02 | End: 2022-08-06 | Stop reason: HOSPADM

## 2022-08-02 RX ORDER — SIMETHICONE 80 MG
1 TABLET,CHEWABLE ORAL 3 TIMES DAILY PRN
Status: DISCONTINUED | OUTPATIENT
Start: 2022-08-02 | End: 2022-08-06 | Stop reason: HOSPADM

## 2022-08-02 RX ORDER — FENTANYL CITRATE 50 UG/ML
INJECTION, SOLUTION INTRAMUSCULAR; INTRAVENOUS
Status: DISCONTINUED | OUTPATIENT
Start: 2022-08-02 | End: 2022-08-02

## 2022-08-02 RX ORDER — ATORVASTATIN CALCIUM 40 MG/1
40 TABLET, FILM COATED ORAL NIGHTLY
Status: DISCONTINUED | OUTPATIENT
Start: 2022-08-02 | End: 2022-08-06 | Stop reason: HOSPADM

## 2022-08-02 RX ORDER — IPRATROPIUM BROMIDE AND ALBUTEROL SULFATE 2.5; .5 MG/3ML; MG/3ML
3 SOLUTION RESPIRATORY (INHALATION) EVERY 6 HOURS PRN
Status: DISCONTINUED | OUTPATIENT
Start: 2022-08-02 | End: 2022-08-06 | Stop reason: HOSPADM

## 2022-08-02 RX ORDER — PROPOFOL 10 MG/ML
VIAL (ML) INTRAVENOUS
Status: DISCONTINUED | OUTPATIENT
Start: 2022-08-02 | End: 2022-08-02

## 2022-08-02 RX ORDER — GLUCAGON 1 MG
1 KIT INJECTION
Status: DISCONTINUED | OUTPATIENT
Start: 2022-08-02 | End: 2022-08-06 | Stop reason: HOSPADM

## 2022-08-02 RX ORDER — INSULIN ASPART 100 [IU]/ML
0-5 INJECTION, SOLUTION INTRAVENOUS; SUBCUTANEOUS
Status: DISCONTINUED | OUTPATIENT
Start: 2022-08-02 | End: 2022-08-06 | Stop reason: HOSPADM

## 2022-08-02 RX ORDER — SODIUM CHLORIDE 9 MG/ML
INJECTION, SOLUTION INTRAVENOUS CONTINUOUS
Status: DISCONTINUED | OUTPATIENT
Start: 2022-08-02 | End: 2022-08-02

## 2022-08-02 RX ORDER — SODIUM CHLORIDE 0.9 % (FLUSH) 0.9 %
10 SYRINGE (ML) INJECTION EVERY 12 HOURS PRN
Status: DISCONTINUED | OUTPATIENT
Start: 2022-08-02 | End: 2022-08-06 | Stop reason: HOSPADM

## 2022-08-02 RX ORDER — TRAZODONE HYDROCHLORIDE 150 MG/1
300 TABLET ORAL NIGHTLY
Status: DISCONTINUED | OUTPATIENT
Start: 2022-08-02 | End: 2022-08-06 | Stop reason: HOSPADM

## 2022-08-02 RX ORDER — LIDOCAINE HYDROCHLORIDE 20 MG/ML
INJECTION INTRAVENOUS
Status: DISCONTINUED | OUTPATIENT
Start: 2022-08-02 | End: 2022-08-02

## 2022-08-02 RX ORDER — NALOXONE HCL 0.4 MG/ML
0.02 VIAL (ML) INJECTION
Status: DISCONTINUED | OUTPATIENT
Start: 2022-08-02 | End: 2022-08-06 | Stop reason: HOSPADM

## 2022-08-02 RX ADMIN — FENTANYL CITRATE 50 MCG: 50 INJECTION INTRAMUSCULAR; INTRAVENOUS at 09:08

## 2022-08-02 RX ADMIN — MUPIROCIN: 20 OINTMENT TOPICAL at 09:08

## 2022-08-02 RX ADMIN — TRAZODONE HYDROCHLORIDE 300 MG: 150 TABLET ORAL at 09:08

## 2022-08-02 RX ADMIN — SODIUM CHLORIDE: 9 INJECTION, SOLUTION INTRAVENOUS at 09:08

## 2022-08-02 RX ADMIN — ATORVASTATIN CALCIUM 40 MG: 40 TABLET, FILM COATED ORAL at 03:08

## 2022-08-02 RX ADMIN — ATORVASTATIN CALCIUM 40 MG: 40 TABLET, FILM COATED ORAL at 09:08

## 2022-08-02 RX ADMIN — SODIUM CHLORIDE: 9 INJECTION, SOLUTION INTRAVENOUS at 05:08

## 2022-08-02 RX ADMIN — PANTOPRAZOLE SODIUM 8 MG/HR: 40 INJECTION, POWDER, FOR SOLUTION INTRAVENOUS at 03:08

## 2022-08-02 RX ADMIN — TRAZODONE HYDROCHLORIDE 300 MG: 150 TABLET ORAL at 03:08

## 2022-08-02 RX ADMIN — PROPOFOL 50 MG: 10 INJECTION, EMULSION INTRAVENOUS at 09:08

## 2022-08-02 RX ADMIN — PANTOPRAZOLE SODIUM 8 MG/HR: 40 INJECTION, POWDER, FOR SOLUTION INTRAVENOUS at 06:08

## 2022-08-02 RX ADMIN — LIDOCAINE HYDROCHLORIDE 100 MG: 20 INJECTION, SOLUTION INTRAVENOUS at 09:08

## 2022-08-02 RX ADMIN — PANTOPRAZOLE SODIUM 8 MG/HR: 40 INJECTION, POWDER, FOR SOLUTION INTRAVENOUS at 09:08

## 2022-08-02 NOTE — DISCHARGE INSTRUCTIONS
Procedure Date  8/2/22     Impression  Overall Impression: Small, clean based, superficial ulcer in the duodenal bulb with overlying suture material. Random gastric biopsies obtained. Small superficial, clean based ulcer in the mid esophagus. Biopsied.  No evidence of active bleeding identified on this examination.     Recommendation  Await pathology results  Continue PPI PO BID.

## 2022-08-02 NOTE — ANESTHESIA PREPROCEDURE EVALUATION
08/02/2022  eNymar Parra is a 72 y.o., male.      Pre-op Assessment    I have reviewed the Patient Summary Reports.    I have reviewed the NPO Status.   I have reviewed the Medications.     Review of Systems  Anesthesia Hx:  No problems with previous Anesthesia      Past Medical History:   Diagnosis Date    Chronic renal disease, stage 4, severely decreased glomerular filtration rate (GFR) between 15-29 mL/min/1.73 square meter     COPD (chronic obstructive pulmonary disease)     Coronary artery disease     COVID-19 06/16/2022    Dieulafoy lesion of duodenum 07/05/2022    HH (hiatus hernia) 07/05/2022    Hypertension        Past Surgical History:   Procedure Laterality Date    ABDOMINAL SURGERY      CARDIAC SURGERY      CORONARY ANGIOPLASTY WITH STENT PLACEMENT      ~ 2017    CORONARY ANGIOPLASTY WITH STENT PLACEMENT      JOINT REPLACEMENT      PYLOROPLASTY N/A 07/08/2022    Procedure: PYLOROPLASTY;  Surgeon: Marvin Rees MD;  Location: [facility];  Service: General;  Laterality: N/A;    VAGOTOMY N/A 07/08/2022    Procedure: VAGOTOMY;  Surgeon: Marvin Rees MD;  Location: [facility];  Service: General;  Laterality: N/A;           Social History     Socioeconomic History    Marital status: Single   Tobacco Use    Smoking status: Former Smoker     Packs/day: 0.50     Years: 30.00     Pack years: 15.00     Types: Cigarettes    Smokeless tobacco: Never Used    Tobacco comment: quit Dec 2021   Substance and Sexual Activity    Alcohol use: Not Currently     Comment: pint a week: Hx    Drug use: Never    Sexual activity: Not Currently       Current Facility-Administered Medications   Medication Dose Route Frequency Provider Last Rate Last Admin    0.9%  NaCl infusion   Intravenous Continuous Karyn Rivera  mL/hr at 08/02/22 0700 No Dose/Rate Change at 08/02/22 0700     acetaminophen tablet 1,000 mg  1,000 mg Oral Q6H PRN Karyn Rivera MD        albuterol-ipratropium 2.5 mg-0.5 mg/3 mL nebulizer solution 3 mL  3 mL Nebulization Q6H PRN Jose L Hdz DO        atorvastatin tablet 40 mg  40 mg Oral QHS Karyn Rivera MD   40 mg at 08/02/22 0315    bisacodyL EC tablet 10 mg  10 mg Oral Daily PRN Karyn Rivera MD        dextromethorphan-guaiFENesin  mg/5 ml liquid 10 mL  10 mL Oral Q6H PRN Karyn Rivera MD        dextrose 50% injection 12.5 g  12.5 g Intravenous PRN Jose L Hdz DO        dextrose 50% injection 25 g  25 g Intravenous PRN Jose L Hdz DO        glucagon (human recombinant) injection 1 mg  1 mg Intramuscular PRN Jose L Hdz DO        insulin aspart U-100 injection 0-5 Units  0-5 Units Subcutaneous QID (AC + HS) PRN Jose L Hdz DO        mupirocin 2 % ointment   Nasal BID Earl Lemus Jr., MD   Given at 08/02/22 0902    naloxone 0.4 mg/mL injection 0.02 mg  0.02 mg Intravenous PRN Jose L Hdz DO        ondansetron injection 8 mg  8 mg Intravenous Q6H PRN Karyn Rivera MD        pantoprazole (PROTONIX) 40 mg in sodium chloride 0.9 % 100 mL IVPB (MB+)  8 mg/hr Intravenous Continuous Karyn Rivera MD 20 mL/hr at 08/02/22 0644 8 mg/hr at 08/02/22 0644    simethicone chewable tablet 80 mg  1 tablet Oral TID PRN Karyn Rivera MD        sodium chloride 0.9% flush 10 mL  10 mL Intravenous Q12H PRN Jose L Hdz DO        traZODone tablet 300 mg  300 mg Oral QHS Karyn Rivera MD   300 mg at 08/02/22 0315    traZODone tablet 50 mg  50 mg Oral Nightly PRN Karyn Rivera MD           Review of patient's allergies indicates:   Allergen Reactions    Gatifloxacin Anaphylaxis       Physical Exam  General: Well nourished    Airway:  Mallampati: II   Mouth Opening: Normal  TM Distance: Normal  Tongue: Normal  Neck ROM: Normal ROM    Dental:  Intact        Anesthesia Plan  Type of  Anesthesia, risks & benefits discussed:    Anesthesia Type: Gen Natural Airway  Intra-op Monitoring Plan: Standard ASA Monitors  Post Op Pain Control Plan: multimodal analgesia  Induction:  IV  Informed Consent: Informed consent signed with the Patient and all parties understand the risks and agree with anesthesia plan.  All questions answered. Patient consented to blood products? Yes  ASA Score: 3  Day of Surgery Review of History & Physical: H&P Update referred to the surgeon/provider.    Ready For Surgery From Anesthesia Perspective.     .

## 2022-08-02 NOTE — TRANSFER OF CARE
Anesthesia Transfer of Care Note    Patient: Neymar Parra    Procedure(s) Performed: * No procedures listed *    Patient location: GI    Anesthesia Type: general    Transport from OR: Transported from OR on room air with adequate spontaneous ventilation. Continuous ECG monitoring in transport. Continuous SpO2 monitoring in transport. Continuos invasive BP monitoring in transport    Post pain: adequate analgesia    Post assessment: no apparent anesthetic complications and tolerated procedure well    Post vital signs: stable    Level of consciousness: responds to stimulation and awake    Nausea/Vomiting: no nausea/vomiting    Complications: none    Transfer of care protocol was followed      Last vitals:   Visit Vitals  /71 (Patient Position: Lying)   Pulse 107   Temp 36.7 °C (98 °F)   Resp 15   Ht 6' (1.829 m)   Wt 89.8 kg (198 lb)   SpO2 (!) 94%   BMI 26.85 kg/m²

## 2022-08-02 NOTE — ASSESSMENT & PLAN NOTE
Patient has CAD with stent   Holding home Lisinopril, Metoprolol tartrate, [id number], and isosorbide mononitrate  - atorvastatin 80mg QD

## 2022-08-02 NOTE — CONSULTS
Digestive Disease Consult Note    No chief complaint on file.      History of Present Illness:  Neymar Parra is a 72 y.o. male that  has a past medical history of Chronic renal disease, stage 4, severely decreased glomerular filtration rate (GFR) between 15-29 mL/min/1.73 square meter, COPD (chronic obstructive pulmonary disease), Coronary artery disease, COVID-19, Dieulafoy lesion of duodenum, HH (hiatus hernia), and Hypertension. .    Recently admitted to [facility] about 1 months ago.  At that time had a total of 4 EGDs for recurrent bleeding. Submucosa injection, clipping attempted multiple times with persistent bleeding. Patient ended up having vagotomy 7/8/22.     Review of Systems:  12 point review of systems otherwise negative except as stated in HPI.    Objective:  Vitals:    08/02/22 0400   BP: 134/80   Pulse: 93   Resp: 18   Temp: 97.5 °F (36.4 °C)     Physical Exam  Constitutional:       Appearance: Normal appearance.   HENT:      Head: Normocephalic and atraumatic.      Nose: No congestion or rhinorrhea.   Eyes:      General: No scleral icterus.  Cardiovascular:      Rate and Rhythm: Normal rate.   Pulmonary:      Effort: Pulmonary effort is normal.   Abdominal:      General: Abdomen is flat.      Palpations: Abdomen is soft.   Musculoskeletal:         General: No swelling or tenderness.      Cervical back: Neck supple. No rigidity.   Skin:     General: Skin is warm and dry.   Neurological:      Mental Status: He is alert.   Psychiatric:         Mood and Affect: Mood normal.         Assessment and Plan:  Hematemesis  Triage:      Please ensure 2 large bore peripheral IVs are available      Would consider infusion for goal HR<100, SBP>100 unless contraindicated      Would consider transfusion for goal Hgb >7 (>10 if history of CAD) unless contraindicated     Timing:  - once stabilized, will plan to perform EGD  - please make patient NPO for the procedure  - recommend holding NSAIDs, and starting IV PPI  -  please call if patient's H/H continues to trend downwards despite transfusions or if patient becomes unstable, and would consider angiography with angioembolization or surgery      Thank you for including us in the care of this patient. Please call with any questions.     Domo Prado MD  Gastroenterology

## 2022-08-02 NOTE — ASSESSMENT & PLAN NOTE
- Prontonix infusion  - NS @ 125 mL/hr  - NPO  - GI consult, Eval for endoscopy  - type and screen  - 2U pRBC given at [facility] ED   - supplemental O2 PRN  - cardiac monitoring  - monitoring H/H transfuse as needed  - holding home Plavix and Asa81

## 2022-08-02 NOTE — ASSESSMENT & PLAN NOTE
[id number]    Patient A&O X 4. VSS. Denies any pain, dizziness, lightheadedness, shortness of breath. Dobutamine gtt continues at 2.5 mcg/kg/min. Renogram done today. Up ad jorgito, able to make needs known Will continue to monitor and notify team of any questions or concerns .   Followed by Dr. Jovan Zaragoza [facility] Q6H PRN  - Supplemental O2 PRN

## 2022-08-02 NOTE — PLAN OF CARE
Problem: Adult Inpatient Plan of Care  Goal: Plan of Care Review  Outcome: Ongoing, Progressing  Flowsheets (Taken 8/2/2022 0300)  Plan of Care Reviewed With: patient  Goal: Optimal Comfort and Wellbeing  Outcome: Ongoing, Progressing  Intervention: Monitor Pain and Promote Comfort  Flowsheets (Taken 8/2/2022 0300)  Pain Management Interventions:   pillow support provided   position adjusted   relaxation techniques promoted   quiet environment facilitated   pain management plan reviewed with patient/caregiver  Intervention: Provide Person-Centered Care  Flowsheets (Taken 8/2/2022 0300)  Trust Relationship/Rapport:   care explained   choices provided   emotional support provided   empathic listening provided   questions answered   questions encouraged   reassurance provided   thoughts/feelings acknowledged     Problem: Diabetes Comorbidity  Goal: Blood Glucose Level Within Targeted Range  Outcome: Ongoing, Progressing  Intervention: Monitor and Manage Glycemia  Flowsheets (Taken 8/2/2022 0300)  Glycemic Management:   blood glucose monitored   supplemental insulin given     Problem: Fluid and Electrolyte Imbalance (Acute Kidney Injury/Impairment)  Goal: Fluid and Electrolyte Balance  Outcome: Ongoing, Progressing  Intervention: Monitor and Manage Fluid and Electrolyte Balance  Flowsheets (Taken 8/2/2022 0300)  Fluid/Electrolyte Management: fluids provided     Problem: Oral Intake Inadequate (Acute Kidney Injury/Impairment)  Goal: Optimal Nutrition Intake  Outcome: Ongoing, Progressing  Intervention: Promote and Optimize Nutrition  Flowsheets (Taken 8/2/2022 0300)  Oral Nutrition Promotion:   physical activity promoted   rest periods promoted   social interaction promoted   safe use of adaptive equipment encouraged     Problem: Renal Function Impairment (Acute Kidney Injury/Impairment)  Goal: Effective Renal Function  Outcome: Ongoing, Progressing  Intervention: Monitor and Support Renal Function  Flowsheets (Taken  8/2/2022 0300)  Medication Review/Management: medications reviewed     Problem: Skin Injury Risk Increased  Goal: Skin Health and Integrity  Outcome: Ongoing, Progressing  Intervention: Optimize Skin Protection  Flowsheets (Taken 8/2/2022 0300)  Pressure Reduction Techniques:   rest period provided between sit times   frequent weight shift encouraged   heels elevated off bed   positioned off wounds   pressure points protected   weight shift assistance provided  Pressure Reduction Devices: positioning supports utilized  Skin Protection:   adhesive use limited   incontinence pads utilized   tubing/devices free from skin contact   transparent dressing maintained  Head of Bed (HOB) Positioning: HOB elevated

## 2022-08-02 NOTE — H&P
[facility] - [facility] Tele[facility] Hospital Medicine  History & Physical    Patient Name: Neymar Parra  MRN: [id number]  Patient Class: IP- Inpatient  Admission Date: 8/2/2022  Attending Physician: Shari Cortes MD   Primary Care Provider: [facility]         Patient information was obtained from patient, past medical records and ER records.     Subjective:     Principal Problem:Gastric hemorrhage    Chief Complaint: No chief complaint on file.       HPI: 72 y.o.m. presents to [facility] from [facility] due to GI bleed causing acute blood loss anemia. Patient resides at the [facility] in [place] where blood work resulted with an H/H of 6.9/20.9. Patient was taken to [facility] ED where he received 2U of pRBC and fluids. Patient was transferred to [facility] and admitted for higher level of care. GI was consulted fluids started and patient made NPO. Patient has several GI bleeds in the past with the most recent being on 7/8/2 where an Ex Lap was performed. Patient has an extensive GI past medical and past surgical Hx. Will monitor H/H and follow GI's recommendations.    ED Course:  Repeat H/H post 2U pRBC showed an H/H of 8.5/24.4. PT/PTT elevated at 15/40.8 with INR wnl. CMP indicated CKD.       Past Medical History:   Diagnosis Date    Chronic renal disease, stage 4, severely decreased glomerular filtration rate (GFR) between 15-29 mL/min/1.73 square meter     COPD (chronic obstructive pulmonary disease)     Coronary artery disease     COVID-19 06/16/2022    Dieulafoy lesion of duodenum 07/05/2022    HH (hiatus hernia) 07/05/2022    Hypertension        Past Surgical History:   Procedure Laterality Date    ABDOMINAL SURGERY      CARDIAC SURGERY      CORONARY ANGIOPLASTY WITH STENT PLACEMENT      ~ 2017    CORONARY ANGIOPLASTY WITH STENT PLACEMENT      JOINT REPLACEMENT      PYLOROPLASTY N/A 07/08/2022    Procedure:  PYLOROPLASTY;  Surgeon: Marvin Rees MD;  Location: [facility] OR;  Service: General;  Laterality: N/A;    VAGOTOMY N/A 07/08/2022    Procedure: VAGOTOMY;  Surgeon: Marvin Rees MD;  Location: [facility] OR;  Service: General;  Laterality: N/A;       Review of patient's allergies indicates:   Allergen Reactions    Gatifloxacin Anaphylaxis       No current facility-administered medications on file prior to encounter.     Current Outpatient Medications on File Prior to Encounter   Medication Sig    acetaminophen (TYLENOL) 500 MG tablet Take 1,000 mg by mouth every 8 (eight) hours as needed for Pain.    albuterol-ipratropium (DUO-NEB) 2.5 mg-0.5 mg/3 mL nebulizer solution Take 3 mLs by nebulization every 6 (six) hours as needed for Wheezing. Rescue    allopurinoL (ZYLOPRIM) 100 MG tablet Take 0.5 tablets (50 mg total) by mouth once daily.    amLODIPine (NORVASC) 10 MG tablet Take 10 mg by mouth once daily.    ascorbic acid, vitamin C, (VITAMIN C) 500 MG tablet Take 1 tablet (500 mg total) by mouth once daily.    atorvastatin (LIPITOR) 80 MG tablet Take 40 mg by mouth every evening.    HYDROcodone-acetaminophen (NORCO) 7.5-325 mg per tablet Take 1 tablet by mouth every 6 (six) hours as needed for Pain.    loratadine (CLARITIN) 10 mg tablet TAKE ONE TABLET BY MOUTH DAILY FOR ALLERGIES    metoprolol tartrate (LOPRESSOR) 25 MG tablet Take 1 tablet (25 mg total) by mouth 2 (two) times daily.    multivitamin Tab Take 1 tablet by mouth once daily.    pantoprazole (PROTONIX) 40 MG tablet Take 1 tablet (40 mg total) by mouth once daily.    sodium bicarbonate 650 MG tablet Take 1 tablet (650 mg total) by mouth 2 (two) times daily.    traMADoL (ULTRAM) 50 mg tablet Take 50 mg by mouth every 8 (eight) hours as needed for Pain.    traZODone (DESYREL) 100 MG tablet Take 300 mg by mouth every evening.    [DISCONTINUED] aspirin (ECOTRIN) 325 MG EC tablet Take 325 mg by mouth once daily.    [DISCONTINUED] clopidogreL  (PLAVIX) 75 mg tablet TAKE 1 TABLET BY MOUTH ONCE DAILY     [DISCONTINUED] isosorbide mononitrate (IMDUR) 60 MG 24 hr tablet Take 1 tablet by mouth once daily.    [DISCONTINUED] lisinopriL (PRINIVIL,ZESTRIL) 20 MG tablet Take 20 mg by mouth.    [DISCONTINUED] omeprazole (PRILOSEC) 20 MG capsule Take 20 mg by mouth once daily.     Family History       Problem Relation (Age of Onset)    Diabetes Mother, Father          Tobacco Use    Smoking status: Former Smoker     Packs/day: 0.50     Years: 30.00     Pack years: 15.00     Types: Cigarettes    Smokeless tobacco: Never Used    Tobacco comment: quit Dec 2021   Substance and Sexual Activity    Alcohol use: Not Currently     Comment: pint a week: Hx    Drug use: Never    Sexual activity: Not Currently     Review of Systems   Constitutional:  Negative for activity change, appetite change, chills, diaphoresis and fever.   HENT:  Negative for congestion, hearing loss, nosebleeds, postnasal drip, rhinorrhea, sore throat and trouble swallowing.    Eyes:  Negative for visual disturbance.   Respiratory:  Negative for cough, chest tightness and shortness of breath.    Cardiovascular:  Negative for chest pain and leg swelling.   Gastrointestinal:  Positive for abdominal pain and blood in stool (Dark stool). Negative for constipation, diarrhea, nausea and vomiting.   Genitourinary:  Negative for dysuria and hematuria.   Musculoskeletal:  Positive for back pain. Negative for joint swelling.   Skin:  Negative for rash.   Neurological:  Negative for dizziness, weakness, light-headedness and headaches.   All other systems reviewed and are negative.  Objective:     Vital Signs (Most Recent):  Temp: 98.5 °F (36.9 °C) (08/02/22 0130)  Pulse: 68 (08/02/22 0130)  Resp: 17 (08/02/22 0130)  BP: (!) 143/75 (08/02/22 0130)  SpO2: 99 % (08/02/22 0130)   Vital Signs (24h Range):  Temp:  [98.5 °F (36.9 °C)-98.7 °F (37.1 °C)] 98.5 °F (36.9 °C)  Pulse:  [68-85] 68  Resp:  [17-20]  17  SpO2:  [97 %-100 %] 99 %  BP: (112-143)/(63-83) 143/75     Weight: 89.8 kg (198 lb)  Body mass index is 26.85 kg/m².    Physical Exam  Vitals and nursing note reviewed.   Constitutional:       General: He is not in acute distress.     Appearance: Normal appearance. He is not ill-appearing, toxic-appearing or diaphoretic.   HENT:      Head: Normocephalic and atraumatic.      Nose: Nose normal. No congestion or rhinorrhea.      Mouth/Throat:      Mouth: Mucous membranes are dry.      Pharynx: Oropharynx is clear. No oropharyngeal exudate or posterior oropharyngeal erythema.   Eyes:      Conjunctiva/sclera: Conjunctivae normal.   Cardiovascular:      Rate and Rhythm: Normal rate. Rhythm irregular.      Pulses: Normal pulses.      Heart sounds: Normal heart sounds. No murmur heard.    No friction rub.   Pulmonary:      Effort: Pulmonary effort is normal. No respiratory distress.      Breath sounds: Normal breath sounds. No wheezing, rhonchi or rales.   Abdominal:      General: Bowel sounds are normal. There is no distension.      Palpations: Abdomen is soft.      Tenderness: There is no abdominal tenderness. There is no guarding.   Musculoskeletal:      Cervical back: Tenderness present.      Right lower leg: No edema.      Left lower leg: No edema.   Lymphadenopathy:      Cervical: No cervical adenopathy.   Skin:     General: Skin is warm and dry.      Capillary Refill: Capillary refill takes more than 3 seconds.   Neurological:      General: No focal deficit present.      Mental Status: He is alert and oriented to person, place, and time.   Psychiatric:         Mood and Affect: Mood normal.         Behavior: Behavior normal.         Thought Content: Thought content normal.         Judgment: Judgment normal.           Significant Labs:   Recent Lab Results         08/02/22  0243        Albumin/Globulin Ratio 0.6       Albumin 1.9       Alkaline Phosphatase 93       ALT 42       Anion Gap 15       aPTT 40.8        AST 29       Baso # 0.04       Basophil % 0.5       BILIRUBIN TOTAL 0.4       BUN 47       BUN/CREAT RATIO 10       Calcium 8.4       Chloride 114       CO2 19       Creatinine 4.77       Differential Type Auto       eGFR if non  13       Eos # 0.21       Eosinophil % 2.6       Ferritin 146       Globulin, Total 3.3       Glucose 78       Group & Rh O POS       Hematocrit 24.4       Hemoglobin 8.5       Immature Grans (Abs) 0.04       Immature Granulocytes 0.5       INDIRECT RAJWINDER NEG       INR 1.23       Iron 30       Iron Saturation 21       Lymph # 2.12       Lymph % 26.4       Magnesium 1.8       MCH 31.4       MCHC 34.8       MCV 90.0       Mono # 0.62       Mono % 7.7       MPV 8.5       Neutrophils, Abs 4.99       Neutrophils Relative 62.3       nRBC 0.0       NUCLEATED RBC ABSOLUTE 0.00       Platelets 225       Potassium 5.5       PROTEIN TOTAL 5.2       Protime 15.0       RBC 2.71       RDW 15.4       Sodium 142       TIBC 143       Vitamin B-12 1,199       WBC 8.02               Significant Imaging: I have reviewed all pertinent imaging results/findings within the past 24 hours.    Assessment/Plan:     * Gastric hemorrhage  - Prontonix infusion  - NS @ 125 mL/hr  - NPO  - GI consult, Eval for endoscopy  - type and screen  - 2U pRBC given at [facility] ED   - supplemental O2 PRN  - cardiac monitoring  - monitoring H/H transfuse as needed  - holding home Plavix and Asa81    Acute blood loss anemia  Anemia panel indicates mixed anemia retic count pending   Trend H/H   Transfuse if inidicated      Hypertension  Holding home amlodipine metoprolol tartrate, and lisinopril       COPD (chronic obstructive pulmonary disease)  Followed by Dr. Jovan Diaz Q6H PRN  - Supplemental O2 PRN    Coronary artery disease involving native coronary artery of native heart without angina pectoris  Patient has CAD with stent   Holding home Lisinopril, Metoprolol tartrate, Asa81, and isosorbide mononitrate  -  atorvastatin 80mg QD      CKD (chronic kidney disease), stage V  Fluids as above      Gastroesophageal reflux disease  Protonix as above        VTE Risk Mitigation (From admission, onward)         Ordered     IP VTE HIGH RISK PATIENT  Once         08/02/22 0429     Reason for no Mechanical VTE Prophylaxis  Once        Question:  Reasons:  Answer:  Active Bleeding    08/02/22 0429     Place sequential compression device  Until discontinued         08/02/22 2677                   Jose L Hdz DO  Department of Hospital Medicine   [facility] - [facility] Medical Telemetry

## 2022-08-02 NOTE — ANESTHESIA POSTPROCEDURE EVALUATION
Anesthesia Post Evaluation    Patient: Neymar Parra    Procedure(s) Performed: * No procedures listed *    Final Anesthesia Type: general      Patient location during evaluation: GI PACU  Patient participation: Yes- Able to Participate  Level of consciousness: awake and alert  Post-procedure vital signs: reviewed and stable  Pain management: adequate  Airway patency: patent    PONV status at discharge: No PONV  Anesthetic complications: no      Cardiovascular status: stable  Respiratory status: unassisted, spontaneous ventilation and room air  Hydration status: euvolemic  Follow-up not needed.          Vitals Value Taken Time   BP 92/44 08/02/22 1007   Temp 36.7 °C (98 °F) 08/02/22 0958   Pulse 73 08/02/22 1007   Resp 18 08/02/22 1007   SpO2 96 % 08/02/22 1007   Vitals shown include unvalidated device data.      No case tracking events are documented in the log.      Pain/Esther Score: Esther Score: 8 (8/2/2022 10:01 AM)

## 2022-08-02 NOTE — HPI
72 y.o.m. presents to [facility] from [facility] due to GI bleed causing acute blood loss anemia. Patient resides at the [facility] in [place] where blood work resulted with an H/H of 6.9/20.9. Patient was taken to [facility] ED where he received 2U of pRBC and fluids. Patient was transferred to [facility] and admitted for higher level of care. GI was consulted fluids started and patient made NPO. Patient has several GI bleeds in the past with the most recent being on 7/8/2 where an Ex Lap was performed. Patient has an extensive GI past medical and past surgical Hx. Will monitor H/H and follow GI's recommendations.    ED Course:  Repeat H/H post 2U pRBC showed an H/H of 8.5/24.4. PT/PTT elevated at 15/40.8 with INR wnl. CMP indicated CKD.

## 2022-08-02 NOTE — NURSING
Pt returned from GI lab via stretcher. Resting in bed. resp even & unlabored. Opens eyes spontaneously. VS obtained. Pt denies any pain or needs. Safety ms ongoing. cb in reach. wctm.

## 2022-08-02 NOTE — SUBJECTIVE & OBJECTIVE
Past Medical History:   Diagnosis Date    Chronic renal disease, stage 4, severely decreased glomerular filtration rate (GFR) between 15-29 mL/min/1.73 square meter     COPD (chronic obstructive pulmonary disease)     Coronary artery disease     COVID-19 06/16/2022    Dieulafoy lesion of duodenum 07/05/2022    HH (hiatus hernia) 07/05/2022    Hypertension        Past Surgical History:   Procedure Laterality Date    ABDOMINAL SURGERY      CARDIAC SURGERY      CORONARY ANGIOPLASTY WITH STENT PLACEMENT      ~ 2017    CORONARY ANGIOPLASTY WITH STENT PLACEMENT      JOINT REPLACEMENT      PYLOROPLASTY N/A 07/08/2022    Procedure: PYLOROPLASTY;  Surgeon: Marvin Rees MD;  Location: [facility] OR;  Service: General;  Laterality: N/A;    VAGOTOMY N/A 07/08/2022    Procedure: VAGOTOMY;  Surgeon: Marvin Rees MD;  Location: [facility] OR;  Service: General;  Laterality: N/A;       Review of patient's allergies indicates:   Allergen Reactions    Gatifloxacin Anaphylaxis       No current facility-administered medications on file prior to encounter.     Current Outpatient Medications on File Prior to Encounter   Medication Sig    acetaminophen (TYLENOL) 500 MG tablet Take 1,000 mg by mouth every 8 (eight) hours as needed for Pain.    albuterol-ipratropium (DUO-NEB) 2.5 mg-0.5 mg/3 mL nebulizer solution Take 3 mLs by nebulization every 6 (six) hours as needed for Wheezing. Rescue    allopurinoL (ZYLOPRIM) 100 MG tablet Take 0.5 tablets (50 mg total) by mouth once daily.    amLODIPine (NORVASC) 10 MG tablet Take 10 mg by mouth once daily.    ascorbic acid, vitamin C, (VITAMIN C) 500 MG tablet Take 1 tablet (500 mg total) by mouth once daily.    atorvastatin (LIPITOR) 80 MG tablet Take 40 mg by mouth every evening.    HYDROcodone-acetaminophen (NORCO) 7.5-325 mg per tablet Take 1 tablet by mouth every 6 (six) hours as needed for Pain.    loratadine (CLARITIN) 10 mg tablet TAKE ONE TABLET BY MOUTH DAILY FOR ALLERGIES    metoprolol  tartrate (LOPRESSOR) 25 MG tablet Take 1 tablet (25 mg total) by mouth 2 (two) times daily.    multivitamin Tab Take 1 tablet by mouth once daily.    pantoprazole (PROTONIX) 40 MG tablet Take 1 tablet (40 mg total) by mouth once daily.    sodium bicarbonate 650 MG tablet Take 1 tablet (650 mg total) by mouth 2 (two) times daily.    traMADoL (ULTRAM) 50 mg tablet Take 50 mg by mouth every 8 (eight) hours as needed for Pain.    traZODone (DESYREL) 100 MG tablet Take 300 mg by mouth every evening.    [DISCONTINUED] aspirin (ECOTRIN) 325 MG EC tablet Take 325 mg by mouth once daily.    [DISCONTINUED] clopidogreL (PLAVIX) 75 mg tablet TAKE 1 TABLET BY MOUTH ONCE DAILY     [DISCONTINUED] isosorbide mononitrate (IMDUR) 60 MG 24 hr tablet Take 1 tablet by mouth once daily.    [DISCONTINUED] lisinopriL (PRINIVIL,ZESTRIL) 20 MG tablet Take 20 mg by mouth.    [DISCONTINUED] omeprazole (PRILOSEC) 20 MG capsule Take 20 mg by mouth once daily.     Family History       Problem Relation (Age of Onset)    Diabetes Mother, Father          Tobacco Use    Smoking status: Former Smoker     Packs/day: 0.50     Years: 30.00     Pack years: 15.00     Types: Cigarettes    Smokeless tobacco: Never Used    Tobacco comment: quit Dec 2021   Substance and Sexual Activity    Alcohol use: Not Currently     Comment: pint a week: Hx    Drug use: Never    Sexual activity: Not Currently     Review of Systems   Constitutional:  Negative for activity change, appetite change, chills, diaphoresis and fever.   HENT:  Negative for congestion, hearing loss, nosebleeds, postnasal drip, rhinorrhea, sore throat and trouble swallowing.    Eyes:  Negative for visual disturbance.   Respiratory:  Negative for cough, chest tightness and shortness of breath.    Cardiovascular:  Negative for chest pain and leg swelling.   Gastrointestinal:  Positive for abdominal pain and blood in stool (Dark stool). Negative for constipation, diarrhea, nausea and vomiting.    Genitourinary:  Negative for dysuria and hematuria.   Musculoskeletal:  Positive for back pain. Negative for joint swelling.   Skin:  Negative for rash.   Neurological:  Negative for dizziness, weakness, light-headedness and headaches.   All other systems reviewed and are negative.  Objective:     Vital Signs (Most Recent):  Temp: 98.5 °F (36.9 °C) (08/02/22 0130)  Pulse: 68 (08/02/22 0130)  Resp: 17 (08/02/22 0130)  BP: (!) 143/75 (08/02/22 0130)  SpO2: 99 % (08/02/22 0130)   Vital Signs (24h Range):  Temp:  [98.5 °F (36.9 °C)-98.7 °F (37.1 °C)] 98.5 °F (36.9 °C)  Pulse:  [68-85] 68  Resp:  [17-20] 17  SpO2:  [97 %-100 %] 99 %  BP: (112-143)/(63-83) 143/75     Weight: 89.8 kg (198 lb)  Body mass index is 26.85 kg/m².    Physical Exam  Vitals and nursing note reviewed.   Constitutional:       General: He is not in acute distress.     Appearance: Normal appearance. He is not ill-appearing, toxic-appearing or diaphoretic.   HENT:      Head: Normocephalic and atraumatic.      Nose: Nose normal. No congestion or rhinorrhea.      Mouth/Throat:      Mouth: Mucous membranes are dry.      Pharynx: Oropharynx is clear. No oropharyngeal exudate or posterior oropharyngeal erythema.   Eyes:      Conjunctiva/sclera: Conjunctivae normal.   Cardiovascular:      Rate and Rhythm: Normal rate. Rhythm irregular.      Pulses: Normal pulses.      Heart sounds: Normal heart sounds. No murmur heard.    No friction rub.   Pulmonary:      Effort: Pulmonary effort is normal. No respiratory distress.      Breath sounds: Normal breath sounds. No wheezing, rhonchi or rales.   Abdominal:      General: Bowel sounds are normal. There is no distension.      Palpations: Abdomen is soft.      Tenderness: There is no abdominal tenderness. There is no guarding.   Musculoskeletal:      Cervical back: Tenderness present.      Right lower leg: No edema.      Left lower leg: No edema.   Lymphadenopathy:      Cervical: No cervical adenopathy.   Skin:      General: Skin is warm and dry.      Capillary Refill: Capillary refill takes more than 3 seconds.   Neurological:      General: No focal deficit present.      Mental Status: He is alert and oriented to person, place, and time.   Psychiatric:         Mood and Affect: Mood normal.         Behavior: Behavior normal.         Thought Content: Thought content normal.         Judgment: Judgment normal.           Significant Labs:   Recent Lab Results         08/02/22  0243        Albumin/Globulin Ratio 0.6       Albumin 1.9       Alkaline Phosphatase 93       ALT 42       Anion Gap 15       aPTT 40.8       AST 29       Baso # 0.04       Basophil % 0.5       BILIRUBIN TOTAL 0.4       BUN 47       BUN/CREAT RATIO 10       Calcium 8.4       Chloride 114       CO2 19       Creatinine 4.77       Differential Type Auto       eGFR if non  13       Eos # 0.21       Eosinophil % 2.6       Ferritin 146       Globulin, Total 3.3       Glucose 78       Group & Rh O POS       Hematocrit 24.4       Hemoglobin 8.5       Immature Grans (Abs) 0.04       Immature Granulocytes 0.5       INDIRECT RAJWINDER NEG       INR 1.23       Iron 30       Iron Saturation 21       Lymph # 2.12       Lymph % 26.4       Magnesium 1.8       MCH 31.4       MCHC 34.8       MCV 90.0       Mono # 0.62       Mono % 7.7       MPV 8.5       Neutrophils, Abs 4.99       Neutrophils Relative 62.3       nRBC 0.0       NUCLEATED RBC ABSOLUTE 0.00       Platelets 225       Potassium 5.5       PROTEIN TOTAL 5.2       Protime 15.0       RBC 2.71       RDW 15.4       Sodium 142       TIBC 143       Vitamin B-12 1,199       WBC 8.02               Significant Imaging: I have reviewed all pertinent imaging results/findings within the past 24 hours.

## 2022-08-03 LAB
ANION GAP SERPL CALCULATED.3IONS-SCNC: 13 MMOL/L (ref 7–16)
BASOPHILS # BLD AUTO: 0.02 K/UL (ref 0–0.2)
BASOPHILS NFR BLD AUTO: 0.3 % (ref 0–1)
BUN SERPL-MCNC: 46 MG/DL (ref 7–18)
BUN/CREAT SERPL: 10 (ref 6–20)
CALCIUM SERPL-MCNC: 8.2 MG/DL (ref 8.5–10.1)
CHLORIDE SERPL-SCNC: 112 MMOL/L (ref 98–107)
CO2 SERPL-SCNC: 19 MMOL/L (ref 21–32)
CREAT SERPL-MCNC: 4.66 MG/DL (ref 0.7–1.3)
DIFFERENTIAL METHOD BLD: ABNORMAL
EOSINOPHIL # BLD AUTO: 0.25 K/UL (ref 0–0.5)
EOSINOPHIL NFR BLD AUTO: 3.5 % (ref 1–4)
ERYTHROCYTE [DISTWIDTH] IN BLOOD BY AUTOMATED COUNT: 15.4 % (ref 11.5–14.5)
ESTROGEN SERPL-MCNC: NORMAL PG/ML
GLUCOSE SERPL-MCNC: 102 MG/DL (ref 70–105)
GLUCOSE SERPL-MCNC: 113 MG/DL (ref 74–106)
GLUCOSE SERPL-MCNC: 115 MG/DL (ref 70–105)
GLUCOSE SERPL-MCNC: 115 MG/DL (ref 70–105)
GLUCOSE SERPL-MCNC: 117 MG/DL (ref 70–105)
HCT VFR BLD AUTO: 25.2 % (ref 40–54)
HGB BLD-MCNC: 7.7 G/DL (ref 13.5–18)
HGB BLD-MCNC: 7.9 G/DL (ref 13.5–18)
HGB BLD-MCNC: 8.1 G/DL (ref 13.5–18)
IMM GRANULOCYTES # BLD AUTO: 0.04 K/UL (ref 0–0.04)
IMM GRANULOCYTES NFR BLD: 0.6 % (ref 0–0.4)
INSULIN SERPL-ACNC: NORMAL U[IU]/ML
LAB AP GROSS DESCRIPTION: NORMAL
LAB AP LABORATORY NOTES: NORMAL
LYMPHOCYTES # BLD AUTO: 1.73 K/UL (ref 1–4.8)
LYMPHOCYTES NFR BLD AUTO: 24.2 % (ref 27–41)
MCH RBC QN AUTO: 30.1 PG (ref 27–31)
MCHC RBC AUTO-ENTMCNC: 32.1 G/DL (ref 32–36)
MCV RBC AUTO: 93.7 FL (ref 80–96)
MONOCYTES # BLD AUTO: 0.43 K/UL (ref 0–0.8)
MONOCYTES NFR BLD AUTO: 6 % (ref 2–6)
MPC BLD CALC-MCNC: 8.7 FL (ref 9.4–12.4)
NEUTROPHILS # BLD AUTO: 4.68 K/UL (ref 1.8–7.7)
NEUTROPHILS NFR BLD AUTO: 65.4 % (ref 53–65)
NRBC # BLD AUTO: 0 X10E3/UL
NRBC, AUTO (.00): 0 %
PLATELET # BLD AUTO: 229 K/UL (ref 150–400)
POTASSIUM SERPL-SCNC: 5.5 MMOL/L (ref 3.5–5.1)
RBC # BLD AUTO: 2.69 M/UL (ref 4.6–6.2)
SODIUM SERPL-SCNC: 138 MMOL/L (ref 136–145)
T3RU NFR SERPL: NORMAL %
WBC # BLD AUTO: 7.15 K/UL (ref 4.5–11)

## 2022-08-03 PROCEDURE — 99232 PR SUBSEQUENT HOSPITAL CARE,LEVL II: ICD-10-PCS | Mod: ,,, | Performed by: STUDENT IN AN ORGANIZED HEALTH CARE EDUCATION/TRAINING PROGRAM

## 2022-08-03 PROCEDURE — 80048 BASIC METABOLIC PNL TOTAL CA: CPT | Performed by: HOSPITALIST

## 2022-08-03 PROCEDURE — 82310 ASSAY OF CALCIUM: CPT | Performed by: HOSPITALIST

## 2022-08-03 PROCEDURE — 25000003 PHARM REV CODE 250: Performed by: HOSPITALIST

## 2022-08-03 PROCEDURE — 99232 SBSQ HOSP IP/OBS MODERATE 35: CPT | Mod: ,,, | Performed by: STUDENT IN AN ORGANIZED HEALTH CARE EDUCATION/TRAINING PROGRAM

## 2022-08-03 PROCEDURE — 36415 COLL VENOUS BLD VENIPUNCTURE: CPT | Performed by: HOSPITALIST

## 2022-08-03 PROCEDURE — 85025 COMPLETE CBC W/AUTO DIFF WBC: CPT | Performed by: HOSPITALIST

## 2022-08-03 PROCEDURE — 85018 HEMOGLOBIN: CPT | Performed by: HOSPITALIST

## 2022-08-03 PROCEDURE — 63600175 PHARM REV CODE 636 W HCPCS: Performed by: HOSPITALIST

## 2022-08-03 PROCEDURE — 82962 GLUCOSE BLOOD TEST: CPT

## 2022-08-03 PROCEDURE — C9113 INJ PANTOPRAZOLE SODIUM, VIA: HCPCS | Performed by: HOSPITALIST

## 2022-08-03 PROCEDURE — 99233 PR SUBSEQUENT HOSPITAL CARE,LEVL III: ICD-10-PCS | Mod: ,,, | Performed by: HOSPITALIST

## 2022-08-03 PROCEDURE — 25000003 PHARM REV CODE 250: Performed by: INTERNAL MEDICINE

## 2022-08-03 PROCEDURE — 11000001 HC ACUTE MED/SURG PRIVATE ROOM

## 2022-08-03 PROCEDURE — 99233 SBSQ HOSP IP/OBS HIGH 50: CPT | Mod: ,,, | Performed by: HOSPITALIST

## 2022-08-03 RX ORDER — SODIUM BICARBONATE 650 MG/1
1300 TABLET ORAL 2 TIMES DAILY
Status: DISCONTINUED | OUTPATIENT
Start: 2022-08-03 | End: 2022-08-05

## 2022-08-03 RX ORDER — PANTOPRAZOLE SODIUM 40 MG/1
40 TABLET, DELAYED RELEASE ORAL
Status: DISCONTINUED | OUTPATIENT
Start: 2022-08-03 | End: 2022-08-06 | Stop reason: HOSPADM

## 2022-08-03 RX ADMIN — SODIUM CHLORIDE: 9 INJECTION, SOLUTION INTRAVENOUS at 03:08

## 2022-08-03 RX ADMIN — TRAZODONE HYDROCHLORIDE 300 MG: 150 TABLET ORAL at 09:08

## 2022-08-03 RX ADMIN — SODIUM BICARBONATE 1300 MG: 650 TABLET ORAL at 09:08

## 2022-08-03 RX ADMIN — PANTOPRAZOLE SODIUM 8 MG/HR: 40 INJECTION, POWDER, FOR SOLUTION INTRAVENOUS at 03:08

## 2022-08-03 RX ADMIN — SODIUM CHLORIDE: 9 INJECTION, SOLUTION INTRAVENOUS at 02:08

## 2022-08-03 RX ADMIN — SODIUM POLYSTYRENE SULFONATE 15 G: 15 SUSPENSION ORAL; RECTAL at 09:08

## 2022-08-03 RX ADMIN — SODIUM POLYSTYRENE SULFONATE 15 G: 15 SUSPENSION ORAL; RECTAL at 01:08

## 2022-08-03 RX ADMIN — MUPIROCIN: 20 OINTMENT TOPICAL at 01:08

## 2022-08-03 RX ADMIN — ATORVASTATIN CALCIUM 40 MG: 40 TABLET, FILM COATED ORAL at 09:08

## 2022-08-03 RX ADMIN — PANTOPRAZOLE SODIUM 40 MG: 40 TABLET, DELAYED RELEASE ORAL at 04:08

## 2022-08-03 RX ADMIN — MUPIROCIN: 20 OINTMENT TOPICAL at 09:08

## 2022-08-03 NOTE — PLAN OF CARE
Problem: Adult Inpatient Plan of Care  Goal: Plan of Care Review  Outcome: Ongoing, Progressing  Flowsheets (Taken 8/2/2022 2018)  Plan of Care Reviewed With: patient  Goal: Optimal Comfort and Wellbeing  Outcome: Ongoing, Progressing  Intervention: Monitor Pain and Promote Comfort  Flowsheets (Taken 8/2/2022 2018)  Pain Management Interventions:   pillow support provided   position adjusted   relaxation techniques promoted   quiet environment facilitated   premedicated for activity   pain management plan reviewed with patient/caregiver  Intervention: Provide Person-Centered Care  Flowsheets (Taken 8/2/2022 2018)  Trust Relationship/Rapport:   care explained   choices provided   emotional support provided   empathic listening provided   questions answered   thoughts/feelings acknowledged   reassurance provided   questions encouraged     Problem: Diabetes Comorbidity  Goal: Blood Glucose Level Within Targeted Range  Outcome: Ongoing, Progressing  Intervention: Monitor and Manage Glycemia  Flowsheets (Taken 8/2/2022 2018)  Glycemic Management:   blood glucose monitored   supplemental insulin given     Problem: Fluid and Electrolyte Imbalance (Acute Kidney Injury/Impairment)  Goal: Fluid and Electrolyte Balance  Outcome: Ongoing, Progressing  Intervention: Monitor and Manage Fluid and Electrolyte Balance  Flowsheets (Taken 8/2/2022 2018)  Fluid/Electrolyte Management: fluids provided     Problem: Oral Intake Inadequate (Acute Kidney Injury/Impairment)  Goal: Optimal Nutrition Intake  Outcome: Ongoing, Progressing  Intervention: Promote and Optimize Nutrition  Flowsheets (Taken 8/2/2022 2018)  Oral Nutrition Promotion:   physical activity promoted   rest periods promoted   social interaction promoted   safe use of adaptive equipment encouraged     Problem: Skin Injury Risk Increased  Goal: Skin Health and Integrity  Outcome: Ongoing, Progressing  Intervention: Optimize Skin Protection  Flowsheets (Taken 8/2/2022  2018)  Pressure Reduction Techniques:   frequent weight shift encouraged   heels elevated off bed   positioned off wounds   pressure points protected   weight shift assistance provided   rest period provided between sit times  Pressure Reduction Devices: positioning supports utilized  Skin Protection:   adhesive use limited   incontinence pads utilized   transparent dressing maintained   tubing/devices free from skin contact  Head of Bed (HOB) Positioning: HOB elevated

## 2022-08-03 NOTE — PLAN OF CARE
Problem: Adult Inpatient Plan of Care  Goal: Plan of Care Review  Outcome: Ongoing, Progressing  Flowsheets (Taken 8/3/2022 1555)  Plan of Care Reviewed With: patient  Goal: Patient-Specific Goal (Individualized)  Outcome: Ongoing, Progressing  Goal: Absence of Hospital-Acquired Illness or Injury  Outcome: Ongoing, Progressing  Goal: Optimal Comfort and Wellbeing  Outcome: Ongoing, Progressing  Goal: Readiness for Transition of Care  Outcome: Ongoing, Progressing     Problem: Diabetes Comorbidity  Goal: Blood Glucose Level Within Targeted Range  Outcome: Ongoing, Progressing  Intervention: Monitor and Manage Glycemia  Flowsheets (Taken 8/3/2022 1551)  Glycemic Management:   blood glucose monitored   supplemental insulin given     Problem: Fluid and Electrolyte Imbalance (Acute Kidney Injury/Impairment)  Goal: Fluid and Electrolyte Balance  Outcome: Ongoing, Progressing     Problem: Oral Intake Inadequate (Acute Kidney Injury/Impairment)  Goal: Optimal Nutrition Intake  Outcome: Ongoing, Progressing  Intervention: Promote and Optimize Nutrition  Flowsheets (Taken 8/3/2022 155)  Oral Nutrition Promotion:   physical activity promoted   rest periods promoted   social interaction promoted   safe use of adaptive equipment encouraged     Problem: Renal Function Impairment (Acute Kidney Injury/Impairment)  Goal: Effective Renal Function  Outcome: Ongoing, Progressing     Problem: Skin Injury Risk Increased  Goal: Skin Health and Integrity  Outcome: Ongoing, Progressing

## 2022-08-03 NOTE — CONSULTS
[facility] - [address] [place] [address]  Nephrology  Consult Note    Patient Name: Neymar Parra  MRN: [id number]  Admission Date: 8/2/2022  Hospital Length of Stay: 1 days  Attending Provider: Shari Cortes MD   Primary Care Physician: [facility]  Principal Problem:Gastric hemorrhage    Consults  Subjective:     HPI: Mr. Parra is seen in consult for creat of 4.7. He is here for a gi bleed with dark stools, negative upper endoscopy. Was here with a gi bleed in June and required laparotomy. Details not clear. He now appears in no distress after receiving PRBC's at the referral hospital in [facility],    Past Medical History:   Diagnosis Date    Chronic renal disease, stage 4, severely decreased glomerular filtration rate (GFR) between 15-29 mL/min/1.73 square meter     COPD (chronic obstructive pulmonary disease)     Coronary artery disease     COVID-19 06/16/2022    Dieulafoy lesion of duodenum 07/05/2022    HH (hiatus hernia) 07/05/2022    Hypertension        Past Surgical History:   Procedure Laterality Date    ABDOMINAL SURGERY      CARDIAC SURGERY      CORONARY ANGIOPLASTY WITH STENT PLACEMENT      ~ 2017    CORONARY ANGIOPLASTY WITH STENT PLACEMENT      JOINT REPLACEMENT      PYLOROPLASTY N/A 07/08/2022    Procedure: PYLOROPLASTY;  Surgeon: Marvin Rees MD;  Location: [facility] OR;  Service: General;  Laterality: N/A;    VAGOTOMY N/A 07/08/2022    Procedure: VAGOTOMY;  Surgeon: Marvin Rees MD;  Location: [facility] OR;  Service: General;  Laterality: N/A;       Review of patient's allergies indicates:   Allergen Reactions    Gatifloxacin Anaphylaxis     Current Facility-Administered Medications   Medication Frequency    0.9%  NaCl infusion Continuous    acetaminophen tablet 1,000 mg Q6H PRN    albuterol-ipratropium 2.5 mg-0.5 mg/3 mL nebulizer solution 3 mL Q6H PRN    atorvastatin tablet 40 mg QHS    bisacodyL EC tablet 10 mg Daily PRN     dextromethorphan-guaiFENesin  mg/5 ml liquid 10 mL Q6H PRN    dextrose 50% injection 12.5 g PRN    dextrose 50% injection 25 g PRN    glucagon (human recombinant) injection 1 mg PRN    insulin aspart U-100 injection 0-5 Units QID (AC + HS) PRN    mupirocin 2 % ointment BID    naloxone 0.4 mg/mL injection 0.02 mg PRN    ondansetron injection 8 mg Q6H PRN    pantoprazole EC tablet 40 mg BID AC    simethicone chewable tablet 80 mg TID PRN    sodium chloride 0.9% flush 10 mL Q12H PRN    sodium polystyrene 15 gram/60 mL suspension 15 g BID    traZODone tablet 300 mg QHS    traZODone tablet 50 mg Nightly PRN     Family History     Problem Relation (Age of Onset)    Diabetes Mother, Father        Tobacco Use    Smoking status: Former Smoker     Packs/day: 0.50     Years: 30.00     Pack years: 15.00     Types: Cigarettes    Smokeless tobacco: Never Used    Tobacco comment: quit Dec 2021   Substance and Sexual Activity    Alcohol use: Not Currently     Comment: pint a week: Hx    Drug use: Never    Sexual activity: Not Currently     Review of Systems  Objective:     Vital Signs (Most Recent):  Temp: 97.9 °F (36.6 °C) (08/03/22 1100)  Pulse: 81 (08/03/22 1100)  Resp: 18 (08/03/22 1100)  BP: 138/75 (08/03/22 1100)  SpO2: 96 % (08/03/22 1100)  O2 Device (Oxygen Therapy): room air (08/03/22 0725) Vital Signs (24h Range):  Temp:  [97.9 °F (36.6 °C)-98.6 °F (37 °C)] 97.9 °F (36.6 °C)  Pulse:  [77-92] 81  Resp:  [16-19] 18  SpO2:  [94 %-99 %] 96 %  BP: (122-159)/(67-80) 138/75     Weight: 89.8 kg (198 lb) (08/02/22 0130)  Body mass index is 26.85 kg/m².  Body surface area is 2.14 meters squared.    I/O last 3 completed shifts:  In: 1590 [P.O.:240; I.V.:1350]  Out: 1950 [Urine:1950]    Physical Exam NO distress. Chest clear. Heart w/o rub. Abd soft    Significant Labs:  BMP:   Recent Labs   Lab 08/02/22  0243 08/03/22  1014   GLU 78 113*    138   K 5.5* 5.5*   * 112*   CO2 19* 19*   BUN 47* 46*    CREATININE 4.77* 4.66*   CALCIUM 8.4* 8.2*   MG 1.8  --      CBC:   Recent Labs   Lab 08/03/22  1014   WBC 7.15   RBC 2.69*   HGB 8.1*   HCT 25.2*      MCV 93.7   MCH 30.1   MCHC 32.1     All labs within the past 24 hours have been reviewed.    Significant Imaging:  Labs: Reviewed    Assessment/Plan:     Active Diagnoses:    Diagnosis Date Noted POA    PRINCIPAL PROBLEM:  Gastric hemorrhage [K92.2] 07/20/2022 Yes    Acute blood loss anemia [D62] 08/02/2022 Yes    COPD (chronic obstructive pulmonary disease) [J44.9] 08/02/2022 Yes    CKD (chronic kidney disease), stage V [N18.5] 08/02/2022 Yes    Coronary artery disease involving native coronary artery of native heart without angina pectoris [I25.10] 08/02/2022 Yes    Hypertension [I10] 12/12/2021 Yes    Gastroesophageal reflux disease [K21.9] 12/12/2021 Yes      Problems Resolved During this Admission:       CKD4. GI bleed but antiplatelet therapy now stopped since he's well out from coronary stent placement. Will add Na bicarb tabs to correct mild metabolic acidosis.    Thank you for your consult. I will follow-up with patient. Please contact us if you have any additional questions.    Jas Crowder MD  Nephrology  [facility] - [address] [place] [address]

## 2022-08-03 NOTE — PROGRESS NOTES
Digestive Disease Progress Note    Interval History:  - patient continues to complains of black diarrhea  - Hgb from 7.8 to 7.7 today, relatively stable since then    Review of Systems:  12 point review of systems otherwise negative except as stated in HPI.    Objective:  Vitals:    08/03/22 0400   BP: 134/79   Pulse: 92   Resp: 18   Temp: 98.4 °F (36.9 °C)     Physical Exam  Constitutional:       Appearance: Normal appearance.   HENT:      Head: Normocephalic and atraumatic.      Nose: No congestion or rhinorrhea.   Eyes:      General: No scleral icterus.  Cardiovascular:      Rate and Rhythm: Normal rate.   Pulmonary:      Effort: Pulmonary effort is normal.   Abdominal:      General: Abdomen is flat.      Palpations: Abdomen is soft.   Musculoskeletal:         General: No swelling or tenderness.      Cervical back: Neck supple. No rigidity.   Skin:     General: Skin is warm and dry.   Neurological:      Mental Status: He is alert.   Psychiatric:         Mood and Affect: Mood normal.         Assessment and Plan:  Hematemesis  Duodenal Bulb Ulcer  Esophageal Ulcer  - no prior gastric biopsies obtained until yesterday, path pending  - continue PPI  - concern with black diarrhea that this may be secondary to prior bleeding, continue to monitor    - if black stools continue or if patient has worsening anemia, would consider colonoscopy but ideally would have this done as outpatient.    Thank you for including us in the care of this patient. Please call with any questions.     Domo Prado MD  Gastroenterology

## 2022-08-04 LAB
ANION GAP SERPL CALCULATED.3IONS-SCNC: 16 MMOL/L (ref 7–16)
BUN SERPL-MCNC: 42 MG/DL (ref 7–18)
BUN/CREAT SERPL: 10 (ref 6–20)
CALCIUM SERPL-MCNC: 7.9 MG/DL (ref 8.5–10.1)
CHLORIDE SERPL-SCNC: 112 MMOL/L (ref 98–107)
CO2 SERPL-SCNC: 20 MMOL/L (ref 21–32)
CREAT SERPL-MCNC: 4.37 MG/DL (ref 0.7–1.3)
GLUCOSE SERPL-MCNC: 123 MG/DL (ref 70–105)
GLUCOSE SERPL-MCNC: 124 MG/DL (ref 70–105)
GLUCOSE SERPL-MCNC: 133 MG/DL (ref 70–105)
GLUCOSE SERPL-MCNC: 79 MG/DL (ref 74–106)
GLUCOSE SERPL-MCNC: 94 MG/DL (ref 70–105)
HGB BLD-MCNC: 7.7 G/DL (ref 13.5–18)
HGB BLD-MCNC: 7.8 G/DL (ref 13.5–18)
HGB BLD-MCNC: 8 G/DL (ref 13.5–18)
HGB BLD-MCNC: 9 G/DL (ref 13.5–18)
POTASSIUM SERPL-SCNC: 4.8 MMOL/L (ref 3.5–5.1)
SODIUM SERPL-SCNC: 143 MMOL/L (ref 136–145)

## 2022-08-04 PROCEDURE — 99232 SBSQ HOSP IP/OBS MODERATE 35: CPT | Mod: ,,, | Performed by: STUDENT IN AN ORGANIZED HEALTH CARE EDUCATION/TRAINING PROGRAM

## 2022-08-04 PROCEDURE — 85018 HEMOGLOBIN: CPT | Performed by: HOSPITALIST

## 2022-08-04 PROCEDURE — 25000003 PHARM REV CODE 250: Performed by: HOSPITALIST

## 2022-08-04 PROCEDURE — 25000003 PHARM REV CODE 250: Performed by: INTERNAL MEDICINE

## 2022-08-04 PROCEDURE — 36415 COLL VENOUS BLD VENIPUNCTURE: CPT | Performed by: HOSPITALIST

## 2022-08-04 PROCEDURE — 94761 N-INVAS EAR/PLS OXIMETRY MLT: CPT

## 2022-08-04 PROCEDURE — 80048 BASIC METABOLIC PNL TOTAL CA: CPT | Performed by: HOSPITALIST

## 2022-08-04 PROCEDURE — 99232 PR SUBSEQUENT HOSPITAL CARE,LEVL II: ICD-10-PCS | Mod: ,,, | Performed by: STUDENT IN AN ORGANIZED HEALTH CARE EDUCATION/TRAINING PROGRAM

## 2022-08-04 PROCEDURE — 99233 PR SUBSEQUENT HOSPITAL CARE,LEVL III: ICD-10-PCS | Mod: ,,, | Performed by: HOSPITALIST

## 2022-08-04 PROCEDURE — 11000001 HC ACUTE MED/SURG PRIVATE ROOM

## 2022-08-04 PROCEDURE — 99233 SBSQ HOSP IP/OBS HIGH 50: CPT | Mod: ,,, | Performed by: HOSPITALIST

## 2022-08-04 PROCEDURE — 82962 GLUCOSE BLOOD TEST: CPT

## 2022-08-04 RX ADMIN — PANTOPRAZOLE SODIUM 40 MG: 40 TABLET, DELAYED RELEASE ORAL at 05:08

## 2022-08-04 RX ADMIN — PANTOPRAZOLE SODIUM 40 MG: 40 TABLET, DELAYED RELEASE ORAL at 04:08

## 2022-08-04 RX ADMIN — SODIUM BICARBONATE 1300 MG: 650 TABLET ORAL at 08:08

## 2022-08-04 RX ADMIN — ACETAMINOPHEN 1000 MG: 500 TABLET ORAL at 08:08

## 2022-08-04 RX ADMIN — TRAZODONE HYDROCHLORIDE 300 MG: 150 TABLET ORAL at 08:08

## 2022-08-04 RX ADMIN — SODIUM CHLORIDE: 9 INJECTION, SOLUTION INTRAVENOUS at 05:08

## 2022-08-04 RX ADMIN — SODIUM POLYSTYRENE SULFONATE 15 G: 15 SUSPENSION ORAL; RECTAL at 09:08

## 2022-08-04 RX ADMIN — MUPIROCIN: 20 OINTMENT TOPICAL at 08:08

## 2022-08-04 RX ADMIN — SODIUM POLYSTYRENE SULFONATE 15 G: 15 SUSPENSION ORAL; RECTAL at 08:08

## 2022-08-04 RX ADMIN — MUPIROCIN: 20 OINTMENT TOPICAL at 09:08

## 2022-08-04 RX ADMIN — SODIUM BICARBONATE 1300 MG: 650 TABLET ORAL at 09:08

## 2022-08-04 RX ADMIN — ATORVASTATIN CALCIUM 40 MG: 40 TABLET, FILM COATED ORAL at 08:08

## 2022-08-04 NOTE — PLAN OF CARE
Problem: Adult Inpatient Plan of Care  Goal: Plan of Care Review  Outcome: Ongoing, Progressing  Goal: Optimal Comfort and Wellbeing  Outcome: Ongoing, Progressing     Problem: Diabetes Comorbidity  Goal: Blood Glucose Level Within Targeted Range  Outcome: Ongoing, Progressing     Problem: Fluid and Electrolyte Imbalance (Acute Kidney Injury/Impairment)  Goal: Fluid and Electrolyte Balance  Outcome: Ongoing, Progressing     Problem: Skin Injury Risk Increased  Goal: Skin Health and Integrity  Outcome: Ongoing, Progressing

## 2022-08-04 NOTE — ASSESSMENT & PLAN NOTE
Fluids as above.     8/3: potassium increasing. Started kayexalate.  Consulted nephrology to optimize in-patient management and discharge recommendations.  Anticipate discharge soon.

## 2022-08-04 NOTE — ASSESSMENT & PLAN NOTE
- Prontonix infusion  - NS @ 125 mL/hr  - NPO  - GI consult, Eval for endoscopy  - type and screen  - 2U pRBC given at [facility] ED   - supplemental O2 PRN  - cardiac monitoring  - monitoring H/H transfuse as needed  - holding home Plavix and Asa81    8/3: may discuss with cardiology/GI when DAPT may be restarted.

## 2022-08-04 NOTE — HOSPITAL COURSE
8/3: potassium increasing so consulted nephrology for Stave IV kidney disease.  May also need to start preparing for dialysis in advance.      8/4: patient continues to complain of black stool.  Hg is stable.    Given repeat admissions for GI bleed, plan to hold ASA/Plavix for now.  Discuss with GI/Cardiology/nephrology prior to discharge.      8/5: ASA/Plavix to be held.  GI ok with this plan.  Patient may follow-up as outpatient with GI/cardiology/nephrology.    Of note, renal function is improving.      08/06--Pt with no new issues or concerns, no further bleeding noted.  H & H remains stable.  States feels ready to be discharged today.  Will need outpatient Colonoscopy.  Will also need to follow up with nephrology and cardiology in 2 weeks.  Repeat CBC and BMP in one week. Cont to hold ASA and Plavix at discharge.  Has met the maximum benefit from this hospitalization and is stable for discharge back to NH.

## 2022-08-04 NOTE — PROGRESS NOTES
[facility] - [address] [place] [address] Medicine  Progress Note    Patient Name: Neymar Parar  MRN: [id number]  Patient Class: IP- Inpatient   Admission Date: 8/2/2022  Length of Stay: 1 days  Attending Physician: Shari Cortes MD  Primary Care Provider: [facility]        Subjective:     Principal Problem:Gastric hemorrhage    HPI:  72 y.o.m. presents to [facility] from [facility] due to GI bleed causing acute blood loss anemia. Patient resides at the [facility] in [place] where blood work resulted with an H/H of 6.9/20.9. Patient was taken to [facility] ED where he received 2U of pRBC and fluids. Patient was transferred to [facility] and admitted for higher level of care. GI was consulted fluids started and patient made NPO. Patient has several GI bleeds in the past with the most recent being on 7/8/2 where an Ex Lap was performed. Patient has an extensive GI past medical and past surgical Hx. Will monitor H/H and follow GI's recommendations.    ED Course:  Repeat H/H post 2U pRBC showed an H/H of 8.5/24.4. PT/PTT elevated at 15/40.8 with INR wnl. CMP indicated CKD.       Overview/Hospital Course:  8/3: potassium increasing so consulted nephrology for Stave IV kidney disease.  May also need to start preparing for dialysis in advance.        Interval History:     Review of Systems   Constitutional:  Negative for activity change, appetite change, chills, diaphoresis and fever.   HENT:  Negative for congestion, hearing loss, nosebleeds, postnasal drip, rhinorrhea, sore throat and trouble swallowing.    Eyes:  Negative for visual disturbance.   Respiratory:  Negative for cough, chest tightness and shortness of breath.    Cardiovascular:  Negative for chest pain and leg swelling.   Gastrointestinal:  Positive for abdominal pain and blood in stool (Dark stool). Negative for constipation, diarrhea, nausea and vomiting.    Genitourinary:  Negative for dysuria and hematuria.   Musculoskeletal:  Positive for back pain. Negative for joint swelling.   Skin:  Negative for rash.   Neurological:  Negative for dizziness, weakness, light-headedness and headaches.   All other systems reviewed and are negative.  Objective:     Vital Signs (Most Recent):  Temp: 98.7 °F (37.1 °C) (08/03/22 1901)  Pulse: 89 (08/03/22 1901)  Resp: 18 (08/03/22 1901)  BP: (!) 167/77 (08/03/22 1901)  SpO2: 95 % (08/03/22 1901)   Vital Signs (24h Range):  Temp:  [97.9 °F (36.6 °C)-98.7 °F (37.1 °C)] 98.7 °F (37.1 °C)  Pulse:  [78-92] 89  Resp:  [16-18] 18  SpO2:  [94 %-98 %] 95 %  BP: (122-167)/(75-80) 167/77     Weight: 89.8 kg (198 lb)  Body mass index is 26.85 kg/m².    Intake/Output Summary (Last 24 hours) at 8/3/2022 2019  Last data filed at 8/3/2022 1437  Gross per 24 hour   Intake 1470 ml   Output 1150 ml   Net 320 ml      Physical Exam  Vitals and nursing note reviewed.   Constitutional:       General: He is not in acute distress.     Appearance: Normal appearance. He is not ill-appearing, toxic-appearing or diaphoretic.   HENT:      Head: Normocephalic and atraumatic.      Nose: Nose normal. No congestion or rhinorrhea.      Mouth/Throat:      Mouth: Mucous membranes are dry.      Pharynx: Oropharynx is clear. No oropharyngeal exudate or posterior oropharyngeal erythema.   Eyes:      Conjunctiva/sclera: Conjunctivae normal.   Cardiovascular:      Rate and Rhythm: Normal rate. Rhythm irregular.      Pulses: Normal pulses.      Heart sounds: Normal heart sounds. No murmur heard.    No friction rub.   Pulmonary:      Effort: Pulmonary effort is normal. No respiratory distress.      Breath sounds: Normal breath sounds. No wheezing, rhonchi or rales.   Abdominal:      General: Bowel sounds are normal. There is no distension.      Palpations: Abdomen is soft.      Tenderness: There is no abdominal tenderness. There is no guarding.   Musculoskeletal:      Cervical  back: Tenderness present.      Right lower leg: No edema.      Left lower leg: No edema.   Lymphadenopathy:      Cervical: No cervical adenopathy.   Skin:     General: Skin is warm and dry.      Capillary Refill: Capillary refill takes more than 3 seconds.   Neurological:      General: No focal deficit present.      Mental Status: He is alert and oriented to person, place, and time.   Psychiatric:         Mood and Affect: Mood normal.         Behavior: Behavior normal.         Thought Content: Thought content normal.         Judgment: Judgment normal.       Significant Labs: All pertinent labs within the past 24 hours have been reviewed.    Significant Imaging: I have reviewed all pertinent imaging results/findings within the past 24 hours.      Assessment/Plan:      * Gastric hemorrhage  - Prontonix infusion  - NS @ 125 mL/hr  - NPO  - GI consult, Eval for endoscopy  - type and screen  - 2U pRBC given at [facility] ED   - supplemental O2 PRN  - cardiac monitoring  - monitoring H/H transfuse as needed  - holding home Plavix and Asa81    8/3: may discuss with cardiology/GI when DAPT may be restarted.     Coronary artery disease involving native coronary artery of native heart without angina pectoris  Patient has CAD with stent   Holding home Lisinopril, Metoprolol tartrate, Asa81, and isosorbide mononitrate  - atorvastatin 80mg QD      CKD (chronic kidney disease), stage V  Fluids as above.     8/3: potassium increasing. Started kayexalate.  Consulted nephrology to optimize in-patient management and discharge recommendations.  Anticipate discharge soon.        COPD (chronic obstructive pulmonary disease)  Followed by Dr. Jovan Diaz Q6H PRN  - Supplemental O2 PRN    Acute blood loss anemia  Anemia panel indicates mixed anemia retic count pending   Trend H/H   Transfuse if inidicated      Gastroesophageal reflux disease  Protonix as above      Hypertension  Holding home amlodipine metoprolol tartrate, and  lisinopril         VTE Risk Mitigation (From admission, onward)         Ordered     IP VTE HIGH RISK PATIENT  Once         08/02/22 0429     Reason for no Mechanical VTE Prophylaxis  Once        Question:  Reasons:  Answer:  Active Bleeding    08/02/22 0429     Place sequential compression device  Until discontinued         08/02/22 0357                Discharge Planning   EDWARDO:      Code Status: DNR   Is the patient medically ready for discharge?:     Reason for patient still in hospital (select all that apply): Treatment  Discharge Plan A: Return to nursing home                  Shari Cortes MD  Department of [facility] Medicine   [facility]

## 2022-08-04 NOTE — PROGRESS NOTES
[facility]  Nephrology  Progress Note    Patient Name: Neymar Parra  MRN: [id number]  Admission Date: 8/2/2022  Hospital Length of Stay: 2 days  Attending Provider: Shari Cortes MD   Primary Care Physician: [facility]  Principal Problem:Gastric hemorrhage    Consults  Subjective:     Interval History: Mr. Parra is seen in f/u of his CKD4. He is stable today and renal fct slightly better.    Review of patient's allergies indicates:   Allergen Reactions    Gatifloxacin Anaphylaxis     Current Facility-Administered Medications   Medication Frequency    0.9%  NaCl infusion Continuous    acetaminophen tablet 1,000 mg Q6H PRN    albuterol-ipratropium 2.5 mg-0.5 mg/3 mL nebulizer solution 3 mL Q6H PRN    atorvastatin tablet 40 mg QHS    bisacodyL EC tablet 10 mg Daily PRN    dextromethorphan-guaiFENesin  mg/5 ml liquid 10 mL Q6H PRN    dextrose 50% injection 12.5 g PRN    dextrose 50% injection 25 g PRN    glucagon (human recombinant) injection 1 mg PRN    insulin aspart U-100 injection 0-5 Units QID (AC + HS) PRN    mupirocin 2 % ointment BID    naloxone 0.4 mg/mL injection 0.02 mg PRN    ondansetron injection 8 mg Q6H PRN    pantoprazole EC tablet 40 mg BID AC    simethicone chewable tablet 80 mg TID PRN    sodium bicarbonate tablet 1,300 mg BID    sodium chloride 0.9% flush 10 mL Q12H PRN    sodium polystyrene 15 gram/60 mL suspension 15 g BID    traZODone tablet 300 mg QHS    traZODone tablet 50 mg Nightly PRN       Objective:     Vital Signs (Most Recent):  Temp: 98.5 °F (36.9 °C) (08/04/22 1100)  Pulse: 88 (08/04/22 1100)  Resp: 19 (08/04/22 1100)  BP: 130/72 (08/04/22 1100)  SpO2: 96 % (08/04/22 1100)  O2 Device (Oxygen Therapy): room air (08/03/22 0725) Vital Signs (24h Range):  Temp:  [98.2 °F (36.8 °C)-98.7 °F (37.1 °C)] 98.5 °F (36.9 °C)  Pulse:  [79-89] 88  Resp:  [18-20] 19  SpO2:  [95 %-97 %] 96 %  BP:  (130-171)/(52-85) 130/72     Weight: 89.8 kg (198 lb) (08/02/22 0130)  Body mass index is 26.85 kg/m².  Body surface area is 2.14 meters squared.    I/O last 3 completed shifts:  In: 1470 [P.O.:120; I.V.:1350]  Out: 1150 [Urine:1150]    Physical Exam No distress. Chest clear. No edema.    Significant Labs:sure  BMP:   Recent Labs   Lab 08/02/22  0243 08/03/22  1014 08/04/22  0743   GLU 78   < > 79      < > 143   K 5.5*   < > 4.8   *   < > 112*   CO2 19*   < > 20*   BUN 47*   < > 42*   CREATININE 4.77*   < > 4.37*   CALCIUM 8.4*   < > 7.9*   MG 1.8  --   --     < > = values in this interval not displayed.     CBC:   Recent Labs   Lab 08/03/22  1014 08/03/22  1826 08/04/22  0744   WBC 7.15  --   --    RBC 2.69*  --   --    HGB 8.1*   < > 7.8*   HCT 25.2*  --   --      --   --    MCV 93.7  --   --    MCH 30.1  --   --    MCHC 32.1  --   --     < > = values in this interval not displayed.     All labs within the past 24 hours have been reviewed.    Significant Imaging:  Labs: Reviewed    Assessment/Plan:     Active Diagnoses:    Diagnosis Date Noted POA    PRINCIPAL PROBLEM:  Gastric hemorrhage [K92.2] 07/20/2022 Yes    Acute blood loss anemia [D62] 08/02/2022 Yes    COPD (chronic obstructive pulmonary disease) [J44.9] 08/02/2022 Yes    CKD (chronic kidney disease), stage V [N18.5] 08/02/2022 Yes    Coronary artery disease involving native coronary artery of native heart without angina pectoris [I25.10] 08/02/2022 Yes    Hypertension [I10] 12/12/2021 Yes    Gastroesophageal reflux disease [K21.9] 12/12/2021 Yes      Problems Resolved During this Admission:       NO change for us today. We'll continue to follow and expect stable renal fct.    Thank you for your consult. I will follow-up with patient. Please contact us if you have any additional questions.    Jas Crowder MD  Nephrology  [facility] - [address] [place] [address]

## 2022-08-04 NOTE — PLAN OF CARE
SS spoke with Kiara Smith at the [facility] ([phone]). Patient's bed will be held for 14 days from when he left. He can return once medically stable as long as it is within 14 days. SS following.

## 2022-08-04 NOTE — SUBJECTIVE & OBJECTIVE
Interval History:     Review of Systems   Constitutional:  Negative for activity change, appetite change, chills, diaphoresis and fever.   HENT:  Negative for congestion, hearing loss, nosebleeds, postnasal drip, rhinorrhea, sore throat and trouble swallowing.    Eyes:  Negative for visual disturbance.   Respiratory:  Negative for cough, chest tightness and shortness of breath.    Cardiovascular:  Negative for chest pain and leg swelling.   Gastrointestinal:  Positive for abdominal pain and blood in stool (Dark stool). Negative for constipation, diarrhea, nausea and vomiting.   Genitourinary:  Negative for dysuria and hematuria.   Musculoskeletal:  Positive for back pain. Negative for joint swelling.   Skin:  Negative for rash.   Neurological:  Negative for dizziness, weakness, light-headedness and headaches.   All other systems reviewed and are negative.  Objective:     Vital Signs (Most Recent):  Temp: 98.7 °F (37.1 °C) (08/03/22 1901)  Pulse: 89 (08/03/22 1901)  Resp: 18 (08/03/22 1901)  BP: (!) 167/77 (08/03/22 1901)  SpO2: 95 % (08/03/22 1901)   Vital Signs (24h Range):  Temp:  [97.9 °F (36.6 °C)-98.7 °F (37.1 °C)] 98.7 °F (37.1 °C)  Pulse:  [78-92] 89  Resp:  [16-18] 18  SpO2:  [94 %-98 %] 95 %  BP: (122-167)/(75-80) 167/77     Weight: 89.8 kg (198 lb)  Body mass index is 26.85 kg/m².    Intake/Output Summary (Last 24 hours) at 8/3/2022 2019  Last data filed at 8/3/2022 1437  Gross per 24 hour   Intake 1470 ml   Output 1150 ml   Net 320 ml      Physical Exam  Vitals and nursing note reviewed.   Constitutional:       General: He is not in acute distress.     Appearance: Normal appearance. He is not ill-appearing, toxic-appearing or diaphoretic.   HENT:      Head: Normocephalic and atraumatic.      Nose: Nose normal. No congestion or rhinorrhea.      Mouth/Throat:      Mouth: Mucous membranes are dry.      Pharynx: Oropharynx is clear. No oropharyngeal exudate or posterior oropharyngeal erythema.   Eyes:       Conjunctiva/sclera: Conjunctivae normal.   Cardiovascular:      Rate and Rhythm: Normal rate. Rhythm irregular.      Pulses: Normal pulses.      Heart sounds: Normal heart sounds. No murmur heard.    No friction rub.   Pulmonary:      Effort: Pulmonary effort is normal. No respiratory distress.      Breath sounds: Normal breath sounds. No wheezing, rhonchi or rales.   Abdominal:      General: Bowel sounds are normal. There is no distension.      Palpations: Abdomen is soft.      Tenderness: There is no abdominal tenderness. There is no guarding.   Musculoskeletal:      Cervical back: Tenderness present.      Right lower leg: No edema.      Left lower leg: No edema.   Lymphadenopathy:      Cervical: No cervical adenopathy.   Skin:     General: Skin is warm and dry.      Capillary Refill: Capillary refill takes more than 3 seconds.   Neurological:      General: No focal deficit present.      Mental Status: He is alert and oriented to person, place, and time.   Psychiatric:         Mood and Affect: Mood normal.         Behavior: Behavior normal.         Thought Content: Thought content normal.         Judgment: Judgment normal.       Significant Labs: All pertinent labs within the past 24 hours have been reviewed.    Significant Imaging: I have reviewed all pertinent imaging results/findings within the past 24 hours.

## 2022-08-04 NOTE — PROGRESS NOTES
Digestive Disease Progress Note    Interval History:  - patient continues to complains of black stools, but less frequent and denies diarrhea  - Hgb relatively stable since then    Review of Systems:  12 point review of systems otherwise negative except as stated in HPI.    Objective:  Vitals:    08/04/22 0400   BP: (!) 130/52   Pulse: 82   Resp: 20   Temp: 98.2 °F (36.8 °C)     Physical Exam  Constitutional:       Appearance: Normal appearance.   HENT:      Head: Normocephalic and atraumatic.      Nose: No congestion or rhinorrhea.   Eyes:      General: No scleral icterus.  Cardiovascular:      Rate and Rhythm: Normal rate.   Pulmonary:      Effort: Pulmonary effort is normal.   Abdominal:      General: Abdomen is flat.      Palpations: Abdomen is soft.   Musculoskeletal:         General: No swelling or tenderness.      Cervical back: Neck supple. No rigidity.   Skin:     General: Skin is warm and dry.   Neurological:      Mental Status: He is alert.   Psychiatric:         Mood and Affect: Mood normal.         Assessment and Plan:  Hematemesis  Duodenal Bulb Ulcer  Esophageal Ulcer  - continue PPI  - concern with black diarrhea that this may be secondary to prior bleeding, continue to monitor    - Hgb stable at this time  - recommend outpatient colonoscopy scheduled at discharge     Thank you for including us in the care of this patient. Please call with any questions.     Domo Prado MD  Gastroenterology

## 2022-08-05 LAB
ABO + RH BLD: NORMAL
ALBUMIN SERPL BCP-MCNC: 1.9 G/DL (ref 3.5–5)
ANION GAP SERPL CALCULATED.3IONS-SCNC: 17 MMOL/L (ref 7–16)
BLD PROD TYP BPU: NORMAL
BLOOD UNIT EXPIRATION DATE: NORMAL
BLOOD UNIT TYPE CODE: 5100
BUN SERPL-MCNC: 36 MG/DL (ref 7–18)
BUN/CREAT SERPL: 9 (ref 6–20)
CALCIUM SERPL-MCNC: 8 MG/DL (ref 8.5–10.1)
CHLORIDE SERPL-SCNC: 111 MMOL/L (ref 98–107)
CO2 SERPL-SCNC: 20 MMOL/L (ref 21–32)
CREAT SERPL-MCNC: 4.15 MG/DL (ref 0.7–1.3)
CROSSMATCH INTERPRETATION: NORMAL
DISPENSE STATUS: NORMAL
GLUCOSE SERPL-MCNC: 102 MG/DL (ref 70–105)
GLUCOSE SERPL-MCNC: 119 MG/DL (ref 70–105)
GLUCOSE SERPL-MCNC: 123 MG/DL (ref 70–105)
GLUCOSE SERPL-MCNC: 124 MG/DL (ref 74–106)
GLUCOSE SERPL-MCNC: 92 MG/DL (ref 70–105)
HGB BLD-MCNC: 7.7 G/DL (ref 13.5–18)
HGB BLD-MCNC: 8.5 G/DL (ref 13.5–18)
PHOSPHATE SERPL-MCNC: 3.8 MG/DL (ref 2.5–4.5)
POTASSIUM SERPL-SCNC: 4.5 MMOL/L (ref 3.5–5.1)
SODIUM SERPL-SCNC: 143 MMOL/L (ref 136–145)
UNIT NUMBER: NORMAL

## 2022-08-05 PROCEDURE — 82962 GLUCOSE BLOOD TEST: CPT

## 2022-08-05 PROCEDURE — 99232 PR SUBSEQUENT HOSPITAL CARE,LEVL II: ICD-10-PCS | Mod: ,,, | Performed by: STUDENT IN AN ORGANIZED HEALTH CARE EDUCATION/TRAINING PROGRAM

## 2022-08-05 PROCEDURE — 80069 RENAL FUNCTION PANEL: CPT | Performed by: HOSPITALIST

## 2022-08-05 PROCEDURE — 36415 COLL VENOUS BLD VENIPUNCTURE: CPT | Performed by: HOSPITALIST

## 2022-08-05 PROCEDURE — 25000003 PHARM REV CODE 250: Performed by: STUDENT IN AN ORGANIZED HEALTH CARE EDUCATION/TRAINING PROGRAM

## 2022-08-05 PROCEDURE — 25000003 PHARM REV CODE 250: Performed by: HOSPITALIST

## 2022-08-05 PROCEDURE — 99233 PR SUBSEQUENT HOSPITAL CARE,LEVL III: ICD-10-PCS | Mod: ,,, | Performed by: HOSPITALIST

## 2022-08-05 PROCEDURE — P9016 RBC LEUKOCYTES REDUCED: HCPCS | Performed by: STUDENT IN AN ORGANIZED HEALTH CARE EDUCATION/TRAINING PROGRAM

## 2022-08-05 PROCEDURE — 85018 HEMOGLOBIN: CPT | Performed by: HOSPITALIST

## 2022-08-05 PROCEDURE — 27000221 HC OXYGEN, UP TO 24 HOURS

## 2022-08-05 PROCEDURE — 36430 TRANSFUSION BLD/BLD COMPNT: CPT

## 2022-08-05 PROCEDURE — 63600175 PHARM REV CODE 636 W HCPCS

## 2022-08-05 PROCEDURE — 99232 SBSQ HOSP IP/OBS MODERATE 35: CPT | Mod: ,,, | Performed by: STUDENT IN AN ORGANIZED HEALTH CARE EDUCATION/TRAINING PROGRAM

## 2022-08-05 PROCEDURE — 11000001 HC ACUTE MED/SURG PRIVATE ROOM

## 2022-08-05 PROCEDURE — 25000003 PHARM REV CODE 250: Performed by: INTERNAL MEDICINE

## 2022-08-05 PROCEDURE — 99233 SBSQ HOSP IP/OBS HIGH 50: CPT | Mod: ,,, | Performed by: HOSPITALIST

## 2022-08-05 PROCEDURE — 94761 N-INVAS EAR/PLS OXIMETRY MLT: CPT

## 2022-08-05 RX ORDER — AMLODIPINE BESYLATE 10 MG/1
5 TABLET ORAL DAILY
Status: CANCELLED | OUTPATIENT
Start: 2022-08-05

## 2022-08-05 RX ORDER — SODIUM BICARBONATE 650 MG/1
1300 TABLET ORAL
Qty: 180 TABLET | Refills: 11
Start: 2022-08-05 | End: 2023-08-05

## 2022-08-05 RX ORDER — PANTOPRAZOLE SODIUM 40 MG/1
40 TABLET, DELAYED RELEASE ORAL
Qty: 60 TABLET | Refills: 11
Start: 2022-08-05 | End: 2023-08-05

## 2022-08-05 RX ORDER — SODIUM BICARBONATE 650 MG/1
1300 TABLET ORAL
Status: DISCONTINUED | OUTPATIENT
Start: 2022-08-05 | End: 2022-08-06 | Stop reason: HOSPADM

## 2022-08-05 RX ORDER — HYDROCODONE BITARTRATE AND ACETAMINOPHEN 500; 5 MG/1; MG/1
TABLET ORAL
Status: DISCONTINUED | OUTPATIENT
Start: 2022-08-05 | End: 2022-08-06 | Stop reason: HOSPADM

## 2022-08-05 RX ADMIN — MUPIROCIN: 20 OINTMENT TOPICAL at 09:08

## 2022-08-05 RX ADMIN — SODIUM BICARBONATE 1300 MG: 650 TABLET ORAL at 09:08

## 2022-08-05 RX ADMIN — SODIUM CHLORIDE: 9 INJECTION, SOLUTION INTRAVENOUS at 09:08

## 2022-08-05 RX ADMIN — PANTOPRAZOLE SODIUM 40 MG: 40 TABLET, DELAYED RELEASE ORAL at 06:08

## 2022-08-05 RX ADMIN — INSULIN ASPART 1 UNITS: 100 INJECTION, SOLUTION INTRAVENOUS; SUBCUTANEOUS at 08:08

## 2022-08-05 RX ADMIN — SODIUM BICARBONATE 1300 MG: 650 TABLET ORAL at 02:08

## 2022-08-05 RX ADMIN — SODIUM POLYSTYRENE SULFONATE 15 G: 15 SUSPENSION ORAL; RECTAL at 08:08

## 2022-08-05 RX ADMIN — ATORVASTATIN CALCIUM 40 MG: 40 TABLET, FILM COATED ORAL at 08:08

## 2022-08-05 RX ADMIN — SODIUM POLYSTYRENE SULFONATE 15 G: 15 SUSPENSION ORAL; RECTAL at 09:08

## 2022-08-05 RX ADMIN — TRAZODONE HYDROCHLORIDE 300 MG: 150 TABLET ORAL at 08:08

## 2022-08-05 RX ADMIN — PANTOPRAZOLE SODIUM 40 MG: 40 TABLET, DELAYED RELEASE ORAL at 04:08

## 2022-08-05 RX ADMIN — SODIUM CHLORIDE: 9 INJECTION, SOLUTION INTRAVENOUS at 10:08

## 2022-08-05 RX ADMIN — SODIUM CHLORIDE: 9 INJECTION, SOLUTION INTRAVENOUS at 02:08

## 2022-08-05 NOTE — NURSING
Patient lying in bed awake watching tv. Resp even and unlabored. C/O abd. Pain 7/10. Safety measures ongoing.

## 2022-08-05 NOTE — PROGRESS NOTES
[facility] - [facility]  Nephrology  Progress Note    Patient Name: Neymar Parra  MRN: [id number]  Admission Date: 8/2/2022  Hospital Length of Stay: 3 days  Attending Provider: Shari Cortes MD   Primary Care Physician: [facility]  Principal Problem:Gastric hemorrhage    Consults  Subjective:     Interval History: Pt seems at baseline physically. His creatinine is stable/improved. K will be managed with correction of met acidosis.    Review of patient's allergies indicates:   Allergen Reactions    Gatifloxacin Anaphylaxis     Current Facility-Administered Medications   Medication Frequency    0.9%  NaCl infusion Continuous    acetaminophen tablet 1,000 mg Q6H PRN    albuterol-ipratropium 2.5 mg-0.5 mg/3 mL nebulizer solution 3 mL Q6H PRN    atorvastatin tablet 40 mg QHS    bisacodyL EC tablet 10 mg Daily PRN    dextromethorphan-guaiFENesin  mg/5 ml liquid 10 mL Q6H PRN    dextrose 50% injection 12.5 g PRN    dextrose 50% injection 25 g PRN    glucagon (human recombinant) injection 1 mg PRN    insulin aspart U-100 injection 0-5 Units QID (AC + HS) PRN    mupirocin 2 % ointment BID    naloxone 0.4 mg/mL injection 0.02 mg PRN    ondansetron injection 8 mg Q6H PRN    pantoprazole EC tablet 40 mg BID AC    simethicone chewable tablet 80 mg TID PRN    sodium bicarbonate tablet 1,300 mg TID PC    sodium chloride 0.9% flush 10 mL Q12H PRN    sodium polystyrene 15 gram/60 mL suspension 15 g BID    traZODone tablet 300 mg QHS    traZODone tablet 50 mg Nightly PRN       Objective:     Vital Signs (Most Recent):  Temp: 97.8 °F (36.6 °C) (08/05/22 0828)  Pulse: 89 (08/05/22 0828)  Resp: 18 (08/05/22 0828)  BP: (!) 150/80 (08/05/22 0828)  SpO2: (!) 94 % (08/05/22 0828)  O2 Device (Oxygen Therapy): nasal cannula (08/05/22 0802) Vital Signs (24h Range):  Temp:  [97.8 °F (36.6 °C)-98.6 °F (37 °C)] 97.8 °F (36.6 °C)  Pulse:  [] 89  Resp:  [18-21]  18  SpO2:  [93 %-99 %] 94 %  BP: (130-157)/(72-92) 150/80     Weight: 89.8 kg (198 lb) (08/02/22 0130)  Body mass index is 26.85 kg/m².  Body surface area is 2.14 meters squared.    I/O last 3 completed shifts:  In: -   Out: 2500 [Urine:2500]    Physical Exam Doing well. Chest clear. No edema.    Significant Labs:sure  BMP:   Recent Labs   Lab 08/02/22  0243 08/03/22  1014 08/04/22  0743   GLU 78   < > 79      < > 143   K 5.5*   < > 4.8   *   < > 112*   CO2 19*   < > 20*   BUN 47*   < > 42*   CREATININE 4.77*   < > 4.37*   CALCIUM 8.4*   < > 7.9*   MG 1.8  --   --     < > = values in this interval not displayed.     All labs within the past 24 hours have been reviewed.    Significant Imaging:  Labs: Reviewed    Assessment/Plan:     Active Diagnoses:    Diagnosis Date Noted POA    PRINCIPAL PROBLEM:  Gastric hemorrhage [K92.2] 07/20/2022 Yes    Acute blood loss anemia [D62] 08/02/2022 Yes    COPD (chronic obstructive pulmonary disease) [J44.9] 08/02/2022 Yes    CKD (chronic kidney disease), stage V [N18.5] 08/02/2022 Yes    Coronary artery disease involving native coronary artery of native heart without angina pectoris [I25.10] 08/02/2022 Yes    Hypertension [I10] 12/12/2021 Yes    Gastroesophageal reflux disease [K21.9] 12/12/2021 Yes      Problems Resolved During this Admission:       Increased bicarb to 1300mg TID. I think he's stable from our standpoint and can probably be discharged soon .    Thank you for your consult. I will follow-up with patient. Please contact us if you have any additional questions.    Jas Crowder MD  Nephrology  [facility] - [address] [place] [address]

## 2022-08-05 NOTE — PROGRESS NOTES
Digestive Disease Progress Note    Interval History:  - patient continues to complains of black stools again today  - Hgb relatively stable since then  - seen up in chair eating lunch    Review of Systems:  12 point review of systems otherwise negative except as stated in HPI.    Objective:  Vitals:    08/05/22 1000   BP: 138/76   Pulse: 92   Resp: 18   Temp: 98.3 °F (36.8 °C)     Physical Exam  Constitutional:       Appearance: Normal appearance.   HENT:      Head: Normocephalic and atraumatic.      Nose: No congestion or rhinorrhea.   Eyes:      General: No scleral icterus.  Cardiovascular:      Rate and Rhythm: Normal rate.   Pulmonary:      Effort: Pulmonary effort is normal.   Abdominal:      General: Abdomen is flat.      Palpations: Abdomen is soft.   Musculoskeletal:         General: No swelling or tenderness.      Cervical back: Neck supple. No rigidity.   Skin:     General: Skin is warm and dry.   Neurological:      Mental Status: He is alert.   Psychiatric:         Mood and Affect: Mood normal.         Assessment and Plan:  Hematemesis  Duodenal Bulb Ulcer  Esophageal Ulcer  - continue PPI PO BID  - Hgb stable at this time despite patient complaining of continued black stools    - recommend outpatient colonoscopy scheduled at discharge     Thank you for including us in the care of this patient. Please call with any questions.     Domo Prado MD  Gastroenterology

## 2022-08-05 NOTE — PLAN OF CARE
0942 SS spoke with Kiara Smith at [facility]. Patient can return today. Their cutoff time is 2PM. Dr. Cortes and floor RN notified of cutoff time.    1145 SS spoke with ELISE Mota. Patient for possible discharge tomorrow. SS spoke with Kiara Smith and supervisor at [facility]. They can accept patient back tomorrow, but will need discharge orders faxed over today. Their cutoff time tomorrow will be 12oclock (noon). ELISE Mota notified. Packet left in patient's cubby.      1220 SS sent discharge orders to Kiara Smith. Provider and floor charge nurse notified that patient will have to be at the [facility] by noon tomorrow if he is discharged.

## 2022-08-05 NOTE — PROGRESS NOTES
[facility] - [address] [place] [address] Medicine  Progress Note    Patient Name: Neymar Parra  MRN: [id number]  Patient Class: IP- Inpatient   Admission Date: 8/2/2022  Length of Stay: 2 days  Attending Physician: Shari Cortes MD  Primary Care Provider: [facility] Moises Anderson        Subjective:     Principal Problem:Gastric hemorrhage    HPI:  72 y.o.m. presents to [facility] from [facility] due to GI bleed causing acute blood loss anemia. Patient resides at the [facility] in [place] where blood work resulted with an H/H of 6.9/20.9. Patient was taken to [facility] ED where he received 2U of pRBC and fluids. Patient was transferred to [facility] and admitted for higher level of care. GI was consulted fluids started and patient made NPO. Patient has several GI bleeds in the past with the most recent being on 7/8/2 where an Ex Lap was performed. Patient has an extensive GI past medical and past surgical Hx. Will monitor H/H and follow GI's recommendations.    ED Course:  Repeat H/H post 2U pRBC showed an H/H of 8.5/24.4. PT/PTT elevated at 15/40.8 with INR wnl. CMP indicated CKD.       Overview/Hospital Course:  8/3: potassium increasing so consulted nephrology for Stave IV kidney disease.  May also need to start preparing for dialysis in advance.      8/4: patient continues to complain of black stool.  Hg is stable.    Given repeat admissions for GI bleed, plan to hold ASA/Plavix for now.  Discuss with GI/Cardiology/nephrology prior to discharge.        Interval History:     Review of Systems   Constitutional:  Negative for activity change, appetite change, chills, diaphoresis and fever.   HENT:  Negative for congestion, hearing loss, nosebleeds, postnasal drip, rhinorrhea, sore throat and trouble swallowing.    Eyes:  Negative for visual disturbance.   Respiratory:  Negative for cough, chest tightness and shortness of breath.     Cardiovascular:  Negative for chest pain and leg swelling.   Gastrointestinal:  Positive for abdominal pain and blood in stool (Dark stool). Negative for constipation, diarrhea, nausea and vomiting.   Genitourinary:  Negative for dysuria and hematuria.   Musculoskeletal:  Positive for back pain. Negative for joint swelling.   Skin:  Negative for rash.   Neurological:  Negative for dizziness, weakness, light-headedness and headaches.   All other systems reviewed and are negative.  Objective:     Vital Signs (Most Recent):  Temp: 98.6 °F (37 °C) (08/04/22 1900)  Pulse: 83 (08/04/22 1900)  Resp: 18 (08/04/22 1900)  BP: (!) 145/79 (08/04/22 1900)  SpO2: 97 % (08/04/22 2025)   Vital Signs (24h Range):  Temp:  [98 °F (36.7 °C)-98.6 °F (37 °C)] 98.6 °F (37 °C)  Pulse:  [] 83  Resp:  [18-21] 18  SpO2:  [95 %-99 %] 97 %  BP: (130-171)/(52-92) 145/79     Weight: 89.8 kg (198 lb)  Body mass index is 26.85 kg/m².    Intake/Output Summary (Last 24 hours) at 8/4/2022 2954  Last data filed at 8/4/2022 1615  Gross per 24 hour   Intake --   Output 1700 ml   Net -1700 ml      Physical Exam  Vitals and nursing note reviewed.   Constitutional:       General: He is not in acute distress.     Appearance: Normal appearance. He is not ill-appearing, toxic-appearing or diaphoretic.   HENT:      Head: Normocephalic and atraumatic.      Nose: Nose normal. No congestion or rhinorrhea.      Mouth/Throat:      Mouth: Mucous membranes are dry.      Pharynx: Oropharynx is clear. No oropharyngeal exudate or posterior oropharyngeal erythema.   Eyes:      Conjunctiva/sclera: Conjunctivae normal.   Cardiovascular:      Rate and Rhythm: Normal rate. Rhythm irregular.      Pulses: Normal pulses.      Heart sounds: Normal heart sounds. No murmur heard.    No friction rub.   Pulmonary:      Effort: Pulmonary effort is normal. No respiratory distress.      Breath sounds: Normal breath sounds. No wheezing, rhonchi or rales.   Abdominal:       General: Bowel sounds are normal. There is no distension.      Palpations: Abdomen is soft.      Tenderness: There is no abdominal tenderness. There is no guarding.   Musculoskeletal:      Cervical back: Tenderness present.      Right lower leg: No edema.      Left lower leg: No edema.   Lymphadenopathy:      Cervical: No cervical adenopathy.   Skin:     General: Skin is warm and dry.      Capillary Refill: Capillary refill takes more than 3 seconds.   Neurological:      General: No focal deficit present.      Mental Status: He is alert and oriented to person, place, and time.   Psychiatric:         Mood and Affect: Mood normal.         Behavior: Behavior normal.         Thought Content: Thought content normal.         Judgment: Judgment normal.       Significant Labs: All pertinent labs within the past 24 hours have been reviewed.    Significant Imaging: I have reviewed all pertinent imaging results/findings within the past 24 hours.      Assessment/Plan:      * Gastric hemorrhage  - Prontonix infusion  - NS @ 125 mL/hr  - NPO  - GI consult, Eval for endoscopy  - type and screen  - 2U pRBC given at [facility] ED   - supplemental O2 PRN  - cardiac monitoring  - monitoring H/H transfuse as needed  - holding home Plavix and Asa81    8/3: may discuss with cardiology/GI when DAPT may be restarted.     Coronary artery disease involving native coronary artery of native heart without angina pectoris  Patient has CAD with stent   Holding home Lisinopril, Metoprolol tartrate, Asa81, and isosorbide mononitrate  - atorvastatin 80mg QD      CKD (chronic kidney disease), stage V  Fluids as above.     8/3: potassium increasing. Started kayexalate.  Consulted nephrology to optimize in-patient management and discharge recommendations.  Anticipate discharge soon.        COPD (chronic obstructive pulmonary disease)  Followed by Dr. Jovan Diaz Q6H PRN  - Supplemental O2 PRN    Acute blood loss anemia  Anemia panel indicates mixed  anemia retic count pending   Trend H/H   Transfuse if inidicated      Gastroesophageal reflux disease  Protonix as above      Hypertension  Holding home amlodipine metoprolol tartrate, and lisinopril         VTE Risk Mitigation (From admission, onward)         Ordered     IP VTE HIGH RISK PATIENT  Once         08/02/22 0429     Reason for no Mechanical VTE Prophylaxis  Once        Question:  Reasons:  Answer:  Active Bleeding    08/02/22 0429     Place sequential compression device  Until discontinued         08/02/22 0357                Discharge Planning   EDWARDO:      Code Status: DNR   Is the patient medically ready for discharge?:     Reason for patient still in hospital (select all that apply): Treatment  Discharge Plan A: Return to nursing home                  Shari Cortes MD  Department of Hospital Medicine   [facility] - [facility]

## 2022-08-06 VITALS
HEIGHT: 72 IN | TEMPERATURE: 99 F | OXYGEN SATURATION: 92 % | RESPIRATION RATE: 20 BRPM | WEIGHT: 198 LBS | SYSTOLIC BLOOD PRESSURE: 154 MMHG | BODY MASS INDEX: 26.82 KG/M2 | HEART RATE: 89 BPM | DIASTOLIC BLOOD PRESSURE: 86 MMHG

## 2022-08-06 LAB
ALBUMIN SERPL BCP-MCNC: 2 G/DL (ref 3.5–5)
ANION GAP SERPL CALCULATED.3IONS-SCNC: 19 MMOL/L (ref 7–16)
BUN SERPL-MCNC: 31 MG/DL (ref 7–18)
BUN/CREAT SERPL: 8 (ref 6–20)
CALCIUM SERPL-MCNC: 8.1 MG/DL (ref 8.5–10.1)
CHLORIDE SERPL-SCNC: 111 MMOL/L (ref 98–107)
CO2 SERPL-SCNC: 17 MMOL/L (ref 21–32)
CREAT SERPL-MCNC: 3.73 MG/DL (ref 0.7–1.3)
GLUCOSE SERPL-MCNC: 92 MG/DL (ref 70–105)
GLUCOSE SERPL-MCNC: 96 MG/DL (ref 74–106)
HGB BLD-MCNC: 8.6 G/DL (ref 13.5–18)
HGB BLD-MCNC: 9.1 G/DL (ref 13.5–18)
PHOSPHATE SERPL-MCNC: 3.8 MG/DL (ref 2.5–4.5)
POTASSIUM SERPL-SCNC: 4.7 MMOL/L (ref 3.5–5.1)
SODIUM SERPL-SCNC: 142 MMOL/L (ref 136–145)

## 2022-08-06 PROCEDURE — 36415 COLL VENOUS BLD VENIPUNCTURE: CPT | Performed by: HOSPITALIST

## 2022-08-06 PROCEDURE — 94761 N-INVAS EAR/PLS OXIMETRY MLT: CPT

## 2022-08-06 PROCEDURE — 25000003 PHARM REV CODE 250: Performed by: HOSPITALIST

## 2022-08-06 PROCEDURE — 85018 HEMOGLOBIN: CPT | Performed by: HOSPITALIST

## 2022-08-06 PROCEDURE — 82040 ASSAY OF SERUM ALBUMIN: CPT | Performed by: HOSPITALIST

## 2022-08-06 PROCEDURE — 25000003 PHARM REV CODE 250: Performed by: INTERNAL MEDICINE

## 2022-08-06 PROCEDURE — 99239 HOSP IP/OBS DSCHRG MGMT >30: CPT | Mod: ,,, | Performed by: HOSPITALIST

## 2022-08-06 PROCEDURE — 99239 PR HOSPITAL DISCHARGE DAY,>30 MIN: ICD-10-PCS | Mod: ,,, | Performed by: HOSPITALIST

## 2022-08-06 PROCEDURE — 82962 GLUCOSE BLOOD TEST: CPT

## 2022-08-06 RX ADMIN — PANTOPRAZOLE SODIUM 40 MG: 40 TABLET, DELAYED RELEASE ORAL at 05:08

## 2022-08-06 RX ADMIN — SODIUM POLYSTYRENE SULFONATE 15 G: 15 SUSPENSION ORAL; RECTAL at 08:08

## 2022-08-06 RX ADMIN — SODIUM CHLORIDE: 9 INJECTION, SOLUTION INTRAVENOUS at 08:08

## 2022-08-06 RX ADMIN — SODIUM BICARBONATE 1300 MG: 650 TABLET ORAL at 08:08

## 2022-08-06 NOTE — ASSESSMENT & PLAN NOTE
Holding home amlodipine metoprolol tartrate, and lisinopril     08/05--Resume home Metoprolol and Amlodipine at discharge

## 2022-08-06 NOTE — ASSESSMENT & PLAN NOTE
"Patient has CAD with stent   Holding home Lisinopril, Metoprolol tartrate, Asa81, and isosorbide mononitrate  - atorvastatin 80mg QD    --resume metoprolol at discharge, follow up with cardiology outpatient. States stents were placed "about 4 years ago"  "

## 2022-08-06 NOTE — ASSESSMENT & PLAN NOTE
Followed by Dr. Jovan Zaragoza Nebs Q6H PRN  - Supplemental O2 PRN    08/05--resume home nebs/meds at discharge

## 2022-08-06 NOTE — ASSESSMENT & PLAN NOTE
- Prontonix infusion  - NS @ 125 mL/hr  - NPO  - GI consult, Eval for endoscopy  - type and screen  - 2U pRBC given at [facility] ED   - supplemental O2 PRN  - cardiac monitoring  - monitoring H/H transfuse as needed  - holding home Plavix and Asa81    8/3: may discuss with cardiology/GI when DAPT may be restarted.     08/05--Cont to hold Plavix and Asa at discharge, H & H stable 8.6/25.2 Rec'd one unit PRBCs on yesterday, no further bleeding noted

## 2022-08-06 NOTE — ASSESSMENT & PLAN NOTE
Fluids as above.     8/3: potassium increasing. Started kayexalate.  Consulted nephrology to optimize in-patient management and discharge recommendations.  Anticipate discharge soon.      08/05--sCr improving 4.15, k+ 4.5 on yesterday, repeat labs drawn this AM with results pending , Continue sodium Bi Carb TID at discharge, repeat BMP in one week, follow up with nephrology outpatient

## 2022-08-06 NOTE — DISCHARGE SUMMARY
[facility] - [address] [place] [address] Medicine  Discharge Summary      Patient Name: Neymar Parra  MRN: [id number]  Patient Class: IP- Inpatient  Admission Date: 8/2/2022  Hospital Length of Stay: 4 days  Discharge Date and Time:  08/06/2022 8:56 AM  Attending Physician: Shari Cortes MD   Discharging Provider: SRIKANTH Curtis  Primary Care Provider: [facility]      HPI:   72 y.o.m. presents to [facility] from [facility] due to GI bleed causing acute blood loss anemia. Patient resides at the [facility] in [place] where blood work resulted with an H/H of 6.9/20.9. Patient was taken to [facility] ED where he received 2U of pRBC and fluids. Patient was transferred to [facility] and admitted for higher level of care. GI was consulted fluids started and patient made NPO. Patient has several GI bleeds in the past with the most recent being on 7/8/2 where an Ex Lap was performed. Patient has an extensive GI past medical and past surgical Hx. Will monitor H/H and follow GI's recommendations.    ED Course:  Repeat H/H post 2U pRBC showed an H/H of 8.5/24.4. PT/PTT elevated at 15/40.8 with INR wnl. CMP indicated CKD.       * No surgery found *      Hospital Course:   8/3: potassium increasing so consulted nephrology for Stave IV kidney disease.  May also need to start preparing for dialysis in advance.      8/4: patient continues to complain of black stool.  Hg is stable.    Given repeat admissions for GI bleed, plan to hold ASA/Plavix for now.  Discuss with GI/Cardiology/nephrology prior to discharge.      8/5: ASA/Plavix to be held.  GI ok with this plan.  Patient may follow-up as outpatient with GI/cardiology/nephrology.    Of note, renal function is improving.      08/06--Pt with no new issues or concerns, no further bleeding noted.  H & H remains stable.  States feels ready to be discharged today.  Will need outpatient  "Colonoscopy.  Will also need to follow up with nephrology and cardiology in 2 weeks.  Repeat CBC and BMP in one week. Cont to hold ASA and Plavix at discharge.  Has met the maximum benefit from this hospitalization and is stable for discharge back to NH.        Goals of Care Treatment Preferences:  Code Status: DNR      Consults:   Consults (From admission, onward)        Status Ordering Provider     Inpatient consult to Nephrology  Once        Provider:  Blayne Salgado MD    Acknowledged YANI PINZON     Inpatient consult to Gastroenterology  Once        Provider:  Gustavo Prado MD    Completed PAMELA HUITRON Gastric hemorrhage  - Prontonix infusion  - NS @ 125 mL/hr  - NPO  - GI consult, Eval for endoscopy  - type and screen  - 2U pRBC given at [facility] ED   - supplemental O2 PRN  - cardiac monitoring  - monitoring H/H transfuse as needed  - holding home Plavix and Asa81    8/3: may discuss with cardiology/GI when DAPT may be restarted.     08/05--Cont to hold Plavix and Asa at discharge, H & H stable 8.6/25.2 Rec'd one unit PRBCs on yesterday, no further bleeding noted    Coronary artery disease involving native coronary artery of native heart without angina pectoris  Patient has CAD with stent   Holding home Lisinopril, Metoprolol tartrate, Asa81, and isosorbide mononitrate  - atorvastatin 80mg QD    --resume metoprolol at discharge, follow up with cardiology outpatient. States stents were placed "about 4 years ago"    CKD (chronic kidney disease), stage V  Fluids as above.     8/3: potassium increasing. Started kayexalate.  Consulted nephrology to optimize in-patient management and discharge recommendations.  Anticipate discharge soon.      08/05--sCr improving 4.15, k+ 4.5 on yesterday, repeat labs drawn this AM with results pending , Continue sodium Bi Carb TID at discharge, repeat BMP in one week, follow up with nephrology outpatient    COPD (chronic obstructive pulmonary " disease)  Followed by Dr. Aldana   - Nik Nebs Q6H PRN  - Supplemental O2 PRN    08/05--resume home nebs/meds at discharge    Acute blood loss anemia  Anemia panel indicates mixed anemia retic count pending   Trend H/H   Transfuse if inidicated      Gastroesophageal reflux disease  Protonix as above    08/05- continue PPI BID at discharge    Hypertension  Holding home amlodipine metoprolol tartrate, and lisinopril     08/05--Resume home Metoprolol and Amlodipine at discharge        Final Active Diagnoses:    Diagnosis Date Noted POA    PRINCIPAL PROBLEM:  Gastric hemorrhage [K92.2] 07/20/2022 Yes    Acute blood loss anemia [D62] 08/02/2022 Yes    COPD (chronic obstructive pulmonary disease) [J44.9] 08/02/2022 Yes    CKD (chronic kidney disease), stage V [N18.5] 08/02/2022 Yes    Coronary artery disease involving native coronary artery of native heart without angina pectoris [I25.10] 08/02/2022 Yes    Hypertension [I10] 12/12/2021 Yes    Gastroesophageal reflux disease [K21.9] 12/12/2021 Yes      Problems Resolved During this Admission:       Discharged Condition: stable    Disposition: Skilled Nursing Facility    Follow Up:   Follow-up Information     [facility] Follow up in 1 week(s).    Why: Please have Provider to see this pt within one week of readmission to the facility, with repeat CBC & BMP.   Pt will need referral to GI for Colonoscopy to be scheduled.  Will also need close follow up with nephrology and cardiology.  Contact information:  [address] [address] [phone]                     Patient Instructions:      Diet renal     Notify your health care provider if you experience any of the following:  temperature >100.4     Notify your health care provider if you experience any of the following:  persistent nausea and vomiting or diarrhea     Notify your health care provider if you experience any of the following:  severe uncontrolled pain     Notify your health  care provider if you experience any of the following:  redness, tenderness, or signs of infection (pain, swelling, redness, odor or green/yellow discharge around incision site)     Notify your health care provider if you experience any of the following:  difficulty breathing or increased cough     Notify your health care provider if you experience any of the following:  severe persistent headache     Notify your health care provider if you experience any of the following:  worsening rash     Notify your health care provider if you experience any of the following:  persistent dizziness, light-headedness, or visual disturbances     Notify your health care provider if you experience any of the following:  increased confusion or weakness     Activity as tolerated       Significant Diagnostic Studies: Labs:   BMP:   Recent Labs   Lab 08/05/22  0936   *      K 4.5   *   CO2 20*   BUN 36*   CREATININE 4.15*   CALCIUM 8.0*    and CBC   Recent Labs   Lab 08/05/22  1830 08/05/22  2349 08/06/22  0813   HGB 8.5* 8.6* 9.1*       Pending Diagnostic Studies:     Procedure Component Value Units Date/Time    Drug Screen, Urine [[id number]]     Order Status: Sent Lab Status: No result     Specimen: Urine     EKG 12-lead [[id number]] Collected: 08/02/22 0727    Order Status: Sent Lab Status: In process Updated: 08/02/22 0738    Narrative:      Test Reason : I10,    Vent. Rate : 097 BPM     Atrial Rate : 227 BPM     P-R Int : 000 ms          QRS Dur : 074 ms      QT Int : 348 ms       P-R-T Axes : 156 002 076 degrees     QTc Int : 441 ms    Atrial flutter with variable A-V block  Anteroseptal infarct ,age undetermined  Abnormal ECG  When compared with ECG of 13-JUL-2022 08:18,  PREVIOUS ECG IS PRESENT    Referred By: PROVIDER NOTINSYSTEM           Confirmed By:     EXTRA TUBES [[id number]] Collected: 08/05/22 0753    Order Status: Sent Lab Status: In process Updated: 08/05/22 0753    Specimen: Blood, Venous      Narrative:      The following orders were created for panel order EXTRA TUBES.  Procedure                               Abnormality         Status                     ---------                               -----------         ------                     Red Top Hold[[id number]]                                     In process                   Please view results for these tests on the individual orders.    EXTRA TUBES [[id number]] Collected: 08/03/22 1047    Order Status: Sent Lab Status: In process Updated: 08/03/22 1048    Specimen: Blood, Venous     Narrative:      The following orders were created for panel order EXTRA TUBES.  Procedure                               Abnormality         Status                     ---------                               -----------         ------                     Lavender Top Hold[[id number]]                                In process                   Please view results for these tests on the individual orders.    EXTRA TUBES [[id number]] Collected: 08/02/22 2348    Order Status: Sent Lab Status: In process Updated: 08/02/22 2348    Specimen: Blood, Venous     Narrative:      The following orders were created for panel order EXTRA TUBES.  Procedure                               Abnormality         Status                     ---------                               -----------         ------                     Red Top Hold[[id number]]                                     In process                   Please view results for these tests on the individual orders.    Renal Function Panel [[id number]] Collected: 08/06/22 0812    Order Status: Sent Lab Status: In process Updated: 08/06/22 0848    Specimen: Blood          Medications:  Reconciled Home Medications:      Medication List      CHANGE how you take these medications    pantoprazole 40 MG tablet  Commonly known as: PROTONIX  Take 1 tablet (40 mg total) by mouth 2 (two) times daily before meals.  What changed: when to take this      sodium bicarbonate 650 MG tablet  Take 2 tablets (1,300 mg total) by mouth 3 (three) times daily after meals.  What changed:   · how much to take  · when to take this        CONTINUE taking these medications    acetaminophen 500 MG tablet  Commonly known as: TYLENOL  Take 1,000 mg by mouth every 8 (eight) hours as needed for Pain.     albuterol-ipratropium 2.5 mg-0.5 mg/3 mL nebulizer solution  Commonly known as: DUO-NEB  Take 3 mLs by nebulization every 6 (six) hours as needed for Wheezing. Rescue     allopurinoL 100 MG tablet  Commonly known as: ZYLOPRIM  Take 0.5 tablets (50 mg total) by mouth once daily.     amLODIPine 10 MG tablet  Commonly known as: NORVASC  Take 10 mg by mouth once daily.     ascorbic acid (vitamin C) 500 MG tablet  Commonly known as: VITAMIN C  Take 1 tablet (500 mg total) by mouth once daily.     atorvastatin 80 MG tablet  Commonly known as: LIPITOR  Take 40 mg by mouth every evening.     HYDROcodone-acetaminophen 7.5-325 mg per tablet  Commonly known as: NORCO  Take 1 tablet by mouth every 6 (six) hours as needed for Pain.     loratadine 10 mg tablet  Commonly known as: CLARITIN  TAKE ONE TABLET BY MOUTH DAILY FOR ALLERGIES     metoprolol tartrate 25 MG tablet  Commonly known as: LOPRESSOR  Take 1 tablet (25 mg total) by mouth 2 (two) times daily.     multivitamin Tab  Take 1 tablet by mouth once daily.     traMADoL 50 mg tablet  Commonly known as: ULTRAM  Take 50 mg by mouth every 8 (eight) hours as needed for Pain.     traZODone 100 MG tablet  Commonly known as: DESYREL  Take 300 mg by mouth every evening.        STOP taking these medications    aspirin 325 MG EC tablet  Commonly known as: ECOTRIN     clopidogreL 75 mg tablet  Commonly known as: PLAVIX     isosorbide mononitrate 60 MG 24 hr tablet  Commonly known as: IMDUR     lisinopriL 20 MG tablet  Commonly known as: PRINIVIL,ZESTRIL     omeprazole 20 MG capsule  Commonly known as: PRILOSEC            Indwelling Lines/Drains at time  of discharge:   Lines/Drains/Airways     None                 Time spent on the discharge of patient: >30 minutes         SRIKANTH Curtis  Department of Hospital Medicine  [facility] - [address] [place] [address]

## 2022-08-06 NOTE — PROGRESS NOTES
[facility] - [address] [place] [address] Medicine  Progress Note    Patient Name: Neymar Parra  MRN: [id number]  Patient Class: IP- Inpatient   Admission Date: 8/2/2022  Length of Stay: 4 days  Attending Physician: Shari Cortes MD  Primary Care Provider: [facility], [place], operated by [facility]        Subjective:     Principal Problem:Gastric hemorrhage    HPI:  72 y.o.m. presents to [facility] from [facility] due to GI bleed causing acute blood loss anemia. Patient resides at the [facility] in [place] where blood work resulted with an H/H of 6.9/20.9. Patient was taken to [facility] ED where he received 2U of pRBC and fluids. Patient was transferred to [facility] and admitted for higher level of care. GI was consulted fluids started and patient made NPO. Patient has several GI bleeds in the past with the most recent being on 7/8/2 where an Ex Lap was performed. Patient has an extensive GI past medical and past surgical Hx. Will monitor H/H and follow GI's recommendations.    ED Course:  Repeat H/H post 2U pRBC showed an H/H of 8.5/24.4. PT/PTT elevated at 15/40.8 with INR wnl. CMP indicated CKD.       Overview/Hospital Course:  8/3: potassium increasing so consulted nephrology for Stave IV kidney disease.  May also need to start preparing for dialysis in advance.      8/4: patient continues to complain of black stool.  Hg is stable.    Given repeat admissions for GI bleed, plan to hold ASA/Plavix for now.  Discuss with GI/Cardiology/nephrology prior to discharge.      8/5: ASA/Plavix to be held.  GI ok with this plan.  Patient may follow-up as outpatient with GI/cardiology/nephrology.    Of note, renal function is improving.        Interval History:     Review of Systems   Constitutional:  Negative for activity change, appetite change, chills, diaphoresis and fever.   HENT:  Negative for congestion, hearing loss, nosebleeds, postnasal drip, rhinorrhea,  sore throat and trouble swallowing.    Eyes:  Negative for visual disturbance.   Respiratory:  Negative for cough, chest tightness and shortness of breath.    Cardiovascular:  Negative for chest pain and leg swelling.   Gastrointestinal:  Positive for abdominal pain and blood in stool (Dark stool). Negative for constipation, diarrhea, nausea and vomiting.   Genitourinary:  Negative for dysuria and hematuria.   Musculoskeletal:  Positive for back pain. Negative for joint swelling.   Skin:  Negative for rash.   Neurological:  Negative for dizziness, weakness, light-headedness and headaches.   All other systems reviewed and are negative.  Objective:     Vital Signs (Most Recent):  Temp: 98.9 °F (37.2 °C) (08/06/22 0430)  Pulse: 94 (08/06/22 0430)  Resp: 18 (08/06/22 0430)  BP: (!) 154/90 (08/06/22 0430)  SpO2: 96 % (08/06/22 0430)   Vital Signs (24h Range):  Temp:  [97.8 °F (36.6 °C)-98.9 °F (37.2 °C)] 98.9 °F (37.2 °C)  Pulse:  [73-96] 94  Resp:  [18-20] 18  SpO2:  [93 %-98 %] 96 %  BP: (138-154)/(76-90) 154/90     Weight: 89.8 kg (198 lb)  Body mass index is 26.85 kg/m².    Intake/Output Summary (Last 24 hours) at 8/6/2022 0708  Last data filed at 8/6/2022 0600  Gross per 24 hour   Intake 317.5 ml   Output 3700 ml   Net -3382.5 ml      Physical Exam  Vitals and nursing note reviewed.   Constitutional:       General: He is not in acute distress.     Appearance: Normal appearance. He is not ill-appearing, toxic-appearing or diaphoretic.   HENT:      Head: Normocephalic and atraumatic.      Nose: Nose normal. No congestion or rhinorrhea.      Mouth/Throat:      Mouth: Mucous membranes are dry.      Pharynx: Oropharynx is clear. No oropharyngeal exudate or posterior oropharyngeal erythema.   Eyes:      Conjunctiva/sclera: Conjunctivae normal.   Cardiovascular:      Rate and Rhythm: Normal rate. Rhythm irregular.      Pulses: Normal pulses.      Heart sounds: Normal heart sounds. No murmur heard.    No friction rub.    Pulmonary:      Effort: Pulmonary effort is normal. No respiratory distress.      Breath sounds: Normal breath sounds. No wheezing, rhonchi or rales.   Abdominal:      General: Bowel sounds are normal. There is no distension.      Palpations: Abdomen is soft.      Tenderness: There is no abdominal tenderness. There is no guarding.   Musculoskeletal:      Cervical back: Tenderness present.      Right lower leg: No edema.      Left lower leg: No edema.   Lymphadenopathy:      Cervical: No cervical adenopathy.   Skin:     General: Skin is warm and dry.      Capillary Refill: Capillary refill takes more than 3 seconds.   Neurological:      General: No focal deficit present.      Mental Status: He is alert and oriented to person, place, and time.   Psychiatric:         Mood and Affect: Mood normal.         Behavior: Behavior normal.         Thought Content: Thought content normal.         Judgment: Judgment normal.       Significant Labs: All pertinent labs within the past 24 hours have been reviewed.    Significant Imaging: I have reviewed all pertinent imaging results/findings within the past 24 hours.      Assessment/Plan:      * Gastric hemorrhage  - Prontonix infusion  - NS @ 125 mL/hr  - NPO  - GI consult, Eval for endoscopy  - type and screen  - 2U pRBC given at [facility] ED   - supplemental O2 PRN  - cardiac monitoring  - monitoring H/H transfuse as needed  - holding home Plavix and Asa81    8/3: may discuss with cardiology/GI when DAPT may be restarted.     Coronary artery disease involving native coronary artery of native heart without angina pectoris  Patient has CAD with stent   Holding home Lisinopril, Metoprolol tartrate, Asa81, and isosorbide mononitrate  - atorvastatin 80mg QD      CKD (chronic kidney disease), stage V  Fluids as above.     8/3: potassium increasing. Started kayexalate.  Consulted nephrology to optimize in-patient management and discharge recommendations.  Anticipate discharge soon.         COPD (chronic obstructive pulmonary disease)  Followed by Dr. Jovan Diaz Q6H PRN  - Supplemental O2 PRN    Acute blood loss anemia  Anemia panel indicates mixed anemia retic count pending   Trend H/H   Transfuse if inidicated      Gastroesophageal reflux disease  Protonix as above      Hypertension  Holding home amlodipine metoprolol tartrate, and lisinopril         VTE Risk Mitigation (From admission, onward)         Ordered     IP VTE HIGH RISK PATIENT  Once         08/02/22 0429     Reason for no Mechanical VTE Prophylaxis  Once        Question:  Reasons:  Answer:  Active Bleeding    08/02/22 0429     Place sequential compression device  Until discontinued         08/02/22 0357                Discharge Planning   EDWARDO: 8/5/2022     Code Status: DNR   Is the patient medically ready for discharge?:     Reason for patient still in hospital (select all that apply): Treatment  Discharge Plan A: Return to nursing home                  Sahri Cortes MD  Department of Hospital Medicine   [facility] - [facility] [address] [facility]

## 2022-08-06 NOTE — SUBJECTIVE & OBJECTIVE
Interval History:     Review of Systems   Constitutional:  Negative for activity change, appetite change, chills, diaphoresis and fever.   HENT:  Negative for congestion, hearing loss, nosebleeds, postnasal drip, rhinorrhea, sore throat and trouble swallowing.    Eyes:  Negative for visual disturbance.   Respiratory:  Negative for cough, chest tightness and shortness of breath.    Cardiovascular:  Negative for chest pain and leg swelling.   Gastrointestinal:  Positive for abdominal pain and blood in stool (Dark stool). Negative for constipation, diarrhea, nausea and vomiting.   Genitourinary:  Negative for dysuria and hematuria.   Musculoskeletal:  Positive for back pain. Negative for joint swelling.   Skin:  Negative for rash.   Neurological:  Negative for dizziness, weakness, light-headedness and headaches.   All other systems reviewed and are negative.  Objective:     Vital Signs (Most Recent):  Temp: 98.9 °F (37.2 °C) (08/06/22 0430)  Pulse: 94 (08/06/22 0430)  Resp: 18 (08/06/22 0430)  BP: (!) 154/90 (08/06/22 0430)  SpO2: 96 % (08/06/22 0430)   Vital Signs (24h Range):  Temp:  [97.8 °F (36.6 °C)-98.9 °F (37.2 °C)] 98.9 °F (37.2 °C)  Pulse:  [73-96] 94  Resp:  [18-20] 18  SpO2:  [93 %-98 %] 96 %  BP: (138-154)/(76-90) 154/90     Weight: 89.8 kg (198 lb)  Body mass index is 26.85 kg/m².    Intake/Output Summary (Last 24 hours) at 8/6/2022 0708  Last data filed at 8/6/2022 0600  Gross per 24 hour   Intake 317.5 ml   Output 3700 ml   Net -3382.5 ml      Physical Exam  Vitals and nursing note reviewed.   Constitutional:       General: He is not in acute distress.     Appearance: Normal appearance. He is not ill-appearing, toxic-appearing or diaphoretic.   HENT:      Head: Normocephalic and atraumatic.      Nose: Nose normal. No congestion or rhinorrhea.      Mouth/Throat:      Mouth: Mucous membranes are dry.      Pharynx: Oropharynx is clear. No oropharyngeal exudate or posterior oropharyngeal erythema.   Eyes:       Conjunctiva/sclera: Conjunctivae normal.   Cardiovascular:      Rate and Rhythm: Normal rate. Rhythm irregular.      Pulses: Normal pulses.      Heart sounds: Normal heart sounds. No murmur heard.    No friction rub.   Pulmonary:      Effort: Pulmonary effort is normal. No respiratory distress.      Breath sounds: Normal breath sounds. No wheezing, rhonchi or rales.   Abdominal:      General: Bowel sounds are normal. There is no distension.      Palpations: Abdomen is soft.      Tenderness: There is no abdominal tenderness. There is no guarding.   Musculoskeletal:      Cervical back: Tenderness present.      Right lower leg: No edema.      Left lower leg: No edema.   Lymphadenopathy:      Cervical: No cervical adenopathy.   Skin:     General: Skin is warm and dry.      Capillary Refill: Capillary refill takes more than 3 seconds.   Neurological:      General: No focal deficit present.      Mental Status: He is alert and oriented to person, place, and time.   Psychiatric:         Mood and Affect: Mood normal.         Behavior: Behavior normal.         Thought Content: Thought content normal.         Judgment: Judgment normal.       Significant Labs: All pertinent labs within the past 24 hours have been reviewed.    Significant Imaging: I have reviewed all pertinent imaging results/findings within the past 24 hours.

## 2022-09-29 NOTE — PLAN OF CARE
Problem: Gas Exchange Impaired  Goal: Optimal Gas Exchange  Outcome: Ongoing, Progressing      Self
